# Patient Record
Sex: MALE | Race: WHITE | NOT HISPANIC OR LATINO | Employment: OTHER | URBAN - METROPOLITAN AREA
[De-identification: names, ages, dates, MRNs, and addresses within clinical notes are randomized per-mention and may not be internally consistent; named-entity substitution may affect disease eponyms.]

---

## 2017-01-03 ENCOUNTER — TELEPHONE (OUTPATIENT)
Dept: FAMILY MEDICINE CLINIC | Facility: CLINIC | Age: 72
End: 2017-01-03

## 2017-01-03 NOTE — TELEPHONE ENCOUNTER
Talk to the sister and they have a concern awaiting 6 months for another CT of the chest he just had one in December the radiologist stated there may be the slightest change I told him that I think 3-4 months would be something that we could wait.  They're hearing all of these stories that there've been changes in 2 weeks and patient.  It is very hard for us to get these done frequently on patient's we've be done her ever month.  I would talk to the radiologist and see how he feels about 3-4 months.  Told her it's hard for me to sit back in no that were following something and does not check it every month but insurance is diabetic and the patient that

## 2017-01-16 RX ORDER — ZOLPIDEM TARTRATE 5 MG/1
TABLET ORAL
Qty: 30 TABLET | Refills: 1 | Status: SHIPPED | OUTPATIENT
Start: 2017-01-16 | End: 2017-09-07 | Stop reason: SDUPTHER

## 2017-01-16 RX ORDER — RIVAROXABAN 20 MG/1
TABLET, FILM COATED ORAL
Qty: 30 TABLET | Refills: 5 | Status: SHIPPED | OUTPATIENT
Start: 2017-01-16 | End: 2017-07-14 | Stop reason: SDUPTHER

## 2017-01-17 RX ORDER — ZOLPIDEM TARTRATE 5 MG/1
TABLET ORAL
Qty: 30 TABLET | Refills: 1 | OUTPATIENT
Start: 2017-01-17

## 2017-01-30 RX ORDER — DOXAZOSIN MESYLATE 4 MG/1
TABLET ORAL
Qty: 90 TABLET | Refills: 0 | Status: SHIPPED | OUTPATIENT
Start: 2017-01-30 | End: 2017-04-19 | Stop reason: SDUPTHER

## 2017-01-30 RX ORDER — GABAPENTIN 300 MG/1
CAPSULE ORAL
Qty: 60 CAPSULE | Refills: 2 | Status: SHIPPED | OUTPATIENT
Start: 2017-01-30 | End: 2017-05-05 | Stop reason: SDUPTHER

## 2017-02-03 ENCOUNTER — OFFICE VISIT (OUTPATIENT)
Dept: NEUROLOGY | Facility: CLINIC | Age: 72
End: 2017-02-03

## 2017-02-03 VITALS
HEART RATE: 68 BPM | DIASTOLIC BLOOD PRESSURE: 66 MMHG | BODY MASS INDEX: 25.73 KG/M2 | WEIGHT: 190 LBS | OXYGEN SATURATION: 98 % | SYSTOLIC BLOOD PRESSURE: 130 MMHG | HEIGHT: 72 IN

## 2017-02-03 DIAGNOSIS — R47.1 DYSARTHRIA: ICD-10-CM

## 2017-02-03 DIAGNOSIS — R26.81 GAIT INSTABILITY: ICD-10-CM

## 2017-02-03 DIAGNOSIS — G20 IDIOPATHIC PARKINSON'S DISEASE (HCC): ICD-10-CM

## 2017-02-03 DIAGNOSIS — G60.9 IDIOPATHIC PERIPHERAL NEUROPATHY: ICD-10-CM

## 2017-02-03 PROCEDURE — 99214 OFFICE O/P EST MOD 30 MIN: CPT | Performed by: PSYCHIATRY & NEUROLOGY

## 2017-02-03 NOTE — PROGRESS NOTES
Subjective   Rommel Gonzalez is a 71 y.o. male with history of Parkinson's disease with peripheral neuropathy and dysarthria came for follow-up appointment.    History of Present Illness   He was accompanied by his daughter and according to them, his slurred speech got worse since her last visit but denies any difficulty swallowing or difficulty breathing issues.  Still having tremor and having more shuffling gait and balance issues when walking and had one fall and complaint with the medication carbidopa/levodopa and denies any side effects.  Denies any hallucinations or memory issues or lightheaded spells.  Denies any worsening of the tingling and numbness and complaint with the medication gabapentin and denies any side effects.  Denies any headaches, blurred vision, double vision, weakness, dizziness, passing out spells or recent hospitalizations since last visit.    The following portions of the patient's history were reviewed and updated as appropriate: allergies, current medications, past family history, past medical history, past social history, past surgical history and problem list.    Review of Systems   Constitutional: Negative for appetite change, chills, fatigue and fever.   HENT: Negative for hearing loss, tinnitus and trouble swallowing.    Eyes: Negative for pain, redness, itching and visual disturbance.   Respiratory: Negative for cough, shortness of breath and wheezing.    Cardiovascular: Negative for chest pain, palpitations and leg swelling.   Gastrointestinal: Negative for constipation, diarrhea, nausea, rectal pain and vomiting.   Endocrine: Negative for cold intolerance and heat intolerance.   Genitourinary: Negative for difficulty urinating and urgency.   Musculoskeletal: Positive for gait problem (unsteady). Negative for back pain, neck pain and neck stiffness.   Skin: Negative for rash and wound.   Allergic/Immunologic: Negative for environmental allergies and food allergies.   Neurological:  Positive for tremors. Negative for dizziness, weakness, numbness and headaches.   Hematological: Bruises/bleeds easily.   Psychiatric/Behavioral: Negative for confusion, hallucinations and sleep disturbance. The patient is not nervous/anxious.        Objective   Physical Exam  GENERAL EXAMINATION : Patient is well-developed, well-nourished, well-groomed, alert and approriate in no apparent distress.  HEENT: Normocephalic, normal funduscopic exam, no visible oral lesions.  NECK : Normal range of motion, no tenderness, no malalignment.  CARDIAC : Regular rate and rhythm, no murmurs.  CHEST : Clear to auscultation, bilateral.   No wheezing .  ABDOMEN : Soft, non tender and non distended. EXTREMITIES: No edema. SKIN : No rashes or lesions visible. MUSCULOSKELETAL : No muscle weakness or muscle pain. No joint pains. PSYCHIATRIC : Awake and follwoing verbal commands well.     NEUROLOGICAL EXAMINATION  :  Higher integrative functions : No aphasia but has dysarthria.  CRANIAL NERVES : Cranial nerve II: Normal visual acuity and visual fields.  On funduscopic exam, discs are flat with sharp margins cranial nerves III, IV, VI: Extraocular movements are full without nystagmus; pupils are equal, round and reactive to light.  Cranial nerve V: Normal facial sensation and strength of muscles of mastication.  Cranial nerve: VII: Facial movements are symmetric.  No weakness.  Cranial nerve VIII: Auditory acuity is normal.  Cranial nerve IX, X: Symmetric palate movement.  Cranial nerve XI sternocleidomastoid and trapezius are normal.  Cranial nerve XII: Tongue in the midline with no atrophy or fasciculations.      MOTOR : Normal muscle strength except for resting tremor with mild cogwheel rigidity and decreased arm swing.  SENSATION : Normal to light touch, pinprick, vibration sensations intact and Romberg is negative.  MUSCLE STRETCH REFLEXES : Normal and symmetric in the upper extremities and lower extremities and the plantar  responses are flexor bilaterally. COORDINATION : Finger to nose test showed no dysmetria.  Rapid alternating movements are normal.   GAIT : Walks on heels, toes, except for difficulty with tandem walk.    Assessment/Plan   Rommel was seen today for parkinson's disease.    Diagnoses and all orders for this visit:    Idiopathic Parkinson's disease  -     OT Consult: Eval & Treat; Future  -     Ambulatory Referral to Speech Therapy    Idiopathic peripheral neuropathy    Dysarthria  -     Ambulatory Referral to Speech Therapy    Gait instability  -     Ambulatory Referral to Physical Therapy Evaluate and treat     71-year-old right-handed white male with history of Parkinson's disease with peripheral neuropathy and dysarthria came for follow-up appointment and complaining of worsening of slurred speech issues and balance problems.  On exam, no new focal deficits were seen except for resting tremor with mild cogwheel rigidity and decreased arm swing with difficulty tandem walk and has dysarthria.  Discussed with the patient and his daughter regarding his signs and symptoms and told him to continue carbidopa/levodopa 25/100 mg 2 tablets by mouth 3 times a day and continue carbidopa/levodopa ER 50/200 mg one tablet by mouth at bedtime and discussed about side effects.  Discussed about fall precautions and ordered for physical therapy, occupational therapy and speech therapy to help with the balance and gait and slurred speech issues.  Continue gabapentin 300 mg 1 capsule by mouth twice a day and discussed about side effects.  Will follow-up in 4 months or earlier if problems arise.    Thank you for allowing me to participate in the care of the patient.  Please feel free to call for any questions at your convenience.    Yours sincerely,    Roxann Hankins M.D

## 2017-02-06 ENCOUNTER — OFFICE VISIT (OUTPATIENT)
Dept: FAMILY MEDICINE CLINIC | Facility: CLINIC | Age: 72
End: 2017-02-06

## 2017-02-06 VITALS
BODY MASS INDEX: 26.25 KG/M2 | OXYGEN SATURATION: 98 % | TEMPERATURE: 97.8 F | HEART RATE: 90 BPM | HEIGHT: 72 IN | SYSTOLIC BLOOD PRESSURE: 104 MMHG | DIASTOLIC BLOOD PRESSURE: 72 MMHG | WEIGHT: 193.8 LBS

## 2017-02-06 DIAGNOSIS — E03.9 HYPOTHYROIDISM (ACQUIRED): ICD-10-CM

## 2017-02-06 DIAGNOSIS — Z91.81 HISTORY OF FALL: Primary | ICD-10-CM

## 2017-02-06 DIAGNOSIS — R26.81 GAIT INSTABILITY: ICD-10-CM

## 2017-02-06 DIAGNOSIS — E03.4 HYPOTHYROIDISM DUE TO ACQUIRED ATROPHY OF THYROID: ICD-10-CM

## 2017-02-06 DIAGNOSIS — G20 IDIOPATHIC PARKINSON'S DISEASE (HCC): ICD-10-CM

## 2017-02-06 DIAGNOSIS — I10 ESSENTIAL HYPERTENSION: ICD-10-CM

## 2017-02-06 DIAGNOSIS — I48.20 CHRONIC ATRIAL FIBRILLATION (HCC): ICD-10-CM

## 2017-02-06 PROCEDURE — 99212 OFFICE O/P EST SF 10 MIN: CPT | Performed by: GENERAL PRACTICE

## 2017-02-06 RX ORDER — CARBIDOPA AND LEVODOPA 25; 100 MG/1; MG/1
1 TABLET, EXTENDED RELEASE ORAL 3 TIMES DAILY
COMMUNITY
End: 2017-03-13 | Stop reason: SDUPTHER

## 2017-02-06 RX ORDER — BUMETANIDE 2 MG/1
TABLET ORAL
Qty: 60 TABLET | Refills: 0 | Status: SHIPPED | OUTPATIENT
Start: 2017-02-06 | End: 2017-03-14 | Stop reason: SDUPTHER

## 2017-02-06 NOTE — PROGRESS NOTES
Subjective   Rommel Gonzalez is a 71 y.o. male.     Chief Complaint   Patient presents with   • Fall   • Knee Pain     Bitlateral knee pain        History of Present Illness patient is a 71 y.o. with known Parkinson disease who had a fall the other day landed on his knees right elbow he is on Plavix but I do not see any large bruising either knee or elbow is very fortunately didn't get any kind of a hematoma.  This patient labs today to be brought up-to-date today he is not fasting today he will return fasting he is with his sister today he does not need any medications filled today at least that's what I was told was in the room JONO was going in to recheck                        The following portions of the patient's history were reviewed and updated as appropriate: allergies, current medications, past family history, past medical history, past social history, past surgical history and problem list.      Review of Systems   Cardiovascular:        Pretension hyperlipidemia on medication chronic atrial fibrillation Xarelto   Musculoskeletal:        No contusions to his right or left knee or elbow from the fall   Skin:        History of fall on right and left knee and right elbow I see no evidence of contusion certainly no skin tears not any bruising no hematomas which is very marissa he is on Plavix   Neurological:        Patient has progressive Parkinson disease and if he can speech defect at this time and mobility defect on treatment from neurologist   Hematological:        Iron deficiency anemia         Objective   Physical Exam   HENT:   Obvious Parkinson issues with speech instability   Cardiovascular:   Slow atrial fibrillation on anticoagulation blood pressure [birth year] I asked the daughter to watch this as mailman a reason for the fall needs to take his pressures come times a day   Pulmonary/Chest: Effort normal and breath sounds normal.   Musculoskeletal:   No findings of the knees from the fall at all the  right elbow also there is negative findings is very fortunate since he is on Plavix   Neurological:   Parkinson disease gait instability speech defect some mild confusion not terrible slurred speech had a fall which he says I tripped   Skin:   Found no evidence of any counter bruising or contusions or the right or left knee on the right elbow from where he fell         Assessment/Plan   Rommel was seen today for fall and knee pain.    Diagnoses and all orders for this visit:    History of fall    Idiopathic Parkinson's disease    Chronic atrial fibrillation    Gait instability    Essential hypertension    Hypothyroidism due to acquired atrophy of thyroid    Hypothyroidism (acquired)            Ike Lee MD  2/6/2017     Very nice 71-year-old white gentleman here today with his sister mainly because he fell on his knees right and left and his right elbow however on examination I see no contusions no bruising no hematomas he is very fortunate since he is anticoagulation I certainly don't feel we need any counter x-rays.  He has progressive Parkinson disease gait instability speech difficulty sees a neurologist he is on medication patient has a history of hyperlipidemia hypothyroidism hypertension all on medications blood pressure as noted was slightly low

## 2017-02-09 LAB
T4 FREE SERPL-MCNC: 1.6 NG/DL (ref 0.93–1.7)
TSH SERPL DL<=0.005 MIU/L-ACNC: 1.21 MIU/ML (ref 0.27–4.2)

## 2017-02-13 RX ORDER — CARBIDOPA AND LEVODOPA 50; 200 MG/1; MG/1
TABLET, EXTENDED RELEASE ORAL
Qty: 90 TABLET | Refills: 2 | Status: SHIPPED | OUTPATIENT
Start: 2017-02-13 | End: 2017-03-13 | Stop reason: SDUPTHER

## 2017-03-13 ENCOUNTER — OFFICE VISIT (OUTPATIENT)
Dept: FAMILY MEDICINE CLINIC | Facility: CLINIC | Age: 72
End: 2017-03-13

## 2017-03-13 VITALS
HEART RATE: 100 BPM | HEIGHT: 72 IN | WEIGHT: 196 LBS | OXYGEN SATURATION: 99 % | BODY MASS INDEX: 26.55 KG/M2 | RESPIRATION RATE: 16 BRPM | SYSTOLIC BLOOD PRESSURE: 108 MMHG | TEMPERATURE: 98.6 F | DIASTOLIC BLOOD PRESSURE: 62 MMHG

## 2017-03-13 DIAGNOSIS — E03.9 HYPOTHYROIDISM (ACQUIRED): ICD-10-CM

## 2017-03-13 DIAGNOSIS — I10 ESSENTIAL HYPERTENSION: ICD-10-CM

## 2017-03-13 DIAGNOSIS — I48.20 CHRONIC ATRIAL FIBRILLATION (HCC): ICD-10-CM

## 2017-03-13 DIAGNOSIS — Z79.01 ANTICOAGULATED: ICD-10-CM

## 2017-03-13 DIAGNOSIS — E61.1 IRON DEFICIENCY: ICD-10-CM

## 2017-03-13 DIAGNOSIS — R93.89 ABNORMAL CHEST CT: ICD-10-CM

## 2017-03-13 DIAGNOSIS — Z79.899 HIGH RISK MEDICATION USE: ICD-10-CM

## 2017-03-13 DIAGNOSIS — G20 IDIOPATHIC PARKINSON'S DISEASE (HCC): Primary | ICD-10-CM

## 2017-03-13 PROCEDURE — 99214 OFFICE O/P EST MOD 30 MIN: CPT | Performed by: FAMILY MEDICINE

## 2017-03-13 RX ORDER — LEVOTHYROXINE SODIUM 112 UG/1
112 TABLET ORAL DAILY
Qty: 90 TABLET | Refills: 3 | Status: SHIPPED | OUTPATIENT
Start: 2017-03-13 | End: 2018-03-03 | Stop reason: SDUPTHER

## 2017-03-14 LAB
BASOPHILS # BLD AUTO: 0 X10E3/UL (ref 0–0.2)
BASOPHILS NFR BLD AUTO: 1 %
BUN SERPL-MCNC: 16 MG/DL (ref 8–27)
BUN/CREAT SERPL: 16 (ref 10–22)
CALCIUM SERPL-MCNC: 9.2 MG/DL (ref 8.6–10.2)
CHLORIDE SERPL-SCNC: 100 MMOL/L (ref 96–106)
CO2 SERPL-SCNC: 29 MMOL/L (ref 18–29)
CREAT SERPL-MCNC: 0.98 MG/DL (ref 0.76–1.27)
EOSINOPHIL # BLD AUTO: 0.2 X10E3/UL (ref 0–0.4)
EOSINOPHIL NFR BLD AUTO: 2 %
ERYTHROCYTE [DISTWIDTH] IN BLOOD BY AUTOMATED COUNT: 13.4 % (ref 12.3–15.4)
FERRITIN SERPL-MCNC: 316 NG/ML (ref 30–400)
GLUCOSE SERPL-MCNC: 95 MG/DL (ref 65–99)
HCT VFR BLD AUTO: 39.5 % (ref 37.5–51)
HGB BLD-MCNC: 12.8 G/DL (ref 12.6–17.7)
IMM GRANULOCYTES # BLD: 0 X10E3/UL (ref 0–0.1)
IMM GRANULOCYTES NFR BLD: 0 %
LYMPHOCYTES # BLD AUTO: 2.2 X10E3/UL (ref 0.7–3.1)
LYMPHOCYTES NFR BLD AUTO: 31 %
MCH RBC QN AUTO: 31.1 PG (ref 26.6–33)
MCHC RBC AUTO-ENTMCNC: 32.4 G/DL (ref 31.5–35.7)
MCV RBC AUTO: 96 FL (ref 79–97)
MONOCYTES # BLD AUTO: 0.5 X10E3/UL (ref 0.1–0.9)
MONOCYTES NFR BLD AUTO: 7 %
NEUTROPHILS # BLD AUTO: 4.1 X10E3/UL (ref 1.4–7)
NEUTROPHILS NFR BLD AUTO: 59 %
PLATELET # BLD AUTO: 239 X10E3/UL (ref 150–379)
POTASSIUM SERPL-SCNC: 4.4 MMOL/L (ref 3.5–5.2)
RBC # BLD AUTO: 4.12 X10E6/UL (ref 4.14–5.8)
SODIUM SERPL-SCNC: 142 MMOL/L (ref 134–144)
WBC # BLD AUTO: 7 X10E3/UL (ref 3.4–10.8)

## 2017-03-14 RX ORDER — BUMETANIDE 2 MG/1
TABLET ORAL
Qty: 60 TABLET | Refills: 0
Start: 2017-03-14 | End: 2017-04-19 | Stop reason: SDUPTHER

## 2017-03-21 ENCOUNTER — HOSPITAL ENCOUNTER (OUTPATIENT)
Dept: SPEECH THERAPY | Facility: HOSPITAL | Age: 72
Setting detail: THERAPIES SERIES
Discharge: HOME | End: 2017-03-21

## 2017-03-21 ENCOUNTER — HOSPITAL ENCOUNTER (OUTPATIENT)
Dept: OCCUPATIONAL THERAPY | Facility: HOSPITAL | Age: 72
Setting detail: THERAPIES SERIES
Discharge: HOME | End: 2017-03-21
Attending: PSYCHIATRY & NEUROLOGY

## 2017-03-21 ENCOUNTER — HOSPITAL ENCOUNTER (OUTPATIENT)
Dept: PHYSICAL THERAPY | Facility: HOSPITAL | Age: 72
Setting detail: THERAPIES SERIES
Discharge: HOME | End: 2017-03-21

## 2017-03-21 DIAGNOSIS — R27.9 LACK OF COORDINATION: ICD-10-CM

## 2017-03-21 DIAGNOSIS — R47.1 DYSARTHRIA: ICD-10-CM

## 2017-03-21 DIAGNOSIS — G20 PARKINSON'S DISEASE (HCC): ICD-10-CM

## 2017-03-21 DIAGNOSIS — G20 PARKINSON'S DISEASE (HCC): Primary | ICD-10-CM

## 2017-03-21 DIAGNOSIS — R49.0 DYSPHONIA: ICD-10-CM

## 2017-03-21 DIAGNOSIS — G20 IDIOPATHIC PARKINSON'S DISEASE (HCC): Primary | ICD-10-CM

## 2017-03-21 DIAGNOSIS — R29.898 WEAKNESS OF BOTH UPPER EXTREMITIES: ICD-10-CM

## 2017-03-21 DIAGNOSIS — R26.81 GAIT INSTABILITY: Primary | ICD-10-CM

## 2017-03-21 PROCEDURE — G8999 MOTOR SPEECH CURRENT STATUS: HCPCS | Performed by: SPEECH-LANGUAGE PATHOLOGIST

## 2017-03-21 PROCEDURE — 97165 OT EVAL LOW COMPLEX 30 MIN: CPT

## 2017-03-21 PROCEDURE — G9186 MOTOR SPEECH GOAL STATUS: HCPCS | Performed by: SPEECH-LANGUAGE PATHOLOGIST

## 2017-03-21 PROCEDURE — G8985 CARRY GOAL STATUS: HCPCS

## 2017-03-21 PROCEDURE — 92522 EVALUATE SPEECH PRODUCTION: CPT | Performed by: SPEECH-LANGUAGE PATHOLOGIST

## 2017-03-21 PROCEDURE — G8984 CARRY CURRENT STATUS: HCPCS

## 2017-03-21 PROCEDURE — G8979 MOBILITY GOAL STATUS: HCPCS

## 2017-03-21 PROCEDURE — 97161 PT EVAL LOW COMPLEX 20 MIN: CPT

## 2017-03-21 PROCEDURE — G8978 MOBILITY CURRENT STATUS: HCPCS

## 2017-03-21 NOTE — PROGRESS NOTES
Outpatient Physical Therapy Neuro Initial Evaluation  [facility]     Patient Name: Rommel Gonzalez  : 1945  MRN: [id number]  Today's Date: 3/21/2017      Visit Date: 2017    Patient Active Problem List   Diagnosis   • AF (atrial fibrillation)   • Depression   • Increased thyroid stimulating hormone level   • Gait instability   • Hypertension   • Insomnia   • Idiopathic Parkinson's disease   • Peripheral neuropathy   • Dysarthria   • Atrial flutter   • Anticoagulated   • Health care maintenance   • Hypothyroidism (acquired)   • Weakness of voice   • Pure hypercholesterolemia   • Abnormal chest CT        Past Medical History   Diagnosis Date   • AF (atrial fibrillation)    • Colon polyps    • Confusion    • Depression    • Disease of thyroid gland    • Gait instability    • Hay fever    • Hyperlipidemia    • Hypertension    • Insomnia    • Measles    • Mumps    • Parkinson disease    • Peripheral neuropathy    • Slurred speech    • Tremor         No past surgical history on file.      Visit Dx:     ICD-10-CM ICD-9-CM   1. Gait instability R26.81 781.2   2. Parkinson's disease G20 332.0             Patient History       17 1500 17 1200       History    Chief Complaint Balance Problems;Falls/history of falls  -MR Balance Problems;Falls/history of falls   difficulty getting up from couch  -EF     Date Current Problem(s) Began 17   date of MD referral  -MR 17   referral date  -EF     Brief Description of Current Complaint Pt reports problems with speech, balance, and decreased UE strength and coordination  -MR Pt reports difficulty getting up from couch, 2 falls in past year.  -EF     Previous treatment for THIS PROBLEM Other (comment)   Outpatient PT at [facility] last year  -MR Other (comment)   Outpatient PT at [facility] last year  -EF     Onset Date- PT  3-21-17  -EF     Onset Date- OT 3/21/17  -MR      Patient/Caregiver Goals Improve mobility;Improve strength  -MR  Improve mobility;Improve strength  -EF     Hand Dominance right-handed  -MR right-handed  -EF     Pain     Pain at Present 0  -MR 0  -EF     Fall Risk Assessment    Any falls in the past year: Yes  -MR Yes  -EF     Number of falls reported in the last 12 months 2  -MR 2  -EF     Factors that contributed to the fall: Lost balance   carrying too many things  -MR Lost balance   carrying too many things  -EF     Previous Functional Level  Ambulation   Independent without device  -EF     Services    Prior Rehab/Home Health Experiences  Yes  -EF     When was the prior experience with Rehab/Home Health  2016  -EF     Where was the prior experience with Rehab/Home Health  [facility]  -EF     Are you currently receiving Home Health services  No  -EF     Do you plan to receive Home Health services in the near future  No  -EF     Daily Activities    Primary Language English  -MR      Pt Participated in POC and Goals Yes  -MR      Safety    Are you being hurt, hit, or frightened by anyone at home or in your life? No  -MR      Are you being neglected by a caregiver No  -MR        User Key  (r) = Recorded By, (t) = Taken By, (c) = Cosigned By    Initials Name Provider Type    EF Kyara Reese, PT Physical Therapist    MR Pascale Hua Grace, OT Occupational Therapist             Hand Therapy (last 24 hours)      Hand Eval       03/21/17 1600          Hand  Strength     Strength Affected Side Bilateral  -MR       Strength Right    Right  Test 1 30  -MR      Right  Test 2 31  -MR      Right  Test 3 25  -MR       Strength Average Right 28.67  -MR       Strength Left    Left  Test 1 47  -MR      Left  Test 2 50  -MR      Left  Test 3 50  -MR       Strength Average Left 49  -MR      Pinch Strength    Affected Side Bilateral  -MR      Right Hand Strength - Pinch (lbs)    Lateral 12 lbs  -MR      Left Hand Strength - Pinch (lbs)    Lateral 11 lbs  -MR        User Key  (r) = Recorded  By, (t) = Taken By, (c) = Cosigned By    Initials Name Provider Type    MR Pascale Edwards, OT Occupational Therapist                PT Neuro       03/21/17 1200          Subjective Comments    Subjective Comments I think I'm doing pretty good.  -EF      Precautions and Contraindications    Precautions/Limitations fall precautions  -EF      Precautions falls  -EF      Subjective Pain    Able to rate subjective pain? yes  -EF      Pre-Treatment Pain Level 0  -EF      Post-Treatment Pain Level 0  -EF      Home Living    Living Arrangements condominium  -EF      Home Accessibility --   no steps  -EF      Posture/Observations    Posture/Observations Comments Pt drove to therapy & ambulated without assistive device. Communication: mumbles, voice very quiet.  -EF      ROM (Range of Motion)    General ROM no range of motion deficits identified  -EF      MMT (Manual Muscle Testing)    General MMT Assessment lower extremity strength deficits identified  -EF      Left Hip    Hip Flexion Gross Movement (4/5) good  -EF      Hip ABduction Gross Movement (4/5) good  -EF      Hip ADduction Gross Movement (4/5) good  -EF      Lower Extremity    Lower Ext Manual Muscle Testing right LE strength is WFL;left hip strength deficit  -EF      Bed Mobility, Assessment/Treatment    Bed Mobility, Comment per patient, Independent  -EF      Transfers    Transfers, Sit-Stand Saunders conditional independence   uses UEs  -EF      Transfers, Stand-Sit Saunders conditional independence   uses UEs  -EF      Transfer, Safety Issues weight-shifting ability decreased  -EF      Transfer, Impairments strength decreased;impaired balance  -EF      Gait Assessment/Treatment    Gait, Saunders Level conditional independence  -EF      Gait, Assistive Device --   no device  -EF      Gait, Distance (Feet) 200   x 1, 80 x 2  -EF      Gait, Gait Deviations bilateral:;adri decreased;decreased heel strike;step length decreased;toe-to-floor  clearance decreased;weight-shifting ability decreased  -EF      Gait, Safety Issues balance decreased during turns;step length decreased;weight-shifting ability decreased  -EF      Gait, Impairments strength decreased;impaired balance  -EF      Stairs Assessment/Treatment    Number of Stairs 4  -EF      Stairs, Handrail Location both sides  -EF      Stairs, [place] Level supervision required  -EF      Stairs, Technique Used step over step (ascending);step over step (descending)  -EF      Stairs, Safety Issues balance decreased during turns;weight-shifting ability decreased  -EF      Stairs, Impairments strength decreased;impaired balance  -EF        User Key  (r) = Recorded By, (t) = Taken By, (c) = Cosigned By    Initials Name Provider Type    KATHIE Reese PT Physical Therapist                        Therapy Education       03/21/17 1239          Therapy Education    Given Posture/body mechanics;Fall prevention and home safety;Mobility training  -EF      Program New  -EF      How Provided Verbal;Demonstration  -EF      Provided to Patient  -EF      Level of Understanding Teach back education performed  -EF        User Key  (r) = Recorded By, (t) = Taken By, (c) = Cosigned By    Initials Name Provider Type    KATHIE Reese PT Physical Therapist                PT OP Goals       03/21/17 1200       PT Short Term Goals    STG Date to Achieve 04/20/17  -EF     STG 1 Pt to be Indep with Initial Home Exercise Program.  -EF     STG 2 Pt to perform sit to stand from couch with improved ease mod Indep.  -EF     STG 3 Pt to improve reaching forward to ~4 inches without loss of balance.  -EF     Long Term Goals    LTG Date to Achieve 05/20/17  -EF     LTG 1 Pt to be Indep with HEP to maintain/improve upon gains made in therapy.  -EF     LTG 2 Pt to report improved ease with performing sit to stand from couch/chairs at home.  -EF     LTG 3 Pt to improve reaching forward to ~6 inches without loss of  balance.  -EF     LTG 4 Pt to score at least 50/56 on Krause Balance Test to demonstrate improved balance and decreased risk of falls.  -EF     LTG 5 Pt to ambulate with increased step length and improved toe to floor clearance.  -EF     Time Calculation    PT Goal Re-Cert Due Date 04/20/17  -EF       User Key  (r) = Recorded By, (t) = Taken By, (c) = Cosigned By    Initials Name Provider Type    EF Kyara Reese, PT Physical Therapist                PT Assessment/Plan       03/21/17 1759 03/21/17 1616    PT Plan    PT Frequency 1x/week  -EF     Predicted Duration of Therapy Intervention (days/wks) 6-8 weeks  -EF 4-5 weeks  -MR      03/21/17 1604 03/21/17 1427    PT Assessment    Functional Limitations  Impaired gait  -EF    Impairments  Balance;Gait;Muscle strength  -EF    Assessment Comments Pt presents with impaired gait and balance, decreased left hip strength, and difficulty with low transfers.  He will benefit from Outpatient Physical Therapy to address these deficits to promote improved function and safety at home and in the community.  -EF     Please refer to paper survey for additional self-reported information  Yes  -EF    Rehab Potential  Good  -EF    Patient/caregiver participated in establishment of treatment plan and goals  Yes  -EF    Patient would benefit from skilled therapy intervention  Yes  -EF    PT Plan    PT Frequency  1x/week  -EF    Predicted Duration of Therapy Intervention (days/wks)  6-8 weeks  -EF    Planned CPT's?  PT EVAL LOW COMPLEXITY: 11128;PT NEUROMUSC RE-EDUCATION EA 15 MIN: 94526  -EF    Physical Therapy Interventions (Optional Details)  balance training;gait training;neuromuscular re-education;home exercise program;patient/family education;strengthening;transfer training  -EF      03/21/17 1200       PT Assessment    Assessment Comments Pt presents with impaired gait and balance, decreased left hip strength, and difficulty with low transfers.  He will benefit from outpatient  Physical Therapy to address these deficits to promote improved function and safety at home and in the community.  -EF     Please refer to paper survey for additional self-reported information Yes  -EF     Rehab Potential Good  -EF     Patient/caregiver participated in establishment of treatment plan and goals Yes  -EF     Patient would benefit from skilled therapy intervention Yes  -EF       User Key  (r) = Recorded By, (t) = Taken By, (c) = Cosigned By    Initials Name Provider Type    EF Kyara Reese, PT Physical Therapist    MR Pascale Edwards, OT Occupational Therapist                   Exercises       03/21/17 1200          Subjective Comments    Subjective Comments I think I'm doing pretty good.  -EF      Subjective Pain    Able to rate subjective pain? yes  -EF      Pre-Treatment Pain Level 0  -EF      Post-Treatment Pain Level 0  -EF      Exercise 1    Exercise Name 1 Standing p/d flexion  -EF      Reps 1 10  -EF      Exercise 2    Exercise Name 2 Standing trunk rotation with UEs at 90 abd  -EF      Cueing 2 Verbal;Tactile  -EF      Reps 2 10  -EF        User Key  (r) = Recorded By, (t) = Taken By, (c) = Cosigned By    Initials Name Provider Type    KATHIE Reese, SARAH Physical Therapist                            Outcome Measures       03/21/17 1600 03/21/17 1500 03/21/17 1200    Krause Balance Scale    Sitting to Standing   3  -EF    Standing Unsupported   4  -EF    Sitting with Back Unsupported but Feet Supported on Floor or on Stool   4  -EF    Standing to Sitting   4  -EF    Transfers   3  -EF    Standing Unsupported with Eyes Closed   4  -EF    Standing Unsupported with Feet Together   4  -EF    Reaching Forward with Outstretched Arm While Standing   2  -EF     Object From the Floor From a Standing Position   4  -EF    Turning to Look Behind Over Left and Right Shoulders While Standing   2  -EF    Turn 360 Degrees   2  -EF    Place Alternate Foot on Step or Stool While Standing  Unsupported   4  -EF    Standing Unsupported with One Foot in Front   2  -EF    Standing on One Leg   2  -EF    Krause Total Score   44  -EF    DASH    Open a tight or new jar.  2  -MR     Write  4  -MR     Turn a key  2  -MR     Prepare a meal  2  -MR     Push open a heavy door  1  -MR     Place an object on a shelf above your head  2  -MR     Do heavy household chores (e.g., wash walls, wash floors)  3  -MR     Garden or do yard work  5  -MR     Make a bed  1  -MR     Carry a shopping bag or briefcase  1  -MR     Carry a heavy object (over 10 lbs)  4  -MR     Change a lightbulb overhead  5  -MR     Wash or blow dry your hair  1  -MR     Wash your back  1  -MR     Put on a pullover sweater  1  -MR     Use a knife to cut food  1  -MR     Recreational activities in which require little effort (e.g., cardplaying, knitting, etc.)  2  -MR     Recreational activities in which you take some force or impact through your arm, should or hand (e.g. golf, hammering, tennis, etc.)  3  -MR     Recreational Activities in which you move your arm freely (e.g., frisbee, badminton, etc.)  2  -MR     Manage transportation needs (getting from one place to another)  1  -MR     During the past week, to what extent has your arm, shoulder, or hand problem interfered with your normal social activites with family, friends, neighbors or groups?  1  -MR     During the past week, were you limited in your work or other regular daily activities as a result of your arm, shoulder or hand problem?  1  -MR     Arm, Shoulder, or hand pain  1  -MR     Arm, shoulder or hand pain when you performed any specific activity  1  -MR     Tingling (pins and needles) in your arm, shoulder, or hand  1  -MR     Weakness in your arm, shoulder or hand  2  -MR     Stiffness in your arm, shoulder or hand  1  -MR     During the past week, how much difficulty have you had sleeping because of the pain in your arm, shoulder or hand?  1  -MR     I feel less capable, less  confident or less useful because of my arm, shoulder or hand problem  4  -MR     DASH Sum   57  -MR     Number of Questions Answered  29  -MR     DASH Score  24.14  -MR     Timed Up and Go (TUG)    TUG Test 1   15 seconds  -EF    TUG Test 2   16 seconds  -EF    Functional Assessment    Outcome Measure Options --  -MR Disabilities of the Arm, Shoulder, and Hand (DASH)  -MR Krause Balance;Timed Up and Go (TUG)  -EF      User Key  (r) = Recorded By, (t) = Taken By, (c) = Cosigned By    Initials Name Provider Type    EF Kyara Reese, PT Physical Therapist    MR Pascale Edwards, OT Occupational Therapist          Time Calculation:   Start Time: 1105  Stop Time: 1150  Time Calculation (min): 45 min     Therapy Charges for Today     Code Description Service Date Service Provider Modifiers Qty    09688482304 HC PT MOBILITY CURRENT 3/21/2017 Kyara Reese, PT GP, CJ 1    76862080678 HC PT MOBILITY PROJECTED 3/21/2017 Kyara Reese, PT GP, CI 1    [id number] HC PT EVAL LOW COMPLEXITY 4 3/21/2017 Kyara Reese, PT GP 1          PT G-Codes  PT Professional Judgement Used?: Yes  Outcome Measure Options: Krause Balance, Timed Up and Go (TUG)  Functional Limitation: Mobility: Walking and moving around  Mobility: Walking and Moving Around Current Status (): At least 20 percent but less than 40 percent impaired, limited or restricted  Mobility: Walking and Moving Around Goal Status (): At least 1 percent but less than 20 percent impaired, limited or restricted         Kyara Reese, PT  3/21/2017

## 2017-03-21 NOTE — PROGRESS NOTES
"Outpatient Speech Language Pathology   Adult Speech Language Cognitive Initial Evaluation  [facility]     Patient Name: Rommel Gonzalez  : 1945  MRN: [id number]  Today's Date: 3/21/2017        Visit Date: 2017   Patient Active Problem List   Diagnosis   • AF (atrial fibrillation)   • Depression   • Increased thyroid stimulating hormone level   • Gait instability   • Hypertension   • Insomnia   • Idiopathic Parkinson's disease   • Peripheral neuropathy   • Dysarthria   • Atrial flutter   • Anticoagulated   • Health care maintenance   • Hypothyroidism (acquired)   • Weakness of voice   • Pure hypercholesterolemia   • Abnormal chest CT        Past Medical History   Diagnosis Date   • AF (atrial fibrillation)    • Colon polyps    • Confusion    • Depression    • Disease of thyroid gland    • Gait instability    • Hay fever    • Hyperlipidemia    • Hypertension    • Insomnia    • Measles    • Mumps    • Parkinson disease    • Peripheral neuropathy    • Slurred speech    • Tremor         History reviewed. No pertinent past surgical history.      Visit Dx:    ICD-10-CM ICD-9-CM   1. Parkinson's disease G20 332.0   2. Dysarthria R47.1 784.51   3. Dysphonia R49.0 784.42                 Adult Speech Language - 17 1100     Background and History    Reason for Referral Mr Gonzalez is referred to outpatient speech therapy by Dr Hankins for worsening dysarthria. Mr Gonzalez has history of Parkinson's disease, dysarthria and dysphonia. Mr Gonzalez has received outpatient speech therapy in the past, 7/16/15 to 8/13/15 and 16 to 16.  -KA    Stated Goals Mr Gonzalez stated his goals are to improve his speech intelligibility and loudness so others can understand him more easily. SLP asked Mr Gonzalez if he has continued his exercises and strategies and he stated \"not really.\" Mr Gonzalez verbalized his goal is to use strategies and exercises to improve his speech so others can understand him.  -KA    Previous " Functional Status Functional for Activities of Daily Living without assistance  -KA    Informant for the Evaluation Self  -KA    Oral Motor    Facial Appearance reduced tone  -KA    Dentition adequate  -KA    Secretions manages secretions (comment)  -KA    Lips reduced range of motion  -KA    Tongue reduced range of motion to right;reduced range of motion to left;reduced strength bilaterally;reduced range of motion tip on elevation;reduced range of motion back  -KA    Palate WFL  -KA    Cheeks reduced tone cheeks  -KA    Jaw reduced range of motion  -KA    AMR's and SMR's    Alternate Motion Rates accuracy imprecise  -KA    Sequential Motion Rates accuracy imprecise  -KA    Dysarthria- Informal Assessment    Respiratory Support Effect on Speech reduced overall loudness;monoloudness  -KA    Reduced Overall Loudness Severe  -KA    Monoloudness Moderate  -KA    Tasks Used to Assess Respiratory Support Effect vowel prolongation;connected speech;reading  -KA    Respiratory Comments MPT= 12 seconds Reduced coordination of breath support to sustain phonation at the sentence to multi-sentence level. Patient barely audible at times at the end of sentence when conversing at the conversation level.  -KA    Phonation Effects on Speech hoarse  -KA    Hoarse Moderate  -KA    Tasks Used to Assess Phonation vowel prolongation;connected speech  -KA    Articulation Effects on Speech imprecise consonants  -KA    Imprecise Consonants Moderate  -KA    Tasks Used to Assess Articulation vowel prolongation;connected speech;reading  -KA    Comprehensibility of Message    Message is Usually Comprehensible To: no one  -KA    Uses Strategies to Improve Comprehensibility not at all  -KA    When Strategies are Used, Message is Comprehensible To: examiner  -KA    Type of Dysarthria    Type of Dysarthria Hypokinetic Dysarthria  -KA    Dysarthria Comments Severe characterized by reduced breath support, reduced loudness, imprecise articulation with  speech intelligibility at the conversation level averaging 30 to 50% without use of strategies with barely audible phonation at the end of sentences.  -KA      User Key  (r) = Recorded By, (t) = Taken By, (c) = Cosigned By    Initials Name Provider Type    GONZÁLEZ Aguilera MA,CCC-SLP Speech and Language Pathologist                               OP SLP Education       03/21/17 1233    Education    Barriers to Learning No barriers identified  -KA    Education Provided Described results of evaluation;Patient expressed understanding of evaluation  -KA    Assessed Learning needs;Learning motivation  -KA    Learning Motivation Moderate  -KA    Learning Method Explanation  -    Teaching Response Verbalized understanding  -KA      User Key  (r) = Recorded By, (t) = Taken By, (c) = Cosigned By    Initials Name Effective Dates    GONZÁLEZ Aguilera MA,CCC-SLP 04/13/15 -                 SLP OP Goals       03/21/17 1200       Goal Type Needed    Goal Type Needed Dysarthria  -KA     Subjective Pain    Able to rate subjective pain? yes  -KA     Pre-Treatment Pain Level 0  -KA     Post-Treatment Pain Level 0  -KA     Dysarthria Goals     Dysarthria LTG's Patient will be able to engage in speech at the conversational level with maximum articulatory accuracy so that speech is understood by familiar /unfamiliar listeners;Patient will be able to communicate a message that is comprehensible to familiar/unfamiliar listeners utilizing verbal speech and augmentative strategies  -KA     Patient will be able to engage in speech at the conversational level with maximum articulatory accuracy so that speech is understood by familiar /unfamiliar listeners 90%:;without cues  -KA     Status: Patient will be able to engage in speech at the conversational level with maximum articulatory accuracy so that speech is understood by familiar /unfamiliar listeners New  -KA     Patient will be able to communicate a message that is comprehensible to  familiar/unfamiliar listeners utilizing verbal speech and augmentative strategies 90%:;without cues  -KA     Status: Patient will be able to communicate a message that is comprehensible to familiar/unfamiliar listeners utilizing verbal speech and augmentative strategies New  -KA      Dysarthria STG's Patient will improve respiratory support and the use of respiration for speech through use of diaphragmatic breathing and prolonging phonation of a vowel sound;Patient will improve comprehensibility of verbal messages by speaking at an appropriate vocal intensity;Patient will compensate for reduced respiratory support for speech by deeper inhalation before beginning an utterance;Patient will maximize use of phonation to improve communication skills by producing continuous tone from bottom to top/top to bottom of pitch range;Patient will apply compensatory strategies to improve intelligibility of speech during in spontaneous speech;Patient will increase strength and power  of articulators so they can move more quickly and accurately to improve speech intelligibility by  -KA     Patient will improve comprehensibility of verbal messages by speaking at an appropriate vocal intensity 90%:;without cues  -KA     Status: Patient will improve comprehensibility of verbal messages by speaking at an appropriate vocal intensity New  -KA     Patient will compensate for reduced respiratory support for speech by deeper inhalation before beginning an utterance 90%:;without cues  -KA     Status: Patient will compensate for reduced respiratory support for speech by deeper inhalation before beginning an utterance New  -KA     Patient will maximize use of phonation to improve communication skills by producing continuous tone from bottom to top/top to bottom of pitch range 90%:;without cues  -KA     Status: Patient will maximize use of phonation to improve communication skills by producing continuous tone from bottom to top/top to bottom of  "pitch range New  -KA     Patient will apply compensatory strategies to improve intelligibility of speech during in spontaneous speech 90%:;without cues  -KA     Status: Patient will apply compensatory strategies to improve intelligibility of speech during in spontaneous speech New  -KA     \"Patient will increase strength and power  of articulators so they can move more quickly and accurately to improve speech intelligibility by completing lip exercises\" 90%:;without cues  -KA     Status: Patient will increase strength and power  of articulators so they can move more quickly and accurately to improve speech intelligibility by completing lip exercises New  -KA       User Key  (r) = Recorded By, (t) = Taken By, (c) = Cosigned By    Initials Name Provider Type    GONZÁLEZ Aguilera MA,CCC-SLP Speech and Language Pathologist                OP SLP Assessment/Plan - 03/21/17 1232     SLP Assessment    Functional Problems Speech Language- Adult/Cognition  -KA    Impact on Function: Adult Speech Language/Cognition Difficulty communicating wants, needs, and ideas;Difficulty communicating in a medical emergency;Restrictions in personal and social life  -KA    Clinical Impression: Speech Language-Adult/Congnition Severe:;Dysarthria- Hypokinetic  -KA    Functional Problems Comment --   Patient presents with reduced speech intellibility in comparison to last year and worsening dysarthria  -KA    Please refer to paper survey for additional self-reported information Yes  -KA    Please refer to items scanned into chart for additional diagnostic informaiton and handouts as provided by clinician Yes  -KA    Prognosis Fair (comment)  -KA    Patient/caregiver participated in establishment of treatment plan and goals Yes  -KA    Patient would benefit from skilled therapy intervention Yes  -KA    SLP Plan    Frequency --   once a week   -KA    Duration --   four to six weeks to review strategies and exercises to improve functional speech " inteligibility  -GONZÁLEZ    Planned CPT's? SLP INDIVIDUAL SPEECH THERAPY: 12972  -KA    Expected Duration Therapy Session (min) 45-60 minutes  -GONZÁLEZ      User Key  (r) = Recorded By, (t) = Taken By, (c) = Cosigned By    Initials Name Provider Type    GONZÁLEZ Aguilera MA,CCC-SLP Speech and Language Pathologist             SLP Outcome Measures (last 72 hours)      SLP Outcome Measures       03/21/17 1200          SLP Outcome Measures    Outcome Measure Used? Adult NOMS  -GONZÁLEZ      FCM Scores    FCM Chosen Motor Speech  -GONZÁLEZ      Motor Speech FCM Score 3  -KA        User Key  (r) = Recorded By, (t) = Taken By, (c) = Cosigned By    Initials Name Effective Dates    GONZÁLEZ Aguilera MA,CCC-SLP 04/13/15 -              Time Calculation:   SLP Start Time: 1015  SLP Stop Time: 1100  SLP Time Calculation (min): 45 min    Therapy Charges for Today     Code Description Service Date Service Provider Modifiers Qty    79957770106 HC ST MOTOR SPEECH CURRENT 3/21/2017 Emigdio Aguilera MA,CCC-SLP GN, CL 1    58197929218 HC ST MOTOR SPEECH GOAL STATUS 3/21/2017 Emigdio Aguilera MA,CCC-SLP EMELINA, CJ 1    71698010872 HC ST EVAL SPEECH SOUND PRODUCTION 3 3/21/2017 Emigdio Aguilera MA,CCC-SLP GN 1          SLP G-Codes  SLP NOMS Used?: Yes  Functional Limitations: Motor speech  Motor Speech Current Status (): At least 60 percent but less than 80 percent impaired, limited or restricted  Motor Speech Goal Status (): At least 20 percent but less than 40 percent impaired, limited or restricted        Emigdio Aguilera MA,CCC-SLP  3/21/2017

## 2017-03-21 NOTE — PROGRESS NOTES
Outpatient Occupational Therapy Neuro Initial Evaluation  [facility]     Patient Name: Rommel Gonzalez  : 1945  MRN: [id number]  Today's Date: 3/21/2017      Visit Date: 2017    Patient Active Problem List   Diagnosis   • AF (atrial fibrillation)   • Depression   • Increased thyroid stimulating hormone level   • Gait instability   • Hypertension   • Insomnia   • Idiopathic Parkinson's disease   • Peripheral neuropathy   • Dysarthria   • Atrial flutter   • Anticoagulated   • Health care maintenance   • Hypothyroidism (acquired)   • Weakness of voice   • Pure hypercholesterolemia   • Abnormal chest CT        Past Medical History   Diagnosis Date   • AF (atrial fibrillation)    • Colon polyps    • Confusion    • Depression    • Disease of thyroid gland    • Gait instability    • Hay fever    • Hyperlipidemia    • Hypertension    • Insomnia    • Measles    • Mumps    • Parkinson disease    • Peripheral neuropathy    • Slurred speech    • Tremor         History reviewed. No pertinent past surgical history.      Visit Dx:      ICD-10-CM ICD-9-CM   1. Idiopathic Parkinson's disease G20 332.0   2. Weakness of both upper extremities M62.81 729.89   3. Lack of coordination R27.9 781.3             Patient History       17 1500 17 1200       History    Chief Complaint Balance Problems;Falls/history of falls  -MR Balance Problems;Falls/history of falls   difficulty getting up from couch  -EF     Date Current Problem(s) Began 17   date of MD referral  -MR 17   referral date  -EF     Brief Description of Current Complaint Pt reports problems with speech, balance, and decreased UE strength and coordination  -MR Pt reports difficulty getting up from couch, 2 falls in past year.  -EF     Previous treatment for THIS PROBLEM Other (comment)   Outpatient PT at [facility] last year  -MR Other (comment)   Outpatient PT at [facility] last year  -EF     Onset Date- PT  3-21-17  -EF     Onset  Date- OT 3/21/17  -MR      Patient/Caregiver Goals Improve mobility;Improve strength  -MR Improve mobility;Improve strength  -EF     Hand Dominance right-handed  -MR right-handed  -EF     Pain     Pain at Present 0  -MR 0  -EF     Fall Risk Assessment    Any falls in the past year: Yes  -MR Yes  -EF     Number of falls reported in the last 12 months 2  -MR 2  -EF     Factors that contributed to the fall: Lost balance   carrying too many things  -MR Lost balance   carrying too many things  -EF     Previous Functional Level  Ambulation   Independent without device  -EF     Services    Prior Rehab/Home Health Experiences  Yes  -EF     When was the prior experience with Rehab/Home Health  2016  -EF     Where was the prior experience with Rehab/Home Health  [facility]  -EF     Are you currently receiving Home Health services  No  -EF     Do you plan to receive Home Health services in the near future  No  -EF     Daily Activities    Primary Language English  -MR      Pt Participated in POC and Goals Yes  -MR      Safety    Are you being hurt, hit, or frightened by anyone at home or in your life? No  -MR      Are you being neglected by a caregiver No  -MR        User Key  (r) = Recorded By, (t) = Taken By, (c) = Cosigned By    Initials Name Provider Type    EF Kyara Reese, PT Physical Therapist    MR Pascale Edwards, OT Occupational Therapist                OT Neuro       03/21/17 1500          Home Living    Living Arrangements condominium  -MR      Coordination    Coordination Tests 9-Hole Peg;Box and Blocks  -MR      Box and Blocks Left 38 blocks  -MR      Box and Blocks Right 26 blocks  -MR      9-Hole Peg Left 38 sec  -MR      9-Hole Peg Right 57 sec  -MR      ROM (Range of Motion)    General ROM no range of motion deficits identified  -MR      MMT (Manual Muscle Testing)    General MMT Assessment upper extremity strength deficits identified  -MR      Left Shoulder    Flexion Gross Movement (4/5) good  -MR       ABduction Gross Movement (4/5) good  -MR      Right Shoulder    Flexion Gross Movement (4/5) good  -MR      ABduction Gross Movement (4/5) good  -MR      Upper Extremity    Upper Ext Manual Muscle Testing right shoulder strength deficit;left shoulder strength deficit;left elbow/forearm strength deficit;right elbow/forearm strength deficit  -MR      Upper Ext Manual Muscle Testing Detail --  -MR      Left Elbow/Forearm    Elbow Flexion Gross Movement (4/5) good  -MR      Elbow Extension Gross Movement (4/5) good  -MR      Right Elbow/Forearm    Elbow Flexion Gross Movement (4/5) good  -MR      Elbow Extension Gross Movement (4/5) good  -MR      ADL Assessment/Intervention    ADL's Assessed? --   Pt reports MOD I with self care tasks  -MR        User Key  (r) = Recorded By, (t) = Taken By, (c) = Cosigned By    Initials Name Provider Type    MR Pascale Hua HeatherstuartNATHAN Occupational Therapist             Hand Therapy (last 24 hours)      Hand Eval       03/21/17 1600          Hand  Strength     Strength Affected Side Bilateral  -MR       Strength Right    Right  Test 1 30  -MR      Right  Test 2 31  -MR      Right  Test 3 25  -MR       Strength Average Right 28.67  -MR       Strength Left    Left  Test 1 47  -MR      Left  Test 2 50  -MR      Left  Test 3 50  -MR       Strength Average Left 49  -MR      Pinch Strength    Affected Side Bilateral  -MR      Right Hand Strength - Pinch (lbs)    Lateral 12 lbs  -MR      Left Hand Strength - Pinch (lbs)    Lateral 11 lbs  -MR        User Key  (r) = Recorded By, (t) = Taken By, (c) = Cosigned By    Initials Name Provider Type    MR Pascale Hua NATHAN Edwards Occupational Therapist                    Therapy Education       03/21/17 8549          Therapy Education    Given Posture/body mechanics;Fall prevention and home safety;Mobility training  -EF      Program New  -EF      How Provided Verbal;Demonstration  -EF      Provided to  Patient  -EF      Level of Understanding Teach back education performed  -EF        User Key  (r) = Recorded By, (t) = Taken By, (c) = Cosigned By    Initials Name Provider Type    EF Kyara Reese, PT Physical Therapist                OT Goals       03/21/17 1600       Long Term Goals    LTG Date to Achieve 04/20/17  -MR     LTG 1 Pt to be independent with HEP.  -MR     LTG 2 Pt to increase RUE  strength to > or = 33 lbs for improved functional use of R hand.  -MR     LTG 3 Pt to improve BUE shoulder and elbow strength to > or = 4+/5 for improved functional use of BUEs.  -MR     LTG 4 Pt to complete 9 Hole peg test using RUE < or = 45 sec to demo improved FM skills for functional use of extremity.  -MR     LTG 5 Pt to report score on DASH < or = 52 to indicate improved UE function.  -MR     Time Calculation    OT Goal Re-Cert Due Date 04/20/17  -MR       User Key  (r) = Recorded By, (t) = Taken By, (c) = Cosigned By    Initials Name Provider Type    MR Pascale Edwards, OT Occupational Therapist                OT Assessment/Plan       03/21/17 1616 03/21/17 1427    OT Assessment    Functional Limitations Limitations in functional capacity and performance;Performance in leisure activities;Limitation in home management  -MR     Impairments Coordination;Dexterity;Balance;Muscle strength  -MR     Assessment Comments Pt is 71 year old male referred to outpatient OT with diagnosis of Parkinson's disease.  Pt reports difficulty with speech, balance, UE strength and coordination.  Pt also being evaluated by SLP and PT.  Upon evaluation pt noted with impaired UE strength and FM skills, with R  strength and FM skills exhibiting greater deficit.  Recommend 4-5 weeks of outpatient OT services to address UE deficits for greater safety and independence with ADL/IADLs.  -MR     Please refer to paper survey for additional self-reported information Yes  -MR     OT Diagnosis impaired functional use of BUEs  -MR     OT  Rehab Potential Good  -MR     Patient/caregiver participated in establishment of treatment plan and goals Yes  -MR     Patient would benefit from skilled therapy intervention Yes  -MR     OT Plan    OT Frequency 1x/week  -MR     Predicted Duration of Therapy Intervention (days/wks) 4-5 weeks  -MR 6-8 weeks  -EF    Planned CPT's? OT EVAL LOW COMPLEXITY: 16488;OT THER ACT EA 15 MIN: [id number];OT THER PROC EA 15 MIN: [id number];OT NEUROMUSC RE EDUCATION EA 15 MIN: 76288;OT SELF CARE/MGMT/TRAIN 15 MIN: 82342  -MR     Planned Therapy Interventions (Optional Details) home exercise program;motor coordination training;neuromuscular re-education;patient/family education;strengthening  -MR       User Key  (r) = Recorded By, (t) = Taken By, (c) = Cosigned By    Initials Name Provider Type    EF Kyara Reese, PT Physical Therapist    MR Pascale Edwards, OT Occupational Therapist                        Outcome Measures       03/21/17 1600 03/21/17 1500 03/21/17 1200    Krause Balance Scale    Sitting to Standing   3  -EF    Standing Unsupported   4  -EF    Sitting with Back Unsupported but Feet Supported on Floor or on Stool   4  -EF    Standing to Sitting   4  -EF    Transfers   3  -EF    Standing Unsupported with Eyes Closed   4  -EF    Standing Unsupported with Feet Together   4  -EF    Reaching Forward with Outstretched Arm While Standing   2  -EF     Object From the Floor From a Standing Position   4  -EF    Turning to Look Behind Over Left and Right Shoulders While Standing   2  -EF    Turn 360 Degrees   2  -EF    Place Alternate Foot on Step or Stool While Standing Unsupported   4  -EF    Standing Unsupported with One Foot in Front   2  -EF    Standing on One Leg   2  -EF    Krause Total Score   44  -EF    DASH    Open a tight or new jar.  2  -MR     Write  4  -MR     Turn a key  2  -MR     Prepare a meal  2  -MR     Push open a heavy door  1  -MR     Place an object on a shelf above your head  2  -MR     Do heavy  household chores (e.g., wash walls, wash floors)  3  -MR     Garden or do yard work  5  -MR     Make a bed  1  -MR     Carry a shopping bag or briefcase  1  -MR     Carry a heavy object (over 10 lbs)  4  -MR     Change a lightbulb overhead  5  -MR     Wash or blow dry your hair  1  -MR     Wash your back  1  -MR     Put on a pullover sweater  1  -MR     Use a knife to cut food  1  -MR     Recreational activities in which require little effort (e.g., cardplaying, knitting, etc.)  2  -MR     Recreational activities in which you take some force or impact through your arm, should or hand (e.g. golf, hammering, tennis, etc.)  3  -MR     Recreational Activities in which you move your arm freely (e.g., frisbee, badminton, etc.)  2  -MR     Manage transportation needs (getting from one place to another)  1  -MR     During the past week, to what extent has your arm, shoulder, or hand problem interfered with your normal social activites with family, friends, neighbors or groups?  1  -MR     During the past week, were you limited in your work or other regular daily activities as a result of your arm, shoulder or hand problem?  1  -MR     Arm, Shoulder, or hand pain  1  -MR     Arm, shoulder or hand pain when you performed any specific activity  1  -MR     Tingling (pins and needles) in your arm, shoulder, or hand  1  -MR     Weakness in your arm, shoulder or hand  2  -MR     Stiffness in your arm, shoulder or hand  1  -MR     During the past week, how much difficulty have you had sleeping because of the pain in your arm, shoulder or hand?  1  -MR     I feel less capable, less confident or less useful because of my arm, shoulder or hand problem  4  -MR     DASH Sum   57  -MR     Number of Questions Answered  29  -MR     DASH Score  24.14  -MR     Timed Up and Go (TUG)    TUG Test 1   15 seconds  -EF    TUG Test 2   16 seconds  -EF    Functional Assessment    Outcome Measure Options --  -MR Disabilities of the Arm, Shoulder, and  Hand (DASH)  -MR Krause Balance;Timed Up and Go (TUG)  -EF      User Key  (r) = Recorded By, (t) = Taken By, (c) = Cosigned By    Initials Name Provider Type    EF Kyara Reese, PT Physical Therapist    MR Pascale Hua Grace, OT Occupational Therapist            Time Calculation:   OT Start Time: 0930  OT Stop Time: 1014  OT Time Calculation (min): 44 min     Therapy Charges for Today     Code Description Service Date Service Provider Modifiers Qty    35804361933 HC OT CARRY MOV HAND OBJ CURRENT 3/21/2017 Lona Rohn, OT GO, CJ 1    49705738788 HC OT CARRY MOV HAND OBJ PROJECTED 3/21/2017 Lona Rohn, OT GO, CI 1    27461187613 HC OT EVAL LOW COMPLEXITY 3 3/21/2017 Pascale Edwards OT GO 1          OT G-codes  OT Professional Judgement Used?: Yes  OT Functional Scales Options: Disabilities of the Arm, Shoulder, and Hand (DASH)  Score: sum= 57; score=24.14  Functional Limitation: Carrying, moving and handling objects  Carrying, Moving and Handling Objects Current Status (): At least 20 percent but less than 40 percent impaired, limited or restricted  Carrying, Moving and Handling Objects Goal Status (): At least 1 percent but less than 20 percent impaired, limited or restricted          Lona Rohn, OT  3/21/2017

## 2017-03-21 NOTE — PROGRESS NOTES
Outpatient Physical Therapy Neuro Initial Evaluation  [facility]     Patient Name: Rommel Gonzalez  : 1945  MRN: [id number]  Today's Date: 3/21/2017      Visit Date: 2017    Patient Active Problem List   Diagnosis   • AF (atrial fibrillation)   • Depression   • Increased thyroid stimulating hormone level   • Gait instability   • Hypertension   • Insomnia   • Idiopathic Parkinson's disease   • Peripheral neuropathy   • Dysarthria   • Atrial flutter   • Anticoagulated   • Health care maintenance   • Hypothyroidism (acquired)   • Weakness of voice   • Pure hypercholesterolemia   • Abnormal chest CT        Past Medical History   Diagnosis Date   • AF (atrial fibrillation)    • Colon polyps    • Confusion    • Depression    • Disease of thyroid gland    • Gait instability    • Hay fever    • Hyperlipidemia    • Hypertension    • Insomnia    • Measles    • Mumps    • Parkinson disease    • Peripheral neuropathy    • Slurred speech    • Tremor         No past surgical history on file.      Visit Dx:     ICD-10-CM ICD-9-CM   1. Gait instability R26.81 781.2   2. Parkinson's disease G20 332.0             Patient History       17 1500 17 1200       History    Chief Complaint Balance Problems;Falls/history of falls  -MR Balance Problems;Falls/history of falls   difficulty getting up from couch  -EF     Date Current Problem(s) Began 17   date of MD referral  -MR 17   referral date  -EF     Brief Description of Current Complaint Pt reports problems with speech, balance, and decreased UE strength and coordination  -MR Pt reports difficulty getting up from couch, 2 falls in past year.  -EF     Previous treatment for THIS PROBLEM Other (comment)   Outpatient PT at [facility] last year  -MR Other (comment)   Outpatient PT at [facility] last year  -EF     Onset Date- PT  3-21-17  -EF     Onset Date- OT 3/21/17  -MR      Patient/Caregiver Goals Improve mobility;Improve strength  -MR  Improve mobility;Improve strength  -EF     Hand Dominance right-handed  -MR right-handed  -EF     Pain     Pain at Present 0  -MR 0  -EF     Fall Risk Assessment    Any falls in the past year: Yes  -MR Yes  -EF     Number of falls reported in the last 12 months 2  -MR 2  -EF     Factors that contributed to the fall: Lost balance   carrying too many things  -MR Lost balance   carrying too many things  -EF     Previous Functional Level  Ambulation   Independent without device  -EF     Services    Prior Rehab/Home Health Experiences  Yes  -EF     When was the prior experience with Rehab/Home Health  2016  -EF     Where was the prior experience with Rehab/Home Health  [facility]  -EF     Are you currently receiving Home Health services  No  -EF     Do you plan to receive Home Health services in the near future  No  -EF     Daily Activities    Primary Language English  -MR      Pt Participated in POC and Goals Yes  -MR      Safety    Are you being hurt, hit, or frightened by anyone at home or in your life? No  -MR      Are you being neglected by a caregiver No  -MR        User Key  (r) = Recorded By, (t) = Taken By, (c) = Cosigned By    Initials Name Provider Type    EF Kyara Reese, PT Physical Therapist    MR Pascale Hua Grace, OT Occupational Therapist             Hand Therapy (last 24 hours)      Hand Eval       03/21/17 1600          Hand  Strength     Strength Affected Side Bilateral  -MR       Strength Right    Right  Test 1 30  -MR      Right  Test 2 31  -MR      Right  Test 3 25  -MR       Strength Average Right 28.67  -MR       Strength Left    Left  Test 1 47  -MR      Left  Test 2 50  -MR      Left  Test 3 50  -MR       Strength Average Left 49  -MR      Pinch Strength    Affected Side Bilateral  -MR      Right Hand Strength - Pinch (lbs)    Lateral 12 lbs  -MR      Left Hand Strength - Pinch (lbs)    Lateral 11 lbs  -MR        User Key  (r) = Recorded  By, (t) = Taken By, (c) = Cosigned By    Initials Name Provider Type    MR Pascale Edwards, OT Occupational Therapist                PT Neuro       03/21/17 1200          Subjective Comments    Subjective Comments I think I'm doing pretty good.  -EF      Precautions and Contraindications    Precautions/Limitations fall precautions  -EF      Precautions falls  -EF      Subjective Pain    Able to rate subjective pain? yes  -EF      Pre-Treatment Pain Level 0  -EF      Post-Treatment Pain Level 0  -EF      Home Living    Living Arrangements condominium  -EF      Home Accessibility --   no steps  -EF      Posture/Observations    Posture/Observations Comments Pt drove to therapy & ambulated without assistive device. Communication: mumbles, voice very quiet.  -EF      ROM (Range of Motion)    General ROM no range of motion deficits identified  -EF      MMT (Manual Muscle Testing)    General MMT Assessment lower extremity strength deficits identified  -EF      Left Hip    Hip Flexion Gross Movement (4/5) good  -EF      Hip ABduction Gross Movement (4/5) good  -EF      Hip ADduction Gross Movement (4/5) good  -EF      Lower Extremity    Lower Ext Manual Muscle Testing right LE strength is WFL;left hip strength deficit  -EF      Bed Mobility, Assessment/Treatment    Bed Mobility, Comment per patient, Independent  -EF      Transfers    Transfers, Sit-Stand [place] conditional independence   uses UEs  -EF      Transfers, Stand-Sit [place] conditional independence   uses UEs  -EF      Transfer, Safety Issues weight-shifting ability decreased  -EF      Transfer, Impairments strength decreased;impaired balance  -EF      Gait Assessment/Treatment    Gait, [place] Level conditional independence  -EF      Gait, Assistive Device --   no device  -EF      Gait, Distance (Feet) 200   x 1, 80 x 2  -EF      Gait, Gait Deviations bilateral:;adri decreased;decreased heel strike;step length decreased;toe-to-floor  clearance decreased;weight-shifting ability decreased  -EF      Gait, Safety Issues balance decreased during turns;step length decreased;weight-shifting ability decreased  -EF      Gait, Impairments strength decreased;impaired balance  -EF      Stairs Assessment/Treatment    Number of Stairs 4  -EF      Stairs, Handrail Location both sides  -EF      Stairs, [place] Level supervision required  -EF      Stairs, Technique Used step over step (ascending);step over step (descending)  -EF      Stairs, Safety Issues balance decreased during turns;weight-shifting ability decreased  -EF      Stairs, Impairments strength decreased;impaired balance  -EF        User Key  (r) = Recorded By, (t) = Taken By, (c) = Cosigned By    Initials Name Provider Type    KATHIE Reese PT Physical Therapist                        Therapy Education       03/21/17 1239          Therapy Education    Given Posture/body mechanics;Fall prevention and home safety;Mobility training  -EF      Program New  -EF      How Provided Verbal;Demonstration  -EF      Provided to Patient  -EF      Level of Understanding Teach back education performed  -EF        User Key  (r) = Recorded By, (t) = Taken By, (c) = Cosigned By    Initials Name Provider Type    KATHIE Reese PT Physical Therapist                PT OP Goals       03/21/17 1200       PT Short Term Goals    STG Date to Achieve 04/20/17  -EF     STG 1 Pt to be Indep with Initial Home Exercise Program.  -EF     STG 2 Pt to perform sit to stand from couch with improved ease mod Indep.  -EF     STG 3 Pt to improve reaching forward to ~4 inches without loss of balance.  -EF     Long Term Goals    LTG Date to Achieve 05/20/17  -EF     LTG 1 Pt to be Indep with HEP to maintain/improve upon gains made in therapy.  -EF     LTG 2 Pt to report improved ease with performing sit to stand from couch/chairs at home.  -EF     LTG 3 Pt to improve reaching forward to ~6 inches without loss of  balance.  -EF     LTG 4 Pt to score at least 50/56 on Krause Balance Test to demonstrate improved balance and decreased risk of falls.  -EF     LTG 5 Pt to ambulate with increased step length and improved toe to floor clearance.  -EF     Time Calculation    PT Goal Re-Cert Due Date 04/20/17  -EF       User Key  (r) = Recorded By, (t) = Taken By, (c) = Cosigned By    Initials Name Provider Type    EF Kyara Reese, PT Physical Therapist                PT Assessment/Plan       03/21/17 1604 03/21/17 1427    PT Assessment    Functional Limitations  Impaired gait  -EF    Impairments  Balance;Gait;Muscle strength  -EF    Assessment Comments Pt presents with impaired gait and balance, decreased left hip strength, and difficulty with low transfers.  He will benefit from Outpatient Physical Therapy to address these deficits to promote improved function and safety at home and in the community.  -EF     Please refer to paper survey for additional self-reported information  Yes  -EF    Rehab Potential  Good  -EF    Patient/caregiver participated in establishment of treatment plan and goals  Yes  -EF    Patient would benefit from skilled therapy intervention  Yes  -EF    PT Plan    PT Frequency  1x/week  -EF    Predicted Duration of Therapy Intervention (days/wks)  6-8 weeks  -EF    Planned CPT's?  PT EVAL LOW COMPLEXITY: 00156;PT NEUROMUSC RE-EDUCATION EA 15 MIN: 34330  -EF    Physical Therapy Interventions (Optional Details)  balance training;gait training;neuromuscular re-education;home exercise program;patient/family education;strengthening;transfer training  -EF      03/21/17 1200       PT Assessment    Assessment Comments Pt presents with impaired gait and balance, decreased left hip strength, and difficulty with low transfers.  He will benefit from outpatient Physical Therapy to address these deficits to promote improved function and safety at home and in the community.  -EF     Please refer to paper survey for  additional self-reported information Yes  -EF     Rehab Potential Good  -EF     Patient/caregiver participated in establishment of treatment plan and goals Yes  -EF     Patient would benefit from skilled therapy intervention Yes  -EF       User Key  (r) = Recorded By, (t) = Taken By, (c) = Cosigned By    Initials Name Provider Type    KATHIE Reese PT Physical Therapist                   Exercises       03/21/17 1200          Subjective Comments    Subjective Comments I think I'm doing pretty good.  -EF      Subjective Pain    Able to rate subjective pain? yes  -EF      Pre-Treatment Pain Level 0  -EF      Post-Treatment Pain Level 0  -EF      Exercise 1    Exercise Name 1 Standing p/d flexion  -EF      Reps 1 10  -EF      Exercise 2    Exercise Name 2 Standing trunk rotation with UEs at 90 abd  -EF      Cueing 2 Verbal;Tactile  -EF      Reps 2 10  -EF        User Key  (r) = Recorded By, (t) = Taken By, (c) = Cosigned By    Initials Name Provider Type    KATHIE Reese PT Physical Therapist                            Outcome Measures       03/21/17 1600 03/21/17 1500 03/21/17 1200    Krause Balance Scale    Sitting to Standing   3  -EF    Standing Unsupported   4  -EF    Sitting with Back Unsupported but Feet Supported on Floor or on Stool   4  -EF    Standing to Sitting   4  -EF    Transfers   3  -EF    Standing Unsupported with Eyes Closed   4  -EF    Standing Unsupported with Feet Together   4  -EF    Reaching Forward with Outstretched Arm While Standing   2  -EF     Object From the Floor From a Standing Position   4  -EF    Turning to Look Behind Over Left and Right Shoulders While Standing   2  -EF    Turn 360 Degrees   2  -EF    Place Alternate Foot on Step or Stool While Standing Unsupported   4  -EF    Standing Unsupported with One Foot in Front   2  -EF    Standing on One Leg   2  -EF    Krause Total Score   44  -EF    DASH    Open a tight or new jar.  2  -MR     Write  4  -MR     Turn a  key  2  -MR     Prepare a meal  2  -MR     Push open a heavy door  1  -MR     Place an object on a shelf above your head  2  -MR     Do heavy household chores (e.g., wash walls, wash floors)  3  -MR     Garden or do yard work  5  -MR     Make a bed  1  -MR     Carry a shopping bag or briefcase  1  -MR     Carry a heavy object (over 10 lbs)  4  -MR     Change a lightbulb overhead  5  -MR     Wash or blow dry your hair  1  -MR     Wash your back  1  -MR     Put on a pullover sweater  1  -MR     Use a knife to cut food  1  -MR     Recreational activities in which require little effort (e.g., cardplaying, knitting, etc.)  2  -MR     Recreational activities in which you take some force or impact through your arm, should or hand (e.g. golf, hammering, tennis, etc.)  3  -MR     Recreational Activities in which you move your arm freely (e.g., frisbee, badminton, etc.)  2  -MR     Manage transportation needs (getting from one place to another)  1  -MR     During the past week, to what extent has your arm, shoulder, or hand problem interfered with your normal social activites with family, friends, neighbors or groups?  1  -MR     During the past week, were you limited in your work or other regular daily activities as a result of your arm, shoulder or hand problem?  1  -MR     Arm, Shoulder, or hand pain  1  -MR     Arm, shoulder or hand pain when you performed any specific activity  1  -MR     Tingling (pins and needles) in your arm, shoulder, or hand  1  -MR     Weakness in your arm, shoulder or hand  2  -MR     Stiffness in your arm, shoulder or hand  1  -MR     During the past week, how much difficulty have you had sleeping because of the pain in your arm, shoulder or hand?  1  -MR     I feel less capable, less confident or less useful because of my arm, shoulder or hand problem  4  -MR     DASH Sum   57  -MR     Number of Questions Answered  29  -MR     DASH Score  24.14  -MR     Timed Up and Go (TUG)    TUG Test 1   15  seconds  -EF    TUG Test 2   16 seconds  -EF    Functional Assessment    Outcome Measure Options --  -MR Disabilities of the Arm, Shoulder, and Hand (DASH)  -MR Krause Balance;Timed Up and Go (TUG)  -EF      User Key  (r) = Recorded By, (t) = Taken By, (c) = Cosigned By    Initials Name Provider Type    EF Kyara Reese, PT Physical Therapist    MR Pascale Edwards, OT Occupational Therapist          Time Calculation:   Start Time: 1105  Stop Time: 1150  Time Calculation (min): 45 min     Therapy Charges for Today     Code Description Service Date Service Provider Modifiers Qty    08205618852  PT MOBILITY CURRENT 3/21/2017 Kyara Reese, PT GP, CJ 1    89034374055 HC PT MOBILITY PROJECTED 3/21/2017 Kyara Reese, PT GP, CI 1    [id number] HC PT EVAL LOW COMPLEXITY 4 3/21/2017 Kyara Reese, PT GP 1          PT G-Codes  PT Professional Judgement Used?: Yes  Outcome Measure Options: Krause Balance, Timed Up and Go (TUG)  Functional Limitation: Mobility: Walking and moving around  Mobility: Walking and Moving Around Current Status (): At least 20 percent but less than 40 percent impaired, limited or restricted  Mobility: Walking and Moving Around Goal Status (): At least 1 percent but less than 20 percent impaired, limited or restricted         Kyara Reese, PT  3/21/2017

## 2017-03-28 ENCOUNTER — HOSPITAL ENCOUNTER (OUTPATIENT)
Dept: OCCUPATIONAL THERAPY | Facility: HOSPITAL | Age: 72
Setting detail: THERAPIES SERIES
Discharge: HOME | End: 2017-03-28
Attending: PSYCHIATRY & NEUROLOGY

## 2017-03-28 ENCOUNTER — HOSPITAL ENCOUNTER (OUTPATIENT)
Dept: PHYSICAL THERAPY | Facility: HOSPITAL | Age: 72
Setting detail: THERAPIES SERIES
Discharge: HOME | End: 2017-03-28

## 2017-03-28 ENCOUNTER — HOSPITAL ENCOUNTER (OUTPATIENT)
Dept: SPEECH THERAPY | Facility: HOSPITAL | Age: 72
Setting detail: THERAPIES SERIES
Discharge: HOME | End: 2017-03-28

## 2017-03-28 DIAGNOSIS — G20 PARKINSON'S DISEASE (HCC): Primary | ICD-10-CM

## 2017-03-28 DIAGNOSIS — R27.9 LACK OF COORDINATION: ICD-10-CM

## 2017-03-28 DIAGNOSIS — R26.81 GAIT INSTABILITY: Primary | ICD-10-CM

## 2017-03-28 DIAGNOSIS — R47.1 DYSARTHRIA: ICD-10-CM

## 2017-03-28 DIAGNOSIS — G20 IDIOPATHIC PARKINSON'S DISEASE (HCC): Primary | ICD-10-CM

## 2017-03-28 DIAGNOSIS — R29.898 WEAKNESS OF BOTH UPPER EXTREMITIES: ICD-10-CM

## 2017-03-28 PROCEDURE — 97112 NEUROMUSCULAR REEDUCATION: CPT

## 2017-03-28 PROCEDURE — 97110 THERAPEUTIC EXERCISES: CPT

## 2017-03-28 PROCEDURE — 92507 TX SP LANG VOICE COMM INDIV: CPT

## 2017-03-28 NOTE — PROGRESS NOTES
Outpatient Speech Language Pathology   Adult Speech Language Cognitive Treatment Note  [facility]     Patient Name: Rommel Gonzalez  : 1945  MRN: [id number]  Today's Date: 3/28/2017         Visit Date: 2017   Patient Active Problem List   Diagnosis   • AF (atrial fibrillation)   • Depression   • Increased thyroid stimulating hormone level   • Gait instability   • Hypertension   • Insomnia   • Idiopathic Parkinson's disease   • Peripheral neuropathy   • Dysarthria   • Atrial flutter   • Anticoagulated   • Health care maintenance   • Hypothyroidism (acquired)   • Weakness of voice   • Pure hypercholesterolemia   • Abnormal chest CT          Visit Dx:    ICD-10-CM ICD-9-CM   1. Parkinson's disease G20 332.0   2. Dysarthria R47.1 784.51                               SLP OP Goals       17 1100       Subjective Comments    Subjective Comments (P)  Pt was motivated and cooperative during 's session.  -JG     Subjective Pain    Able to rate subjective pain? (P)  yes  -JG     Pre-Treatment Pain Level (P)  0  -JG     Post-Treatment Pain Level (P)  0  -JG     Dysarthria Goals    Patient will be able to engage in speech at the conversational level with maximum articulatory accuracy so that speech is understood by familiar /unfamiliar listeners (P)  90%:;without cues  -JG     Status: Patient will be able to engage in speech at the conversational level with maximum articulatory accuracy so that speech is understood by familiar /unfamiliar listeners (P)  Progressing as expected  -JG     Patient will be able to communicate a message that is comprehensible to familiar/unfamiliar listeners utilizing verbal speech and augmentative strategies (P)  90%:;without cues  -JG     Status: Patient will be able to communicate a message that is comprehensible to familiar/unfamiliar listeners utilizing verbal speech and augmentative strategies (P)  Progressing as expected  -JG     Patient will improve comprehensibility  "of verbal messages by speaking at an appropriate vocal intensity (P)  90%:;without cues  -JG     Status: Patient will improve comprehensibility of verbal messages by speaking at an appropriate vocal intensity (P)  Progressing as expected  -JG     Comments: Patient will improve comprehensibility of verbal messages by speaking at an appropriate vocal intensity (P)  Pt demonstrated 60% accuracy with cues when verbalizing sentences with increased intensity. Pt identified 6 frequently used phrases at home and in community, and pt is to practice inhaling prior to phonation and saying phrases with loud intensity.  -JG     Patient will compensate for reduced respiratory support for speech by deeper inhalation before beginning an utterance (P)  90%:;without cues  -JG     Status: Patient will compensate for reduced respiratory support for speech by deeper inhalation before beginning an utterance (P)  Progressing as expected  -JG     Comments: Patient will compensate for reduced respiratory support for speech by deeper inhalation before beginning an utterance (P)  Pt demonstrated 50% accuracy with cues when taking inhalation prior to spoken sentences.   -JG     Patient will maximize use of phonation to improve communication skills by producing continuous tone from bottom to top/top to bottom of pitch range (P)  90%:;without cues  -JG     Status: Patient will maximize use of phonation to improve communication skills by producing continuous tone from bottom to top/top to bottom of pitch range (P)  Progressing as expected  -JG     Comments: Patient will maximize use of phonation to improve communication skills by producing continuous tone from bottom to top/top to bottom of pitch range (P)  Pt demonstrated 60% accuracy with cues during pitch glides. Cues required to increase pitch range and get loud.  -JG     \"Patient will increase strength and power  of articulators so they can move more quickly and accurately to improve speech " "intelligibility by completing lip exercises\" (P)  90%:;without cues  -JG     Status: Patient will increase strength and power  of articulators so they can move more quickly and accurately to improve speech intelligibility by completing lip exercises (P)  Progressing as expected  -JG     Comments: Patient will increase strength and power  of articulators so they can move more quickly and accurately to improve speech intelligibility by completing lip exercises (P)  Pt demonstrated 60% accuracy without cues when completing oral motor exercises, reduced lingual retraction and jaw range of motion.  -JG       User Key  (r) = Recorded By, (t) = Taken By, (c) = Cosigned By    Initials Name Provider Type    JG Haylee Parham Speech Therapy Student Speech Therapy Student                         Time Calculation:   SLP Start Time: (P) 1015  SLP Stop Time: (P) 1100  SLP Time Calculation (min): (P) 45 min    Therapy Charges for Today     Code Description Service Date Service Provider Modifiers Qty    [id number]  ST TREATMENT SPEECH 3 3/28/2017 Haylee Parham Speech Therapy Student GN 1                   Haylee Parham Speech Therapy Student  3/28/2017  "

## 2017-03-28 NOTE — PROGRESS NOTES
Outpatient Physical Therapy Neuro Treatment Note  [facility]     Patient Name: Rommel Gonzalez  : 1945  MRN: [id number]  Today's Date: 3/28/2017      Visit Date: 2017    Visit Dx:    ICD-10-CM ICD-9-CM   1. Gait instability R26.81 781.2       Patient Active Problem List   Diagnosis   • AF (atrial fibrillation)   • Depression   • Increased thyroid stimulating hormone level   • Gait instability   • Hypertension   • Insomnia   • Idiopathic Parkinson's disease   • Peripheral neuropathy   • Dysarthria   • Atrial flutter   • Anticoagulated   • Health care maintenance   • Hypothyroidism (acquired)   • Weakness of voice   • Pure hypercholesterolemia   • Abnormal chest CT                 PT Neuro       17 1221          Subjective Comments    Subjective Comments I'm doing fine.  -EF      Precautions and Contraindications    Precautions/Limitations fall precautions  -EF      Precautions falls  -EF      Subjective Pain    Able to rate subjective pain? yes  -EF      Pre-Treatment Pain Level 0  -EF      Post-Treatment Pain Level 0  -EF      Posture/Observations    Posture/Observations Comments head laterally flexed to L  -EF      Bed Mobility, Assessment/Treatment    Bed Mobility, Roll Left, [place] verbal cues required;conditional independence  -EF      Bed Mobility, Roll Right, [place] verbal cues required;conditional independence  -EF      Bed Mob, Supine to Sit, [place] verbal cues required;conditional independence   verbal cues for log roll & technique  -EF      Bed Mob, Sit to Supine, [place] conditional independence;verbal cues required   verbal cues for technique  -EF      Bed Mobility, Impairments decreased flexibility  -EF      Transfers    Transfers, Sit-Stand [place] conditional independence   uses UEs  -EF      Transfers, Stand-Sit [place] conditional independence   uses UEs  -EF      Transfer, Safety Issues weight-shifting ability decreased  -EF      Transfer,  Comment verbal cues for sequencing, practiced sit to stand from couch  -EF      Gait Assessment/Treatment    Gait, [place] Level conditional independence  -EF      Gait, Distance (Feet) 200   x 3, 80 x 2  -EF      Gait, Gait Deviations bilateral:;decreased heel strike;stride length decreased;toe-to-floor clearance decreased;weight-shifting ability decreased   decreased trunk rotation  -EF      Gait, Safety Issues balance decreased during turns;step length decreased;weight-shifting ability decreased  -EF      Gait, Impairments strength decreased;impaired balance  -EF        User Key  (r) = Recorded By, (t) = Taken By, (c) = Cosigned By    Initials Name Provider Type    KATHIE Reese, PT Physical Therapist                        PT Assessment/Plan       03/28/17 1242       PT Assessment    Assessment Comments Pt did well with practicing bed mobility using log roll and transfers from a couch with verbal cues.  Ambulated today with improved adri and slightly improved trunk rotation.    -EF     PT Plan    PT Frequency 1x/week  -EF     PT Plan Comments Continue with Plan of Care.  -EF       User Key  (r) = Recorded By, (t) = Taken By, (c) = Cosigned By    Initials Name Provider Type    KATHIE Reese, PT Physical Therapist                     Exercises       03/28/17 1221          Subjective Comments    Subjective Comments I'm doing fine.  -EF      Subjective Pain    Able to rate subjective pain? yes  -EF      Pre-Treatment Pain Level 0  -EF      Post-Treatment Pain Level 0  -EF      Exercise 1    Exercise Name 1 Standing p/d flexion at hemibars  -EF      Reps 1 10  -EF      Exercise 2    Exercise Name 2 Sitting trunk rotation holding 1# pole weight with UEs at 90 abd  -EF      Cueing 2 Verbal;Tactile  -EF      Reps 2 10  -EF      Exercise 3    Exercise Name 3 Neck AROM: rotation, lateral flexion, flex/ext (in sitting)  -EF      Reps 3 5   each  -EF      Exercise 4    Exercise Name 4 MIP at hemibars   -EF      Reps 4 20  -EF      Exercise 5    Exercise Name 5 Lunges at hemibars  -EF      Reps 5 5   with 10 sec hold  -EF      Exercise 6    Exercise Name 6 Sitting BIG exercise #1  -EF      Reps 6 5   with 10 sec hold after attaining each position  -EF      Exercise 7    Exercise Name 7 calf stretches on 1st step with rails  -EF      Reps 7 1   15 sec hold  -EF        User Key  (r) = Recorded By, (t) = Taken By, (c) = Cosigned By    Initials Name Provider Type    KATHIE Reese PT Physical Therapist                                  Therapy Education       03/28/17 1242 03/28/17 1012       Therapy Education    Given Posture/body mechanics;Fall prevention and home safety;Mobility training  -EF HEP  -MR     Program New  -EF New   pt given HEP for theraband with UE exercises  -MR     How Provided Verbal;Demonstration  -EF Verbal;Demonstration;Written  -MR     Provided to Patient  -EF Patient  -MR     Level of Understanding Teach back education performed;Verbalized;Demonstrated  -EF Teach back education performed  -MR       User Key  (r) = Recorded By, (t) = Taken By, (c) = Cosigned By    Initials Name Provider Type     Kyara Reese PT Physical Therapist    MR Pascale Edwards, OT Occupational Therapist                Time Calculation:   Start Time: 1105  Stop Time: 1150  Time Calculation (min): 45 min     Therapy Charges for Today     Code Description Service Date Service Provider Modifiers Qty    55271266850  PT NEUROMUSC RE EDUCATION EA 15 MIN 3/28/2017 Kyara Reese PT GP 3                    Kyara Reese PT  3/28/2017

## 2017-03-28 NOTE — PROGRESS NOTES
Outpatient Occupational Therapy Neuro Treatment Note  [facility]     Patient Name: Rommel Gonzalez  : 1945  MRN: [id number]  Today's Date: 3/28/2017       Visit Date: 2017    Patient Active Problem List   Diagnosis   • AF (atrial fibrillation)   • Depression   • Increased thyroid stimulating hormone level   • Gait instability   • Hypertension   • Insomnia   • Idiopathic Parkinson's disease   • Peripheral neuropathy   • Dysarthria   • Atrial flutter   • Anticoagulated   • Health care maintenance   • Hypothyroidism (acquired)   • Weakness of voice   • Pure hypercholesterolemia   • Abnormal chest CT        Past Medical History:   Diagnosis Date   • AF (atrial fibrillation)    • Colon polyps    • Confusion    • Depression    • Disease of thyroid gland    • Gait instability    • Hay fever    • Hyperlipidemia    • Hypertension    • Insomnia    • Measles    • Mumps    • Parkinson disease    • Peripheral neuropathy    • Slurred speech    • Tremor         No past surgical history on file.      Visit Dx:    ICD-10-CM ICD-9-CM   1. Idiopathic Parkinson's disease G20 332.0   2. Weakness of both upper extremities M62.81 729.89   3. Lack of coordination R27.9 781.3                         OT Assessment/Plan       17 1013       OT Assessment    Assessment Comments Pt seen for first treatment session following initial evaluation, cooperative for participation in all OT interventions.  -MR       User Key  (r) = Recorded By, (t) = Taken By, (c) = Cosigned By    Initials Name Provider Type     Pascale Hua Grace, OT Occupational Therapist                    Therapy Education       17 1012          Therapy Education    Given HEP  -MR      Program New   pt given HEP for theraband with UE exercises  -MR      How Provided Verbal;Demonstration;Written  -MR      Provided to Patient  -MR      Level of Understanding Teach back education performed  -MR        User Key  (r) = Recorded By, (t) = Taken By, (c) =  Cosigned By    Initials Name Provider Type    MR Pascale Hua NATHAN Edwards Occupational Therapist                    OT Exercises       03/28/17 0900          Subjective Comments    Subjective Comments Pt reports no new concerns  -MR      Subjective Pain    Able to rate subjective pain? yes  -MR      Exercise 1    Exercise Name 1 Pt performs various exercises to increase strength with UEs for improved functional use.  Using theraband pt performs chest pull, shoulder flexion, abduction, elbow flexion, and forward reach.  OT provides demo and cues for all exercises  -MR      Cueing 1 Verbal;Demo  -MR      Equipment 1 Theraband  -MR      Resistance 1 Red  -MR      Reps 1 15  -MR      Exercise 2    Exercise Name 2 Pt performs activity to increase coordination and strength for improved functional use of bilateral hands.  Pt performs in hand manipulation, gross grasp, tip pinch, oppositional pinches and lateral pinch.  OT provides demo and cues for technique throughout activity.  -MR      Cueing 2 Verbal;Demo  -MR      Equipment 2 Theraputty  -MR      Resistance 2 Red  -MR        User Key  (r) = Recorded By, (t) = Taken By, (c) = Cosigned By    Initials Name Provider Type    MR AshrafLona NATHAN Edwards Occupational Therapist                    Time Calculation:   OT Start Time: 0932  OT Stop Time: 1015  OT Time Calculation (min): 43 min     Therapy Charges for Today     Code Description Service Date Service Provider Modifiers Qty    40325052516  OT THER PROC EA 15 MIN 3/28/2017 Pascale Edwards OT GO 2    [id number]  OT NEUROMUSC RE EDUCATION EA 15 MIN 3/28/2017 Pascale Edwards OT GO 1                    Pascale Edwards OT  3/28/2017

## 2017-04-04 ENCOUNTER — HOSPITAL ENCOUNTER (OUTPATIENT)
Dept: OCCUPATIONAL THERAPY | Facility: HOSPITAL | Age: 72
Setting detail: THERAPIES SERIES
Discharge: HOME | End: 2017-04-04
Attending: PSYCHIATRY & NEUROLOGY

## 2017-04-04 ENCOUNTER — HOSPITAL ENCOUNTER (OUTPATIENT)
Dept: SPEECH THERAPY | Facility: HOSPITAL | Age: 72
Setting detail: THERAPIES SERIES
Discharge: HOME | End: 2017-04-04

## 2017-04-04 ENCOUNTER — HOSPITAL ENCOUNTER (OUTPATIENT)
Dept: PHYSICAL THERAPY | Facility: HOSPITAL | Age: 72
Setting detail: THERAPIES SERIES
Discharge: HOME | End: 2017-04-04

## 2017-04-04 DIAGNOSIS — R26.9 ABNORMAL GAIT: ICD-10-CM

## 2017-04-04 DIAGNOSIS — G20 PARKINSON'S DISEASE (HCC): Primary | ICD-10-CM

## 2017-04-04 DIAGNOSIS — G20 IDIOPATHIC PARKINSON'S DISEASE (HCC): Primary | ICD-10-CM

## 2017-04-04 DIAGNOSIS — R26.81 GAIT INSTABILITY: Primary | ICD-10-CM

## 2017-04-04 DIAGNOSIS — R27.9 LACK OF COORDINATION: ICD-10-CM

## 2017-04-04 DIAGNOSIS — R47.1 DYSARTHRIA: ICD-10-CM

## 2017-04-04 DIAGNOSIS — R49.0 DYSPHONIA: ICD-10-CM

## 2017-04-04 DIAGNOSIS — G20 PARKINSON'S DISEASE (HCC): ICD-10-CM

## 2017-04-04 DIAGNOSIS — R29.898 WEAKNESS OF BOTH UPPER EXTREMITIES: ICD-10-CM

## 2017-04-04 PROCEDURE — 92507 TX SP LANG VOICE COMM INDIV: CPT

## 2017-04-04 PROCEDURE — 97112 NEUROMUSCULAR REEDUCATION: CPT | Performed by: PHYSICAL THERAPIST

## 2017-04-04 PROCEDURE — 97112 NEUROMUSCULAR REEDUCATION: CPT

## 2017-04-04 PROCEDURE — 97110 THERAPEUTIC EXERCISES: CPT

## 2017-04-04 NOTE — PROGRESS NOTES
Outpatient Physical Therapy Neuro Treatment Note  [facility]     Patient Name: Rommel Gonzalez  : 1945  MRN: [id number]  Today's Date: 2017      Visit Date: 2017    Visit Dx:    ICD-10-CM ICD-9-CM   1. Gait instability R26.81 781.2   2. Parkinson's disease G20 332.0   3. Abnormal gait R26.9 781.2       Patient Active Problem List   Diagnosis   • AF (atrial fibrillation)   • Depression   • Increased thyroid stimulating hormone level   • Gait instability   • Hypertension   • Insomnia   • Idiopathic Parkinson's disease   • Peripheral neuropathy   • Dysarthria   • Atrial flutter   • Anticoagulated   • Health care maintenance   • Hypothyroidism (acquired)   • Weakness of voice   • Pure hypercholesterolemia   • Abnormal chest CT               PT Ortho       17 1100    Transfers    Transfers, Sit-Stand [place] independent  -    Transfers, Stand-Sit [place] independent  -    Transfer, Safety Issues weight-shifting ability decreased  -    Transfer, Comment vcs to reach forward and avoid use of UE (x10)  -    Gait Assessment/Treatment    Gait, [place] independent  -    Gait, Distance (Feet) 200  -KH    Gait, Gait Deviations bilateral:;adri decreased;decreased heel strike;double stance time increased;forward flexed posture;step length decreased;stride length decreased;toe-to-floor clearance decreased  -    Gait, Safety Issues balance decreased during turns  -    Gait, Impairments strength decreased;impaired balance  -    Gait, Comment vc's to increase B arm swing  -      User Key  (r) = Recorded By, (t) = Taken By, (c) = Cosigned By    Initials Name Provider Type    REAL Tyson PT Physical Therapist                PT Assessment/Plan       17 1200       PT Assessment    Assessment Comments Progressed by performing BIG exercises in standing position with single UE support on dom-bar. Patient requires vc's and demonstration to perform correctly as  "well as reminders to move \"BIG.\" Added 6\" forward/lateral step ups and had patient descend by tapping his heel on the floor to work on eccentric quad strength.   -     PT Plan    PT Plan Comments Continue to progress with balance and gait training  -       User Key  (r) = Recorded By, (t) = Taken By, (c) = Cosigned By    Initials Name Provider Type     Pricilla Tyson, PT Physical Therapist                     Exercises       04/04/17 1100          Subjective Comments    Subjective Comments I'm good  -      Subjective Pain    Able to rate subjective pain? yes  -      Pre-Treatment Pain Level 0  -      Post-Treatment Pain Level 0  -      Exercise 2    Exercise Name 2 Sitting trunk rotation holding 1# pole weight with UEs at 90 abd  -      Cueing 2 Verbal;Tactile  -      Reps 2 10  -KH      Exercise 4    Exercise Name 4 MIP at hemibars  -      Reps 4 20  -KH      Exercise 5    Exercise Name 5 Lunges at hemibars  -      Cueing 5 Verbal   to try and increase bend in knees  -      Reps 5 10   bilaterally  -      Exercise 6    Exercise Name 6 Forward step BIG exercise with unilateral UE support on dom-bar  -      Cueing 6 Demo  -      Reps 6 10   to each side  -KH      Exercise 7    Exercise Name 7 calf stretches on 1st step with rails  -      Reps 7 3  -KH      Time (Seconds) 7 20  -KH      Exercise 8    Exercise Name 8 Lateral step BIG exercise with unilateral UE support on dom-bar  -      Cueing 8 Demo  -      Reps 8 10   bilaterally  -      Exercise 9    Exercise Name 9 Posterior step BIG exercise with unilateral UE support on dom-bar  -      Cueing 9 Demo  -      Reps 9 10   bilaterally  -KH      Exercise 10    Exercise Name 10 Forward step ups (6\")  -      Reps 10 10   bilaterally  -KH      Exercise 11    Exercise Name 11 Lateral Step Ups (6\")  -      Reps 11 10   bilaterally  -      Exercise 12    Exercise Name 12 side steps with yellow theraband and UE support on " dom-bar  -      Sets 12 2  -KH      Reps 12 3   laps along dom-[facility]  -        User Key  (r) = Recorded By, (t) = Taken By, (c) = Cosigned By    Initials Name Provider Type    REAL Tyson PT Physical Therapist                  Time Calculation:   Start Time: 1102  Stop Time: 1143  Time Calculation (min): 41 min     Therapy Charges for Today     Code Description Service Date Service Provider Modifiers Qty    99015813803  PT NEUROMUSC RE EDUCATION EA 15 MIN 4/4/2017 Pricilla Tyson, PT GP 3                    Pricilla Tyson PT  4/4/2017

## 2017-04-04 NOTE — PROGRESS NOTES
Outpatient Speech Language Pathology   Adult Speech Language Cognitive Treatment Note  [facility]     Patient Name: Rommel Gonzalez  : 1945  MRN: [id number]  Today's Date: 2017         Visit Date: 2017   Patient Active Problem List   Diagnosis   • AF (atrial fibrillation)   • Depression   • Increased thyroid stimulating hormone level   • Gait instability   • Hypertension   • Insomnia   • Idiopathic Parkinson's disease   • Peripheral neuropathy   • Dysarthria   • Atrial flutter   • Anticoagulated   • Health care maintenance   • Hypothyroidism (acquired)   • Weakness of voice   • Pure hypercholesterolemia   • Abnormal chest CT          Visit Dx:    ICD-10-CM ICD-9-CM   1. Parkinson's disease G20 332.0   2. Dysarthria R47.1 784.51   3. Dysphonia R49.0 784.42                               SLP OP Goals       17 1100       Subjective Comments    Subjective Comments (P)  Pt was cooperative and motivated during session today.   -JG     Subjective Pain    Able to rate subjective pain? (P)  yes  -JG     Pre-Treatment Pain Level (P)  0  -JG     Post-Treatment Pain Level (P)  0  -JG     Dysarthria Goals    Patient will be able to engage in speech at the conversational level with maximum articulatory accuracy so that speech is understood by familiar /unfamiliar listeners (P)  90%:;without cues  -JG     Status: Patient will be able to engage in speech at the conversational level with maximum articulatory accuracy so that speech is understood by familiar /unfamiliar listeners (P)  Progressing as expected  -JG     Patient will be able to communicate a message that is comprehensible to familiar/unfamiliar listeners utilizing verbal speech and augmentative strategies (P)  90%:;without cues  -JG     Status: Patient will be able to communicate a message that is comprehensible to familiar/unfamiliar listeners utilizing verbal speech and augmentative strategies (P)  Progressing as expected  -JG     Patient will  improve comprehensibility of verbal messages by speaking at an appropriate vocal intensity (P)  90%:;without cues  -JG     Status: Patient will improve comprehensibility of verbal messages by speaking at an appropriate vocal intensity (P)  Progressing as expected  -JG     Comments: Patient will improve comprehensibility of verbal messages by speaking at an appropriate vocal intensity (P)  Pt demonstrated 60% accuracy with cues when verbalizing sentences with increased intensity. Pt requires mod to max cues to take deep inhalation prior to phonation and speak loudly when reading sentences. Pt's intensity is improved at the beginning of reading tasks.  -JG     Patient will compensate for reduced respiratory support for speech by deeper inhalation before beginning an utterance (P)  90%:;without cues  -JG     Status: Patient will compensate for reduced respiratory support for speech by deeper inhalation before beginning an utterance (P)  Progressing as expected  -JG     Comments: Patient will compensate for reduced respiratory support for speech by deeper inhalation before beginning an utterance (P)  Pt demonstrated 50% accuracy without cues and 70% accuracy with mod to max cues when taking inhalation prior to spoken sentences when reading common statements.  -JG     Patient will maximize use of phonation to improve communication skills by producing continuous tone from bottom to top/top to bottom of pitch range (P)  90%:;without cues  -JG     Status: Patient will maximize use of phonation to improve communication skills by producing continuous tone from bottom to top/top to bottom of pitch range (P)  Progressing as expected  -JG     Comments: Patient will maximize use of phonation to improve communication skills by producing continuous tone from bottom to top/top to bottom of pitch range (P)  Pt demonstrated 70% accuracy without cues during pitch glide exercises.  -JG     Patient will apply compensatory strategies to  "improve intelligibility of speech during in spontaneous speech (P)  90%:;without cues  -JG     Status: Patient will apply compensatory strategies to improve intelligibility of speech during in spontaneous speech (P)  Progressing as expected  -JG     Comments: Patient will apply compensatory strategies to improve intelligibility of speech during in spontaneous speech (P)  Pt demonstrated 50% accuracy without cues when reading sentences with overarticulation and focus on intonation.  -JG     \"Patient will increase strength and power  of articulators so they can move more quickly and accurately to improve speech intelligibility by completing lip exercises\" (P)  90%:;without cues  -JG     Status: Patient will increase strength and power  of articulators so they can move more quickly and accurately to improve speech intelligibility by completing lip exercises (P)  Progressing as expected  -JG     Comments: Patient will increase strength and power  of articulators so they can move more quickly and accurately to improve speech intelligibility by completing lip exercises (P)  Pt demonstrated 70% accuracy without cues when performing oral motor exercises.  -JG       User Key  (r) = Recorded By, (t) = Taken By, (c) = Cosigned By    Initials Name Provider Type    JOSHUA Parham Speech Therapy Student Speech Therapy Student                         Time Calculation:   SLP Start Time: (P) 1015  SLP Stop Time: (P) 1100  SLP Time Calculation (min): (P) 45 min    Therapy Charges for Today     Code Description Service Date Service Provider Modifiers Qty    73300215122  ST TREATMENT SPEECH 3 4/4/2017 Haylee Parham Speech Therapy Student GN 1                   Haylee Parham Speech Therapy Student  4/4/2017  "

## 2017-04-04 NOTE — PROGRESS NOTES
Outpatient Occupational Therapy Neuro Treatment Note  [facility]     Patient Name: Rommel Gonzalez  : 1945  MRN: [id number]  Today's Date: 2017       Visit Date: 2017    Patient Active Problem List   Diagnosis   • AF (atrial fibrillation)   • Depression   • Increased thyroid stimulating hormone level   • Gait instability   • Hypertension   • Insomnia   • Idiopathic Parkinson's disease   • Peripheral neuropathy   • Dysarthria   • Atrial flutter   • Anticoagulated   • Health care maintenance   • Hypothyroidism (acquired)   • Weakness of voice   • Pure hypercholesterolemia   • Abnormal chest CT        Past Medical History:   Diagnosis Date   • AF (atrial fibrillation)    • Colon polyps    • Confusion    • Depression    • Disease of thyroid gland    • Gait instability    • Hay fever    • Hyperlipidemia    • Hypertension    • Insomnia    • Measles    • Mumps    • Parkinson disease    • Peripheral neuropathy    • Slurred speech    • Tremor         No past surgical history on file.      Visit Dx:    ICD-10-CM ICD-9-CM   1. Idiopathic Parkinson's disease G20 332.0   2. Weakness of both upper extremities M62.81 729.89   3. Lack of coordination R27.9 781.3                         OT Assessment/Plan       17 1037       OT Assessment    Assessment Comments Pt remains cooperative with OT interventions, continue OT to address UE strength and coordination for independence with functional tasks.  -MR       User Key  (r) = Recorded By, (t) = Taken By, (c) = Cosigned By    Initials Name Provider Type     Pascale Hua Grace, OT Occupational Therapist                    Therapy Education       17 1036          Therapy Education    Given HEP  -MR      Program New   Pt given slo foam for use at home with bilateral hands  -MR      How Provided Verbal;Demonstration  -MR      Provided to Patient  -MR      Level of Understanding Teach back education performed  -MR        User Key  (r) = Recorded By, (t) =  "Taken By, (c) = Cosigned By    Initials Name Provider Type    MR Pascale Edwards OT Occupational Therapist                    OT Exercises       04/04/17 0900          Subjective Comments    Subjective Comments \"I walk every morning.\"  -MR      Subjective Pain    Able to rate subjective pain? yes  -MR      Pre-Treatment Pain Level 0  -MR      Exercise 1    Exercise Name 1 Pt peforms activity to increase coordination and strength with bilateral hands for improved functional use.  Using theraputty pt removes various small objects embedded by OT.  OT provides cues as needed.  -MR      Cueing 1 Verbal;Demo  -MR      Exercise 2    Exercise Name 2 Pt performs therapeutic exercises to increase BUE strength and endurance for improved performance of functional tasks.  Using dowel ye pt performs chest press, shoulder flexion, abduction, bicep curls, and forward circles. OT provides demo and cues for proper technique.  -MR      Cueing 2 Verbal;Demo  -MR      Equipment 2 Dowel  -MR      Weights/Plates 2 2  -MR      Sets 2 2  -MR      Reps 2 10  -MR      Exercise 3    Exercise Name 3 Pt performs exercises to increase  strength for improved functional use of hands.  Pt performs gripping exercises using green slo foam following OT demo/cues, with focus on R hand, as L hand with greater strength.  -MR      Cueing 3 Verbal;Demo  -MR      Equipment 3 Other   slo foam  -MR      Resistance 3 Green  -MR        User Key  (r) = Recorded By, (t) = Taken By, (c) = Cosigned By    Initials Name Provider Type    MR Pascale Edwards OT Occupational Therapist                    Time Calculation:   OT Start Time: 0932  OT Stop Time: 1015  OT Time Calculation (min): 43 min     Therapy Charges for Today     Code Description Service Date Service Provider Modifiers Qty    48308138796  OT THER PROC EA 15 MIN 4/4/2017 LonaMelita Edwards OT GO 2    [id number]  OT NEUROMUSC RE EDUCATION EA 15 MIN 4/4/2017 LonaMelita Edwards OT GO 1    "                 Pascale Edwards, OT  4/4/2017

## 2017-04-07 ENCOUNTER — TELEPHONE (OUTPATIENT)
Dept: NEUROLOGY | Facility: CLINIC | Age: 72
End: 2017-04-07

## 2017-04-07 NOTE — TELEPHONE ENCOUNTER
Pt fall yesterday pt's sister wanted to know if the meds should be changed or do you want to see pt.

## 2017-04-10 ENCOUNTER — TELEPHONE (OUTPATIENT)
Dept: NEUROLOGY | Facility: CLINIC | Age: 72
End: 2017-04-10

## 2017-04-10 NOTE — TELEPHONE ENCOUNTER
----- Message from Manuel Escobar MA sent at 4/10/2017 12:39 PM EDT -----  Pt's sister called Friday about pt falling he didn't hurt himself. She wanted to see if he should come in for a sooner appt or to see if his meds needed to be adjusted. Request callback    Raquel- [phone]           Called and made appt for pt on 4/19/17

## 2017-04-11 ENCOUNTER — HOSPITAL ENCOUNTER (OUTPATIENT)
Dept: SPEECH THERAPY | Facility: HOSPITAL | Age: 72
Setting detail: THERAPIES SERIES
Discharge: HOME | End: 2017-04-11

## 2017-04-11 ENCOUNTER — HOSPITAL ENCOUNTER (OUTPATIENT)
Dept: OCCUPATIONAL THERAPY | Facility: HOSPITAL | Age: 72
Setting detail: THERAPIES SERIES
Discharge: HOME | End: 2017-04-11
Attending: PSYCHIATRY & NEUROLOGY

## 2017-04-11 ENCOUNTER — HOSPITAL ENCOUNTER (OUTPATIENT)
Dept: PHYSICAL THERAPY | Facility: HOSPITAL | Age: 72
Setting detail: THERAPIES SERIES
Discharge: HOME | End: 2017-04-11

## 2017-04-11 DIAGNOSIS — R49.0 DYSPHONIA: ICD-10-CM

## 2017-04-11 DIAGNOSIS — R26.81 GAIT INSTABILITY: Primary | ICD-10-CM

## 2017-04-11 DIAGNOSIS — R29.898 WEAKNESS OF BOTH UPPER EXTREMITIES: ICD-10-CM

## 2017-04-11 DIAGNOSIS — G20 PARKINSON'S DISEASE (HCC): Primary | ICD-10-CM

## 2017-04-11 DIAGNOSIS — G20 IDIOPATHIC PARKINSON'S DISEASE (HCC): Primary | ICD-10-CM

## 2017-04-11 DIAGNOSIS — R47.1 DYSARTHRIA: ICD-10-CM

## 2017-04-11 DIAGNOSIS — R27.9 LACK OF COORDINATION: ICD-10-CM

## 2017-04-11 PROCEDURE — 97112 NEUROMUSCULAR REEDUCATION: CPT

## 2017-04-11 PROCEDURE — 92507 TX SP LANG VOICE COMM INDIV: CPT

## 2017-04-11 PROCEDURE — 97110 THERAPEUTIC EXERCISES: CPT

## 2017-04-11 NOTE — PROGRESS NOTES
Outpatient Occupational Therapy Neuro Treatment Note  [facility]     Patient Name: Rommel Gonzalez  : 1945  MRN: [id number]  Today's Date: 2017       Visit Date: 2017    Patient Active Problem List   Diagnosis   • AF (atrial fibrillation)   • Depression   • Increased thyroid stimulating hormone level   • Gait instability   • Hypertension   • Insomnia   • Idiopathic Parkinson's disease   • Peripheral neuropathy   • Dysarthria   • Atrial flutter   • Anticoagulated   • Health care maintenance   • Hypothyroidism (acquired)   • Weakness of voice   • Pure hypercholesterolemia   • Abnormal chest CT        Past Medical History:   Diagnosis Date   • AF (atrial fibrillation)    • Colon polyps    • Confusion    • Depression    • Disease of thyroid gland    • Gait instability    • Hay fever    • Hyperlipidemia    • Hypertension    • Insomnia    • Measles    • Mumps    • Parkinson disease    • Peripheral neuropathy    • Slurred speech    • Tremor         No past surgical history on file.      Visit Dx:    ICD-10-CM ICD-9-CM   1. Idiopathic Parkinson's disease G20 332.0   2. Weakness of both upper extremities M62.81 729.89   3. Lack of coordination R27.9 781.3                         OT Assessment/Plan       17 1002       OT Assessment    Assessment Comments Pt able to complete FM task using Grooved Pegboard this date with min difficulty; re-assessment to be completed at next OT session.  -MR       User Key  (r) = Recorded By, (t) = Taken By, (c) = Cosigned By    Initials Name Provider Type     Pascale Hua Grace, OT Occupational Therapist                    Therapy Education       17 1015          Therapy Education    Given HEP  -MR      Program Reinforced   theraband and slo foam exercises  -MR      How Provided Verbal  -MR      Provided to Patient  -MR      Level of Understanding Verbalized  -MR        User Key  (r) = Recorded By, (t) = Taken By, (c) = Cosigned By    Initials Name Provider  Type    MR Pascale Hua Grace OT Occupational Therapist                    OT Exercises       04/11/17 0900          Subjective Pain    Able to rate subjective pain? yes  -MR      Pre-Treatment Pain Level 0  -MR      Exercise 1    Exercise Name 1 FM activity to increase coordination with bilateral hands. Using grooved pegboard, pt manipulates pegs and places into corresponding openings.  Multiple attempts placing/removing pegs  -MR      Cueing 1 Verbal;Demo  -MR      Exercise 2    Exercise Name 2 Ergometer to increase UE strength and endurance. Pt propels ergometer using BUEs with no rest break.  -MR      Cueing 2 Verbal;Demo  -MR      Equipment 2 UE Ergometer  -MR      Exercise 3    Exercise Name 3 Activity to increase strength and coordination with bilateral hands. Using clothespins pt attaches and removes from horizontal bar following OT demo/cues. Multiple reps completed  -MR      Cueing 3 Verbal;Demo  -MR        User Key  (r) = Recorded By, (t) = Taken By, (c) = Cosigned By    Initials Name Provider Type    MR AshrafLona NATHAN Edwards Occupational Therapist                    Time Calculation:   OT Start Time: 0933  OT Stop Time: 1015  OT Time Calculation (min): 42 min     Therapy Charges for Today     Code Description Service Date Service Provider Modifiers Qty    60576456751  OT THER PROC EA 15 MIN 4/11/2017 Pascale Edwards OT GO 2    [id number]  OT NEUROMUSC RE EDUCATION EA 15 MIN 4/11/2017 Pascale Edwards OT GO 1                    Pascale Edwards OT  4/11/2017

## 2017-04-11 NOTE — PROGRESS NOTES
Outpatient Physical Therapy Neuro Treatment Note  [facility]     Patient Name: Rommel Gonzalez  : 1945  MRN: [id number]  Today's Date: 2017      Visit Date: 2017    Visit Dx:    ICD-10-CM ICD-9-CM   1. Gait instability R26.81 781.2       Patient Active Problem List   Diagnosis   • AF (atrial fibrillation)   • Depression   • Increased thyroid stimulating hormone level   • Gait instability   • Hypertension   • Insomnia   • Idiopathic Parkinson's disease   • Peripheral neuropathy   • Dysarthria   • Atrial flutter   • Anticoagulated   • Health care maintenance   • Hypothyroidism (acquired)   • Weakness of voice   • Pure hypercholesterolemia   • Abnormal chest CT                 PT Neuro       17 1216          Subjective Comments    Subjective Comments I'm doing fine.  -EF      Precautions and Contraindications    Precautions/Limitations fall precautions  -EF      Precautions falls  -EF      Subjective Pain    Able to rate subjective pain? yes  -EF      Pre-Treatment Pain Level 0  -EF      Post-Treatment Pain Level 0  -EF      Posture/Observations    Posture/Observations Comments head laterally flexed L  -EF      Bed Mobility, Assessment/Treatment    Bed Mob, Supine to Sit, [place] conditional independence  -EF      Bed Mob, Sit to Supine, [place] conditional independence  -EF      Transfers    Transfers, Sit-Stand [place] conditional independence  -EF      Transfers, Stand-Sit [place] conditional independence  -EF      Transfer, Safety Issues weight-shifting ability decreased  -EF      Gait Assessment/Treatment    Gait, [place] Level conditional independence  -EF      Gait, Distance (Feet) 200   x 2, 80 x 2  -EF      Gait, Gait Deviations forward flexed posture;bilateral:;decreased heel strike;step length decreased;toe-to-floor clearance decreased;weight-shifting ability decreased  -EF      Gait, Safety Issues balance decreased during turns;step length  decreased;weight-shifting ability decreased  -EF      Gait, Impairments strength decreased;impaired balance  -EF        User Key  (r) = Recorded By, (t) = Taken By, (c) = Cosigned By    Initials Name Provider Type    KATHIE Reese PT Physical Therapist                        PT Assessment/Plan       04/11/17 1241       PT Assessment    Assessment Comments Pt demonstrated understanding of previous home exercise program (added 10 second hold to BIG exercises for progression with balance and stretching) and also added new stretching & ROM exercises.  -EF     PT Plan    PT Frequency 1x/week  -EF     PT Plan Comments Continue with plan of care.  -EF       User Key  (r) = Recorded By, (t) = Taken By, (c) = Cosigned By    Initials Name Provider Type    KATHIE Reese PT Physical Therapist                     Exercises       04/11/17 1216          Subjective Comments    Subjective Comments I'm doing fine.  -EF      Subjective Pain    Able to rate subjective pain? yes  -EF      Pre-Treatment Pain Level 0  -EF      Post-Treatment Pain Level 0  -EF      Exercise 1    Exercise Name 1 Standing p/d flexion at hemibars  -EF      Reps 1 10  -EF      Exercise 7    Exercise Name 7 calf stretches on 1st step with rails  -EF      Reps 7 3  -EF      Time (Seconds) 7 20  -EF      Exercise 13    Exercise Name 13 Reviewed previous HEP (BIG sitting ex #1 adding 10 sec hold to each position, practiced cervical rotation & chin tucks with back to wall, single knee to chest sitting).    -EF      Reps 13 10  -EF      Exercise 14    Exercise Name 14 New HEP: sitting trunk rotation, sitting ham stretch)  -EF      Reps 14 5  -EF        User Key  (r) = Recorded By, (t) = Taken By, (c) = Cosigned By    Initials Name Provider Type    KATHIE Reese PT Physical Therapist                                  Therapy Education       04/11/17 1240 04/11/17 1015       Therapy Education    Given HEP;Posture/body mechanics;Fall prevention  and home safety;Mobility training   see separate pages of reviewed exercises & new exercises  -EF HEP  -MR     Program New   & reinforced  -EF Reinforced   theraband and slo foam exercises  -MR     How Provided Verbal;Demonstration;Written  -EF Verbal  -MR     Provided to Patient  -EF Patient  -MR     Level of Understanding Verbalized  -EF Verbalized  -MR       User Key  (r) = Recorded By, (t) = Taken By, (c) = Cosigned By    Initials Name Provider Type    EF Kyara Reese, PT Physical Therapist    MR Pascale Edwards, OT Occupational Therapist                Time Calculation:   Start Time: 1110  Stop Time: 1155  Time Calculation (min): 45 min     Therapy Charges for Today     Code Description Service Date Service Provider Modifiers Qty    49371304328 HC PT NEUROMUSC RE EDUCATION EA 15 MIN 4/11/2017 Kyara Reese, PT GP 3                    Kyara Reese, PT  4/11/2017

## 2017-04-17 RX ORDER — LISINOPRIL 20 MG/1
20 TABLET ORAL 2 TIMES DAILY
Qty: 180 TABLET | Refills: 1 | Status: SHIPPED | OUTPATIENT
Start: 2017-04-17 | End: 2017-06-02 | Stop reason: SDUPTHER

## 2017-04-18 ENCOUNTER — HOSPITAL ENCOUNTER (OUTPATIENT)
Dept: PHYSICAL THERAPY | Facility: HOSPITAL | Age: 72
Setting detail: THERAPIES SERIES
Discharge: HOME | End: 2017-04-18

## 2017-04-18 ENCOUNTER — HOSPITAL ENCOUNTER (OUTPATIENT)
Dept: SPEECH THERAPY | Facility: HOSPITAL | Age: 72
Setting detail: THERAPIES SERIES
Discharge: HOME | End: 2017-04-18

## 2017-04-18 ENCOUNTER — HOSPITAL ENCOUNTER (OUTPATIENT)
Dept: OCCUPATIONAL THERAPY | Facility: HOSPITAL | Age: 72
Setting detail: THERAPIES SERIES
Discharge: HOME | End: 2017-04-18
Attending: PSYCHIATRY & NEUROLOGY

## 2017-04-18 DIAGNOSIS — R27.9 LACK OF COORDINATION: ICD-10-CM

## 2017-04-18 DIAGNOSIS — R47.1 DYSARTHRIA: ICD-10-CM

## 2017-04-18 DIAGNOSIS — G20 PARKINSON'S DISEASE (HCC): Primary | ICD-10-CM

## 2017-04-18 DIAGNOSIS — G20 IDIOPATHIC PARKINSON'S DISEASE (HCC): Primary | ICD-10-CM

## 2017-04-18 DIAGNOSIS — R26.81 GAIT INSTABILITY: Primary | ICD-10-CM

## 2017-04-18 DIAGNOSIS — R49.0 DYSPHONIA: ICD-10-CM

## 2017-04-18 DIAGNOSIS — R29.898 WEAKNESS OF BOTH UPPER EXTREMITIES: ICD-10-CM

## 2017-04-18 PROCEDURE — 92507 TX SP LANG VOICE COMM INDIV: CPT

## 2017-04-18 PROCEDURE — 97110 THERAPEUTIC EXERCISES: CPT

## 2017-04-18 PROCEDURE — 97112 NEUROMUSCULAR REEDUCATION: CPT

## 2017-04-18 NOTE — PROGRESS NOTES
Outpatient Speech Language Pathology   Adult Speech Language Cognitive Treatment Note  [facility]     Patient Name: Rommel Gonzalez  : 1945  MRN: [id number]  Today's Date: 2017         Visit Date: 2017   Patient Active Problem List   Diagnosis   • AF (atrial fibrillation)   • Depression   • Increased thyroid stimulating hormone level   • Gait instability   • Hypertension   • Insomnia   • Idiopathic Parkinson's disease   • Peripheral neuropathy   • Dysarthria   • Atrial flutter   • Anticoagulated   • Health care maintenance   • Hypothyroidism (acquired)   • Weakness of voice   • Pure hypercholesterolemia   • Abnormal chest CT          Visit Dx:    ICD-10-CM ICD-9-CM   1. Parkinson's disease G20 332.0   2. Dysarthria R47.1 784.51   3. Dysphonia R49.0 784.42                               SLP OP Goals       17 1300       Subjective Comments    Subjective Comments (P)  Pt was cooperative and motivated in session.  -JG     Subjective Pain    Able to rate subjective pain? (P)  yes  -JG     Pre-Treatment Pain Level (P)  0  -JG     Post-Treatment Pain Level (P)  0  -JG     Dysarthria Goals    Patient will be able to engage in speech at the conversational level with maximum articulatory accuracy so that speech is understood by familiar /unfamiliar listeners (P)  90%:;without cues  -JG     Status: Patient will be able to engage in speech at the conversational level with maximum articulatory accuracy so that speech is understood by familiar /unfamiliar listeners (P)  Progressing as expected  -JG     Patient will be able to communicate a message that is comprehensible to familiar/unfamiliar listeners utilizing verbal speech and augmentative strategies (P)  90%:;without cues  -JG     Status: Patient will be able to communicate a message that is comprehensible to familiar/unfamiliar listeners utilizing verbal speech and augmentative strategies (P)  Progressing as expected  -JG     Patient will improve  comprehensibility of verbal messages by speaking at an appropriate vocal intensity (P)  90%:;without cues  -JG     Status: Patient will improve comprehensibility of verbal messages by speaking at an appropriate vocal intensity (P)  Progressing as expected  -JG     Comments: Patient will improve comprehensibility of verbal messages by speaking at an appropriate vocal intensity (P)  Pt demonstrated 80% accuracy with cues when verbalizing sentences with increased intensity. Pt's intensity continues to be improved at the beginning of reading tasks.  -JG     Patient will compensate for reduced respiratory support for speech by deeper inhalation before beginning an utterance (P)  90%:;without cues  -JG     Status: Patient will compensate for reduced respiratory support for speech by deeper inhalation before beginning an utterance (P)  Progressing as expected  -JG     Comments: Patient will compensate for reduced respiratory support for speech by deeper inhalation before beginning an utterance (P)  Pt demonstrated 75% accuracy without cues when taking inhalation prior to spoken sentences when reading common statements.  -JG     Patient will maximize use of phonation to improve communication skills by producing continuous tone from bottom to top/top to bottom of pitch range (P)  90%:;without cues  -JG     Status: Patient will maximize use of phonation to improve communication skills by producing continuous tone from bottom to top/top to bottom of pitch range (P)  Progressing as expected  -JG     Comments: Patient will maximize use of phonation to improve communication skills by producing continuous tone from bottom to top/top to bottom of pitch range (P)  Pt demonstrated 70% accuracy without cues during pitch glide exercises.  -JG     Patient will apply compensatory strategies to improve intelligibility of speech during in spontaneous speech (P)  90%:;without cues  -JG     Status: Patient will apply compensatory strategies  "to improve intelligibility of speech during in spontaneous speech (P)  Progressing as expected  -JG     Comments: Patient will apply compensatory strategies to improve intelligibility of speech during in spontaneous speech (P)  Pt demonstrated 70% accuracy without cues when reading sentences with overarticulation and focus on intonation. Pt demonstrates good self-awareness of progress during voice recordings.  -JG     \"Patient will increase strength and power  of articulators so they can move more quickly and accurately to improve speech intelligibility by completing lip exercises\" (P)  90%:;without cues  -JG     Status: Patient will increase strength and power  of articulators so they can move more quickly and accurately to improve speech intelligibility by completing lip exercises (P)  Progressing as expected  -JG     Comments: Patient will increase strength and power  of articulators so they can move more quickly and accurately to improve speech intelligibility by completing lip exercises (P)  Pt demonstrated 80% accuracy without cues when performing oral motor exercises. Pt demonstrated reduced lingual, labial, and jaw ROM.  -JG       User Key  (r) = Recorded By, (t) = Taken By, (c) = Cosigned By    Initials Name Provider Type    JOSHUA Parham Speech Therapy Student Speech Therapy Student                         Time Calculation:   SLP Start Time: (P) 1015  SLP Stop Time: (P) 1100  SLP Time Calculation (min): (P) 45 min    Therapy Charges for Today     Code Description Service Date Service Provider Modifiers Qty    [id number]  ST TREATMENT SPEECH 3 4/18/2017 Haylee Parham Speech Therapy Student GN 1                   Haylee Parham Speech Therapy Student  4/18/2017  "

## 2017-04-18 NOTE — PROGRESS NOTES
Outpatient Occupational Therapy Neuro Re-Evaluation  [facility]     Patient Name: Rommel Gonzalez  : 1945  MRN: [id number]  Today's Date: 2017      Visit Date: 2017    Patient Active Problem List   Diagnosis   • AF (atrial fibrillation)   • Depression   • Increased thyroid stimulating hormone level   • Gait instability   • Hypertension   • Insomnia   • Idiopathic Parkinson's disease   • Peripheral neuropathy   • Dysarthria   • Atrial flutter   • Anticoagulated   • Health care maintenance   • Hypothyroidism (acquired)   • Weakness of voice   • Pure hypercholesterolemia   • Abnormal chest CT        Past Medical History:   Diagnosis Date   • AF (atrial fibrillation)    • Colon polyps    • Confusion    • Depression    • Disease of thyroid gland    • Gait instability    • Hay fever    • Hyperlipidemia    • Hypertension    • Insomnia    • Measles    • Mumps    • Parkinson disease    • Peripheral neuropathy    • Slurred speech    • Tremor         No past surgical history on file.      Visit Dx:      ICD-10-CM ICD-9-CM   1. Idiopathic Parkinson's disease G20 332.0   2. Weakness of both upper extremities M62.81 729.89   3. Lack of coordination R27.9 781.3                 OT Neuro       17 0900          Coordination    9-Hole Peg Right 46 sec  -MR      ROM (Range of Motion)    General ROM no range of motion deficits identified  -MR      Left Shoulder    Flexion Gross Movement (4+/5) good plus  -MR      ABduction Gross Movement (4+/5) good plus  -MR      Right Shoulder    Flexion Gross Movement (4+/5) good plus  -MR      ABduction Gross Movement (4+/5) good plus  -MR      Left Elbow/Forearm    Elbow Flexion Gross Movement (4+/5) good plus  -MR      Elbow Extension Gross Movement (4+/5) good plus  -MR      Right Elbow/Forearm    Elbow Flexion Gross Movement (4+/5) good plus  -MR      Elbow Extension Gross Movement (4+/5) good plus  -MR      ADL Assessment/Intervention    ADL's Assessed? --   pt  reports MOD I with self care tasks  -MR        User Key  (r) = Recorded By, (t) = Taken By, (c) = Cosigned By    Initials Name Provider Type    MR Pascale Edwards, OT Occupational Therapist             Hand Therapy (last 24 hours)      Hand Eval       04/18/17 1000           Strength Right    Right  Test 1 54  -MR      Right  Test 2 55  -MR      Right  Test 3 41  -MR       Strength Average Right 50  -MR       Strength Left    Left  Test 1 52  -MR      Left  Test 2 50  -MR      Left  Test 3 52  -MR       Strength Average Left 51.33  -MR      Right Hand Strength - Pinch (lbs)    Lateral 15 lbs  -MR      Left Hand Strength - Pinch (lbs)    Lateral 15 lbs  -MR      Therapy Education    Given HEP  -MR      Program Reinforced  -MR      How Provided Verbal;Demonstration  -MR      Provided to Patient  -MR      Level of Understanding Verbalized  -MR        User Key  (r) = Recorded By, (t) = Taken By, (c) = Cosigned By    Initials Name Provider Type    MR Pascale Edwards, OT Occupational Therapist                        OT Goals       04/18/17 1000       Long Term Goals    LTG Date to Achieve 05/02/17  -MR     LTG 1 Pt to be independent with HEP.  -MR     LTG 1 Progress Progressing;Ongoing  -MR     LTG 1 Progress Comments Pt reports compliance with HEP.  -MR     LTG 2 Pt to increase RUE  strength to > or = 33 lbs for improved functional use of R hand.  -MR     LTG 2 Progress Met  -MR     LTG 3 Pt to improve BUE shoulder and elbow strength to > or = 4+/5 for improved functional use of BUEs.  -MR     LTG 3 Progress Met  -MR     LTG 4 Pt to complete 9 Hole peg test using RUE < or = 45 sec to demo improved FM skills for functional use of extremity.  -MR     LTG 4 Progress Progressing;Not Met  -MR     LTG 5 Pt to report score on DASH < or = 52 to indicate improved UE function.  -MR     LTG 5 Progress Progressing;Not Met  -MR     Time Calculation    OT Goal Re-Cert Due Date 05/18/17   "-MR       User Key  (r) = Recorded By, (t) = Taken By, (c) = Cosigned By    Initials Name Provider Type    MR Pascale Edwards, OT Occupational Therapist                OT Assessment/Plan       04/18/17 1012       OT Assessment    Functional Limitations Limitations in functional capacity and performance;Performance in leisure activities;Limitation in home management  -MR     Impairments Coordination;Dexterity;Balance;Muscle strength  -MR     Assessment Comments Re-assessment completed this date, pt demos progress since initial evaluation.  Upon re-evaluation today pt noted with improved BUE strength, as well as improved  and pinch strength.  Pt demos increased FM skills with RUE as evidenced by improved score on 9 Hole peg test. Pt reports overall improved strength, states he has performed home exericses per OT's recommendations. Recommend approximately 2 additional weeks OT treatment for UE strengthening and FM training for safety and independence with ADL/IADLs.  -MR     OT Diagnosis impaired functional use of BUEs  -MR     OT Rehab Potential Good  -MR     Patient/caregiver participated in establishment of treatment plan and goals Yes  -MR     Patient would benefit from skilled therapy intervention Yes  -MR     OT Plan    OT Frequency 1x/week  -MR     Predicted Duration of Therapy Intervention (days/wks) 2 weeks  -MR     Planned CPT's? OT THER ACT EA 15 MIN: 00435QT;OT THER PROC EA 15 MIN: 79980SP;OT NEUROMUSC RE EDUCATION EA 15 MIN: 95255;OT SELF CARE/MGMT/TRAIN 15 MIN: 62376  -MR     Planned Therapy Interventions (Optional Details) home exercise program;motor coordination training;neuromuscular re-education;patient/family education;strengthening  -MR       User Key  (r) = Recorded By, (t) = Taken By, (c) = Cosigned By    Initials Name Provider Type    MR Pascale Edwards, OT Occupational Therapist                OT Exercises       04/18/17 1000          Subjective Comments    Subjective Comments \"I've been " "doing my exercises.\"  -MR      Subjective Pain    Pre-Treatment Pain Level 0  -MR      Exercise 1    Exercise Name 1 FM activity to increase coordination for functional use of R hand. Using small pegboard, pt places and removes multiple pegs following OT demo/cues.  -MR      Cueing 1 Verbal;Demo  -MR      Exercise 2    Exercise Name 2  strengthening. Pt performs  strengthening exercises using digiflex, alternates R and L UEs  -MR      Cueing 2 Verbal;Demo  -MR      Equipment 2 Other   digiflex  -MR      Resistance 2 Green;Blue  -MR        User Key  (r) = Recorded By, (t) = Taken By, (c) = Cosigned By    Initials Name Provider Type    MR AshrafLona Rohn, OT Occupational Therapist                    Time Calculation:   OT Start Time: 0933  OT Stop Time: 1015  OT Time Calculation (min): 42 min     Therapy Charges for Today     Code Description Service Date Service Provider Modifiers Qty    [id number]  OT THER PROC EA 15 MIN 4/18/2017 Pascale Edwards OT GO 2    [id number]  OT NEUROMUSC RE EDUCATION EA 15 MIN 4/18/2017 Pascale Edwards OT GO 1                     Pascale Edwards OT  4/18/2017  "

## 2017-04-18 NOTE — PROGRESS NOTES
Outpatient Physical Therapy Neuro Re-Evaluation  [facility]     Patient Name: Rommel Gonzalez  : 1945  MRN: [id number]  Today's Date: 2017      Visit Date: 2017    Patient Active Problem List   Diagnosis   • AF (atrial fibrillation)   • Depression   • Increased thyroid stimulating hormone level   • Gait instability   • Hypertension   • Insomnia   • Idiopathic Parkinson's disease   • Peripheral neuropathy   • Dysarthria   • Atrial flutter   • Anticoagulated   • Health care maintenance   • Hypothyroidism (acquired)   • Weakness of voice   • Pure hypercholesterolemia   • Abnormal chest CT        Past Medical History:   Diagnosis Date   • AF (atrial fibrillation)    • Atrial flutter    • Atypical chest pain    • Colon polyps    • Confusion    • Depression    • Disease of thyroid gland    • Disorder of thyroid    • Dyspnea and respiratory abnormalities    • Elevated TSH    • Fatigue    • Gait instability    • Hay fever    • Hyperlipidemia    • Hypertension    • Insomnia    • Measles    • Mumps    • Palpitations    • Parkinson disease    • Peripheral neuropathy    • Pneumonia    • Poor sleep pattern    • Slurred speech    • Tremor         No past surgical history on file.      Visit Dx:     ICD-10-CM ICD-9-CM   1. Gait instability R26.81 781.2              Hand Therapy (last 24 hours)      Hand Eval       17 1000           Strength Right    Right  Test 1 54  -MR      Right  Test 2 55  -MR      Right  Test 3 41  -MR       Strength Average Right 50  -MR       Strength Left    Left  Test 1 52  -MR      Left  Test 2 50  -MR      Left  Test 3 52  -MR       Strength Average Left 51.33  -MR      Right Hand Strength - Pinch (lbs)    Lateral 15 lbs  -MR      Left Hand Strength - Pinch (lbs)    Lateral 15 lbs  -MR      Therapy Education    Given HEP  -MR      Program Reinforced  -MR      How Provided Verbal;Demonstration  -MR      Provided to Patient  -MR       Level of Understanding Verbalized  -MR        User Key  (r) = Recorded By, (t) = Taken By, (c) = Cosigned By    Initials Name Provider Type    MR Pascale Edwards, OT Occupational Therapist                PT Neuro       04/18/17 1614          Subjective Comments    Subjective Comments I'm doing okay.  -EF      Precautions and Contraindications    Precautions/Limitations fall precautions  -EF      Precautions falls  -EF      Subjective Pain    Able to rate subjective pain? yes  -EF      Pre-Treatment Pain Level 0  -EF      Post-Treatment Pain Level 0  -EF      Transfers    Transfers, Sit-Stand Harrison conditional independence  -EF      Transfers, Stand-Sit Harrison conditional independence  -EF      Transfer, Safety Issues weight-shifting ability decreased  -EF      Transfer, Comment Practiced sit to stand from couch, recliner, low chairs.  -EF      Gait Assessment/Treatment    Gait, Harrison Level conditional independence  -EF      Gait, Distance (Feet) 200   x 2  -EF      Gait, Gait Deviations forward flexed posture;bilateral:;step length decreased;decreased heel strike;toe-to-floor clearance decreased;weight-shifting ability decreased  -EF      Gait, Safety Issues step length decreased;weight-shifting ability decreased  -EF      Gait, Impairments strength decreased;impaired balance  -EF      Gait, Comment verbal cues to increase dorsiflexion during swing phase  -EF        User Key  (r) = Recorded By, (t) = Taken By, (c) = Cosigned By    Initials Name Provider Type    EF Kyara Reese, PT Physical Therapist                        Therapy Education       04/18/17 1631          Therapy Education    Given Symptoms/condition management;Posture/body mechanics;Fall prevention and home safety;Mobility training  -EF      Program Reinforced  -EF      How Provided Verbal;Demonstration  -EF      Provided to Patient  -EF      Level of Understanding Verbalized;Teach back education performed  -EF        User  Key  (r) = Recorded By, (t) = Taken By, (c) = Cosigned By    Initials Name Provider Type    KATHIE Reese PT Physical Therapist                PT OP Goals       04/18/17 1600       PT Short Term Goals    STG Date to Achieve 04/20/17  -EF     STG 1 Pt to be Indep with Initial Home Exercise Program.  -EF     STG 1 Progress Met  -EF     STG 2 Pt to perform sit to stand from couch with improved ease mod Indep.  -EF     STG 2 Progress Met  -EF     STG 3 Pt to improve reaching forward to ~4 inches without loss of balance.  -EF     STG 3 Progress Ongoing  -EF     Long Term Goals    LTG Date to Achieve 05/20/17  -EF     LTG 1 Pt to be Indep with HEP to maintain/improve upon gains made in therapy.  -EF     LTG 1 Progress Ongoing  -EF     LTG 2 Pt to report improved ease with performing sit to stand from couch/chairs at home.  -EF     LTG 2 Progress Partially Met  -EF     LTG 3 Pt to improve reaching forward to ~6 inches without loss of balance.  -EF     LTG 3 Progress Ongoing  -EF     LTG 4 Pt to score at least 50/56 on Krause Balance Test to demonstrate improved balance and decreased risk of falls.  -EF     LTG 4 Progress Progressing   48/56 on 4-18-17  -EF     LTG 5 Pt to ambulate with increased step length and improved toe to floor clearance.  -EF     LTG 5 Progress Partially Met   pt able to perform with verbal cues  -EF     Time Calculation    PT Goal Re-Cert Due Date 05/18/17  -EF       User Key  (r) = Recorded By, (t) = Taken By, (c) = Cosigned By    Initials Name Provider Type    KATHIE Reese PT Physical Therapist                PT Assessment/Plan       04/18/17 1643 04/18/17 1012    PT Assessment    Functional Limitations Impaired gait  -EF     Impairments Balance;Gait;Muscle strength  -EF     Assessment Comments Pt had made progress towards goals during the last four weeks.  His Krause Balance Test score has improved from 44/56 to 48/56 indicating improved gait and balance.  He is also able to  demonstrate improved gait pattern with verbal cues.  He will benefit from continued Outpatient Physical Therapy to work on gait, balance, and strengthening.  -EF     PT Plan    PT Frequency 1x/week  -EF     Predicted Duration of Therapy Intervention (days/wks) 2-4 weeks  -EF 2 weeks  -MR    Physical Therapy Interventions (Optional Details) balance training;gait training;neuromuscular re-education;home exercise program;patient/family education;strengthening;transfer training  -EF     PT Plan Comments Continue with Plan of Care.  -EF       User Key  (r) = Recorded By, (t) = Taken By, (c) = Cosigned By    Initials Name Provider Type    EF Kyara Reese, PT Physical Therapist    MR Pascale Edwards, OT Occupational Therapist                   Exercises       04/18/17 1614          Subjective Comments    Subjective Comments I'm doing okay.  -EF      Subjective Pain    Able to rate subjective pain? yes  -EF      Pre-Treatment Pain Level 0  -EF      Post-Treatment Pain Level 0  -EF      Exercise 1    Exercise Name 1 Standing p/d flexion at hemibars  -EF      Reps 1 15  -EF      Exercise 3    Exercise Name 3 Neck AROM: rotation, lateral flexion, flex/ext (in standing)  -EF      Reps 3 5  -EF      Exercise 4    Exercise Name 4 MIP at hemibars  -EF      Reps 4 20  -EF      Exercise 7    Exercise Name 7 calf stretches on 1st step with rails  -EF      Reps 7 1  -EF      Time (Seconds) 7 30  -EF      Exercise 10    Exercise Name 10 Forward step ups (on green step with 1 pink insert)  -EF      Reps 10 15   each  -EF      Exercise 15    Exercise Name 15 SLS at hemibars  -EF      Reps 15 5  -EF      Exercise 16    Exercise Name 16 Alt touches in 1st step (without rails)  -EF      Reps 16 20  -EF        User Key  (r) = Recorded By, (t) = Taken By, (c) = Cosigned By    Initials Name Provider Type    KATHIE Reese, PT Physical Therapist                            Outcome Measures       04/18/17 1600          Krause Balance  Scale    Sitting to Standing 4  -EF      Standing Unsupported 4  -EF      Sitting with Back Unsupported but Feet Supported on Floor or on Stool 4  -EF      Standing to Sitting 4  -EF      Transfers 4  -EF      Standing Unsupported with Eyes Closed 4  -EF      Standing Unsupported with Feet Together 4  -EF      Reaching Forward with Outstretched Arm While Standing 2  -EF       Object From the Floor From a Standing Position 4  -EF      Turning to Look Behind Over Left and Right Shoulders While Standing 2  -EF      Turn 360 Degrees 4  -EF      Place Alternate Foot on Step or Stool While Standing Unsupported 4  -EF      Standing Unsupported with One Foot in Front 2  -EF      Standing on One Leg 2  -EF      Krause Total Score 48  -EF      Timed Up and Go (TUG)    TUG Test 1 15 seconds  -EF      TUG Test 2 13 seconds  -EF        User Key  (r) = Recorded By, (t) = Taken By, (c) = Cosigned By    Initials Name Provider Type    EF Kyara Reese, PT Physical Therapist          Time Calculation:   Start Time: 1105  Stop Time: 1145  Time Calculation (min): 40 min     Therapy Charges for Today     Code Description Service Date Service Provider Modifiers Qty    [id number] HC PT NEUROMUSC RE EDUCATION EA 15 MIN 4/18/2017 Kyara Reese, PT GP 3                    Kyara Reese PT  4/18/2017

## 2017-04-19 ENCOUNTER — OFFICE VISIT (OUTPATIENT)
Dept: NEUROLOGY | Facility: CLINIC | Age: 72
End: 2017-04-19

## 2017-04-19 ENCOUNTER — OFFICE VISIT (OUTPATIENT)
Dept: CARDIOLOGY | Facility: CLINIC | Age: 72
End: 2017-04-19

## 2017-04-19 VITALS
HEART RATE: 90 BPM | WEIGHT: 196 LBS | HEIGHT: 72 IN | BODY MASS INDEX: 26.55 KG/M2 | OXYGEN SATURATION: 99 % | SYSTOLIC BLOOD PRESSURE: 114 MMHG | DIASTOLIC BLOOD PRESSURE: 72 MMHG

## 2017-04-19 VITALS
HEART RATE: 78 BPM | DIASTOLIC BLOOD PRESSURE: 76 MMHG | BODY MASS INDEX: 26.41 KG/M2 | SYSTOLIC BLOOD PRESSURE: 110 MMHG | HEIGHT: 72 IN | WEIGHT: 195 LBS

## 2017-04-19 DIAGNOSIS — R47.1 DYSARTHRIA: ICD-10-CM

## 2017-04-19 DIAGNOSIS — I10 ESSENTIAL HYPERTENSION: ICD-10-CM

## 2017-04-19 DIAGNOSIS — R26.81 GAIT INSTABILITY: ICD-10-CM

## 2017-04-19 DIAGNOSIS — I48.3 TYPICAL ATRIAL FLUTTER (HCC): ICD-10-CM

## 2017-04-19 DIAGNOSIS — R55 NEAR SYNCOPE: Primary | ICD-10-CM

## 2017-04-19 DIAGNOSIS — G47.10 HYPERSOMNIA: ICD-10-CM

## 2017-04-19 DIAGNOSIS — R29.810 FACIAL DROOP: ICD-10-CM

## 2017-04-19 DIAGNOSIS — Z79.01 ANTICOAGULATED: ICD-10-CM

## 2017-04-19 DIAGNOSIS — G60.9 IDIOPATHIC PERIPHERAL NEUROPATHY: ICD-10-CM

## 2017-04-19 DIAGNOSIS — G20 IDIOPATHIC PARKINSON'S DISEASE (HCC): ICD-10-CM

## 2017-04-19 DIAGNOSIS — R93.89 ABNORMAL CHEST CT: ICD-10-CM

## 2017-04-19 PROCEDURE — 99214 OFFICE O/P EST MOD 30 MIN: CPT | Performed by: PSYCHIATRY & NEUROLOGY

## 2017-04-19 PROCEDURE — 99214 OFFICE O/P EST MOD 30 MIN: CPT | Performed by: INTERNAL MEDICINE

## 2017-04-19 PROCEDURE — 93000 ELECTROCARDIOGRAM COMPLETE: CPT | Performed by: INTERNAL MEDICINE

## 2017-04-19 RX ORDER — BUMETANIDE 2 MG/1
TABLET ORAL
Qty: 60 TABLET | Refills: 0
Start: 2017-04-19 | End: 2017-04-19 | Stop reason: DRUGHIGH

## 2017-04-19 RX ORDER — BUMETANIDE 2 MG/1
TABLET ORAL
Qty: 30 TABLET | Refills: 3 | Status: SHIPPED | OUTPATIENT
Start: 2017-04-19 | End: 2018-03-07

## 2017-04-19 RX ORDER — DOXAZOSIN 2 MG/1
2 TABLET ORAL NIGHTLY
Qty: 30 TABLET | Refills: 5 | Status: SHIPPED | OUTPATIENT
Start: 2017-04-19 | End: 2017-09-11 | Stop reason: SDUPTHER

## 2017-04-19 NOTE — PROGRESS NOTES
Subjective   Rommel Gonzalez is a 71 y.o. male with history of Parkinson's disease with peripheral neuropathy and dysarthria with gait instability came for follow-up appointment.    History of Present Illness   He was accompanied by his sister and according to them, he was having more falls recently and feels weakness in the legs and more pronounced on the right compared to left but denies any worsening of the resting tremor or cogwheel rigidity but having occasional shuffling gait with balance issues when walking and getting physical therapy, occupational therapy and speech therapy once a week and complaint with the medication carbidopa/levodopa and denies any side effects.  Denies any hallucinations or memory issues.  Still complaining of slurred speech issues but denies any headaches, blurred vision or double vision, dizziness, passing out spells or recent hospitalizations since last visit.  Still complaining of tingling and numbness and gabapentin helping his symptoms and denies any side effects.  Complaining of depression but denies any suicidal or homicidal thoughts.    The following portions of the patient's history were reviewed and updated as appropriate: allergies, current medications, past family history, past medical history, past social history, past surgical history and problem list.    Review of Systems   Constitutional: Negative for chills, fatigue and fever.   HENT: Negative for hearing loss, tinnitus and trouble swallowing.    Eyes: Negative for pain, redness and itching.   Respiratory: Negative for cough and shortness of breath.    Cardiovascular: Negative for palpitations and leg swelling.   Gastrointestinal: Negative for constipation, diarrhea, nausea and vomiting.   Endocrine: Negative for cold intolerance, heat intolerance and polyuria.   Genitourinary: Negative for decreased urine volume, difficulty urinating and urgency.   Musculoskeletal: Positive for back pain and gait problem (falling).  Negative for neck pain and neck stiffness.   Skin: Negative for rash and wound.   Allergic/Immunologic: Negative for environmental allergies and food allergies.   Neurological: Positive for tremors. Negative for dizziness, weakness, light-headedness, numbness and headaches.   Hematological: Negative for adenopathy. Bruises/bleeds easily.   Psychiatric/Behavioral: Negative for confusion, hallucinations and sleep disturbance. The patient is nervous/anxious (depression).        Objective   Physical Exam   Vitals reviewed.    GENERAL EXAMINATION : Patient is well-developed, well-nourished, well-groomed, alert and approriate in no apparent distress.  HEENT: Normocephalic, normal funduscopic exam, no visible oral lesions.  NECK : Normal range of motion, no tenderness, no malalignment.  CARDIAC : Regular rate and rhythm, no murmurs.  CHEST : Clear to auscultation, bilateral.   No wheezing .  ABDOMEN : Soft, non tender and non distended. EXTREMITIES: No edema. SKIN : No rashes or lesions visible. MUSCULOSKELETAL : No muscle weakness or muscle pain. No joint pains. PSYCHIATRIC : Awake and follwoing verbal commands well.     NEUROLOGICAL EXAMINATION  :  Higher integrative functions : No aphasia but has dysarthria.  CRANIAL NERVES : Cranial nerve II: Normal visual acuity and visual fields.  On funduscopic exam, discs are flat with sharp margins cranial nerves III, IV, VI: Extraocular movements are full without nystagmus; pupils are equal, round and reactive to light.  Cranial nerve V: Normal facial sensation and strength of muscles of mastication.  Cranial nerve: VII: Facial movements are symmetric except for left facial droopiness.  Cranial nerve VIII: Auditory acuity is normal.  Cranial nerve IX, X: Symmetric palate movement.  Cranial nerve XI sternocleidomastoid and trapezius are normal.  Cranial nerve XII: Tongue in the midline with no atrophy or fasciculations.      MOTOR : Normal muscle strength except for subtle resting  tremor with mild cogwheel rigidity and decreased arm swing.  SENSATION : Normal to light touch, pinprick, vibration sensations intact and Romberg is negative.  MUSCLE STRETCH REFLEXES : Normal and symmetric in the upper extremities and lower extremities and the plantar responses are flexor bilaterally. COORDINATION : Finger to nose test showed no dysmetria.  Rapid alternating movements are normal.   GAIT : Walks on heels, toes, except for mild difficulty with tandem walk.    Assessment/Plan   Rommel was seen today for parkinson's disease.    Diagnoses and all orders for this visit:    Idiopathic Parkinson's disease  -     Ambulatory Referral to Neurology    Idiopathic peripheral neuropathy    Gait instability  -     Ambulatory Referral to Neurology    Dysarthria  -     MRI Brain Without Contrast; Future  -     Ambulatory Referral to Neurology    Facial droop  -     MRI Brain Without Contrast; Future    71-year-old right-handed white male with history of Parkinson's disease with peripheral neuropathy and dysarthria with gait instability came for follow-up appointment and complaining of more balance issues and falls and on exam has left facial droopiness with subtle resting tremor with mild cogwheel rigidity and decreased arm swing with mild difficulty tandem walk.  Discussed with the patient and his sister regarding his signs and symptoms and ordered for MRI brain without contrast to evaluate for slurred speech and left facial droopiness.  Told him to continue carbidopa/levodopa 25/100 mg 2 tablets by mouth 3 times a day and continue carbidopa/levodopa ER 50/200 mg one tablet by mouth at bedtime and discussed about side effects.  Discussed about fall precautions and told him to continue physical therapy, occupational therapy and speech therapy.  Will refer him to movement disorder specialist to evaluate for gait issues with dysarthria and Parkinson's disease.  Continue gabapentin 300 mg twice a day and discussed about  side effects.  Will follow-up in 4 months or earlier if problems arise.    Thank you for allowing me to participate in the care of the patient.  Please feel free to call for any questions at your convenience.    Yours sincerely,    Roxann Hankins M.D

## 2017-04-19 NOTE — PROGRESS NOTES
Date of Office Visit: 2017  Encounter Provider: Laura Bennett MD  Place of Service: [facility]  Patient Name: Rommel Gonzalez  :1945    Chief complaint  Follow-up of atrial flutter and fibrillation    History of Present Illness  The patient is a 71-year-old gentleman with history of hypertension, hyperlipidemia, and Parkinson’s disease.  In 2015, he was noted to have atrial fibrillation with rapid ventricular rates.  Coreg and Xarelto were added to his regimen and rate control was pursued.  An echocardiogram revealed normal left ventricular size and function with an ejection fraction of 51%, grade 2 diastolic dysfunction, moderate left atrial enlargement, mild mitral regurgitation, and normal right-sided pressures.  In 2015, he had a Lexiscan perfusion study that was negative for ischemia.  He had an inferior wall defect consistent with attenuation.     Since last visit he has had no chest pain palpitations syncope near syncope.  However he has been seen by Dr. Giana masters me) refer to Parkinson's specialist.  He has had 2 falls.  The first was in the kitchen approximate 6 weeks ago when he was carrying history across a room suddenly fell to the ground believes he lost consciousness.  He did have some prodromal symptoms of weakness.  He got up and was able to ablate back to his chair and room without difficulty.  He denies any chest pain palpitations syncope with this.  It did not recur again until 2 weeks ago when returning from the window walking back into his living room he suddenly had a syncopal episode and fell to the floor he does not recall having any trauma or warning he denies any chest pain shortness of breath palpitations.  Afraid to do any activities and has not been walking outside his home since then.  His blood pressure is often slightly lower than it is today.    Past Medical History:   Diagnosis Date   • AF (atrial fibrillation)    • Atrial  flutter    • Atypical chest pain    • Colon polyps    • Confusion    • Depression    • Disease of thyroid gland    • Disorder of thyroid    • Dyspnea and respiratory abnormalities    • Elevated TSH    • Fatigue    • Gait instability    • Hay fever    • Hyperlipidemia    • Hypertension    • Insomnia    • Measles    • Mumps    • Palpitations    • Parkinson disease    • Peripheral neuropathy    • Pneumonia    • Poor sleep pattern    • Slurred speech    • Tremor      No past surgical history on file.  Outpatient Medications Prior to Visit   Medication Sig Dispense Refill   • carbidopa-levodopa (SINEMET)  MG per tablet TAKE TWO TABLETS BY MOUTH THREE TIMES A  tablet 2   • carvedilol (COREG) 6.25 MG tablet TAKE ONE TABLET BY MOUTH TWICE A DAY WITH MEALS 180 tablet 2   • FLUZONE HIGH-DOSE 0.5 ML suspension prefilled syringe injection      • gabapentin (NEURONTIN) 300 MG capsule TAKE ONE CAPSULE BY MOUTH TWICE A DAY 60 capsule 2   • levothyroxine (SYNTHROID, LEVOTHROID) 112 MCG tablet Take 1 tablet by mouth Daily. 90 tablet 3   • lisinopril (PRINIVIL,ZESTRIL) 20 MG tablet Take 1 tablet by mouth 2 (Two) Times a Day. 180 tablet 1   • lovastatin (MEVACOR) 10 MG tablet Take 1 tablet by mouth Every Other Day. 90 tablet 1   • Multiple Vitamins-Minerals (MULTIVITAL) tablet Take 1 tablet by mouth daily.     • XARELTO 20 MG tablet TAKE ONE TABLET BY MOUTH EVERY EVENING WITH SUPPER 30 tablet 5   • zolpidem (AMBIEN) 5 MG tablet TAKE ONE TABLET BY MOUTH EVERY NIGHT AT BEDTIME AS NEEDED FOR SLEEP 30 tablet 1   • ZOSTAVAX 97456 UNT/0.65ML injection      • bumetanide (BUMEX) 2 MG tablet TAKE ONE HALF TABLET EVERY OTHER DAY. 60 tablet 0   • doxazosin (CARDURA) 4 MG tablet TAKE ONE TABLET BY MOUTH DAILY 90 tablet 0   • Ferrous Sulfate 27 MG tablet Take 1 tablet by mouth Every Morning.       No facility-administered medications prior to visit.      Allergies as of 04/19/2017   • (No Known Allergies)     Social History     Social  History   • Marital status:      Spouse name: N/A   • Number of children: N/A   • Years of education: N/A     Occupational History   • Not on file.     Social History Main Topics   • Smoking status: Former Smoker   • Smokeless tobacco: Not on file   • Alcohol use Yes      Comment: social use   • Drug use: Not on file   • Sexual activity: Not on file     Other Topics Concern   • Not on file     Social History Narrative     Family History   Problem Relation Age of Onset   • Hypertension Mother    • Glaucoma Mother    • Throat cancer Father    • Alcohol abuse Father    • Hypertension Sister    • Cancer Sister      malignant neoplasm of urinary bladder   • Lung cancer Brother    • Heart attack Other    • Lung disease Other      Review of Systems   Constitution: Positive for malaise/fatigue. Negative for fever, weight gain and weight loss.   HENT: Negative for ear pain, hearing loss, nosebleeds and sore throat.    Eyes: Negative for double vision, pain, vision loss in left eye and vision loss in right eye.   Cardiovascular:        See history of present illness.   Respiratory: Negative for cough, shortness of breath, sleep disturbances due to breathing, snoring and wheezing.    Endocrine: Negative for cold intolerance, heat intolerance and polyuria.   Skin: Negative for itching, poor wound healing and rash.   Musculoskeletal: Negative for joint pain, joint swelling and myalgias.   Gastrointestinal: Negative for abdominal pain, diarrhea, hematochezia, nausea and vomiting.   Genitourinary: Negative for hematuria and hesitancy.   Neurological: Negative for numbness, paresthesias and seizures.   Psychiatric/Behavioral: Negative for depression. The patient is not nervous/anxious.      Objective:     Vitals:    04/19/17 1306 04/19/17 1417 04/19/17 1418 04/19/17 1419   BP: 122/70 142/88 118/80 110/76   BP Location:  Left arm Left arm Left arm   Patient Position:  Lying Sitting Standing   Pulse: 78      Weight: 195 lb  "(88.5 kg)      Height: 72\" (182.9 cm)        Body mass index is 26.45 kg/(m^2).    Physical Exam   Constitutional: He is oriented to person, place, and time. He appears well-developed and well-nourished.   HENT:   Head: Normocephalic.   Nose: Nose normal.   Mouth/Throat: Oropharynx is clear and moist.   Eyes: Conjunctivae and EOM are normal. Pupils are equal, round, and reactive to light. Right eye exhibits no discharge. No scleral icterus.   Neck: Normal range of motion. Neck supple. No JVD present. No thyromegaly present.   Cardiovascular: Normal rate, regular rhythm, normal heart sounds and intact distal pulses.  Exam reveals no gallop and no friction rub.    No murmur heard.  Pulses:       Carotid pulses are 2+ on the right side, and 2+ on the left side.       Radial pulses are 2+ on the right side, and 2+ on the left side.        Femoral pulses are 2+ on the right side, and 2+ on the left side.       Popliteal pulses are 2+ on the right side, and 2+ on the left side.        Dorsalis pedis pulses are 2+ on the right side, and 2+ on the left side.        Posterior tibial pulses are 2+ on the right side, and 2+ on the left side.   Pulmonary/Chest: Effort normal and breath sounds normal. No respiratory distress. He has no wheezes. He has no rales.   Abdominal: Soft. Bowel sounds are normal. He exhibits no distension. There is no hepatosplenomegaly. There is no tenderness. There is no rebound.   Musculoskeletal: Normal range of motion. He exhibits no edema or tenderness.   Neurological: He is alert and oriented to person, place, and time.   Skin: Skin is warm and dry. No rash noted. No erythema.   Psychiatric: He has a normal mood and affect. His behavior is normal. Judgment and thought content normal.   Vitals reviewed.    Lab Review:     ECG 12 Lead  Date/Time: 4/19/2017 6:58 PM  Performed by: EL CORDERO  Authorized by: EL CORDERO   Comparison: compared with previous ECG   Similar to previous ECG  Rhythm: atrial " flutter  Conduction comments: QTc = 456msec  Clinical impression: abnormal ECG          Assessment:       Diagnosis Plan   1. Near syncope  Cardiac Event Monitor    Adult Transthoracic Echo Complete    Home Sleep Study    ECG 12 Lead   2. Hypersomnia   Home Sleep Study    ECG 12 Lead   3. Essential hypertension  ECG 12 Lead   4. Typical atrial flutter  ECG 12 Lead   5. Anticoagulated  ECG 12 Lead   6. Abnormal chest CT  ECG 12 Lead     Plan:       1.  Falls with syncope and near syncope.  Blood pressure supine is 142/88 standing was 110/76.  He was slightly lightheaded with this.  I suspect he is developing some autonomic dysfunction with Parkinson's disease.  Would concur with further neurologic testing and recommendations.  In addition I recommended he stay hydrated.  He will discuss possible decrease in Cardura with Dr. fong and Dr. Onofre.  In addition we'll decrease the frequency of Bumex to half a tablet only on Tuesday and Friday.  Will also place a 30 day event monitor and check an echocardiogram and he is also scheduled for an MRI of his head ordered by Dr. Rene Reid who felt he may have also had a facial droop  2.  Atrial flutter/fibrillation.  On Xarelto therapy.  If his falls continual have to discontinue Xarelto.  3.  Xarelto therapy, as above.  In addition blood work done March 13 revealed normal renal function  4.  History of rectal bleeding. No recurrance  5.  Hypertension, controlled, as above  6.  Pulmonary nodule, he had a CT scan in December that showed slight enlargement of the pulmonary nodule.  He will contact Dr. Onofre's office for follow-up in regards to this.  His sister is with him and we'll assist him with the recommendations above.       Rommel Gonzalez   Home Medication Instructions CHELSEA:    Printed on:04/19/17 7618   Medication Information                      bumetanide (BUMEX) 2 MG tablet  Tablet every Tuesday and Friday             carbidopa-levodopa (SINEMET)  MG per  tablet  TAKE TWO TABLETS BY MOUTH THREE TIMES A DAY             carvedilol (COREG) 6.25 MG tablet  TAKE ONE TABLET BY MOUTH TWICE A DAY WITH MEALS             doxazosin (CARDURA) 2 MG tablet  Take 1 tablet by mouth Every Night.             FLUZONE HIGH-DOSE 0.5 ML suspension prefilled syringe injection               gabapentin (NEURONTIN) 300 MG capsule  TAKE ONE CAPSULE BY MOUTH TWICE A DAY             levothyroxine (SYNTHROID, LEVOTHROID) 112 MCG tablet  Take 1 tablet by mouth Daily.             lisinopril (PRINIVIL,ZESTRIL) 20 MG tablet  Take 1 tablet by mouth 2 (Two) Times a Day.             lovastatin (MEVACOR) 10 MG tablet  Take 1 tablet by mouth Every Other Day.             Multiple Vitamins-Minerals (MULTIVITAL) tablet  Take 1 tablet by mouth daily.             XARELTO 20 MG tablet  TAKE ONE TABLET BY MOUTH EVERY EVENING WITH SUPPER             zolpidem (AMBIEN) 5 MG tablet  TAKE ONE TABLET BY MOUTH EVERY NIGHT AT BEDTIME AS NEEDED FOR SLEEP             ZOSTAVAX 92388 UNT/0.65ML injection                   New Medications Ordered This Visit   Medications   • bumetanide (BUMEX) 2 MG tablet     Sig: Tablet every Tuesday and Friday     Dispense:  30 tablet     Refill:  3       EMR Dragon/Transcription disclaimer:   Much of this encounter note is an electronic transcription/translation of spoken language to printed text. The electronic translation of spoken language may permit erroneous, or at times, nonsensical words or phrases to be inadvertently transcribed; Although I have reviewed the note for such errors, some may still exist.

## 2017-04-25 ENCOUNTER — HOSPITAL ENCOUNTER (OUTPATIENT)
Dept: OCCUPATIONAL THERAPY | Facility: HOSPITAL | Age: 72
Setting detail: THERAPIES SERIES
Discharge: HOME | End: 2017-04-25
Attending: PSYCHIATRY & NEUROLOGY

## 2017-04-25 ENCOUNTER — HOSPITAL ENCOUNTER (OUTPATIENT)
Dept: SPEECH THERAPY | Facility: HOSPITAL | Age: 72
Setting detail: THERAPIES SERIES
Discharge: HOME | End: 2017-04-25

## 2017-04-25 ENCOUNTER — HOSPITAL ENCOUNTER (OUTPATIENT)
Dept: PHYSICAL THERAPY | Facility: HOSPITAL | Age: 72
Setting detail: THERAPIES SERIES
Discharge: HOME | End: 2017-04-25

## 2017-04-25 DIAGNOSIS — R26.81 GAIT INSTABILITY: Primary | ICD-10-CM

## 2017-04-25 DIAGNOSIS — R29.898 WEAKNESS OF BOTH UPPER EXTREMITIES: ICD-10-CM

## 2017-04-25 DIAGNOSIS — R49.0 DYSPHONIA: ICD-10-CM

## 2017-04-25 DIAGNOSIS — G20 PARKINSON'S DISEASE (HCC): Primary | ICD-10-CM

## 2017-04-25 DIAGNOSIS — R47.1 DYSARTHRIA: ICD-10-CM

## 2017-04-25 DIAGNOSIS — R27.9 LACK OF COORDINATION: ICD-10-CM

## 2017-04-25 DIAGNOSIS — G20 IDIOPATHIC PARKINSON'S DISEASE (HCC): Primary | ICD-10-CM

## 2017-04-25 PROCEDURE — 92507 TX SP LANG VOICE COMM INDIV: CPT

## 2017-04-25 PROCEDURE — 97112 NEUROMUSCULAR REEDUCATION: CPT

## 2017-04-25 PROCEDURE — 97110 THERAPEUTIC EXERCISES: CPT

## 2017-04-25 NOTE — PROGRESS NOTES
Outpatient Occupational Therapy Neuro Treatment Note  [facility]     Patient Name: Rommel Gonzalez  : 1945  MRN: [id number]  Today's Date: 2017       Visit Date: 2017    Patient Active Problem List   Diagnosis   • AF (atrial fibrillation)   • Depression   • Increased thyroid stimulating hormone level   • Gait instability   • Hypertension   • Insomnia   • Idiopathic Parkinson's disease   • Peripheral neuropathy   • Dysarthria   • Atrial flutter   • Anticoagulated   • Health care maintenance   • Hypothyroidism (acquired)   • Weakness of voice   • Pure hypercholesterolemia   • Abnormal chest CT   • Facial droop   • Near syncope   • Hypersomnia         Past Medical History:   Diagnosis Date   • AF (atrial fibrillation)    • Atrial flutter    • Atypical chest pain    • Colon polyps    • Confusion    • Depression    • Disease of thyroid gland    • Disorder of thyroid    • Dyspnea and respiratory abnormalities    • Elevated TSH    • Fatigue    • Gait instability    • Hay fever    • Hyperlipidemia    • Hypertension    • Insomnia    • Measles    • Mumps    • Palpitations    • Parkinson disease    • Peripheral neuropathy    • Pneumonia    • Poor sleep pattern    • Slurred speech    • Tremor         No past surgical history on file.      Visit Dx:    ICD-10-CM ICD-9-CM   1. Idiopathic Parkinson's disease G20 332.0   2. Weakness of both upper extremities M62.81 729.89   3. Lack of coordination R27.9 781.3                         OT Assessment/Plan       17 1013       OT Assessment    Assessment Comments Pt has made good progress towards OT goals, anticipate discharge at next therapy session.  -MR       User Key  (r) = Recorded By, (t) = Taken By, (c) = Cosigned By    Initials Name Provider Type    MR Pascale Edwards, OT Occupational Therapist                    Therapy Education       17 1015          Therapy Education    Given HEP  -MR      Program Reinforced  -MR      How Provided Verbal   -MR      Provided to Patient  -MR      Level of Understanding Verbalized  -MR        User Key  (r) = Recorded By, (t) = Taken By, (c) = Cosigned By    Initials Name Provider Type    MR Pascale Hua NATHAN Edwards Occupational Therapist                    OT Exercises       04/25/17 1000          Subjective Pain    Pre-Treatment Pain Level 0  -MR      Exercise 1    Exercise Name 1 FM activity to increase coordination for use of R hand. Pt performs FM activity assembling small rods, washers, and cylinders using [employer] pegboard. Multiple reps completed following OT demo/cues, requires increased time to complete  -MR      Cueing 1 Verbal;Demo  -MR      Exercise 2    Exercise Name 2 therapeutic exercises to increase strength and endurance for functional use of UEs. Using dowel ye pt performs chest press, shoulder flexion, abduction, forward circles, and bicep curls. OT provides demo and cues for technique.  -MR      Cueing 2 Verbal;Demo  -MR      Equipment 2 Dowel  -MR      Weights/Plates 2 2  -MR      Sets 2 3  -MR      Reps 2 15  -MR        User Key  (r) = Recorded By, (t) = Taken By, (c) = Cosigned By    Initials Name Provider Type    MR Pascale Hua NATHAN Edwards Occupational Therapist                    Time Calculation:   OT Start Time: 0932  OT Stop Time: 1015  OT Time Calculation (min): 43 min     Therapy Charges for Today     Code Description Service Date Service Provider Modifiers Qty    19134472768  OT THER PROC EA 15 MIN 4/25/2017 Pascale Edwards OT GO 1    [id number]  OT NEUROMUSC RE EDUCATION EA 15 MIN 4/25/2017 Pascale Edwards OT GO 2                    Pascale Edwards OT  4/25/2017

## 2017-04-25 NOTE — PROGRESS NOTES
Outpatient Physical Therapy Neuro Treatment Note  [facility]     Patient Name: Rommel Gonzalez  : 1945  MRN: [id number]  Today's Date: 2017      Visit Date: 2017    Visit Dx:    ICD-10-CM ICD-9-CM   1. Gait instability R26.81 781.2       Patient Active Problem List   Diagnosis   • AF (atrial fibrillation)   • Depression   • Increased thyroid stimulating hormone level   • Gait instability   • Hypertension   • Insomnia   • Idiopathic Parkinson's disease   • Peripheral neuropathy   • Dysarthria   • Atrial flutter   • Anticoagulated   • Health care maintenance   • Hypothyroidism (acquired)   • Weakness of voice   • Pure hypercholesterolemia   • Abnormal chest CT   • Facial droop   • Near syncope   • Hypersomnia                  PT Neuro       17 1213          Subjective Comments    Subjective Comments No problems.  -EF      Precautions and Contraindications    Precautions/Limitations fall precautions  -EF      Precautions falls  -EF      Subjective Pain    Able to rate subjective pain? yes  -EF      Pre-Treatment Pain Level 0  -EF      Post-Treatment Pain Level 0  -EF      Transfers    Transfers, Sit-Stand [place] conditional independence  -EF      Transfers, Stand-Sit [place] conditional independence  -EF      Transfer, Safety Issues weight-shifting ability decreased  -EF      Transfer, Comment Practiced sit to stand x 10 from mat with v.c.s for shifting forward  -EF      Gait Assessment/Treatment    Gait, [place] Level conditional independence  -EF      Gait, Distance (Feet) 200   x 2  -EF      Gait, Gait Deviations forward flexed posture;bilateral:;step length decreased;decreased heel strike;toe-to-floor clearance decreased;weight-shifting ability decreased  -EF      Gait, Safety Issues step length decreased;weight-shifting ability decreased  -EF      Gait, Impairments strength decreased;impaired balance  -EF      Stairs Assessment/Treatment    Number of Stairs 12  -EF       Stairs, Handrail Location both sides  -EF      Stairs, [place] Level supervision required  -EF      Stairs, Technique Used step over step (ascending);step over step (descending)  -EF      Stairs, Safety Issues balance decreased during turns;weight-shifting ability decreased  -EF      Stairs, Impairments strength decreased;impaired balance  -EF      Stairs, Comment cues to step further onto step when ascending  -EF      Balance Skills Training    Standing-Balance Activities Reaching for objects   while stepping fwd with oposite LE  -EF        User Key  (r) = Recorded By, (t) = Taken By, (c) = Cosigned By    Initials Name Provider Type    KATHIE Reese, PT Physical Therapist                        PT Assessment/Plan       04/25/17 1225       PT Assessment    Assessment Comments Pt doing well with balance and exercises and improved sit to stand.  Plan for one additional session & then discharge home with home program.  -EF     PT Plan    PT Frequency 1x/week  -EF     Predicted Duration of Therapy Intervention (days/wks) one more session  -EF     PT Plan Comments Continue with Plan of Care.  -EF       User Key  (r) = Recorded By, (t) = Taken By, (c) = Cosigned By    Initials Name Provider Type    KATHIE Reese, PT Physical Therapist                     Exercises       04/25/17 1213          Subjective Comments    Subjective Comments No problems.  -EF      Subjective Pain    Able to rate subjective pain? yes  -EF      Pre-Treatment Pain Level 0  -EF      Post-Treatment Pain Level 0  -EF      Exercise 2    Exercise Name 2 Standing trunk rotation holding 1# pole weight with UEs at 90 degrees  -EF      Cueing 2 Verbal;Tactile  -EF      Reps 2 15  -EF      Exercise 3    Exercise Name 3 Neck AROM: rotation, lateral flexion, flex/ext (in standing)  -EF      Reps 3 5  -EF      Exercise 4    Exercise Name 4 MIP at hemibars  -EF      Reps 4 20  -EF      Exercise 5    Exercise Name 5 Lunges at hemibars  -EF       Cueing 5 Verbal  -EF      Reps 5 10   with 5 sec hold  -EF      Exercise 17    Exercise Name 17 Sitting hip flexion  -EF      Cueing 17 Verbal   for posture  -EF      Reps 17 10  -EF      Exercise 18    Exercise Name 18 Nustep (Lvl 7, seat 11, UEs 9)  -EF      Time (Minutes) 18 10  -EF        User Key  (r) = Recorded By, (t) = Taken By, (c) = Cosigned By    Initials Name Provider Type    EF Kyara Resee, SARAH Physical Therapist                                  Therapy Education       04/25/17 1225 04/25/17 1015       Therapy Education    Given Symptoms/condition management;Posture/body mechanics;Fall prevention and home safety;Mobility training  -EF HEP  -MR     Program Reinforced  -EF Reinforced  -MR     How Provided Verbal;Demonstration  -EF Verbal  -MR     Provided to Patient  -EF Patient  -MR     Level of Understanding Verbalized;Demonstrated  -EF Verbalized  -MR       User Key  (r) = Recorded By, (t) = Taken By, (c) = Cosigned By    Initials Name Provider Type    EF Kyara Reese, SARAH Physical Therapist    MR Pascale Edwards, OT Occupational Therapist                Time Calculation:   Start Time: 1100  Stop Time: 1145  Time Calculation (min): 45 min     Therapy Charges for Today     Code Description Service Date Service Provider Modifiers Qty    14486355080 HC PT NEUROMUSC RE EDUCATION EA 15 MIN 4/25/2017 Kyara Reese, PT GP 3                    Kyara Reese PT  4/25/2017      141

## 2017-04-25 NOTE — PROGRESS NOTES
Outpatient Speech Language Pathology   Adult Speech Language Cognitive Treatment Note  [facility]     Patient Name: Rommel Gonzalez  : 1945  MRN: [id number]  Today's Date: 2017         Visit Date: 2017   Patient Active Problem List   Diagnosis   • AF (atrial fibrillation)   • Depression   • Increased thyroid stimulating hormone level   • Gait instability   • Hypertension   • Insomnia   • Idiopathic Parkinson's disease   • Peripheral neuropathy   • Dysarthria   • Atrial flutter   • Anticoagulated   • Health care maintenance   • Hypothyroidism (acquired)   • Weakness of voice   • Pure hypercholesterolemia   • Abnormal chest CT   • Facial droop   • Near syncope   • Hypersomnia           Visit Dx:    ICD-10-CM ICD-9-CM   1. Parkinson's disease G20 332.0   2. Dysarthria R47.1 784.51   3. Dysphonia R49.0 784.42                               SLP OP Goals       17 1100       Subjective Comments    Subjective Comments (P)  Pt was cooperative and motivated for session today.  -JG     Subjective Pain    Able to rate subjective pain? (P)  yes  -JG     Pre-Treatment Pain Level (P)  0  -JG     Post-Treatment Pain Level (P)  0  -JG     Dysarthria Goals    Patient will be able to engage in speech at the conversational level with maximum articulatory accuracy so that speech is understood by familiar /unfamiliar listeners (P)  90%:;without cues  -JG     Status: Patient will be able to engage in speech at the conversational level with maximum articulatory accuracy so that speech is understood by familiar /unfamiliar listeners (P)  Progressing as expected  -JG     Patient will be able to communicate a message that is comprehensible to familiar/unfamiliar listeners utilizing verbal speech and augmentative strategies (P)  90%:;without cues  -JG     Status: Patient will be able to communicate a message that is comprehensible to familiar/unfamiliar listeners utilizing verbal speech and augmentative strategies (P)   Progressing as expected  -JG     Patient will improve comprehensibility of verbal messages by speaking at an appropriate vocal intensity (P)  90%:;without cues  -JG     Status: Patient will improve comprehensibility of verbal messages by speaking at an appropriate vocal intensity (P)  Progressing as expected  -JG     Comments: Patient will improve comprehensibility of verbal messages by speaking at an appropriate vocal intensity (P)  Pt demonstrated 80% accuracy with cues when verbalizing sentences with increased intensity. Pt's intensity continues to be improved at the beginning of reading tasks.  -JG     Patient will compensate for reduced respiratory support for speech by deeper inhalation before beginning an utterance (P)  90%:;without cues  -JG     Status: Patient will compensate for reduced respiratory support for speech by deeper inhalation before beginning an utterance (P)  Progressing as expected  -JG     Comments: Patient will compensate for reduced respiratory support for speech by deeper inhalation before beginning an utterance (P)  Pt demonstrated 75% accuracy without cues when taking inhalation prior to spoken sentences when reading common statements.  -JG     Patient will maximize use of phonation to improve communication skills by producing continuous tone from bottom to top/top to bottom of pitch range (P)  90%:;without cues  -JG     Status: Patient will maximize use of phonation to improve communication skills by producing continuous tone from bottom to top/top to bottom of pitch range (P)  Progressing as expected  -JG     Comments: Patient will maximize use of phonation to improve communication skills by producing continuous tone from bottom to top/top to bottom of pitch range (P)  Pt demonstrated 80% accuracy without cues during pitch glide exercises.  -JG     Patient will apply compensatory strategies to improve intelligibility of speech during in spontaneous speech (P)  90%:;without cues  -JG   "   Status: Patient will apply compensatory strategies to improve intelligibility of speech during in spontaneous speech (P)  Progressing as expected  -JG     Comments: Patient will apply compensatory strategies to improve intelligibility of speech during in spontaneous speech (P)  Pt demonstrated 65-70% accuracy without cues when reading sentences with overarticulation and focus on intonation. Pt continues to demonstrate good self-awareness of progress during voice recordings. Pt continues to demonstrate improved intelligibility at the beginning of reading tasks.  -JG     \"Patient will increase strength and power  of articulators so they can move more quickly and accurately to improve speech intelligibility by completing lip exercises\" (P)  90%:;without cues  -JG     Status: Patient will increase strength and power  of articulators so they can move more quickly and accurately to improve speech intelligibility by completing lip exercises (P)  Progressing as expected  -JG     Comments: Patient will increase strength and power  of articulators so they can move more quickly and accurately to improve speech intelligibility by completing lip exercises (P)  Pt demonstrated 80% accuracy without cues when performing oral motor exercises. Pt continues to demonstrate reduced lingual, labial, and jaw ROM.  -JG       User Key  (r) = Recorded By, (t) = Taken By, (c) = Cosigned By    Initials Name Provider Type    JOSHUA Parham Speech Therapy Student Speech Therapy Student                         Time Calculation:   SLP Start Time: (P) 1015  SLP Stop Time: (P) 1100  SLP Time Calculation (min): (P) 45 min    Therapy Charges for Today     Code Description Service Date Service Provider Modifiers Qty    [id number]  ST TREATMENT SPEECH 3 4/25/2017 Haylee Parham Speech Therapy Student GN 1                   Haylee Parham Speech Therapy Student  4/25/2017  "

## 2017-04-25 NOTE — PROGRESS NOTES
Outpatient Speech Language Pathology   Adult Speech Language Cognitive Re-Evaluation  [facility]     Patient Name: Rommel Gonzalez  : 1945  MRN: [id number]  Today's Date: 2017        Visit Date: 2017   Patient Active Problem List   Diagnosis   • AF (atrial fibrillation)   • Depression   • Increased thyroid stimulating hormone level   • Gait instability   • Hypertension   • Insomnia   • Idiopathic Parkinson's disease   • Peripheral neuropathy   • Dysarthria   • Atrial flutter   • Anticoagulated   • Health care maintenance   • Hypothyroidism (acquired)   • Weakness of voice   • Pure hypercholesterolemia   • Abnormal chest CT   • Facial droop   • Near syncope   • Hypersomnia         Past Medical History:   Diagnosis Date   • AF (atrial fibrillation)    • Atrial flutter    • Atypical chest pain    • Colon polyps    • Confusion    • Depression    • Disease of thyroid gland    • Disorder of thyroid    • Dyspnea and respiratory abnormalities    • Elevated TSH    • Fatigue    • Gait instability    • Hay fever    • Hyperlipidemia    • Hypertension    • Insomnia    • Measles    • Mumps    • Palpitations    • Parkinson disease    • Peripheral neuropathy    • Pneumonia    • Poor sleep pattern    • Slurred speech    • Tremor         No past surgical history on file.      Visit Dx:    ICD-10-CM ICD-9-CM   1. Parkinson's disease G20 332.0   2. Dysarthria R47.1 784.51   3. Dysphonia R49.0 784.42                                        SLP OP Goals       17 1100       Subjective Comments    Subjective Comments (P)  Pt was cooperative and motivated for session today.  -JG     Subjective Pain    Able to rate subjective pain? (P)  yes  -JG     Pre-Treatment Pain Level (P)  0  -JG     Post-Treatment Pain Level (P)  0  -JG     Dysarthria Goals    Patient will be able to engage in speech at the conversational level with maximum articulatory accuracy so that speech is understood by familiar /unfamiliar listeners  (P)  90%:;without cues  -JG     Status: Patient will be able to engage in speech at the conversational level with maximum articulatory accuracy so that speech is understood by familiar /unfamiliar listeners (P)  Progressing as expected  -JG     Patient will be able to communicate a message that is comprehensible to familiar/unfamiliar listeners utilizing verbal speech and augmentative strategies (P)  90%:;without cues  -JG     Status: Patient will be able to communicate a message that is comprehensible to familiar/unfamiliar listeners utilizing verbal speech and augmentative strategies (P)  Progressing as expected  -JG     Patient will improve comprehensibility of verbal messages by speaking at an appropriate vocal intensity (P)  90%:;without cues  -JG     Status: Patient will improve comprehensibility of verbal messages by speaking at an appropriate vocal intensity (P)  Progressing as expected  -JG     Comments: Patient will improve comprehensibility of verbal messages by speaking at an appropriate vocal intensity (P)  Pt demonstrated 80% accuracy with cues when verbalizing sentences with increased intensity. Pt's intensity continues to be improved at the beginning of reading tasks.  -JG     Patient will compensate for reduced respiratory support for speech by deeper inhalation before beginning an utterance (P)  90%:;without cues  -JG     Status: Patient will compensate for reduced respiratory support for speech by deeper inhalation before beginning an utterance (P)  Progressing as expected  -JG     Comments: Patient will compensate for reduced respiratory support for speech by deeper inhalation before beginning an utterance (P)  Pt demonstrated 75% accuracy without cues when taking inhalation prior to spoken sentences when reading common statements.  -JG     Patient will maximize use of phonation to improve communication skills by producing continuous tone from bottom to top/top to bottom of pitch range (P)   "90%:;without cues  -JG     Status: Patient will maximize use of phonation to improve communication skills by producing continuous tone from bottom to top/top to bottom of pitch range (P)  Progressing as expected  -JG     Comments: Patient will maximize use of phonation to improve communication skills by producing continuous tone from bottom to top/top to bottom of pitch range (P)  Pt demonstrated 80% accuracy without cues during pitch glide exercises.  -JG     Patient will apply compensatory strategies to improve intelligibility of speech during in spontaneous speech (P)  90%:;without cues  -JG     Status: Patient will apply compensatory strategies to improve intelligibility of speech during in spontaneous speech (P)  Progressing as expected  -JG     Comments: Patient will apply compensatory strategies to improve intelligibility of speech during in spontaneous speech (P)  Pt demonstrated 65-70% accuracy without cues when reading sentences with overarticulation and focus on intonation. Pt continues to demonstrate good self-awareness of progress during voice recordings. Pt continues to demonstrate improved intelligibility at the beginning of reading tasks.  -JG     \"Patient will increase strength and power  of articulators so they can move more quickly and accurately to improve speech intelligibility by completing lip exercises\" (P)  90%:;without cues  -JG     Status: Patient will increase strength and power  of articulators so they can move more quickly and accurately to improve speech intelligibility by completing lip exercises (P)  Progressing as expected  -JG     Comments: Patient will increase strength and power  of articulators so they can move more quickly and accurately to improve speech intelligibility by completing lip exercises (P)  Pt demonstrated 80% accuracy without cues when performing oral motor exercises. Pt continues to demonstrate reduced lingual, labial, and jaw ROM.  -JG       User Key  (r) = " Recorded By, (t) = Taken By, (c) = Cosigned By    Initials Name Provider Type    JOSHUA Parham, Speech Therapy Student Speech Therapy Student                OP SLP Assessment/Plan - 04/25/17 1137     SLP Assessment    Impact on Function: Adult Speech Language/Cognition Difficulty communicating wants, needs, and ideas;Difficulty communicating in a medical emergency;Restrictions in personal and social life  -    Clinical Impression: Speech Language-Adult/Congnition Severe:;Dysarthria- Hypokinetic  -    Functional Problems Comment --   Overall Mr Gonzalez has demonstrated some improvement with use of strategies (inhalation prior to vocalization, overarticulation, etc) during structured tasks when reading and repeating sentences average 80% with min to mod cues. Reduced carryover at the [facility]    Clinical Impression Comments --   continue: conversation level with requiring max cues. Mr Gonzalez requires min to mod cues for breath support exercises and pitch glides, and reports he is completing all exercises and practicing strategies at home with his frequently used statements   -    Prognosis Fair (comment)  -    Patient would benefit from skilled therapy intervention Yes  -KA    SLP Plan    Frequency --   once a week   -    Duration --   one more week planned d/c next week  -    Planned CPT's? SLP INDIVIDUAL SPEECH THERAPY: 03336  -      User Key  (r) = Recorded By, (t) = Taken By, (c) = Cosigned By    Initials Name Provider Type    GONZÁLEZ Aguilera MA,CCC-SLP Speech and Language Pathologist                 Time Calculation:   SLP Start Time: (P) 1015  SLP Stop Time: (P) 1100  SLP Time Calculation (min): (P) 45 min                 Emigdio Aguilera MA,CCC-SLP  4/25/2017

## 2017-04-27 ENCOUNTER — HOSPITAL ENCOUNTER (OUTPATIENT)
Dept: CARDIOLOGY | Facility: HOSPITAL | Age: 72
Discharge: HOME | End: 2017-04-27
Attending: INTERNAL MEDICINE | Admitting: INTERNAL MEDICINE

## 2017-04-27 VITALS
WEIGHT: 195 LBS | HEART RATE: 95 BPM | HEIGHT: 72 IN | BODY MASS INDEX: 26.41 KG/M2 | DIASTOLIC BLOOD PRESSURE: 84 MMHG | SYSTOLIC BLOOD PRESSURE: 152 MMHG

## 2017-04-27 DIAGNOSIS — R55 NEAR SYNCOPE: ICD-10-CM

## 2017-04-27 LAB
ASCENDING AORTA: 3 CM
BH CV ECHO MEAS - ACS: 2 CM
BH CV ECHO MEAS - AO MAX PG (FULL): 3 MMHG
BH CV ECHO MEAS - AO MAX PG: 6.7 MMHG
BH CV ECHO MEAS - AO MEAN PG (FULL): 2 MMHG
BH CV ECHO MEAS - AO MEAN PG: 3.9 MMHG
BH CV ECHO MEAS - AO ROOT AREA (BSA CORRECTED): 1.7
BH CV ECHO MEAS - AO ROOT AREA: 9.7 CM^2
BH CV ECHO MEAS - AO ROOT DIAM: 3.5 CM
BH CV ECHO MEAS - AO V2 MAX: 129.4 CM/SEC
BH CV ECHO MEAS - AO V2 MEAN: 93.2 CM/SEC
BH CV ECHO MEAS - AO V2 VTI: 26.3 CM
BH CV ECHO MEAS - AVA(I,A): 2.1 CM^2
BH CV ECHO MEAS - AVA(I,D): 2.1 CM^2
BH CV ECHO MEAS - AVA(V,A): 2 CM^2
BH CV ECHO MEAS - AVA(V,D): 2 CM^2
BH CV ECHO MEAS - BSA(HAYCOCK): 2.1 M^2
BH CV ECHO MEAS - BSA: 2.1 M^2
BH CV ECHO MEAS - BZI_BMI: 26.4 KILOGRAMS/M^2
BH CV ECHO MEAS - BZI_METRIC_HEIGHT: 182.9 CM
BH CV ECHO MEAS - BZI_METRIC_WEIGHT: 88.5 KG
BH CV ECHO MEAS - CONTRAST EF (2CH): 57.1 ML/M^2
BH CV ECHO MEAS - CONTRAST EF 4CH: 54.5 ML/M^2
BH CV ECHO MEAS - EDV(MOD-SP2): 91 ML
BH CV ECHO MEAS - EDV(MOD-SP4): 88 ML
BH CV ECHO MEAS - EDV(TEICH): 154.2 ML
BH CV ECHO MEAS - EF(CUBED): 70.7 %
BH CV ECHO MEAS - EF(MOD-SP2): 57.1 %
BH CV ECHO MEAS - EF(MOD-SP4): 54.5 %
BH CV ECHO MEAS - EF(TEICH): 61.7 %
BH CV ECHO MEAS - ESV(MOD-SP2): 39 ML
BH CV ECHO MEAS - ESV(MOD-SP4): 40 ML
BH CV ECHO MEAS - ESV(TEICH): 59.1 ML
BH CV ECHO MEAS - FS: 33.6 %
BH CV ECHO MEAS - IVS/LVPW: 0.96
BH CV ECHO MEAS - IVSD: 0.96 CM
BH CV ECHO MEAS - LAT PEAK E' VEL: 11 CM/SEC
BH CV ECHO MEAS - LV DIASTOLIC VOL/BSA (35-75): 41.8 ML/M^2
BH CV ECHO MEAS - LV MASS(C)D: 214.3 GRAMS
BH CV ECHO MEAS - LV MASS(C)DI: 101.7 GRAMS/M^2
BH CV ECHO MEAS - LV MAX PG: 3.7 MMHG
BH CV ECHO MEAS - LV MEAN PG: 2 MMHG
BH CV ECHO MEAS - LV SYSTOLIC VOL/BSA (12-30): 19 ML/M^2
BH CV ECHO MEAS - LV V1 MAX: 96 CM/SEC
BH CV ECHO MEAS - LV V1 MEAN: 65.4 CM/SEC
BH CV ECHO MEAS - LV V1 VTI: 20.5 CM
BH CV ECHO MEAS - LVIDD: 5.6 CM
BH CV ECHO MEAS - LVIDS: 3.7 CM
BH CV ECHO MEAS - LVLD AP2: 7.3 CM
BH CV ECHO MEAS - LVLD AP4: 7.1 CM
BH CV ECHO MEAS - LVLS AP2: 6.3 CM
BH CV ECHO MEAS - LVLS AP4: 6.4 CM
BH CV ECHO MEAS - LVOT AREA (M): 2.8 CM^2
BH CV ECHO MEAS - LVOT AREA: 2.7 CM^2
BH CV ECHO MEAS - LVOT DIAM: 1.9 CM
BH CV ECHO MEAS - LVPWD: 1 CM
BH CV ECHO MEAS - MED PEAK E' VEL: 12 CM/SEC
BH CV ECHO MEAS - MV E MAX VEL: 105.7 CM/SEC
BH CV ECHO MEAS - MV MAX PG: 4.9 MMHG
BH CV ECHO MEAS - MV MEAN PG: 2.2 MMHG
BH CV ECHO MEAS - MV V2 MAX: 111.2 CM/SEC
BH CV ECHO MEAS - MV V2 MEAN: 67.2 CM/SEC
BH CV ECHO MEAS - MV V2 VTI: 16.3 CM
BH CV ECHO MEAS - MVA(VTI): 3.4 CM^2
BH CV ECHO MEAS - PA ACC TIME: 0.12 SEC
BH CV ECHO MEAS - PA MAX PG (FULL): 1.2 MMHG
BH CV ECHO MEAS - PA MAX PG: 2.4 MMHG
BH CV ECHO MEAS - PA PR(ACCEL): 26.7 MMHG
BH CV ECHO MEAS - PA V2 MAX: 78.2 CM/SEC
BH CV ECHO MEAS - PVA(V,A): 3.7 CM^2
BH CV ECHO MEAS - PVA(V,D): 3.7 CM^2
BH CV ECHO MEAS - QP/QS: 0.98
BH CV ECHO MEAS - RAP SYSTOLE: 3 MMHG
BH CV ECHO MEAS - RV MAX PG: 1.3 MMHG
BH CV ECHO MEAS - RV MEAN PG: 0.78 MMHG
BH CV ECHO MEAS - RV V1 MAX: 56.8 CM/SEC
BH CV ECHO MEAS - RV V1 MEAN: 40.9 CM/SEC
BH CV ECHO MEAS - RV V1 VTI: 10.5 CM
BH CV ECHO MEAS - RVOT AREA: 5.2 CM^2
BH CV ECHO MEAS - RVOT DIAM: 2.6 CM
BH CV ECHO MEAS - RVSP: 29 MMHG
BH CV ECHO MEAS - SI(AO): 121.1 ML/M^2
BH CV ECHO MEAS - SI(CUBED): 59.1 ML/M^2
BH CV ECHO MEAS - SI(LVOT): 26.2 ML/M^2
BH CV ECHO MEAS - SI(MOD-SP2): 24.7 ML/M^2
BH CV ECHO MEAS - SI(MOD-SP4): 22.8 ML/M^2
BH CV ECHO MEAS - SI(TEICH): 45.1 ML/M^2
BH CV ECHO MEAS - SV(AO): 255.3 ML
BH CV ECHO MEAS - SV(CUBED): 124.6 ML
BH CV ECHO MEAS - SV(LVOT): 55.2 ML
BH CV ECHO MEAS - SV(MOD-SP2): 52 ML
BH CV ECHO MEAS - SV(MOD-SP4): 48 ML
BH CV ECHO MEAS - SV(RVOT): 54.3 ML
BH CV ECHO MEAS - SV(TEICH): 95.1 ML
BH CV ECHO MEAS - TAPSE (>1.6): 2.2 CM2
BH CV ECHO MEAS - TR MAX VEL: 253 CM/SEC
BH CV XLRA - RV BASE: 3.2 CM
BH CV XLRA - TDI S': 13 CM/SEC
E/E' RATIO: 11.5
LEFT ATRIUM VOLUME INDEX: 21 ML/M2
LV EF 2D ECHO EST: 55 %
SINUS: 3.7 CM
STJ: 2.9 CM

## 2017-04-27 PROCEDURE — 93306 TTE W/DOPPLER COMPLETE: CPT | Performed by: INTERNAL MEDICINE

## 2017-04-27 PROCEDURE — 93306 TTE W/DOPPLER COMPLETE: CPT

## 2017-05-02 ENCOUNTER — HOSPITAL ENCOUNTER (OUTPATIENT)
Dept: SPEECH THERAPY | Facility: HOSPITAL | Age: 72
Setting detail: THERAPIES SERIES
Discharge: HOME | End: 2017-05-02

## 2017-05-02 ENCOUNTER — HOSPITAL ENCOUNTER (OUTPATIENT)
Dept: OCCUPATIONAL THERAPY | Facility: HOSPITAL | Age: 72
Setting detail: THERAPIES SERIES
Discharge: HOME | End: 2017-05-02
Attending: PSYCHIATRY & NEUROLOGY

## 2017-05-02 ENCOUNTER — HOSPITAL ENCOUNTER (OUTPATIENT)
Dept: PHYSICAL THERAPY | Facility: HOSPITAL | Age: 72
Setting detail: THERAPIES SERIES
Discharge: HOME | End: 2017-05-02

## 2017-05-02 DIAGNOSIS — R26.81 GAIT INSTABILITY: Primary | ICD-10-CM

## 2017-05-02 DIAGNOSIS — R29.898 WEAKNESS OF BOTH UPPER EXTREMITIES: ICD-10-CM

## 2017-05-02 DIAGNOSIS — R49.0 DYSPHONIA: ICD-10-CM

## 2017-05-02 DIAGNOSIS — R47.1 DYSARTHRIA: ICD-10-CM

## 2017-05-02 DIAGNOSIS — G20 PARKINSON'S DISEASE (HCC): Primary | ICD-10-CM

## 2017-05-02 DIAGNOSIS — R27.9 LACK OF COORDINATION: ICD-10-CM

## 2017-05-02 DIAGNOSIS — G20 IDIOPATHIC PARKINSON'S DISEASE (HCC): Primary | ICD-10-CM

## 2017-05-02 PROCEDURE — 97112 NEUROMUSCULAR REEDUCATION: CPT

## 2017-05-02 PROCEDURE — G8979 MOBILITY GOAL STATUS: HCPCS

## 2017-05-02 PROCEDURE — G9186 MOTOR SPEECH GOAL STATUS: HCPCS | Performed by: SPEECH-LANGUAGE PATHOLOGIST

## 2017-05-02 PROCEDURE — G8980 MOBILITY D/C STATUS: HCPCS

## 2017-05-02 PROCEDURE — G8985 CARRY GOAL STATUS: HCPCS

## 2017-05-02 PROCEDURE — G8986 CARRY D/C STATUS: HCPCS

## 2017-05-02 PROCEDURE — 92507 TX SP LANG VOICE COMM INDIV: CPT | Performed by: SPEECH-LANGUAGE PATHOLOGIST

## 2017-05-02 PROCEDURE — 97110 THERAPEUTIC EXERCISES: CPT

## 2017-05-02 PROCEDURE — G9158 MOTOR SPEECH D/C STATUS: HCPCS | Performed by: SPEECH-LANGUAGE PATHOLOGIST

## 2017-05-05 ENCOUNTER — TELEPHONE (OUTPATIENT)
Dept: CARDIOLOGY | Facility: CLINIC | Age: 72
End: 2017-05-05

## 2017-05-07 DIAGNOSIS — G47.34 NOCTURNAL HYPOXIA: ICD-10-CM

## 2017-05-07 DIAGNOSIS — G47.33 OSA (OBSTRUCTIVE SLEEP APNEA): Primary | ICD-10-CM

## 2017-05-08 ENCOUNTER — HOSPITAL ENCOUNTER (OUTPATIENT)
Dept: CT IMAGING | Facility: HOSPITAL | Age: 72
Discharge: HOME | End: 2017-05-08
Attending: FAMILY MEDICINE | Admitting: FAMILY MEDICINE

## 2017-05-08 DIAGNOSIS — R93.89 ABNORMAL CHEST CT: ICD-10-CM

## 2017-05-08 LAB — CREAT BLDA-MCNC: 0.9 MG/DL (ref 0.6–1.3)

## 2017-05-08 PROCEDURE — 71260 CT THORAX DX C+: CPT

## 2017-05-08 PROCEDURE — 0 IOPAMIDOL 61 % SOLUTION: Performed by: FAMILY MEDICINE

## 2017-05-08 PROCEDURE — 82565 ASSAY OF CREATININE: CPT

## 2017-05-08 RX ORDER — GABAPENTIN 300 MG/1
CAPSULE ORAL
Qty: 60 CAPSULE | Refills: 6 | Status: SHIPPED | OUTPATIENT
Start: 2017-05-08 | End: 2017-12-04

## 2017-05-08 RX ADMIN — IOPAMIDOL 100 ML: 612 INJECTION, SOLUTION INTRAVENOUS at 12:55

## 2017-05-09 ENCOUNTER — APPOINTMENT (OUTPATIENT)
Dept: OCCUPATIONAL THERAPY | Facility: HOSPITAL | Age: 72
End: 2017-05-09
Attending: PSYCHIATRY & NEUROLOGY

## 2017-05-09 ENCOUNTER — APPOINTMENT (OUTPATIENT)
Dept: SPEECH THERAPY | Facility: HOSPITAL | Age: 72
End: 2017-05-09

## 2017-05-09 ENCOUNTER — APPOINTMENT (OUTPATIENT)
Dept: PHYSICAL THERAPY | Facility: HOSPITAL | Age: 72
End: 2017-05-09

## 2017-05-16 ENCOUNTER — APPOINTMENT (OUTPATIENT)
Dept: OCCUPATIONAL THERAPY | Facility: HOSPITAL | Age: 72
End: 2017-05-16
Attending: PSYCHIATRY & NEUROLOGY

## 2017-05-16 ENCOUNTER — APPOINTMENT (OUTPATIENT)
Dept: SPEECH THERAPY | Facility: HOSPITAL | Age: 72
End: 2017-05-16

## 2017-05-16 ENCOUNTER — APPOINTMENT (OUTPATIENT)
Dept: PHYSICAL THERAPY | Facility: HOSPITAL | Age: 72
End: 2017-05-16

## 2017-05-23 ENCOUNTER — APPOINTMENT (OUTPATIENT)
Dept: SPEECH THERAPY | Facility: HOSPITAL | Age: 72
End: 2017-05-23

## 2017-05-23 ENCOUNTER — APPOINTMENT (OUTPATIENT)
Dept: PHYSICAL THERAPY | Facility: HOSPITAL | Age: 72
End: 2017-05-23

## 2017-05-23 ENCOUNTER — APPOINTMENT (OUTPATIENT)
Dept: OCCUPATIONAL THERAPY | Facility: HOSPITAL | Age: 72
End: 2017-05-23
Attending: PSYCHIATRY & NEUROLOGY

## 2017-06-01 ENCOUNTER — TELEPHONE (OUTPATIENT)
Dept: CARDIOLOGY | Facility: CLINIC | Age: 72
End: 2017-06-01

## 2017-06-02 RX ORDER — LISINOPRIL 10 MG/1
10 TABLET ORAL 2 TIMES DAILY
Qty: 60 TABLET | Refills: 6 | Status: SHIPPED | OUTPATIENT
Start: 2017-06-02 | End: 2017-07-11 | Stop reason: SDUPTHER

## 2017-06-02 RX ORDER — CARVEDILOL 6.25 MG/1
9.38 TABLET ORAL 2 TIMES DAILY WITH MEALS
Qty: 90 TABLET | Refills: 6 | Status: SHIPPED | OUTPATIENT
Start: 2017-06-02 | End: 2017-09-05 | Stop reason: SDUPTHER

## 2017-06-23 ENCOUNTER — OFFICE VISIT (OUTPATIENT)
Dept: NEUROLOGY | Facility: CLINIC | Age: 72
End: 2017-06-23

## 2017-06-23 VITALS
BODY MASS INDEX: 26.55 KG/M2 | SYSTOLIC BLOOD PRESSURE: 108 MMHG | DIASTOLIC BLOOD PRESSURE: 70 MMHG | HEIGHT: 72 IN | WEIGHT: 196 LBS | OXYGEN SATURATION: 99 % | HEART RATE: 62 BPM

## 2017-06-23 DIAGNOSIS — R26.81 GAIT INSTABILITY: ICD-10-CM

## 2017-06-23 DIAGNOSIS — G20 IDIOPATHIC PARKINSON'S DISEASE (HCC): ICD-10-CM

## 2017-06-23 DIAGNOSIS — G60.9 IDIOPATHIC PERIPHERAL NEUROPATHY: ICD-10-CM

## 2017-06-23 DIAGNOSIS — R47.1 DYSARTHRIA: ICD-10-CM

## 2017-06-23 PROCEDURE — 99214 OFFICE O/P EST MOD 30 MIN: CPT | Performed by: PSYCHIATRY & NEUROLOGY

## 2017-06-23 NOTE — PROGRESS NOTES
Subjective   Rommel Gonzalez is a 71 y.o. male with history of Parkinson's disease with dysarthria and gait instability issues and peripheral neuropathy came for follow-up appointment.    History of Present Illness   He was accompanied by his sister and according to them, he denies any worsening of the tremors but having more stiffening with balance issues when walking and denies any falls and was evaluated by Parkinson's specialist but not in hospital and changed the carbidopa/levodopa dosage and denies any side effects.  Denies any hallucinations or memory issues but still complaining of lightheaded spells but denies any passing out spells.  Still complaining of tingling and numbness and complaint with the medication gabapentin and denies any side effects.  Denies any headaches, blurred vision, double vision, weakness, passing out spells or recent hospitalizations since last visit.  Denies any major depression or anxiety issues.      The following portions of the patient's history were reviewed and updated as appropriate: allergies, current medications, past family history, past medical history, past social history, past surgical history and problem list.    Review of Systems   Constitutional: Negative for chills, fatigue and fever.   HENT: Positive for hearing loss. Negative for tinnitus and trouble swallowing.    Eyes: Negative for pain, redness, itching and visual disturbance.   Respiratory: Negative for cough, chest tightness, shortness of breath and wheezing.    Cardiovascular: Negative for chest pain, palpitations and leg swelling.   Gastrointestinal: Negative for constipation, diarrhea, nausea and vomiting.   Endocrine: Negative for cold intolerance and heat intolerance.   Genitourinary: Negative for decreased urine volume, difficulty urinating, frequency and urgency.   Musculoskeletal: Positive for gait problem (unstead). Negative for back pain, neck pain and neck stiffness.   Skin: Negative for color  change, rash and wound.   Allergic/Immunologic: Negative for environmental allergies and food allergies.   Neurological: Positive for tremors. Negative for dizziness, weakness, light-headedness, numbness and headaches.   Hematological: Negative for adenopathy. Bruises/bleeds easily.   Psychiatric/Behavioral: Negative for confusion, hallucinations and sleep disturbance. The patient is not nervous/anxious.        Objective   Physical Exam   Vitals reviewed.    GENERAL EXAMINATION : Patient is well-developed, well-nourished, well-groomed, alert and approriate in no apparent distress.  HEENT: Normocephalic, normal funduscopic exam, no visible oral lesions.  NECK : Normal range of motion, no tenderness, no malalignment.  CARDIAC : Regular rate and rhythm, no murmurs.  CHEST : Clear to auscultation, bilateral.   No wheezing .  ABDOMEN : Soft, non tender and non distended. EXTREMITIES: No edema. SKIN : No rashes or lesions visible. MUSCULOSKELETAL : No muscle weakness or muscle pain. No joint pains. PSYCHIATRIC : Awake and follwoing verbal commands well.     NEUROLOGICAL EXAMINATION  :  Higher integrative functions : No aphasia or dysarthria.  CRANIAL NERVES : Cranial nerve II: Normal visual acuity and visual fields.  On funduscopic exam, discs are flat with sharp margins cranial nerves III, IV, VI: Extraocular movements are full without nystagmus; pupils are equal, round and reactive to light.  Cranial nerve V: Normal facial sensation and strength of muscles of mastication.  Cranial nerve: VII: Facial movements are symmetric.  No weakness.  Cranial nerve VIII: Auditory acuity is normal.  Cranial nerve IX, X: Symmetric palate movement.  Cranial nerve XI sternocleidomastoid and trapezius are normal.  Cranial nerve XII: Tongue in the midline with no atrophy or fasciculations.      MOTOR : Normal muscle strength except for subtle resting tremor with cogwheel rigidity and decreased arm swing with masked facies.    SENSATION :  Normal to light touch, pinprick, vibration sensations intact and Romberg is negative.  MUSCLE STRETCH REFLEXES : Normal and symmetric in the upper extremities and lower extremities and the plantar responses are flexor bilaterally. COORDINATION : Finger to nose test showed no dysmetria.  Rapid alternating movements are normal.   GAIT : Walks on heels, toesexcept for mild difficulty with tandem walk.      Assessment/Plan   Rommel was seen today for parkinson's disease.    Diagnoses and all orders for this visit:    Idiopathic Parkinson's disease    Dysarthria    Idiopathic peripheral neuropathy    Gait instability    71-year-old right-handed white male with history of Parkinson's disease with dysarthria and gait instability issues and peripheral neuropathy came for follow-up appointment.  On exam, no new focal deficits were seen except for subtle resting tremor with cogwheel rigidity and decreased arm swing with masked facies.  Reviewed Dr. Pickett notes and also reviewed MRI brain/MRI of the cervical spine dated May 2017 and did not any acute abnormalities and discussed with the patient and his sister regarding his signs and symptoms and told him to continue carbidopa/levodopa 25/250 mg one and half tablet by mouth twice a day and discussed about side effects.  Discussed about fall precautions and told him to continue physical activity and do stationary bike to help with the balance and gait and told him to follow-up with Dr. Pickett from now on.  Continue gabapentin 300 mg 1 capsule twice a day and discussed about side effects.  Will follow-up as needed or call for any questions.    Thank you for allowing me to participate in the care of the patient.  Please feel free to call for any questions at your convenience.    Yours sincerely,    Roxann Hankins M.D

## 2017-07-11 RX ORDER — LISINOPRIL 10 MG/1
10 TABLET ORAL 2 TIMES DAILY
Qty: 60 TABLET | Refills: 1 | Status: SHIPPED | OUTPATIENT
Start: 2017-07-11 | End: 2017-08-10 | Stop reason: SDUPTHER

## 2017-07-13 ENCOUNTER — HOSPITAL ENCOUNTER (OUTPATIENT)
Dept: SLEEP MEDICINE | Facility: HOSPITAL | Age: 72
Discharge: HOME | End: 2017-07-13
Attending: INTERNAL MEDICINE | Admitting: INTERNAL MEDICINE

## 2017-07-13 DIAGNOSIS — G47.33 OSA (OBSTRUCTIVE SLEEP APNEA): ICD-10-CM

## 2017-07-13 DIAGNOSIS — G47.34 NOCTURNAL HYPOXIA: ICD-10-CM

## 2017-07-13 PROCEDURE — G0463 HOSPITAL OUTPT CLINIC VISIT: HCPCS

## 2017-07-13 NOTE — CONSULTS
[facility]      Patient Care Team:  Luiz Onofre MD as PCP - General (Family Medicine)    Referring Provider: Luiz Onofre MD    Chief complaint:   Referred by Dr. Bennett for sleep apnea evaluation/treatment    History of present illness:    This is a 71-year-old male patient, known to have Parkinson disease which was diagnosed couple of years ago and has been progressively worsening.  Though, he had significant neurological impairment, he lives by himself and is able to perform his daily activities.  He does have atrial fibrillation and follows with Dr. Bennett.  A home sleep apnea test was performed and showed severe sleep apnea and therefore patient was referred to us for further care.    Patient lives alone.  He reported a prior history of snoring.  He does not know whether he snores at this point.  He reported dozing off very easily when he's a passenger in a car but otherwise he denies significant daytime sleepiness.  His [place] Sleepiness Scale (ESS) = 7.    He denies urge to move the legs, sleep paralysis, hallucination or cataplexy.    He reported difficulties falling asleep and staying asleep without medications.      Sleep schedule:  -Bedtime: 9 PM  -Sleep latency: 15 minutes  -Wake up time: 5 AM , does not feel refreshed  -Nocturnal awakening: 3 times because of nocturia.  No difficulties going back to sleep.  -Perceived sleep hours: 7 hours      Review of Systems  Constitutional: No fever or chills. No changes in appetite.   ENMT: No sinus congestion, postnasal drip, hoarsness  Cardiovascular: Irregular heartbeats and chest tightness but no legs swelling.    Respiratory: No dyspnea, cough or wheezing.  Gastrointestinal: No constipation, diarrhea, abdominal pain or acid reflux  Neurology: No headache, weakness, numbness or dizziness.   Musculoskeletal: No joints pain, stiffness or swelling.   Psychiatry: No depression, anxiety or irritability.    Hem/Lymphatic: No swollen glands or easy bruising.  Integumentary: No rash.  Endocrinology: No excessive thirst, cold or warm intolerance.   Urinary: No dysuria, bloody urine or frequent urination.     History  Past Medical History:   Diagnosis Date   • AF (atrial fibrillation)    • Atrial flutter    • Atypical chest pain    • Colon polyps    • Confusion    • Depression    • Disease of thyroid gland    • Disorder of thyroid    • Dyspnea and respiratory abnormalities    • Elevated TSH    • Fatigue    • Gait instability    • Hay fever    • Hyperlipidemia    • Hypertension    • Insomnia    • Measles    • Mumps    • Palpitations    • Parkinson disease    • Peripheral neuropathy    • Pneumonia    • Poor sleep pattern    • Slurred speech    • Tremor    , No past surgical history on file.,   Family History   Problem Relation Age of Onset   • Hypertension Mother    • Glaucoma Mother    • Throat cancer Father    • Alcohol abuse Father    • Hypertension Sister    • Cancer Sister      malignant neoplasm of urinary bladder   • Lung cancer Brother    • Heart attack Other    • Lung disease Other    ,   Social History   Substance Use Topics   • Smoking status: Former Smoker   • Smokeless tobacco: Not on file   • Alcohol use Yes      Comment: social use     Allergies:  Review of patient's allergies indicates no known allergies.    Medications:  Bumex  Sinemet  Carvedilol  Doxy Zosyn  Gabapentin  Levothyroxin  Lisinopril  Lovastatin  Xarelto  Ambien  Multivitamin    Vital Signs:  BP= 137/75; heart rate = 80; weight = 195 pounds; height = 69.5 inches; Neck size = 17 inches    Physical Exam:  Constitutional: Not in acute distress.  Eyes: Injected conjunctiva, EOMI.  ENMT: Krause score 4. No oral thrush. Tonsils grade 1. Narrow distance in between the posterior pharyngeal pillars (<25 %).   Neck: Large. No thyromegaly.    Heart: Regular rhythm and rate, no murmur  Lungs: Good and equal air entry bilaterally. No crackles or  wheezing.         Abdomen: Obese.   Extremities: No cyanosis, clubbing or pitting edema. Moves all extremities.  Neuro: Conscious, alert, oriented x3.  Significant bradykinesia. Mild tremor right hand. Cogwheel rigidity.  Psych: Appropriate mood and affect.    Integumentary: No rash  Lymphatic: No palpable cervical or supraclavicular lymph nodes.    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     Plan:  1) we discussed the pathophysiology of obstructive sleep apnea and treatment which mainly include CPAP therapy given the severity of his sleep apnea.  Patient is agreeable with CPAP therapy.  I am a little concerned about his ability to operate the CPAP.  It appears that he has an independent functional status which is reassuring. However,  his fine motor activity is reduced.  His sister lives 5 minutes away and can help him if needed.  Patient is very difficult to understand because of his aphasia in the setting of Parkinson disease and therefore his sister who usually accompanies him to his medical appointments.  He will definitely need help during the CPAP set up.  I will start him on auto CPAP therapy and have him follow-up within 1-2 month    2) we talked about the association between obstructive sleep apnea and cardiovascular disease including hypertension and atrial fibrillation at the beneficial effect of CPAP therapy.  I emphasized about the importance of CPAP therapy to reduce the risks of future cardiovascular events.        Ame Rdz MD  07/13/17  7:11 PM

## 2017-07-14 RX ORDER — RIVAROXABAN 20 MG/1
TABLET, FILM COATED ORAL
Qty: 30 TABLET | Refills: 5 | Status: SHIPPED | OUTPATIENT
Start: 2017-07-14 | End: 2017-09-15 | Stop reason: SDUPTHER

## 2017-07-17 ENCOUNTER — TELEPHONE (OUTPATIENT)
Dept: SLEEP MEDICINE | Facility: HOSPITAL | Age: 72
End: 2017-07-17

## 2017-08-10 ENCOUNTER — OFFICE VISIT (OUTPATIENT)
Dept: CARDIOLOGY | Facility: CLINIC | Age: 72
End: 2017-08-10

## 2017-08-10 VITALS
HEART RATE: 75 BPM | WEIGHT: 190 LBS | BODY MASS INDEX: 25.73 KG/M2 | HEIGHT: 72 IN | DIASTOLIC BLOOD PRESSURE: 84 MMHG | SYSTOLIC BLOOD PRESSURE: 122 MMHG

## 2017-08-10 DIAGNOSIS — I10 ESSENTIAL HYPERTENSION: Primary | ICD-10-CM

## 2017-08-10 DIAGNOSIS — I48.20 CHRONIC ATRIAL FIBRILLATION (HCC): ICD-10-CM

## 2017-08-10 DIAGNOSIS — Z79.01 ANTICOAGULATED: ICD-10-CM

## 2017-08-10 PROCEDURE — 93000 ELECTROCARDIOGRAM COMPLETE: CPT | Performed by: INTERNAL MEDICINE

## 2017-08-10 PROCEDURE — 99214 OFFICE O/P EST MOD 30 MIN: CPT | Performed by: INTERNAL MEDICINE

## 2017-08-10 RX ORDER — LISINOPRIL 10 MG/1
TABLET ORAL
Qty: 60 TABLET | Refills: 1 | Status: SHIPPED | OUTPATIENT
Start: 2017-08-10 | End: 2017-12-19 | Stop reason: SDUPTHER

## 2017-08-10 NOTE — PROGRESS NOTES
Date of Office Visit: 08/10/2017  Encounter Provider: Laura Bennett MD  Place of Service: [facility] CARDIOLOGY  Patient Name: Rommel Gonzalez  :1945    Chief complaint  Follow-up of atrial flutter and fibrillation    History of Present Illness  The patient is a 71-year-old gentleman with history of hypertension, hyperlipidemia, and Parkinson’s disease.  In 2015, he was noted to have atrial fibrillation with rapid ventricular rates.  Coreg and Xarelto were added to his regimen and rate control was pursued.  An echocardiogram revealed normal left ventricular size and function with an ejection fraction of 51%, grade 2 diastolic dysfunction, moderate left atrial enlargement, mild mitral regurgitation, and normal right-sided pressures.  In 2015, he had a Lexiscan perfusion study that was negative for ischemia.  He had an inferior wall defect consistent with attenuation.     Since last visit he has been using the CPAP for 2 weeks.  He brings with him numerous blood pressure readings many of which were higher MG tablet over the past several weeks is been lower with systolics in the 80s to 90s.  He denies any chest pain, shortness of breath, palpitations, syncope near syncope.  He is being followed by Dr. Pickett 4 hits Parkinson's and has significant gait ataxia though has not fallen as of yet.  Medications are being adjusted further    Past Medical History:   Diagnosis Date   • AF (atrial fibrillation)    • Atrial flutter    • Atypical chest pain    • Chest pain    • Colon polyps    • Confusion    • Depression    • Disease of thyroid gland    • Disorder of thyroid    • Dyspnea and respiratory abnormalities    • Elevated TSH    • Fatigue    • Gait instability    • Hay fever    • Hyperlipidemia    • Hypertension    • Hypothyroidism    • Insomnia    • Measles    • Mumps    • Palpitations    • Parkinson disease    • Peripheral neuropathy    • Pneumonia    • Poor sleep pattern    •  Slurred speech    • Tremor      History reviewed. No pertinent surgical history.  Outpatient Medications Prior to Visit   Medication Sig Dispense Refill   • bumetanide (BUMEX) 2 MG tablet Tablet every Tuesday and Friday (Patient taking differently: 0.5 mg. Tablet every Tuesday and Friday) 30 tablet 3   • carbidopa-levodopa (SINEMET)  MG per tablet TAKE TWO TABLETS BY MOUTH THREE TIMES A DAY (Patient taking differently: 1.5 tablets tid) 540 tablet 2   • carvedilol (COREG) 6.25 MG tablet Take 1.5 tablets by mouth 2 (Two) Times a Day With Meals. 90 tablet 6   • doxazosin (CARDURA) 2 MG tablet Take 1 tablet by mouth Every Night. 30 tablet 5   • gabapentin (NEURONTIN) 300 MG capsule TAKE ONE CAPSULE BY MOUTH TWO TIMES A DAY 60 capsule 6   • levothyroxine (SYNTHROID, LEVOTHROID) 112 MCG tablet Take 1 tablet by mouth Daily. 90 tablet 3   • lovastatin (MEVACOR) 10 MG tablet Take 1 tablet by mouth Every Other Day. 90 tablet 1   • Multiple Vitamins-Minerals (MULTIVITAL) tablet Take 1 tablet by mouth daily.     • XARELTO 20 MG tablet TAKE ONE TABLET BY MOUTH EVERY EVENING WITH SUPPER 30 tablet 5   • zolpidem (AMBIEN) 5 MG tablet TAKE ONE TABLET BY MOUTH EVERY NIGHT AT BEDTIME AS NEEDED FOR SLEEP 30 tablet 1   • ZOSTAVAX 88768 UNT/0.65ML injection      • lisinopril (PRINIVIL,ZESTRIL) 10 MG tablet Take 1 tablet by mouth 2 (Two) Times a Day. 60 tablet 1   • FLUZONE HIGH-DOSE 0.5 ML suspension prefilled syringe injection        No facility-administered medications prior to visit.      Allergies as of 08/10/2017   • (No Known Allergies)     Social History     Social History   • Marital status:      Spouse name: N/A   • Number of children: N/A   • Years of education: N/A     Occupational History   • Not on file.     Social History Main Topics   • Smoking status: Former Smoker   • Smokeless tobacco: Not on file   • Alcohol use Yes      Comment: social use   • Drug use: No   • Sexual activity: Not on file     Other Topics  "Concern   • Not on file     Social History Narrative     Family History   Problem Relation Age of Onset   • Hypertension Mother    • Glaucoma Mother    • Throat cancer Father    • Alcohol abuse Father    • Hypertension Sister    • Cancer Sister      malignant neoplasm of urinary bladder   • Lung cancer Brother    • Heart attack Other    • Lung disease Other      Review of Systems   Constitution: Positive for malaise/fatigue. Negative for fever, weight gain and weight loss.   HENT: Negative for ear pain, hearing loss, nosebleeds and sore throat.    Eyes: Negative for double vision, pain, vision loss in left eye and vision loss in right eye.   Cardiovascular:        See history of present illness.   Respiratory: Negative for cough, shortness of breath, sleep disturbances due to breathing, snoring and wheezing.    Endocrine: Negative for cold intolerance, heat intolerance and polyuria.   Skin: Negative for itching, poor wound healing and rash.   Musculoskeletal: Negative for joint pain, joint swelling and myalgias.   Gastrointestinal: Negative for abdominal pain, diarrhea, hematochezia, nausea and vomiting.   Genitourinary: Negative for hematuria and hesitancy.   Neurological: Negative for numbness, paresthesias and seizures.   Psychiatric/Behavioral: Negative for depression. The patient is not nervous/anxious.      Objective:     Vitals:    08/10/17 0946   BP: 122/84   Pulse: 75   Weight: 190 lb (86.2 kg)   Height: 72\" (182.9 cm)     Body mass index is 25.77 kg/(m^2).    Physical Exam   Constitutional: He is oriented to person, place, and time. He appears well-developed and well-nourished.   HENT:   Head: Normocephalic.   Nose: Nose normal.   Mouth/Throat: Oropharynx is clear and moist.   Eyes: Conjunctivae and EOM are normal. Pupils are equal, round, and reactive to light. Right eye exhibits no discharge. No scleral icterus.   Neck: Normal range of motion. Neck supple. No JVD present. No thyromegaly present. "   Cardiovascular: Normal rate, normal heart sounds and intact distal pulses.  An irregularly irregular rhythm present. Exam reveals no gallop and no friction rub.    No murmur heard.  Pulses:       Carotid pulses are 2+ on the right side, and 2+ on the left side.       Radial pulses are 2+ on the right side, and 2+ on the left side.        Femoral pulses are 2+ on the right side, and 2+ on the left side.       Popliteal pulses are 2+ on the right side, and 2+ on the left side.        Dorsalis pedis pulses are 2+ on the right side, and 2+ on the left side.        Posterior tibial pulses are 2+ on the right side, and 2+ on the left side.   Pulmonary/Chest: Effort normal and breath sounds normal. No respiratory distress. He has no wheezes. He has no rales.   Abdominal: Soft. Bowel sounds are normal. He exhibits no distension. There is no hepatosplenomegaly. There is no tenderness. There is no rebound.   Musculoskeletal: Normal range of motion. He exhibits no edema or tenderness.   Neurological: He is alert and oriented to person, place, and time.   Skin: Skin is warm and dry. No rash noted. No erythema.   Psychiatric: He has a normal mood and affect. His behavior is normal. Judgment and thought content normal.   Vitals reviewed.    Lab Review:     ECG 12 Lead  Date/Time: 8/10/2017 8:06 PM  Performed by: EL CORDERO  Authorized by: EL CORDERO   Rhythm: atrial flutter  Conduction comments: QTc = 461msec  Clinical impression: abnormal ECG          Assessment:       Diagnosis Plan   1. Essential hypertension     2. Chronic atrial fibrillation     3. Anticoagulated       Plan:       1.  Falls with syncope and near syncope.  This seems to have improved.  In addition we'll decrease lisinopril to 5 mg in a.m. and 10 mg every afternoon.  May need to continue to wean off depending on his blood pressure response as he continues with CPAP therapy  2.  Atrial flutter/fibrillation.  On Xarelto therapy.  If his falls continual have  to discontinue Xarelto.  3.  Xarelto therapy, as above.  In addition blood work done March 13 revealed normal renal function  4.  History of rectal bleeding. No recurrance  5.  Hypertension, controlled, as above  6.  Pulmonary nodule, by Dr. Onofre  7.  Sleep apnea, on CPAP x 2 weeks    Atrial Fibrillation and Atrial Flutter  Assessment  • The patient has persistent atrial fibrillation  • The patient's CHADS2-VASc score is 2  • A [id number] score of 2 or more is considered a high risk for a thromboembolic event  • Warfarin not prescribed  • Dabigatran not prescribed  • Rivaroxaban prescribed  • Apixaban not prescribed  • Aspirin not prescribed    Plan  • Continue in atrial fibrillation with rate control  • Add rivaroxaban for antithrombotic therapy  • Continue beta blocker for rate control       Rommel Gonzalez   Home Medication Instructions CHELSEA:    Printed on:08/13/17 2007   Medication Information                      bumetanide (BUMEX) 2 MG tablet  Tablet every Tuesday and Friday             carbidopa-levodopa (SINEMET)  MG per tablet  TAKE TWO TABLETS BY MOUTH THREE TIMES A DAY             carvedilol (COREG) 6.25 MG tablet  Take 1.5 tablets by mouth 2 (Two) Times a Day With Meals.             doxazosin (CARDURA) 2 MG tablet  Take 1 tablet by mouth Every Night.             gabapentin (NEURONTIN) 300 MG capsule  TAKE ONE CAPSULE BY MOUTH TWO TIMES A DAY             levothyroxine (SYNTHROID, LEVOTHROID) 112 MCG tablet  Take 1 tablet by mouth Daily.             lisinopril (PRINIVIL,ZESTRIL) 10 MG tablet  Take half tablet po q am and one tablet po q pm             lovastatin (MEVACOR) 10 MG tablet  Take 1 tablet by mouth Every Other Day.             Multiple Vitamins-Minerals (MULTIVITAL) tablet  Take 1 tablet by mouth daily.             XARELTO 20 MG tablet  TAKE ONE TABLET BY MOUTH EVERY EVENING WITH SUPPER             zolpidem (AMBIEN) 5 MG tablet  TAKE ONE TABLET BY MOUTH EVERY NIGHT AT BEDTIME AS NEEDED  FOR SLEEP             ZOSTAVAX 05442 UNT/0.65ML injection                   New Medications Ordered This Visit   Medications   • lisinopril (PRINIVIL,ZESTRIL) 10 MG tablet     Sig: Take half tablet po q am and one tablet po q pm     Dispense:  60 tablet     Refill:  1       Dictated utilizing Dragon dictation

## 2017-08-13 PROBLEM — R55 NEAR SYNCOPE: Status: RESOLVED | Noted: 2017-04-19 | Resolved: 2017-08-13

## 2017-08-16 ENCOUNTER — OFFICE VISIT (OUTPATIENT)
Dept: FAMILY MEDICINE CLINIC | Facility: CLINIC | Age: 72
End: 2017-08-16

## 2017-08-16 VITALS
RESPIRATION RATE: 16 BRPM | WEIGHT: 194.4 LBS | HEART RATE: 90 BPM | HEIGHT: 72 IN | DIASTOLIC BLOOD PRESSURE: 68 MMHG | OXYGEN SATURATION: 97 % | TEMPERATURE: 98.1 F | SYSTOLIC BLOOD PRESSURE: 110 MMHG | BODY MASS INDEX: 26.33 KG/M2

## 2017-08-16 DIAGNOSIS — H25.013 CATARACT CORTICAL, SENILE, BILATERAL: ICD-10-CM

## 2017-08-16 DIAGNOSIS — G20 IDIOPATHIC PARKINSON'S DISEASE (HCC): ICD-10-CM

## 2017-08-16 DIAGNOSIS — I48.20 CHRONIC ATRIAL FIBRILLATION (HCC): ICD-10-CM

## 2017-08-16 DIAGNOSIS — Z79.01 ANTICOAGULATED: ICD-10-CM

## 2017-08-16 DIAGNOSIS — E78.5 HYPERLIPIDEMIA, UNSPECIFIED HYPERLIPIDEMIA TYPE: ICD-10-CM

## 2017-08-16 DIAGNOSIS — I10 ESSENTIAL HYPERTENSION: ICD-10-CM

## 2017-08-16 DIAGNOSIS — E03.9 HYPOTHYROIDISM (ACQUIRED): ICD-10-CM

## 2017-08-16 DIAGNOSIS — G47.00 INSOMNIA, UNSPECIFIED TYPE: ICD-10-CM

## 2017-08-16 DIAGNOSIS — Z79.899 HIGH RISK MEDICATION USE: Primary | ICD-10-CM

## 2017-08-16 DIAGNOSIS — Z99.89 OSA ON CPAP: ICD-10-CM

## 2017-08-16 DIAGNOSIS — F32.0 MILD SINGLE CURRENT EPISODE OF MAJOR DEPRESSIVE DISORDER (HCC): ICD-10-CM

## 2017-08-16 DIAGNOSIS — G47.33 OSA ON CPAP: ICD-10-CM

## 2017-08-16 DIAGNOSIS — R26.81 GAIT INSTABILITY: ICD-10-CM

## 2017-08-16 LAB
ALBUMIN SERPL-MCNC: 4 G/DL (ref 3.5–5.2)
ALBUMIN/GLOB SERPL: 1.7 G/DL
ALP SERPL-CCNC: 40 U/L (ref 39–117)
ALT SERPL-CCNC: 12 U/L (ref 1–41)
AST SERPL-CCNC: 17 U/L (ref 1–40)
BASOPHILS # BLD AUTO: 0.02 10*3/MM3 (ref 0–0.2)
BASOPHILS NFR BLD AUTO: 0.3 % (ref 0–1.5)
BILIRUB SERPL-MCNC: 0.4 MG/DL (ref 0.1–1.2)
BUN SERPL-MCNC: 13 MG/DL (ref 8–23)
BUN/CREAT SERPL: 14.4 (ref 7–25)
CALCIUM SERPL-MCNC: 9.2 MG/DL (ref 8.6–10.5)
CHLORIDE SERPL-SCNC: 102 MMOL/L (ref 98–107)
CHOLEST SERPL-MCNC: 144 MG/DL (ref 0–200)
CO2 SERPL-SCNC: 28.4 MMOL/L (ref 22–29)
CREAT SERPL-MCNC: 0.9 MG/DL (ref 0.76–1.27)
EOSINOPHIL # BLD AUTO: 0.08 10*3/MM3 (ref 0–0.7)
EOSINOPHIL NFR BLD AUTO: 1.3 % (ref 0.3–6.2)
ERYTHROCYTE [DISTWIDTH] IN BLOOD BY AUTOMATED COUNT: 13.4 % (ref 11.5–14.5)
GLOBULIN SER CALC-MCNC: 2.4 GM/DL
GLUCOSE SERPL-MCNC: 95 MG/DL (ref 65–99)
HCT VFR BLD AUTO: 38.3 % (ref 40.4–52.2)
HDLC SERPL-MCNC: 40 MG/DL (ref 40–60)
HGB BLD-MCNC: 11.8 G/DL (ref 13.7–17.6)
IMM GRANULOCYTES # BLD: 0.02 10*3/MM3 (ref 0–0.03)
IMM GRANULOCYTES NFR BLD: 0.3 % (ref 0–0.5)
INTERPRETATION: NORMAL
LDLC SERPL CALC-MCNC: 79 MG/DL (ref 0–100)
LYMPHOCYTES # BLD AUTO: 1.49 10*3/MM3 (ref 0.9–4.8)
LYMPHOCYTES NFR BLD AUTO: 23.8 % (ref 19.6–45.3)
MCH RBC QN AUTO: 31.2 PG (ref 27–32.7)
MCHC RBC AUTO-ENTMCNC: 30.8 G/DL (ref 32.6–36.4)
MCV RBC AUTO: 101.3 FL (ref 79.8–96.2)
MONOCYTES # BLD AUTO: 0.28 10*3/MM3 (ref 0.2–1.2)
MONOCYTES NFR BLD AUTO: 4.5 % (ref 5–12)
NEUTROPHILS # BLD AUTO: 4.37 10*3/MM3 (ref 1.9–8.1)
NEUTROPHILS NFR BLD AUTO: 69.8 % (ref 42.7–76)
PLATELET # BLD AUTO: 202 10*3/MM3 (ref 140–500)
POTASSIUM SERPL-SCNC: 4.4 MMOL/L (ref 3.5–5.2)
PROT SERPL-MCNC: 6.4 G/DL (ref 6–8.5)
RBC # BLD AUTO: 3.78 10*6/MM3 (ref 4.6–6)
SODIUM SERPL-SCNC: 141 MMOL/L (ref 136–145)
T4 FREE SERPL-MCNC: 1.83 NG/DL (ref 0.93–1.7)
TRIGL SERPL-MCNC: 125 MG/DL (ref 0–150)
TSH SERPL DL<=0.005 MIU/L-ACNC: 0.81 MIU/ML (ref 0.27–4.2)
VLDLC SERPL CALC-MCNC: 25 MG/DL (ref 5–40)
WBC # BLD AUTO: 6.26 10*3/MM3 (ref 4.5–10.7)

## 2017-08-16 PROCEDURE — 99214 OFFICE O/P EST MOD 30 MIN: CPT | Performed by: FAMILY MEDICINE

## 2017-08-16 RX ORDER — CARBIDOPA/LEVODOPA 25MG-250MG
TABLET ORAL
COMMUNITY
Start: 2017-08-14 | End: 2018-02-23 | Stop reason: DRUGHIGH

## 2017-08-16 NOTE — PROGRESS NOTES
HPI  Rommel Gonzalez is a 72 y.o. male who is here for follow up of hypothyroidism and hyperlipidemia and other ongoing medical issues.  Also followed by cardiologist and neurologist.  Overall denies any new medical complaints.  Does have some balance issues most likely related to parkinsonism.      Review of Systems   Constitutional: Positive for activity change.   HENT: Positive for drooling and voice change.    Eyes: Positive for discharge.   Respiratory: Positive for apnea.    Cardiovascular:        Known atrial fibrillation on anticoagulation therapy   Gastrointestinal: Positive for constipation.   Genitourinary: Positive for frequency.   Neurological: Positive for tremors and speech difficulty.   Psychiatric/Behavioral: Positive for sleep disturbance.        PHQ9 noted with a total score of 5.  Main complaint is sleep difficulty.   All other systems reviewed and are negative.        Past Medical History:   Diagnosis Date   • AF (atrial fibrillation)    • Atrial flutter    • Atypical chest pain    • Chest pain    • Colon polyps    • Confusion    • Depression    • Disease of thyroid gland    • Disorder of thyroid    • Dyspnea and respiratory abnormalities    • Elevated TSH    • Fatigue    • Gait instability    • Hay fever    • Hyperlipidemia    • Hypertension    • Hypothyroidism    • Insomnia    • Measles    • Mumps    • Palpitations    • Parkinson disease    • Peripheral neuropathy    • Pneumonia    • Poor sleep pattern    • Slurred speech    • Tremor        No past surgical history on file.    Family History   Problem Relation Age of Onset   • Hypertension Mother    • Glaucoma Mother    • Throat cancer Father    • Alcohol abuse Father    • Hypertension Sister    • Cancer Sister      malignant neoplasm of urinary bladder   • Lung cancer Brother    • Heart attack Other    • Lung disease Other        Social History     Social History   • Marital status:      Spouse name: N/A   • Number of children: N/A   •  Years of education: N/A     Occupational History   • Not on file.     Social History Main Topics   • Smoking status: Former Smoker   • Smokeless tobacco: Not on file   • Alcohol use Yes      Comment: social use   • Drug use: No   • Sexual activity: No     Other Topics Concern   • Not on file     Social History Narrative         Physical Exam   Constitutional: He is oriented to person, place, and time. He appears well-developed and well-nourished.   HENT:   Head: Normocephalic.   Mouth/Throat: Oropharynx is clear and moist.   Eyes: Conjunctivae and EOM are normal. Pupils are equal, round, and reactive to light.   Bilateral cataracts noted and do recommend routine eye exam   Neck: Normal range of motion. Neck supple. No thyromegaly present.   Cardiovascular: Normal rate and regular rhythm.    Pulmonary/Chest: Effort normal and breath sounds normal.   Abdominal: Soft. He exhibits no distension and no mass. There is no tenderness.   Musculoskeletal: He exhibits no edema or deformity.   Neurological: He is alert and oriented to person, place, and time. He exhibits abnormal muscle tone. Coordination abnormal.   Skin: Skin is warm and dry.   Psychiatric: He has a normal mood and affect. Judgment and thought content normal. His speech is delayed. He is slowed. Cognition and memory are normal.   Nursing note and vitals reviewed.        Assessment/Plan    Rommel was seen today for hypertension.    Diagnoses and all orders for this visit:    High risk medication use  -     CBC & Differential  -     Comprehensive Metabolic Panel    Hypothyroidism (acquired)  -     TSH+Free T4    Idiopathic Parkinson's disease    Hyperlipidemia, unspecified hyperlipidemia type  -     Lipid Panel    Chronic atrial fibrillation    Anticoagulated    Gait instability  -     CBC & Differential    Mild single current episode of major depressive disorder    Essential hypertension  -     Comprehensive Metabolic Panel    CORINNE on CPAP    Insomnia,  unspecified type    Cataract cortical, senile, bilateral      Patient is here for routine follow-up of multiple medical problems some of which are noted above.  Overall seems to be stable on current regimen.  Also followed by cardiologist and neurologist and has obvious changes consistent with his parkinsonism.  Will get routine lab as noted above and adjust medications as indicated.  Otherwise follow-up in 6 months or as needed.  Do recommend routine eye exam.    This note includes information entered using a voice recognition dictation system.  Though reviewed, some nonsensible errors may remain.

## 2017-08-17 ENCOUNTER — TELEPHONE (OUTPATIENT)
Dept: CARDIOLOGY | Facility: CLINIC | Age: 72
End: 2017-08-17

## 2017-08-17 DIAGNOSIS — D64.9 ANEMIA, UNSPECIFIED TYPE: ICD-10-CM

## 2017-08-17 DIAGNOSIS — E03.9 HYPOTHYROIDISM (ACQUIRED): Primary | ICD-10-CM

## 2017-09-05 RX ORDER — CARVEDILOL 6.25 MG/1
9.38 TABLET ORAL 2 TIMES DAILY WITH MEALS
Qty: 180 TABLET | Refills: 1 | Status: SHIPPED | OUTPATIENT
Start: 2017-09-05 | End: 2017-09-07 | Stop reason: SDUPTHER

## 2017-09-06 ENCOUNTER — TELEPHONE (OUTPATIENT)
Dept: FAMILY MEDICINE CLINIC | Facility: CLINIC | Age: 72
End: 2017-09-06

## 2017-09-06 NOTE — TELEPHONE ENCOUNTER
PT IS REQUESTING A #90 DAY SUPPLY.    AMBIEN 5 MG.    Future appointment 02/14/2018.    Last O.V. 08/16/2017.  Lazaro 03/08/2017 (UPDATED).  Filled 08/09/2017.    Thank you.    ----- Message from Kip Kaur sent at 9/6/2017 11:23 AM EDT -----  Patient would like a 90 day supply verses 30 day supply of sleeping medication.       Thanks,  Katya

## 2017-09-07 ENCOUNTER — HOSPITAL ENCOUNTER (OUTPATIENT)
Dept: SLEEP MEDICINE | Facility: HOSPITAL | Age: 72
Discharge: HOME | End: 2017-09-07
Admitting: INTERNAL MEDICINE

## 2017-09-07 PROCEDURE — G0463 HOSPITAL OUTPT CLINIC VISIT: HCPCS

## 2017-09-07 RX ORDER — ZOLPIDEM TARTRATE 5 MG/1
5 TABLET ORAL NIGHTLY PRN
Qty: 90 TABLET | Refills: 1 | Status: SHIPPED | OUTPATIENT
Start: 2017-09-07 | End: 2017-11-01 | Stop reason: SDUPTHER

## 2017-09-07 RX ORDER — CARVEDILOL 6.25 MG/1
9.38 TABLET ORAL 2 TIMES DAILY WITH MEALS
Qty: 270 TABLET | Refills: 1 | Status: SHIPPED | OUTPATIENT
Start: 2017-09-07 | End: 2018-05-18 | Stop reason: SDUPTHER

## 2017-09-07 NOTE — PROGRESS NOTES
[facility]                          Chief Complaint:   Follow up for obstructive sleep apnea    History of present illness:   This is a 72-year-old male patient who was last seen on 17 when he was referred by Dr. Bennett for evaluation of CORINNE.  He had a home sleep test through her office showing moderate to severe CORINNE with SAUL 28.2 events per hour.    He is accompanied by his sister today.  On the last visit, I placed him on auto CPAP therapy.  He returns today for follow-up.  Since his last visit he was diagnosed with atypical Parkinson disease.    He has been using his CPAP every night.  He said that he takes it off sometimes in the middle of the night because the mask is to titrate his face.  He also reported being bothered by the change in the pressure.  He has noted no difference since his been started with CPAP therapy.  As a matter of fact, he reported waking up more often.  However, his sister said that his daytime sleepiness has improved    Sleep schedule:  -Bedtime: 9:30 PM  -Wake up time: 5 AM , does feel refreshed  -Nocturnal awakenin-3 times because of nocturia.  No difficulties going back to sleep.  -Perceived sleep hours: 7 hours    Mask: Air fit F 20 large fullface which does fit well.  He reported some air leak and some dry mouth  DME: [facility]    ESS: 11    Vital Signs:  BP = 130/75; HR = 88; weight = 190; height = 6 foot; SPO2 = 98%; neck size = 15.5'    Physical Exam:   General Appearance:    Alert, cooperative, in no acute distress   HEENT:  Krause score 4. No oral thrush. Tonsils grade 1. Narrow distance in between the posterior pharyngeal pillars (<25 %).    Lungs:     Clear to auscultation,respirations regular, even and                  unlabored    Heart:    Regular rhythm and normal rate, normal S1 and S2, no            murmur, no gallop, no rub, no click       Neuro:   Conscious, alert. Appropriate mood and  affect. Parkinson face.  She'll hold gait    Extremities:   Moves all extremities well, no edema, no cyanosis              Diagnostic data:  Home sleep apnea test was performed on: 4/27/17  AHI= 28.2/h; SHANNAN: 30/h  Jak SpO2: 69 %; Hypoxic burden: 75 minutes (15 %)    CPAP download showed:  Date: 8/8/17-9/6/17  Usage (days): 100 %  Days used>4h: 83 %  AHI: 25.7/h  Leak: 2 hours and 48 seconds  Usage: 5 h and 40 min  CPAP: Auto 5-18  P 90% = 10  Mean CPAP = 8.3      ASSESSMENT:  1) CORINNE, moderate to severe: Patient is very compliant with therapy and has minor symptomatic benefit.  However he has significant residual apnea with, predominantly hypopnia which can be explained by the large air leak..       PLAN:  Proceed with mask refitting.  Return to clinic in 1 month to evaluate effectiveness of therapy.  I advised him to continue using his CPAP in the meanwhile                EMR Dragon/Transcription disclaimer:   Much of this encounter note is an electronic transcription/translation of spoken language to printed text. The electronic translation of spoken language may permit erroneous, or at times, nonsensical words or phrases to be inadvertently transcribed; Although I have reviewed the note for such errors, some may still exist.

## 2017-09-11 ENCOUNTER — TELEPHONE (OUTPATIENT)
Dept: FAMILY MEDICINE CLINIC | Facility: CLINIC | Age: 72
End: 2017-09-11

## 2017-09-11 RX ORDER — DOXAZOSIN 2 MG/1
2 TABLET ORAL NIGHTLY
Qty: 90 TABLET | Refills: 3 | Status: SHIPPED | OUTPATIENT
Start: 2017-09-11 | End: 2018-05-07 | Stop reason: SDUPTHER

## 2017-09-11 NOTE — TELEPHONE ENCOUNTER
----- Message from Kyara Celis sent at 9/11/2017 11:30 AM EDT -----  PT REQUESTING 90 DAY SUPPLY    doxazosin (CARDURA) 2 MG tablet   Sig: Take 1 tablet by mouth Every Night.      Pharmacy:  [place] [address] [address] [place] AT [facility] [address] [facility] [phone] [facility] [phone] FX

## 2017-09-18 ENCOUNTER — RESULTS ENCOUNTER (OUTPATIENT)
Dept: FAMILY MEDICINE CLINIC | Facility: CLINIC | Age: 72
End: 2017-09-18

## 2017-09-18 DIAGNOSIS — E03.9 HYPOTHYROIDISM (ACQUIRED): ICD-10-CM

## 2017-09-18 DIAGNOSIS — D64.9 ANEMIA, UNSPECIFIED TYPE: ICD-10-CM

## 2017-10-26 ENCOUNTER — OFFICE VISIT (OUTPATIENT)
Dept: SLEEP MEDICINE | Facility: HOSPITAL | Age: 72
End: 2017-10-26
Attending: INTERNAL MEDICINE

## 2017-10-26 VITALS
HEART RATE: 93 BPM | WEIGHT: 189 LBS | SYSTOLIC BLOOD PRESSURE: 117 MMHG | OXYGEN SATURATION: 96 % | DIASTOLIC BLOOD PRESSURE: 74 MMHG | HEIGHT: 72 IN | BODY MASS INDEX: 25.6 KG/M2

## 2017-10-26 DIAGNOSIS — G47.33 OBSTRUCTIVE SLEEP APNEA, ADULT: Primary | ICD-10-CM

## 2017-10-26 DIAGNOSIS — G47.01 INSOMNIA DUE TO MEDICAL CONDITION: ICD-10-CM

## 2017-10-26 PROCEDURE — G0463 HOSPITAL OUTPT CLINIC VISIT: HCPCS

## 2017-10-26 NOTE — PROGRESS NOTES
[facility]                          Chief Complaint:   Follow up for obstructive sleep apnea    History of present illness:   Rommel is a 72-year-old male patient who was diagnosed with sleep apnea in April 2017 on home sleep test.  His AHI was 28.2.  He was started on auto CPAP therapy.  I last saw him on 9/7/17 when he was noted to be very complaint with therapy but had large air leak and residual apnea.   I recommended mask refitting.  He did end up getting a new mask.      He reported that he uses the CPAP every night. He takes the mask off his face on some nights because it bothers him and leaves the machine blowing.  He does hear it leaking sometimes, usually at the sites.  He said that they goes away when he adjust the mask and tighten the straps.  It does appear that he had some troubles adjusting the mask occasionally.  He said that one of the strap was off for 1 night and he noticed that the second day and ended up fixing it.  He said that he tosses and turns a lot in bed.    As previously reported, he was recently diagnosed with Parkinson disease.  Surprisingly, he can still take care of himself and does his usual activity at home.  He lives by himself.  He lives by himself.    Sleep schedule:  -Bedtime: 9 PM  -Sleep latency: Variable  -Wake up time: 6 AM , does feel refreshed  -Nocturnal awakening: 3 times because of nocturia.  No difficulties going back to sleep usually.    Mask: Resmed AirFit FFM which does fit well sometimes.  He reported significant air leak sometimes but denies dry mouth  DME: [facility]    ESS: Total score: 1     He reported difficulties falling asleep most of the night.  He uses melatonin over-the-counter and Ambien 5 mg.  He wakes up early and cannot go back to sleep.  His sleep is interrupted.  He denies significant mind racing.    REVIEW OF SYSTEMS:   HEENT: No nasal congestion or postnasal drip    CARDIOVASCULAR: No chest pain, chest pressure or chest discomfort. No palpitations or edema.   RESPIRATORY: No shortness of breath, cough or sputum.   GASTROINTESTINAL: No abdominal bloating or reflux   NEUROLOGICAL/PSYCHOATRY: No depression nor anxiety    Past Medical History:  Past Medical History:   Diagnosis Date   • AF (atrial fibrillation)    • Atrial flutter    • Atypical chest pain    • Chest pain    • Colon polyps    • Confusion    • Depression    • Disease of thyroid gland    • Disorder of thyroid    • Dyspnea and respiratory abnormalities    • Elevated TSH    • Fatigue    • Gait instability    • Hay fever    • Hyperlipidemia    • Hypertension    • Hypothyroidism    • Insomnia    • Measles    • Mumps    • Palpitations    • Parkinson disease    • Peripheral neuropathy    • Pneumonia    • Poor sleep pattern    • Slurred speech    • Tremor        Medication Review:     Current Outpatient Prescriptions:   •  bumetanide (BUMEX) 2 MG tablet, Tablet every Tuesday and Friday (Patient taking differently: 0.5 mg. Tablet every Tuesday and Friday), Disp: 30 tablet, Rfl: 3  •  carbidopa-levodopa (SINEMET)  MG per tablet, , Disp: , Rfl:   •  carvedilol (COREG) 6.25 MG tablet, Take 1.5 tablets by mouth 2 (Two) Times a Day With Meals., Disp: 270 tablet, Rfl: 1  •  doxazosin (CARDURA) 2 MG tablet, Take 1 tablet by mouth Every Night., Disp: 90 tablet, Rfl: 3  •  gabapentin (NEURONTIN) 300 MG capsule, TAKE ONE CAPSULE BY MOUTH TWO TIMES A DAY, Disp: 60 capsule, Rfl: 6  •  levothyroxine (SYNTHROID, LEVOTHROID) 112 MCG tablet, Take 1 tablet by mouth Daily., Disp: 90 tablet, Rfl: 3  •  lisinopril (PRINIVIL,ZESTRIL) 10 MG tablet, Take half tablet po q am and one tablet po q pm, Disp: 60 tablet, Rfl: 1  •  lovastatin (MEVACOR) 10 MG tablet, Take 1 tablet by mouth Every Other Day., Disp: 90 tablet, Rfl: 1  •  Multiple Vitamins-Minerals (MULTIVITAL) tablet, Take 1 tablet by mouth daily., Disp: , Rfl:   •  rivaroxaban  "(XARELTO) 20 MG tablet, Take 1 tablet by mouth Daily With Dinner., Disp: 90 tablet, Rfl: 1  •  zolpidem (AMBIEN) 5 MG tablet, Take 1 tablet by mouth At Night As Needed for Sleep., Disp: 90 tablet, Rfl: 1      Social History:    Vital Signs:  Vitals:    10/26/17 1251   BP: 117/74   Pulse: 93   SpO2: 96%   Weight: 189 lb (85.7 kg)   Height: 72\" (182.9 cm)     Body mass index is 25.63 kg/(m^2).    Physical Exam:   General Appearance:    Alert, cooperative, in no acute distress   HEENT: Krause score 4. No oral thrush. Tonsils grade 1. Narrow distance in between the posterior pharyngeal pillars (<25 %).    Lungs:     Clear to auscultation,respirations regular, even and                  unlabored    Heart:    Regular rhythm and normal rate, normal S1 and S2, no            murmur   Abdomen:     Soft abdomen.     Neuro:   Conscious, alert, oriented x3. Rigid face.  Shuffling gait.    Extremities:   Moves all extremities well, no edema, no cyanosis, no             Redness              Diagnostic data:  Home sleep apnea test was performed on: 4/27/17  AHI= 28.2/h; SHANNAN: 30/h  Jak SpO2: 69 %; Hypoxic burden: 75 minutes (15 %)    CPAP download showed:  Date: 9/26/17-10/25/17  Usage (days): 96 %  Days used>4h: 76 %  AHI: 26.9/h (hypoxia index = 15.5; OAI = 9.5)  Leak: 111 min and 56 seconds  Usage: 5 h and 27 min  P 90% = 9.3  Auto CPAP 5-18 centimeters H2O      ASSESSMENT:  Diagnoses and all orders for this visit:    Obstructive sleep apnea, adult    Insomnia due to medical condition    Parkinson's disease    PLAN:  -I asked the patient to ensure that the mask is tight when he goes to bed.  He continues to have large air leak and significant residual apnea which is predominantly related to the air leak and was persistent despite mask refitting.  If his problem persists, I will consider refitting again with a different masks such as nasal mask.    - I asked the patient to continue taking melatonin and increase the Ambien to " 10 mg at night.  He does not have significant daytime sleepiness and therefore I'm not concerned about that.  I would rather have him use extra dose of Ambien so he can stays asleep with the mask on to treat his sleep apnea which is likely severe.    -RTC in 3 month              EMR Dragon/Transcription disclaimer:   Much of this encounter note is an electronic transcription/translation of spoken language to printed text. The electronic translation of spoken language may permit erroneous, or at times, nonsensical words or phrases to be inadvertently transcribed; Although I have reviewed the note for such errors, some may still exist.

## 2017-11-01 ENCOUNTER — OFFICE VISIT (OUTPATIENT)
Dept: FAMILY MEDICINE CLINIC | Facility: CLINIC | Age: 72
End: 2017-11-01

## 2017-11-01 VITALS
BODY MASS INDEX: 25.33 KG/M2 | SYSTOLIC BLOOD PRESSURE: 110 MMHG | OXYGEN SATURATION: 98 % | WEIGHT: 187 LBS | HEART RATE: 96 BPM | HEIGHT: 72 IN | TEMPERATURE: 97.6 F | DIASTOLIC BLOOD PRESSURE: 64 MMHG

## 2017-11-01 DIAGNOSIS — D64.9 ANEMIA, UNSPECIFIED TYPE: ICD-10-CM

## 2017-11-01 DIAGNOSIS — G20 IDIOPATHIC PARKINSON'S DISEASE (HCC): ICD-10-CM

## 2017-11-01 DIAGNOSIS — Z99.89 OSA ON CPAP: Primary | ICD-10-CM

## 2017-11-01 DIAGNOSIS — E03.9 HYPOTHYROIDISM (ACQUIRED): ICD-10-CM

## 2017-11-01 DIAGNOSIS — R29.810 FACIAL DROOP: ICD-10-CM

## 2017-11-01 DIAGNOSIS — Z79.01 ANTICOAGULATED: ICD-10-CM

## 2017-11-01 DIAGNOSIS — I10 ESSENTIAL HYPERTENSION: ICD-10-CM

## 2017-11-01 DIAGNOSIS — R26.81 GAIT INSTABILITY: ICD-10-CM

## 2017-11-01 DIAGNOSIS — G47.33 OSA ON CPAP: Primary | ICD-10-CM

## 2017-11-01 DIAGNOSIS — Z23 IMMUNIZATION DUE: ICD-10-CM

## 2017-11-01 DIAGNOSIS — I48.20 CHRONIC ATRIAL FIBRILLATION (HCC): ICD-10-CM

## 2017-11-01 LAB
BASOPHILS # BLD AUTO: 0.01 10*3/MM3 (ref 0–0.2)
BASOPHILS NFR BLD AUTO: 0.2 % (ref 0–1.5)
EOSINOPHIL # BLD AUTO: 0.14 10*3/MM3 (ref 0–0.7)
EOSINOPHIL NFR BLD AUTO: 2.4 % (ref 0.3–6.2)
ERYTHROCYTE [DISTWIDTH] IN BLOOD BY AUTOMATED COUNT: 13 % (ref 11.5–14.5)
HCT VFR BLD AUTO: 40.1 % (ref 40.4–52.2)
HGB BLD-MCNC: 12.8 G/DL (ref 13.7–17.6)
IMM GRANULOCYTES # BLD: 0 10*3/MM3 (ref 0–0.03)
IMM GRANULOCYTES NFR BLD: 0 % (ref 0–0.5)
LYMPHOCYTES # BLD AUTO: 1.92 10*3/MM3 (ref 0.9–4.8)
LYMPHOCYTES NFR BLD AUTO: 33 % (ref 19.6–45.3)
MCH RBC QN AUTO: 32.1 PG (ref 27–32.7)
MCHC RBC AUTO-ENTMCNC: 31.9 G/DL (ref 32.6–36.4)
MCV RBC AUTO: 100.5 FL (ref 79.8–96.2)
MONOCYTES # BLD AUTO: 0.47 10*3/MM3 (ref 0.2–1.2)
MONOCYTES NFR BLD AUTO: 8.1 % (ref 5–12)
NEUTROPHILS # BLD AUTO: 3.27 10*3/MM3 (ref 1.9–8.1)
NEUTROPHILS NFR BLD AUTO: 56.3 % (ref 42.7–76)
PLATELET # BLD AUTO: 228 10*3/MM3 (ref 140–500)
RBC # BLD AUTO: 3.99 10*6/MM3 (ref 4.6–6)
T4 FREE SERPL-MCNC: 1.7 NG/DL (ref 0.93–1.7)
TSH SERPL DL<=0.005 MIU/L-ACNC: 1.39 MIU/ML (ref 0.27–4.2)
WBC # BLD AUTO: 5.81 10*3/MM3 (ref 4.5–10.7)

## 2017-11-01 PROCEDURE — 90662 IIV NO PRSV INCREASED AG IM: CPT | Performed by: FAMILY MEDICINE

## 2017-11-01 PROCEDURE — 90471 IMMUNIZATION ADMIN: CPT | Performed by: FAMILY MEDICINE

## 2017-11-01 PROCEDURE — 99214 OFFICE O/P EST MOD 30 MIN: CPT | Performed by: FAMILY MEDICINE

## 2017-11-01 RX ORDER — ZOLPIDEM TARTRATE 10 MG/1
10 TABLET ORAL NIGHTLY PRN
Qty: 30 TABLET | Refills: 5 | Status: SHIPPED | OUTPATIENT
Start: 2017-11-01 | End: 2017-11-01 | Stop reason: SDUPTHER

## 2017-11-01 RX ORDER — ZOLPIDEM TARTRATE 10 MG/1
10 TABLET ORAL NIGHTLY PRN
Qty: 90 TABLET | Refills: 1 | Status: SHIPPED | OUTPATIENT
Start: 2017-11-01 | End: 2018-03-19 | Stop reason: HOSPADM

## 2017-11-01 NOTE — PROGRESS NOTES
HPI  Rommel Gonzalez is a 72 y.o. male who is here for follow up of multiple medical problems some of which are noted below.  Previously diagnosed with parkinsonism which has led to instability and frequent falls.  Has been evaluated by sleep specialist and started on CPAP machine.  Continues to complain of sleep difficulties which she says he has had his whole life.  Previously was on Ambien 10 mg but was decreased to 5 mg because of age and risks of falls.  This was discussed with patient as well as into is with him.  Basically feel his falling difficulties is related to his neurological disorder and has nothing to do with his sleep medicine.  However potential risks again discussed especially since he is on anticoagulation therapy for chronic atrial fibrillation.  Reportedly has a new neurologist and is being weaned off levodopa because neurologist feels he has more of a supranuclear palsy instead of parkinsonism?  Is also was discussed.  Patient has long-term speech difficulties.  Discussed immunization updates.  Did get Prevnar last year and thinks he probably got Pneumovax several years ago but as usual despite investigating to previous computer systems as usual  this cannot be verified?      Review of Systems   HENT: Positive for drooling.    Respiratory: Positive for apnea.    Gastrointestinal: Positive for constipation.   Musculoskeletal: Positive for gait problem.   Neurological: Positive for speech difficulty.   Psychiatric/Behavioral: Positive for agitation and sleep disturbance.   All other systems reviewed and are negative.        Past Medical History:   Diagnosis Date   • AF (atrial fibrillation)    • Atrial flutter    • Atypical chest pain    • Chest pain    • Colon polyps    • Confusion    • Depression    • Disease of thyroid gland    • Disorder of thyroid    • Dyspnea and respiratory abnormalities    • Elevated TSH    • Fatigue    • Gait instability    • Hay fever    • Hyperlipidemia    •  Hypertension    • Hypothyroidism    • Insomnia    • Measles    • Mumps    • Palpitations    • Parkinson disease    • Peripheral neuropathy    • Pneumonia    • Poor sleep pattern    • Slurred speech    • Tremor        No past surgical history on file.    Family History   Problem Relation Age of Onset   • Hypertension Mother    • Glaucoma Mother    • Throat cancer Father    • Alcohol abuse Father    • Hypertension Sister    • Cancer Sister      malignant neoplasm of urinary bladder   • Lung cancer Brother    • Heart attack Other    • Lung disease Other        Social History     Social History   • Marital status:      Spouse name: N/A   • Number of children: N/A   • Years of education: N/A     Occupational History   • Not on file.     Social History Main Topics   • Smoking status: Former Smoker   • Smokeless tobacco: Not on file   • Alcohol use Yes      Comment: social use   • Drug use: No   • Sexual activity: No     Other Topics Concern   • Not on file     Social History Narrative         Physical Exam   Constitutional: He appears well-developed and well-nourished. No distress.   HENT:   Head: Normocephalic.   Mouth/Throat: Oropharynx is clear and moist.   Eyes: EOM are normal. Pupils are equal, round, and reactive to light.   Cardiovascular: Normal rate.  An irregularly irregular rhythm present.   Pulmonary/Chest: Effort normal. No respiratory distress.   Musculoskeletal: He exhibits no edema or deformity.   Neurological: He is alert. Coordination abnormal.   Very flat facial features.  Very soft voice   Skin: Skin is warm and dry.   Psychiatric: Judgment and thought content normal. His affect is blunt. He is slowed. He is not agitated, not aggressive, not hyperactive, not withdrawn and not combative. Cognition and memory are normal.   Vitals reviewed.        Assessment/Plan    Rommel was seen today for insomnia and injections.    Diagnoses and all orders for this visit:    CORINNE on CPAP    Idiopathic Parkinson's  disease    Gait instability    Facial droop    Hypothyroidism (acquired)  -     TSH+Free T4    Anticoagulated    Anemia, unspecified type  -     CBC & Differential    Chronic atrial fibrillation    Essential hypertension    Other orders  -     Discontinue: zolpidem (AMBIEN) 10 MG tablet; Take 1 tablet by mouth At Night As Needed for Sleep.  -     zolpidem (AMBIEN) 10 MG tablet; Take 1 tablet by mouth At Night As Needed for Sleep.        Patient with multiple ongoing medical issues some of which are noted above.  Discussed risks versus benefits of trial of increased dose of Ambien to help with sleep.  Do not really feel this will significantly increase his risk of falling as discussed above.  Will recheck blood count which was slightly decreased last visit.  Also free T4 was slightly elevated and will recheck and may need to decrease thyroid supplement?  The medication adjustments per new neurologist.  Will follow-up in 3 months or as needed.    .fq

## 2017-11-03 RX ORDER — LOVASTATIN 10 MG/1
10 TABLET ORAL EVERY OTHER DAY
Qty: 90 TABLET | Refills: 1 | Status: SHIPPED | OUTPATIENT
Start: 2017-11-03 | End: 2018-03-19 | Stop reason: HOSPADM

## 2017-11-09 ENCOUNTER — TELEPHONE (OUTPATIENT)
Dept: CARDIOLOGY | Facility: CLINIC | Age: 72
End: 2017-11-09

## 2017-11-09 NOTE — TELEPHONE ENCOUNTER
Dr Bennett is out until Monday.    Pt takes Bumex 2mg on Tuesday and Friday.  Pt has small amt of edema on the tops of his feet.  Pt's sister (Raquel) @ [phone] want to know if they can go ahead and take extra Bumex today?  Please advise.  Thank you!

## 2017-12-19 RX ORDER — LISINOPRIL 10 MG/1
TABLET ORAL
Qty: 60 TABLET | Refills: 3 | Status: SHIPPED | OUTPATIENT
Start: 2017-12-19 | End: 2018-03-07

## 2018-01-11 ENCOUNTER — OFFICE VISIT (OUTPATIENT)
Dept: SLEEP MEDICINE | Facility: HOSPITAL | Age: 73
End: 2018-01-11
Attending: INTERNAL MEDICINE

## 2018-01-11 VITALS
BODY MASS INDEX: 26.34 KG/M2 | DIASTOLIC BLOOD PRESSURE: 67 MMHG | SYSTOLIC BLOOD PRESSURE: 106 MMHG | HEIGHT: 70 IN | HEART RATE: 86 BPM | WEIGHT: 184 LBS

## 2018-01-11 DIAGNOSIS — G47.33 OBSTRUCTIVE SLEEP APNEA, ADULT: Primary | ICD-10-CM

## 2018-01-11 PROCEDURE — G0463 HOSPITAL OUTPT CLINIC VISIT: HCPCS

## 2018-01-11 NOTE — PROGRESS NOTES
[facility]                          Chief Complaint:   Follow up for obstructive sleep apnea    History of present illness:   Rommel is a 72-year-old male patient who was diagnosed with sleep apnea in 2017 on home sleep test.  His AHI was 28.2.  He was started on auto CPAP therapy.  I last saw him on 10/26/17 when he was noted to have significant air leak and poor compliance.  At that time, we discussed that the mask should be tightened enough and I asked his sister to help him with that.  He was also noted to have some insomnia and we increased his Ambien to 10 mg in addition to the melatonin.    This the clinic today for follow-up.  He continues to report poor sleep with frequent awakening.  It seems like he was taken off melatonin by another specialist due to falls.  Concerning his sleep apnea, he reported that he unintentionally removes the mask in the middle of the night.  He denies pressure intolerance or significant air leak.  However he also reported that the mask bother him sometimes and therefore he removes it and goes back to sleep.    Sleep schedule:  -Bedtime: 9 PM  -Wake up time: 5 AM   -Nocturnal awakenin times because of unclear reason.     Mask: Resmed AirFit FFM which does fit well sometimes.    DME: [facility]    ESS: Total score: 0         Medication Review:     Current Outpatient Prescriptions:   •  bumetanide (BUMEX) 2 MG tablet, Tablet every Tuesday and Friday (Patient taking differently: 0.5 mg. Tablet every Tuesday and Friday), Disp: 30 tablet, Rfl: 3  •  carbidopa-levodopa (SINEMET)  MG per tablet, , Disp: , Rfl:   •  carvedilol (COREG) 6.25 MG tablet, Take 1.5 tablets by mouth 2 (Two) Times a Day With Meals., Disp: 270 tablet, Rfl: 1  •  doxazosin (CARDURA) 2 MG tablet, Take 1 tablet by mouth Every Night., Disp: 90 tablet, Rfl: 3  •  levothyroxine (SYNTHROID, LEVOTHROID) 112 MCG tablet, Take 1 tablet by  "mouth Daily., Disp: 90 tablet, Rfl: 3  •  lisinopril (PRINIVIL,ZESTRIL) 10 MG tablet, TAKE ONE-HALF TABLET BY MOUTH EVERY MORNING AND TAKE ONE TABLET BY MOUTH EVERY EVENING, Disp: 60 tablet, Rfl: 3  •  lovastatin (MEVACOR) 10 MG tablet, Take 1 tablet by mouth Every Other Day., Disp: 90 tablet, Rfl: 1  •  Multiple Vitamins-Minerals (MULTIVITAL) tablet, Take 1 tablet by mouth daily., Disp: , Rfl:   •  rivaroxaban (XARELTO) 20 MG tablet, Take 1 tablet by mouth Daily With Dinner., Disp: 90 tablet, Rfl: 1  •  zolpidem (AMBIEN) 10 MG tablet, Take 1 tablet by mouth At Night As Needed for Sleep., Disp: 90 tablet, Rfl: 1      Social History:    Vital Signs:  Vitals:    01/11/18 1300   BP: 106/67   Pulse: 86   Weight: 83.5 kg (184 lb)   Height: 177.8 cm (70\")     Body mass index is 26.4 kg/(m^2).          Physical Exam:   General Appearance:    Alert, cooperative, in no acute distress   HEENT: Krause score 4. No oral thrush. Tonsils grade 1. Narrow distance in between the posterior pharyngeal pillars (<25 %).    Lungs:     Clear to auscultation,respirations regular, even and                  unlabored    Heart:    Regular rhythm and normal rate, normal S1 and S2, no            murmur   Abdomen:     Soft abdomen.     Neuro:   Conscious, alert, oriented x3. Rigid face.  Shuffling gait.    Extremities:   Moves all extremities well, no edema, no cyanosis, no             Redness           Diagnostic data:  Home sleep apnea test was performed on: 4/27/17  AHI= 28.2/h; SHANNAN: 30/h  Jak SpO2: 69 %; Hypoxic burden: 75 minutes (15 %)    CPAP download showed:  Date: 12/12/17-1/10/18  Usage (days): 93 %  Days used>4h: 30 %  AHI: 26.2/h (OAI=5.5; CA=1.1)  Leak: 1 hour and 45 minutes   Usage: 3 h and 30 min  P 90% : 8.5  Auto CPAP: 5-18      ASSESSMENT:    Diagnoses and all orders for this visit:    Obstructive sleep apnea, adult      PLAN:    We discussed the result of the download.  Unfortunately continues to have significant air leak " and residual apnea.  It's very difficult to assess the reason off poor compliance but it's probably multifactorial, related to sleep disturbance and insomnia from Parkinson disease in addition to difficulty setting up his equipment and probably underlying central apnea or may be inadequate pressure.  Therefore, I will proceed with in lab CPAP titration.              EMR Dragon/Transcription disclaimer:   Much of this encounter note is an electronic transcription/translation of spoken language to printed text. The electronic translation of spoken language may permit erroneous, or at times, nonsensical words or phrases to be inadvertently transcribed; Although I have reviewed the note for such errors, some may still exist.

## 2018-01-19 ENCOUNTER — TRANSCRIBE ORDERS (OUTPATIENT)
Dept: PHYSICAL THERAPY | Facility: HOSPITAL | Age: 73
End: 2018-01-19

## 2018-01-19 DIAGNOSIS — G24.1 GENERALIZED TORSION DYSTONIA: Primary | ICD-10-CM
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
## 2018-01-19 DIAGNOSIS — R47.1 DYSARTHRIA: ICD-10-CM

## 2018-01-24 ENCOUNTER — HOSPITAL ENCOUNTER (OUTPATIENT)
Dept: OCCUPATIONAL THERAPY | Facility: HOSPITAL | Age: 73
Setting detail: THERAPIES SERIES
Discharge: HOME | End: 2018-01-24

## 2018-01-24 ENCOUNTER — HOSPITAL ENCOUNTER (OUTPATIENT)
Dept: SPEECH THERAPY | Facility: HOSPITAL | Age: 73
Setting detail: THERAPIES SERIES
Discharge: HOME | End: 2018-01-24

## 2018-01-24 ENCOUNTER — HOSPITAL ENCOUNTER (OUTPATIENT)
Dept: PHYSICAL THERAPY | Facility: HOSPITAL | Age: 73
Setting detail: THERAPIES SERIES
Discharge: HOME | End: 2018-01-24

## 2018-01-24 DIAGNOSIS — G20 ATYPICAL PARKINSONISM (HCC): Primary | ICD-10-CM

## 2018-01-24 DIAGNOSIS — R27.8 LOSS OF COORDINATION: ICD-10-CM

## 2018-01-24 DIAGNOSIS — R26.89 FUNCTIONAL GAIT ABNORMALITY: ICD-10-CM

## 2018-01-24 DIAGNOSIS — R29.898 WEAKNESS OF UPPER EXTREMITY: ICD-10-CM

## 2018-01-24 DIAGNOSIS — R47.1 DYSARTHRIA: ICD-10-CM

## 2018-01-24 DIAGNOSIS — R49.0 DYSPHONIA: Primary | ICD-10-CM

## 2018-01-24 PROCEDURE — G9171 VOICE CURRENT STATUS: HCPCS

## 2018-01-24 PROCEDURE — 97166 OT EVAL MOD COMPLEX 45 MIN: CPT | Performed by: OCCUPATIONAL THERAPIST

## 2018-01-24 PROCEDURE — 92522 EVALUATE SPEECH PRODUCTION: CPT

## 2018-01-24 PROCEDURE — G8979 MOBILITY GOAL STATUS: HCPCS

## 2018-01-24 PROCEDURE — G8988 SELF CARE GOAL STATUS: HCPCS | Performed by: OCCUPATIONAL THERAPIST

## 2018-01-24 PROCEDURE — G8987 SELF CARE CURRENT STATUS: HCPCS | Performed by: OCCUPATIONAL THERAPIST

## 2018-01-24 PROCEDURE — G9172 VOICE GOAL STATUS: HCPCS

## 2018-01-24 PROCEDURE — G8978 MOBILITY CURRENT STATUS: HCPCS

## 2018-01-24 PROCEDURE — G8999 MOTOR SPEECH CURRENT STATUS: HCPCS

## 2018-01-24 PROCEDURE — 97162 PT EVAL MOD COMPLEX 30 MIN: CPT

## 2018-01-24 PROCEDURE — G9186 MOTOR SPEECH GOAL STATUS: HCPCS

## 2018-01-24 NOTE — THERAPY EVALUATION
Outpatient Speech Language Pathology   Adult Speech  Initial Evaluation  [facility]     Patient Name: Rommel Gonzalez  : 1945  MRN: [id number]  Today's Date: 2018        Visit Date: 2018   Patient Active Problem List   Diagnosis   • AF (atrial fibrillation)   • Depression   • Gait instability   • Hypertension   • Insomnia   • Idiopathic Parkinson's disease   • Peripheral neuropathy   • Dysarthria   • Atrial flutter   • Anticoagulated   • Health care maintenance   • Hypothyroidism (acquired)   • Weakness of voice   • Abnormal chest CT   • Facial droop   • Hypersomnia    • Hyperlipidemia   • High risk medication use   • CORINNE on CPAP   • Obstructive sleep apnea, adult        Past Medical History:   Diagnosis Date   • AF (atrial fibrillation)    • Atrial flutter    • Atypical chest pain    • Chest pain    • Colon polyps    • Confusion    • Depression    • Disease of thyroid gland    • Disorder of thyroid    • Dyspnea and respiratory abnormalities    • Elevated TSH    • Fatigue    • Gait instability    • Hay fever    • Hyperlipidemia    • Hypertension    • Hypothyroidism    • Insomnia    • Measles    • Mumps    • Palpitations    • Parkinson disease    • Peripheral neuropathy    • Pneumonia    • Poor sleep pattern    • Slurred speech    • Tremor         No past surgical history on file.      Visit Dx:    ICD-10-CM ICD-9-CM   1. Dysphonia R49.0 784.42   2. Dysarthria R47.1 784.51             Patient History       18 1500 18 1348 18 1245    History    Chief Complaint Difficulty with daily activities;Fatigue/poor endurance;Muscle weakness  -SO Balance Problems;Difficulty Walking;Difficulty with daily activities;Falls/history of falls  -TORREY Other 1 (comment)   Speech; Difficulty being understood  -SA    Date Current Problem(s) Began 18   OT order  -SO 18  -TORREY 18  -SA    Brief Description of Current Complaint Pt reports decrease in UE strength and coordination and  requiring increased time for ADLs.  -SO Pt reports he is having difficulty with transfers, ambulation and balance but transfers are most concern of pt. He reports he falls back into chair frequently. Pt referred to PT to work on improving his mobility.   -TORREY Pt reports people don't understand him; he avoids people d/t his voice; runs out of air when he talks and he feels incompetant  -SA    Previous treatment for THIS PROBLEM   Other (comment)   Speech therapy last year  -SA    Onset Date- PT  1/24/2018  -TORREY     Onset Date- OT 1/24/18  -SO      Onset Date- SLP   7/26/16  -SA    Patient/Caregiver Goals Improve strength  -SO Improve strength;Improve mobility  -TORREY Improve strength;Other (comment)   Improve communication  -SA    Patient's Rating of General Health Good  -SO      Hand Dominance right-handed  -SO right-handed  -TORREY right-handed  -SA    Pain     Pain Location  Foot   left great toe   -TORREY     Pain at Present  2  -TORREY 0  -SA    [place] FACES Pain Rating  0  -SO      Fall Risk Assessment    Any falls in the past year: Yes  -SO Yes   pt reports frequently bumps into walls at home, without fall  -TORREY Yes  -SA    Number of falls reported in the last 12 months --   3-4  -SO      Factors that contributed to the fall: Lost balance  -SO Lost balance  -TORREY     Does patient have a fear of falling Yes (comment)  -SO      Services    Prior Rehab/Home Health Experiences   Yes  -SA    When was the prior experience with Rehab/Home Health   7/26/16   through 5/2/2017  -SA    Where was the prior experience with Rehab/Home Health   [facility]  -SA    Daily Activities    Primary Language English  -SO  English  -SA    Teaching needs identified Home Exercise Program;Management of Condition;Falls Prevention;Home Safety  -SO  Management of Condition;Home Exercise Program  -SA    Patient is concerned about/has problems with Coordination;Difficulty with self care (i.e. bathing, dressing, toileting:;Repetitive  movements of the hand, arm, shoulder;Transfers (getting out of a chair, bed);Writing/grasping items with hand(s)  -SO  Other (comment)   Communicating w/ others  -SA    Barriers to learning None  -SO  None  -SA    Recommended Referrals Physical Therapy;Speech Therapy  -SO      Pt Participated in POC and Goals Yes  -SO  Yes  -SA    Safety    Are you being hurt, hit, or frightened by anyone at home or in your life? No  -SO  No  -SA    Are you being neglected by a caregiver No  -SO  No  -SA      User Key  (r) = Recorded By, (t) = Taken By, (c) = Cosigned By    Initials Name Provider Type    SO Kyara Lo, OTR Occupational Therapist    TORREY Babin, PT Physical Therapist    SA Lisa Paul MS CCC-SLP Speech and Language Pathologist                Adult Speech Language - 01/24/18 1600     Background and History    Reason for Referral dysarthria  -SA    Description of Complaint People don't understand what pt says  -SA    Previous Functional Status Connected speech was dysarthric or apraxic but intelligible;Connected speech was difficult to understand  -SA    Informant for the Evaluation Self;Other (comment)   Sister  -SA    AMR's and SMR's    Alternate Motion Rates rate slow;accuracy imprecise;rhythm irregular  -SA    Sequential Motion Rates rhythm regular;accuracy imprecise;rate normal  -SA    Dysarthria- Informal Assessment    Respiratory Support Effect on Speech reduced overall loudness  -SA    Reduced Overall Loudness Severe  -SA    Tasks Used to Assess Respiratory Support Effect connected speech;reading;counting;vowel prolongation  -SA    Respiratory Comments Thoracic breathing; Observation w/ breathing rate per minute was 12 which is on the low average.   -SA    Phonation Effects on Speech breathy;hoarse;loudness decay;low pitch;monopitch  -SA    Breathy Mild  -SA    Hoarse Severe  -SA    Monopitch Severe  -SA    Low Pitch Moderate  -SA    Loudness Decay Severe  -SA    Tasks Used to Assess  Phonation reading;connected speech  -SA    Phonation Comments Maximum phonation time is reduced; s/z ratio: 3.23 which indicates a possible vocal fold pathology; he is able to complete pitch glide of vowels adequately re: ability and pitch range; however, pitch control had a change in voice quality and pt used glottal hickman  -SA    Articulation Effects on Speech imprecise consonants;weak pressure consonants  -SA    Imprecise Consonants Moderate  -SA    Weak Pressure Consonants Moderate  -SA    Tasks Used to Assess Articulation reading;connected speech  -SA    Articulation Comments Pt w/ long phrase length and increased MLU w/o appropriate breath support which decreases intelligibility  -SA    Resonance Effects on Speech WFL  -SA    Tasks Used to Assess Resonance nasal laden stimuli;non-nasal stimuli;connected speech  -SA    Prosody Effects on Speech stress;intonation word level  -SA    Stress excess and equal  -SA    Intonation Word Level abnormal pattern;other (comment)   unable  -SA    Intonation in Speech abnormal pattern  -SA    Tasks Used to Assess Prosody reading;connected speech  -SA    Prosody Comments Pt attempted to increase loudness instead of prosody  -SA    Intelligibility of Acoustic Signal    Easily Understood By: no one  -SA    Comprehensibility of Message    Message is Usually Comprehensible To: family/caregiver  -SA    Uses Strategies to Improve Comprehensibility not at all  -SA    When Strategies are Used, Message is Comprehensible To: examiner  -SA    Efficiency of Verbal Communication    Verbal Messages are: take longer than usual;do not sound natural  -SA    Environmental Factors that Improve Comprehensibility background noise;proximity of speaker to listener;other (comment)   use of a voice amplification system (pt brought his [employer])  -SA    Type of Dysarthria    Type of Dysarthria Mixed Dysarthria  -SA    Standardized Tests    Dysarthria Tests Other   [place] Dysarthria Assessment Tool  -SA     Other Dysarthria Standardized Tests    Other Dysarthria Test Used [place] Dysarthria Assessment Tool  -    Other Dysarthria Test Comments Also used the Voice Handicap Index. Pt achieved an overall score of 104 which has a severity rating of Severe.  -SA      User Key  (r) = Recorded By, (t) = Taken By, (c) = Cosigned By    Initials Name Provider Type     Lisa Paul, MS CCC-SLP Speech and Language Pathologist                               OP SLP Education       01/24/18 1731    Education    Barriers to Learning No barriers identified  -    Education Provided Described results of evaluation;Family/caregivers participated in establishing goals and treatment plan;Patient requires further education on strategies, risks  -    Assessed Learning needs;Learning motivation;Learning preferences;Learning readiness  -    Learning Motivation Other (comment)   Unsure at this time; pt did state he was hesitant to do the homework d/t people might hear him   -    Learning Method Explanation  -    Teaching Response Reinforcement needed  -      User Key  (r) = Recorded By, (t) = Taken By, (c) = Cosigned By    Initials Name Effective Dates    SA Gonzalez Paul, MS CCC-SLP 07/25/17 -                 SLP OP Goals       01/24/18 1700 01/24/18 1600    Goal Type Needed    Goal Type Needed Dysarthria;Voice  -SA     Subjective Pain    Able to rate subjective pain? yes  -SA     Pre-Treatment Pain Level 0  -SA     Post-Treatment Pain Level 0  -SA     Dysarthria Goals     Dysarthria LTG's Patient will be able to engage in speech at the conversational level with maximum articulatory accuracy so that speech is understood by familiar /unfamiliar listeners  -SA     Patient will be able to engage in speech at the conversational level with maximum articulatory accuracy so that speech is understood by familiar /unfamiliar listeners 90%:;without cues  -SA     Status: Patient will be able to engage in speech at the conversational level  with maximum articulatory accuracy so that speech is understood by familiar /unfamiliar listeners New  -SA     Patient will be able to communicate a message that is comprehensible to familiar/unfamiliar listeners utilizing verbal speech and augmentative strategies 90%:;without cues  -SA     Status: Patient will be able to communicate a message that is comprehensible to familiar/unfamiliar listeners utilizing verbal speech and augmentative strategies New  -SA      Dysarthria STG's Patient will compensate for reduced respiratory support for speech by deeper inhalation before beginning an utterance;Patient will improve respiratory support and the use of respiration for speech through use of increased maximum inhalation as measured by incentive spirometry;Patient will improve comprehensibility of verbal messages by speaking at an appropriate vocal intensity;Patient will apply compensatory strategies to improve intelligibility of speech during in spontaneous speech;Patient will maximize use of phonation to improve communication skills by using high phonatory effort in sentences;Patient will increase strength and power  of articulators so they can move more quickly and accurately to improve speech intelligibility by  -SA     Patient will improve respiratory support and the use of respiration for speech through use of increased maximum inhalation as measured by incentive spirometry without cues;90%:  -SA     Status: Patient will improve respiratory support and the use of respiration for speech through use of increased maximum inhalation as measured by incentive spirometry New  -SA     Patient will improve comprehensibility of verbal messages by speaking at an appropriate vocal intensity 90%:;without cues  -SA     Status: Patient will improve comprehensibility of verbal messages by speaking at an appropriate vocal intensity New  -SA     Patient will compensate for reduced respiratory support for speech by deeper inhalation  "before beginning an utterance 90%:;without cues  -SA     Status: Patient will compensate for reduced respiratory support for speech by deeper inhalation before beginning an utterance New  -SA     Patient will maximize use of phonation to improve communication skills by using high phonatory effort in sentences 90%:;without cues  -SA     Status: Patient will maximize use of phonation to improve communication skills by using high phonatory effort in sentences New  -SA     Patient will apply compensatory strategies to improve intelligibility of speech during in spontaneous speech 90%:;without cues  -SA     Status: Patient will apply compensatory strategies to improve intelligibility of speech during in spontaneous speech New  -SA     \"Patient will increase strength and power  of articulators so they can move more quickly and accurately to improve speech intelligibility by completing lip exercises\" 90%:;without cues  -SA     Status: Patient will increase strength and power  of articulators so they can move more quickly and accurately to improve speech intelligibility by completing lip exercises New  -SA     Voice Goals    Voice LTG's Patient will utilize proprioceptive and auditory self-monitoring skills for adopting functional vocal behaviors in all vocational and avocational activities  -SA     Patient will utilize proprioceptive and auditory self-monitoring skills for adopting functional vocal behaviors in all vocational and avocational activities 90%:;without cues  -SA     Status: Patient will utilize proprioceptive and auditory self-monitoring skills for adopting functional vocal behaviors in all vocational and avocational activities New  -SA     Patient will be able to produce his/her optimal pitch 90%:;without cues  -SA     Status: Patient will be able to produce his/her optimal pitch New  -SA     Patient will be able to use functional phonation 90%:;without cues  -SA     Status: Patient will be able to use " functional phonation New  -SA     SLP Time Calculation    SLP Goal Re-Cert Due Date 04/24/18  -     Time Calculation    PT Goal Re-Cert Due Date  02/21/18  -      User Key  (r) = Recorded By, (t) = Taken By, (c) = Cosigned By    Initials Name Provider Type    TORREY Babin, PT Physical Therapist     Lisa Paul MS CCC-SLP Speech and Language Pathologist                OP SLP Assessment/Plan - 01/24/18 1729     SLP Assessment    Functional Problems Speech Language- Adult/Cognition  -SA    Impact on Function: Adult Speech Language/Cognition Difficulty communicating wants, needs, and ideas;Difficulty communicating in a medical emergency;Restrictions in personal and social life  -SA    Clinical Impression: Speech Language-Adult/Congnition Other (comment)   Dysarthria-Mixed  -SA    Please refer to paper survey for additional self-reported information Yes  -SA    Please refer to items scanned into chart for additional diagnostic informaiton and handouts as provided by clinician Yes  -SA    SLP Diagnosis Severe Dysarthria  -SA    Prognosis Fair (comment)   depending on pt motivation  -SA    Patient/caregiver participated in establishment of treatment plan and goals Yes  -SA    Patient would benefit from skilled therapy intervention Yes  -SA    SLP Plan    Frequency 2 times a week  -    Duration 6-9 months  -SA    Planned CPT's? SLP SPEECH SOUND PROD EVAL: 61999;SLP INDIVIDUAL SPEECH THERAPY: 37839  -    Expected Duration Therapy Session (min) 45-60 minutes  -      User Key  (r) = Recorded By, (t) = Taken By, (c) = Cosigned By    Initials Name Provider Type    SA Lisa Paul MS CCC-SLP Speech and Language Pathologist             SLP Outcome Measures (last 72 hours)      SLP Outcome Measures       01/24/18 1700          SLP Outcome Measures    Outcome Measure Used? Adult NOMS  -SA      FCM Scores    FCM Chosen Voice;Motor Speech  -SA      Motor Speech FCM Score 3  -SA      Voice FCM Score 2  -SA         User Key  (r) = Recorded By, (t) = Taken By, (c) = Cosigned By    Initials Name Effective Dates    SA Lisa Paul MS CCC-SLP 07/25/17 -              Time Calculation:   SLP Start Time: 1245  SLP Stop Time: 1345  SLP Time Calculation (min): 60 min  SLP Non-Billable Time (min): 60 min    Therapy Charges for Today     Code Description Service Date Service Provider Modifiers Qty    77860974849 HC ST EVAL SPEECH SOUND PRODUCTION 4 1/24/2018 Lisa Paul MS CCC-SLP GN 1    14781869716 HC ST MOTOR SPEECH CURRENT 1/24/2018 Lisa Paul MS CCC-SLP GN, CL 1    86907275064 HC ST MOTOR SPEECH GOAL STATUS 1/24/2018 Lisa Paul MS CCC-SLP GN, CK 1    42740561758 HC ST VOICE CURRENT 1/24/2018 Lisa Paul MS CCC-SLP GN, CM 1    95027117731 HC ST VOICE PROJECTED 1/24/2018 Lisa Paul MS CCC-SLP GN, CL 1          SLP G-Codes  SLP NOMS Used?: Yes  Functional Limitations: Motor speech, Voice  Motor Speech Current Status (): At least 60 percent but less than 80 percent impaired, limited or restricted  Motor Speech Goal Status (): At least 40 percent but less than 60 percent impaired, limited or restricted  Voice Current Status (): At least 80 percent but less than 100 percent impaired, limited or restricted  Voice Goal Status (): At least 60 percent but less than 80 percent impaired, limited or restricted        Lisa Paul MS CCC-SLP  1/24/2018

## 2018-01-24 NOTE — THERAPY EVALUATION
.Outpatient Physical Therapy Neuro Initial Evaluation  Psychiatric     Patient Name: Rommel Gonzalez  : 1945  MRN: [id number]  Today's Date: 2018      Visit Date: 2018    Patient Active Problem List   Diagnosis   • AF (atrial fibrillation)   • Depression   • Gait instability   • Hypertension   • Insomnia   • Idiopathic Parkinson's disease   • Peripheral neuropathy   • Dysarthria   • Atrial flutter   • Anticoagulated   • Health care maintenance   • Hypothyroidism (acquired)   • Weakness of voice   • Abnormal chest CT   • Facial droop   • Hypersomnia    • Hyperlipidemia   • High risk medication use   • CORINNE on CPAP   • Obstructive sleep apnea, adult        Past Medical History:   Diagnosis Date   • AF (atrial fibrillation)    • Atrial flutter    • Atypical chest pain    • Chest pain    • Colon polyps    • Confusion    • Depression    • Disease of thyroid gland    • Disorder of thyroid    • Dyspnea and respiratory abnormalities    • Elevated TSH    • Fatigue    • Gait instability    • Hay fever    • Hyperlipidemia    • Hypertension    • Hypothyroidism    • Insomnia    • Measles    • Mumps    • Palpitations    • Parkinson disease    • Peripheral neuropathy    • Pneumonia    • Poor sleep pattern    • Slurred speech    • Tremor         History reviewed. No pertinent surgical history.      Visit Dx:     ICD-10-CM ICD-9-CM   1. 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 Functional gait abnormality R26.89 781.2             Patient History       18 1500 18 1348       History    Chief Complaint Difficulty with daily activities;Fatigue/poor endurance;Muscle weakness  -SO Balance Problems;Difficulty Walking;Difficulty with daily activities;Falls/history of falls  -TORREY     Date Current Problem(s) Began 18   OT order  -SO 18  -TORREY     Brief Description of Current Complaint Pt reports decrease in UE strength and coordination and requiring increased time for ADLs.  -SO Pt reports he is having  difficulty with transfers, ambulation and balance but transfers are most concern of pt. He reports he falls back into chair frequently. Pt referred to PT to work on improving his mobility.   -TORREY     Onset Date- PT  1/24/2018  -TORREY     Onset Date- OT 1/24/18  -SO      Patient/Caregiver Goals Improve strength  -SO Improve strength;Improve mobility  -TORREY     Patient's Rating of General Health Good  -SO      Hand Dominance right-handed  -SO right-handed  -TORREY     Pain     Pain Location  Foot   left great toe   -TORREY     Pain at Present  2  -TORREY     Berg-Lee FACES Pain Rating  0  -SO      Fall Risk Assessment    Any falls in the past year: Yes  -SO Yes   pt reports frequently bumps into walls at home, without fall  -TORREY     Number of falls reported in the last 12 months --   3-4  -SO      Factors that contributed to the fall: Lost balance  -SO Lost balance  -TORREY     Does patient have a fear of falling Yes (comment)  -SO      Daily Activities    Primary Language English  -SO      Teaching needs identified Home Exercise Program;Management of Condition;Falls Prevention;Home Safety  -SO      Patient is concerned about/has problems with Coordination;Difficulty with self care (i.e. bathing, dressing, toileting:;Repetitive movements of the hand, arm, shoulder;Transfers (getting out of a chair, bed);Writing/grasping items with hand(s)  -SO      Barriers to learning None  -SO      Recommended Referrals Physical Therapy;Speech Therapy  -SO      Pt Participated in POC and Goals Yes  -SO      Safety    Are you being hurt, hit, or frightened by anyone at home or in your life? No  -SO      Are you being neglected by a caregiver No  -SO        User Key  (r) = Recorded By, (t) = Taken By, (c) = Cosigned By    Initials Name Provider Type    SO Kyara Lo, OTR Occupational Therapist    TORREY Babin, PT Physical Therapist             Hand Therapy (last 24 hours)      Hand Eval       01/24/18 1500          Hand  Strength      Strength Affected Side Bilateral  -SO       Strength Right    # Reps 3  -SO      Right Rung 2  -SO      Right  Test 1 25  -SO      Right  Test 2 30  -SO      Right  Test 3 30  -SO       Strength Average Right 28.33  -SO       Strength Left    # Reps 3  -SO      Left Rung 2  -SO      Left  Test 1 30  -SO      Left  Test 2 21  -SO      Left  Test 3 30  -SO       Strength Average Left 27  -SO      Pinch Strength    Affected Side Bilateral  -SO      Right Hand Strength - Pinch (lbs)    Lateral 12 lbs  -SO      Tip (2 point) 8.3 lbs  -SO      Left Hand Strength - Pinch (lbs)    Lateral 18 lbs  -SO      Tip (2 point) 10.3 lbs  -SO        User Key  (r) = Recorded By, (t) = Taken By, (c) = Cosigned By    Initials Name Provider Type    SO Kyara Lo OTR Occupational Therapist                PT Neuro       01/24/18 3353          Subjective Comments    Subjective Comments Pt and his sister report pt having difficulty with transfers. Pt said he uses his cane sometimes in the community but no device at home.   -TORREY      Precautions and Contraindications    Precautions/Limitations fall precautions  -TORREY      Pain Assessment    Pain Assessment 0-10  -TORREY      Pain Score 0  -TORREY      Post Pain Score 2  -TORREY      Pain Location Foot   L great toe   -TORREY      Response to Interventions Pt has an appointment to see podiatrist tomorrow to look at his L great toe.   -TORREY      Home Living    Living Arrangements condominium  -TORREY      Home Accessibility bed and bath on same level  -TORREY      Stair Railings at Home none  -TORREY      Home Equipment Rolling walker;Cane;Quad cane  -TORREY      Living Environment Comment Pt lives alone. Pt drives and goes to the grocery or buys fast food too. His 2 sisters help him to get to appointments. Pt is a .   -TORREY      Vision-Basic Assessment    Current Vision --   some slow eye movement especially looking downward   -TORREY      Sensation    Light Touch No  apparent deficits  -TORREY      Posture/Observations    Posture/Observations Comments Pt is well nourished male who arrived to therapy with straight cane and his sister was present. Sits with head laterally flexed to L.  Very little articulation with low voice.   -TORREY      ROM (Range of Motion)    General ROM no range of motion deficits identified  -TORREY      MMT (Manual Muscle Testing)    General MMT Assessment Detail Overall LE strength 4/5.   -TORREY      Bed Mobility, Assessment/Treatment    Bed Mobility, Roll Left, [place] verbal cues required;conditional independence   labors; instructed pt to flex knees first   -TORREY      Bed Mobility, Roll Right, [place] verbal cues required;conditional independence   cues to flex knees prior to roll   -TORREY      Bed Mob, Sidelying to Sit, [place] supervision required  -TORREY      Bed Mob, Sit to Sidelying, [place] supervision required  -TORREY      Bed Mobility, Impairments strength decreased;impaired balance;postural control impaired  -TORREY      Transfers    Transfers, Sit-Stand [place] verbal cues required;supervision required;contact guard assist  -TORREY      Transfers, Stand-Sit [place] verbal cues required;contact guard assist;supervision required   pt only 1/2 turns to sit   -TORREY      Transfers, Sit-Stand-Sit, Assist Device other (see comments)   no device   -TORREY      Transfer, Safety Issues balance decreased during turns;step length decreased;weight-shifting ability decreased;steps too close front assistive device;impulsivity  -TORREY      Transfer, Impairments strength decreased;impaired balance;postural control impaired  -TORREY      Transfer, Comment Pt's transfers to stand and to sit decreased technique back of knees pushing back on chair seat, decreased forward weight shift and especially turning around to sit with TUG pt nearly dove for chair, falling into chair.   -      Gait Assessment/Treatment    Gait, [place] Level supervision required  -TORREY      Gait,  "Assistive Device straight cane  -      Gait, Distance (Feet) 80   x 2  -      Gait, Gait Deviations bilateral:;adri decreased;toe-to-floor clearance decreased;weight-shifting ability decreased;step length decreased;decreased heel strike;forward flexed posture   no armswing; no trunk rotation   -      Gait, Safety Issues balance decreased during turns;step length decreased;weight-shifting ability decreased   places cane too close to R foot, sometimes carries cane   -      Gait, Impairments decreased flexibility;strength decreased;impaired balance;postural control impaired  -      Gait, Comment Pt ambulated without device and at first good step length but as he fatigued more flexed posture, more toe drag. Pt's sister said sometimes \"he gets to moving to fast and I think he is going to fall.\"   -      Stairs Assessment/Treatment    Number of Stairs 4  -      Stairs, Handrail Location both sides  -      Stairs, [place] Level supervision required  -      Stairs, Technique Used step over step (ascending);step over step (descending)  -      Stairs, Safety Issues balance decreased during turns;weight-shifting ability decreased  -      Stairs, Impairments strength decreased;impaired balance;postural control impaired  -      Stairs, Comment Curb: no device, SBA -- however, this was tested at beginning of session.   -      Balance Skills Training    Sitting-Level of Assistance Independent  -      Sitting-Balance Support No upper extremity supported;Feet supported  -      Sitting-Balance Activities --   little pelvis movement with anterior/posterior pelvic tilt.   -        User Key  (r) = Recorded By, (t) = Taken By, (c) = Cosigned By    Initials Name Provider Type    TORREY Babin PT Physical Therapist                  Therapy Education  Education Details: Reviewed with pt to fully turn prior to sit.   Given: Fall prevention and home safety, Mobility training  How Provided: " Verbal, Demonstration  Provided to: Patient  Level of Understanding: Teach back education performed, Verbalized, Demonstrated          PT OP Goals       01/24/18 1600       PT Short Term Goals    STG Date to Achieve 02/21/18  -     STG 1 Pt to be Indep with Home Exercise Program to improve mobility.   -     STG 2 Pt to perform sit to stand from armchair and armless chair with increased forward weight shift over LE's.   -     STG 3 Pt to perform stand to sit transfers by fully turning around prior to sit and reaching back for chair.   -     STG 4 Pt will perform bed mobility with less labored and independently.   -     Long Term Goals    LTG Date to Achieve 03/21/18  -     LTG 1 Pt to ambulate with increased step length and improved toe clearance bilaterally.   -     LTG 2 Pt will transfer to stand from armchair and armless chair with one attempt.   -     LTG 3 Pt to score at least 44/56 on the MCKEON balance test to demonstrate improved balance and decreased fall risk.   -     LTG 4 Pt to score at least 18/28 on the Tinetti to demonstrate improved balance and decreased fall risk.   -     LTG 5 Pt will complete TUG in less than 18 seconds for improved functional mobility and decreased fall risk.   -     Time Calculation    PT Goal Re-Cert Due Date 02/21/18  -       User Key  (r) = Recorded By, (t) = Taken By, (c) = Cosigned By    Initials Name Provider Type    TORREY Babin, PT Physical Therapist                PT Assessment/Plan       01/24/18 1630 01/24/18 1552    PT Assessment    Functional Limitations Decreased safety during functional activities;Impaired gait;Limitation in home management;Performance in self-care ADL;Limitations in community activities  -     Impairments Balance;Gait;Muscle strength  -     Assessment Comments Pt presents with deficits with bed mobility, transfers, ambulation and balance. Pt is at risk for falls as indicated by his score on the Tinetti, MCKEON, and  TUG. Pt's clinical presentation is evolving due to his activity limitations due to Parkinsonism, his dependence on his meds for mobility. Pt will benefit from skilled PT to work on strengthening, bed mobility, transfers, ambulation and balance.   -TORREY     Please refer to paper survey for additional self-reported information Yes  -TORREY     Rehab Potential Good  -TORREY     Patient/caregiver participated in establishment of treatment plan and goals Yes  -TORREY     Patient would benefit from skilled therapy intervention Yes  -TORREY     PT Plan    PT Frequency 2x/week  -TORREY     Predicted Duration of Therapy Intervention (days/wks) 8 weeks   -TORREY 1x/week for 4-6 weeks  -SO    Planned CPT's? PT EVAL MOD COMPLELITY: 40524;PT NEUROMUSC RE-EDUCATION EA 15 MIN: 67433  -TORREY     Physical Therapy Interventions (Optional Details) balance training;bed mobility training;gait training;home exercise program;postural re-education;neuromuscular re-education;stair training;transfer training  -TORREY       User Key  (r) = Recorded By, (t) = Taken By, (c) = Cosigned By    Initials Name Provider Type    DARLENE Lo, OTR Occupational Therapist    TORREY Babin, PT Physical Therapist                   Exercises       01/24/18 9864          Subjective Comments    Subjective Comments Pt and his sister report pt having difficulty with transfers. Pt said he uses his cane sometimes in the community but no device at home.   -TORREY        User Key  (r) = Recorded By, (t) = Taken By, (c) = Cosigned By    Initials Name Provider Type    TORREY Babin, PT Physical Therapist                      Outcome Measure Options: Krause Balance, Timed Up and Go (TUG), Tinetti  Krause Balance Scale  Sitting to Standing: able to stand independently using hands  Standing Unsupported: able to stand safely for 2 minutes  Sitting with Back Unsupported but Feet Supported on Floor or on Stool: able to sit safely and securely for 2 minutes  Standing to Sitting: uses back  "of legs against chair to control descent  Transfers: able to transfer safely definite need of hands  Standing Unsupported with Eyes Closed: able to stand 10 seconds safely  Standing Unsupported with Feet Together: able to place feet together independently and stand 1 minute safely  Reaching Forward with Outstretched Arm While Standing: can reach forward 12 cm (5 inches)   Object From the Floor From a Standing Position: able to  object but needs supervision  Turning to Look Behind Over Left and Right Shoulders While Standing: turns sideways only but maintains balance  Turn 360 Degrees: able to turn 360 degrees safely but slowly  Place Alternate Foot on Step or Stool While Standing Unsupported: able to complete 4 steps without aid with supervision  Standing Unsupported with One Foot in Front: able to take small step independently and hold 30 seconds  Standing on One Leg: tries to lift leg unable to hold 3 seconds but remains standing independently  Krause Total Score: 39  Tinetti Assessment  Tinetti Assessment: yes  Sitting Balance: Steady,safe  Arises: Able, uses arms  Attempts to Rise: Able, requires > 1 attempt  Immediate Standing Balance (first 5 sec): Steady without support  Standing Balance: Narrow stance, without support  Sternal Nudge (feet close together): Steady  Eyes Closed (feet close together): Steady  Turning 360 Degrees- Steps: Discontinuous steps  Turning 360 Degrees- Steadiness: Unsteady (staggers, grabs)  Sitting Down: Uses arms or not a smooth motion  Tinetti Balance Score: 11  Gait Initiation (immediate after told \"go\"): No hesitancy  Step Length- Right Swing: Right swing foot passes Left stance leg  Step Length- Left Swing: Left swing foot passes Right  Foot Clearance- Right Foot: Right foot does not completely clear floor  Foot Clearance- Left Foot: Left foot does not completely clear floor  Step Symmetry: Right and Left step length unequal  Step Continuity: Steps appear " continuous  Path (excursion): Mild/moderate deviation or use of aid  Trunk: Marked sway or uses device  Base of Support: Heels close while walking  Gait Score: 6  Tinetti Total Score: 17  Tinetti Assistive Device: other (see comments) (no device)  Timed Up and Go (TUG)  TUG Test 1: 21 seconds (using cane )  TUG Test 2: 20 seconds (no device )  Timed Up and Go Comments: Pt only 1/2 turned to sit down with each test, falling into chair.     Time Calculation:   Start Time: 1348  Stop Time: 1430  Time Calculation (min): 42 min     Therapy Charges for Today     Code Description Service Date Service Provider Modifiers Qty    88490299947 HC PT MOBILITY CURRENT 1/24/2018 Socorro Babin, PT GP,  1    [id number] HC PT MOBILITY PROJECTED 1/24/2018 Socorro Babin, PT GP,  1    [id number]  PT EVAL MOD COMPLEXITY 4 1/24/2018 Socorro Babin, PT GP 1          PT G-Codes  PT Professional Judgement Used?: Yes  Outcome Measure Options: Krause Balance, Timed Up and Go (TUG), Tinetti  Functional Limitation: Mobility: Walking and moving around  Mobility: Walking and Moving Around Current Status (): At least 20 percent but less than 40 percent impaired, limited or restricted  Mobility: Walking and Moving Around Goal Status (): At least 20 percent but less than 40 percent impaired, limited or restricted         [place] Babin, PT  1/24/2018

## 2018-01-24 NOTE — THERAPY EVALUATION
Outpatient Speech Language Pathology   Adult Speech  Initial Evaluation  [facility]     Patient Name: Rommel Gonzalez  : 1945  MRN: [id number]  Today's Date: 2018        Visit Date: 2018   Patient Active Problem List   Diagnosis   • AF (atrial fibrillation)   • Depression   • Gait instability   • Hypertension   • Insomnia   • Idiopathic Parkinson's disease   • Peripheral neuropathy   • Dysarthria   • Atrial flutter   • Anticoagulated   • Health care maintenance   • Hypothyroidism (acquired)   • Weakness of voice   • Abnormal chest CT   • Facial droop   • Hypersomnia    • Hyperlipidemia   • High risk medication use   • CORINNE on CPAP   • Obstructive sleep apnea, adult        Past Medical History:   Diagnosis Date   • AF (atrial fibrillation)    • Atrial flutter    • Atypical chest pain    • Chest pain    • Colon polyps    • Confusion    • Depression    • Disease of thyroid gland    • Disorder of thyroid    • Dyspnea and respiratory abnormalities    • Elevated TSH    • Fatigue    • Gait instability    • Hay fever    • Hyperlipidemia    • Hypertension    • Hypothyroidism    • Insomnia    • Measles    • Mumps    • Palpitations    • Parkinson disease    • Peripheral neuropathy    • Pneumonia    • Poor sleep pattern    • Slurred speech    • Tremor         No past surgical history on file.      Visit Dx:    ICD-10-CM ICD-9-CM   1. Dysphonia R49.0 784.42   2. Dysarthria R47.1 784.51             Patient History       18 1500 18 1348 18 1245    History    Chief Complaint Difficulty with daily activities;Fatigue/poor endurance;Muscle weakness  -SO Balance Problems;Difficulty Walking;Difficulty with daily activities;Falls/history of falls  -TORREY Other 1 (comment)   Speech; Difficulty being understood  -SA    Date Current Problem(s) Began 18   OT order  -SO 18  -TORREY 18  -SA    Brief Description of Current Complaint Pt reports decrease in UE strength and coordination and  requiring increased time for ADLs.  -SO Pt reports he is having difficulty with transfers, ambulation and balance but transfers are most concern of pt. He reports he falls back into chair frequently. Pt referred to PT to work on improving his mobility.   -TORREY Pt reports people don't understand him; he avoids people d/t his voice; runs out of air when he talks and he feels incompetant  -SA    Previous treatment for THIS PROBLEM   Other (comment)   Speech therapy last year  -SA    Onset Date- PT  1/24/2018  -TORREY     Onset Date- OT 1/24/18  -SO      Onset Date- SLP   7/26/16  -SA    Patient/Caregiver Goals Improve strength  -SO Improve strength;Improve mobility  -TORREY Improve strength;Other (comment)   Improve communication  -SA    Patient's Rating of General Health Good  -SO      Hand Dominance right-handed  -SO right-handed  -TORREY right-handed  -SA    Pain     Pain Location  Foot   left great toe   -TORREY     Pain at Present  2  -TORREY 0  -SA    [place] FACES Pain Rating  0  -SO      Fall Risk Assessment    Any falls in the past year: Yes  -SO Yes   pt reports frequently bumps into walls at home, without fall  -TORREY Yes  -SA    Number of falls reported in the last 12 months --   3-4  -SO      Factors that contributed to the fall: Lost balance  -SO Lost balance  -TORREY     Does patient have a fear of falling Yes (comment)  -SO      Services    Prior Rehab/Home Health Experiences   Yes  -SA    When was the prior experience with Rehab/Home Health   7/26/16   through 5/2/2017  -SA    Where was the prior experience with Rehab/Home Health   [facility]  -SA    Daily Activities    Primary Language English  -SO  English  -SA    Teaching needs identified Home Exercise Program;Management of Condition;Falls Prevention;Home Safety  -SO  Management of Condition;Home Exercise Program  -SA    Patient is concerned about/has problems with Coordination;Difficulty with self care (i.e. bathing, dressing, toileting:;Repetitive  movements of the hand, arm, shoulder;Transfers (getting out of a chair, bed);Writing/grasping items with hand(s)  -SO  Other (comment)   Communicating w/ others  -SA    Barriers to learning None  -SO  None  -SA    Recommended Referrals Physical Therapy;Speech Therapy  -SO      Pt Participated in POC and Goals Yes  -SO  Yes  -SA    Safety    Are you being hurt, hit, or frightened by anyone at home or in your life? No  -SO  No  -SA    Are you being neglected by a caregiver No  -SO  No  -SA      User Key  (r) = Recorded By, (t) = Taken By, (c) = Cosigned By    Initials Name Provider Type    SO Kyara Lo, OTR Occupational Therapist    TORREY Babin, PT Physical Therapist    SA Lisa Paul MS CCC-SLP Speech and Language Pathologist                Adult Speech Language - 01/24/18 1600     Background and History    Reason for Referral dysarthria  -SA    Description of Complaint People don't understand what pt says  -SA    Previous Functional Status Connected speech was dysarthric or apraxic but intelligible;Connected speech was difficult to understand  -SA    Informant for the Evaluation Self;Other (comment)   Sister  -SA    AMR's and SMR's    Alternate Motion Rates rate slow;accuracy imprecise;rhythm irregular  -SA    Sequential Motion Rates rhythm regular;accuracy imprecise;rate normal  -SA    Dysarthria- Informal Assessment    Respiratory Support Effect on Speech reduced overall loudness  -SA    Reduced Overall Loudness Severe  -SA    Tasks Used to Assess Respiratory Support Effect connected speech;reading;counting;vowel prolongation  -SA    Respiratory Comments Thoracic breathing; Observation w/ breathing rate per minute was 12 which is on the low average.   -SA    Phonation Effects on Speech breathy;hoarse;loudness decay;low pitch;monopitch  -SA    Breathy Mild  -SA    Hoarse Severe  -SA    Monopitch Severe  -SA    Low Pitch Moderate  -SA    Loudness Decay Severe  -SA    Tasks Used to Assess  Phonation reading;connected speech  -SA    Phonation Comments Maximum phonation time is reduced; s/z ratio: 3.23 which indicates a possible vocal fold pathology; he is able to complete pitch glide of vowels adequately re: ability and pitch range; however, pitch control had a change in voice quality and pt used glottal hickman  -SA    Articulation Effects on Speech imprecise consonants;weak pressure consonants  -SA    Imprecise Consonants Moderate  -SA    Weak Pressure Consonants Moderate  -SA    Tasks Used to Assess Articulation reading;connected speech  -SA    Articulation Comments Pt w/ long phrase length and increased MLU w/o appropriate breath support which decreases intelligibility  -SA    Resonance Effects on Speech WFL  -SA    Tasks Used to Assess Resonance nasal laden stimuli;non-nasal stimuli;connected speech  -SA    Prosody Effects on Speech stress;intonation word level  -SA    Stress excess and equal  -SA    Intonation Word Level abnormal pattern;other (comment)   unable  -SA    Intonation in Speech abnormal pattern  -SA    Tasks Used to Assess Prosody reading;connected speech  -SA    Prosody Comments Pt attempted to increase loudness instead of prosody  -SA    Intelligibility of Acoustic Signal    Easily Understood By: no one  -SA    Comprehensibility of Message    Message is Usually Comprehensible To: family/caregiver  -SA    Uses Strategies to Improve Comprehensibility not at all  -SA    When Strategies are Used, Message is Comprehensible To: examiner  -SA    Efficiency of Verbal Communication    Verbal Messages are: take longer than usual;do not sound natural  -SA    Environmental Factors that Improve Comprehensibility background noise;proximity of speaker to listener;other (comment)   use of a voice amplification system (pt brought his [employer])  -SA    Type of Dysarthria    Type of Dysarthria Mixed Dysarthria  -SA    Standardized Tests    Dysarthria Tests Other   [place] Dysarthria Assessment Tool  -SA     Other Dysarthria Standardized Tests    Other Dysarthria Test Used [place] Dysarthria Assessment Tool  -SA    Other Dysarthria Test Comments Also used the Voice Handicap Index. Pt achieved an overall score of 104 which has a severity rating of Severe.  -SA      User Key  (r) = Recorded By, (t) = Taken By, (c) = Cosigned By    Initials Name Provider Type    SA Lisa Paul MS CCC-SLP Speech and Language Pathologist                               OP SLP Education       01/24/18 1731    Education    Barriers to Learning No barriers identified  -SA    Education Provided Described results of evaluation;Family/caregivers participated in establishing goals and treatment plan;Patient requires further education on strategies, risks  -SA    Assessed Learning needs;Learning motivation;Learning preferences;Learning readiness  -    Learning Motivation Other (comment)   Unsure at this time; pt did state he was hesitant to do the homework d/t people might hear him   -    Learning Method Explanation  -    Teaching Response Reinforcement needed  -SA      User Key  (r) = Recorded By, (t) = Taken By, (c) = Cosigned By    Initials Name Effective Dates     Lisa Paul MS CCC-SLP 07/25/17 -                 SLP OP Goals       01/24/18 1600       Time Calculation    PT Goal Re-Cert Due Date 02/21/18  -TORREY       User Key  (r) = Recorded By, (t) = Taken By, (c) = Cosigned By    Initials Name Provider Type    TORREY Babin, PT Physical Therapist                OP SLP Assessment/Plan - 01/24/18 1721     SLP Assessment    Functional Problems Speech Language- Adult/Cognition  -    Impact on Function: Adult Speech Language/Cognition Difficulty communicating wants, needs, and ideas;Difficulty communicating in a medical emergency;Restrictions in personal and social life  -SA    Clinical Impression: Speech Language-Adult/Congnition Other (comment)   Dysarthria-Mixed  -SA    Please refer to paper survey for additional self-reported  information Yes  -SA    Please refer to items scanned into chart for additional diagnostic informaiton and handouts as provided by clinician Yes  -SA    SLP Diagnosis Severe Dysarthria  -    Prognosis Fair (comment)   depending on pt motivation  -SA    Patient/caregiver participated in establishment of treatment plan and goals Yes  -SA    Patient would benefit from skilled therapy intervention Yes  -SA    SLP Plan    Frequency 2 times a week  -    Duration 6-9 months  -SA    Planned CPT's? SLP SPEECH SOUND PROD EVAL: 42771;SLP INDIVIDUAL SPEECH THERAPY: 53442  -    Expected Duration Therapy Session (min) 45-60 minutes  -      User Key  (r) = Recorded By, (t) = Taken By, (c) = Cosigned By    Initials Name Provider Type     Lisa Paul MS CCC-SLP Speech and Language Pathologist             SLP Outcome Measures (last 72 hours)      SLP Outcome Measures       01/24/18 1700          SLP Outcome Measures    Outcome Measure Used? Adult NOMS  -      FCM Scores    FCM Chosen Voice;Motor Speech  -      Motor Speech FCM Score 3  -SA      Voice FCM Score 2  -        User Key  (r) = Recorded By, (t) = Taken By, (c) = Cosigned By    Initials Name Effective Dates     Lisa Paul MS CCC-SLP 07/25/17 -              Time Calculation:   SLP Start Time: 1245  SLP Stop Time: 1345  SLP Time Calculation (min): 60 min  SLP Non-Billable Time (min): 60 min    Therapy Charges for Today     Code Description Service Date Service Provider Modifiers Qty    98856739286 HC ST EVAL SPEECH SOUND PRODUCTION 4 1/24/2018 Lisa Paul MS CCC-SLP GN 1    25351244955 HC ST MOTOR SPEECH CURRENT 1/24/2018 Lisa Paul, MS CCC-SLP GN, CL 1    52332132707 HC ST MOTOR SPEECH GOAL STATUS 1/24/2018 Lisa Humberto MS CCC-SLP GN, CK 1    33418295358 HC ST VOICE CURRENT 1/24/2018 Lisadavid Paul MS CCC-SLP GN, CM 1    88749979489 HC ST VOICE PROJECTED 1/24/2018 Lisa Paul, MS CCC-SLP GN, CL 1          SLP G-Codes  SLP NOMS Used?: Yes  Functional  Limitations: Motor speech, Voice  Motor Speech Current Status (): At least 60 percent but less than 80 percent impaired, limited or restricted  Motor Speech Goal Status (): At least 40 percent but less than 60 percent impaired, limited or restricted  Voice Current Status (): At least 80 percent but less than 100 percent impaired, limited or restricted  Voice Goal Status (): At least 60 percent but less than 80 percent impaired, limited or restricted        Lisa Paul MS CCC-SLP  1/24/2018

## 2018-01-24 NOTE — THERAPY EVALUATION
Outpatient Occupational Therapy Neuro Initial Evaluation  [facility]     Patient Name: Rommel Gonzalez  : 1945  MRN: [id number]  Today's Date: 2018      Visit Date: 2018    Patient Active Problem List   Diagnosis   • AF (atrial fibrillation)   • Depression   • Gait instability   • Hypertension   • Insomnia   • Idiopathic Parkinson's disease   • Peripheral neuropathy   • Dysarthria   • Atrial flutter   • Anticoagulated   • Health care maintenance   • Hypothyroidism (acquired)   • Weakness of voice   • Abnormal chest CT   • Facial droop   • Hypersomnia    • Hyperlipidemia   • High risk medication use   • CORINNE on CPAP   • Obstructive sleep apnea, adult        Past Medical History:   Diagnosis Date   • AF (atrial fibrillation)    • Atrial flutter    • Atypical chest pain    • Chest pain    • Colon polyps    • Confusion    • Depression    • Disease of thyroid gland    • Disorder of thyroid    • Dyspnea and respiratory abnormalities    • Elevated TSH    • Fatigue    • Gait instability    • Hay fever    • Hyperlipidemia    • Hypertension    • Hypothyroidism    • Insomnia    • Measles    • Mumps    • Palpitations    • Parkinson disease    • Peripheral neuropathy    • Pneumonia    • Poor sleep pattern    • Slurred speech    • Tremor         History reviewed. No pertinent surgical history.      Visit Dx:      ICD-10-CM ICD-9-CM   1. 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 Weakness of upper extremity R29.898 729.89   3. Loss of coordination R27.0 781.3             Patient History       18 1500 18 1348       History    Chief Complaint Difficulty with daily activities;Fatigue/poor endurance;Muscle weakness  -SO Balance Problems;Difficulty Walking;Difficulty with daily activities;Falls/history of falls  -TORREY     Date Current Problem(s) Began 18   OT order  -SO 18  -TORREY     Brief Description of Current Complaint Pt reports decrease in UE strength and coordination and requiring increased time  for ADLs.  -SO Pt reports he is having difficulty with transfers, ambulation and balance but transfers are most concern of pt. He reports he falls back into chair frequently. Pt referred to PT to work on improving his mobility.   -TORREY     Onset Date- PT  1/24/2018  -TORREY     Onset Date- OT 1/24/18  -SO      Patient/Caregiver Goals Improve strength  -SO Improve strength;Improve mobility  -TORREY     Patient's Rating of General Health Good  -SO      Hand Dominance right-handed  -SO right-handed  -TORREY     Pain     Pain Location  Foot   left great toe   -TORREY     Pain at Present  2  -TORREY     Berg-Lee FACES Pain Rating  0  -SO      Fall Risk Assessment    Any falls in the past year: Yes  -SO Yes   pt reports frequently bumps into walls at home, without fall  -TORREY     Number of falls reported in the last 12 months --   3-4  -SO      Factors that contributed to the fall: Lost balance  -SO Lost balance  -TORREY     Does patient have a fear of falling Yes (comment)  -SO      Daily Activities    Primary Language English  -SO      Teaching needs identified Home Exercise Program;Management of Condition;Falls Prevention;Home Safety  -SO      Patient is concerned about/has problems with Coordination;Difficulty with self care (i.e. bathing, dressing, toileting:;Repetitive movements of the hand, arm, shoulder;Transfers (getting out of a chair, bed);Writing/grasping items with hand(s)  -SO      Barriers to learning None  -SO      Recommended Referrals Physical Therapy;Speech Therapy  -SO      Pt Participated in POC and Goals Yes  -SO      Safety    Are you being hurt, hit, or frightened by anyone at home or in your life? No  -SO      Are you being neglected by a caregiver No  -SO        User Key  (r) = Recorded By, (t) = Taken By, (c) = Cosigned By    Initials Name Provider Type    SO Kyara Lo, OTR Occupational Therapist    TORREY Socorro Babin, PT Physical Therapist                OT Neuro       01/24/18 1500          Subjective  Comments    Subjective Comments Pt states that he has noticed a decrease in his UE strength and things like buttons and self care take increased time to perform.  -SO      Precautions and Contraindications    Precautions/Limitations fall precautions  -SO      Subjective Pain    Able to rate subjective pain? yes  -SO      Pre-Treatment Pain Level 0  -SO      Post-Treatment Pain Level 0  -SO      Home Living    Living Arrangements condominium  -SO      Living Environment Comment States he does not have grab bars or use a shower  -SO      Vision- Basic    Current Vision Wears glasses only for reading  -SO      Cognitive Assessment/Intervention    Current Cognitive/Communication Assessment functional   Very disarthric  -SO      Orientation Status oriented x 4  -SO      Follows Commands/Answers Questions 100% of the time  -SO      Personal Safety WNL/WFL;mild impairment  -SO      Sensation    Sensation WNL? WNL  -SO      Posture/Observations    Posture/Observations Comments Cervical dystonia, pt head placement tilted to the R, when asked to turn head to the L unable to turn much past midline. Concern due to pt states he is still driving and really isnt able to turn his head functionally to the left  -SO      Coordination    Coordination Observations Delayed Response  -SO      Other Coordination Observations R hand delayed movements, seems to get frozen during certain tasks  -SO      Coordination Tests Box and Blocks;9-Hole Peg  -SO      Box and Blocks Left 35  -SO      Box and Blocks Right 26  -SO      9-Hole Peg Left 58  -SO      9-Hole Peg Right 1:20  -SO      ROM (Range of Motion)    General ROM no range of motion deficits identified  -SO      General ROM Detail BUE appear WFL except supination of both hands initally 1/2, able to supinate to 7/8 with cues; also seems to have a swan neck deformity on the L hand 4th digit limiting flexion   -SO      MMT (Manual Muscle Testing)    General MMT Assessment upper extremity  strength deficits identified  -SO      General MMT Assessment Detail Generalized UE weakness 4-/5  -SO      Functional Mobility    Functional Mobility- Comment Uses cane in community  -SO        User Key  (r) = Recorded By, (t) = Taken By, (c) = Cosigned By    Initials Name Provider Type    JAVIER Mendoza Occupational Therapist             Hand Therapy (last 24 hours)      Hand Eval       01/24/18 1500          Hand  Strength     Strength Affected Side Bilateral  -SO       Strength Right    # Reps 3  -SO      Right Rung 2  -SO      Right  Test 1 25  -SO      Right  Test 2 30  -SO      Right  Test 3 30  -SO       Strength Average Right 28.33  -SO       Strength Left    # Reps 3  -SO      Left Rung 2  -SO      Left  Test 1 30  -SO      Left  Test 2 21  -SO      Left  Test 3 30  -SO       Strength Average Left 27  -SO      Pinch Strength    Affected Side Bilateral  -SO      Right Hand Strength - Pinch (lbs)    Lateral 12 lbs  -SO      Tip (2 point) 8.3 lbs  -SO      Left Hand Strength - Pinch (lbs)    Lateral 18 lbs  -SO      Tip (2 point) 10.3 lbs  -SO        User Key  (r) = Recorded By, (t) = Taken By, (c) = Cosigned By    Initials Name Provider Type    DARLENE Lo OTR Occupational Therapist                         OT Goals       01/24/18 1500       OT Short Term Goals    STG Date to Achieve 02/07/18  -SO     STG 1 Patient to be independent with fine motor coordination home program.  -SO     STG 2 Patient and family to be independent with gross motor coordination home exercise program.  -SO     STG 3 Pt. to be (I) with strengthening HEP.  -SO     Long Term Goals    LTG Date to Achieve 03/07/18  -SO     LTG 1 Patient will reach to right and left sides in standing with moderate to maximum challenge without loss of balance.  -SO     LTG 2 Patient to improve fine motor skills evidenced by increased independence with manipulation of self care  items. (i.e. buttons, tying, and opening containers)  -SO     LTG 3 Patient to use R hand to hold utensil for cutting/chopping food.  -SO     LTG 4 Pt. to improve Box and Blocks score to blocks by 5 blocks on R side to demonstrate increased independence with daily tasks.  -SO     LTG 5 Pt to increase BUE strength to 4+/5 to assist with ADLs.  -SO     Time Calculation    OT Goal Re-Cert Due Date 02/21/18  -SO       User Key  (r) = Recorded By, (t) = Taken By, (c) = Cosigned By    Initials Name Provider Type    SO Kyara Lo, OTR Occupational Therapist                OT Assessment/Plan       01/24/18 8868       OT Assessment    Functional Limitations Performance in self-care ADL;Limitation in home management  -SO     Impairments Balance;Joint mobility;Muscle strength;Poor body mechanics  -SO     Assessment Comments Pt presents to OP OT with diagnosis of supranuclear palsy with impaired balance, UE strength and coordination. pt with decreased RUE  and pinch strengths and impaired GMC/FMC skills.  -SO     Please refer to paper survey for additional self-reported information Yes  -SO     OT Diagnosis Impaired UE  -SO     OT Rehab Potential Good  -SO     Patient/caregiver participated in establishment of treatment plan and goals Yes  -SO     Patient would benefit from skilled therapy intervention Yes  -SO     OT Plan    OT Frequency 1x/week  -SO     Predicted Duration of Therapy Intervention (days/wks) 1x/week for 4-6 weeks  -SO     Planned CPT's? OT EVAL MOD COMPLEXITY: 16785;OT SELF CARE/MGMT/TRAIN 15 MIN: 00886;OT NEUROMUSC RE EDUCATION EA 15 MIN: 44722;OT THER PROC EA 15 MIN: 20496UH  -SO     Planned Therapy Interventions (Optional Details) balance training;patient/family education;postural re-education;strengthening;neuromuscular re-education  -SO     OT Plan Comments OT plans to work on UE deficits to improve independence.  -SO       User Key  (r) = Recorded By, (t) = Taken By, (c) = Cosigned By     Initials Name Provider Type    SO Kayra JAVIER Burgess Occupational Therapist                  Outcome Measure Options: Disabilities of the Arm, Shoulder, and Hand (DASH)  9 Hole Peg  9-Hole Peg Left: 58  9-Hole Peg Right: 1:20  Box and Blocks  Box and Blocks Left: 35  Box and Blocks Right: 26  DASH  Open a tight or new jar.: Mild Difficulty  Write: Mild Difficulty  Turn a key: Mild Difficulty  Prepare a meal: Moderate Difficulty  Push open a heavy door: Moderate Difficulty  Place an object on a shelf above your head: Moderate Difficulty  Do heavy household chores (e.g., wash walls, wash floors): Moderate Difficulty  Garden or do yard work: Unable  Make a bed: Mild Difficulty  Carry a shopping bag or briefcase: Moderate Difficulty  Carry a heavy object (over 10 lbs): Moderate Difficulty  Change a lightbulb overhead: No Difficulty  Wash or blow dry your hair: No Difficulty  Wash your back: No Difficulty  Put on a pullover sweater: No Difficulty  Use a knife to cut food: Moderate Difficulty  Recreational activities in which require little effort (e.g., cardplaying, knitting, etc.): Unable  Recreational activities in which you take some force or impact through your arm, should or hand (e.g. golf, hammering, tennis, etc.): Unable  Recreational Activities in which you move your arm freely (e.g., frisbee, badminton, etc.): Unable  Manage transportation needs (getting from one place to another): Mild Difficulty  During the past week, to what extent has your arm, shoulder, or hand problem interfered with your normal social activites with family, friends, neighbors or groups?: Slightly  During the past week, were you limited in your work or other regular daily activities as a result of your arm, shoulder or hand problem?: Slightly Limited  Arm, Shoulder, or hand pain: None  Arm, shoulder or hand pain when you performed any specific activity: None  Tingling (pins and needles) in your arm, shoulder, or hand:  None  Weakness in your arm, shoulder or hand: None  Stiffness in your arm, shoulder or hand: Mild  During the past week, how much difficulty have you had sleeping because of the pain in your arm, shoulder or hand?: No difficulty  I feel less capable, less confident or less useful because of my arm, shoulder or hand problem: Disagree  DASH Sum : 68  Number of Questions Answered: 29  DASH Score: 33.62         Time Calculation:   OT Start Time: 1430  OT Stop Time: 1515  OT Time Calculation (min): 45 min     Therapy Charges for Today     Code Description Service Date Service Provider Modifiers Qty    [id number]  OT EVAL MOD COMPLEXITY 3 1/24/2018 Kyara Lo OTR GO 1    [id number]  OT SELFCARE CURRENT 1/24/2018 Kyara Lo OTR GO, CI 1    [id number]  OT SELFCARE PROJECTED 1/24/2018 Kyara Lo OTR GO, CI 1          OT G-codes  OT Functional Scales Options: Disabilities of the Arm, Shoulder, and Hand (DASH)  Functional Limitation: Self care  Self Care Current Status (): At least 1 percent but less than 20 percent impaired, limited or restricted  Self Care Goal Status (): At least 1 percent but less than 20 percent impaired, limited or restricted          Kyara Lo OTR  1/24/2018

## 2018-01-26 ENCOUNTER — HOSPITAL ENCOUNTER (OUTPATIENT)
Dept: OCCUPATIONAL THERAPY | Facility: HOSPITAL | Age: 73
Setting detail: THERAPIES SERIES
Discharge: HOME | End: 2018-01-26

## 2018-01-26 ENCOUNTER — HOSPITAL ENCOUNTER (OUTPATIENT)
Dept: SPEECH THERAPY | Facility: HOSPITAL | Age: 73
Setting detail: THERAPIES SERIES
Discharge: HOME | End: 2018-01-26

## 2018-01-26 ENCOUNTER — HOSPITAL ENCOUNTER (OUTPATIENT)
Dept: PHYSICAL THERAPY | Facility: HOSPITAL | Age: 73
Setting detail: THERAPIES SERIES
Discharge: HOME | End: 2018-01-26

## 2018-01-26 ENCOUNTER — TELEPHONE (OUTPATIENT)
Dept: SPEECH THERAPY | Facility: HOSPITAL | Age: 73
End: 2018-01-26

## 2018-01-26 DIAGNOSIS — R29.898 WEAKNESS OF UPPER EXTREMITY: ICD-10-CM

## 2018-01-26 DIAGNOSIS — G20 ATYPICAL PARKINSONISM (HCC): Primary | ICD-10-CM

## 2018-01-26 DIAGNOSIS — R47.1 DYSARTHRIA: Primary | ICD-10-CM

## 2018-01-26 DIAGNOSIS — R47.9 SPEECH DEFECT: ICD-10-CM

## 2018-01-26 DIAGNOSIS — R49.0 DYSPHONIA: ICD-10-CM

## 2018-01-26 DIAGNOSIS — R27.8 LOSS OF COORDINATION: ICD-10-CM

## 2018-01-26 DIAGNOSIS — R26.89 FUNCTIONAL GAIT ABNORMALITY: ICD-10-CM

## 2018-01-26 DIAGNOSIS — R26.81 GAIT INSTABILITY: ICD-10-CM

## 2018-01-26 PROCEDURE — 97112 NEUROMUSCULAR REEDUCATION: CPT | Performed by: OCCUPATIONAL THERAPIST

## 2018-01-26 PROCEDURE — 97112 NEUROMUSCULAR REEDUCATION: CPT

## 2018-01-26 PROCEDURE — 92507 TX SP LANG VOICE COMM INDIV: CPT

## 2018-01-26 PROCEDURE — 97110 THERAPEUTIC EXERCISES: CPT | Performed by: OCCUPATIONAL THERAPIST

## 2018-01-26 NOTE — TELEPHONE ENCOUNTER
1/26/18: 2:30 pm Spoke to Kayleen, Mr. Gonzalez's sister re: concerns that ST and PT had re: almost falling X 2 in tx today. With PT, an aide caught Mr. Gonzalez so that he did not fall to the ground. Pt stated he drove to tx and therapists have concerns re: driving as well as walking to tx. Kayleen stated Mr. Gonzalez has a handicapped parking pass but he is too proud to use it. SLP stated we would like him to be driven to and from tx and assist him up to the tx room if possible. His sister stated that would be fine and she will bring him to his appt's; however, asked if she had to stay for the full 3 hours, as she cares for her elderly mother with dementia. SLP stated that the  did not have to stay for tx.

## 2018-01-26 NOTE — THERAPY TREATMENT NOTE
Outpatient Speech Language Pathology   Adult Speech Treatment Note  [facility]     Patient Name: Rommel Gonzalez  : 1945  MRN: [id number]  Today's Date: 2018         Visit Date: 2018   Patient Active Problem List   Diagnosis   • AF (atrial fibrillation)   • Depression   • Gait instability   • Hypertension   • Insomnia   • Idiopathic Parkinson's disease   • Peripheral neuropathy   • Dysarthria   • Atrial flutter   • Anticoagulated   • Health care maintenance   • Hypothyroidism (acquired)   • Weakness of voice   • Abnormal chest CT   • Facial droop   • Hypersomnia    • Hyperlipidemia   • High risk medication use   • CORINNE on CPAP   • Obstructive sleep apnea, adult          Visit Dx:    ICD-10-CM ICD-9-CM   1. Dysarthria R47.1 784.51   2. Dysphonia R49.0 784.42   3. Speech defect R47.02 784.59             Adult Speech Language - 18 1600     Background and History    Reason for Referral dysarthria  -SA    Description of Complaint People don't understand what pt says  -SA    Previous Functional Status Connected speech was dysarthric or apraxic but intelligible;Connected speech was difficult to understand  -SA    Informant for the Evaluation Self;Other (comment)   Sister  -SA    AMR's and SMR's    Alternate Motion Rates rate slow;accuracy imprecise;rhythm irregular  -SA    Sequential Motion Rates rhythm regular;accuracy imprecise;rate normal  -SA    Dysarthria- Informal Assessment    Respiratory Support Effect on Speech reduced overall loudness  -SA    Reduced Overall Loudness Severe  -SA    Tasks Used to Assess Respiratory Support Effect connected speech;reading;counting;vowel prolongation  -SA    Respiratory Comments Thoracic breathing; Observation w/ breathing rate per minute was 12 which is on the low average.   -SA    Phonation Effects on Speech breathy;hoarse;loudness decay;low pitch;monopitch  -SA    Breathy Mild  -SA    Hoarse Severe  -SA    Monopitch Severe  -SA    Low Pitch Moderate  -SA     Loudness Decay Severe  -SA    Tasks Used to Assess Phonation reading;connected speech  -SA    Phonation Comments Maximum phonation time is reduced; s/z ratio: 3.23 which indicates a possible vocal fold pathology; he is able to complete pitch glide of vowels adequately re: ability and pitch range; however, pitch control had a change in voice quality and pt used glottal hickman  -SA    Articulation Effects on Speech imprecise consonants;weak pressure consonants  -SA    Imprecise Consonants Moderate  -SA    Weak Pressure Consonants Moderate  -SA    Tasks Used to Assess Articulation reading;connected speech  -SA    Articulation Comments Pt w/ long phrase length and increased MLU w/o appropriate breath support which decreases intelligibility  -SA    Resonance Effects on Speech WFL  -SA    Tasks Used to Assess Resonance nasal laden stimuli;non-nasal stimuli;connected speech  -SA    Prosody Effects on Speech stress;intonation word level  -SA    Stress excess and equal  -SA    Intonation Word Level abnormal pattern;other (comment)   unable  -SA    Intonation in Speech abnormal pattern  -SA    Tasks Used to Assess Prosody reading;connected speech  -SA    Prosody Comments Pt attempted to increase loudness instead of prosody  -SA    Intelligibility of Acoustic Signal    Easily Understood By: no one  -SA    Comprehensibility of Message    Message is Usually Comprehensible To: family/caregiver  -SA    Uses Strategies to Improve Comprehensibility not at all  -SA    When Strategies are Used, Message is Comprehensible To: examiner  -SA    Efficiency of Verbal Communication    Verbal Messages are: take longer than usual;do not sound natural  -SA    Environmental Factors that Improve Comprehensibility background noise;proximity of speaker to listener;other (comment)   use of a voice amplification system (pt brought his [employer])  -SA    Type of Dysarthria    Type of Dysarthria Mixed Dysarthria  -SA    Standardized Tests    Dysarthria  "Tests Other   [place] Dysarthria Assessment Tool  -    Other Dysarthria Standardized Tests    Other Dysarthria Test Used [place] Dysarthria Assessment Tool  -    Other Dysarthria Test Comments Also used the Voice Handicap Index. Pt achieved an overall score of 104 which has a severity rating of Severe.  -SA      User Key  (r) = Recorded By, (t) = Taken By, (c) = Cosigned By    Initials Name Provider Type     Lisa Paul MS CCC-SLP Speech and Language Pathologist                              SLP OP Goals       01/26/18 1100       Subjective Comments    Subjective Comments No c/o; pt almost fell when getting up from chair in tx; Provided pt w/ handouts on supranuclear palsy; dysphagia handouts as pt c/o swallowing issues and EMST(expiratory muscle strength ) Spoke to sister re: driving concerns and she stated pt passed a vision test given by the MD who stated he was ok to drive. Sister will be bringing pt to tx d/t concerns for falls in the parking lot -SA     Subjective Pain    Able to rate subjective pain? yes  -SA     Pre-Treatment Pain Level 0  -SA     Post-Treatment Pain Level 0  -SA     Dysarthria Goals     Dysarthria LTG's Patient will be able to engage in speech at the conversational level with maximum articulatory accuracy so that speech is understood by familiar /unfamiliar listeners  -SA     Status: Patient will be able to engage in speech at the conversational level with maximum articulatory accuracy so that speech is understood by familiar /unfamiliar listeners Progressing as expected  -SA     Comments: Patient will be able to engage in speech at the conversational level with maximum articulatory accuracy so that speech is understood by familiar /unfamiliar listeners Mr. Gonzalez was cued to use \"Loud Speech\", \"Short Phrases\" and \"Exaggerated Speech\". After lengthy explanation of all of the compensatory strategies; Mr. Gonzalez was asked a question w/ the expectation to use the strategies " "discussed. Pt w/ odd behavior as he did not answer the question, and when slp further probed the pt stated he had lied because he thought \"exaggerated\" meant to lie. SLP once again reviewed the compensatory strategies w/ pt.   -SA     Status: Patient will be able to communicate a message that is comprehensible to familiar/unfamiliar listeners utilizing verbal speech and augmentative strategies Progressing as expected  -SA     Comments: Patient will be able to communicate a message that is comprehensible to familiar/unfamiliar listeners utilizing verbal speech and augmentative strategies With use of strategies to overarticulate and increased loudness. Mr. Gonzalez needed MAX visual; verbal; and auditory cues to overarticulation and use increased loudness  -SA     Comments: Patient will improve respiratory support and the use of respiration for speech through use of increased maximum inhalation as measured by incentive spirometry Reviewed information re: expiratory muscle strength  and how to purchase to strengthen expiratory muscles. Unsure if pt will purchase. However, did speak to pt's sister re: information   -SA     Patient will improve comprehensibility of verbal messages by speaking at an appropriate vocal intensity 40%:;with cues   Max cues  -SA     Status: Patient will improve comprehensibility of verbal messages by speaking at an appropriate vocal intensity Progressing as expected  -SA     Comments: Patient will improve comprehensibility of verbal messages by speaking at an appropriate vocal intensity When imitating and repeating words pt able to increase loudness w/ MAX vis/janina/aud cues  -SA     Patient will apply compensatory strategies to improve intelligibility of speech during in spontaneous speech 40%:  -SA     Status: Patient will apply compensatory strategies to improve intelligibility of speech during in spontaneous speech Progressing as expected  -SA     Comments: Patient will apply " "compensatory strategies to improve intelligibility of speech during in spontaneous speech Pt demonstrated 40% accuracy with max cues to produce exaggerated speech (overarticulation). Minimal self awareness was evidenced during voice recordings of labial sounds in words .   -SA       User Key  (r) = Recorded By, (t) = Taken By, (c) = Cosigned By    Initials Name Provider Type    SA Lisa Paul MS CCC-SLP Speech and Language Pathologist                OP SLP Education       01/26/18 1709    Education    Barriers to Learning Resistent to information;Other (comment0   Unsure if he is willing to attempt tx d/t being \"proud\" per sister  -    Action Taken to Address Barriers Will discuss w/ pt next session re: working at tx  -    Education Provided Described results of evaluation;Patient expressed understanding of evaluation;Patient requires further education on strategies, risks  -    Assessed Learning needs;Learning motivation;Learning readiness  -    Learning Motivation Weak  -    Learning Method Explanation;Demonstration;Written materials  -    Teaching Response Reinforcement needed  -      User Key  (r) = Recorded By, (t) = Taken By, (c) = Cosigned By    Initials Name Effective Dates    SA Gonzalez Paul, MS CCC-SLP 07/25/17 -                  SLP Outcome Measures (last 72 hours)      SLP Outcome Measures       01/24/18 1700          SLP Outcome Measures    Outcome Measure Used? Adult NOMS  -SA      FCM Scores    FCM Chosen Voice;Motor Speech  -      Motor Speech FCM Score 3  -SA      Voice FCM Score 2  -SA        User Key  (r) = Recorded By, (t) = Taken By, (c) = Cosigned By    Initials Name Effective Dates     Lisa PaulMS [place]-SLP 07/25/17 -              Time Calculation:   SLP Start Time: 1000  SLP Stop Time: 1100  SLP Time Calculation (min): 60 min  SLP Non-Billable Time (min): 30 min (Spoke to sister at length via phone)    Therapy Charges for Today     Code Description Service Date Service " Provider Modifiers Qty    [id number] [facility] TREATMENT SPEECH 4 1/26/2018 Lisa Paul, MS CCC-SLP GN 1                   Lisa Paul MS CCC-SLP  1/26/2018

## 2018-01-26 NOTE — THERAPY TREATMENT NOTE
Outpatient Occupational Therapy Neuro Treatment Note  [facility]     Patient Name: Rommel Gonzalez  : 1945  MRN: [id number]  Today's Date: 2018       Visit Date: 2018    Patient Active Problem List   Diagnosis   • AF (atrial fibrillation)   • Depression   • Gait instability   • Hypertension   • Insomnia   • Idiopathic Parkinson's disease   • Peripheral neuropathy   • Dysarthria   • Atrial flutter   • Anticoagulated   • Health care maintenance   • Hypothyroidism (acquired)   • Weakness of voice   • Abnormal chest CT   • Facial droop   • Hypersomnia    • Hyperlipidemia   • High risk medication use   • CORINNE on CPAP   • Obstructive sleep apnea, adult        Past Medical History:   Diagnosis Date   • AF (atrial fibrillation)    • Atrial flutter    • Atypical chest pain    • Chest pain    • Colon polyps    • Confusion    • Depression    • Disease of thyroid gland    • Disorder of thyroid    • Dyspnea and respiratory abnormalities    • Elevated TSH    • Fatigue    • Gait instability    • Hay fever    • Hyperlipidemia    • Hypertension    • Hypothyroidism    • Insomnia    • Measles    • Mumps    • Palpitations    • Parkinson disease    • Peripheral neuropathy    • Pneumonia    • Poor sleep pattern    • Slurred speech    • Tremor         No past surgical history on file.      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 Weakness of upper extremity R29.898 729.89   3. Loss of coordination R27.0 781.3                         OT Assessment/Plan       18 1007       OT Assessment    Assessment Comments Pt able to participate in UE strengtheing, requires vcing for full ROM and speed of exercises.  -SO       User Key  (r) = Recorded By, (t) = Taken By, (c) = Cosigned By    Initials Name Provider Type    DARLENE Lo, OTR Occupational Therapist              Therapy Education  Education Details: Given BUE strengthening program using dumbbells.  Given: HEP  Program:  New  How Provided: Verbal, Demonstration, Written  Provided to: Patient  Level of Understanding: Teach back education performed, Verbalized              OT Exercises       01/26/18 0900          Subjective Comments    Subjective Comments Pt reports no new changes. Neck posture seems to be more midline this date.  -SO      Subjective Pain    Able to rate subjective pain? yes  -SO      Pre-Treatment Pain Level 0  -SO      Post-Treatment Pain Level 0  -SO      Exercise 1    Exercise Name 1 Pt performs UE ergometer to warm up and assist with improving endurance in the UE.  -SO      Cueing 1 Verbal  -SO      Equipment 1 UE Ergometer  -SO      Sets 1 2  -SO      Time (Minutes) 1 3  -SO      Exercise 2    Exercise Name 2 Pt performs UE strengthening exercises, requires vcing for full ROM with BUE and for speed of movement.  -SO      Cueing 2 Verbal;Demo  -SO      Equipment 2 Dumbell  -SO      Weights/Plates 2 2  -SO      Sets 2 3  -SO      Reps 2 10  -SO      Exercise 3    Exercise Name 3 Pt performes RUE FMC exercise with small clothespins. Pt with min difficulty with in hand manipulation and 2 pt pinch. Pt with mod difficulty when performed with L hand, manipulation to finger tips with increased difficulty.  -SO        User Key  (r) = Recorded By, (t) = Taken By, (c) = Cosigned By    Initials Name Provider Type    SO JAVIER Suarez Occupational Therapist                        Time Calculation:   OT Start Time: 0902  OT Stop Time: 0946  OT Time Calculation (min): 44 min     Therapy Charges for Today     Code Description Service Date Service Provider Modifiers Qty    33561070154  OT THER PROC EA 15 MIN 1/26/2018 JAVIER Suarez GO 2    [id number]  OT NEUROMUSC RE EDUCATION EA 15 MIN 1/26/2018 JAVIER Suaerz GO 1                    JAVIER Suarez  1/26/2018

## 2018-01-26 NOTE — THERAPY TREATMENT NOTE
"    Outpatient Physical Therapy Neuro Treatment Note  [facility]     Patient Name: Rommel Gonzalez  : 1945  MRN: [id number]  Today's Date: 2018      Visit Date: 2018    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 Functional gait abnormality R26.89 781.2   3. Gait instability R26.81 781.2       Patient Active Problem List   Diagnosis   • AF (atrial fibrillation)   • Depression   • Gait instability   • Hypertension   • Insomnia   • Idiopathic Parkinson's disease   • Peripheral neuropathy   • Dysarthria   • Atrial flutter   • Anticoagulated   • Health care maintenance   • Hypothyroidism (acquired)   • Weakness of voice   • Abnormal chest CT   • Facial droop   • Hypersomnia    • Hyperlipidemia   • High risk medication use   • CORINNE on CPAP   • Obstructive sleep apnea, adult                 PT Neuro       18 1101          Subjective Comments    Subjective Comments \"I haven't had a fall in months.\" Pt reports he has a rolling walker at home but he probably won't use it. Beba hilario how he got here pt reported he drove here. Pt reported he parked but doesn't use his handicap permit. After PT walked pt to his car to evaluate gait outside PT recommended pt have his sister drivehim to PT due to his strong loss of balance to the R when coming to stand and for his safety. Pt stated, \"Yes katarzyna.\"   -LB      Precautions and Contraindications    Precautions/Limitations fall precautions  -LB      Pain Assessment    Pain Assessment 0-10  -LB      Pain Score 0  -LB      Post Pain Score 0  -LB      Bed Mobility, Assessment/Treatment    Bed Mobility, Comment deferred  -LB      Transfers    Transfers, Sit-Stand [place] moderate assist (50% patient effort);stand by assist;contact guard assist  -LB      Transfers, Stand-Sit [place] contact guard assist;verbal cues required   lacks control and plops into chairs at times   -LB      Transfers, Sit-Stand-Sit, Assist Device straight " cane;other (see comments)   or no device   -LB      Transfer, Safety Issues balance decreased during turns;step length decreased;weight-shifting ability decreased;steps too close front assistive device;impulsivity  -LB      Transfer, Impairments strength decreased;impaired balance;postural control impaired  -LB      Transfer, Comment When pt was ask to stand to begin pt stood and strongly lost his balance to the R and required moderate assistance ot prevent falling. With to stand rest of PT session pt reqquired CGA to SBA with to stand. Pt lacked control with to sit 2 of 5 times and plopped down into the chair with chair moving. Into car pt lacks control .  -LB      Gait Assessment/Treatment    Gait, [place] Level contact guard assist;stand by assist  -LB      Gait, Assistive Device straight cane  -LB      Gait, Distance (Feet) 90    x 4, 300' outdoors on concrete in parking lot  -LB      Gait, Gait Deviations adri decreased;bilateral:;toe-to-floor clearance decreased;weight-shifting ability decreased;step length decreased;forward flexed posture;other (see comments)   no armswing; no trunk rotation   -LB      Gait, Safety Issues balance decreased during turns;sequencing ability decreased;step length decreased;weight-shifting ability decreased   places cane too close to R foot,  carries cane ~75%  -LB      Gait, Impairments decreased flexibility;strength decreased;impaired balance;postural control impaired  -LB      Gait, Comment Pt was advised to place some weight thru the cane with ambulation but basically carries cane. Pt was evaluated with the cane in his left hand but pt switched to R hand after a few steps (poor cane placement) Pt was evaluated with a rolling walker ~ 50' x 1 with pt leaning to the right.    Pt did not loss his balance with ambulation.  -LB      Balance Skills Training    Standing-Level of Assistance Contact guard  -LB      Static Standing Balance Support No upper extremity supported   "-LB      Standing-Balance Activities Weight Shift R-L  -LB      Gait Balance-Level of Assistance Minimum assistance   loss of balance when sidestepping to the right   -LB      Gait Balance Support No upper extremity supported  -LB      Gait Balance Activities side-stepping  -LB        User Key  (r) = Recorded By, (t) = Taken By, (c) = Cosigned By    Initials Name Provider Type    KIM Agudelo PT Physical Therapist                        PT Assessment/Plan       01/26/18 6223       PT Assessment    Assessment Comments Pt presented to PT following OT and ST. When pt stood first time he strongly lost his balcnce to the right and required moderate of 1 to regain balance. Pt did not present with this strong loss of blance throughout the rest of the session. However, he did lack control with to sit plopped into a chair 2. Pt reported he drove to PT today and parked far away. Pt was ask if he had a  parking pass or a handicap sticker and pt stated he did but didn;t use.  PT ambulated pt to his car to evaluate his gait outdoors. Pt did not loss his balance. PT recommended to pt that his sister drive him to therapies due to imbalance noted with transfers and risk for falls. Pt agreed. Speech pathologist Lisa Paul called pt's sister regarding our concerns.    -LB       User Key  (r) = Recorded By, (t) = Taken By, (c) = Cosigned By    Initials Name Provider Type    KIM Agudelo PT Physical Therapist                     Exercises       01/26/18 1109          Subjective Comments    Subjective Comments \"I haven't had a fall in months.\" Pt reports he has a rolling walker at home but he probably won't use it. Whe ladyk how he got here pt reported he drove here. Pt reported he parked but doesn't use his handicap permit. After PT walked pt to his car to evaluate gait outside PT recommended pt have his sister drivehim to PT due to his strong loss of balance to the R when coming to stand and for his safety. Pt stated, " "\"Yes katarzyna.\"   -LB        User Key  (r) = Recorded By, (t) = Taken By, (c) = Cosigned By    Initials Name Provider Type    KIM Agudelo PT Physical Therapist                              PT OP Goals       01/26/18 1700       PT Short Term Goals    STG 5 Pt to come to stand with feet even ~ 90%.   -LB       User Key  (r) = Recorded By, (t) = Taken By, (c) = Cosigned By    Initials Name Provider Type    KIM Agudelo PT Physical Therapist          Therapy Education  Education Details: Recommended to pt that his sister drive him to therapies due to his strong loss of balance with coming to stand and risk for falls. Pt agreed. Pt was advised to come to stand with feet even and to stand for 30 seconds before stepping. Pt was encouraged to place weight thru his cane.   Given: Fall prevention and home safety  How Provided: Verbal  Provided to: Patient  Level of Understanding: Verbalized, Demonstrated              Time Calculation:   Start Time: 1102  Stop Time: 1149  Time Calculation (min): 47 min     Therapy Charges for Today     Code Description Service Date Service Provider Modifiers Qty    13176464012 HC PT NEUROMUSC RE EDUCATION EA 15 MIN 1/26/2018 Evelyne Agudelo, PT GP 3                    Evelyne Agudelo PT  1/26/2018     "

## 2018-01-31 ENCOUNTER — HOSPITAL ENCOUNTER (OUTPATIENT)
Dept: PHYSICAL THERAPY | Facility: HOSPITAL | Age: 73
Setting detail: THERAPIES SERIES
Discharge: HOME | End: 2018-01-31

## 2018-01-31 ENCOUNTER — HOSPITAL ENCOUNTER (OUTPATIENT)
Dept: OCCUPATIONAL THERAPY | Facility: HOSPITAL | Age: 73
Setting detail: THERAPIES SERIES
Discharge: HOME | End: 2018-01-31

## 2018-01-31 ENCOUNTER — HOSPITAL ENCOUNTER (OUTPATIENT)
Dept: SPEECH THERAPY | Facility: HOSPITAL | Age: 73
Setting detail: THERAPIES SERIES
Discharge: HOME | End: 2018-01-31

## 2018-01-31 DIAGNOSIS — R27.8 LOSS OF COORDINATION: ICD-10-CM

## 2018-01-31 DIAGNOSIS — R47.9 SPEECH DEFECT: ICD-10-CM

## 2018-01-31 DIAGNOSIS — R26.89 FUNCTIONAL GAIT ABNORMALITY: ICD-10-CM

## 2018-01-31 DIAGNOSIS — G20 ATYPICAL PARKINSONISM (HCC): Primary | ICD-10-CM

## 2018-01-31 DIAGNOSIS — R47.1 DYSARTHRIA: Primary | ICD-10-CM

## 2018-01-31 DIAGNOSIS — R29.898 WEAKNESS OF UPPER EXTREMITY: ICD-10-CM

## 2018-01-31 DIAGNOSIS — R26.81 GAIT INSTABILITY: ICD-10-CM

## 2018-01-31 DIAGNOSIS — G20 PARKINSON'S DISEASE (HCC): ICD-10-CM

## 2018-01-31 DIAGNOSIS — R49.0 DYSPHONIA: ICD-10-CM

## 2018-01-31 PROCEDURE — 97110 THERAPEUTIC EXERCISES: CPT | Performed by: OCCUPATIONAL THERAPIST

## 2018-01-31 PROCEDURE — 97112 NEUROMUSCULAR REEDUCATION: CPT | Performed by: OCCUPATIONAL THERAPIST

## 2018-01-31 PROCEDURE — 97112 NEUROMUSCULAR REEDUCATION: CPT

## 2018-02-02 ENCOUNTER — APPOINTMENT (OUTPATIENT)
Dept: SPEECH THERAPY | Facility: HOSPITAL | Age: 73
End: 2018-02-02

## 2018-02-02 ENCOUNTER — APPOINTMENT (OUTPATIENT)
Dept: OCCUPATIONAL THERAPY | Facility: HOSPITAL | Age: 73
End: 2018-02-02

## 2018-02-02 ENCOUNTER — APPOINTMENT (OUTPATIENT)
Dept: PHYSICAL THERAPY | Facility: HOSPITAL | Age: 73
End: 2018-02-02

## 2018-02-02 ENCOUNTER — OFFICE VISIT (OUTPATIENT)
Dept: FAMILY MEDICINE CLINIC | Facility: CLINIC | Age: 73
End: 2018-02-02

## 2018-02-02 VITALS
HEIGHT: 70 IN | OXYGEN SATURATION: 98 % | SYSTOLIC BLOOD PRESSURE: 106 MMHG | HEART RATE: 96 BPM | TEMPERATURE: 97.9 F | DIASTOLIC BLOOD PRESSURE: 58 MMHG | BODY MASS INDEX: 26.63 KG/M2 | WEIGHT: 186 LBS

## 2018-02-02 DIAGNOSIS — Z79.01 ANTICOAGULATED: ICD-10-CM

## 2018-02-02 DIAGNOSIS — Z79.899 HIGH RISK MEDICATION USE: ICD-10-CM

## 2018-02-02 DIAGNOSIS — G20 IDIOPATHIC PARKINSON'S DISEASE (HCC): ICD-10-CM

## 2018-02-02 DIAGNOSIS — R47.1 DYSARTHRIA: ICD-10-CM

## 2018-02-02 DIAGNOSIS — I48.20 CHRONIC ATRIAL FIBRILLATION (HCC): Primary | ICD-10-CM

## 2018-02-02 LAB
ALBUMIN SERPL-MCNC: 4.1 G/DL (ref 3.5–5.2)
ALBUMIN/GLOB SERPL: 1.7 G/DL
ALP SERPL-CCNC: 54 U/L (ref 39–117)
ALT SERPL-CCNC: 9 U/L (ref 1–41)
AST SERPL-CCNC: 18 U/L (ref 1–40)
BASOPHILS # BLD AUTO: 0.02 10*3/MM3 (ref 0–0.2)
BASOPHILS NFR BLD AUTO: 0.1 % (ref 0–1.5)
BILIRUB SERPL-MCNC: 0.2 MG/DL (ref 0.1–1.2)
BUN SERPL-MCNC: 17 MG/DL (ref 8–23)
BUN/CREAT SERPL: 18.9 (ref 7–25)
CALCIUM SERPL-MCNC: 8.7 MG/DL (ref 8.6–10.5)
CHLORIDE SERPL-SCNC: 103 MMOL/L (ref 98–107)
CO2 SERPL-SCNC: 32.2 MMOL/L (ref 22–29)
CREAT SERPL-MCNC: 0.9 MG/DL (ref 0.76–1.27)
EOSINOPHIL # BLD AUTO: 0.08 10*3/MM3 (ref 0–0.7)
EOSINOPHIL NFR BLD AUTO: 0.6 % (ref 0.3–6.2)
ERYTHROCYTE [DISTWIDTH] IN BLOOD BY AUTOMATED COUNT: 13.4 % (ref 11.5–14.5)
GFR SERPLBLD CREATININE-BSD FMLA CKD-EPI: 100 ML/MIN/1.73
GFR SERPLBLD CREATININE-BSD FMLA CKD-EPI: 83 ML/MIN/1.73
GLOBULIN SER CALC-MCNC: 2.4 GM/DL
GLUCOSE SERPL-MCNC: 113 MG/DL (ref 65–99)
HCT VFR BLD AUTO: 37.8 % (ref 40.4–52.2)
HGB BLD-MCNC: 11.6 G/DL (ref 13.7–17.6)
IMM GRANULOCYTES # BLD: 0.03 10*3/MM3 (ref 0–0.03)
IMM GRANULOCYTES NFR BLD: 0.2 % (ref 0–0.5)
LYMPHOCYTES # BLD AUTO: 1.33 10*3/MM3 (ref 0.9–4.8)
LYMPHOCYTES NFR BLD AUTO: 9.8 % (ref 19.6–45.3)
MCH RBC QN AUTO: 31.4 PG (ref 27–32.7)
MCHC RBC AUTO-ENTMCNC: 30.7 G/DL (ref 32.6–36.4)
MCV RBC AUTO: 102.4 FL (ref 79.8–96.2)
MONOCYTES # BLD AUTO: 0.82 10*3/MM3 (ref 0.2–1.2)
MONOCYTES NFR BLD AUTO: 6 % (ref 5–12)
NEUTROPHILS # BLD AUTO: 11.34 10*3/MM3 (ref 1.9–8.1)
NEUTROPHILS NFR BLD AUTO: 83.3 % (ref 42.7–76)
PLATELET # BLD AUTO: 242 10*3/MM3 (ref 140–500)
POTASSIUM SERPL-SCNC: 4.5 MMOL/L (ref 3.5–5.2)
PROT SERPL-MCNC: 6.5 G/DL (ref 6–8.5)
RBC # BLD AUTO: 3.69 10*6/MM3 (ref 4.6–6)
SODIUM SERPL-SCNC: 144 MMOL/L (ref 136–145)
WBC # BLD AUTO: 13.62 10*3/MM3 (ref 4.5–10.7)

## 2018-02-02 PROCEDURE — 99213 OFFICE O/P EST LOW 20 MIN: CPT | Performed by: FAMILY MEDICINE

## 2018-02-02 NOTE — PROGRESS NOTES
HPI  Rommel Gonzalez is a 72 y.o. male who is here for follow up complaining of weakness especially after having speech physical and occupational therapy all done yesterday.  Afterwards he was extremely weak was even having trouble talking etc.  Has follow-up appointment with neurologist next week.  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  Actually seems to be doing much better today.      Review of Systems   Constitutional: Positive for chills and fatigue.   HENT: Positive for drooling and postnasal drip.    Gastrointestinal: Positive for blood in stool and constipation.   Endocrine: Positive for cold intolerance and polyphagia.   Musculoskeletal: Positive for neck stiffness.   Allergic/Immunologic: Positive for environmental allergies.   Neurological: Positive for speech difficulty and weakness.   Psychiatric/Behavioral: Positive for sleep disturbance.   All other systems reviewed and are negative.        Past Medical History:   Diagnosis Date   • AF (atrial fibrillation)    • Atrial flutter    • Atypical chest pain    • Chest pain    • Colon polyps    • Confusion    • Depression    • Disease of thyroid gland    • Disorder of thyroid    • Dyspnea and respiratory abnormalities    • Elevated TSH    • Fatigue    • Gait instability    • Hay fever    • Hyperlipidemia    • Hypertension    • Hypothyroidism    • Insomnia    • Measles    • Mumps    • Palpitations    • Parkinson disease    • Peripheral neuropathy    • Pneumonia    • Poor sleep pattern    • Slurred speech    • Tremor        No past surgical history on file.    Family History   Problem Relation Age of Onset   • Hypertension Mother    • Glaucoma Mother    • Throat cancer Father    • Alcohol abuse Father    • Hypertension Sister    • Cancer Sister      malignant neoplasm of urinary bladder   • Lung cancer Brother    • Heart attack Other    • Lung disease Other        Social History     Social History   •  Marital status:      Spouse name: N/A   • Number of children: N/A   • Years of education: N/A     Occupational History   • Not on file.     Social History Main Topics   • Smoking status: Former Smoker   • Smokeless tobacco: Not on file   • Alcohol use Yes      Comment: social use   • Drug use: No   • Sexual activity: No     Other Topics Concern   • Not on file     Social History Narrative         Physical Exam   Constitutional: He is oriented to person, place, and time. He appears well-developed and well-nourished.   HENT:   Head: Normocephalic and atraumatic.   Nose: Nose normal.   Mouth/Throat: Oropharynx is clear and moist.   Eyes: Conjunctivae and EOM are normal. Pupils are equal, round, and reactive to light.   Neck: Normal range of motion. Neck supple. No JVD present. No thyromegaly present.   Cardiovascular: Normal rate.  An irregular rhythm present.   Pulmonary/Chest: Effort normal and breath sounds normal.   Abdominal: Soft. He exhibits no distension. There is no tenderness.   Musculoskeletal: He exhibits no edema or deformity.   Neurological: He is alert and oriented to person, place, and time. He exhibits abnormal muscle tone. Coordination abnormal.   Skin: Skin is warm and dry.   Psychiatric: Judgment and thought content normal. His affect is blunt. His speech is slurred. He is slowed. Cognition and memory are normal.   Nursing note and vitals reviewed.        Assessment/Plan    Rommel was seen today for extremity weakness and fatigue.    Diagnoses and all orders for this visit:    Chronic atrial fibrillation    Anticoagulated    High risk medication use  -     CBC & Differential  -     Comprehensive Metabolic Panel    Idiopathic Parkinson's disease    Dysarthria      Patient is here mostly for generalized weakness after undergoing recent physical occupational and speech therapy.  Better today and suspect just related to too much activity at wants and this was discussed with his sister.  Has  appointment with neurologist next week regarding parkinsonism versus super nuclear palsy?  Also appointment with cardiologist later this month.  Blood pressure tends to run somewhat low at times suspect related to his neurological condition and blood pressure medications may need to be decreased slightly.  Will get routine lab work which was last done 6 months ago.  Recent thyroid function was unremarkable.  Follow-up in 3 months or as needed.    This note includes information entered using a voice recognition dictation system.  Though reviewed, some nonsensible errors may remain.

## 2018-02-05 ENCOUNTER — TELEPHONE (OUTPATIENT)
Dept: PHYSICAL THERAPY | Facility: HOSPITAL | Age: 73
End: 2018-02-05

## 2018-02-05 NOTE — TELEPHONE ENCOUNTER
02/05/2018  Spoke with pt's sister Raquel regarding pt's fatigue after previous therapy sessions and the recommendations of the therapists to reduce pt to two therapies 2 x week verses the scheduled three therapies 2 x week. Raquel reported her brother was very fatigued and couldn't talk for awhile after previous session. She reported he has also had a recent fall. Raquel reported pt's neurologist Dr. Pickett has ordered a EEG, echo on his heart this week and a MRI and CT scan on the 17th and he will not be returning until they get the results. Discussed with Raquel reduction of therapies or possible home health following discussion with the neurologist.

## 2018-02-07 ENCOUNTER — APPOINTMENT (OUTPATIENT)
Dept: OCCUPATIONAL THERAPY | Facility: HOSPITAL | Age: 73
End: 2018-02-07

## 2018-02-07 ENCOUNTER — APPOINTMENT (OUTPATIENT)
Dept: PHYSICAL THERAPY | Facility: HOSPITAL | Age: 73
End: 2018-02-07

## 2018-02-07 ENCOUNTER — APPOINTMENT (OUTPATIENT)
Dept: SPEECH THERAPY | Facility: HOSPITAL | Age: 73
End: 2018-02-07

## 2018-02-09 ENCOUNTER — APPOINTMENT (OUTPATIENT)
Dept: PHYSICAL THERAPY | Facility: HOSPITAL | Age: 73
End: 2018-02-09

## 2018-02-09 ENCOUNTER — APPOINTMENT (OUTPATIENT)
Dept: SPEECH THERAPY | Facility: HOSPITAL | Age: 73
End: 2018-02-09

## 2018-02-09 ENCOUNTER — APPOINTMENT (OUTPATIENT)
Dept: OCCUPATIONAL THERAPY | Facility: HOSPITAL | Age: 73
End: 2018-02-09

## 2018-02-14 ENCOUNTER — APPOINTMENT (OUTPATIENT)
Dept: OCCUPATIONAL THERAPY | Facility: HOSPITAL | Age: 73
End: 2018-02-14

## 2018-02-14 ENCOUNTER — APPOINTMENT (OUTPATIENT)
Dept: SPEECH THERAPY | Facility: HOSPITAL | Age: 73
End: 2018-02-14

## 2018-02-14 ENCOUNTER — APPOINTMENT (OUTPATIENT)
Dept: PHYSICAL THERAPY | Facility: HOSPITAL | Age: 73
End: 2018-02-14

## 2018-02-16 ENCOUNTER — APPOINTMENT (OUTPATIENT)
Dept: OCCUPATIONAL THERAPY | Facility: HOSPITAL | Age: 73
End: 2018-02-16

## 2018-02-16 ENCOUNTER — APPOINTMENT (OUTPATIENT)
Dept: SPEECH THERAPY | Facility: HOSPITAL | Age: 73
End: 2018-02-16

## 2018-02-16 ENCOUNTER — APPOINTMENT (OUTPATIENT)
Dept: PHYSICAL THERAPY | Facility: HOSPITAL | Age: 73
End: 2018-02-16

## 2018-02-19 ENCOUNTER — TELEPHONE (OUTPATIENT)
Dept: PHYSICAL THERAPY | Facility: HOSPITAL | Age: 73
End: 2018-02-19

## 2018-02-19 NOTE — TELEPHONE ENCOUNTER
Spoke to sister, Raquel today and she said they got results that he has carotid stenosis and will see the cardiologist Friday, 2/23/18. She said pt has fallen at home since we last spoke to her and pt is now not driving. PT discussed possibility of home health and she will discuss this with cardiologist at pt's appointment. Pt's sister to call PT back to let us know what plans for therapy are.  Sister also mentioned they are thinking of getting some hired help in pt's home, at least a few hours at a time.

## 2018-02-21 ENCOUNTER — APPOINTMENT (OUTPATIENT)
Dept: PHYSICAL THERAPY | Facility: HOSPITAL | Age: 73
End: 2018-02-21

## 2018-02-21 ENCOUNTER — APPOINTMENT (OUTPATIENT)
Dept: OCCUPATIONAL THERAPY | Facility: HOSPITAL | Age: 73
End: 2018-02-21

## 2018-02-21 ENCOUNTER — APPOINTMENT (OUTPATIENT)
Dept: SPEECH THERAPY | Facility: HOSPITAL | Age: 73
End: 2018-02-21

## 2018-02-23 ENCOUNTER — APPOINTMENT (OUTPATIENT)
Dept: OCCUPATIONAL THERAPY | Facility: HOSPITAL | Age: 73
End: 2018-02-23

## 2018-02-23 ENCOUNTER — APPOINTMENT (OUTPATIENT)
Dept: PHYSICAL THERAPY | Facility: HOSPITAL | Age: 73
End: 2018-02-23

## 2018-02-23 ENCOUNTER — OFFICE VISIT (OUTPATIENT)
Dept: CARDIOLOGY | Facility: CLINIC | Age: 73
End: 2018-02-23

## 2018-02-23 ENCOUNTER — TELEPHONE (OUTPATIENT)
Dept: CARDIOLOGY | Facility: CLINIC | Age: 73
End: 2018-02-23

## 2018-02-23 VITALS
HEIGHT: 72 IN | DIASTOLIC BLOOD PRESSURE: 76 MMHG | BODY MASS INDEX: 24.11 KG/M2 | WEIGHT: 178 LBS | HEART RATE: 92 BPM | SYSTOLIC BLOOD PRESSURE: 136 MMHG

## 2018-02-23 DIAGNOSIS — G47.33 OBSTRUCTIVE SLEEP APNEA, ADULT: ICD-10-CM

## 2018-02-23 DIAGNOSIS — Z79.899 HIGH RISK MEDICATION USE: ICD-10-CM

## 2018-02-23 DIAGNOSIS — Z79.01 ANTICOAGULATED: ICD-10-CM

## 2018-02-23 DIAGNOSIS — I48.3 TYPICAL ATRIAL FLUTTER (HCC): Primary | ICD-10-CM

## 2018-02-23 DIAGNOSIS — I10 ESSENTIAL HYPERTENSION: ICD-10-CM

## 2018-02-23 DIAGNOSIS — I48.20 CHRONIC ATRIAL FIBRILLATION (HCC): ICD-10-CM

## 2018-02-23 PROCEDURE — 93000 ELECTROCARDIOGRAM COMPLETE: CPT | Performed by: INTERNAL MEDICINE

## 2018-02-23 PROCEDURE — 99213 OFFICE O/P EST LOW 20 MIN: CPT | Performed by: INTERNAL MEDICINE

## 2018-02-23 RX ORDER — CARBIDOPA AND LEVODOPA 50; 200 MG/1; MG/1
TABLET, EXTENDED RELEASE ORAL
COMMUNITY
Start: 2018-02-17 | End: 2018-03-19 | Stop reason: HOSPADM

## 2018-02-23 RX ORDER — GABAPENTIN 300 MG/1
CAPSULE ORAL
COMMUNITY
Start: 2017-11-27 | End: 2018-03-07

## 2018-02-23 NOTE — TELEPHONE ENCOUNTER
Dr Bennett spoke with Dr Pickett re: ASA and Xarelto use.      Per Dr Bennett and Dr Pickett- continue ASA and Xarelto.  Pt is at high risk for a fall but also has a lot of plaque which also puts him at risk.  PT MUST USE HIS WALKER.      LMM with pt at home to call me.

## 2018-02-27 NOTE — TELEPHONE ENCOUNTER
Yes, but they told me he already had one and hew as using this at home. Is this not correct? kriss

## 2018-02-27 NOTE — TELEPHONE ENCOUNTER
Order placed in our box.  Patient and family will need to discuss choice with primary care or the medical supply company. kriss

## 2018-02-28 ENCOUNTER — APPOINTMENT (OUTPATIENT)
Dept: SPEECH THERAPY | Facility: HOSPITAL | Age: 73
End: 2018-02-28

## 2018-02-28 ENCOUNTER — APPOINTMENT (OUTPATIENT)
Dept: PHYSICAL THERAPY | Facility: HOSPITAL | Age: 73
End: 2018-02-28

## 2018-02-28 ENCOUNTER — DOCUMENTATION (OUTPATIENT)
Dept: PHYSICAL THERAPY | Facility: HOSPITAL | Age: 73
End: 2018-02-28

## 2018-02-28 ENCOUNTER — APPOINTMENT (OUTPATIENT)
Dept: OCCUPATIONAL THERAPY | Facility: HOSPITAL | Age: 73
End: 2018-02-28

## 2018-03-01 ENCOUNTER — DOCUMENTATION (OUTPATIENT)
Dept: OCCUPATIONAL THERAPY | Facility: HOSPITAL | Age: 73
End: 2018-03-01

## 2018-03-01 NOTE — THERAPY DISCHARGE NOTE
Outpatient Occupational Therapy Discharge Summary         Patient Name: Rommel Gonzalez  : 1945  MRN: [id number]    Today's Date: 3/1/2018      Visit Date: 2018            OT Goals       18 0900       OT Short Term Goals    STG 1 Patient to be independent with fine motor coordination home program.  -SO     STG 1 Progress Not Met  -SO     STG 2 Patient and family to be independent with gross motor coordination home exercise program.  -SO     STG 2 Progress Not Met  -SO     STG 3 Pt. to be (I) with strengthening HEP.  -SO     STG 3 Progress Not Met  -SO       User Key  (r) = Recorded By, (t) = Taken By, (c) = Cosigned By    Initials Name Provider Type    SO Kyara Lo OTR Occupational Therapist           OP OT Discharge Summary  Date of Discharge: 18  Reason for Discharge: Unable to participate  Outcomes Achieved: Unable to make functional progress toward goals at this time  Discharge Instructions: Pt not coming back to OP OT at this time per family.      Time Calculation:                         JAVIER Suarez   3/1/2018

## 2018-03-02 ENCOUNTER — APPOINTMENT (OUTPATIENT)
Dept: PHYSICAL THERAPY | Facility: HOSPITAL | Age: 73
End: 2018-03-02

## 2018-03-02 ENCOUNTER — APPOINTMENT (OUTPATIENT)
Dept: SPEECH THERAPY | Facility: HOSPITAL | Age: 73
End: 2018-03-02

## 2018-03-02 ENCOUNTER — APPOINTMENT (OUTPATIENT)
Dept: OCCUPATIONAL THERAPY | Facility: HOSPITAL | Age: 73
End: 2018-03-02

## 2018-03-03 DIAGNOSIS — E03.9 HYPOTHYROIDISM (ACQUIRED): ICD-10-CM

## 2018-03-05 RX ORDER — LEVOTHYROXINE SODIUM 112 UG/1
TABLET ORAL
Qty: 90 TABLET | Refills: 2 | Status: SHIPPED | OUTPATIENT
Start: 2018-03-05 | End: 2018-03-07

## 2018-03-07 ENCOUNTER — DOCUMENTATION (OUTPATIENT)
Dept: SPEECH THERAPY | Facility: HOSPITAL | Age: 73
End: 2018-03-07

## 2018-03-07 ENCOUNTER — APPOINTMENT (OUTPATIENT)
Dept: PHYSICAL THERAPY | Facility: HOSPITAL | Age: 73
End: 2018-03-07

## 2018-03-07 ENCOUNTER — APPOINTMENT (OUTPATIENT)
Dept: CT IMAGING | Facility: HOSPITAL | Age: 73
End: 2018-03-07

## 2018-03-07 ENCOUNTER — APPOINTMENT (OUTPATIENT)
Dept: OCCUPATIONAL THERAPY | Facility: HOSPITAL | Age: 73
End: 2018-03-07

## 2018-03-07 ENCOUNTER — APPOINTMENT (OUTPATIENT)
Dept: GENERAL RADIOLOGY | Facility: HOSPITAL | Age: 73
End: 2018-03-07

## 2018-03-07 ENCOUNTER — HOSPITAL ENCOUNTER (INPATIENT)
Facility: HOSPITAL | Age: 73
LOS: 12 days | Discharge: NURSING, REHAB OR LONG-TERM CARE | End: 2018-03-19
Attending: EMERGENCY MEDICINE | Admitting: INTERNAL MEDICINE

## 2018-03-07 ENCOUNTER — APPOINTMENT (OUTPATIENT)
Dept: SPEECH THERAPY | Facility: HOSPITAL | Age: 73
End: 2018-03-07

## 2018-03-07 DIAGNOSIS — G23.1 PROGRESSIVE SUPRANUCLEAR PALSY (HCC): ICD-10-CM

## 2018-03-07 DIAGNOSIS — T79.6XXA TRAUMATIC RHABDOMYOLYSIS, INITIAL ENCOUNTER (HCC): Primary | ICD-10-CM
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
## 2018-03-07 DIAGNOSIS — R13.10 DYSPHAGIA, UNSPECIFIED TYPE: ICD-10-CM
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
## 2018-03-07 DIAGNOSIS — R47.1 DYSARTHRIA: ICD-10-CM

## 2018-03-07 DIAGNOSIS — Z74.09 IMPAIRED FUNCTIONAL MOBILITY, BALANCE, GAIT, AND ENDURANCE: ICD-10-CM

## 2018-03-07 PROBLEM — T14.8XXA BRUISING: Status: ACTIVE | Noted: 2018-03-07

## 2018-03-07 LAB
ALBUMIN SERPL-MCNC: 3.8 G/DL (ref 3.5–5.2)
ALBUMIN/GLOB SERPL: 1.2 G/DL
ALP SERPL-CCNC: 57 U/L (ref 39–117)
ALT SERPL W P-5'-P-CCNC: 13 U/L (ref 1–41)
ANION GAP SERPL CALCULATED.3IONS-SCNC: 9 MMOL/L
AST SERPL-CCNC: 63 U/L (ref 1–40)
BASOPHILS # BLD AUTO: 0.01 10*3/MM3 (ref 0–0.2)
BASOPHILS NFR BLD AUTO: 0.1 % (ref 0–1.5)
BILIRUB SERPL-MCNC: 0.7 MG/DL (ref 0.1–1.2)
BILIRUB UR QL STRIP: NEGATIVE
BUN BLD-MCNC: 17 MG/DL (ref 8–23)
BUN/CREAT SERPL: 18.7 (ref 7–25)
CALCIUM SPEC-SCNC: 8.8 MG/DL (ref 8.6–10.5)
CHLORIDE SERPL-SCNC: 104 MMOL/L (ref 98–107)
CK SERPL-CCNC: 2413 U/L (ref 20–200)
CLARITY UR: CLEAR
CO2 SERPL-SCNC: 27 MMOL/L (ref 22–29)
COLOR UR: ABNORMAL
CREAT BLD-MCNC: 0.91 MG/DL (ref 0.76–1.27)
DEPRECATED RDW RBC AUTO: 48.1 FL (ref 37–54)
EOSINOPHIL # BLD AUTO: 0 10*3/MM3 (ref 0–0.7)
EOSINOPHIL NFR BLD AUTO: 0 % (ref 0.3–6.2)
ERYTHROCYTE [DISTWIDTH] IN BLOOD BY AUTOMATED COUNT: 13.4 % (ref 11.5–14.5)
GFR SERPL CREATININE-BSD FRML MDRD: 82 ML/MIN/1.73
GLOBULIN UR ELPH-MCNC: 3.1 GM/DL
GLUCOSE BLD-MCNC: 111 MG/DL (ref 65–99)
GLUCOSE UR STRIP-MCNC: NEGATIVE MG/DL
HCT VFR BLD AUTO: 40.1 % (ref 40.4–52.2)
HGB BLD-MCNC: 13.1 G/DL (ref 13.7–17.6)
HGB UR QL STRIP.AUTO: NEGATIVE
HOLD SPECIMEN: NORMAL
IMM GRANULOCYTES # BLD: 0.02 10*3/MM3 (ref 0–0.03)
IMM GRANULOCYTES NFR BLD: 0.2 % (ref 0–0.5)
INR PPP: 2.57 (ref 0.9–1.1)
KETONES UR QL STRIP: ABNORMAL
LEUKOCYTE ESTERASE UR QL STRIP.AUTO: NEGATIVE
LYMPHOCYTES # BLD AUTO: 0.73 10*3/MM3 (ref 0.9–4.8)
LYMPHOCYTES NFR BLD AUTO: 8.1 % (ref 19.6–45.3)
MCH RBC QN AUTO: 31.9 PG (ref 27–32.7)
MCHC RBC AUTO-ENTMCNC: 32.7 G/DL (ref 32.6–36.4)
MCV RBC AUTO: 97.6 FL (ref 79.8–96.2)
MONOCYTES # BLD AUTO: 0.88 10*3/MM3 (ref 0.2–1.2)
MONOCYTES NFR BLD AUTO: 9.8 % (ref 5–12)
NEUTROPHILS # BLD AUTO: 7.35 10*3/MM3 (ref 1.9–8.1)
NEUTROPHILS NFR BLD AUTO: 81.8 % (ref 42.7–76)
NITRITE UR QL STRIP: NEGATIVE
PH UR STRIP.AUTO: 7 [PH] (ref 5–8)
PLATELET # BLD AUTO: 202 10*3/MM3 (ref 140–500)
PMV BLD AUTO: 11.3 FL (ref 6–12)
POTASSIUM BLD-SCNC: 4.2 MMOL/L (ref 3.5–5.2)
PROT SERPL-MCNC: 6.9 G/DL (ref 6–8.5)
PROT UR QL STRIP: ABNORMAL
PROTHROMBIN TIME: 27.1 SECONDS (ref 11.7–14.2)
RBC # BLD AUTO: 4.11 10*6/MM3 (ref 4.6–6)
SODIUM BLD-SCNC: 140 MMOL/L (ref 136–145)
SP GR UR STRIP: 1.03 (ref 1–1.03)
UROBILINOGEN UR QL STRIP: ABNORMAL
WBC NRBC COR # BLD: 8.99 10*3/MM3 (ref 4.5–10.7)
WHOLE BLOOD HOLD SPECIMEN: NORMAL
WHOLE BLOOD HOLD SPECIMEN: NORMAL

## 2018-03-07 PROCEDURE — 70450 CT HEAD/BRAIN W/O DYE: CPT

## 2018-03-07 PROCEDURE — 85025 COMPLETE CBC W/AUTO DIFF WBC: CPT | Performed by: NURSE PRACTITIONER

## 2018-03-07 PROCEDURE — 81003 URINALYSIS AUTO W/O SCOPE: CPT | Performed by: NURSE PRACTITIONER

## 2018-03-07 PROCEDURE — 85610 PROTHROMBIN TIME: CPT | Performed by: NURSE PRACTITIONER

## 2018-03-07 PROCEDURE — 80053 COMPREHEN METABOLIC PANEL: CPT | Performed by: NURSE PRACTITIONER

## 2018-03-07 PROCEDURE — 82550 ASSAY OF CK (CPK): CPT | Performed by: NURSE PRACTITIONER

## 2018-03-07 PROCEDURE — 99285 EMERGENCY DEPT VISIT HI MDM: CPT

## 2018-03-07 PROCEDURE — 71046 X-RAY EXAM CHEST 2 VIEWS: CPT

## 2018-03-07 RX ORDER — ASPIRIN 81 MG/1
81 TABLET, CHEWABLE ORAL DAILY
COMMUNITY
End: 2018-03-19 | Stop reason: HOSPADM

## 2018-03-07 RX ORDER — GABAPENTIN 300 MG/1
300 CAPSULE ORAL 2 TIMES DAILY
Status: DISCONTINUED | OUTPATIENT
Start: 2018-03-07 | End: 2018-03-08

## 2018-03-07 RX ORDER — ATORVASTATIN CALCIUM 10 MG/1
10 TABLET, FILM COATED ORAL DAILY
Status: DISCONTINUED | OUTPATIENT
Start: 2018-03-08 | End: 2018-03-12

## 2018-03-07 RX ORDER — LISINOPRIL 10 MG/1
5 TABLET ORAL EVERY MORNING
COMMUNITY
End: 2018-03-19 | Stop reason: HOSPADM

## 2018-03-07 RX ORDER — CARBIDOPA/LEVODOPA 25MG-250MG
0.5 TABLET ORAL 3 TIMES DAILY
Status: DISCONTINUED | OUTPATIENT
Start: 2018-03-07 | End: 2018-03-17

## 2018-03-07 RX ORDER — LISINOPRIL 10 MG/1
10 TABLET ORAL NIGHTLY
Status: DISCONTINUED | OUTPATIENT
Start: 2018-03-07 | End: 2018-03-08

## 2018-03-07 RX ORDER — ZOLPIDEM TARTRATE 5 MG/1
5 TABLET ORAL NIGHTLY PRN
Status: DISCONTINUED | OUTPATIENT
Start: 2018-03-07 | End: 2018-03-19 | Stop reason: HOSPADM

## 2018-03-07 RX ORDER — ACETAMINOPHEN 325 MG/1
650 TABLET ORAL EVERY 6 HOURS PRN
Status: DISCONTINUED | OUTPATIENT
Start: 2018-03-07 | End: 2018-03-19 | Stop reason: HOSPADM

## 2018-03-07 RX ORDER — ASPIRIN 81 MG/1
81 TABLET, CHEWABLE ORAL DAILY
Status: DISCONTINUED | OUTPATIENT
Start: 2018-03-08 | End: 2018-03-09

## 2018-03-07 RX ORDER — LEVOTHYROXINE SODIUM 112 UG/1
112 TABLET ORAL
Status: DISCONTINUED | OUTPATIENT
Start: 2018-03-08 | End: 2018-03-17

## 2018-03-07 RX ORDER — SODIUM CHLORIDE 450 MG/100ML
100 INJECTION, SOLUTION INTRAVENOUS CONTINUOUS
Status: DISCONTINUED | OUTPATIENT
Start: 2018-03-07 | End: 2018-03-08

## 2018-03-07 RX ORDER — GABAPENTIN 300 MG/1
300 CAPSULE ORAL 2 TIMES DAILY
COMMUNITY
End: 2018-03-19 | Stop reason: HOSPADM

## 2018-03-07 RX ORDER — TERAZOSIN 2 MG/1
2 CAPSULE ORAL NIGHTLY
Status: DISCONTINUED | OUTPATIENT
Start: 2018-03-07 | End: 2018-03-08

## 2018-03-07 RX ORDER — LISINOPRIL 5 MG/1
5 TABLET ORAL DAILY
Status: DISCONTINUED | OUTPATIENT
Start: 2018-03-08 | End: 2018-03-08

## 2018-03-07 RX ORDER — BUMETANIDE 2 MG/1
2 TABLET ORAL
Status: DISCONTINUED | OUTPATIENT
Start: 2018-03-09 | End: 2018-03-08

## 2018-03-07 RX ORDER — LEVOTHYROXINE SODIUM 112 UG/1
112 TABLET ORAL DAILY
COMMUNITY
End: 2018-04-11 | Stop reason: DRUGHIGH

## 2018-03-07 RX ORDER — SODIUM CHLORIDE 0.9 % (FLUSH) 0.9 %
10 SYRINGE (ML) INJECTION AS NEEDED
Status: DISCONTINUED | OUTPATIENT
Start: 2018-03-07 | End: 2018-03-19 | Stop reason: HOSPADM

## 2018-03-07 RX ORDER — SODIUM CHLORIDE 0.9 % (FLUSH) 0.9 %
1-10 SYRINGE (ML) INJECTION AS NEEDED
Status: DISCONTINUED | OUTPATIENT
Start: 2018-03-07 | End: 2018-03-08

## 2018-03-07 RX ORDER — MULTIPLE VITAMINS W/ MINERALS TAB 9MG-400MCG
1 TAB ORAL DAILY
Status: DISCONTINUED | OUTPATIENT
Start: 2018-03-08 | End: 2018-03-17

## 2018-03-07 RX ORDER — BUMETANIDE 2 MG/1
1 TABLET ORAL 2 TIMES WEEKLY
COMMUNITY
End: 2018-03-19 | Stop reason: HOSPADM

## 2018-03-07 RX ORDER — DEXTROSE MONOHYDRATE 50 MG/ML
75 INJECTION, SOLUTION INTRAVENOUS CONTINUOUS
Status: DISCONTINUED | OUTPATIENT
Start: 2018-03-07 | End: 2018-03-07

## 2018-03-07 RX ORDER — BUMETANIDE 1 MG/1
1 TABLET ORAL
Status: DISCONTINUED | OUTPATIENT
Start: 2018-03-10 | End: 2018-03-08

## 2018-03-07 RX ORDER — CARBIDOPA/LEVODOPA 25MG-250MG
2.5 TABLET ORAL 3 TIMES DAILY
COMMUNITY
End: 2018-05-18 | Stop reason: DRUGHIGH

## 2018-03-07 RX ORDER — BUMETANIDE 2 MG/1
2 TABLET ORAL 2 TIMES WEEKLY
COMMUNITY
End: 2018-03-19 | Stop reason: HOSPADM

## 2018-03-07 RX ORDER — ONDANSETRON 2 MG/ML
4 INJECTION INTRAMUSCULAR; INTRAVENOUS EVERY 6 HOURS PRN
Status: DISCONTINUED | OUTPATIENT
Start: 2018-03-07 | End: 2018-03-19 | Stop reason: HOSPADM

## 2018-03-07 RX ORDER — LISINOPRIL 10 MG/1
10 TABLET ORAL NIGHTLY
COMMUNITY
End: 2018-03-19 | Stop reason: HOSPADM

## 2018-03-07 RX ADMIN — LISINOPRIL 10 MG: 10 TABLET ORAL at 23:18

## 2018-03-07 RX ADMIN — CARVEDILOL 9.38 MG: 6.25 TABLET, FILM COATED ORAL at 23:18

## 2018-03-07 RX ADMIN — RIVAROXABAN 20 MG: 20 TABLET, FILM COATED ORAL at 23:18

## 2018-03-07 RX ADMIN — SODIUM CHLORIDE 100 ML/HR: 4.5 INJECTION, SOLUTION INTRAVENOUS at 21:26

## 2018-03-07 RX ADMIN — CARBIDOPA AND LEVODOPA 0.5 TABLET: 25; 250 TABLET ORAL at 23:19

## 2018-03-07 RX ADMIN — SODIUM CHLORIDE 1000 ML: 9 INJECTION, SOLUTION INTRAVENOUS at 17:55

## 2018-03-07 RX ADMIN — SODIUM CHLORIDE 1500 ML: 9 INJECTION, SOLUTION INTRAVENOUS at 16:36

## 2018-03-07 RX ADMIN — TERAZOSIN HYDROCHLORIDE ANHYDROUS 2 MG: 2 CAPSULE ORAL at 23:18

## 2018-03-07 RX ADMIN — CARBIDOPA AND LEVODOPA 2 TABLET: 25; 100 TABLET ORAL at 23:17

## 2018-03-07 RX ADMIN — GABAPENTIN 300 MG: 300 CAPSULE ORAL at 23:18

## 2018-03-07 NOTE — ED PROVIDER NOTES
Pt presents to the ED with family beside. He was found down by family after being down for an unknown length of time. He denies a fall but let himself down onto the ground. He denies any injuries. On exam, pt awake and alert, no new focal neuro deficit, no facial droop, erythema and abrasions over left knee and thigh. He is currently being hydrated for rhabdomyolysis. Discussed with family the plan to admit for continued observation and IVF.    Attestation:  I supervised care provided by the midlevel provider.    We have discussed this patient's history, physical exam, and treatment plan.   I have reviewed the note and personally saw and examined the patient and agree with the plan of care.    Documentation assistance provided by dm Rivera for Dr. Lovelace. Information recorded by the codyibedinson was done at my direction and has been verified and validated by me.       Brittany Rivera  03/07/18 7983       Sigifredo Lovelace MD  03/07/18 1955

## 2018-03-07 NOTE — ED NOTES
"Pt's sister reports that she found pt today at 14:00 after pt was down in floor prone since approximately 10:00 am. Pt's sister reports new left-sided facial droop, worsened slurred speech, and increased left-sided weakness. Pt reports that this is probably all normal and associated with his previously diagnosed \"Palsy,\" and claims that he laid himself on his face in the floor for 4 hours. MD aware of pt arrival/pt's current status. V/s currently stable and pt in NAD. Pt updated, family at bedside. Call light provided and lighting decreased per pt's request     Germania Smith RN  03/07/18 1530    "

## 2018-03-07 NOTE — THERAPY DISCHARGE NOTE
Outpatient Speech Language Pathology   Adult Speech Language Cognitive Eval/Discharge       Patient Name: Rommel Gonzalez  : 1945  MRN: [id number]  Today's Date: 3/7/2018         Visit Date: 2018    Patient Active Problem List   Diagnosis   • AF (atrial fibrillation)   • Depression   • Gait instability   • Hypertension   • Insomnia   • Idiopathic Parkinson's disease   • Peripheral neuropathy   • Dysarthria   • Atrial flutter   • Anticoagulated   • Health care maintenance   • Hypothyroidism (acquired)   • Weakness of voice   • Abnormal chest CT   • Facial droop   • Hypersomnia    • Hyperlipidemia   • High risk medication use   • CORINNE on CPAP   • Obstructive sleep apnea, adult        Past Medical History:   Diagnosis Date   • AF (atrial fibrillation)    • Atrial flutter    • Atypical chest pain    • Chest pain    • Colon polyps    • Confusion    • Depression    • Disease of thyroid gland    • Disorder of thyroid    • Dyspnea and respiratory abnormalities    • Elevated TSH    • Fatigue    • Gait instability    • Hay fever    • Hyperlipidemia    • Hypertension    • Hypothyroidism    • Insomnia    • Measles    • Mumps    • Palpitations    • Parkinson disease    • Peripheral neuropathy    • Pneumonia    • Poor sleep pattern    • Slurred speech    • Tremor         No past surgical history on file.      Visit Dx:  No diagnosis found.                                        OP SLP Assessment/Plan - 18 0944     SLP Assessment    Functional Problems Voice  -SA    Clinical Impression- Voice Severe voice disorder  -SA    Clinical Impression- PVFM Does not appear to present with PVFM  -SA    Prognosis Fair (comment)  -SA    SLP Plan    Frequency Pt being d/c from ST and should continue ST in the home  -SA      User Key  (r) = Recorded By, (t) = Taken By, (c) = Cosigned By    Initials Name Provider Type    SA Lisa Paul MS CCC-SLP Speech and Language Pathologist             SLP Outcome Measures (last 72 hours)       SLP Outcome Measures       03/07/18 0900          SLP Outcome Measures    Outcome Measure Used? Adult NOMS  -      FCM Scores    Motor Speech FCM Score 3  -      Voice FCM Score 2  -SA        User Key  (r) = Recorded By, (t) = Taken By, (c) = Cosigned By    Initials Name Effective Dates    SA Lisa Paul MS CCC-SLP 07/25/17 -              Time Calculation:            SLP G-Codes  Functional Limitations: Motor speech, Voice  Motor Speech Discharge Status (): At least 60 percent but less than 80 percent impaired, limited or restricted  Voice Discharge Status (): At least 80 percent but less than 100 percent impaired, limited or restricted    OP SLP Discharge Summary  Date of Discharge: 03/07/18  Reason for Discharge: Change in medical status, other (comment) (Pt w/ decline and weakness coming to tx; Pt to recieve HH speech tx)  Outcomes Achieved:  (Worked on high phonatory effort w/ 40% accuracy; imporoving comprehensibilitiy of appropriate vocal intensity 50% w/ MOD to MAX verbal cues; pt able to achieve pitch glide w/ 100%; pitch scale-40%. Pt using compensatory strategies 40% w/ max cues. )  Discharge Destination: Home health services  Discharge Instructions:  (Pt Worked on diaphragmatic breathing  for exhalatory strength; SLP provided information to purchase EMST device to improbe respiratory support; SLP encouraged pt to use voice amplification system. Pt reluctant to use. )      Lisa Paul MS CCC-SLP  3/7/2018

## 2018-03-07 NOTE — PROGRESS NOTES
Clinical Pharmacy Services: Medication History    Rommel Gonzalez is a 72 y.o. male presenting to [facility] for   Chief Complaint   Patient presents with   • Neuro Deficit(s)       He  has a past medical history of AF (atrial fibrillation); Atrial flutter; Atypical chest pain; Chest pain; Colon polyps; Confusion; Depression; Disease of thyroid gland; Disorder of thyroid; Dyspnea and respiratory abnormalities; Elevated TSH; Fatigue; Gait instability; Hay fever; Hyperlipidemia; Hypertension; Hypothyroidism; Insomnia; Measles; Mumps; Palpitations; Parkinson disease; Peripheral neuropathy; Pneumonia; Poor sleep pattern; Slurred speech; and Tremor.    Allergies as of 03/07/2018   • (No Known Allergies)       Medication information was obtained from: Family member  Pharmacy and Phone Number: [facility] [phone]    Prior to Admission Medications     Prescriptions Last Dose Informant Patient Reported? Taking?    aspirin 81 MG chewable tablet  Family Member Yes Yes    Chew 81 mg Daily.    bumetanide (BUMEX) 2 MG tablet  Family Member Yes Yes    Take 2 mg by mouth 2 (Two) Times a Week. Tuesday, Friday    bumetanide (BUMEX) 2 MG tablet  Family Member Yes Yes    Take 1 mg by mouth 2 (Two) Times a Week. Wednesday, Saturday    carbidopa-levodopa (SINEMET)  MG per tablet  Family Member Yes Yes    Take 2 tablets by mouth 3 (Three) Times a Day.    carbidopa-levodopa (SINEMET)  MG per tablet  Family Member Yes Yes    Take 0.5 tablets by mouth 3 (Three) Times a Day.    carbidopa-levodopa CR (SINEMET CR)  MG per CR tablet  Family Member Yes Yes    Take  by mouth.    carvedilol (COREG) 6.25 MG tablet  Family Member No Yes    Take 1.5 tablets by mouth 2 (Two) Times a Day With Meals.    doxazosin (CARDURA) 2 MG tablet  Family Member No Yes    Take 1 tablet by mouth Every Night.    gabapentin (NEURONTIN) 300 MG capsule  Family Member Yes Yes    Take 300 mg by mouth 2 (Two) Times a Day.    levothyroxine  (SYNTHROID, LEVOTHROID) 112 MCG tablet  Family Member Yes Yes    Take 112 mcg by mouth Daily.    lisinopril (PRINIVIL,ZESTRIL) 10 MG tablet  Family Member Yes Yes    Take 5 mg by mouth Every Morning.    lisinopril (PRINIVIL,ZESTRIL) 10 MG tablet  Family Member Yes Yes    Take 10 mg by mouth Every Night.    lovastatin (MEVACOR) 10 MG tablet  Family Member No Yes    Take 1 tablet by mouth Every Other Day.    Multiple Vitamins-Minerals (MULTIVITAL) tablet  Family Member Yes Yes    Take 1 tablet by mouth daily.    rivaroxaban (XARELTO) 20 MG tablet  Family Member No Yes    Take 1 tablet by mouth Daily With Dinner.    zolpidem (AMBIEN) 10 MG tablet  Family Member No Yes    Take 1 tablet by mouth At Night As Needed for Sleep.            Medication notes: Added Sinemet  per family in addition to     This medication list is complete to the best of my knowledge as of 3/7/2018    Please call if questions.    Gisell Goel, Medication History Technician  3/7/2018 6:36 PM

## 2018-03-07 NOTE — ED PROVIDER NOTES
EMERGENCY DEPARTMENT ENCOUNTER    Room Number:  14  Date seen:  3/7/2018  Time seen: 4:03 PM  PCP: Luiz Onofre MD   Neurologist: Dr. Pickett   Cardiologist: Dr. Bennett    HPI:  Chief complaint: Found down by family.   Context:Rommel Gonzalez is a 72 y.o. male who presents to the ED with family after being found on floor. Family states that pt was recently dx with progressive supranuclear palsy. Pt has a hx of Parkinson's.  He was not started on any medication at that time. Family states that pt was found down today at 1400. Pt was last in contact with family at 1000. He reports lying on the floor for approximately 3 hours. Pt is unable to state why he was unable to get up from floor. However, he denies any fall or injury. Pt denies any recent illness, fever, cough, abd pain, CP, N/V/D. Pt is on Xarelto    Timing: constant  Duration: 3 hours  Location: pt denies any injury  Radiation: none  Quality: found down  Intensity/Severity: pt denies any pain  Associated Symptoms: pt denies any sxs  Aggravating Factors: none  Alleviating Factors:none  Previous Episodes:none  Treatment before arrival:none    MEDICAL RECORD REVIEW  Seen by Dr. Pickett on 18 in office.  RADIOLOGY REPORT        FACILITY:  [facility]    UNIT/AGE/GENDER: M.MRI  OP      AGE:72 Y          SEX:M    PATIENT NAME/:  ROMMEL GONZALEZ L    1945    UNIT NUMBER:  [id number]    ACCOUNT NUMBER:  [id number]    ACCESSION NUMBER:  [id number]        EXAMINATION: MRI of the brain without contrast.        DATE: 2018    HISTORY: TIA versus focal seizure Dx: Transient cerebral ischemia, unspecified type [G45.9 (ICD-10-CM)]; Partial symptomatic epilepsy with complex partial seizures, not intractable, without status epilepticus (HCC) [G40.209 (ICD-10-CM)]      COMPARISON: None.    TECHNIQUE: Standard multisequence multiplanar magnetic resonance imaging study of the brain was performed without intravenous contrast.          FINDINGS:        There is no acute infarct. Chronic lacunar infarcts are present in the subcortical white matter of the frontal lobes bilaterally. There are scattered T2 hyperintense white matter changes consistent with the sequela of chronic microangiopathy. A chronic     lacunar infarct is present in the splenium of the corpus callosum on the right. Brain volume is normal.        Paranasal sinuses are clear, except for some frothy secretions in the left frontal sinus. Surgical changes are present in the paranasal sinuses.        IMPRESSION:        1. Multifocal chronic lacunar infarcts and white matter changes consistent with chronic microangiopathy.         Dictated by: Jud Nunez M.D.        Images and Report reviewed and interpreted by: Jud Nunez M.D.        <PS><Electronically signed by: Jud Nunez M.D.>    2018 1228        D: 2018 1222    T: 2018 1222       MRI Brain Wo Contrast (2018 8:11 AM)   Procedure Note   Interface, Rad Out - 2018 12:29 PM EST    RADIOLOGY REPORT    FACILITY: [facility]  UNIT/AGE/GENDER: M.MRI OP AGE:72 Y SEX:M  PATIENT NAME/: DESIRAE CASTANEDA L 1945  UNIT NUMBER: [id number]  ACCOUNT NUMBER: [id number]  ACCESSION NUMBER: [id number]    EXAMINATION: MRI of the brain without contrast.    DATE: 2018  HISTORY: TIA versus focal seizure Dx: Transient cerebral ischemia, unspecified type [G45.9 (ICD-10-CM)]; Partial symptomatic epilepsy with complex partial seizures, not intractable, without status epilepticus (HCC) [G40.209 (ICD-10-CM)]   COMPARISON: None.  TECHNIQUE: Standard multisequence multiplanar magnetic resonance imaging study of the brain was performed without intravenous contrast.     FINDINGS:    There is no acute infarct. Chronic lacunar infarcts are present in the subcortical white matter of the frontal lobes bilaterally. There are scattered T2 hyperintense white matter changes  consistent with the sequela of chronic microangiopathy. A chronic   lacunar infarct is present in the splenium of the corpus callosum on the right. Brain volume is normal.    Paranasal sinuses are clear, except for some frothy secretions in the left frontal sinus. Surgical changes are present in the paranasal sinuses.    IMPRESSION:    1. Multifocal chronic lacunar infarcts and white matter changes consistent with chronic microangiopathy.     Dictated by: Jud Nunez M.D.    Images and Report reviewed and interpreted by: Jud Nunez M.D.    <PS><Electronically signed by: Jud Nunez M.D.>  2018 1228    D: 2018 1222  T: 2018 1222       Progress Notes  Unsigned   Encounter Date: 2018  Laura Bennett MD   Cardiology   Procedure Orders:   1. ECG 12 Lead [[id number]] ordered by Laura Bennett MD at 18 1824   Post-procedure Diagnoses:   1. Typical atrial flutter [I48.3]   2. Chronic atrial fibrillation [I48.2]   3. Essential hypertension [I10]   4. Obstructive sleep apnea, adult [G47.33]   5. Anticoagulated [Z79.01]   6. High risk medication use [Z79.899]   Expand All Collapse All   Hide copied text  Date of Office Visit: 2018  Encounter Provider: Laura Bennett MD  Place of Service: [facility] CARDIOLOGY  Patient Name: Rommel Gonzalez  :1945     Chief complaint  Follow-up of atrial flutter and fibrillation     History of Present Illness  The patient is a 71-year-old gentleman with history of hypertension, hyperlipidemia, and Parkinson’s disease.  In 2015, he was noted to have atrial fibrillation with rapid ventricular rates.  Coreg and Xarelto were added to his regimen and rate control was pursued.  An echocardiogram revealed normal left ventricular size and function with an ejection fraction of 51%, grade 2 diastolic dysfunction, moderate left atrial enlargement, mild mitral regurgitation, and normal right-sided pressures.  In 2015, he had a  Lexiscan perfusion study that was negative for ischemia.  He had an inferior wall defect consistent with attenuation.      Since last visit, he has had no chest pain, shortness of breath, palpitations, syncope, near-syncope. There was a concern that he may have had a TIA earlier last week when he was weak. He states he was instructed by his neurologist at [facility] to take a baby aspirin a day in addition to Xarelto. However at home he uses a walker and feels quite steady though did have a fall a month ago in the bathroom where he fell into the tub. At that time he was not using his walker.            ALLERGIES  Review of patient's allergies indicates no known allergies.    PAST MEDICAL HISTORY  Active Ambulatory Problems     Diagnosis Date Noted   • AF (atrial fibrillation) 03/28/2016   • Depression 03/28/2016   • Gait instability 03/28/2016   • Hypertension 03/28/2016   • Insomnia 03/28/2016   • Idiopathic Parkinson's disease 03/28/2016   • Peripheral neuropathy 03/28/2016   • Dysarthria 03/28/2016   • Atrial flutter 04/03/2016   • Anticoagulated 04/05/2016   • Health care maintenance 04/19/2016   • Hypothyroidism (acquired) 06/23/2016   • Weakness of voice 06/23/2016   • Abnormal chest CT 03/13/2017   • Facial droop 04/19/2017   • Hypersomnia  04/19/2017   • Hyperlipidemia 08/16/2017   • High risk medication use 08/16/2017   • CORINNE on CPAP 08/16/2017   • Obstructive sleep apnea, adult 10/26/2017     Resolved Ambulatory Problems     Diagnosis Date Noted   • Atypical chest pain 03/28/2016   • Chest pain 03/28/2016   • Colon polyp 03/28/2016   • Confusional state 03/28/2016   • Shortness of breath 03/28/2016   • Fatigue 03/28/2016   • Hyperlipidemia 03/28/2016   • Palpitations 03/28/2016   • Parkinson's disease 03/28/2016   • Tremor 03/28/2016   • Hematuria, gross 04/12/2016   • Persistent cough for 3 weeks or longer 05/02/2016   • Near syncope 04/19/2017     Past Medical History:   Diagnosis Date   • AF  (atrial fibrillation)    • Atrial flutter    • Atypical chest pain    • Chest pain    • Colon polyps    • Confusion    • Depression    • Disease of thyroid gland    • Disorder of thyroid    • Dyspnea and respiratory abnormalities    • Elevated TSH    • Fatigue    • Gait instability    • Hay fever    • Hyperlipidemia    • Hypertension    • Hypothyroidism    • Insomnia    • Measles    • Mumps    • Palpitations    • Parkinson disease    • Peripheral neuropathy    • Pneumonia    • Poor sleep pattern    • Slurred speech    • Tremor        PAST SURGICAL HISTORY  History reviewed. No pertinent surgical history.    FAMILY HISTORY  Family History   Problem Relation Age of Onset   • Hypertension Mother    • Glaucoma Mother    • Throat cancer Father    • Alcohol abuse Father    • Hypertension Sister    • Cancer Sister      malignant neoplasm of urinary bladder   • Lung cancer Brother    • Heart attack Other    • Lung disease Other        SOCIAL HISTORY  Social History     Social History   • Marital status:      Spouse name: N/A   • Number of children: N/A   • Years of education: N/A     Occupational History   • Not on file.     Social History Main Topics   • Smoking status: Former Smoker   • Smokeless tobacco: Not on file      Comment: seldom caffeine   • Alcohol use Yes      Comment: social use   • Drug use: No   • Sexual activity: No     Other Topics Concern   • Not on file     Social History Narrative       REVIEW OF SYSTEMS  Review of Systems   Constitutional: Negative for activity change, appetite change, diaphoresis and fever.   HENT: Negative for trouble swallowing.    Eyes: Negative for visual disturbance.   Respiratory: Negative for cough, chest tightness, shortness of breath and wheezing.    Cardiovascular: Negative for chest pain, palpitations and leg swelling.   Gastrointestinal: Negative for abdominal pain, diarrhea, nausea and vomiting.   Genitourinary: Negative for dysuria.   Musculoskeletal: Negative for  back pain.   Skin: Negative for rash.   Neurological: Negative for dizziness, speech difficulty and light-headedness.       PHYSICAL EXAM  ED Triage Vitals   Temp Heart Rate Resp BP SpO2   03/07/18 1459 03/07/18 1500 03/07/18 1501 03/07/18 1459 03/07/18 1500   97.5 °F (36.4 °C) 99 18 126/71 96 %      Temp src Heart Rate Source Patient Position BP Location FiO2 (%)   03/07/18 1459 -- -- -- --   Oral         Physical Exam   Constitutional: He is oriented to person, place, and time and well-developed, well-nourished, and in no distress. No distress.   HENT:   Head: Normocephalic and atraumatic.   Eyes: Pupils are equal, round, and reactive to light.   Neck: Normal range of motion. Neck supple.   Cardiovascular: Normal rate, S1 normal, S2 normal and normal heart sounds.  Exam reveals no gallop and no friction rub.    No murmur heard.  Pulmonary/Chest: Effort normal. No respiratory distress. He has no wheezes. He has no rales. He exhibits no tenderness.   Abdominal: Soft. There is no rebound and no guarding.   Musculoskeletal: Normal range of motion. He exhibits no deformity.   Lymphadenopathy:     He has no cervical adenopathy.   Neurological: He is alert and oriented to person, place, and time. He displays abnormal speech (garbled likely due to Parkinsons).   Cranial nerves 2-12 intact as tested.  Sensation intact.  5/5 strength in all extremities.  Normal cerebellar testing.  No drift in any extremity.  No dysarthria.  No aphasia.  No neglect/extinction.     Flattened face, likely due to Parkinson's   Skin: Skin is warm and dry. Abrasion (medial bilateral knees due to prolonged skin to skin contact) and bruising (Large bruise R wrist. Bruising and erythema to bilateral knees) noted.   Psychiatric: Affect normal.   Nursing note and vitals reviewed.      LAB RESULTS  Recent Results (from the past 24 hour(s))   Light Blue Top    Collection Time: 03/07/18  3:00 PM   Result Value Ref Range    Extra Tube hold for add-on     Green Top (Gel)    Collection Time: 03/07/18  3:00 PM   Result Value Ref Range    Extra Tube Hold for add-ons.    Lavender Top    Collection Time: 03/07/18  3:00 PM   Result Value Ref Range    Extra Tube hold for add-on    Comprehensive Metabolic Panel    Collection Time: 03/07/18  3:00 PM   Result Value Ref Range    Glucose 111 (H) 65 - 99 mg/dL    BUN 17 8 - 23 mg/dL    Creatinine 0.91 0.76 - 1.27 mg/dL    Sodium 140 136 - 145 mmol/L    Potassium 4.2 3.5 - 5.2 mmol/L    Chloride 104 98 - 107 mmol/L    CO2 27.0 22.0 - 29.0 mmol/L    Calcium 8.8 8.6 - 10.5 mg/dL    Total Protein 6.9 6.0 - 8.5 g/dL    Albumin 3.80 3.50 - 5.20 g/dL    ALT (SGPT) 13 1 - 41 U/L    AST (SGOT) 63 (H) 1 - 40 U/L    Alkaline Phosphatase 57 39 - 117 U/L    Total Bilirubin 0.7 0.1 - 1.2 mg/dL    eGFR Non African Amer 82 >60 mL/min/1.73    Globulin 3.1 gm/dL    A/G Ratio 1.2 g/dL    BUN/Creatinine Ratio 18.7 7.0 - 25.0    Anion Gap 9.0 mmol/L   Protime-INR    Collection Time: 03/07/18  3:00 PM   Result Value Ref Range    Protime 27.1 (H) 11.7 - 14.2 Seconds    INR 2.57 (H) 0.90 - 1.10   CK    Collection Time: 03/07/18  3:00 PM   Result Value Ref Range    Creatine Kinase 2413 (H) 20 - 200 U/L   CBC Auto Differential    Collection Time: 03/07/18  3:00 PM   Result Value Ref Range    WBC 8.99 4.50 - 10.70 10*3/mm3    RBC 4.11 (L) 4.60 - 6.00 10*6/mm3    Hemoglobin 13.1 (L) 13.7 - 17.6 g/dL    Hematocrit 40.1 (L) 40.4 - 52.2 %    MCV 97.6 (H) 79.8 - 96.2 fL    MCH 31.9 27.0 - 32.7 pg    MCHC 32.7 32.6 - 36.4 g/dL    RDW 13.4 11.5 - 14.5 %    RDW-SD 48.1 37.0 - 54.0 fl    MPV 11.3 6.0 - 12.0 fL    Platelets 202 140 - 500 10*3/mm3    Neutrophil % 81.8 (H) 42.7 - 76.0 %    Lymphocyte % 8.1 (L) 19.6 - 45.3 %    Monocyte % 9.8 5.0 - 12.0 %    Eosinophil % 0.0 (L) 0.3 - 6.2 %    Basophil % 0.1 0.0 - 1.5 %    Immature Grans % 0.2 0.0 - 0.5 %    Neutrophils, Absolute 7.35 1.90 - 8.10 10*3/mm3    Lymphocytes, Absolute 0.73 (L) 0.90 - 4.80 10*3/mm3     Monocytes, Absolute 0.88 0.20 - 1.20 10*3/mm3    Eosinophils, Absolute 0.00 0.00 - 0.70 10*3/mm3    Basophils, Absolute 0.01 0.00 - 0.20 10*3/mm3    Immature Grans, Absolute 0.02 0.00 - 0.03 10*3/mm3   Urinalysis With / Microscopic If Indicated - Urine, Clean Catch    Collection Time: 03/07/18  5:48 PM   Result Value Ref Range    Color, UA Dark Yellow (A) Yellow, Straw    Appearance, UA Clear Clear    pH, UA 7.0 5.0 - 8.0    Specific Gravity, UA 1.027 1.005 - 1.030    Glucose, UA Negative Negative    Ketones, UA Trace (A) Negative    Bilirubin, UA Negative Negative    Blood, UA Negative Negative    Protein, UA Trace (A) Negative    Leuk Esterase, UA Negative Negative    Nitrite, UA Negative Negative    Urobilinogen, UA 1.0 E.U./dL 0.2 - 1.0 E.U./dL       I ordered the above labs and reviewed the results    RADIOLOGY  XR Chest 2 View   Preliminary Result   1. No acute process.          CT Head Without Contrast   Preliminary Result   No acute process identified. Further evaluation could be   performed with a MRI examination of the brain as indicated.               Radiation dose reduction techniques were utilized, including automated   exposure control and exposure modulation based on body size.                  I ordered the above noted radiological studies and reviewed the images on the PACS system.  MEDICATIONS GIVEN IN ER  Medications   sodium chloride 0.9 % flush 10 mL (not administered)   sodium chloride 0.9 % bolus 1,000 mL (1,000 mL Intravenous New Bag 3/7/18 1755)   sodium chloride 0.9 % bolus 1,500 mL (1,500 mL Intravenous New Bag 3/7/18 1636)     PROCEDURES  Procedures    COURSE & MEDICAL DECISION MAKING  Pertinent Labs and Imaging studies that were ordered and reviewed are noted above.  Results were reviewed/discussed with the patient and they were also made aware of online assess.  Pt also made aware that some labs, such as cultures, will not be resulted during ER visit and follow up with PMD is  "necessary.     PROGRESS AND CONSULTS    Progress Notes:    ED Course     1719 Reviewed pt's history and workup with Dr. Lovelace.  After a bedside evaluation; Dr Lovelace agrees with the plan of care    1817 Based on the patient's lab findings and presenting symptoms, the doctor and I feel it is appropriate to admit the patient for further management, evaluation, and treatment.  I have discussed this with the admitting team (Dr. Schaffer).  I have also discussed this with the patient/family.  They are in agreement with admission.        Disposition vitals:  /71  Pulse 92  Temp 97.5 °F (36.4 °C) (Oral)   Resp 12  Ht 182.9 cm (72\")  Wt 81.6 kg (180 lb)  SpO2 100%  BMI 24.41 kg/m2      DIAGNOSIS  Final diagnoses:   Traumatic rhabdomyolysis, initial encounter   Fall at home, initial encounter     Documentation assistance provided by dm Hastings for JOVANY Hudson.  Information recorded by the codyibedinson was done at my direction and has been verified and validated by me.  Electronically signed by Johan Hastings on 3/7/2018 at time 6:15 PM       Rafael Hastings  03/07/18 1819       JOVANY Love  03/07/18 1929    "

## 2018-03-08 ENCOUNTER — APPOINTMENT (OUTPATIENT)
Dept: CARDIOLOGY | Facility: HOSPITAL | Age: 73
End: 2018-03-08
Attending: INTERNAL MEDICINE

## 2018-03-08 PROBLEM — M62.82 NON-TRAUMATIC RHABDOMYOLYSIS: Status: ACTIVE | Noted: 2018-03-07

## 2018-03-08 LAB
ALBUMIN SERPL-MCNC: 3 G/DL (ref 3.5–5.2)
ALBUMIN/GLOB SERPL: 1.4 G/DL
ALP SERPL-CCNC: 41 U/L (ref 39–117)
ALT SERPL W P-5'-P-CCNC: 5 U/L (ref 1–41)
ANION GAP SERPL CALCULATED.3IONS-SCNC: 3.2 MMOL/L
AST SERPL-CCNC: 65 U/L (ref 1–40)
BH CV ECHO MEAS - ACS: 1.8 CM
BH CV ECHO MEAS - AO MAX PG (FULL): 3 MMHG
BH CV ECHO MEAS - AO MAX PG: 6.2 MMHG
BH CV ECHO MEAS - AO MEAN PG (FULL): 1 MMHG
BH CV ECHO MEAS - AO MEAN PG: 3 MMHG
BH CV ECHO MEAS - AO ROOT AREA (BSA CORRECTED): 1.6
BH CV ECHO MEAS - AO ROOT AREA: 8.6 CM^2
BH CV ECHO MEAS - AO ROOT DIAM: 3.3 CM
BH CV ECHO MEAS - AO V2 MAX: 124 CM/SEC
BH CV ECHO MEAS - AO V2 MEAN: 74 CM/SEC
BH CV ECHO MEAS - AO V2 VTI: 23.3 CM
BH CV ECHO MEAS - AVA(I,A): 2.8 CM^2
BH CV ECHO MEAS - AVA(I,D): 2.8 CM^2
BH CV ECHO MEAS - AVA(V,A): 2.2 CM^2
BH CV ECHO MEAS - AVA(V,D): 2.2 CM^2
BH CV ECHO MEAS - BSA(HAYCOCK): 2 M^2
BH CV ECHO MEAS - BSA: 2 M^2
BH CV ECHO MEAS - BZI_BMI: 24 KILOGRAMS/M^2
BH CV ECHO MEAS - BZI_METRIC_HEIGHT: 182.9 CM
BH CV ECHO MEAS - BZI_METRIC_WEIGHT: 80.3 KG
BH CV ECHO MEAS - CONTRAST EF (2CH): 60.5 ML/M^2
BH CV ECHO MEAS - CONTRAST EF 4CH: 57.8 ML/M^2
BH CV ECHO MEAS - EDV(CUBED): 103.8 ML
BH CV ECHO MEAS - EDV(MOD-SP2): 81 ML
BH CV ECHO MEAS - EDV(MOD-SP4): 90 ML
BH CV ECHO MEAS - EDV(TEICH): 102.4 ML
BH CV ECHO MEAS - EF(CUBED): 68.4 %
BH CV ECHO MEAS - EF(MOD-SP2): 60.5 %
BH CV ECHO MEAS - EF(MOD-SP4): 57.8 %
BH CV ECHO MEAS - EF(TEICH): 60 %
BH CV ECHO MEAS - ESV(CUBED): 32.8 ML
BH CV ECHO MEAS - ESV(MOD-SP2): 32 ML
BH CV ECHO MEAS - ESV(MOD-SP4): 38 ML
BH CV ECHO MEAS - ESV(TEICH): 41 ML
BH CV ECHO MEAS - FS: 31.9 %
BH CV ECHO MEAS - IVS/LVPW: 1
BH CV ECHO MEAS - IVSD: 1 CM
BH CV ECHO MEAS - LAT PEAK E' VEL: 12 CM/SEC
BH CV ECHO MEAS - LV DIASTOLIC VOL/BSA (35-75): 44.5 ML/M^2
BH CV ECHO MEAS - LV MASS(C)D: 164.5 GRAMS
BH CV ECHO MEAS - LV MASS(C)DI: 81.3 GRAMS/M^2
BH CV ECHO MEAS - LV MAX PG: 3.1 MMHG
BH CV ECHO MEAS - LV MEAN PG: 2 MMHG
BH CV ECHO MEAS - LV SYSTOLIC VOL/BSA (12-30): 18.8 ML/M^2
BH CV ECHO MEAS - LV V1 MAX: 88.7 CM/SEC
BH CV ECHO MEAS - LV V1 MEAN: 56.8 CM/SEC
BH CV ECHO MEAS - LV V1 VTI: 20.7 CM
BH CV ECHO MEAS - LVIDD: 4.7 CM
BH CV ECHO MEAS - LVIDS: 3.2 CM
BH CV ECHO MEAS - LVLD AP2: 8 CM
BH CV ECHO MEAS - LVLD AP4: 7.6 CM
BH CV ECHO MEAS - LVLS AP2: 7 CM
BH CV ECHO MEAS - LVLS AP4: 6.1 CM
BH CV ECHO MEAS - LVOT AREA (M): 3.1 CM^2
BH CV ECHO MEAS - LVOT AREA: 3.1 CM^2
BH CV ECHO MEAS - LVOT DIAM: 2 CM
BH CV ECHO MEAS - LVPWD: 1 CM
BH CV ECHO MEAS - MED PEAK E' VEL: 10 CM/SEC
BH CV ECHO MEAS - MV A DUR: 0.13 SEC
BH CV ECHO MEAS - MV A MAX VEL: 47.1 CM/SEC
BH CV ECHO MEAS - MV DEC SLOPE: 863 CM/SEC^2
BH CV ECHO MEAS - MV DEC TIME: 0.13 SEC
BH CV ECHO MEAS - MV E MAX VEL: 105 CM/SEC
BH CV ECHO MEAS - MV E/A: 2.2
BH CV ECHO MEAS - MV MAX PG: 5.8 MMHG
BH CV ECHO MEAS - MV MEAN PG: 2 MMHG
BH CV ECHO MEAS - MV P1/2T MAX VEL: 122 CM/SEC
BH CV ECHO MEAS - MV P1/2T: 41.4 MSEC
BH CV ECHO MEAS - MV V2 MAX: 120 CM/SEC
BH CV ECHO MEAS - MV V2 MEAN: 61.4 CM/SEC
BH CV ECHO MEAS - MV V2 VTI: 29.1 CM
BH CV ECHO MEAS - MVA P1/2T LCG: 1.8 CM^2
BH CV ECHO MEAS - MVA(P1/2T): 5.3 CM^2
BH CV ECHO MEAS - MVA(VTI): 2.2 CM^2
BH CV ECHO MEAS - PA ACC TIME: 0.13 SEC
BH CV ECHO MEAS - PA MAX PG (FULL): 0.86 MMHG
BH CV ECHO MEAS - PA MAX PG: 2.6 MMHG
BH CV ECHO MEAS - PA PR(ACCEL): 18.7 MMHG
BH CV ECHO MEAS - PA V2 MAX: 79.9 CM/SEC
BH CV ECHO MEAS - PVA(V,A): 5 CM^2
BH CV ECHO MEAS - PVA(V,D): 5 CM^2
BH CV ECHO MEAS - QP/QS: 1.5
BH CV ECHO MEAS - RAP SYSTOLE: 8 MMHG
BH CV ECHO MEAS - RV MAX PG: 1.7 MMHG
BH CV ECHO MEAS - RV MEAN PG: 1 MMHG
BH CV ECHO MEAS - RV V1 MAX: 65 CM/SEC
BH CV ECHO MEAS - RV V1 MEAN: 42.1 CM/SEC
BH CV ECHO MEAS - RV V1 VTI: 15.9 CM
BH CV ECHO MEAS - RVOT AREA: 6.2 CM^2
BH CV ECHO MEAS - RVOT DIAM: 2.8 CM
BH CV ECHO MEAS - RVSP: 25 MMHG
BH CV ECHO MEAS - SI(AO): 98.5 ML/M^2
BH CV ECHO MEAS - SI(CUBED): 35.1 ML/M^2
BH CV ECHO MEAS - SI(LVOT): 32.2 ML/M^2
BH CV ECHO MEAS - SI(MOD-SP2): 24.2 ML/M^2
BH CV ECHO MEAS - SI(MOD-SP4): 25.7 ML/M^2
BH CV ECHO MEAS - SI(TEICH): 30.4 ML/M^2
BH CV ECHO MEAS - SV(AO): 199.3 ML
BH CV ECHO MEAS - SV(CUBED): 71.1 ML
BH CV ECHO MEAS - SV(LVOT): 65 ML
BH CV ECHO MEAS - SV(MOD-SP2): 49 ML
BH CV ECHO MEAS - SV(MOD-SP4): 52 ML
BH CV ECHO MEAS - SV(RVOT): 97.9 ML
BH CV ECHO MEAS - SV(TEICH): 61.4 ML
BH CV ECHO MEAS - TAPSE (>1.6): 3.3 CM2
BH CV ECHO MEAS - TR MAX VEL: 206 CM/SEC
BH CV VAS BP RIGHT ARM: NORMAL MMHG
BH CV XLRA - RV BASE: 3.6 CM
BH CV XLRA - TDI S': 11 CM/SEC
BILIRUB SERPL-MCNC: 0.4 MG/DL (ref 0.1–1.2)
BUN BLD-MCNC: 12 MG/DL (ref 8–23)
BUN/CREAT SERPL: 14 (ref 7–25)
CALCIUM SPEC-SCNC: 7.5 MG/DL (ref 8.6–10.5)
CHLORIDE SERPL-SCNC: 108 MMOL/L (ref 98–107)
CK SERPL-CCNC: 2335 U/L (ref 20–200)
CO2 SERPL-SCNC: 29.8 MMOL/L (ref 22–29)
CREAT BLD-MCNC: 0.86 MG/DL (ref 0.76–1.27)
DEPRECATED RDW RBC AUTO: 48.8 FL (ref 37–54)
E/E' RATIO: 10
ERYTHROCYTE [DISTWIDTH] IN BLOOD BY AUTOMATED COUNT: 13.7 % (ref 11.5–14.5)
GFR SERPL CREATININE-BSD FRML MDRD: 87 ML/MIN/1.73
GLOBULIN UR ELPH-MCNC: 2.1 GM/DL
GLUCOSE BLD-MCNC: 96 MG/DL (ref 65–99)
HCT VFR BLD AUTO: 32.6 % (ref 40.4–52.2)
HGB BLD-MCNC: 10.4 G/DL (ref 13.7–17.6)
LEFT ATRIUM VOLUME INDEX: 24 ML/M2
LV EF 2D ECHO EST: 58 %
MAXIMAL PREDICTED HEART RATE: 147 BPM
MCH RBC QN AUTO: 31.1 PG (ref 27–32.7)
MCHC RBC AUTO-ENTMCNC: 31.9 G/DL (ref 32.6–36.4)
MCV RBC AUTO: 97.6 FL (ref 79.8–96.2)
NT-PROBNP SERPL-MCNC: 1028 PG/ML (ref 5–900)
PLATELET # BLD AUTO: 176 10*3/MM3 (ref 140–500)
PMV BLD AUTO: 10.9 FL (ref 6–12)
POTASSIUM BLD-SCNC: 3.4 MMOL/L (ref 3.5–5.2)
PROT SERPL-MCNC: 5.1 G/DL (ref 6–8.5)
RBC # BLD AUTO: 3.34 10*6/MM3 (ref 4.6–6)
SODIUM BLD-SCNC: 141 MMOL/L (ref 136–145)
STRESS TARGET HR: 125 BPM
WBC NRBC COR # BLD: 7.15 10*3/MM3 (ref 4.5–10.7)

## 2018-03-08 PROCEDURE — 25810000003 SODIUM CHLORIDE 0.9 % WITH KCL 20 MEQ 20-0.9 MEQ/L-% SOLUTION: Performed by: HOSPITALIST

## 2018-03-08 PROCEDURE — 93010 ELECTROCARDIOGRAM REPORT: CPT | Performed by: INTERNAL MEDICINE

## 2018-03-08 PROCEDURE — 93306 TTE W/DOPPLER COMPLETE: CPT | Performed by: INTERNAL MEDICINE

## 2018-03-08 PROCEDURE — 93005 ELECTROCARDIOGRAM TRACING: CPT | Performed by: INTERNAL MEDICINE

## 2018-03-08 PROCEDURE — 93306 TTE W/DOPPLER COMPLETE: CPT

## 2018-03-08 PROCEDURE — 85027 COMPLETE CBC AUTOMATED: CPT | Performed by: INTERNAL MEDICINE

## 2018-03-08 PROCEDURE — 82550 ASSAY OF CK (CPK): CPT | Performed by: INTERNAL MEDICINE

## 2018-03-08 PROCEDURE — 83880 ASSAY OF NATRIURETIC PEPTIDE: CPT | Performed by: INTERNAL MEDICINE

## 2018-03-08 PROCEDURE — 99221 1ST HOSP IP/OBS SF/LOW 40: CPT | Performed by: INTERNAL MEDICINE

## 2018-03-08 PROCEDURE — 99232 SBSQ HOSP IP/OBS MODERATE 35: CPT | Performed by: PSYCHIATRY & NEUROLOGY

## 2018-03-08 PROCEDURE — 80053 COMPREHEN METABOLIC PANEL: CPT | Performed by: INTERNAL MEDICINE

## 2018-03-08 PROCEDURE — 92610 EVALUATE SWALLOWING FUNCTION: CPT

## 2018-03-08 RX ORDER — MIDODRINE HYDROCHLORIDE 2.5 MG/1
2.5 TABLET ORAL
Status: DISCONTINUED | OUTPATIENT
Start: 2018-03-08 | End: 2018-03-09

## 2018-03-08 RX ORDER — SODIUM CHLORIDE AND POTASSIUM CHLORIDE 150; 900 MG/100ML; MG/100ML
125 INJECTION, SOLUTION INTRAVENOUS CONTINUOUS
Status: DISCONTINUED | OUTPATIENT
Start: 2018-03-08 | End: 2018-03-12

## 2018-03-08 RX ORDER — TERAZOSIN 1 MG/1
1 CAPSULE ORAL NIGHTLY
Status: DISCONTINUED | OUTPATIENT
Start: 2018-03-08 | End: 2018-03-17

## 2018-03-08 RX ORDER — GABAPENTIN 100 MG/1
100 CAPSULE ORAL 2 TIMES DAILY
Status: DISCONTINUED | OUTPATIENT
Start: 2018-03-08 | End: 2018-03-11

## 2018-03-08 RX ADMIN — CARBIDOPA AND LEVODOPA 2 TABLET: 25; 100 TABLET ORAL at 20:51

## 2018-03-08 RX ADMIN — CARVEDILOL 9.38 MG: 6.25 TABLET, FILM COATED ORAL at 17:15

## 2018-03-08 RX ADMIN — MIDODRINE HYDROCHLORIDE 2.5 MG: 2.5 TABLET ORAL at 18:56

## 2018-03-08 RX ADMIN — ATORVASTATIN CALCIUM 10 MG: 10 TABLET, FILM COATED ORAL at 08:04

## 2018-03-08 RX ADMIN — LISINOPRIL 5 MG: 5 TABLET ORAL at 08:04

## 2018-03-08 RX ADMIN — ASPIRIN 81 MG: 81 TABLET, CHEWABLE ORAL at 08:04

## 2018-03-08 RX ADMIN — CARBIDOPA AND LEVODOPA 2 TABLET: 25; 100 TABLET ORAL at 17:16

## 2018-03-08 RX ADMIN — MULTIPLE VITAMINS W/ MINERALS TAB 1 TABLET: TAB at 08:04

## 2018-03-08 RX ADMIN — TERAZOSIN HYDROCHLORIDE 1 MG: 1 CAPSULE ORAL at 20:52

## 2018-03-08 RX ADMIN — CARBIDOPA AND LEVODOPA 2 TABLET: 25; 100 TABLET ORAL at 08:04

## 2018-03-08 RX ADMIN — CARVEDILOL 9.38 MG: 6.25 TABLET, FILM COATED ORAL at 08:04

## 2018-03-08 RX ADMIN — CARBIDOPA AND LEVODOPA 0.5 TABLET: 25; 250 TABLET ORAL at 17:15

## 2018-03-08 RX ADMIN — CARBIDOPA AND LEVODOPA 0.5 TABLET: 25; 250 TABLET ORAL at 08:04

## 2018-03-08 RX ADMIN — SODIUM CHLORIDE 100 ML/HR: 4.5 INJECTION, SOLUTION INTRAVENOUS at 08:03

## 2018-03-08 RX ADMIN — LEVOTHYROXINE SODIUM 112 MCG: 112 TABLET ORAL at 06:28

## 2018-03-08 RX ADMIN — GABAPENTIN 100 MG: 100 CAPSULE ORAL at 20:52

## 2018-03-08 RX ADMIN — SODIUM CHLORIDE AND POTASSIUM CHLORIDE 100 ML/HR: 9; 1.49 INJECTION, SOLUTION INTRAVENOUS at 10:30

## 2018-03-08 RX ADMIN — GABAPENTIN 300 MG: 300 CAPSULE ORAL at 08:04

## 2018-03-08 RX ADMIN — CARBIDOPA AND LEVODOPA 0.5 TABLET: 25; 250 TABLET ORAL at 20:51

## 2018-03-08 RX ADMIN — SODIUM CHLORIDE AND POTASSIUM CHLORIDE 100 ML/HR: 9; 1.49 INJECTION, SOLUTION INTRAVENOUS at 20:49

## 2018-03-08 RX ADMIN — SODIUM BICARBONATE 75 ML/HR: 84 INJECTION, SOLUTION INTRAVENOUS at 00:46

## 2018-03-08 NOTE — PROGRESS NOTES
Name: Rommel Gonzalez ADMIT: 3/7/2018   : 1945  PCP: Luiz Onofre MD    MRN: [id number] LOS: 1 days   AGE/SEX: 72 y.o. male  ROOM: [facility]/   Subjective   Subjective  States he feels good. Denies complaints. No chest pain or difficulty breathing.    Objective   Vital Signs  Temp:  [97.5 °F (36.4 °C)-99.2 °F (37.3 °C)] 99.2 °F (37.3 °C)  Heart Rate:  [] 90  Resp:  [12-18] 16  BP: (111-139)/(61-77) 111/61  SpO2:  [96 %-100 %] 97 %  on   ;   O2 Device: room air  Body mass index is 24.06 kg/(m^2).    Physical Exam   Constitutional: He is oriented to person, place, and time. He appears well-developed. He is cooperative. No distress.   Neck: No JVD present. No tracheal deviation present. No thyromegaly present.   Cardiovascular: Normal rate and regular rhythm.    No murmur heard.  Pulmonary/Chest: Effort normal and breath sounds normal. No respiratory distress.   Abdominal: Soft. Normal appearance and bowel sounds are normal. He exhibits no distension. There is no tenderness.   Neurological: He is alert and oriented to person, place, and time.   Masked facies. Sluggish speech.   Skin: Skin is warm and dry. No rash noted.   Psychiatric: He has a normal mood and affect. His behavior is normal.   Nursing note and vitals reviewed.      Results Review:       I reviewed the patient's new clinical results.    Results from last 7 days  Lab Units 18  0554 18  1500   WBC 10*3/mm3 7.15 8.99   HEMOGLOBIN g/dL 10.4* 13.1*   PLATELETS 10*3/mm3 176 202     Results from last 7 days  Lab Units 18  0554 18  1500   SODIUM mmol/L 141 140   POTASSIUM mmol/L 3.4* 4.2   CHLORIDE mmol/L 108* 104   CO2 mmol/L 29.8* 27.0   BUN mg/dL 12 17   CREATININE mg/dL 0.86 0.91   GLUCOSE mg/dL 96 111*   Estimated Creatinine Clearance: 87.1 mL/min (by C-G formula based on Cr of 0.86).  Results from last 7 days  Lab Units 18  0554 18  1500   CALCIUM mg/dL 7.5* 8.8   ALBUMIN g/dL 3.00* 3.80       Results  from last 7 days  Lab Units 03/08/18  0554 03/07/18  1500   CK TOTAL U/L 2335* 2413*   PROBNP pg/mL 1028.0*  --        Results from last 7 days  Lab Units 03/07/18  1500   PROTIME Seconds 27.1*   INR  2.57*         aspirin 81 mg Oral Daily   atorvastatin 10 mg Oral Daily   [START ON 3/10/2018] bumetanide 1 mg Oral Once per day on Wed Sat   [START ON 3/9/2018] bumetanide 2 mg Oral Once per day on Tue Fri   carbidopa-levodopa 2 tablet Oral TID   carbidopa-levodopa 0.5 tablet Oral TID   carvedilol 9.375 mg Oral BID With Meals   gabapentin 300 mg Oral BID   levothyroxine 112 mcg Oral Q AM   lisinopril 10 mg Oral Nightly   lisinopril 5 mg Oral Daily   multivitamin with minerals 1 tablet Oral Daily   rivaroxaban 20 mg Oral Daily With Dinner   terazosin 2 mg Oral Nightly       sodium bicarbonate drip less than 75 mEq/bag 75 mL/hr Last Rate: 75 mL/hr (03/08/18 0046)   sodium chloride 100 mL/hr Last Rate: 100 mL/hr (03/08/18 0803)   NPO Diet NPO Except: Sips With Meds, Ice Chips      Assessment/Plan   Active Hospital Problems (** Indicates Principal Problem)    Diagnosis Date Noted   • **Non-traumatic rhabdomyolysis [M62.82] 03/07/2018   • Fall [W19.XXXA] 03/07/2018   • Progressive supranuclear palsy [G23.1] 03/07/2018   • Bruising [T14.8XXA] 03/07/2018   • Obstructive sleep apnea, adult [G47.33] 10/26/2017   • Weakness of voice [R49.8] 06/23/2016   • Anticoagulated [Z79.01] 04/05/2016   • Idiopathic Parkinson's disease [G20] 03/28/2016   • Hypertension [I10] 03/28/2016   • AF (atrial fibrillation) [I48.91] 03/28/2016   • Gait instability [R26.81] 03/28/2016   • Dysarthria [R47.1] 03/28/2016      Resolved Hospital Problems    Diagnosis Date Noted Date Resolved   No resolved problems to display.       Mr. Gonzalez is a 72 y.o. male who has been admitted with mild rhabdomyolysis.    · Discontinue bicarbonate from IV fluids. Will add potassium. Will monitor CPK and renal function daily. No evidence of AMANDA currently.  · We will  hold his Bumex and lisinopril for now.  · Neurology to evaluate.  · We will have physical therapy and case management see as well.      Fahad Schafer MD  [facility]  03/08/18  8:29 AM

## 2018-03-08 NOTE — PLAN OF CARE
Problem: Patient Care Overview (Adult)  Goal: Plan of Care Review  Outcome: Ongoing (interventions implemented as appropriate)   03/08/18 0915   Patient Care Overview   Progress progress towards functional goals is fair   Outcome Evaluation   Outcome Summary/Follow up Plan Pt seen for bedside swallow w/suspected oropharyngeal dysphagia. Rec pudding thick liquids, fork mashed/ground meat diet, meds with puree, and water/ice between meals with oral care. VFSS 3/9. ST to follow.   Coping/Psychosocial Response Interventions   Plan Of Care Reviewed With patient;family       Problem: Inpatient SLP  Goal: Dysphagia- Patient will safely consume diet as per recommendation with no signs/symptoms of aspiration  Outcome: Ongoing (interventions implemented as appropriate)   03/08/18 0915   Safely Consume Diet   Safely Consume Diet- SLP, Time to Achieve by discharge   Safely Consume Diet- SLP, Activity Level Patient will improve oral skills for more efficient eating;Patient will improve timing of pharyngeal response for safer, more efficient swallow   Safely Consume Diet- SLP, Outcome goal ongoing

## 2018-03-08 NOTE — PLAN OF CARE
Problem: Patient Care Overview (Adult)  Goal: Plan of Care Review  Outcome: Ongoing (interventions implemented as appropriate)   03/07/18 2019 03/08/18 0458   Patient Care Overview   Progress --  no change   Outcome Evaluation   Outcome Summary/Follow up Plan --  Patient was admitted form ER. Patient is on IV fluids and D5W with sodium bicarb. Family went home for the night. Bed alarm on. Patient refused PRN pain pills for carpet burn pain on his knees. Cardiology and neurology was consulted. Will continue to monitor vital signs,labs and comfort.   Coping/Psychosocial Response Interventions   Plan Of Care Reviewed With patient;daughter --      Goal: Adult Individualization and Mutuality  Outcome: Ongoing (interventions implemented as appropriate)    Goal: Discharge Needs Assessment  Outcome: Ongoing (interventions implemented as appropriate)      Problem: Fall Risk (Adult)  Goal: Identify Related Risk Factors and Signs and Symptoms  Outcome: Ongoing (interventions implemented as appropriate)    Goal: Absence of Falls  Outcome: Ongoing (interventions implemented as appropriate)      Problem: Pain, Acute (Adult)  Goal: Identify Related Risk Factors and Signs and Symptoms  Outcome: Ongoing (interventions implemented as appropriate)    Goal: Acceptable Pain Control/Comfort Level  Outcome: Ongoing (interventions implemented as appropriate)

## 2018-03-08 NOTE — CONSULTS
Encounter Date: 3/8/2018    CHIEF COMPLAINT: Altered mental status, found down    Patient Active Problem List   Diagnosis   • AF (atrial fibrillation)   • Depression   • Gait instability   • Hypertension   • Insomnia   • Idiopathic Parkinson's disease   • Peripheral neuropathy   • Dysarthria   • Atrial flutter   • Anticoagulated   • Health care maintenance   • Hypothyroidism (acquired)   • Weakness of voice   • Abnormal chest CT   • Facial droop   • Hypersomnia    • Hyperlipidemia   • High risk medication use   • CORINNE on CPAP   • Obstructive sleep apnea, adult   • Non-traumatic rhabdomyolysis   • Fall   • Progressive supranuclear palsy   • Bruising       PRESENT ILLNESS:    Portions of this notes is copied from previous physician encounters, reviewed and edited appropriately.    Background:     Rommel Gonzalez is a 72 y.o. male with history of hypertension and dyslipidemia and questionable Parkinson's disease now admitted with altered mental status.  Patient was found down on the floor with some pills around him.  He usually uses walker for worsening disease from Parkinson's and new diagnosis of progressive supranuclear palsy.  He was found to have mild rhabdomyolysis, acute on chronic kidney disease, atrial fibrillation.  He also has sleep apnea on CPAP.    Patient examined by the bedside: No new complaints    Review of systems   No neck stiffness  No photophobia  All systems reviewed and are negative.         Past Medical History:   Diagnosis Date   • AF (atrial fibrillation)    • Atrial flutter    • Atypical chest pain    • Chest pain    • Colon polyps    • Confusion    • Depression    • Disease of thyroid gland    • Disorder of thyroid    • Dyspnea and respiratory abnormalities    • Elevated TSH    • Fatigue    • Gait instability    • Hay fever    • Hyperlipidemia    • Hypertension    • Hypothyroidism    • Insomnia    • Measles    • Mumps    • Palpitations    • Parkinson disease    • Peripheral neuropathy    •  Pneumonia    • Poor sleep pattern    • Slurred speech    • Tremor        History reviewed. No pertinent surgical history.    Current Facility-Administered Medications   Medication Dose Route Frequency Provider Last Rate Last Dose   • acetaminophen (TYLENOL) tablet 650 mg  650 mg Oral Q6H PRN Jero Schaffer MD       • aspirin chewable tablet 81 mg  81 mg Oral Daily Jawed MD Sarbjit   81 mg at 03/08/18 0804   • atorvastatin (LIPITOR) tablet 10 mg  10 mg Oral Daily Jawed MD Sarbjit   10 mg at 03/08/18 0804   • carbidopa-levodopa (SINEMET)  MG per tablet 2 tablet  2 tablet Oral TID Jawshannen Schaffer MD   2 tablet at 03/08/18 0804   • carbidopa-levodopa (SINEMET)  MG per tablet 0.5 tablet  0.5 tablet Oral TID Kaylied MD Sarbjit   0.5 tablet at 03/08/18 0804   • carvedilol (COREG) tablet 9.375 mg  9.375 mg Oral BID With Meals Jawed MD Sarbjit   9.375 mg at 03/08/18 0804   • gabapentin (NEURONTIN) capsule 300 mg  300 mg Oral BID Jawed MD Sarbjit   300 mg at 03/08/18 0804   • levothyroxine (SYNTHROID, LEVOTHROID) tablet 112 mcg  112 mcg Oral Q AM Jero Schaffer MD   112 mcg at 03/08/18 0628   • multivitamin with minerals 1 tablet  1 tablet Oral Daily Kaylied MD Sarbjit   1 tablet at 03/08/18 0804   • ondansetron (ZOFRAN) injection 4 mg  4 mg Intravenous Q6H PRN Jero Schaffer MD       • rivaroxaban (XARELTO) tablet 20 mg  20 mg Oral Daily With Dinner Jawed MD Sarbjit   20 mg at 03/07/18 2318   • sodium chloride 0.9 % flush 1-10 mL  1-10 mL Intravenous PRN Jero Schaffer MD       • sodium chloride 0.9 % flush 10 mL  10 mL Intravenous PRN Sigifredo Lovelace MD       • sodium chloride 0.9 % with KCl 20 mEq/L infusion  100 mL/hr Intravenous Continuous Fahad Schafer  mL/hr at 03/08/18 1030 100 mL/hr at 03/08/18 1030   • terazosin (HYTRIN) capsule 2 mg  2 mg Oral Nightly Jawed MD Sarbjit   2 mg at 03/07/18 9321   • zolpidem (AMBIEN) tablet 5 mg  5 mg Oral Nightly PRN Jawed MD Sarbjit           No Known Allergies    Social History     Social  "History   • Marital status:      Spouse name: N/A   • Number of children: N/A   • Years of education: N/A     Occupational History   • Not on file.     Social History Main Topics   • Smoking status: Former Smoker   • Smokeless tobacco: Not on file      Comment: seldom caffeine   • Alcohol use Yes      Comment: social use   • Drug use: No   • Sexual activity: No     Other Topics Concern   • Not on file     Social History Narrative       Family History   Problem Relation Age of Onset   • Hypertension Mother    • Glaucoma Mother    • Throat cancer Father    • Alcohol abuse Father    • Hypertension Sister    • Cancer Sister      malignant neoplasm of urinary bladder   • Lung cancer Brother    • Heart attack Other    • Lung disease Other        Family Status   Relation Status   • Mother    • Father    • Sister    • Brother    • Other        No current facility-administered medications on file prior to encounter.      Current Outpatient Prescriptions on File Prior to Encounter   Medication Sig   • carvedilol (COREG) 6.25 MG tablet Take 1.5 tablets by mouth 2 (Two) Times a Day With Meals.   • doxazosin (CARDURA) 2 MG tablet Take 1 tablet by mouth Every Night.   • lovastatin (MEVACOR) 10 MG tablet Take 1 tablet by mouth Every Other Day.   • Multiple Vitamins-Minerals (MULTIVITAL) tablet Take 1 tablet by mouth daily.   • rivaroxaban (XARELTO) 20 MG tablet Take 1 tablet by mouth Daily With Dinner.   • zolpidem (AMBIEN) 10 MG tablet Take 1 tablet by mouth At Night As Needed for Sleep.   • carbidopa-levodopa CR (SINEMET CR)  MG per CR tablet Take  by mouth.           PHYSICAL EXAMINATION:    Vitals: Patient Vitals for the past 4 hrs:   Height   03/08/18 0825 182.9 cm (72\")     Temp:  [97.5 °F (36.4 °C)-99.2 °F (37.3 °C)] 99.2 °F (37.3 °C)  Heart Rate:  [] 90  Resp:  [12-18] 16  BP: (111-139)/(61-77) 111/61    General:  pleasant in no acute distress.  HEENT: No pallor or icterus. Neck supple. No Carotid " bruits.  Heart: S1 and S2 normal with regular rhythm.  No murmur.       GENERAL NEUROLOGIC EXAM     Mental Status: Awake alert and oriented to person place and time. Language is normal for comprehension, repetition, naming and fluency. Recent and remote memory were normal.  Attention span and concentration were normal. Fund of knowledge was normal.      Cranial nerves:  Fundi were normal bilaterally.  Visual fields were full to confrontation.  Pupils are equal regular and reactive to light. Extraocular movements are intact. Facial sensation was normal and symmetrical bilaterally. Muscles of facial expression were strong and symmetric. Hearing to finger rub normal bilaterally. Palate elevates symmetrically. Sternocleidomastoid and trapezius normal. The tongue protrudes midline without atrophy or fasciculations.      Motor: Normal tone and bulk with no involuntary movements or pronator drift.    Strength normal 5/5 in bilateral upper and lower extremities.     Sensation: Light touch, pin prick, vibration and joint position sense were normal in the upper and lower extremities.     Reflexes: 2+ bilaterally symmetrical with down going toes.    Coordination: finger nose and heel shin test normal bilaterally.     Gait: Normal station and gait.  Heel, toe and tandem walk normal.  Romberg negative.     Labs:     Lab Results   Component Value Date    HGB 10.4 (L) 03/08/2018    HCT 32.6 (L) 03/08/2018    WBC 7.15 03/08/2018     03/08/2018     Lab Results   Component Value Date    BUN 12 03/08/2018    CALCIUM 7.5 (L) 03/08/2018     03/08/2018    K 3.4 (L) 03/08/2018     (H) 03/08/2018    CO2 29.8 (H) 03/08/2018     (H) 02/02/2018     Lab Results   Component Value Date    ALT 5 03/08/2018    AST 65 (H) 03/08/2018    BILITOT 0.4 03/08/2018     Lab Results   Component Value Date    PROTIME 27.1 (H) 03/07/2018    INR 2.57 (H) 03/07/2018     No components found for: POCGLUC  No components found for:  A1C  Lab Results   Component Value Date    HDL 40 08/16/2017    LDL 79 08/16/2017     No components found for: B12  Lab Results   Component Value Date    TSH 1.390 11/01/2017       Lab Results (last 24 hours)     Procedure Component Value Units Date/Time    Light Blue Top [[id number]] Collected:  03/07/18 1500    Specimen:  Blood Updated:  03/07/18 1601     Extra Tube hold for add-on      Auto resulted       Green Top (Gel) [[id number]] Collected:  03/07/18 1500    Specimen:  Blood Updated:  03/07/18 1601     Extra Tube Hold for add-ons.      Auto resulted.       Lavender Top [[id number]] Collected:  03/07/18 1500    Specimen:  Blood Updated:  03/07/18 1601     Extra Tube hold for add-on      Auto resulted       CBC & Differential [[id number]] Collected:  03/07/18 1500    Specimen:  Blood Updated:  03/07/18 1655    Narrative:       The following orders were created for panel order CBC & Differential.  Procedure                               Abnormality         Status                     ---------                               -----------         ------                     CBC Auto Differential[[id number]]        Abnormal            Final result                 Please view results for these tests on the individual orders.    CBC Auto Differential [[id number]]  (Abnormal) Collected:  03/07/18 1500    Specimen:  Blood Updated:  03/07/18 1655     WBC 8.99 10*3/mm3      RBC 4.11 (L) 10*6/mm3      Hemoglobin 13.1 (L) g/dL      Hematocrit 40.1 (L) %      MCV 97.6 (H) fL      MCH 31.9 pg      MCHC 32.7 g/dL      RDW 13.4 %      RDW-SD 48.1 fl      MPV 11.3 fL      Platelets 202 10*3/mm3      Neutrophil % 81.8 (H) %      Lymphocyte % 8.1 (L) %      Monocyte % 9.8 %      Eosinophil % 0.0 (L) %      Basophil % 0.1 %      Immature Grans % 0.2 %      Neutrophils, Absolute 7.35 10*3/mm3      Lymphocytes, Absolute 0.73 (L) 10*3/mm3      Monocytes, Absolute 0.88 10*3/mm3      Eosinophils, Absolute 0.00 10*3/mm3      Basophils, Absolute  0.01 10*3/mm3      Immature Grans, Absolute 0.02 10*3/mm3     Protime-INR [[id number]]  (Abnormal) Collected:  03/07/18 1500    Specimen:  Blood Updated:  03/07/18 1700     Protime 27.1 (H) Seconds      INR 2.57 (H)    Comprehensive Metabolic Panel [[id number]]  (Abnormal) Collected:  03/07/18 1500    Specimen:  Blood Updated:  03/07/18 1717     Glucose 111 (H) mg/dL      BUN 17 mg/dL      Creatinine 0.91 mg/dL      Sodium 140 mmol/L      Potassium 4.2 mmol/L      Chloride 104 mmol/L      CO2 27.0 mmol/L      Calcium 8.8 mg/dL      Total Protein 6.9 g/dL      Albumin 3.80 g/dL      ALT (SGPT) 13 U/L      AST (SGOT) 63 (H) U/L       Specimen hemolyzed.  Results may be affected.        Alkaline Phosphatase 57 U/L      Total Bilirubin 0.7 mg/dL      eGFR Non African Amer 82 mL/min/1.73      Globulin 3.1 gm/dL      A/G Ratio 1.2 g/dL      BUN/Creatinine Ratio 18.7     Anion Gap 9.0 mmol/L     Narrative:       The MDRD GFR formula is only valid for adults with stable renal function between ages 18 and 70.    CK [[id number]]  (Abnormal) Collected:  03/07/18 1500    Specimen:  Blood Updated:  03/07/18 1725     Creatine Kinase 2413 (H) U/L     Urinalysis With / Microscopic If Indicated - Urine, Clean Catch [[id number]]  (Abnormal) Collected:  03/07/18 1748    Specimen:  Urine from Urine, Clean Catch Updated:  03/07/18 1800     Color, UA Dark Yellow (A)     Appearance, UA Clear     pH, UA 7.0     Specific Gravity, UA 1.027     Glucose, UA Negative     Ketones, UA Trace (A)     Bilirubin, UA Negative     Blood, UA Negative     Protein, UA Trace (A)     Leuk Esterase, UA Negative     Nitrite, UA Negative     Urobilinogen, UA 1.0 E.U./dL    Narrative:       Urine microscopic not indicated.    [place] Draw [[id number]] Collected:  03/07/18 1500    Specimen:  Blood Updated:  03/07/18 6106    Narrative:       The following orders were created for panel order [place] Draw.  Procedure                               Abnormality          Status                     ---------                               -----------         ------                     Light Blue Top[[id number]]                                   Final result               Green Top (Gel)[[id number]]                                  Final result               Lavender Top[[id number]]                                     Final result               Gold Top - SST[[id number]]                                                                Please view results for these tests on the individual orders.    BNP [[id number]]  (Abnormal) Collected:  03/08/18 0554    Specimen:  Blood Updated:  03/08/18 0714     proBNP 1028.0 (H) pg/mL     Narrative:       Among patients with dyspnea, NT-proBNP is highly sensitive for the detection of acute congestive heart failure. In addition NT-proBNP of <300 pg/ml effectively rules out acute congestive heart failure with 99% negative predictive value.    CBC (No Diff) [[id number]]  (Abnormal) Collected:  03/08/18 0554    Specimen:  Blood Updated:  03/08/18 0717     WBC 7.15 10*3/mm3      RBC 3.34 (L) 10*6/mm3      Hemoglobin 10.4 (L) g/dL      Hematocrit 32.6 (L) %      MCV 97.6 (H) fL      MCH 31.1 pg      MCHC 31.9 (L) g/dL      RDW 13.7 %      RDW-SD 48.8 fl      MPV 10.9 fL      Platelets 176 10*3/mm3     Comprehensive Metabolic Panel [[id number]]  (Abnormal) Collected:  03/08/18 0554    Specimen:  Blood Updated:  03/08/18 0720     Glucose 96 mg/dL      BUN 12 mg/dL      Creatinine 0.86 mg/dL      Sodium 141 mmol/L      Potassium 3.4 (L) mmol/L      Chloride 108 (H) mmol/L      CO2 29.8 (H) mmol/L      Calcium 7.5 (L) mg/dL      Total Protein 5.1 (L) g/dL      Albumin 3.00 (L) g/dL      ALT (SGPT) 5 U/L      AST (SGOT) 65 (H) U/L      Alkaline Phosphatase 41 U/L      Total Bilirubin 0.4 mg/dL      eGFR Non African Amer 87 mL/min/1.73      Globulin 2.1 gm/dL      A/G Ratio 1.4 g/dL      BUN/Creatinine Ratio 14.0     Anion Gap 3.2 mmol/L     Narrative:       The MDRD  GFR formula is only valid for adults with stable renal function between ages 18 and 70.    CK [[id number]]  (Abnormal) Collected:  03/08/18 0554    Specimen:  Blood Updated:  03/08/18 0729     Creatine Kinase 2335 (H) U/L           .  Imaging Results (last 24 hours)     Procedure Component Value Units Date/Time    CT Head Without Contrast [[id number]] Collected:  03/07/18 1725     Updated:  03/07/18 2022    Narrative:       CT HEAD WITHOUT CONTRAST     HISTORY: Confusion, altered mental status, left facial droop.     COMPARISON: CT head 09/5/2014.     FINDINGS: The brain and ventricles are symmetrical. There is no evidence  of intracranial hemorrhage, hydrocephalus or of abnormal extra-axial  fluid. No focal area of decreased attenuation to suggest acute  infarction is identified. Decreased attenuation is appreciated involving  the deep white matter of the left frontal lobe, present previously and  unchanged likely representing small vessel ischemic disease. Vascular  calcification is noted.       Impression:       No acute process identified. Further evaluation could be  performed with a MRI examination of the brain as indicated.           Radiation dose reduction techniques were utilized, including automated  exposure control and exposure modulation based on body size.     This report was finalized on 3/7/2018 8:19 PM by Dr. Leon Garcia MD.       XR Chest 2 View [[id number]] Collected:  03/07/18 1753     Updated:  03/08/18 0829    Narrative:       PA AND LATERAL CHEST 3/7/2018      HISTORY: Cough. Shortness of breath.     FINDINGS: Heart size is within normal limits. The lungs appear free of  acute infiltrates. Bones and soft tissues are unremarkable.       Impression:       1. No acute process.     This report was finalized on 3/8/2018 8:26 AM by Dr. Wood Vargas MD.             For this problem, the following was ordered:  Orders Placed This Encounter   Procedures   • XR Chest 2 View   • CT Head Without  Contrast   • [place] Draw   • Comprehensive Metabolic Panel   • Protime-INR   • Urinalysis With / Microscopic If Indicated - Urine, Clean Catch   • CK   • CBC Auto Differential   • CK   • CBC (No Diff)   • Comprehensive Metabolic Panel   • BNP   • Basic Metabolic Panel   • CK   • Diet Dysphagia; III - Pureed With Some Mashed; Pudding Thick   • Vital Signs   • Strict Intake and Output   • Weigh Patient   • Saline Lock & Maintain IV Access   • Place Sequential Compression Device   • Maintain Sequential Compression Device   • Straight Cath   • Full Code   • LHA (on-call MD unless specified)   • Inpatient Consult to Neurology   • Inpatient Consult to Cardiology   • Oxygen Therapy-   • SLP Consult: Eval & Treat   • SLP Evaluation - Failed Bedside Dysphagia Screening   • SLP Consult: Eval & Treat   • ECG 12 Lead   • Adult Transthoracic Echo Complete W/ Cont if Necessary Per Protocol   • Insert peripheral IV   • Insert Peripheral IV   • Inpatient Admission   • Light Blue Top   • Green Top (Gel)   • Lavender Top   • Gold Top - SST   • CBC & Differential       Problem List Items Addressed This Visit     * (Principal)Non-traumatic rhabdomyolysis - Primary      Other Visit Diagnoses     Fall at home, initial encounter              ASSESSMENT/PLAN: This is a 72 y.o. male who has   Past Medical History:   Diagnosis Date   • AF (atrial fibrillation)    • Atrial flutter    • Atypical chest pain    • Chest pain    • Colon polyps    • Confusion    • Depression    • Disease of thyroid gland    • Disorder of thyroid    • Dyspnea and respiratory abnormalities    • Elevated TSH    • Fatigue    • Gait instability    • Hay fever    • Hyperlipidemia    • Hypertension    • Hypothyroidism    • Insomnia    • Measles    • Mumps    • Palpitations    • Parkinson disease    • Peripheral neuropathy    • Pneumonia    • Poor sleep pattern    • Slurred speech    • Tremor     presents with Parkinson's disease, found down with mild  rhabdomyolysis.    1.  Acute encephalopathy from combination of rhabdomyolysis,hypocalcemia,Neurontin; the other differential is possible seizure.  - Since is the first episode we are not inclined to treat it But if patient develops any seizure witnessed in the future AED will be started.  - Correction of electric imbalance aggressively  - Reduced dose of Neurontin and terazosin by half    2.  Parkinson's disease and supranuclear palsy:  - Continue current dose of Sinemet and follow-up with neurology as outpatient.  - PTOT    3.  Falls for the from Parkinson disease; supranuclear palsy and autonomic dysfunction:  -   Midodrine 2.5 mg  Three times a day. (to counteract the effects of hypotension from  Parkinson/PSP related autonomic dysfunction and medications.     follow-up neurology as outpatient.     will sign off/    Thank you for allowing us to participate in the care of this patient.    Vidya Manley MD  03/08/18  12:21 PM

## 2018-03-08 NOTE — PROGRESS NOTES
Discharge Planning Assessment  [facility]     Patient Name: Rommel Gonzalez  MRN: [id number]  Today's Date: 3/8/2018    Admit Date: 3/7/2018          Discharge Needs Assessment       03/08/18 1234    Living Environment    Living Environment Comment --   grab bars in the shower    Living Environment    Provides Primary Care For no one, unable/limited ability to care for self    Primary Care Provided By other (see comments)   family assist with meals and have offered for him to come live with them    Quality Of Family Relationships supportive;helpful;involved    Able to Return to Prior Living Arrangements yes   with some assistance and home health    Living Arrangement Comments lives by himself    Discharge Needs Assessment    Concerns To Be Addressed adjustment to diagnosis/illness concerns    Readmission Within The Last 30 Days no previous admission in last 30 days    Anticipated Changes Related to Illness inability to care for self    Equipment Needed After Discharge --   not sure at this time. Will continue to follow for any MD DME orders    Current Discharge Risk lives alone    Discharge Disposition still a patient      03/08/18 1228    Discharge Needs Assessment    Equipment Currently Used at Home walker, standard;cane, straight;bipap/ cpap;raised toilet;grab bar      03/08/18 1227    Living Environment    Lives With alone    Living Arrangements condominium    Home Accessibility bed and bath are not on the first floor    Stair Railings at Home none    Type of Financial/Environmental Concern none    Transportation Available car;family or friend will provide            Discharge Plan       03/08/18 1247    Case Management/Social Work Plan    Plan home with home health vs. SNU short term rehab      03/08/18 1108    Case Management/Social Work Plan    Additional Comments IMM completed. MARY LOU Rangel RN, CCP        Discharge Placement     No information found                Demographic Summary       03/08/18 1232     Referral Information    Admission Type inpatient    Arrived From admitted as an inpatient    Referral Source admission list    Reason For Consult discharge planning    Record Reviewed clinical discipline documentation;history and physical;medical record;patient profile;plan of care    Contact Information    Permission Granted to Share Information With       03/08/18 1226    Referral Information    Referral Source admission list    Primary Care Physician Information    Name Dr. Anthony Saleh            Functional Status       03/08/18 1236    Functional Status Current    Ambulation 3-->assistive equipment and person    Transferring 3-->assistive equipment and person    Toileting 3-->assistive equipment and person    Bathing 2-->assistive person    Dressing 0-->independent    Eating 0-->independent    Communication 2-->difficulty speaking (not related to language barrier)    Swallowing (if score 2 or more for any item, consult Rehab Services) 2-->difficulty swallowing liquids/foods    Change in Functional Status Since Onset of Current Illness/Injury yes    Functional Status Prior    Ambulation 1-->assistive equipment    Transferring 1-->assistive equipment    Toileting 1-->assistive equipment    Bathing 0-->independent    Dressing 0-->independent    Eating 0-->independent    Communication 2-->difficulty speaking (not related to language barrier)    Swallowing 2-->difficulty swallowing liquids/foods    Cognitive/Perceptual/Developmental    Current Mental Status/Cognitive Functioning no deficits noted    Recent Changes in Mental Status/Cognitive Functioning no changes    Employment/Financial    Source Of Income social security    Financial Concerns none      03/08/18 1227    IADL    Medications independent    Meal Preparation --   family brings meals to him    Housekeeping independent    Laundry independent    Shopping independent    Oral Care independent    Activity Tolerance    Usual Activity Tolerance  moderate    Current Activity Tolerance fair    Cognitive/Perceptual/Developmental    Developmental Stage (Eriksson's Stages of Development) Stage 8 (65 years-death/Late Adulthood) Integrity vs. Despair            Psychosocial     None            Abuse/Neglect     None            Legal     None            Substance Abuse     None            Patient Forms     None          Nan Rangel, RN

## 2018-03-08 NOTE — THERAPY EVALUATION
Acute Care - Speech Language Pathology   Swallow Initial Evaluation [facility]     Patient Name: Rommel Gonzalez  : 1945  MRN: [id number]  Today's Date: 3/8/2018               Admit Date: 3/7/2018    SPEECH-LANGUAGE PATHOLOGY EVALUATION - SWALLOW  Subjective: The patient was seen on this date for a Clinical Swallow evaluation.  Patient was alert and cooperative but clearly fatigued.   Significant history: Pt adm after found down on floor for unknown time w/left facial droop and increased slurred speech. Pt w/PMH Parkinsons/Progressive Supranuclear Palsy, Dysarthria, and Aphonia. Pt was just DC from OP ST yesterday after being treated for voice/speech.   Objective: Oral motor exam revealed generalized weakness with decreased ROM of mandible, left sided buccal weakness, Left sided labial droop, decreased lingual ROM, and noted harsh vocal quality. Pt w/missing teeth and partials not available. Slightly weak volitional cough and slight delay with volitional swallow noted.  Textures given included ice, thin liquid, nectar thick liquid, honey thick liquid, puree consistency and mechanical soft consistency. Regular solid not given due to missing dentition and inadequate mastication of mechanical soft.  Assessment: Difficulties were noted with ice, thin liquid, nectar thick liquid, honey thick liquid and mechanical soft consistency.  Pt w/reduced mandibular ROM causing slight difficulty taking foods/liquids from spoon. Delayed/inadequate mastication noted w/mechanical soft peach, taking pt several minutes to chew/swallow. Wet vocal quality noted immediately w/ice, thin, nectar, and honey all by tsp, and mechanical soft with delay. Pt able to clear voice with throat clear and second swallow when cued by SLP. No vocal quality changes noted with puree, however, pt w/multiple swallows. Audible swallows noted throughout trials. Laryngeal elevation adequate but slightly delayed with volitional swallow.   oral residue,  inefficient mastication and wet vocal quality  SLP Findings:  Patient presents with moderate oropharyngeal dysphagia, without esophageal component.   Recommendations: Diet Textures: pudding thick liquid, puree with some mashed (fork mashed) consistency food.  Medications should be taken whole or crushed with puree. May have water and ice between meals after oral care, under staff or family supervision and with the recommended strategies for safe swallowing.   Recommended Strategies: Upright for PO, small bites and sips, double swallow with all bites/sips and no straw. Oral care before breakfast, after all meals and PRN.  Other Recommended Evaluations: VFSS    Dysphagia therapy is recommended. Rationale: monitor diet tolerance, ed re diet modifications as needed.        Visit Dx:     ICD-10-CM ICD-9-CM   1. Traumatic rhabdomyolysis, initial encounter T79.6XXA 958.6   2. Fall at home, initial encounter W19.XXXA E888.9    Y92.099 E849.0     Patient Active Problem List   Diagnosis   • AF (atrial fibrillation)   • Depression   • Gait instability   • Hypertension   • Insomnia   • Idiopathic Parkinson's disease   • Peripheral neuropathy   • Dysarthria   • Atrial flutter   • Anticoagulated   • Health care maintenance   • Hypothyroidism (acquired)   • Weakness of voice   • Abnormal chest CT   • Facial droop   • Hypersomnia    • Hyperlipidemia   • High risk medication use   • CORINNE on CPAP   • Obstructive sleep apnea, adult   • Non-traumatic rhabdomyolysis   • Fall   • Progressive supranuclear palsy   • Bruising     Past Medical History:   Diagnosis Date   • AF (atrial fibrillation)    • Atrial flutter    • Atypical chest pain    • Chest pain    • Colon polyps    • Confusion    • Depression    • Disease of thyroid gland    • Disorder of thyroid    • Dyspnea and respiratory abnormalities    • Elevated TSH    • Fatigue    • Gait instability    • Hay fever    • Hyperlipidemia    • Hypertension    • Hypothyroidism    • Insomnia     • Measles    • Mumps    • Palpitations    • Parkinson disease    • Peripheral neuropathy    • Pneumonia    • Poor sleep pattern    • Slurred speech    • Tremor      History reviewed. No pertinent surgical history.       SWALLOW EVALUATION (last 72 hours)      Swallow Evaluation       03/08/18 0900                Rehab Evaluation    Document Type evaluation  -JT        Subjective Information agree to therapy;no complaints  -JT        Patient Effort, Rehab Treatment good  -JT        Symptoms Noted During/After Treatment fatigue  -JT        General Information    Patient Profile Review yes  -JT        Subjective Patient Observations alert, cooperative  -JT        Pertinent History Of Current Problem Pt adm after found down on floor, increased slurred speech, left facial droop. PMH Parkinson's/Progressive Supranuclear Palsy   Pt DC from OP ST yesterday. OP ST for Speech/voice  -JT        Current Diet Limitations NPO  -JT        Precautions/Limitations, Hearing WFL  -JT        Prior Level of Function- Communication connected speech is difficult to understand   pt w/dysarthria and aphonia due to parkinsons  -JT        Prior Level of Function- Swallowing no diet consistency restrictions  -JT        Plans/Goals Discussed With patient;other;agreed upon  -JT        Barriers to Rehab medically complex  -JT        Clinical Impression    Patient's Goals For Discharge return to PO diet  -JT        Family Goals For Discharge family did not state  -JT        SLP Swallowing Diagnosis oral dysfunction;pharyngeal dysfunction  -JT        Rehab Potential/Prognosis, Swallowing fair, will monitor progress closely  -JT        Criteria for Skilled Therapeutic Interventions Met skilled criteria for dysphagia intervention met  -JT        Therapy Frequency PRN  -JT        Predicted Duration Therapy Interv (days) until discharge  -JT        Expected Duration Therapy Session (min) 15-30 minutes  -JT        SLP Diet Recommendation III - pureed  with some mashed;pudding-thick liquids  -JT        Recommended Diagnostics VFSS (MBS)  -JT        Recommended Feeding/Eating Techniques alternate between small bites and sips of food/liquid;multiple swallow technique;no straws;small sips/bites  -JT        SLP Rec. for Method of Medication Administration meds whole in pudding/applesauce  -JT        Monitor For Signs Of Aspiration cough;elevated WBC count;gurgly voice;throat clearing;fever;upper respiratory infection;pneumonia;right lower lobe infiltrates  -JT        Anticipated Discharge Disposition other (see comments)   unknown  -JT        Pain Assessment    Pain Assessment No/denies pain  -JT        Cognitive Assessment/Intervention    Current Cognitive/Communication Assessment functional  -JT        Orientation Status oriented x 4  -JT        Follows Commands/Answers Questions 100% of the time;able to follow single-step instructions  -JT        Oral Motor Structure and Function    Oral Motor Anatomy and Physiology patient demonstrates anatomy that is WNL  -JT        Dentition Assessment missing teeth   upper  partials not present; family to bring  -JT        Secretion Management WNL/WFL  -JT        Mucosal Quality dry  -JT        Volitional Swallow mild to moderate difficulties initiating volitional swallow  -JT        Volitional Cough weak volitional cough  -JT        Oral Musculature General Assessment mandibular impairment;oral labial impairment;lingual impairment;buccal impairment  -JT        Mandibular Strength and Mobility reduced strength left  -JT        Oral Labial Strength and Mobility left labial droop  -JT        Lingual Strength and Mobility reduced ROM;reduced strength bilaterally  -JT        Buccal Strength and Mobility reduced strength left  -JT        General Feeding/Swallowing Observations    Current Feeding Method NPO  -JT        Clinical Swallow Exam    Mode of Presentation fed by clinician;spoon  -JT        Oral Preparation Concerns  mixed:;excess chewing noted;increased prep time;inability to open lips/jaw adequately  -JT        Oral Residue mixed:;sulci residue  -JT        Oral Phase Results impaired oral phase, signs of dysfunction present  -JT        Pharyngeal Phase Results sign/symptoms of pharyngeal impairment;multiple swallows   wet vocal quality w/thin, nectar,honey, mixed; aud swall all  -JT        Summary of Clinical Exam Pt w/suspected oropharyngeal dysphagia characterized by generalized weakness, reduced oral prep, wet vocal quality with thin, nectar, honey, and mixed, multiple/audible swallows w/all consistencies, and inadequate mastication for solid foods.   -JT          User Key  (r) = Recorded By, (t) = Taken By, (c) = Cosigned By    Initials Name Effective Dates    JT Danna Ortiz Taina 03/07/18 -         EDUCATION  The patient has been educated in the following areas:   Dysphagia (Swallowing Impairment).    SLP Recommendation and Plan  SLP Swallowing Diagnosis: oral dysfunction, pharyngeal dysfunction  SLP Diet Recommendation: III - pureed with some mashed, pudding-thick liquids  Recommended Feeding/Eating Techniques: alternate between small bites and sips of food/liquid, multiple swallow technique, no straws, small sips/bites  SLP Rec. for Method of Medication Administration: meds whole in pudding/applesauce  Monitor For Signs Of Aspiration: cough, elevated WBC count, gurgly voice, throat clearing, fever, upper respiratory infection, pneumonia, right lower lobe infiltrates  Recommended Diagnostics: VFSS (MBS)  Criteria for Skilled Therapeutic Interventions Met: skilled criteria for dysphagia intervention met  Anticipated Discharge Disposition: other (see comments) (unknown)  Rehab Potential/Prognosis, Swallowing: fair, will monitor progress closely  Therapy Frequency: PRN             Plan of Care Review  Plan Of Care Reviewed With: patient, family  Progress: progress towards functional goals is fair  Outcome Summary/Follow  up Plan: Pt seen for bedside swallow w/suspected oropharyngeal dysphagia. Rec pudding thick liquids, fork mashed/ground meat diet, meds with puree, and water/ice between meals with oral care. VFSS 3/9. ST to follow.          IP SLP Goals       03/08/18 0915          Safely Consume Diet    Safely Consume Diet- SLP, Time to Achieve by discharge  -JT      Safely Consume Diet- SLP, Activity Level Patient will improve oral skills for more efficient eating;Patient will improve timing of pharyngeal response for safer, more efficient swallow  -JT      Safely Consume Diet- SLP, Outcome goal ongoing  -JT        User Key  (r) = Recorded By, (t) = Taken By, (c) = Cosigned By    Initials Name Provider Type    JKRISTOPHER Her Speech and Language Pathologist             SLP Outcome Measures (last 72 hours)      SLP Outcome Measures       03/08/18 0900 03/07/18 0900       SLP Outcome Measures    Outcome Measure Used? Adult NOMS  -JT Adult NOMS  -SA     FCM Scores    FCM Chosen Swallowing  -JT      Swallowing FCM Score 3  -JT      Motor Speech FCM Score  3  -SA     Voice FCM Score  2  -SA       User Key  (r) = Recorded By, (t) = Taken By, (c) = Cosigned By    Initials Name Effective Dates    JT Danna Carterlen 03/07/18 -     SA Lisa Paul MS [facility]-SLP 03/07/18 -            Time Calculation:         Time Calculation- SLP       03/08/18 0919          Time Calculation- SLP    SLP Start Time 0815  -JT      SLP Stop Time 0915  -JT      SLP Time Calculation (min) 60 min  -JT      SLP Received On 03/08/18  -JT        User Key  (r) = Recorded By, (t) = Taken By, (c) = Cosigned By    Initials Name Provider Type    DIGNA Her Speech and Language Pathologist          Therapy Charges for Today     Code Description Service Date Service Provider Modifiers Qty    [id number] HC ST EVAL ORAL PHARYNG SWALLOW 4 3/8/2018 Danna Her GN 1               Danna Her  3/8/2018

## 2018-03-08 NOTE — H&P
Internal medicine history and physical    INTERNAL MEDICINE   [facility]       Patient Identification:  Name: Rommel Gonzalez  Age: 72 y.o.  Sex: male  :  1945  MRN: [id number]                   Primary Care Physician: Luiz Onofre MD                                   Chief Complaint:  Found down by family members at his home on the floor.    History of Present Illness:   Patient is a 72-year-old male with past medical history noted below most important of which is progressive Parkinson's disease and new diagnosis of progressive supranuclear palsy..  In that background patient has issues with mobility and uses walker.  According to patient he went down to  some pills on the floor and then in that process laid down and could not get up.  Patient's family members for calling him as per their routine but was not getting any answer since the last time this spoke was 10 AM this morning.  This resulted in somebody went to check on him and found him on the floor resulting in this hospitalization.  Patient himself denies any specific complaints denies any pain and says that his appetite is okay.  Again is very difficult to understand Mr. Gonzalez at times because of the peculiar garbled nature of his speech that is progressively getting worse due to underlying Parkinson's disease.  Patient denies any specific weakness of arm or legs.      Past Medical History:  Past Medical History:   Diagnosis Date   • AF (atrial fibrillation)    • Atrial flutter    • Atypical chest pain    • Chest pain    • Colon polyps    • Confusion    • Depression    • Disease of thyroid gland    • Disorder of thyroid    • Dyspnea and respiratory abnormalities    • Elevated TSH    • Fatigue    • Gait instability    • Hay fever    • Hyperlipidemia    • Hypertension    • Hypothyroidism    • Insomnia    • Measles    • Mumps    • Palpitations    • Parkinson disease    • Peripheral neuropathy    • Pneumonia    • Poor sleep  pattern    • Slurred speech    • Tremor      Past Surgical History:  History reviewed. No pertinent surgical history.   Home Meds:  Prescriptions Prior to Admission   Medication Sig Dispense Refill Last Dose   • aspirin 81 MG chewable tablet Chew 81 mg Daily.      • bumetanide (BUMEX) 2 MG tablet Take 2 mg by mouth 2 (Two) Times a Week. Tuesday, Friday      • bumetanide (BUMEX) 2 MG tablet Take 1 mg by mouth 2 (Two) Times a Week. Wednesday, Saturday      • carbidopa-levodopa (SINEMET)  MG per tablet Take 2 tablets by mouth 3 (Three) Times a Day.      • carbidopa-levodopa (SINEMET)  MG per tablet Take 0.5 tablets by mouth 3 (Three) Times a Day.      • carvedilol (COREG) 6.25 MG tablet Take 1.5 tablets by mouth 2 (Two) Times a Day With Meals. 270 tablet 1 Unknown   • doxazosin (CARDURA) 2 MG tablet Take 1 tablet by mouth Every Night. 90 tablet 3 Unknown   • gabapentin (NEURONTIN) 300 MG capsule Take 300 mg by mouth 2 (Two) Times a Day.      • levothyroxine (SYNTHROID, LEVOTHROID) 112 MCG tablet Take 112 mcg by mouth Daily.      • lisinopril (PRINIVIL,ZESTRIL) 10 MG tablet Take 5 mg by mouth Every Morning.      • lisinopril (PRINIVIL,ZESTRIL) 10 MG tablet Take 10 mg by mouth Every Night.      • lovastatin (MEVACOR) 10 MG tablet Take 1 tablet by mouth Every Other Day. 90 tablet 1 Unknown   • Multiple Vitamins-Minerals (MULTIVITAL) tablet Take 1 tablet by mouth daily.   Unknown   • rivaroxaban (XARELTO) 20 MG tablet Take 1 tablet by mouth Daily With Dinner. 90 tablet 1 Unknown   • zolpidem (AMBIEN) 10 MG tablet Take 1 tablet by mouth At Night As Needed for Sleep. 90 tablet 1 Unknown   • carbidopa-levodopa CR (SINEMET CR)  MG per CR tablet Take  by mouth.   Unknown     Current Meds:     Current Facility-Administered Medications:   •  sodium chloride 0.9 % flush 10 mL, 10 mL, Intravenous, PRN, Sigifredo Lovelace MD  Allergies:  No Known Allergies  Social History:   Social History   Substance Use Topics   •  "Smoking status: Former Smoker   • Smokeless tobacco: Not on file      Comment: seldom caffeine   • Alcohol use Yes      Comment: social use      Family History:  Family History   Problem Relation Age of Onset   • Hypertension Mother    • Glaucoma Mother    • Throat cancer Father    • Alcohol abuse Father    • Hypertension Sister    • Cancer Sister      malignant neoplasm of urinary bladder   • Lung cancer Brother    • Heart attack Other    • Lung disease Other           Review of Systems  See history of present illness and past medical history.  Constitutional: Denies weakness no fever or chills.   HENT: Having issues and trouble swallowing.    Eyes: Negative for visual disturbance.   Respiratory: Negative for cough, chest tightness, shortness of breath and wheezing.    Cardiovascular: Negative for chest pain, palpitations and leg swelling.   Gastrointestinal: Negative for abdominal pain, diarrhea, nausea and vomiting.   Genitourinary: Negative for dysuria.   Musculoskeletal: Negative for back pain.   Skin: Abrasions superficial on the knee and of the bony prominences noted.   Neurological: Generalized weakness due to progressive supranuclear palsy and rigidity and propensity to fall due to underlying Parkinson's disease..     Vitals:   /73 (BP Location: Left arm, Patient Position: Lying)  Pulse 97  Temp 98.8 °F (37.1 °C) (Oral)   Resp 16  Ht 60 cm (23.62\")  Wt 80.5 kg (177 lb 7 oz)  SpO2 97%  .57 kg/m2  I/O:   Intake/Output Summary (Last 24 hours) at 03/07/18 2058  Last data filed at 03/07/18 1755   Gross per 24 hour   Intake             2500 ml   Output                0 ml   Net             2500 ml     Exam:  General Appearance:    Alert, cooperative, no distress, appears Older than stated age   Head:    Normocephalic, without obvious abnormality, Classical masked facies    Eyes:    PERRL, conjunctiva/corneas clear, EOM's intact, both eyes   Ears:    Normal external ear canals, both ears "   Nose:   Nares normal, septum midline, mucosa normal, no drainage    or sinus tenderness   Throat:   Lips, tongue, gums normal; oral mucosa pink and moist   Neck:   Supple, symmetrical, trachea midline, no adenopathy;     thyroid:  no enlargement/tenderness/nodules; no carotid    bruit or JVD   Back:     Symmetric, no curvature, ROM normal, no CVA tenderness   Lungs:     Clear to auscultation bilaterally, respirations unlabored   Chest Wall:    No tenderness or deformity    Heart:    Regular rate and rhythm, S1 and S2 normal, no murmur, rub   or gallop   Abdomen:     Soft, non-tender, bowel sounds active all four quadrants,     no masses, no hepatomegaly, no splenomegaly   Extremities:   No obvious deformity noted has abrasions on his knees and ankle    Pulses:   Pulses palpable in all extremities; symmetric all extremities   Skin:   Patient and ecchymosis noted    Neurologic:   CNII-XII intact, motor strength grossly intact, sensation grossly intact to light touch, no focal deficits noted       Data Review:      I reviewed the patient's new clinical results.    Results from last 7 days  Lab Units 03/07/18  1500   WBC 10*3/mm3 8.99   HEMOGLOBIN g/dL 13.1*   PLATELETS 10*3/mm3 202       Results from last 7 days  Lab Units 03/07/18  1500   SODIUM mmol/L 140   POTASSIUM mmol/L 4.2   CHLORIDE mmol/L 104   CO2 mmol/L 27.0   BUN mg/dL 17   CREATININE mg/dL 0.91   CALCIUM mg/dL 8.8   GLUCOSE mg/dL 111*       Assessment:  Active Hospital Problems (** Indicates Principal Problem)    Diagnosis Date Noted   • **Traumatic rhabdomyolysis [T79.6XXA] 03/07/2018   • Fall [W19.XXXA] 03/07/2018   • Progressive supranuclear palsy [G23.1] 03/07/2018   • Bruising [T14.8XXA] 03/07/2018   • Obstructive sleep apnea, adult [G47.33] 10/26/2017   • Hypothyroidism (acquired) [E03.9] 06/23/2016   • Weakness of voice [R49.8] 06/23/2016   • Anticoagulated [Z79.01] 04/05/2016   • Idiopathic Parkinson's disease [G20] 03/28/2016   • Hypertension  [I10] 03/28/2016   • AF (atrial fibrillation) [I48.91] 03/28/2016   • Gait instability [R26.81] 03/28/2016   • Dysarthria [R47.1] 03/28/2016      Resolved Hospital Problems    Diagnosis Date Noted Date Resolved   No resolved problems to display.       Plan:  Found down and was unable to get up in the context of progressive Parkinson's disease and new diagnosis of progressive supranuclear palsy-this represent progressive deterioration of underlying Parkinson's and supranuclear palsy.  Plan follow precautions will do PT evaluation.  Elevated CPK due to rhabdomyolysis in the context of prolonged immobility on the ground after reported fall plan is to localize urine monitor CPK and renal function.  Acute on chronic renal failure likely due to prerenal process called by volume depletion due to vomiting and inability to drink or eat much.  Atrial fibrillation continue current regimen  Obstructive sleep apnea continue CPAP.  Dysarthria-patient's speech is very monotonous and garbled and this is unchanged compared to previous episodes as documented in the notes.  Still would need aspiration precaution.  Hypertension continue his home regimen  Hypothyroidism continue thyroid replacement therapy.  Jero Schaffer MD   3/7/2018  8:58 PM  Much of this encounter note is an electronic transcription/translation of spoken language to printed text. The electronic translation of spoken language may permit erroneous, or at times, nonsensical words or phrases to be inadvertently transcribed; Although I have reviewed the note for such errors, some may still exist

## 2018-03-08 NOTE — CONSULTS
Patient Name: Rommel Gonzalez  :1945  72 y.o.    Date of Admission: 3/7/2018  Date of Consultation:  18  Encounter Provider: Delia Mcgrath RN  Place of Service: [facility] CARDIOLOGY  Referring Provider: Jero Schaffer MD  Patient Care Team:  Luiz Onofre MD as PCP - General (Family Medicine)      Chief complaint: Weakness    History of Present Illness:      72-year-old gentleman who is usually followed by Dr. Bennett.  Patient has a history of hypertension hyperlipidemia and by chart Parkinson's disease.  It sounds like he really has progressive supranuclear palsy.  He had an episode of atrial fibrillation back in  with a rapid rate.  He is placed on Coreg and Xarelto at that time and had remained stable.  He had a stress test also back in  with an echo that was not remarkable.  Patient had an echo about a year ago that was unremarkable.  There was a concern he was having TIAs during an office visit with a neurologist on 18 and aspirin was added to his Xarelto regimen.  Patient's sister attempted to get a hold of him yesterday and he did not answer the phone.  They ultimately went over to his house and found him on the floor.  He was unable to get up and a 5 called EMS.  It is unclear how long he was on the floor however his CPK is elevated consistent with rhabdomyolysis.  We will asked to see patient for management of diuretics and I believe also to ultimately address anticoagulation.    CT of head on 3/7/18-  FINDINGS: The brain and ventricles are symmetrical. There is no evidence  of intracranial hemorrhage, hydrocephalus or of abnormal extra-axial  fluid. No focal area of decreased attenuation to suggest acute  infarction is identified. Decreased attenuation is appreciated involving  the deep white matter of the left frontal lobe, present previously and  unchanged likely representing small vessel ischemic disease. Vascular  calcification is  noted.      IMPRESSION:  No acute process identified. Further evaluation could be  performed with a MRI examination of the brain as indicated.    XR chest 2 vw on 3/7/18-  FINDINGS: Heart size is within normal limits. The lungs appear free of  acute infiltrates. Bones and soft tissues are unremarkable.      IMPRESSION:  1. No acute process.    Previous Testing-  Echocardiogram on 4/27/17-  Interpretation Summary      · Left ventricular function is normal. Estimated EF = 55%.     Echocardiogram on 2/27/15-        Past Medical History:   Diagnosis Date   • AF (atrial fibrillation)    • Atrial flutter    • Atypical chest pain    • Chest pain    • Colon polyps    • Confusion    • Depression    • Disease of thyroid gland    • Disorder of thyroid    • Dyspnea and respiratory abnormalities    • Elevated TSH    • Fatigue    • Gait instability    • Hay fever    • Hyperlipidemia    • Hypertension    • Hypothyroidism    • Insomnia    • Measles    • Mumps    • Palpitations    • Parkinson disease    • Peripheral neuropathy    • Pneumonia    • Poor sleep pattern    • Slurred speech    • Tremor        History reviewed. No pertinent surgical history.      Prior to Admission medications    Medication Sig Start Date End Date Taking? Authorizing Provider   aspirin 81 MG chewable tablet Chew 81 mg Daily.   Yes Historical Provider, MD   bumetanide (BUMEX) 2 MG tablet Take 2 mg by mouth 2 (Two) Times a Week. Tuesday, Friday   Yes Historical Provider, MD   bumetanide (BUMEX) 2 MG tablet Take 1 mg by mouth 2 (Two) Times a Week. Wednesday, Saturday   Yes Historical Provider, MD   carbidopa-levodopa (SINEMET)  MG per tablet Take 2 tablets by mouth 3 (Three) Times a Day.   Yes Historical Provider, MD   carbidopa-levodopa (SINEMET)  MG per tablet Take 0.5 tablets by mouth 3 (Three) Times a Day.   Yes Historical Provider, MD   carvedilol (COREG) 6.25 MG tablet Take 1.5 tablets by mouth 2 (Two) Times a Day With Meals. 9/7/17  Yes  Laura Bennett MD   doxazosin (CARDURA) 2 MG tablet Take 1 tablet by mouth Every Night. 9/11/17  Yes Luiz Onofre MD   gabapentin (NEURONTIN) 300 MG capsule Take 300 mg by mouth 2 (Two) Times a Day.   Yes Historical Provider, MD   levothyroxine (SYNTHROID, LEVOTHROID) 112 MCG tablet Take 112 mcg by mouth Daily.   Yes Historical Provider, MD   lisinopril (PRINIVIL,ZESTRIL) 10 MG tablet Take 5 mg by mouth Every Morning.   Yes Historical Provider, MD   lisinopril (PRINIVIL,ZESTRIL) 10 MG tablet Take 10 mg by mouth Every Night.   Yes Historical Provider, MD   lovastatin (MEVACOR) 10 MG tablet Take 1 tablet by mouth Every Other Day. 11/3/17  Yes Luiz Onofre MD   Multiple Vitamins-Minerals (MULTIVITAL) tablet Take 1 tablet by mouth daily. 9/5/14  Yes Historical Provider, MD   rivaroxaban (XARELTO) 20 MG tablet Take 1 tablet by mouth Daily With Dinner. 9/15/17  Yes Laura Bennett MD   zolpidem (AMBIEN) 10 MG tablet Take 1 tablet by mouth At Night As Needed for Sleep. 11/1/17  Yes Luiz Onofre MD   carbidopa-levodopa CR (SINEMET CR)  MG per CR tablet Take  by mouth. 2/17/18   Historical Provider, MD       No Known Allergies    Social History     Social History   • Marital status:      Social History Main Topics   • Smoking status: Former Smoker      Comment: seldom caffeine   • Alcohol use Yes      Comment: social use   • Drug use: No   • Sexual activity: No       Family History   Problem Relation Age of Onset   • Hypertension Mother    • Glaucoma Mother    • Throat cancer Father    • Alcohol abuse Father    • Hypertension Sister    • Cancer Sister      malignant neoplasm of urinary bladder   • Lung cancer Brother    • Heart attack Other    • Lung disease Other        REVIEW OF SYSTEMS:   All systems reviewed.  Pertinent positives identified in HPI.  All other systems are negative.      Objective:     Vitals:    03/07/18 1932 03/07/18 1935 03/08/18 0411 03/08/18 0825   BP: 127/73  111/61    BP Location:  "Left arm  Right arm    Patient Position: Lying      Pulse: 97  90    Resp: 16  16    Temp: 98.8 °F (37.1 °C)  99.2 °F (37.3 °C)    TempSrc: Oral  Oral    SpO2: 97%  97%    Weight:  80.5 kg (177 lb 7 oz)     Height:  60 cm (23.62\")  182.9 cm (72\")     Body mass index is 24.06 kg/(m^2).    General Appearane:    Alert, cooperative, in no acute distress  Flat affect    Head:    Normocephalic, without obvious abnormality, atraumatic   Eyes:            Lids and lashes normal, conjunctivae and sclerae normal, no   icterus, no pallor, corneas clear, PERRLA   Ears:    Ears appear intact with no abnormalities noted   Throat:   No oral lesions, no thrush, oral mucosa moist   Neck:   No adenopathy, supple, trachea midline, no thyromegaly, no   carotid bruit, no JVD   Back:     No kyphosis present, no scoliosis present, no skin lesions, erythema or scars, no tenderness to percussion or palpation, range of motion normal   Lungs:     Clear to auscultation,respirations regular, even and unlabored    Heart:    Regular rhythm and normal rate, normal S1 and S2, no murmur, no gallop, no rub, no click   Chest Wall:    No abnormalities observed   Abdomen:     Normal bowel sounds, no masses, no organomegaly, soft        non-tender, non-distended, no guarding, no rebound  tenderness   Extremities:   Moves all extremities well, no edema, no cyanosis, no redness           Lab Review:       Results from last 7 days  Lab Units 03/08/18  0554   SODIUM mmol/L 141   POTASSIUM mmol/L 3.4*   CHLORIDE mmol/L 108*   CO2 mmol/L 29.8*   BUN mg/dL 12   CREATININE mg/dL 0.86   CALCIUM mg/dL 7.5*   BILIRUBIN mg/dL 0.4   ALK PHOS U/L 41   ALT (SGPT) U/L 5   AST (SGOT) U/L 65*   GLUCOSE mg/dL 96       Results from last 7 days  Lab Units 03/08/18  0554 03/07/18  1500   CK TOTAL U/L 2335* 2413*       Results from last 7 days  Lab Units 03/08/18  0554   WBC 10*3/mm3 7.15   HEMOGLOBIN g/dL 10.4*   HEMATOCRIT % 32.6*   PLATELETS 10*3/mm3 176       Results from " last 7 days  Lab Units 03/07/18  1500   INR  2.57*                     Baseline EKG on 2/23/18-    I personally viewed and interpreted the patient's EKG/Telemetry data.        Assessment and Plan:       1.  Progressive supranuclear palsy.  Per neurology  2.  Possible recent TIAs versus super nuclear palsy progression.  3.  Rhabdomyolysis.  Hold diuretics for now follow kidney function and CK levels.  4.  Last echo was in 2015 had grade 2 diastolic dysfunction and normal ejection fraction.  Would repeat  5.  Paroxysmal atrial fibrillation in the past.  ECG pending at time this dictation.  6.  Hypertension blood pressure currently good.    7. Will follow        Delia Mcgrath RN  03/08/18  8:32 AM    Hans Muhammad MD  [facility]  3/8/2018 11:14 AM

## 2018-03-08 NOTE — PLAN OF CARE
"Problem: Patient Care Overview (Adult)  Goal: Plan of Care Review  Outcome: Ongoing (interventions implemented as appropriate)   03/08/18 1803   Patient Care Overview   Progress improving   Outcome Evaluation   Outcome Summary/Follow up Plan Pt has denied pain. Speech evaluated, ordered dysphagia III diet with mashed and pudding thick. Pt tolerated lunch and drinks in between. Speech to do video swallow tomorrow. Family updated regarding this. Pt has been consulted. Orthostatic BP per neurology request. Labs continued. Echo and ekg preformed today. Echo appears negative. EKG showed atrial flutter. Potassium low, MD ordered potassium in fluids, no protocol currently. Pt was straight cathed this Am due to retention. Pt had large void later this AM. Pt has been asked frequently if he feels the need to void, replying \"no\" most times. Pt was bladder scanned this afternoon with only 137 ml. Pt sat in chair for lunch. SCDs have been on while in bed. Takes pills whole with water. Will continue to monitor.   Coping/Psychosocial Response Interventions   Plan Of Care Reviewed With patient;family;daughter     Goal: Adult Individualization and Mutuality  Outcome: Ongoing (interventions implemented as appropriate)    Goal: Discharge Needs Assessment  Outcome: Ongoing (interventions implemented as appropriate)      Problem: Fall Risk (Adult)  Goal: Identify Related Risk Factors and Signs and Symptoms  Outcome: Ongoing (interventions implemented as appropriate)    Goal: Absence of Falls  Outcome: Ongoing (interventions implemented as appropriate)      Problem: Pain, Acute (Adult)  Goal: Identify Related Risk Factors and Signs and Symptoms  Outcome: Ongoing (interventions implemented as appropriate)    Goal: Acceptable Pain Control/Comfort Level  Outcome: Ongoing (interventions implemented as appropriate)        "

## 2018-03-08 NOTE — THERAPY EVALUATION
Acute Care - Speech Language Pathology   Swallow Initial Evaluation [facility]     Patient Name: Rommel Gonzalez  : 1945  MRN: [id number]  Today's Date: 3/8/2018               Admit Date: 3/7/2018    SPEECH-LANGUAGE PATHOLOGY EVALUATION - SWALLOW  Subjective: The patient was seen on this date for a Clinical Swallow evaluation.  Patient was alert and cooperative but clearly fatigued.   Significant history: Pt adm after found down on floor for unknown time w/left facial droop and increased slurred speech. Pt w/PMH Parkinsons/Progressive Supranuclear Palsy, Dysarthria, and Aphonia. Pt was just DC from OP ST yesterday after being treated for voice/speech.   Objective: Oral motor exam revealed generalized weakness with decreased ROM of mandible, left sided buccal weakness, Left sided labial droop, decreased lingual ROM, and noted harsh vocal quality. Pt w/missing teeth and partials not available. Slightly weak volitional cough and slight delay with volitional swallow noted.  Textures given included ice, thin liquid, nectar thick liquid, honey thick liquid, puree consistency and mechanical soft consistency. Regular solid not given due to missing dentition and inadequate mastication of mechanical soft.  Assessment: Difficulties were noted with ice, thin liquid, nectar thick liquid, honey thick liquid and mechanical soft consistency.  Pt w/reduced mandibular ROM causing slight difficulty taking foods/liquids from spoon. Delayed/inadequate mastication noted w/mechanical soft peach, taking pt several minutes to chew/swallow. Wet vocal quality noted immediately w/ice, thin, nectar, and honey all by tsp, and mechanical soft with delay. Pt able to clear voice with throat clear and second swallow when cued by SLP. No vocal quality changes noted with puree, however, pt w/multiple swallows. Audible swallows noted throughout trials. Laryngeal elevation adequate but slightly delayed with volitional swallow.   oral residue,  inefficient mastication and wet vocal quality  SLP Findings:  Patient presents with moderate oropharyngeal dysphagia, without esophageal component.   Recommendations: Diet Textures: pudding thick liquid, puree with some mashed (fork mashed) consistency food.  Medications should be taken whole or crushed with puree. May have water and ice between meals after oral care, under staff or family supervision and with the recommended strategies for safe swallowing.   Recommended Strategies: Upright for PO, small bites and sips, double swallow with all bites/sips and no straw. Oral care before breakfast, after all meals and PRN.  Other Recommended Evaluations: VFSS    Dysphagia therapy is recommended. Rationale: monitor diet tolerance, ed re diet modifications as needed.  Visit Dx:     ICD-10-CM ICD-9-CM   1. Traumatic rhabdomyolysis, initial encounter T79.6XXA 958.6   2. Fall at home, initial encounter W19.XXXA E888.9    Y92.099 E849.0     Patient Active Problem List   Diagnosis   • AF (atrial fibrillation)   • Depression   • Gait instability   • Hypertension   • Insomnia   • Idiopathic Parkinson's disease   • Peripheral neuropathy   • Dysarthria   • Atrial flutter   • Anticoagulated   • Health care maintenance   • Hypothyroidism (acquired)   • Weakness of voice   • Abnormal chest CT   • Facial droop   • Hypersomnia    • Hyperlipidemia   • High risk medication use   • CORINNE on CPAP   • Obstructive sleep apnea, adult   • Non-traumatic rhabdomyolysis   • Fall   • Progressive supranuclear palsy   • Bruising     Past Medical History:   Diagnosis Date   • AF (atrial fibrillation)    • Atrial flutter    • Atypical chest pain    • Chest pain    • Colon polyps    • Confusion    • Depression    • Disease of thyroid gland    • Disorder of thyroid    • Dyspnea and respiratory abnormalities    • Elevated TSH    • Fatigue    • Gait instability    • Hay fever    • Hyperlipidemia    • Hypertension    • Hypothyroidism    • Insomnia    •  Measles    • Mumps    • Palpitations    • Parkinson disease    • Peripheral neuropathy    • Pneumonia    • Poor sleep pattern    • Slurred speech    • Tremor      History reviewed. No pertinent surgical history.       SWALLOW EVALUATION (last 72 hours)      Swallow Evaluation       03/08/18 0900                Rehab Evaluation    Document Type evaluation  -JT        Subjective Information agree to therapy;no complaints  -JT        Patient Effort, Rehab Treatment good  -JT        Symptoms Noted During/After Treatment fatigue  -JT        General Information    Patient Profile Review yes  -JT        Subjective Patient Observations alert, cooperative  -JT        Pertinent History Of Current Problem Pt adm after found down on floor, increased slurred speech, left facial droop. PMH Parkinson's/Progressive Supranuclear Palsy   Pt DC from OP ST yesterday. OP ST for Speech/voice  -JT        Current Diet Limitations NPO  -JT        Precautions/Limitations, Hearing WFL  -JT        Prior Level of Function- Communication connected speech is difficult to understand   pt w/dysarthria and aphonia due to parkinsons  -JT        Prior Level of Function- Swallowing no diet consistency restrictions  -JT        Plans/Goals Discussed With patient;other;agreed upon  -JT        Barriers to Rehab medically complex  -JT        Clinical Impression    Patient's Goals For Discharge return to PO diet  -JT        Family Goals For Discharge family did not state  -JT        SLP Swallowing Diagnosis oral dysfunction;pharyngeal dysfunction  -JT        Rehab Potential/Prognosis, Swallowing fair, will monitor progress closely  -JT        Criteria for Skilled Therapeutic Interventions Met skilled criteria for dysphagia intervention met  -JT        Therapy Frequency PRN  -JT        Predicted Duration Therapy Interv (days) until discharge  -JT        Expected Duration Therapy Session (min) 15-30 minutes  -JT        SLP Diet Recommendation III - pureed with  some mashed;pudding-thick liquids  -JT        Recommended Diagnostics VFSS (MBS)  -JT        Recommended Feeding/Eating Techniques alternate between small bites and sips of food/liquid;multiple swallow technique;no straws;small sips/bites  -JT        SLP Rec. for Method of Medication Administration meds whole in pudding/applesauce  -JT        Monitor For Signs Of Aspiration cough;elevated WBC count;gurgly voice;throat clearing;fever;upper respiratory infection;pneumonia;right lower lobe infiltrates  -JT        Anticipated Discharge Disposition other (see comments)   unknown  -JT        Pain Assessment    Pain Assessment No/denies pain  -JT        Cognitive Assessment/Intervention    Current Cognitive/Communication Assessment functional  -JT        Orientation Status oriented x 4  -JT        Follows Commands/Answers Questions 100% of the time;able to follow single-step instructions  -JT        Oral Motor Structure and Function    Oral Motor Anatomy and Physiology patient demonstrates anatomy that is WNL  -JT        Dentition Assessment missing teeth   upper  partials not present; family to bring  -JT        Secretion Management WNL/WFL  -JT        Mucosal Quality dry  -JT        Volitional Swallow mild to moderate difficulties initiating volitional swallow  -JT        Volitional Cough weak volitional cough  -JT        Oral Musculature General Assessment mandibular impairment;oral labial impairment;lingual impairment;buccal impairment  -JT        Mandibular Strength and Mobility reduced strength left  -JT        Oral Labial Strength and Mobility left labial droop  -JT        Lingual Strength and Mobility reduced ROM;reduced strength bilaterally  -JT        Buccal Strength and Mobility reduced strength left  -JT        General Feeding/Swallowing Observations    Current Feeding Method NPO  -JT        Clinical Swallow Exam    Mode of Presentation fed by clinician;spoon  -JT        Oral Preparation Concerns mixed:;excess  chewing noted;increased prep time;inability to open lips/jaw adequately  -JT        Oral Residue mixed:;sulci residue  -JT        Oral Phase Results impaired oral phase, signs of dysfunction present  -JT        Pharyngeal Phase Results sign/symptoms of pharyngeal impairment;multiple swallows   wet vocal quality w/thin, nectar,honey, mixed; aud swall all  -JT        Summary of Clinical Exam Pt w/suspected oropharyngeal dysphagia characterized by generalized weakness, reduced oral prep, wet vocal quality with thin, nectar, honey, and mixed, multiple/audible swallows w/all consistencies, and inadequate mastication for solid foods.   -JT          User Key  (r) = Recorded By, (t) = Taken By, (c) = Cosigned By    Initials Name Effective Dates    JT Danna Ortiz Taina 03/07/18 -         EDUCATION  The patient has been educated in the following areas:   Dysphagia (Swallowing Impairment).    SLP Recommendation and Plan  SLP Swallowing Diagnosis: oral dysfunction, pharyngeal dysfunction  SLP Diet Recommendation: III - pureed with some mashed, pudding-thick liquids  Recommended Feeding/Eating Techniques: alternate between small bites and sips of food/liquid, multiple swallow technique, no straws, small sips/bites  SLP Rec. for Method of Medication Administration: meds whole in pudding/applesauce  Monitor For Signs Of Aspiration: cough, elevated WBC count, gurgly voice, throat clearing, fever, upper respiratory infection, pneumonia, right lower lobe infiltrates  Recommended Diagnostics: VFSS (MBS)  Criteria for Skilled Therapeutic Interventions Met: skilled criteria for dysphagia intervention met  Anticipated Discharge Disposition: other (see comments) (unknown)  Rehab Potential/Prognosis, Swallowing: fair, will monitor progress closely  Therapy Frequency: PRN             Plan of Care Review  Plan Of Care Reviewed With: patient, family  Progress: progress towards functional goals is fair  Outcome Summary/Follow up Plan: Pt  seen for bedside swallow w/suspected oropharyngeal dysphagia. Rec pudding thick liquids, fork mashed/ground meat diet, meds with puree, and water/ice between meals with oral care. VFSS 3/9. ST to follow.          IP SLP Goals       03/08/18 0915          Safely Consume Diet    Safely Consume Diet- SLP, Time to Achieve by discharge  -JT      Safely Consume Diet- SLP, Activity Level Patient will improve oral skills for more efficient eating;Patient will improve timing of pharyngeal response for safer, more efficient swallow  -JT      Safely Consume Diet- SLP, Outcome goal ongoing  -JT        User Key  (r) = Recorded By, (t) = Taken By, (c) = Cosigned By    Initials Name Provider Type    DIGNA Her Speech and Language Pathologist             SLP Outcome Measures (last 72 hours)      SLP Outcome Measures       03/08/18 0900 03/07/18 0900       SLP Outcome Measures    Outcome Measure Used? Adult NOMS  -JT Adult NOMS  -SA     FCM Scores    FCM Chosen Swallowing  -JT      Swallowing FCM Score 3  -JT      Motor Speech FCM Score  3  -SA     Voice FCM Score  2  -SA       User Key  (r) = Recorded By, (t) = Taken By, (c) = Cosigned By    Initials Name Effective Dates    JT Danna Ortiz Taina 03/07/18 -     SA Lisa Paul MS [facility]-SLP 03/07/18 -            Time Calculation:         Time Calculation- SLP       03/08/18 0919          Time Calculation- SLP    SLP Start Time 0815  -JT      SLP Stop Time 0930  -JT      SLP Time Calculation (min) 75 min  -JT      SLP Received On 03/08/18  -JT        User Key  (r) = Recorded By, (t) = Taken By, (c) = Cosigned By    Initials Name Provider Type    DIGNA Her Speech and Language Pathologist          Therapy Charges for Today     Code Description Service Date Service Provider Modifiers Qty    [id number] HC ST EVAL ORAL PHARYNG SWALLOW 5 3/8/2018 Danna Her GN 1               Danna Her  3/8/2018

## 2018-03-09 ENCOUNTER — APPOINTMENT (OUTPATIENT)
Dept: GENERAL RADIOLOGY | Facility: HOSPITAL | Age: 73
End: 2018-03-09

## 2018-03-09 ENCOUNTER — APPOINTMENT (OUTPATIENT)
Dept: OCCUPATIONAL THERAPY | Facility: HOSPITAL | Age: 73
End: 2018-03-09

## 2018-03-09 ENCOUNTER — APPOINTMENT (OUTPATIENT)
Dept: PHYSICAL THERAPY | Facility: HOSPITAL | Age: 73
End: 2018-03-09

## 2018-03-09 ENCOUNTER — APPOINTMENT (OUTPATIENT)
Dept: SPEECH THERAPY | Facility: HOSPITAL | Age: 73
End: 2018-03-09

## 2018-03-09 LAB
ANION GAP SERPL CALCULATED.3IONS-SCNC: 3.8 MMOL/L
BUN BLD-MCNC: 10 MG/DL (ref 8–23)
BUN/CREAT SERPL: 13.7 (ref 7–25)
CALCIUM SPEC-SCNC: 7.7 MG/DL (ref 8.6–10.5)
CHLORIDE SERPL-SCNC: 108 MMOL/L (ref 98–107)
CK SERPL-CCNC: 1867 U/L (ref 20–200)
CO2 SERPL-SCNC: 29.2 MMOL/L (ref 22–29)
CREAT BLD-MCNC: 0.73 MG/DL (ref 0.76–1.27)
GFR SERPL CREATININE-BSD FRML MDRD: 105 ML/MIN/1.73
GLUCOSE BLD-MCNC: 86 MG/DL (ref 65–99)
POTASSIUM BLD-SCNC: 3.9 MMOL/L (ref 3.5–5.2)
SODIUM BLD-SCNC: 141 MMOL/L (ref 136–145)

## 2018-03-09 PROCEDURE — 97165 OT EVAL LOW COMPLEX 30 MIN: CPT

## 2018-03-09 PROCEDURE — 97162 PT EVAL MOD COMPLEX 30 MIN: CPT

## 2018-03-09 PROCEDURE — 99232 SBSQ HOSP IP/OBS MODERATE 35: CPT | Performed by: INTERNAL MEDICINE

## 2018-03-09 PROCEDURE — 97110 THERAPEUTIC EXERCISES: CPT

## 2018-03-09 PROCEDURE — 25810000003 SODIUM CHLORIDE 0.9 % WITH KCL 20 MEQ 20-0.9 MEQ/L-% SOLUTION: Performed by: HOSPITALIST

## 2018-03-09 PROCEDURE — 97535 SELF CARE MNGMENT TRAINING: CPT

## 2018-03-09 PROCEDURE — 92611 MOTION FLUOROSCOPY/SWALLOW: CPT

## 2018-03-09 PROCEDURE — 82550 ASSAY OF CK (CPK): CPT | Performed by: HOSPITALIST

## 2018-03-09 PROCEDURE — 74230 X-RAY XM SWLNG FUNCJ C+: CPT

## 2018-03-09 PROCEDURE — 80048 BASIC METABOLIC PNL TOTAL CA: CPT | Performed by: HOSPITALIST

## 2018-03-09 RX ORDER — POTASSIUM CHLORIDE 750 MG/1
20 CAPSULE, EXTENDED RELEASE ORAL ONCE
Status: COMPLETED | OUTPATIENT
Start: 2018-03-09 | End: 2018-03-09

## 2018-03-09 RX ORDER — MIDODRINE HYDROCHLORIDE 2.5 MG/1
2.5 TABLET ORAL
Status: DISCONTINUED | OUTPATIENT
Start: 2018-03-09 | End: 2018-03-12

## 2018-03-09 RX ADMIN — MIDODRINE HYDROCHLORIDE 2.5 MG: 2.5 TABLET ORAL at 17:31

## 2018-03-09 RX ADMIN — CARVEDILOL 9.38 MG: 6.25 TABLET, FILM COATED ORAL at 17:31

## 2018-03-09 RX ADMIN — MULTIPLE VITAMINS W/ MINERALS TAB 1 TABLET: TAB at 09:05

## 2018-03-09 RX ADMIN — GABAPENTIN 100 MG: 100 CAPSULE ORAL at 09:05

## 2018-03-09 RX ADMIN — BARIUM SULFATE 50 ML: 400 SUSPENSION ORAL at 11:15

## 2018-03-09 RX ADMIN — CARBIDOPA AND LEVODOPA 2 TABLET: 25; 100 TABLET ORAL at 21:41

## 2018-03-09 RX ADMIN — CARBIDOPA AND LEVODOPA 0.5 TABLET: 25; 250 TABLET ORAL at 16:05

## 2018-03-09 RX ADMIN — BARIUM SULFATE 65 ML: 960 POWDER, FOR SUSPENSION ORAL at 11:15

## 2018-03-09 RX ADMIN — GABAPENTIN 100 MG: 100 CAPSULE ORAL at 21:41

## 2018-03-09 RX ADMIN — CARBIDOPA AND LEVODOPA 2 TABLET: 25; 100 TABLET ORAL at 16:04

## 2018-03-09 RX ADMIN — POTASSIUM CHLORIDE 20 MEQ: 750 CAPSULE, EXTENDED RELEASE ORAL at 09:06

## 2018-03-09 RX ADMIN — CARVEDILOL 9.38 MG: 6.25 TABLET, FILM COATED ORAL at 09:05

## 2018-03-09 RX ADMIN — SODIUM CHLORIDE AND POTASSIUM CHLORIDE 100 ML/HR: 9; 1.49 INJECTION, SOLUTION INTRAVENOUS at 06:49

## 2018-03-09 RX ADMIN — SODIUM CHLORIDE AND POTASSIUM CHLORIDE 75 ML/HR: 9; 1.49 INJECTION, SOLUTION INTRAVENOUS at 17:15

## 2018-03-09 RX ADMIN — LEVOTHYROXINE SODIUM 112 MCG: 112 TABLET ORAL at 06:47

## 2018-03-09 RX ADMIN — ATORVASTATIN CALCIUM 10 MG: 10 TABLET, FILM COATED ORAL at 09:05

## 2018-03-09 RX ADMIN — CARBIDOPA AND LEVODOPA 0.5 TABLET: 25; 250 TABLET ORAL at 21:41

## 2018-03-09 RX ADMIN — BARIUM SULFATE 4 ML: 980 POWDER, FOR SUSPENSION ORAL at 11:15

## 2018-03-09 RX ADMIN — RIVAROXABAN 20 MG: 20 TABLET, FILM COATED ORAL at 17:31

## 2018-03-09 RX ADMIN — CARBIDOPA AND LEVODOPA 2 TABLET: 25; 100 TABLET ORAL at 09:05

## 2018-03-09 RX ADMIN — MIDODRINE HYDROCHLORIDE 2.5 MG: 2.5 TABLET ORAL at 06:47

## 2018-03-09 RX ADMIN — CARBIDOPA AND LEVODOPA 0.5 TABLET: 25; 250 TABLET ORAL at 09:05

## 2018-03-09 RX ADMIN — TERAZOSIN HYDROCHLORIDE 1 MG: 1 CAPSULE ORAL at 21:42

## 2018-03-09 NOTE — THERAPY EVALUATION
Acute Care - Occupational Therapy Initial Evaluation  [facility]     Patient Name: Rommel Gonzalez  : 1945  MRN: [id number]  Today's Date: 3/9/2018     Date of Referral to OT: 18  Referring Physician: Gilmar    Admit Date: 3/7/2018       ICD-10-CM ICD-9-CM   1. Traumatic rhabdomyolysis, initial encounter T79.6XXA 958.6   2. Fall at home, initial encounter W19.XXXA E888.9    Y92.099 E849.0   3. Dysarthria R47.1 784.51   4. Progressive supranuclear palsy G23.1 333.0     Patient Active Problem List   Diagnosis   • AF (atrial fibrillation)   • Depression   • Gait instability   • Hypertension   • Insomnia   • Idiopathic Parkinson's disease   • Peripheral neuropathy   • Dysarthria   • Atrial flutter   • Anticoagulated   • Health care maintenance   • Hypothyroidism (acquired)   • Weakness of voice   • Abnormal chest CT   • Facial droop   • Hypersomnia    • Hyperlipidemia   • High risk medication use   • CORINNE on CPAP   • Obstructive sleep apnea, adult   • Non-traumatic rhabdomyolysis   • Fall   • Progressive supranuclear palsy   • Bruising     Past Medical History:   Diagnosis Date   • AF (atrial fibrillation)    • Atrial flutter    • Atypical chest pain    • Chest pain    • Colon polyps    • Confusion    • Depression    • Disease of thyroid gland    • Disorder of thyroid    • Dyspnea and respiratory abnormalities    • Elevated TSH    • Fatigue    • Gait instability    • Hay fever    • Hyperlipidemia    • Hypertension    • Hypothyroidism    • Insomnia    • Measles    • Mumps    • Palpitations    • Parkinson disease    • Peripheral neuropathy    • Pneumonia    • Poor sleep pattern    • Slurred speech    • Tremor      History reviewed. No pertinent surgical history.       OT ASSESSMENT FLOWSHEET (last 72 hours)      OT Evaluation       18 1357 18 1100 18 1056 18 0937 18 1236    Rehab Evaluation    Document Type evaluation;therapy note (daily note)  -LE evaluation  -SA       Subjective  Information agree to therapy  -LE no complaints;agree to therapy  -SA       Evaluation Not Performed   other (see comments)   Undergoing swallow study. Will follow up this afternoon.  -MA      Patient Effort, Rehab Treatment good  -LE good  -SA       Symptoms Noted During/After Treatment  none  -SA       General Information    Patient Profile Review yes  -LE        Referring Physician Ray  -LE        General Observations supine in bed  -LE        Pertinent History Of Current Problem from home with a fall  -LE        Precautions/Limitations fall precautions  -LE        Prior Level of Function independent:;driving;ADL's;transfer   takes shower, drives to [place].  -LE        Equipment Currently Used at Home walker, rolling;cane, straight;raised toilet  -LE   cane, quad;walker, rolling  -BC     Plans/Goals Discussed With patient;agreed upon  -LE        Living Environment    Lives With alone  -LE        Living Environment Comment --   plans to d/c to [facility]  -LE        Clinical Impression    Date of Referral to OT 03/09/18  -LE        OT Diagnosis need for assist with pesonal care  -LE        Impairments Found (describe specific impairments) ergonomics and body mechanics;gait, locomotion, and balance  -LE        Patient/Family Goals Statement prevent falls  -LE        Criteria for Skilled Therapeutic Interventions Met yes;treatment indicated  -LE        Rehab Potential fair, will monitor progress closely  -LE        Therapy Frequency 3-5 times/wk  -LE        Anticipated Equipment Needs At Discharge shower chair  -LE        Anticipated Discharge Disposition home with assist   agree with d/c to [facility]  -LE        Functional Level Prior    Ambulation     1-->assistive equipment  -BC    Transferring     1-->assistive equipment  -BC    Toileting     1-->assistive equipment  -BC    Bathing     0-->independent  -BC    Dressing     0-->independent  -BC    Eating     0-->independent  -BC    Communication      2-->difficulty speaking (not related to language barrier)  -BC    Swallowing     2-->difficulty swallowing liquids/foods  -BC    Vital Signs    O2 Delivery Pre Treatment room air  -LE        O2 Delivery Intra Treatment room air  -LE        O2 Delivery Post Treatment room air  -LE        Pre Patient Position Supine  -LE        Intra Patient Position Standing  -LE        Post Patient Position Supine  -LE        Activity Duration --   moves slowly. h/o parkinsons  -LE        Pain Assessment    Pain Assessment No/denies pain  -LE        Cognitive Assessment/Intervention    Current Cognitive/Communication Assessment --   slow moving  -LE        Orientation Status oriented x 4  -LE        Personal Safety decreased insight to deficits  -LE        Personal Safety Interventions fall prevention program maintained;gait belt;nonskid shoes/slippers when out of bed  -LE        ROM (Range of Motion)    General ROM no range of motion deficits identified   B UE functional for ADL  -LE        MMT (Manual Muscle Testing)    General MMT Assessment --   B UE 4/5  -LE        Bed Mobility, Assessment/Treatment    Bed Mobility, Assistive Device bed rails;head of bed elevated  -LE        Bed Mob, Supine to Sit, [place] supervision required  -LE        Bed Mob, Sit to Supine, [place] supervision required  -LE        Transfer Assessment/Treatment    Transfers, Sit-Stand [place] contact guard assist  -LE        Transfers, Stand-Sit [place] contact guard assist  -LE        Transfers, Sit-Stand-Sit, Assist Device rolling walker  -LE        Toilet Transfer, [place] contact guard assist  -LE        Toilet Transfer, Assistive Device rolling walker  -LE        Transfer, Safety Issues balance decreased during turns;sequencing ability decreased;step length decreased  -LE        Transfer, Impairments impaired balance   leans to R  -LE        Transfer, Comment --   cues and assist to reach back before sitting  -LE         Functional Mobility    Functional Mobility- Ind. Level contact guard assist  -LE        Functional Mobility- Device rolling walker  -LE        Functional Mobility-Distance (Feet) 20  -LE        Functional Mobility- Comment --   leans to right.  cues and A for body mechanics  -LE        Lower Body Dressing Assessment/Training    LB Dressing Assess/Train, Clothing Type doffing:;donning:;slipper socks  -LE        LB Dressing Assess/Train, Position edge of bed;sitting  -LE        LB Dressing Assess/Train, [place] set up required;supervision required;verbal cues required  -LE        Toileting Assessment/Training    Toileting Assess/Train, Assistive Device grab bars  -LE        Toileting Assess/Train, Position sitting;supported standing  -LE        Toileting Assess/Train, Indepen Level --   sat on toilet.  no output  -LE        Balance Skills Training    Sitting-Level of Assistance Distant supervision  -LE        Sitting-Balance Support Feet supported  -LE        Sitting-Balance Activities Trunk control activities  -LE        Sitting # of Minutes 5  -LE        Standing-Level of Assistance Close supervision;Contact guard   CGA dynamic at walker. Leans to R  -LE        Static Standing Balance Support assistive device;Left upper extremity supported;Right upper extremity supported  -LE        Standing-Balance Activities --   VC and A for posture and to correct to midline  -LE        Standing Balance # of Minutes 5  -LE        General Therapy Interventions    Planned Therapy Interventions activity intolerance;adaptive equipment training;ADL retraining;balance training;transfer training  -LE        Positioning and Restraints    Pre-Treatment Position in bed  -LE        Post Treatment Position bed  -LE        In Bed supine;call light within reach;encouraged to call for assist;exit alarm on;SCD pump applied;heels elevated  -LE          03/08/18 1234 03/08/18 1228 03/08/18 1227 03/08/18 0900 03/08/18 0458    Rehab Evaluation     Document Type    evaluation  -JT     Subjective Information    agree to therapy;no complaints  -JT     Patient Effort, Rehab Treatment    good  -JT     Symptoms Noted During/After Treatment    fatigue  -JT     General Information    Equipment Currently Used at Home  walker, standard;cane, straight;bipap/ cpap;raised toilet;grab bar  -BC   walker, standard  -    Living Environment    Lives With   alone  -BC      Living Arrangements   condomini  -BC      Home Accessibility   bed and bath are not on the first floor  -BC      Stair Railings at Home   none  -BC      Type of Financial/Environmental Concern   none  -BC      Transportation Available   car;family or friend will provide  -BC  car;family or friend will provide  -    Living Environment Comment --   grab bars in the shower  -BC        Pain Assessment    Pain Assessment    No/denies pain  -JT     Cognitive Assessment/Intervention    Current Cognitive/Communication Assessment    functional  -JT     Orientation Status    oriented x 4  -JT     Follows Commands/Answers Questions    100% of the time;able to follow single-step instructions  -JT       03/07/18 1939                General Information    Equipment Currently Used at Home cane, straight;bipap/ cpap  -MB        Living Environment    Lives With alone  -MB        Living Arrangements [facility]        Home Accessibility bed and bath are not on the first floor  -MB        Stair Railings at Home none  -MB        Type of Financial/Environmental Concern none  -MB        Transportation Available car  -MB        Functional Level Prior    Ambulation 1-->assistive equipment  -MB        Transferring 1-->assistive equipment  -MB        Toileting 1-->assistive equipment  -MB        Bathing 0-->independent  -MB        Dressing 0-->independent  -MB        Eating 0-->independent  -MB        Communication 2-->difficulty speaking (not related to language barrier)  -MB        Swallowing 2-->difficulty swallowing  liquids/foods  -MB        Prior Functional Level Comment None  -MB          User Key  (r) = Recorded By, (t) = Taken By, (c) = Cosigned By    Initials Name Effective Dates    TARSHA Zepeda, OTR 03/07/18 -     GINA Duval, DANDY 06/16/16 -     JKRISTOPHER Her 03/07/18 -     DEJON Rangel RN 02/18/16 -     MYRTLE Hernandez RN 11/10/16 -     SHERIF Allen, PT 03/07/18 -     SA Lisa Paul, MS [facility]-SLP 03/07/18 -            Occupational Therapy Education     Title: PT OT SLP Therapies (Done)     Topic: Occupational Therapy (Done)     Point: ADL training (Done)    Description: Instruct learner(s) on proper safety adaptation and remediation techniques during self care or transfers.   Instruct in proper use of assistive devices.    Learning Progress Summary    Learner Readiness Method Response Comment Documented by Status   Patient Acceptance D,TB,E VU,DU VC and A for posture and to correct R lean in standing.  VC for hand placement and body position during xfers.  Ed on risk of falls and agree with d/c to [facility]'s home for increase assist.  03/09/18 1404 Done               Point: Body mechanics (Done)    Description: Instruct learner(s) on proper positioning and spine alignment during self-care, functional mobility activities and/or exercises.    Learning Progress Summary    Learner Readiness Method Response Comment Documented by Status   Patient Acceptance D,TB,E VU,DU VC and A for posture and to correct R lean in standing.  VC for hand placement and body position during xfers.  Ed on risk of falls and agree with d/c to [facility]'s home for increase assist.  03/09/18 1404 Done                      User Key     Initials Effective Dates Name Provider Type Discipline     03/07/18 -  Evelyne Zepeda, OTR Occupational Therapist OT                  OT Recommendation and Plan  Anticipated Equipment Needs At Discharge: shower chair  Anticipated Discharge Disposition: home with assist (agree with d/c to [facility]  home)  Planned Therapy Interventions: activity intolerance, adaptive equipment training, ADL retraining, balance training, transfer training  Therapy Frequency: 3-5 times/wk  Plan of Care Review  Plan Of Care Reviewed With: (P) patient  Outcome Summary/Follow up Plan: (P) Pt presents after fall at home. Pt has been living alone and still drives. Pt demo impaired balance, increase completion time and a fall risk. Pt is assist of 1 to walk to BR.  Pt  may benefit from skilled OT to increase safety and independence.           OT Goals       03/09/18 1406          Transfer Training OT LTG    Transfer Training OT LTG, Date Established (P)  03/09/18  -LE      Transfer Training OT LTG, Time to Achieve (P)  1 wk  -LE      Transfer Training OT LTG, Activity Type (P)  bed to chair /chair to bed;sit to stand/stand to sit;toilet  -LE      Transfer Training OT LTG, [place] Level (P)  supervision required  -LE      Transfer Training OT LTG, Assist Device (P)  walker, rolling  -LE      Bathing OT LTG    Bathing Goal OT LTG, Date Established (P)  03/09/18  -LE      Bathing Goal OT LTG, Time to Achieve (P)  1 wk  -LE      Bathing Goal OT LTG, Activity Type (P)  upper body bathing;lower body bathing  -LE      Bathing Goal OT LTG, [place] Level (P)  supervision required  -LE      Functional Mobility OT LTG    Functional Mobility Goal OT LTG, Date Established (P)  03/09/18  -LE      Functional Mobility Goal OT LTG, Time to Achieve (P)  1 wk  -LE      Functional Mobility Goal OT LTG, [place] Level (P)  supervision  -LE      Functional Mobility Goal OT LTG, Assist Device (P)  appropriate AD  -LE      Functional Mobility Goal OT LTG, Distance to Achieve (P)  to the bathroom;to the sink  -LE        User Key  (r) = Recorded By, (t) = Taken By, (c) = Cosigned By    Initials Name Provider Type    JAVIER Delgado Occupational Therapist                Outcome Measures       03/09/18 1408 03/09/18 1400       How much help from  another is currently needed...    Putting on and taking off regular lower body clothing? 3  -LE      Bathing (including washing, rinsing, and drying) 3  -LE      Toileting (which includes using toilet bed pan or urinal) 3  -LE      Putting on and taking off regular upper body clothing 3  -LE      Taking care of personal grooming (such as brushing teeth) 3  -LE      Eating meals 3  -LE      Score 18  -LE      Functional Assessment    Outcome Measure Options  AM-PAC 6 Clicks Daily Activity (OT)  -LE       User Key  (r) = Recorded By, (t) = Taken By, (c) = Cosigned By    Initials Name Provider Type    JAVIER Delgado Occupational Therapist          Time Calculation:   OT Start Time: 1343  OT Stop Time: 1402  OT Time Calculation (min): 19 min    Therapy Charges for Today     Code Description Service Date Service Provider Modifiers Qty    12725692986  OT EVAL LOW COMPLEXITY 2 3/9/2018 JAVIER Galeas GO 1    [id number]  OT SELF CARE/MGMT/TRAIN EA 15 MIN 3/9/2018 JAVIER Galeas GO 1               JAVIER Galeas  3/9/2018

## 2018-03-09 NOTE — PLAN OF CARE
Problem: Patient Care Overview (Adult)  Goal: Plan of Care Review  Outcome: Ongoing (interventions implemented as appropriate)   03/09/18 1705   Patient Care Overview   Progress improving   Outcome Evaluation   Outcome Summary/Follow up Plan Pt has denied pain this shift. IVF decreased this afternoon. Pt up several times to BR with x1 assist and walker to urinate. BM today. Ambulated in velez with PT. Aspirin d/c'd, xarelto continued per cardiology. Will continue to monitor.   Coping/Psychosocial Response Interventions   Plan Of Care Reviewed With patient;family     Goal: Adult Individualization and Mutuality  Outcome: Ongoing (interventions implemented as appropriate)    Goal: Discharge Needs Assessment  Outcome: Ongoing (interventions implemented as appropriate)      Problem: Fall Risk (Adult)  Goal: Identify Related Risk Factors and Signs and Symptoms  Outcome: Ongoing (interventions implemented as appropriate)    Goal: Absence of Falls  Outcome: Ongoing (interventions implemented as appropriate)      Problem: Pain, Acute (Adult)  Goal: Identify Related Risk Factors and Signs and Symptoms  Outcome: Ongoing (interventions implemented as appropriate)    Goal: Acceptable Pain Control/Comfort Level  Outcome: Ongoing (interventions implemented as appropriate)      Problem: Nutrition, Imbalanced: Inadequate Oral Intake (Adult)  Goal: Identify Related Risk Factors and Signs and Symptoms  Outcome: Ongoing (interventions implemented as appropriate)    Goal: Prevent Further Weight Loss  Outcome: Ongoing (interventions implemented as appropriate)

## 2018-03-09 NOTE — PROGRESS NOTES
Discharge Planning Assessment  [facility]     Patient Name: Rommel Gonzalez  MRN: [id number]  Today's Date: 3/9/2018    Admit Date: 3/7/2018          Discharge Needs Assessment       03/09/18 0937    Discharge Needs Assessment    Equipment Currently Used at Home cane, quad;walker, rolling            Discharge Plan     None        Discharge Placement     No information found                Demographic Summary       03/09/18 0937    Primary Care Physician Information    Name correction it is Dr. Luiz Onofre            Functional Status     None            Psychosocial     None            Abuse/Neglect     None            Legal     None            Substance Abuse     None            Patient Forms     None          Nan Rangel, DANDY

## 2018-03-09 NOTE — PROGRESS NOTES
Name: Rommel Gonzalez ADMIT: 3/7/2018   : 1945  PCP: Luiz Onofre MD    MRN: [id number] LOS: 2 days   AGE/SEX: 72 y.o. male  ROOM: [facility]/   Subjective   Subjective  States he feels good. Denies complaints. No chest pain or difficulty breathing.    Objective   Vital Signs  Temp:  [96.8 °F (36 °C)-98.5 °F (36.9 °C)] 97.1 °F (36.2 °C)  Heart Rate:  [63-82] 75  Resp:  [16] 16  BP: (109-153)/(59-75) 153/75  SpO2:  [95 %-99 %] 98 %  on   ;   O2 Device: room air  Body mass index is 24.06 kg/(m^2).    Physical Exam   Constitutional: He is oriented to person, place, and time. He appears well-developed. He is cooperative. No distress.   Neck: No JVD present. No tracheal deviation present. No thyromegaly present.   Cardiovascular: Normal rate and regular rhythm.    No murmur heard.  Pulmonary/Chest: Effort normal and breath sounds normal. No respiratory distress.   Abdominal: Soft. Normal appearance and bowel sounds are normal. He exhibits no distension. There is no tenderness.   Neurological: He is alert and oriented to person, place, and time.   Masked facies. Sluggish speech.   Skin: Skin is warm and dry. No rash noted.   Psychiatric: He has a normal mood and affect. His behavior is normal.   Nursing note and vitals reviewed.      Results Review:       I reviewed the patient's new clinical results.    Results from last 7 days  Lab Units 18  0554 18  1500   WBC 10*3/mm3 7.15 8.99   HEMOGLOBIN g/dL 10.4* 13.1*   PLATELETS 10*3/mm3 176 202       Results from last 7 days  Lab Units 18  0720 18  0554 18  1500   SODIUM mmol/L 141 141 140   POTASSIUM mmol/L 3.9 3.4* 4.2   CHLORIDE mmol/L 108* 108* 104   CO2 mmol/L 29.2* 29.8* 27.0   BUN mg/dL 10 12 17   CREATININE mg/dL 0.73* 0.86 0.91   GLUCOSE mg/dL 86 96 111*   Estimated Creatinine Clearance: 93.6 mL/min (by C-G formula based on Cr of 0.73).    Results from last 7 days  Lab Units 18  0720 18  0554 18  1500    CALCIUM mg/dL 7.7* 7.5* 8.8   ALBUMIN g/dL  --  3.00* 3.80       Results from last 7 days  Lab Units 03/09/18  0720 03/08/18  0554 03/07/18  1500   CK TOTAL U/L 1867* 2335* 2413*   PROBNP pg/mL  --  1028.0*  --        Results from last 7 days  Lab Units 03/07/18  1500   PROTIME Seconds 27.1*   INR  2.57*         atorvastatin 10 mg Oral Daily   carbidopa-levodopa 2 tablet Oral TID   carbidopa-levodopa 0.5 tablet Oral TID   carvedilol 9.375 mg Oral BID With Meals   gabapentin 100 mg Oral BID   levothyroxine 112 mcg Oral Q AM   midodrine 2.5 mg Oral BID AC   multivitamin with minerals 1 tablet Oral Daily   rivaroxaban 20 mg Oral Daily With Dinner   terazosin 1 mg Oral Nightly       sodium chloride 0.9 % with KCl 20 mEq 100 mL/hr Last Rate: 100 mL/hr (03/09/18 0649)   Diet Soft Texture; Chopped; Thin      Assessment/Plan   Active Hospital Problems (** Indicates Principal Problem)    Diagnosis Date Noted   • **Non-traumatic rhabdomyolysis [M62.82] 03/07/2018   • Fall [W19.XXXA] 03/07/2018   • Progressive supranuclear palsy [G23.1] 03/07/2018   • Bruising [T14.8XXA] 03/07/2018   • Obstructive sleep apnea, adult [G47.33] 10/26/2017   • Weakness of voice [R49.8] 06/23/2016   • Anticoagulated [Z79.01] 04/05/2016   • Idiopathic Parkinson's disease [G20] 03/28/2016   • Hypertension [I10] 03/28/2016   • AF (atrial fibrillation) [I48.91] 03/28/2016   • Gait instability [R26.81] 03/28/2016   • Dysarthria [R47.1] 03/28/2016      Resolved Hospital Problems    Diagnosis Date Noted Date Resolved   No resolved problems to display.       Mr. Gonzalez is a 72 y.o. male with a history of Parkinson's disease and progressive supranuclear palsy who was admitted with mild rhabdomyolysis after sustaining a fall.    · IVFs adjusted yesterday. Cr stable and CPK improving. Will slow rate.  · Discussed with Dr. Manley. Feels likely orthostatic hypotension due to autonomic dysfxn and midodrine has been started. Watch BP. On Coreg for Afib. No AC  per cards due to fall risk.   · Continue to hold Bumex and lisinopril for now.  · We will have physical therapy and case management see as well. SNU vs home with HH. Probably Sunday or Monday.      Fahad Schafer MD  [facility]  03/09/18  1:56 PM

## 2018-03-09 NOTE — PLAN OF CARE
Problem: Patient Care Overview (Adult)  Goal: Plan of Care Review  Outcome: Ongoing (interventions implemented as appropriate)  Pt up x 2 to void, large bowel movement this am. He has denied any discomfort. Swallows pills whole without difficulty, very pleasant and cooperative. Uses walker to ambulate to BR. Some swelling and bruising noted on r lower rib from his recent fall. IVF infusing as ordered. Will continue to monitor and report any changes as needed.  Goal: Adult Individualization and Mutuality  Outcome: Ongoing (interventions implemented as appropriate)    Goal: Discharge Needs Assessment  Outcome: Ongoing (interventions implemented as appropriate)      Problem: Fall Risk (Adult)  Goal: Identify Related Risk Factors and Signs and Symptoms  Outcome: Ongoing (interventions implemented as appropriate)    Goal: Absence of Falls  Outcome: Ongoing (interventions implemented as appropriate)      Problem: Pain, Acute (Adult)  Goal: Identify Related Risk Factors and Signs and Symptoms  Outcome: Ongoing (interventions implemented as appropriate)    Goal: Acceptable Pain Control/Comfort Level  Outcome: Ongoing (interventions implemented as appropriate)

## 2018-03-09 NOTE — THERAPY EVALUATION
Acute Care - Physical Therapy Initial Evaluation  [facility]     Patient Name: Rommel Gonzalez  : 1945  MRN: [id number]  Today's Date: 3/9/2018      Date of Referral to PT: 18  Referring Physician: Gilmar      Admit Date: 3/7/2018     Visit Dx:    ICD-10-CM ICD-9-CM   1. Traumatic rhabdomyolysis, initial encounter T79.6XXA 958.6   2. Fall at home, initial encounter W19.XXXA E888.9    Y92.099 E849.0   3. Dysarthria R47.1 784.51   4. Progressive supranuclear palsy G23.1 333.0   5. Impaired functional mobility, balance, gait, and endurance Z74.09 V49.89     Patient Active Problem List   Diagnosis   • AF (atrial fibrillation)   • Depression   • Gait instability   • Hypertension   • Insomnia   • Idiopathic Parkinson's disease   • Peripheral neuropathy   • Dysarthria   • Atrial flutter   • Anticoagulated   • Health care maintenance   • Hypothyroidism (acquired)   • Weakness of voice   • Abnormal chest CT   • Facial droop   • Hypersomnia    • Hyperlipidemia   • High risk medication use   • CORINNE on CPAP   • Obstructive sleep apnea, adult   • Non-traumatic rhabdomyolysis   • Fall   • Progressive supranuclear palsy   • Bruising     Past Medical History:   Diagnosis Date   • AF (atrial fibrillation)    • Atrial flutter    • Atypical chest pain    • Chest pain    • Colon polyps    • Confusion    • Depression    • Disease of thyroid gland    • Disorder of thyroid    • Dyspnea and respiratory abnormalities    • Elevated TSH    • Fatigue    • Gait instability    • Hay fever    • Hyperlipidemia    • Hypertension    • Hypothyroidism    • Insomnia    • Measles    • Mumps    • Palpitations    • Parkinson disease    • Peripheral neuropathy    • Pneumonia    • Poor sleep pattern    • Slurred speech    • Tremor      History reviewed. No pertinent surgical history.       PT ASSESSMENT (last 72 hours)      PT Evaluation       18 1400 18 1357    Rehab Evaluation    Document Type evaluation  -MA evaluation;therapy  note (daily note)  -LE    Subjective Information agree to therapy;no complaints  -MA agree to therapy  -LE    Patient Effort, Rehab Treatment good  -MA good  -LE    Symptoms Noted During/After Treatment fatigue  -MA     General Information    Patient Profile Review yes  -MA yes  -LE    Referring Physician Ray  -MA Ray  -TARSHA    General Observations supine in bed with HOB elevated, no acute distress noted at rest  -MA supine in bed  -LE    Pertinent History Of Current Problem Experienced a fall  -MA from home with a fall  -LE    Precautions/Limitations fall precautions   Progressive supranuclear palsy, PD  -MA fall precautions  -LE    Prior Level of Function independent:;all household mobility  -MA independent:;driving;ADL's;transfer   takes shower, drives to [place].  -LE    Equipment Currently Used at Home walker, rolling;cane, straight  -MA walker, rolling;cane, straight;raised toilet  -LE    Plans/Goals Discussed With patient  -MA patient;agreed upon  -LE    Risks Reviewed patient:  -MA     Benefits Reviewed patient:  -MA     Barriers to Rehab medically complex  -MA     Living Environment    Lives With  alone  -LE    Living Environment Comment  --   plans to d/c to [facility] home  -LE    Clinical Impression    Date of Referral to PT 03/09/18  -MA     PT Diagnosis impaired funct mobility  -MA     Criteria for Skilled Therapeutic Interventions Met yes;treatment indicated  -MA     Pathology/Pathophysiology Noted (Describe Specifically for Each System) neuromuscular;musculoskeletal  -MA     Impairments Found (describe specific impairments) aerobic capacity/endurance;ergonomics and body mechanics;gait, locomotion, and balance  -MA     Rehab Potential good, to achieve stated therapy goals  -MA     Vital Signs    O2 Delivery Pre Treatment room air  -MA room air  -LE    O2 Delivery Intra Treatment room air  -MA room air  -LE    O2 Delivery Post Treatment room air  -MA room air  -LE    Pre Patient Position Supine  -MA Supine   -LE    Intra Patient Position Standing  -MA Standing  -LE    Post Patient Position Supine  -MA Supine  -LE    Activity Duration  --   moves slowly. h/o parkinsons  -LE    Pain Assessment    Pain Assessment No/denies pain  -MA No/denies pain  -LE    Vision Assessment/Intervention    Visual Impairment WFL  -MA     Cognitive Assessment/Intervention    Current Cognitive/Communication Assessment functional  -MA --   slow moving  -LE    Orientation Status oriented x 4  -MA oriented x 4  -LE    Follows Commands/Answers Questions 75% of the time;able to follow multi-step instructions  -MA     Personal Safety mild impairment  -MA decreased insight to deficits  -LE    Personal Safety Interventions fall prevention program maintained;gait belt;nonskid shoes/slippers when out of bed  -MA fall prevention program maintained;gait belt;nonskid shoes/slippers when out of bed  -LE    ROM (Range of Motion)    General ROM no range of motion deficits identified  -MA no range of motion deficits identified   B UE functional for ADL  -LE    MMT (Manual Muscle Testing)    General MMT Assessment  --   B UE 4/5  -LE    General MMT Assessment Detail B LE MMT 4/5 grossly  -MA     Bed Mobility, Assessment/Treatment    Bed Mobility, Assistive Device bed rails;head of bed elevated  -MA bed rails;head of bed elevated  -LE    Bed Mob, Supine to Sit, [place] supervision required  -MA supervision required  -LE    Bed Mob, Sit to Supine, [place] supervision required  -MA supervision required  -LE    Transfer Assessment/Treatment    Transfers, Sit-Stand [place] contact guard assist  -MA contact guard assist  -LE    Transfers, Stand-Sit [place] contact guard assist  -MA contact guard assist  -LE    Transfers, Sit-Stand-Sit, Assist Device rolling walker  -MA rolling walker  -LE    Toilet Transfer, [place]  contact guard assist  -LE    Toilet Transfer, Assistive Device  rolling walker  -LE    Transfer, Safety Issues balance  decreased during turns;sequencing ability decreased  -MA balance decreased during turns;sequencing ability decreased;step length decreased  -LE    Transfer, Impairments impaired balance  -MA impaired balance   leans to R  -LE    Transfer, Comment Cues for hand placement  -MA --   cues and assist to reach back before sitting  -LE    Gait Assessment/Treatment    Gait, [place] stand by assist;contact guard assist  -MA     Gait, Assistive Device rolling walker  -MA     Gait, Distance (Feet) 95  -MA     Gait, Gait Pattern Analysis swing-through gait  -MA     Gait, Gait Deviations bilateral:;adri decreased;forward flexed posture  -MA     Gait, Safety Issues balance decreased during turns;sequencing ability decreased  -MA     Gait, Impairments impaired balance  -MA     Gait, Comment Toe-walker noted- corrected with cues, cues for BIG steps and wide EUN  -MA     Balance Skills Training    Sitting-Level of Assistance Distant supervision  -MA Distant supervision  -LE    Sitting-Balance Support Feet supported  -MA Feet supported  -LE    Sitting-Balance Activities  Trunk control activities  -LE    Sitting # of Minutes  5  -LE    Standing-Level of Assistance Contact guard  -MA Close supervision;Contact guard   CGA dynamic at walker. Leans to R  -LE    Static Standing Balance Support assistive device  -MA assistive device;Left upper extremity supported;Right upper extremity supported  -LE    Standing-Balance Activities  --   VC and A for posture and to correct to midline  -LE    Standing Balance # of Minutes  5  -LE    Positioning and Restraints    Pre-Treatment Position in bed  -MA in bed  -LE    Post Treatment Position bed  -MA bed  -LE    In Bed notified nsg;fowlers;call light within reach;encouraged to call for assist;SCD pump applied  -MA supine;call light within reach;encouraged to call for assist;exit alarm on;SCD pump applied;heels elevated  -LE      03/09/18 1100 03/09/18 1056    Rehab Evaluation     Document Type evaluation  -     Subjective Information no complaints;agree to therapy  -SA     Evaluation Not Performed  other (see comments)   Undergoing swallow study. Will follow up this afternoon.  -MA    Patient Effort, Rehab Treatment good  -SA     Symptoms Noted During/After Treatment none  -SA       03/09/18 0937 03/08/18 1234    General Information    Equipment Currently Used at Home cane, quad;walker, rolling  -BC     Living Environment    Living Environment Comment  --   grab bars in the shower  -BC      03/08/18 1228 03/08/18 1227    General Information    Equipment Currently Used at Home walker, standard;cane, straight;bipap/ cpap;raised toilet;grab bar  -BC     Living Environment    Lives With  alone  -BC    Living Arrangements  [facility]    Home Accessibility  bed and bath are not on the first floor  -BC    Stair Railings at Home  none  -BC    Type of Financial/Environmental Concern  none  -BC    Transportation Available  car;family or friend will provide  -BC      03/08/18 0900 03/08/18 0458    Rehab Evaluation    Document Type evaluation  -JT     Subjective Information agree to therapy;no complaints  -JT     Patient Effort, Rehab Treatment good  -JT     Symptoms Noted During/After Treatment fatigue  -JT     General Information    Equipment Currently Used at Home  walker, standard  -    Living Environment    Transportation Available  car;family or friend will provide  -    Pain Assessment    Pain Assessment No/denies pain  -JT     Cognitive Assessment/Intervention    Current Cognitive/Communication Assessment functional  -JT     Orientation Status oriented x 4  -JT     Follows Commands/Answers Questions 100% of the time;able to follow single-step instructions  -JT       03/07/18 1939       General Information    Equipment Currently Used at Home cane, straight;bipap/ cpap  -MB     Living Environment    Lives With alone  -MB     Living Arrangements condominium  -MB     Home Accessibility  bed and bath are not on the first floor  -MB     Stair Railings at Home none  -MB     Type of Financial/Environmental Concern none  -MB     Transportation Available car  -MB       User Key  (r) = Recorded By, (t) = Taken By, (c) = Cosigned By    Initials Name Provider Type    TARSHA Zepeda, OTR Occupational Therapist    GINA Duval, RN Registered Nurse    DIGNA Hre Speech and Language Pathologist    DEJON Rangel, DANDY Case Manager    MYRTLE Hernandez, RN Registered Nurse    SHERIF Allen, PT Physical Therapist    SA Lisa Paul MS CCC-SLP Speech and Language Pathologist          Physical Therapy Education     Title: PT OT SLP Therapies (Done)     Topic: Physical Therapy (Done)     Point: Mobility training (Done)    Learning Progress Summary    Learner Readiness Method Response Comment Documented by Status   Patient Acceptance [facility] 03/09/18 1445 Done               Point: Home exercise program (Done)    Learning Progress Summary    Learner Readiness Method Response Comment Documented by Status   Patient Acceptance [facility] 03/09/18 1445 Done               Point: Body mechanics (Done)    Learning Progress Summary    Learner Readiness Method Response Comment Documented by Status   Patient Acceptance [facility] 03/09/18 1445 Done               Point: Precautions (Done)    Learning Progress Summary    Learner Readiness Method Response Comment Documented by Status   Patient Acceptance [facility] 03/09/18 1445 Done                      User Key     Initials Effective Dates Name Provider Type Discipline    MA 03/07/18 -  Eve Allen PT Physical Therapist PT                PT Recommendation and Plan  Anticipated Discharge Disposition: home with assist, home with home health  Planned Therapy Interventions: balance training, home exercise program, bed mobility training, gait training, patient/family education, postural re-education, strengthening, transfer training, stair training  PT  Frequency: daily  Plan of Care Review  Plan Of Care Reviewed With: patient  Outcome Summary/Follow up Plan: Patient is a 72 y.o. male who presents with impaired functional mobility and endurance secondary to admission for a fall. PMHx includes supranuclear palsy and PD. All mobility required assist x 1. Ambulated 95' with CGA. Patient currently lives alone but planning to move in with his daugther for assistance. Patient will benefit from skilled PT services acutely to address deficits as able and improve level of independence.           IP PT Goals       03/09/18 1441          Bed Mobility PT LTG    Bed Mobility PT LTG, Date Established 03/09/18  -MA      Bed Mobility PT LTG, Time to Achieve 1 wk  -MA      Bed Mobility PT LTG, Activity Type all bed mobility  -MA      Bed Mobility PT LTG, [place] Level independent  -MA      Transfer Training PT LTG    Transfer Training PT LTG, Date Established 03/09/18  -MA      Transfer Training PT LTG, Time to Achieve 1 wk  -MA      Transfer Training PT LTG, Activity Type all transfers  -MA      Transfer Training PT LTG, [place] Level conditional independence  -MA      Transfer Training PT LTG, Assist Device walker, rolling  -MA      Gait Training PT LTG    Gait Training Goal PT LTG, Date Established 03/09/18  -MA      Gait Training Goal PT LTG, Time to Achieve 1 wk  -MA      Gait Training Goal PT LTG, [place] Level conditional independence  -MA      Gait Training Goal PT LTG, Assist Device walker, rolling  -MA      Gait Training Goal PT LTG, Distance to Achieve 150  -MA        User Key  (r) = Recorded By, (t) = Taken By, (c) = Cosigned By    Initials Name Provider Type    SHERIF Allen, PT Physical Therapist                Outcome Measures       03/09/18 1408 03/09/18 1400       How much help from another person do you currently need...    Turning from your back to your side while in flat bed without using bedrails? 4  -MA      Moving from lying on back to  sitting on the side of a flat bed without bedrails? 4  -MA      Moving to and from a bed to a chair (including a wheelchair)? 3  -MA      Standing up from a chair using your arms (e.g., wheelchair, bedside chair)? 3  -MA      Climbing 3-5 steps with a railing? 2  -MA      To walk in hospital room? 3  -MA      AM-PAC 6 Clicks Score 19  -MA      How much help from another is currently needed...    Putting on and taking off regular lower body clothing? 3  -LE      Bathing (including washing, rinsing, and drying) 3  -LE      Toileting (which includes using toilet bed pan or urinal) 3  -LE      Putting on and taking off regular upper body clothing 3  -LE      Taking care of personal grooming (such as brushing teeth) 3  -LE      Eating meals 3  -LE      Score 18  -LE      Functional Assessment    Outcome Measure Options AM-PAC 6 Clicks Basic Mobility (PT)  -MA AM-PAC 6 Clicks Daily Activity (OT)  -LE       User Key  (r) = Recorded By, (t) = Taken By, (c) = Cosigned By    Initials Name Provider Type    TARSHA Zepeda, OTR Occupational Therapist    SHERIF Allen, PT Physical Therapist           Time Calculation:         PT Charges       03/09/18 1434 03/09/18 1056       Time Calculation    Start Time 1410  -MA      Stop Time 1428  -MA      Time Calculation (min) 18 min  -MA      PT Received On 03/09/18  -MA      PT - Next Appointment 03/10/18  -MA 03/09/18  -MA     PT Goal Re-Cert Due Date 03/16/18  -MA        User Key  (r) = Recorded By, (t) = Taken By, (c) = Cosigned By    Initials Name Provider Type    SHERIF Allen PT Physical Therapist          Therapy Charges for Today     Code Description Service Date Service Provider Modifiers Qty    78770888300  PT EVAL MOD COMPLEXITY 2 3/9/2018 Eve Allen, PT GP 1    01382268149 HC PT THER PROC EA 15 MIN 3/9/2018 Eve Allen PT GP 1          PT G-Codes  Outcome Measure Options: AM-PAC 6 Clicks Basic Mobility (PT)      Eve Allen  PT  3/9/2018

## 2018-03-09 NOTE — PROGRESS NOTES
[facility]  Progress note: 3/9/2018    Patient Identification:  Name:Rommel Gonzalez  Age:72 y.o.  Sex: male  :  1945  MRN: [id number]           CC:  Followup of atrial fib, falls     Interval history:  He does not recall falling.  Denies any chest pain or shortness of breath or palpitations    Vital Signs:   Temp:  [96.8 °F (36 °C)-98.5 °F (36.9 °C)] 97.2 °F (36.2 °C)  Heart Rate:  [63-82] 80  Resp:  [12-16] 16  BP: (109-133)/(59-75) 133/74    Intake/Output Summary (Last 24 hours) at 18 0810  Last data filed at 18 0528   Gross per 24 hour   Intake           1774.2 ml   Output                0 ml   Net           1774.2 ml       Physical Examination:    General Appearance No acute distress   Neck No adenopathy, supple, trachea midline, no thyromegaly, no carotid bruit, no JVD   Lungs Clear to auscultation,respirations regular, even and unlabored   Heart Irregular rhythm and normal rate, normal S1 and S2, no murmur, no gallop, no rub, no click   Chest wall No abnormalities observed   Abdomen Normal bowel sounds, no masses, no hepatomegaly, soft   Extremities Moves all extremities well, no edema, no cyanosis, no redness   Neurological Alert and oriented x 3     Lab Review:  Personally reviewed the labs, radiology imaging and other cardiac procedures.   Results from last 7 days  Lab Units 18  0554   SODIUM mmol/L 141   POTASSIUM mmol/L 3.4*   CHLORIDE mmol/L 108*   CO2 mmol/L 29.8*   BUN mg/dL 12   CREATININE mg/dL 0.86   CALCIUM mg/dL 7.5*   BILIRUBIN mg/dL 0.4   ALK PHOS U/L 41   ALT (SGPT) U/L 5   AST (SGOT) U/L 65*   GLUCOSE mg/dL 96       Results from last 7 days  Lab Units 18  0554 18  1500   CK TOTAL U/L 2335* 2413*     )  Results from last 7 days  Lab Units 18  0554 18  1500   WBC 10*3/mm3 7.15 8.99   HEMOGLOBIN g/dL 10.4* 13.1*   HEMATOCRIT % 32.6* 40.1*   PLATELETS 10*3/mm3 176 202       Results from last 7 days  Lab Units 18  1500   INR  2.57*        Medication Review:   Meds reviewed  Scheduled Meds:  aspirin 81 mg Oral Daily   atorvastatin 10 mg Oral Daily   carbidopa-levodopa 2 tablet Oral TID   carbidopa-levodopa 0.5 tablet Oral TID   carvedilol 9.375 mg Oral BID With Meals   gabapentin 100 mg Oral BID   levothyroxine 112 mcg Oral Q AM   midodrine 2.5 mg Oral TID AC   multivitamin with minerals 1 tablet Oral Daily   rivaroxaban 20 mg Oral Daily With Dinner   terazosin 1 mg Oral Nightly     Continuous Infusions:  sodium chloride 0.9 % with KCl 20 mEq 100 mL/hr Last Rate: 100 mL/hr (03/09/18 0649)     I personally viewed and interpreted the patient's EKG/Telemetry data    Assessment and Plan  1.  Progressive supranuclear palsy.  Favor long term facility or an alternative living situation with weakness and worsening gait severo if anticoagulation is to be continued  2.  Weakness with recurrent falls I talked with Dr. Fernandez who does not feel the patient had a TIA or stroke and does not feel aspirin as needed at this time.  We'll discontinue aspirin and continue with Xarelto  3.  Rhabdomyolysis.    4.  Diastolic dysfunction and normal ejection fraction.  Overall unchanged  5.  Atrial flutter/fibrillation.  This is still present.  Anticoagulation concerns as above  6.  Hypertension blood pressure currently good.    7.  Prolonged QTc interval.  The measurement is confounded by flutter waves.  Recheck tomorrow after treatment of hypokalemia       Mini Bennett  3/9/95502:10 AM  25min spent in reviewing records, discussion and examination of the patient and discussion with other members of the patient's medical team.     Dictated utilizing Dragon dictation

## 2018-03-09 NOTE — SIGNIFICANT NOTE
03/09/18 1056   Rehab Treatment   Discipline physical therapist   Rehab Evaluation   Evaluation Not Performed other (see comments)  (Undergoing swallow study. Will follow up this afternoon.)   Recommendation   PT - Next Appointment 03/09/18

## 2018-03-09 NOTE — PLAN OF CARE
Problem: Inpatient SLP  Goal: Dysphagia- Patient will safely consume diet as per recommendation with no signs/symptoms of aspiration   03/09/18 1348   Safely Consume Diet   Safely Consume Diet- SLP, Date Established 03/09/18   Safely Consume Diet- SLP, Time to Achieve by discharge   Safely Consume Diet- SLP, Activity Level Patient will improve oral skills for more efficient eating;Patient will improve hyolaryngeal excursion for safer, more efficient swallow;Patient will improve tongue base/pharyngeal wall squeeze for safer, more efficient swallow

## 2018-03-09 NOTE — PLAN OF CARE
Problem: Patient Care Overview (Adult)  Goal: Plan of Care Review  Outcome: Ongoing (interventions implemented as appropriate)   03/09/18 1406   Outcome Evaluation   Outcome Summary/Follow up Plan Pt presents after fall at home. Pt has been living alone and still drives. Pt demo impaired balance, increase completion time and a fall risk. Pt is assist of 1 to walk to BR. Pt may benefit from skilled OT to increase safety and independence.    Coping/Psychosocial Response Interventions   Plan Of Care Reviewed With patient       Problem: Inpatient Occupational Therapy  Goal: Transfer Training Goal 1 LTG- OT  Outcome: Ongoing (interventions implemented as appropriate)   03/09/18 1406   Transfer Training OT LTG   Transfer Training OT LTG, Date Established 03/09/18   Transfer Training OT LTG, Time to Achieve 1 wk   Transfer Training OT LTG, Activity Type bed to chair /chair to bed;sit to stand/stand to sit;toilet   Transfer Training OT LTG, [place] Level supervision required   Transfer Training OT LTG, Assist Device walker, rolling     Goal: Bathing Goal LTG- OT  Outcome: Ongoing (interventions implemented as appropriate)   03/09/18 1406   Bathing OT LTG   Bathing Goal OT LTG, Date Established 03/09/18   Bathing Goal OT LTG, Time to Achieve 1 wk   Bathing Goal OT LTG, Activity Type upper body bathing;lower body bathing   Bathing Goal OT LTG, [place] Level supervision required     Goal: Functional Mobility Goal LTG- OT  Outcome: Ongoing (interventions implemented as appropriate)   03/09/18 1406   Functional Mobility OT LTG   Functional Mobility Goal OT LTG, Date Established 03/09/18   Functional Mobility Goal OT LTG, Time to Achieve 1 wk   Functional Mobility Goal OT LTG, [place] Level supervision   Functional Mobility Goal OT LTG, Assist Device appropriate AD   Functional Mobility Goal OT LTG, Distance to Achieve to the bathroom;to the sink

## 2018-03-09 NOTE — PROGRESS NOTES
Discharge Planning Assessment  [facility]     Patient Name: Rommel Gonzalez  MRN: [id number]  Today's Date: 3/9/2018    Admit Date: 3/7/2018          Discharge Needs Assessment     None            Discharge Plan       03/09/18 1550    Case Management/Social Work Plan    Additional Comments Spoke with the patient and his discharge plan is home with daughter/Christianne in [place], [place]. Patient prefer [facility] and a call was placed to [facility] which is following for a possible weekend discharge. Daughter will provide transportation to home. The address to the Christianne's home is [address] [place] [address]. MARY LOU Rangel RN, CCP.         Discharge Placement     Facility/Agency Request Status Selected? Address Phone Number Fax Number    [facility] Pending - Request Sent     [address] [place] [address] [phone]                Demographic Summary     None            Functional Status     None            Psychosocial     None            Abuse/Neglect     None            Legal     None            Substance Abuse     None            Patient Forms     None          Nan Rangel RN

## 2018-03-09 NOTE — PLAN OF CARE
Problem: Patient Care Overview (Adult)  Goal: Plan of Care Review   03/09/18 1441   Outcome Evaluation   Outcome Summary/Follow up Plan Patient is a pleasant 72 y.o. male who presents with impaired functional mobility and endurance secondary to admission for a fall. PMHx includes supranuclear palsy and PD. All mobility required assist x 1. Ambulated 95' with CGA. Patient currently lives alone but planning to move in with his daugther for assistance. Patient will benefit from skilled PT services acutely to address deficits as able and improve level of independence.    Coping/Psychosocial Response Interventions   Plan Of Care Reviewed With patient       Problem: Inpatient Physical Therapy  Goal: Bed Mobility Goal LTG- PT   03/09/18 1441   Bed Mobility PT LTG   Bed Mobility PT LTG, Date Established 03/09/18   Bed Mobility PT LTG, Time to Achieve 1 wk   Bed Mobility PT LTG, Activity Type all bed mobility   Bed Mobility PT LTG, [place] Level independent     Goal: Transfer Training Goal 1 LTG- PT   03/09/18 1441   Transfer Training PT LTG   Transfer Training PT LTG, Date Established 03/09/18   Transfer Training PT LTG, Time to Achieve 1 wk   Transfer Training PT LTG, Activity Type all transfers   Transfer Training PT LTG, [place] Level conditional independence   Transfer Training PT LTG, Assist Device walker, rolling     Goal: Gait Training Goal LTG- PT   03/09/18 1441   Gait Training PT LTG   Gait Training Goal PT LTG, Date Established 03/09/18   Gait Training Goal PT LTG, Time to Achieve 1 wk   Gait Training Goal PT LTG, [place] Level conditional independence   Gait Training Goal PT LTG, Assist Device walker, rolling   Gait Training Goal PT LTG, Distance to Achieve 150

## 2018-03-09 NOTE — PROGRESS NOTES
Discharge Planning Assessment  [facility]     Patient Name: Rommel Gonzalez  MRN: [id number]  Today's Date: 3/9/2018    Admit Date: 3/7/2018          Discharge Needs Assessment       03/09/18 1557    Discharge Needs Assessment    Discharge Planning Comments Discharge plan is for the patient to move in with his daughter/Christianne, [address] [place] [address] ([phone]). [facility] to follow            Discharge Plan       03/09/18 8680    Case Management/Social Work Plan    Additional Comments Spoke with the patient and his discharge plan is home with daughter/Christianne in [place], [place]. Patient prefer [facility] and a call was placed to [facility] which is following for a possible weekend discharge. Daughter will provide transportation to home. The address to the Christianne's home is [address] [place] [address]. MARY LOU Rangel RN, CCP.         Discharge Placement     Facility/Agency Request Status Selected? Address Phone Number Fax Number    [facility] Pending - Request Sent     [address] [place] [address] [phone]                Demographic Summary     None            Functional Status     None            Psychosocial     None            Abuse/Neglect     None            Legal     None            Substance Abuse     None            Patient Forms     None          Nan Rangel RN

## 2018-03-09 NOTE — PLAN OF CARE
Problem: Nutrition, Imbalanced: Inadequate Oral Intake (Adult)  Intervention: Promote/Optimize Nutrition   03/09/18 1232   Nutrition Interventions   Oral Nutrition Promotion calorie dense liquids provided       Goal: Identify Related Risk Factors and Signs and Symptoms  Outcome: Ongoing (interventions implemented as appropriate)   03/09/18 1232   Nutrition, Imbalanced: Inadequate Oral Intake   Nutrition Imbalanced: Less than Body Requirements: Related Risk Factors chronic illness/infection   Signs and Symptoms (Nutrition Imbalance, Inadequate Oral Intake: Signs and Symptoms) weakness/lethargy;weight decreased (percent weight loss, percent usual body weight, body mass index less than 18.5) (Adults)     Goal: Prevent Further Weight Loss  Outcome: Ongoing (interventions implemented as appropriate)   03/09/18 1232   Nutrition, Imbalanced: Inadequate Oral Intake (Adult)   Prevent Further Weight Loss making progress toward outcome

## 2018-03-09 NOTE — THERAPY EVALUATION
Acute Care - Speech Language Pathology   Swallow Initial Evaluation [facility]     Patient Name: Rommel Gonzalez  : 1945  MRN: [id number]  Today's Date: 3/9/2018               Admit Date: 3/7/2018  SPEECH-LANGUAGE PATHOLOGY EVALUTION - VFSS  Subjective: The patient was seen on this date for a VFSS(Videofluoroscopic Swallowing Study).  Patient was alert and cooperative.    Significant history: Significant history: Pt familiar to speech; recently d/c from OP Speech Therapy w/ this SLP. Pt adm after found down on floor, increased slurred speech, left facial droop. PMH Parkinson's/Progressive Supranuclear Palsy. Currently on pudding thick liquids/pureed per BSE.   Objective: Risks/benefits were reviewed with the patient and family, and consent was obtained. The study was completed with SLP present and Radiologist review. The patient was seen in lateral view(s). Textures given included thin liquid, nectar thick liquid, honey thick liquid, puree consistency, mechanical soft consistency and regular consistency.  Assessment:  Pt w/ mild oropharyngeal dysphagia. No aspiration or supraglottic penetration was seen during this study. Pt w/ reduced oral control and spillage to the valleculae or pyriforms before the swallow was triggered. Oral residue spilled into the valleculae and pyriforms before additional swallows cleared. Pt typically swallowed w/o any cuing.  Mild difficulty w/ oral transit as lingual pumping was observed to propel the bolus. Anterior munching versus rotary chew was observed w/ solids. Pt states he has difficulty w/ some solids (ie meats). However, mastication was functional for soft foods. There was mild residue in the valleculae and pyriforms post swallow indicating reduced tongue base retraction; pharyngeal constriction, and hyoid excursion. Feel pt will be safe on [facility] soft (chopped meats) and thin liquids w/ additional swallows.   SLP Findings: Patient presents with mild oropharyngeal  dysphagia.   Comments:   Recommendations: Diet Textures: thin liquid, mechanical soft, chopped meats consistency food. Medications should be taken whole with thin liquids or puree.   Recommended Strategies: Upright for PO, small bites and sips, double swallow with bites/sips and may use straw. Oral care before breakfast, after all meals and PRN.    Dysphagia therapy is recommended at next level of care. Rationale: increase strength; diet tolerance; education. Pt to receive  speech therapy.      Visit Dx:     ICD-10-CM ICD-9-CM   1. Traumatic rhabdomyolysis, initial encounter T79.6XXA 958.6   2. Fall at home, initial encounter W19.XXXA E888.9    Y92.099 E849.0   3. Dysarthria R47.1 784.51   4. Progressive supranuclear palsy G23.1 333.0     Patient Active Problem List   Diagnosis   • AF (atrial fibrillation)   • Depression   • Gait instability   • Hypertension   • Insomnia   • Idiopathic Parkinson's disease   • Peripheral neuropathy   • Dysarthria   • Atrial flutter   • Anticoagulated   • Health care maintenance   • Hypothyroidism (acquired)   • Weakness of voice   • Abnormal chest CT   • Facial droop   • Hypersomnia    • Hyperlipidemia   • High risk medication use   • CORINNE on CPAP   • Obstructive sleep apnea, adult   • Non-traumatic rhabdomyolysis   • Fall   • Progressive supranuclear palsy   • Bruising     Past Medical History:   Diagnosis Date   • AF (atrial fibrillation)    • Atrial flutter    • Atypical chest pain    • Chest pain    • Colon polyps    • Confusion    • Depression    • Disease of thyroid gland    • Disorder of thyroid    • Dyspnea and respiratory abnormalities    • Elevated TSH    • Fatigue    • Gait instability    • Hay fever    • Hyperlipidemia    • Hypertension    • Hypothyroidism    • Insomnia    • Measles    • Mumps    • Palpitations    • Parkinson disease    • Peripheral neuropathy    • Pneumonia    • Poor sleep pattern    • Slurred speech    • Tremor      History reviewed. No pertinent  surgical history.       SWALLOW EVALUATION (last 72 hours)      Swallow Evaluation       03/09/18 1100 03/08/18 0900             Rehab Evaluation    Document Type evaluation  -SA evaluation  -JT       Subjective Information no complaints;agree to therapy  -SA agree to therapy;no complaints  -JT       Patient Effort, Rehab Treatment good  -SA good  -JT       Symptoms Noted During/After Treatment none  -SA fatigue  -JT       General Information    Patient Profile Review yes  -SA yes  -JT       Subjective Patient Observations Alert; cooperative  -SA alert, cooperative  -JT       Pertinent History Of Current Problem Pt adm after found down on floor, increased slurred speech, left facial droop. PMH Parkinson's/Progressive Supranuclear Palsy (Pt DC from OP ST yesterday. OP ST for Speech/voice)  -SA Pt adm after found down on floor, increased slurred speech, left facial droop. [facility] Parkinson's/Progressive Supranuclear Palsy   Pt DC from OP ST yesterday. OP ST for Speech/voice  -JT       Current Diet Limitations puree;other (see comments)   pudding thick liquids  -SA NPO  -JT       Precautions/Limitations, Vision WFL  -SA        Precautions/Limitations, Hearing WFL  -SA WFL  -JT       Prior Level of Function- Communication connected speech is dysarthric or apraxic but intelligible;connected speech is difficult to understand  -SA connected speech is difficult to understand   pt w/dysarthria and aphonia due to parkinsons  -JT       Prior Level of Function- Swallowing no diet consistency restrictions  -SA no diet consistency restrictions  -JT       Plans/Goals Discussed With patient and family  -SA patient;other;agreed upon  -JT       Barriers to Rehab none identified  -SA medically complex  -JT       Clinical Impression    Patient's Goals For Discharge return to regular diet  -SA return to PO diet  -JT       Family Goals For Discharge  family did not state  -JT       SLP Swallowing Diagnosis mild dysphagia  -SA oral  dysfunction;pharyngeal dysfunction  -JT       Rehab Potential/Prognosis, Swallowing good, to achieve stated therapy goals  -SA fair, will monitor progress closely  -JT       Criteria for Skilled Therapeutic Interventions Met skilled criteria for dysphagia intervention met  -SA skilled criteria for dysphagia intervention met  -JT       FCM, Swallowing 6-->Level 6  -SA        Therapy Frequency PRN  -SA PRN  -JT       Predicted Duration Therapy Interv (days) until discharge  -SA until discharge  -JT       Expected Duration Therapy Session (min) 15-30 minutes  -SA 15-30 minutes  -JT       SLP Diet Recommendation soft textures;chopped;thin liquids  -SA III - pureed with some mashed;pudding-thick liquids  -JT       Recommended Diagnostics  VFSS (MBS)  -JT       Recommended Feeding/Eating Techniques maintain upright posture during/after eating for 30 mins;small sips/bites;multiple swallow technique  -SA alternate between small bites and sips of food/liquid;multiple swallow technique;no straws;small sips/bites  -JT       SLP Rec. for Method of Medication Administration meds whole with thin liquid;meds whole in pudding/applesauce  -SA meds whole in pudding/applesauce  -JT       Monitor For Signs Of Aspiration cough;elevated WBC count;gurgly voice;throat clearing;fever;upper respiratory infection;pneumonia;right lower lobe infiltrates  -SA cough;elevated WBC count;gurgly voice;throat clearing;fever;upper respiratory infection;pneumonia;right lower lobe infiltrates  -JT       Anticipated Discharge Disposition home with home health  -SA other (see comments)   unknown  -JT       Pain Assessment    Pain Assessment  No/denies pain  -JT       Cognitive Assessment/Intervention    Current Cognitive/Communication Assessment  functional  -JT       Orientation Status  oriented x 4  -JT       Follows Commands/Answers Questions  100% of the time;able to follow single-step instructions  -JT       Oral Motor Structure and Function    Oral  Motor Anatomy and Physiology patient demonstrates anatomy and physiology that is WNL  -SA patient demonstrates anatomy that is WNL  -JT       Dentition Assessment present and adequate  -SA missing teeth   upper  partials not present; family to bring  -JT       Secretion Management WNL/WFL  -SA WNL/WFL  -JT       Mucosal Quality dry  -SA dry  -JT       Volitional Swallow  mild to moderate difficulties initiating volitional swallow  -JT       Volitional Cough  weak volitional cough  -JT       Oral Musculature General Assessment  mandibular impairment;oral labial impairment;lingual impairment;buccal impairment  -JT       Mandibular Strength and Mobility  reduced strength left  -JT       Oral Labial Strength and Mobility  left labial droop  -JT       Lingual Strength and Mobility  reduced ROM;reduced strength bilaterally  -JT       Buccal Strength and Mobility  reduced strength left  -JT       General Feeding/Swallowing Observations    Current Feeding Method  NPO  -JT       Clinical Swallow Exam    Mode of Presentation  fed by clinician;spoon  -JT       Oral Preparation Concerns  mixed:;excess chewing noted;increased prep time;inability to open lips/jaw adequately  -JT       Oral Residue  mixed:;sulci residue  -JT       Oral Phase Results  impaired oral phase, signs of dysfunction present  -JT       Pharyngeal Phase Results  sign/symptoms of pharyngeal impairment;multiple swallows   wet vocal quality w/thin, nectar,honey, mixed; aud swall all  -JT       Summary of Clinical Exam  Pt w/suspected oropharyngeal dysphagia characterized by generalized weakness, reduced oral prep, wet vocal quality with thin, nectar, honey, and mixed, multiple/audible swallows w/all consistencies, and inadequate mastication for solid foods.   -JT       SLP Communication to Radiology    Summary Statement Pt w/ mild oropharyngeal dysphagia. No aspiration or supraglottic penetration was seen during this study. Pt w/ reduced oral control and  spillage to the valleculae or pyriforms before the swallow was triggered. Oral residue spilled into the valleculae and pyriforms before additional swallows cleared. Pt typically swallowed w/o any cuing.  Mild difficulty w/ oral transit as lingual pumping was observed to propel the bolus. Anterior munching versus rotary chew was observed w/ solids. Pt states he has difficulty w/ some solids (ie meats). However, mastication was functional for soft foods. There was mild residue in the valleculae and pyriforms post swallow indicating reduced tongue base retraction; pharyngeal constriction, and hyoid excursion. Feel pt will be safe on [facility] soft (chopped meats) and thin liquids w/ additional swallows.   -SA          User Key  (r) = Recorded By, (t) = Taken By, (c) = Cosigned By    Initials Name Effective Dates    JT Danna Ortiz Taina 03/07/18 -     SA Lisa Paul MS KADI-SLP 03/07/18 -         EDUCATION  The patient has been educated in the following areas:   Home Exercise Program (HEP) Dysphagia (Swallowing Impairment) Oral Care/Hydration Modified Diet Instruction.    SLP Recommendation and Plan  SLP Swallowing Diagnosis: mild dysphagia  SLP Diet Recommendation: soft textures, chopped, thin liquids  Recommended Feeding/Eating Techniques: maintain upright posture during/after eating for 30 mins, small sips/bites, multiple swallow technique  SLP Rec. for Method of Medication Administration: meds whole with thin liquid, meds whole in pudding/applesauce  Monitor For Signs Of Aspiration: cough, elevated WBC count, gurgly voice, throat clearing, fever, upper respiratory infection, pneumonia, right lower lobe infiltrates     Criteria for Skilled Therapeutic Interventions Met: skilled criteria for dysphagia intervention met  Anticipated Discharge Disposition: home with home health  Rehab Potential/Prognosis, Swallowing: good, to achieve stated therapy goals  Therapy Frequency: PRN                        IP SLP Goals        03/09/18 1348 03/08/18 0915       Safely Consume Diet    Safely Consume Diet- SLP, Date Established 03/09/18  -      Safely Consume Diet- SLP, Time to Achieve by discharge  -SA by discharge  -JT     Safely Consume Diet- SLP, Activity Level Patient will improve oral skills for more efficient eating;Patient will improve hyolaryngeal excursion for safer, more efficient swallow;Patient will improve tongue base/pharyngeal wall squeeze for safer, more efficient swallow  -SA Patient will improve oral skills for more efficient eating;Patient will improve timing of pharyngeal response for safer, more efficient swallow  -JT     Safely Consume Diet- SLP, Outcome  goal ongoing  -JT       User Key  (r) = Recorded By, (t) = Taken By, (c) = Cosigned By    Initials Name Provider Type    DIGNA Her Speech and Language Pathologist    SA Lisa Paul MS CCC-SLP Speech and Language Pathologist             SLP Outcome Measures (last 72 hours)      SLP Outcome Measures       03/09/18 1300 03/08/18 0900 03/07/18 0900    SLP Outcome Measures    Outcome Measure Used? Adult NOMS  -SA Adult NOMS  -JT Adult NOMS  -SA    FCM Scores    FCM Chosen Swallowing  -SA Swallowing  -JT     Swallowing FCM Score 6  -SA 3  -JT     Motor Speech FCM Score   3  -SA    Voice FCM Score   2  -SA      User Key  (r) = Recorded By, (t) = Taken By, (c) = Cosigned By    Initials Name Effective Dates     Danna Her 03/07/18 -      Lisa Paul MS CCC-SLP 03/07/18 -            Time Calculation:         Time Calculation- SLP       03/09/18 1349          Time Calculation- SLP    SLP Start Time 1100  -      SLP Stop Time 1200  -      SLP Time Calculation (min) 60 min  -      SLP Received On 03/09/18  -        User Key  (r) = Recorded By, (t) = Taken By, (c) = Cosigned By    Initials Name Provider Type    SA Lisa Paul MS CCC-SLP Speech and Language Pathologist          Therapy Charges for Today     Code Description Service Date  Service Provider Modifiers Qty    [id number] HC ST MOTION FLUORO EVAL SWALLOW 4 3/9/2018 Lisa Paul, MS CCC-SLP GN 1               Lisa Paul MS CCC-SLP  3/9/2018

## 2018-03-09 NOTE — DISCHARGE PLACEMENT REQUEST
"Rommel Gonzalez (72 y.o. Male)     Date of Birth Social Security Number Address Home Phone MRN    1945  [address] [place] [address] [phone] [id number]    Mandaeism Marital Status          Jehovah's witness        Admission Date Admission Type Admitting Provider Attending Provider Department, Room/Bed    3/7/18 Emergency Jero Schaffer MD Ray, Jonathan, MD [address], P485/1    Discharge Date Discharge Disposition Discharge Destination                      Attending Provider: Fahad Schafer MD     Allergies:  No Known Allergies    Isolation:  None   Infection:  None   Code Status:  FULL    Ht:  182.9 cm (72\")   Wt:  80.5 kg (177 lb 7 oz)    Admission Cmt:  None   Principal Problem:  Non-traumatic rhabdomyolysis [M62.82]                 Active Insurance as of 3/7/2018     Primary Coverage     Payor Plan Insurance Group Employer/Plan Group    HUMANA MEDICARE REPLACEMENT HUMANA MEDICARE REPL [id number]     Payor Plan Address Payor Plan Phone Number Effective From Effective To    [address] [address] [phone] 1/1/2018     [place] [address]       Subscriber Name Subscriber Birth Date Member ID       ROMMEL GONZALEZ 1945 [id number]                 Emergency Contacts      (Rel.) Home Phone Work Phone Mobile Phone    Leroy CarlosRaquel hess (Sister) [phone] -- --    SandhyaDanicaChristianne (Daughter) -- -- [phone]    Miya Mcgrath (Sister) [phone] -- --              "

## 2018-03-09 NOTE — CONSULTS
Adult Nutrition  Assessment/PES    Patient Name:  Rommel Gonzalez  YOB: 1945  MRN: [id number]  Admit Date:  3/7/2018    Assessment Date:  3/9/2018    Comments:  Consult per nursing admission screen for weight loss and decreased appetite.     Pt is eating 100% here. He had a swallow eval by SLP, diet upgraded to Soft/Chopped with thin liquids.  Wt is down approximately 18 lb over past year, 11 lb over past 4-5 months. Wt remains within IBW range & normal BMI.   Added Mighty Shakes BID. Will continue to monitor.          Reason for Assessment       03/09/18 1203    Reason for Assessment    Reason For Assessment/Visit nurse/nurse practitioner consult    Identified At Risk By Screening Criteria MST SCORE 2+;no indicators present    Neurological Dysphagia;Parkinson's    Other diagnosis Non-traumatic rhabdomyolysis              Nutrition/Diet History       03/09/18 1205    Nutrition/Diet History    Oral Swallowing Difficulty    Mental State/Condition Weakness;Other (comment)   found down by family - unknown period of time; has progressive Parkinson's            Anthropometrics       03/09/18 1210    Anthropometrics    RD Documented Weight on Admission 80.3 kg (177 lb)    Anthropometrics (Special Considerations)    Height Used for Calculations 1.829 m (6')    RD Calculated     RD Calculated % IBW 99    RD Calculated BMI (kg/m2) 24    Usual Body Weight (UBW)    Usual Body Weight 88.9 kg (196 lb)   3/2017    Weight Loss 8.165 kg (18 lb)    % Weight Loss  9 %    Weight Loss Time Frame 1 yr (down 11 lb over 4 mos, down 6 lb over 2 mos) -- progressive wt loss but not within parameters of MSA    Body Mass Index (BMI)    BMI Grade 19.1 - 24.9 - normal            Labs/Tests/Procedures/Meds       03/09/18 1212    Labs/Tests/Procedures/Meds    Diagnostic Test/Procedure Review reviewed    Labs/Tests Review Reviewed    Procedure Review SLP    Swallow eval status Pending    Type of SLP Evaluation VFSS   BSE  completed 3/8    Medication Review Reviewed, pertinent;Multivitamin;Ldopa    Significant Vitals reviewed            Physical Findings       03/09/18 1212    Physical Findings/Assessment    Additional Documentation Physical Appearance (Group)    Physical Appearance    Skin other (see comments)   no impairment            Estimated/Assessed Needs       03/09/18 1213    Calculation Measurements    Weight Used For Calculations 80.3 kg (177 lb)    Estimated/Assessed Energy Needs    Energy Need Method Kcal/kg    kcal/kg 25    25 Kcal/Kg (kcal) 2007.18    Estimated/Assessed Protein Needs    Weight Used for Protein Calculation 80.3 kg (177 lb)    Protein (gm/kg) 1.0    1.0 Gm Protein (gm) 80.29    Estimated/Assessed Fluid Needs    Fluid Need Method RDA method    RDA Method (mL)  2000            Nutrition Prescription Ordered       03/09/18 1214    Nutrition Prescription PO    Current PO Diet Soft Texture    Texture Chopped    Dysphagia Level --    Fluid Consistency Thin            Evaluation of Received Nutrient/Fluid Intake       03/09/18 1214    PO Evaluation    Number of Days PO Intake Evaluated 1 day    Number of Meals 3    % PO Intake 100%            Problem/Interventions:        Problem 1       03/09/18 1214    Nutrition Diagnoses Problem 1    Problem 1 Swallowing Difficulty    Etiology (related to) Medical Diagnosis    Neurological Parkinson's    Signs/Symptoms (evidenced by) Report/Observation    Other Comment Known to need dys diet.             Problem 2       03/09/18 1216    Nutrition Diagnoses Problem 2    Problem 2 Unintended Weight Loss    Signs/Symptoms (evidenced by) Unintended Weight Change    Unintended Weight Change Loss    Number of Pounds Lost 18#    Weight loss time period 1 yr                  Intervention Goal       03/09/18 1216    Intervention Goal    General Maintain nutrition    PO Tolerate PO;Maintain intake;PO intake (%)    PO Intake % 100 %    Weight Maintain weight            Nutrition  Intervention       03/09/18 1217    Nutrition Intervention    RD/Tech Action Recommend/ordered;Follow Tx progress;Care plan reviewd;Encourage intake    Recommended/Ordered Supplement            Nutrition Prescription       03/09/18 1217    Nutrition Prescription PO    Supplement Mighty Shake    Supplement Frequency 3 times a day;2 times a day    New PO Prescription Ordered? Yes            Education/Evaluation       03/09/18 1217    Monitor/Evaluation    Monitor Per protocol        Electronically signed by:  Jazzy Trammell RD  03/09/18 12:22 PM

## 2018-03-10 LAB
ANION GAP SERPL CALCULATED.3IONS-SCNC: 6.5 MMOL/L
BUN BLD-MCNC: 7 MG/DL (ref 8–23)
BUN/CREAT SERPL: 9.1 (ref 7–25)
CALCIUM SPEC-SCNC: 7.7 MG/DL (ref 8.6–10.5)
CHLORIDE SERPL-SCNC: 106 MMOL/L (ref 98–107)
CK SERPL-CCNC: 1407 U/L (ref 20–200)
CO2 SERPL-SCNC: 27.5 MMOL/L (ref 22–29)
CREAT BLD-MCNC: 0.77 MG/DL (ref 0.76–1.27)
GFR SERPL CREATININE-BSD FRML MDRD: 99 ML/MIN/1.73
GLUCOSE BLD-MCNC: 101 MG/DL (ref 65–99)
POTASSIUM BLD-SCNC: 4 MMOL/L (ref 3.5–5.2)
SODIUM BLD-SCNC: 140 MMOL/L (ref 136–145)

## 2018-03-10 PROCEDURE — 93005 ELECTROCARDIOGRAM TRACING: CPT | Performed by: INTERNAL MEDICINE

## 2018-03-10 PROCEDURE — 99231 SBSQ HOSP IP/OBS SF/LOW 25: CPT | Performed by: NURSE PRACTITIONER

## 2018-03-10 PROCEDURE — 93010 ELECTROCARDIOGRAM REPORT: CPT | Performed by: INTERNAL MEDICINE

## 2018-03-10 PROCEDURE — 82550 ASSAY OF CK (CPK): CPT | Performed by: HOSPITALIST

## 2018-03-10 PROCEDURE — 25810000003 SODIUM CHLORIDE 0.9 % WITH KCL 20 MEQ 20-0.9 MEQ/L-% SOLUTION: Performed by: HOSPITALIST

## 2018-03-10 PROCEDURE — 80048 BASIC METABOLIC PNL TOTAL CA: CPT | Performed by: HOSPITALIST

## 2018-03-10 PROCEDURE — 97110 THERAPEUTIC EXERCISES: CPT

## 2018-03-10 RX ORDER — BUMETANIDE 1 MG/1
1 TABLET ORAL DAILY
Status: DISCONTINUED | OUTPATIENT
Start: 2018-03-10 | End: 2018-03-11

## 2018-03-10 RX ADMIN — SODIUM CHLORIDE AND POTASSIUM CHLORIDE 75 ML/HR: 9; 1.49 INJECTION, SOLUTION INTRAVENOUS at 21:46

## 2018-03-10 RX ADMIN — ZOLPIDEM TARTRATE 5 MG: 5 TABLET ORAL at 21:46

## 2018-03-10 RX ADMIN — BUMETANIDE 1 MG: 1 TABLET ORAL at 12:00

## 2018-03-10 RX ADMIN — CARBIDOPA AND LEVODOPA 0.5 TABLET: 25; 250 TABLET ORAL at 08:51

## 2018-03-10 RX ADMIN — CARVEDILOL 9.38 MG: 6.25 TABLET, FILM COATED ORAL at 08:50

## 2018-03-10 RX ADMIN — CARBIDOPA AND LEVODOPA 2 TABLET: 25; 100 TABLET ORAL at 18:21

## 2018-03-10 RX ADMIN — MULTIPLE VITAMINS W/ MINERALS TAB 1 TABLET: TAB at 08:49

## 2018-03-10 RX ADMIN — CARBIDOPA AND LEVODOPA 0.5 TABLET: 25; 250 TABLET ORAL at 21:47

## 2018-03-10 RX ADMIN — MIDODRINE HYDROCHLORIDE 2.5 MG: 2.5 TABLET ORAL at 06:51

## 2018-03-10 RX ADMIN — LEVOTHYROXINE SODIUM 112 MCG: 112 TABLET ORAL at 06:51

## 2018-03-10 RX ADMIN — CARBIDOPA AND LEVODOPA 2 TABLET: 25; 100 TABLET ORAL at 08:50

## 2018-03-10 RX ADMIN — GABAPENTIN 100 MG: 100 CAPSULE ORAL at 08:49

## 2018-03-10 RX ADMIN — TERAZOSIN HYDROCHLORIDE 1 MG: 1 CAPSULE ORAL at 21:46

## 2018-03-10 RX ADMIN — GABAPENTIN 100 MG: 100 CAPSULE ORAL at 21:46

## 2018-03-10 RX ADMIN — CARBIDOPA AND LEVODOPA 2 TABLET: 25; 100 TABLET ORAL at 21:46

## 2018-03-10 RX ADMIN — CARVEDILOL 9.38 MG: 6.25 TABLET, FILM COATED ORAL at 18:21

## 2018-03-10 RX ADMIN — SODIUM CHLORIDE AND POTASSIUM CHLORIDE 75 ML/HR: 9; 1.49 INJECTION, SOLUTION INTRAVENOUS at 06:37

## 2018-03-10 RX ADMIN — ATORVASTATIN CALCIUM 10 MG: 10 TABLET, FILM COATED ORAL at 08:50

## 2018-03-10 RX ADMIN — CARBIDOPA AND LEVODOPA 0.5 TABLET: 25; 250 TABLET ORAL at 18:25

## 2018-03-10 RX ADMIN — RIVAROXABAN 20 MG: 20 TABLET, FILM COATED ORAL at 18:21

## 2018-03-10 NOTE — PLAN OF CARE
Problem: Patient Care Overview  Goal: Plan of Care Review  Outcome: Ongoing (interventions implemented as appropriate)   03/10/18 9989   Coping/Psychosocial   Plan of Care Reviewed With patient;sibling   OTHER   Outcome Summary pt is progressing mobility. Lived alone prior to fall. Feels stronger. Encourage up to chair.    Plan of Care Review   Progress improving

## 2018-03-10 NOTE — PROGRESS NOTES
Name: Rommel Gonzalez ADMIT: 3/7/2018   : 1945  PCP: Luiz Onofre MD    MRN: [id number] LOS: 3 days   AGE/SEX: 72 y.o. male  ROOM: [facility]   Subjective   Subjective  States he feels good. Denies complaints.     Objective   Vital Signs  Temp:  [97.1 °F (36.2 °C)-99.2 °F (37.3 °C)] 99 °F (37.2 °C)  Heart Rate:  [75-85] 81  Resp:  [14-16] 16  BP: (143-155)/(75-87) 143/84  SpO2:  [95 %-98 %] 96 %  on   ;      Body mass index is 24.06 kg/m².    Physical Exam   Constitutional: He is oriented to person, place, and time. He appears well-developed. He is cooperative. No distress.   Neck: No JVD present. No tracheal deviation present. No thyromegaly present.   Cardiovascular: Normal rate and regular rhythm.    No murmur heard.  Pulmonary/Chest: Effort normal and breath sounds normal. No respiratory distress.   Abdominal: Soft. Normal appearance and bowel sounds are normal. He exhibits no distension. There is no tenderness.   Neurological: He is alert and oriented to person, place, and time.   Masked facies. Sluggish speech.   Skin: Skin is warm and dry. No rash noted.   Psychiatric: He has a normal mood and affect. His behavior is normal.   Nursing note and vitals reviewed.      Results Review:       I reviewed the patient's new clinical results.    Results from last 7 days  Lab Units 18  0554 18  1500   WBC 10*3/mm3 7.15 8.99   HEMOGLOBIN g/dL 10.4* 13.1*   PLATELETS 10*3/mm3 176 202       Results from last 7 days  Lab Units 03/10/18  0604 18  0720 18  0554 18  1500   SODIUM mmol/L 140 141 141 140   POTASSIUM mmol/L 4.0 3.9 3.4* 4.2   CHLORIDE mmol/L 106 108* 108* 104   CO2 mmol/L 27.5 29.2* 29.8* 27.0   BUN mg/dL 7* 10 12 17   CREATININE mg/dL 0.77 0.73* 0.86 0.91   GLUCOSE mg/dL 101* 86 96 111*   Estimated Creatinine Clearance: 93.6 mL/min (by C-G formula based on SCr of 0.77 mg/dL).    Results from last 7 days  Lab Units 03/10/18  0604 18  0720 18  0512  03/07/18  1500   CALCIUM mg/dL 7.7* 7.7* 7.5* 8.8   ALBUMIN g/dL  --   --  3.00* 3.80       Results from last 7 days  Lab Units 03/10/18  0604 03/09/18  0720 03/08/18  0554 03/07/18  1500   CK TOTAL U/L 1,407* 1,867* 2,335* 2,413*   PROBNP pg/mL  --   --  1,028.0*  --        Results from last 7 days  Lab Units 03/07/18  1500   PROTIME Seconds 27.1*   INR  2.57*   (XARELTO)      atorvastatin 10 mg Oral Daily   carbidopa-levodopa 2 tablet Oral TID   carbidopa-levodopa 0.5 tablet Oral TID   carvedilol 9.375 mg Oral BID With Meals   gabapentin 100 mg Oral BID   levothyroxine 112 mcg Oral Q AM   midodrine 2.5 mg Oral BID AC   multivitamin with minerals 1 tablet Oral Daily   rivaroxaban 20 mg Oral Daily With Dinner   terazosin 1 mg Oral Nightly       sodium chloride 0.9 % with KCl 20 mEq 75 mL/hr Last Rate: 75 mL/hr (03/10/18 0637)   Diet Soft Texture; Chopped; Thin      Assessment/Plan   Active Hospital Problems (** Indicates Principal Problem)    Diagnosis Date Noted   • **Non-traumatic rhabdomyolysis [M62.82] 03/07/2018   • Fall [W19.XXXA] 03/07/2018   • Progressive supranuclear palsy [G23.1] 03/07/2018   • Bruising [T14.8XXA] 03/07/2018   • Obstructive sleep apnea, adult [G47.33] 10/26/2017   • Weakness of voice [R49.8] 06/23/2016   • Anticoagulated [Z79.01] 04/05/2016   • Idiopathic Parkinson's disease [G20] 03/28/2016   • Hypertension [I10] 03/28/2016   • AF (atrial fibrillation) [I48.91] 03/28/2016   • Gait instability [R26.81] 03/28/2016   • Dysarthria [R47.1] 03/28/2016      Resolved Hospital Problems    Diagnosis Date Noted Date Resolved   No resolved problems to display.       Mr. Gonzalez is a 72 y.o. male with a history of Parkinson's disease and progressive supranuclear palsy who was admitted with mild rhabdomyolysis after sustaining a fall.    · Cr stable and CPK improving. Continue IVFs. Will restart his Bumex and continue to hold lisinopril. Can likely resume at discharge.  · Rather peculiar Bumex  instructions prior to admission. Will simplify and give 1 mg per day. Was previously getting 6 mg per week. He has a history of diastolic dysfunction but denies prior history of CHF.  · Discussed with Dr. Manley. Feels likely orthostatic hypotension due to autonomic dysfxn and midodrine has been started. BP up slightly since midodrine started. On Coreg for Afib. On Xarelto per Dr. Bennett. Restarting Bumex.  · Plan for him is to move in with his daughter for now. Will need home health at discharge. Can likely discharge Sunday or Monday.      Fahad Schafer MD  [facility]  03/10/18  7:57 AM

## 2018-03-10 NOTE — THERAPY TREATMENT NOTE
Acute Care - Physical Therapy Treatment Note  [facility]     Patient Name: Rommel Gonzalez  : 1945  MRN: [id number]  Today's Date: 3/10/2018     Date of Referral to PT: 18       Admit Date: 3/7/2018    Visit Dx:    ICD-10-CM ICD-9-CM   1. Traumatic rhabdomyolysis, initial encounter T79.6XXA 958.6   2. Fall at home, initial encounter W19.XXXA E888.9    Y92.099 E849.0   3. Dysarthria R47.1 784.51   4. Progressive supranuclear palsy G23.1 333.0   5. Impaired functional mobility, balance, gait, and endurance Z74.09 V49.89     Patient Active Problem List   Diagnosis   • AF (atrial fibrillation)   • Depression   • Gait instability   • Hypertension   • Insomnia   • Idiopathic Parkinson's disease   • Peripheral neuropathy   • Dysarthria   • Atrial flutter   • Anticoagulated   • Health care maintenance   • Hypothyroidism (acquired)   • Weakness of voice   • Abnormal chest CT   • Facial droop   • Hypersomnia    • Hyperlipidemia   • High risk medication use   • CORINNE on CPAP   • Obstructive sleep apnea, adult   • Non-traumatic rhabdomyolysis   • Fall   • Progressive supranuclear palsy   • Bruising       Therapy Treatment    Therapy Treatment / Health Promotion    Treatment Time/Intention  Discipline: physical therapist  Document Type: therapy note (daily note)  Subjective Information: no complaints, pain  Mode of Treatment: physical therapy  Patient Effort: good  Plan of Care Review  Plan of Care Reviewed With: patient, sibling    Vitals/Pain/Safety  Pain Assessment  Additional Documentation: Pain Scale: Numbers Pre/Post-Treatment (Group)  Pain Scale: Numbers Pre/Post-Treatment  Pain Scale: Numbers, Pretreatment: 0/10 - no pain  Safety Issues, Functional Mobility  Safety Issues Affecting Function (Mobility): sequencing abilities, awareness of need for assistance  Positioning and Restraints  Pre-Treatment Position: other (comment) (sitting eob with nsg )  Post Treatment Position: bed  In Bed: encouraged to call  for assist, call light within reach, exit alarm on, side rails up x2, SCD pump applied, supine    Mobility,ADL,Motor, Modality  Bed Mobility Assessment/Treatment  Bed Mobility Assessment/Treatment: sit-supine, scooting/bridging  Scooting/Bridging Chidester (Bed Mobility): verbal cues, minimum assist (75% patient effort)  Supine-Sit [place] (Bed Mobility): not tested (up with nursing on eob )  Sit-Supine [place] (Bed Mobility): minimum assist (75% patient effort)  Bed Mobility, Safety Issues: impaired trunk control for bed mobility, decreased use of arms for pushing/pulling, decreased use of legs for bridging/pushing  Assistive Device (Bed Mobility): bed rails, head of bed elevated  Comment (Bed Mobility): pt req hob up, leans back but with rigidity started to roll some and tried to pull up legs but could not move, so assisted.   Transfer Assessment/Treatment  Transfer Assessment/Treatment: sit-stand transfer, stand-sit transfer  Sit-Stand Transfer  Sit-Stand [place] (Transfers): minimum assist (75% patient effort)  Assistive Device (Sit-Stand Transfers): walker, front-wheeled  Stand-Sit Transfer  Stand-Sit [place] (Transfers): contact guard, minimum assist (75% patient effort)  Assistive Device (Stand-Sit Transfers): walker, front-wheeled  Gait/Stairs Assessment/Training  Gait/Stairs Assessment/Training: gait/ambulation assistive device, distance ambulated, gait pattern, gait deviations  Chidester Level (Gait): minimum assist (75% patient effort), verbal cues, nonverbal cues (demo/gesture)  Assistive Device (Gait): walker, front-wheeled  Distance in Feet (Gait): 100  Pattern (Gait): step-to, swing-to (vc throughout every 4 steps to recorret balance)  Deviations/Abnormal Patterns (Gait): festinating/shuffling, gait speed decreased, stride length decreased (startes to walk on toes intitially, less during walk, )     Motor Skills Assessment/Interventions  Additional Documentation:  (cues,  corrections for gait deviations )           ROM/MMT             Sensory, Edema, Orthotics          Cognition, Communication, Swallow  Cognitive Assessment Intervention- SLP  Cognitive Function (Cognition): WFL  Orientation Status (Cognition): awareness of basic personal information, person, place, time    Outcome Summary             Physical Therapy Education     Title: PT OT SLP Therapies (Done)     Topic: Physical Therapy (Done)     Point: Mobility training (Done)    Learning Progress Summary     Learner Status Readiness Method Response Comment Documented by    Patient Done Acceptance E VU,NR ed on gait impairment corrections, and ed on safety, up only with assist. SP 03/10/18 1702     Done Acceptance E VU  MA 03/09/18 1445          Point: Home exercise program (Done)    Learning Progress Summary     Learner Status Readiness Method Response Comment Documented by    Patient Done Acceptance E VU  MA 03/09/18 1445          Point: Body mechanics (Done)    Learning Progress Summary     Learner Status Readiness Method Response Comment Documented by    Patient Done Acceptance E VU  MA 03/09/18 1445          Point: Precautions (Done)    Learning Progress Summary     Learner Status Readiness Method Response Comment Documented by    Patient Done Acceptance E VU,NR ed on gait impairment corrections, and ed on safety, up only with assist. SP 03/10/18 1702     Done Acceptance E VU  MA 03/09/18 1445                      User Key     Initials Effective Dates Name Provider Type Discipline    MA 03/07/18 -  Eve Allen, PT Physical Therapist PT     01/09/17 -  Monica Booker, PT Physical Therapist PT                    PT Recommendation and Plan     Plan of Care Reviewed With: patient, sibling  Progress: improving  Outcome Summary: pt is progressing mobility. Lived alone prior to fall. Feels stronger.  Encourage up to chair.           Outcome Measures     Row Name 03/10/18 1700 03/09/18 1408 03/09/18 1400       How much  help from another person do you currently need...    Turning from your back to your side while in flat bed without using bedrails? 3  -SP 4  -MA  --    Moving from lying on back to sitting on the side of a flat bed without bedrails? 3  -SP 4  -MA  --    Moving to and from a bed to a chair (including a wheelchair)? 3  -SP 3  -MA  --    Standing up from a chair using your arms (e.g., wheelchair, bedside chair)? 3  -SP 3  -MA  --    Climbing 3-5 steps with a railing? 2  -SP 2  -MA  --    To walk in hospital room? 3  -SP 3  -MA  --    AM-PAC 6 Clicks Score 17  -SP 19  -MA  --       How much help from another is currently needed...    Putting on and taking off regular lower body clothing?  -- 3  -LE  --    Bathing (including washing, rinsing, and drying)  -- 3  -LE  --    Toileting (which includes using toilet bed pan or urinal)  -- 3  -LE  --    Putting on and taking off regular upper body clothing  -- 3  -LE  --    Taking care of personal grooming (such as brushing teeth)  -- 3  -LE  --    Eating meals  -- 3  -LE  --    Score  -- 18  -LE  --       Functional Assessment    Outcome Measure Options AM-PAC 6 Clicks Basic Mobility (PT)  -SP AM-PAC 6 Clicks Basic Mobility (PT)  -MA AM-PAC 6 Clicks Daily Activity (OT)  -LE      User Key  (r) = Recorded By, (t) = Taken By, (c) = Cosigned By    Initials Name Provider Type    TARSHA Zepeda OTR Occupational Therapist    SHERIF Allen, PT Physical Therapist    PATRICK Booker, PT Physical Therapist           Time Calculation:         PT Charges     Row Name 03/10/18 1705             Time Calculation    Start Time 1625  -SP      Stop Time 1645  -SP      Time Calculation (min) 20 min  -SP      PT Received On 03/10/18  -SP      PT - Next Appointment 03/11/18  -SP        User Key  (r) = Recorded By, (t) = Taken By, (c) = Cosigned By    Initials Name Provider Type    PATRICK Booker, PT Physical Therapist          Therapy Charges for Today     Code Description Service  Date Service Provider Modifiers Qty    69435251569 HC PT THER PROC EA 15 MIN 3/10/2018 Monica Booker, PT GP 1          PT G-Codes  Outcome Measure Options: AM-PAC 6 Clicks Basic Mobility (PT)    Monica Booker, PT  3/10/2018

## 2018-03-10 NOTE — PROGRESS NOTES
[facility]  Progress note: 3/10/2018    Patient Identification:  Name:Rommel Gonzalez  Age:72 y.o.  Sex: male  :  1945  MRN: [id number]           CC:  Followup of atrial fib, falls     Interval history:    Denies any chest pain,  Syncope, near syncope,  or shortness of breath or palpitations.      Vital Signs:   Temp:  [97.1 °F (36.2 °C)-99.2 °F (37.3 °C)] 97.1 °F (36.2 °C)  Heart Rate:  [78-93] 93  Resp:  [14-16] 16  BP: (138-174)/(72-92) 174/92    Intake/Output Summary (Last 24 hours) at 03/10/18 1759  Last data filed at 03/10/18 1739   Gross per 24 hour   Intake             1929 ml   Output              800 ml   Net             1129 ml       Physical Examination:    General Appearance No acute distress, frail elderly, chronically ill appearing.        Lungs Clear to auscultation,respirations regular, even and unlabored   Heart Irregular rhythm and normal rate, normal S1 and S2, no murmur, no gallop, no rub, no click   Chest wall No abnormalities observed   Abdomen Normal bowel sounds, no masses, no hepatomegaly, soft   Extremities Moves all extremities well, no edema, no cyanosis, no redness   Neurological Alert and oriented x 3, flat affect.  Slow speech. Follows commands.     Lab Review:  Personally reviewed the labs, radiology imaging and other cardiac procedures.   Results from last 7 days  Lab Units 03/10/18  0604  18  0554   SODIUM mmol/L 140  < > 141   POTASSIUM mmol/L 4.0  < > 3.4*   CHLORIDE mmol/L 106  < > 108*   CO2 mmol/L 27.5  < > 29.8*   BUN mg/dL 7*  < > 12   CREATININE mg/dL 0.77  < > 0.86   CALCIUM mg/dL 7.7*  < > 7.5*   BILIRUBIN mg/dL  --   --  0.4   ALK PHOS U/L  --   --  41   ALT (SGPT) U/L  --   --  5   AST (SGOT) U/L  --   --  65*   GLUCOSE mg/dL 101*  < > 96   < > = values in this interval not displayed.    Results from last 7 days  Lab Units 03/10/18  0604 18  0720 18  0554   CK TOTAL U/L 1,407* 1,867* 2,335*     )    Results from last 7 days  Lab Units  03/08/18  0554 03/07/18  1500   WBC 10*3/mm3 7.15 8.99   HEMOGLOBIN g/dL 10.4* 13.1*   HEMATOCRIT % 32.6* 40.1*   PLATELETS 10*3/mm3 176 202       Results from last 7 days  Lab Units 03/07/18  1500   INR  2.57*     Previous Testing-  Echocardiogram on 4/27/17-      Interpretation Summary      · Left ventricular function is normal. Estimated EF = 55%.      Echocardiogram on 2/27/15-       3/10/18      3/8/18      8/10/17 previous EKG      Medication Review:   Meds reviewed  Scheduled Meds:    atorvastatin 10 mg Oral Daily   bumetanide 1 mg Oral Daily   carbidopa-levodopa 2 tablet Oral TID   carbidopa-levodopa 0.5 tablet Oral TID   carvedilol 9.375 mg Oral BID With Meals   gabapentin 100 mg Oral BID   levothyroxine 112 mcg Oral Q AM   midodrine 2.5 mg Oral BID AC   multivitamin with minerals 1 tablet Oral Daily   rivaroxaban 20 mg Oral Daily With Dinner   terazosin 1 mg Oral Nightly     Continuous Infusions:    sodium chloride 0.9 % with KCl 20 mEq 75 mL/hr Last Rate: 75 mL/hr (03/10/18 0637)     I personally viewed and interpreted the patient's EKG/Telemetry data    The patient's CHADS2-VASc score is 2.  A [id number] score of 2 or more is considered a high risk for a thromboembolic event    Assessment and Plan  1.  Progressive supranuclear palsy. Plans for him to move in with daughter after discharge.   2.  Weakness with recurrent falls.  Dr. Bennett discussed with Dr. Fernandez who does not feel the patient had a TIA or stroke and does not feel aspirin is needed at this time.  ASA was discontinued 3/9.  continue with Xarelto for CVA prophylaxis in setting of afib/flutter.  He must use his walker.  He is at risk for further falls.  For now risk of CVA is greater than risk of fall.   3.  Rhabdomyolysis: is on IVF and CPK is trending down.    4.  Diastolic dysfunction and normal ejection fraction. clinically compensated.   Bumex has been resumed at 1 mg daily.  Will need to monitor creatinine and orthostatic BP.    5.   Atrial flutter/fibrillation.  Persistant. Is on xarelto.   Anticoagulation concerns as above  6.  Hypertension blood pressure currently good.  randing 130-170/70-90's which is tolerable for his age and co-morbidities.   7.  Prolonged QTc interval: improved now that K+ is normalized.   8.  Orthostatic hypotension: per neuro likely from autonomic dysfunction/ parkinsons.  Is on low dose midodrine 2.5 mg bid.  Terazosin dose was cut in half by neuro.  Have asked staff to check orthostatic BP daily to reassess med adjustments.     JOVANY Thakkar  Weekend cardiology coverage  3/10/86777:10 AM

## 2018-03-10 NOTE — PLAN OF CARE
Problem: Fall Risk (Adult)  Goal: Identify Related Risk Factors and Signs and Symptoms  Outcome: Ongoing (interventions implemented as appropriate)    Goal: Absence of Fall  Outcome: Outcome(s) achieved Date Met: 03/10/18    Goal: Identify Related Risk Factors and Signs and Symptoms  Outcome: Ongoing (interventions implemented as appropriate)      Problem: Nutrition, Imbalanced: Inadequate Oral Intake (Adult)  Goal: Identify Related Risk Factors and Signs and Symptoms  Outcome: Ongoing (interventions implemented as appropriate)    Goal: Improved Oral Intake  Outcome: Ongoing (interventions implemented as appropriate)    Goal: Prevent Further Weight Loss  Outcome: Ongoing (interventions implemented as appropriate)      Problem: Patient Care Overview  Goal: Plan of Care Review  Outcome: Ongoing (interventions implemented as appropriate)   03/10/18 0553   Coping/Psychosocial   Plan of Care Reviewed With patient   OTHER   Outcome Summary pt slept a litte during night, frequently urinating, unsteady on feet while using a walker, no c/o pain or nausea, will cont to monitor

## 2018-03-11 ENCOUNTER — APPOINTMENT (OUTPATIENT)
Dept: GENERAL RADIOLOGY | Facility: HOSPITAL | Age: 73
End: 2018-03-11

## 2018-03-11 ENCOUNTER — APPOINTMENT (OUTPATIENT)
Dept: CT IMAGING | Facility: HOSPITAL | Age: 73
End: 2018-03-11

## 2018-03-11 LAB
ANION GAP SERPL CALCULATED.3IONS-SCNC: 14.5 MMOL/L
ARTERIAL PATENCY WRIST A: POSITIVE
ATMOSPHERIC PRESS: 749.5 MMHG
BASE EXCESS BLDA CALC-SCNC: 0 MMOL/L (ref 0–2)
BASOPHILS # BLD AUTO: 0.03 10*3/MM3 (ref 0–0.2)
BASOPHILS NFR BLD AUTO: 0.1 % (ref 0–1.5)
BDY SITE: ABNORMAL
BUN BLD-MCNC: 8 MG/DL (ref 8–23)
BUN/CREAT SERPL: 9.4 (ref 7–25)
CALCIUM SPEC-SCNC: 8.2 MG/DL (ref 8.6–10.5)
CHLORIDE SERPL-SCNC: 99 MMOL/L (ref 98–107)
CK SERPL-CCNC: 941 U/L (ref 20–200)
CO2 SERPL-SCNC: 23.5 MMOL/L (ref 22–29)
CREAT BLD-MCNC: 0.85 MG/DL (ref 0.76–1.27)
D-LACTATE SERPL-SCNC: 1.9 MMOL/L (ref 0.5–2)
DEPRECATED RDW RBC AUTO: 46.4 FL (ref 37–54)
EOSINOPHIL # BLD AUTO: 0 10*3/MM3 (ref 0–0.7)
EOSINOPHIL NFR BLD AUTO: 0 % (ref 0.3–6.2)
ERYTHROCYTE [DISTWIDTH] IN BLOOD BY AUTOMATED COUNT: 13.3 % (ref 11.5–14.5)
GAS FLOW AIRWAY: 1.5 LPM
GFR SERPL CREATININE-BSD FRML MDRD: 88 ML/MIN/1.73
GLUCOSE BLD-MCNC: 87 MG/DL (ref 65–99)
GLUCOSE BLDC GLUCOMTR-MCNC: 205 MG/DL (ref 70–130)
GLUCOSE BLDC GLUCOMTR-MCNC: 75 MG/DL (ref 70–130)
GLUCOSE BLDC GLUCOMTR-MCNC: 97 MG/DL (ref 70–130)
HCO3 BLDA-SCNC: 22.3 MMOL/L (ref 22–28)
HCT VFR BLD AUTO: 33.2 % (ref 40.4–52.2)
HGB BLD-MCNC: 10.8 G/DL (ref 13.7–17.6)
IMM GRANULOCYTES # BLD: 0.32 10*3/MM3 (ref 0–0.03)
IMM GRANULOCYTES NFR BLD: 1.2 % (ref 0–0.5)
LYMPHOCYTES # BLD AUTO: 0.82 10*3/MM3 (ref 0.9–4.8)
LYMPHOCYTES NFR BLD AUTO: 3 % (ref 19.6–45.3)
MCH RBC QN AUTO: 31.1 PG (ref 27–32.7)
MCHC RBC AUTO-ENTMCNC: 32.5 G/DL (ref 32.6–36.4)
MCV RBC AUTO: 95.7 FL (ref 79.8–96.2)
MODALITY: ABNORMAL
MONOCYTES # BLD AUTO: 1.23 10*3/MM3 (ref 0.2–1.2)
MONOCYTES NFR BLD AUTO: 4.5 % (ref 5–12)
NEUTROPHILS # BLD AUTO: 25.14 10*3/MM3 (ref 1.9–8.1)
NEUTROPHILS NFR BLD AUTO: 91.2 % (ref 42.7–76)
PCO2 BLDA: 28.3 MM HG (ref 35–45)
PH BLDA: 7.51 PH UNITS (ref 7.35–7.45)
PLATELET # BLD AUTO: 135 10*3/MM3 (ref 140–500)
PMV BLD AUTO: 10.5 FL (ref 6–12)
PO2 BLDA: 64.6 MM HG (ref 80–100)
POTASSIUM BLD-SCNC: 4.3 MMOL/L (ref 3.5–5.2)
PROCALCITONIN SERPL-MCNC: 55.39 NG/ML (ref 0.1–0.25)
RBC # BLD AUTO: 3.47 10*6/MM3 (ref 4.6–6)
SAO2 % BLDCOA: 94.5 % (ref 92–99)
SODIUM BLD-SCNC: 137 MMOL/L (ref 136–145)
TOTAL RATE: 28 BREATHS/MINUTE
WBC NRBC COR # BLD: 27.54 10*3/MM3 (ref 4.5–10.7)

## 2018-03-11 PROCEDURE — 82962 GLUCOSE BLOOD TEST: CPT

## 2018-03-11 PROCEDURE — 82550 ASSAY OF CK (CPK): CPT | Performed by: HOSPITALIST

## 2018-03-11 PROCEDURE — 82803 BLOOD GASES ANY COMBINATION: CPT

## 2018-03-11 PROCEDURE — 99232 SBSQ HOSP IP/OBS MODERATE 35: CPT | Performed by: NURSE PRACTITIONER

## 2018-03-11 PROCEDURE — 85025 COMPLETE CBC W/AUTO DIFF WBC: CPT | Performed by: NURSE PRACTITIONER

## 2018-03-11 PROCEDURE — 84145 PROCALCITONIN (PCT): CPT | Performed by: NURSE PRACTITIONER

## 2018-03-11 PROCEDURE — 36600 WITHDRAWAL OF ARTERIAL BLOOD: CPT

## 2018-03-11 PROCEDURE — 71045 X-RAY EXAM CHEST 1 VIEW: CPT

## 2018-03-11 PROCEDURE — 83605 ASSAY OF LACTIC ACID: CPT | Performed by: INTERNAL MEDICINE

## 2018-03-11 PROCEDURE — 70450 CT HEAD/BRAIN W/O DYE: CPT

## 2018-03-11 PROCEDURE — 25010000002 PIPERACILLIN SOD-TAZOBACTAM PER 1 G: Performed by: INTERNAL MEDICINE

## 2018-03-11 PROCEDURE — 25010000002 VANCOMYCIN 10 G RECONSTITUTED SOLUTION: Performed by: INTERNAL MEDICINE

## 2018-03-11 PROCEDURE — 87040 BLOOD CULTURE FOR BACTERIA: CPT | Performed by: INTERNAL MEDICINE

## 2018-03-11 PROCEDURE — 80048 BASIC METABOLIC PNL TOTAL CA: CPT | Performed by: HOSPITALIST

## 2018-03-11 RX ORDER — DEXTROSE MONOHYDRATE 25 G/50ML
INJECTION, SOLUTION INTRAVENOUS
Status: COMPLETED
Start: 2018-03-11 | End: 2018-03-11

## 2018-03-11 RX ADMIN — TAZOBACTAM SODIUM AND PIPERACILLIN SODIUM 4.5 G: 500; 4 INJECTION, SOLUTION INTRAVENOUS at 20:40

## 2018-03-11 RX ADMIN — DEXTROSE MONOHYDRATE: 25 INJECTION, SOLUTION INTRAVENOUS at 10:15

## 2018-03-11 RX ADMIN — VANCOMYCIN HYDROCHLORIDE 1500 MG: 10 INJECTION, POWDER, LYOPHILIZED, FOR SOLUTION INTRAVENOUS at 22:01

## 2018-03-11 NOTE — PLAN OF CARE
Problem: Fall Risk (Adult)  Goal: Identify Related Risk Factors and Signs and Symptoms  Outcome: Outcome(s) achieved Date Met: 03/11/18    Goal: Absence of Fall  Outcome: Ongoing (interventions implemented as appropriate)      Problem: Patient Care Overview  Goal: Plan of Care Review  Outcome: Ongoing (interventions implemented as appropriate)   03/11/18 8691   Coping/Psychosocial   Plan of Care Reviewed With patient;family   OTHER   Outcome Summary pt confused, lethargic, rapid called, md notified head ct and abg's ordered, transferred to tele will monitor   Plan of Care Review   Progress declining

## 2018-03-11 NOTE — PROGRESS NOTES
[facility]  Progress note: 3/11/2018    Patient Identification:  Name:Rommel Gonzalez  Age:72 y.o.  Sex: male  :  1945  MRN: [id number]           CC:  Followup of atrial fib, falls     Interval history:  Events noted. Rapid called this am for decreased responsiveness and daughter noted facial drooping on both sides of mouth.  Was transferred to Tele floor.  Daughter is at bedside.      Subjective:  Denies any chest pain,  Pressure or SHOB.      Vital Signs:   Temp:  [97.1 °F (36.2 °C)-99.4 °F (37.4 °C)] 97.6 °F (36.4 °C)  Heart Rate:  [] 115  Resp:  [14-18] 14  BP: ()/(54-86) 113/68     BP's yesterday were running 150-170's systolic but since around 10 am today BP dramatically lower 90-11/50-60's and HR's have trended up from 70-80's to 90- 110's.      Intake/Output Summary (Last 24 hours) at 18 1856  Last data filed at 18 0605   Gross per 24 hour   Intake                0 ml   Output              550 ml   Net             -550 ml     Tele: Aflutter rates 's.      ROS:  GI: denies nv or abd pain  Pulm: denies cough or shob  CV: denies chest pain or palps  Const: + poor appetite, denies fever, chills, rigors.     Physical Examination:    General Appearance No acute distress, frail elderly, chronically ill appearing.        Lungs Clear to auscultation,respirations regular, even and unlabored   Heart Irregular rhythm and normal rate, normal S1 and S2, no murmur, no gallop, no rub, no click   Chest wall No abnormalities observed   Abdomen Normal bowel sounds, no masses, no hepatomegaly, soft   Extremities Moves all extremities with generalized weakness noted, no edema, no cyanosis, no redness   Neurological Alert and oriented x 3, flat affect.  Slow speech. Follows commands.     Lab Review:  Personally reviewed the labs, radiology imaging and other cardiac procedures.   Results from last 7 days  Lab Units 18  0658  18  0554   SODIUM mmol/L 137  < > 141    POTASSIUM mmol/L 4.3  < > 3.4*   CHLORIDE mmol/L 99  < > 108*   CO2 mmol/L 23.5  < > 29.8*   BUN mg/dL 8  < > 12   CREATININE mg/dL 0.85  < > 0.86   CALCIUM mg/dL 8.2*  < > 7.5*   BILIRUBIN mg/dL  --   --  0.4   ALK PHOS U/L  --   --  41   ALT (SGPT) U/L  --   --  5   AST (SGOT) U/L  --   --  65*   GLUCOSE mg/dL 87  < > 96   < > = values in this interval not displayed.    Results from last 7 days  Lab Units 03/11/18  0658 03/10/18  0604 03/09/18  0720   CK TOTAL U/L 941* 1,407* 1,867*     )    Results from last 7 days  Lab Units 03/08/18  0554 03/07/18  1500   WBC 10*3/mm3 7.15 8.99   HEMOGLOBIN g/dL 10.4* 13.1*   HEMATOCRIT % 32.6* 40.1*   PLATELETS 10*3/mm3 176 202       Results from last 7 days  Lab Units 03/07/18  1500   INR  2.57*     Previous Testing-  Echocardiogram on 4/27/17-      Interpretation Summary      · Left ventricular function is normal. Estimated EF = 55%.      Echocardiogram on 2/27/15-       3/10/18      3/8/18      8/10/17 previous EKG      Medication Review:   Meds reviewed  Scheduled Meds:    atorvastatin 10 mg Oral Daily   carbidopa-levodopa 2 tablet Oral TID   carbidopa-levodopa 0.5 tablet Oral TID   carvedilol 9.375 mg Oral BID With Meals   levothyroxine 112 mcg Oral Q AM   midodrine 2.5 mg Oral BID AC   multivitamin with minerals 1 tablet Oral Daily   rivaroxaban 20 mg Oral Daily With Dinner   terazosin 1 mg Oral Nightly     Continuous Infusions:    sodium chloride 0.9 % with KCl 20 mEq 75 mL/hr Last Rate: 75 mL/hr (03/10/18 6041)     I personally viewed and interpreted the patient's EKG/Telemetry data    The patient's CHADS2-VASc score is 2.  A [id number] score of 2 or more is considered a high risk for a thromboembolic event    Assessment and Plan  1.  Progressive supranuclear palsy. Plans for him to move in with daughter after discharge.   2.  Weakness with recurrent falls.  Dr. Bennett discussed with Dr. Fernandez who does not feel the patient had a TIA or stroke and does not feel aspirin  is needed at this time.  ASA was discontinued 3/9.  continue with Xarelto for CVA prophylaxis in setting of afib/flutter.  He must use his walker.  He is at risk for further falls.  For now risk of CVA is greater than risk of fall.   3.  Rhabdomyolysis: is on IVF and CPK is trending down.    4.  Diastolic dysfunction and normal ejection fraction. clinically compensated.   Bumex has been resumed at 1 mg daily by attending.  Will need to monitor creatinine and orthostatic BP.    5.  Atrial flutter/fibrillation.  Persistant. Is on xarelto.   Anticoagulation concerns as above  6.  Hypertension: now hypotensive.  BP's yesterday were running 150-170's systolic but since around 10 am today BP dramatically lower 90-11/50-60's and HR's have trended up from 70-80's to 90- 110's.  Will order CBC and procal to assess for sepsis, I have strong suspicion given his current clinical presentation of tachycardia, hypotension, and altered mental status.  7.  Prolonged QTc interval: improved now that K+ is normalized. Check 12 lead in am.    8.  Orthostatic hypotension: per neuro likely from autonomic dysfunction/ parkinsons.  Is on low dose midodrine 2.5 mg bid which has been held d/t difficulty swallowing earlier today. .  Terazosin dose was cut in half by neuro.      I spoke with daughter regarding my concerns for sepsis and that labs were pending.        Prisca Begmu, APRN  Weekend cardiology coverage  3/11/2018 630 pm    35 min total: 20 minutes at bedside and 15 min in chart review and in coordination of care with other team members.      Addendum: 742 pm  740 pm procal 55.39 with WBC 27.54 (7.15 on 3/8/18).  Hgb 10.8 (10.4), Plts 135,000 (176,000).  I spoke with his bedside RN and requested she notify Hospitalist attending of CBC and procal results for further management.  Recheck CBC in am and BMP in am.

## 2018-03-11 NOTE — CODE DOCUMENTATION
Rashida Mckeon spoke with Dr Whiteside, no orders.   Reviewed medications.  BP in 90s, RN will hold bp meds and talk with cardiology.  Pt in NAD

## 2018-03-11 NOTE — PLAN OF CARE
Problem: Fall Risk (Adult)  Goal: Identify Related Risk Factors and Signs and Symptoms  Outcome: Ongoing (interventions implemented as appropriate)    Goal: Identify Related Risk Factors and Signs and Symptoms  Outcome: Ongoing (interventions implemented as appropriate)    Goal: Absence of Fall  Outcome: Ongoing (interventions implemented as appropriate)      Problem: Patient Care Overview  Goal: Plan of Care Review  Outcome: Ongoing (interventions implemented as appropriate)   03/10/18 2007   Coping/Psychosocial   Plan of Care Reviewed With patient   OTHER   Outcome Summary pt frequently urinating. bed alarm on. pt confused today. plan is fo rpt to be DC soon with    Plan of Care Review   Progress improving     Goal: Individualization and Mutuality  Outcome: Ongoing (interventions implemented as appropriate)    Goal: Discharge Needs Assessment  Outcome: Ongoing (interventions implemented as appropriate)    Goal: Interprofessional Rounds/Family Conf  Outcome: Ongoing (interventions implemented as appropriate)

## 2018-03-11 NOTE — PROGRESS NOTES
Called with lab results. Markedly elevated PCT. WBC also >27K. I ordered blood cultures & lactic acid. Started vanc & zosyn since CXR shows mild right basilar infiltrate that is new. Doesn't seem like this would be enough to cause that much of a white count & PCT, but there is something going on.

## 2018-03-11 NOTE — PROGRESS NOTES
[facility]     LOS: 4 days     Name: Rommel Gonzalez  Age: 72 y.o.  Sex: male  :  1945  MRN: [id number]         Primary Care Physician: Luiz Onofre MD    Diet Soft Texture; Chopped; Thin    Subjective   Rapid response his morning for dysarthria. Discussed with sister and another family member they state he was similar to this before but daughter arrived later and says this is not normal. Time: 35 minutes, greater than 50% spent in counseling and coordination of care.    Objective   Temp:  [97.1 °F (36.2 °C)-99.4 °F (37.4 °C)] 97.1 °F (36.2 °C)  Heart Rate:  [] 106  Resp:  [14-18] 14  BP: ()/(54-98) 91/54  SpO2:  [90 %-97 %] 92 %  on   ;   Device (Oxygen Therapy): room air  Body mass index is 24.06 kg/m².    Physical Exam   Constitutional: No distress.   Cardiovascular: Normal rate and regular rhythm.    Pulmonary/Chest: Effort normal and breath sounds normal. No respiratory distress.   Abdominal: Soft. There is no tenderness.   Neurological: He is alert.   Slurred speech, left facial weakness   Skin: Skin is warm and dry.   Psychiatric: He is slowed.     Reviewed medications and new clinical results    atorvastatin 10 mg Oral Daily   bumetanide 1 mg Oral Daily   carbidopa-levodopa 2 tablet Oral TID   carbidopa-levodopa 0.5 tablet Oral TID   carvedilol 9.375 mg Oral BID With Meals   gabapentin 100 mg Oral BID   levothyroxine 112 mcg Oral Q AM   midodrine 2.5 mg Oral BID AC   multivitamin with minerals 1 tablet Oral Daily   rivaroxaban 20 mg Oral Daily With Dinner   terazosin 1 mg Oral Nightly     sodium chloride 0.9 % with KCl 20 mEq 75 mL/hr Last Rate: 75 mL/hr (03/10/18 3379)     Results from last 7 days  Lab Units 18  0554 18  1500   WBC 10*3/mm3 7.15 8.99   HEMOGLOBIN g/dL 10.4* 13.1*   PLATELETS 10*3/mm3 176 202     Results from last 7 days  Lab Units 18  0658 03/10/18  0604 18  0720 18  0554  03/07/18  1500   SODIUM mmol/L 137 140 141 141 140   POTASSIUM mmol/L 4.3 4.0 3.9 3.4* 4.2   CHLORIDE mmol/L 99 106 108* 108* 104   CO2 mmol/L 23.5 27.5 29.2* 29.8* 27.0   BUN mg/dL 8 7* 10 12 17   CREATININE mg/dL 0.85 0.77 0.73* 0.86 0.91   CALCIUM mg/dL 8.2* 7.7* 7.7* 7.5* 8.8   GLUCOSE mg/dL 87 101* 86 96 111*     Lab Results   Component Value Date    ANIONGAP 14.5 03/11/2018     Glucose   Date/Time Value Ref Range Status   03/11/2018 1025 205 (H) 70 - 130 mg/dL Final   03/11/2018 1007 75 70 - 130 mg/dL Final     Estimated Creatinine Clearance: 88.1 mL/min (by C-G formula based on SCr of 0.85 mg/dL).    Results from last 7 days  Lab Units 03/11/18  0658 03/10/18  0604 03/09/18  0720   CK TOTAL U/L 941* 1,407* 1,867*     Assessment/Plan   Active Hospital Problems (** Indicates Principal Problem)    Diagnosis Date Noted   • **Non-traumatic rhabdomyolysis [M62.82] 03/07/2018   • Fall [W19.XXXA] 03/07/2018   • Progressive supranuclear palsy [G23.1] 03/07/2018   • Bruising [T14.8XXA] 03/07/2018   • Obstructive sleep apnea, adult [G47.33] 10/26/2017   • Weakness of voice [R49.8] 06/23/2016   • Anticoagulated [Z79.01] 04/05/2016   • Idiopathic Parkinson's disease [G20] 03/28/2016   • Hypertension [I10] 03/28/2016   • AF (atrial fibrillation) [I48.91] 03/28/2016   • Gait instability [R26.81] 03/28/2016   • Dysarthria [R47.1] 03/28/2016      Resolved Hospital Problems    Diagnosis Date Noted Date Resolved   No resolved problems to display.     · Rhabdomyolysis: CPK improving. Continue IV fluids.  · Low BP this morning: Hold Bumex  · Family states his Neurontin was discontinued as an outpatient. Dose has been reduced here, will stop  · Encephalopathy: Her daughter were status today. Check CT head, urinalysis/culture, chest x-ray. Ask neurology to see again  · Parkinson's disease, supranuclear palsy: Discussed with sister goals of care, progression.  · Atrial fibrillation: Continues on Xarelto  · Disposition: with daughter  on discharge  · I discussed the patient's findings and my recommendations with patient, family and nursing staff.    Aleksey Whiteside MD   03/11/18  12:47 PM

## 2018-03-11 NOTE — PROGRESS NOTES
LOS: 4 days   Patient Care Team:  Luiz Onofre MD as PCP - General (Family Medicine)    Chief Complaint:    Chief Complaint   Patient presents with   • Neuro Deficit(s)       Subjective     Interval History: per RN and nurses aide patient was up all night wanting to go to the . However, since there shift he has been minimally responsive. Daughter notes he was talking the past few days and easy to understand, typically not, and oriented.     Patient Complaints: no response, daughter is concerned that both sides of his mouth are drooping   Patient Denies: no response   History taken from: patient chart family RN    Objective     Vital Signs  Temp:  [97.1 °F (36.2 °C)-99.4 °F (37.4 °C)] 98.2 °F (36.8 °C)  Heart Rate:  [] 111  Resp:  [14-18] 14  BP: ()/(54-98) 100/60      Physical Examination:  HEENT: Normocephalic, atraumatic, diaphoretic, ill appearing, poor dentition   Resp: shallow, congested     Neurological:   MS: lethargic, difficult to arouse. Héctor to whisper his name, told me he was home. Followed simple commands, thumbs up and wiggled his toes  CN: EOM intact, dysarthric ? Dry mouth, soft spoken, no obvious facial weakness, no obvious vision loss  Motor: héctor to hold both arms up off of bed, R>L. Moves BLE spontaneously not to command  Sensory: W/D to pain LE>UE  Coordination: No response     Results Review:     I reviewed the patient's new clinical results.          Results from last 7 days  Lab Units 03/08/18  0554 03/07/18  1500   WBC 10*3/mm3 7.15 8.99   HEMOGLOBIN g/dL 10.4* 13.1*   HEMATOCRIT % 32.6* 40.1*   PLATELETS 10*3/mm3 176 202       No results found for: CHOL  Lab Results   Component Value Date    HDL 40 08/16/2017    HDL 41 10/10/2016    HDL 40 04/04/2016     Lab Results   Component Value Date    LDL 79 08/16/2017    LDL 92 10/10/2016    LDL 99 04/04/2016     Lab Results   Component Value Date    TRIG 125 08/16/2017    TRIG 71 10/10/2016    TRIG 221 (H) 04/04/2016          Results from last 7 days  Lab Units 03/11/18  0658 03/10/18  0604 03/09/18  0720 03/08/18  0554 03/07/18  1500   SODIUM mmol/L 137 140 141 141 140   POTASSIUM mmol/L 4.3 4.0 3.9 3.4* 4.2   CHLORIDE mmol/L 99 106 108* 108* 104   CO2 mmol/L 23.5 27.5 29.2* 29.8* 27.0   BUN mg/dL 8 7* 10 12 17   CREATININE mg/dL 0.85 0.77 0.73* 0.86 0.91   CALCIUM mg/dL 8.2* 7.7* 7.7* 7.5* 8.8   BILIRUBIN mg/dL  --   --   --  0.4 0.7   ALK PHOS U/L  --   --   --  41 57   ALT (SGPT) U/L  --   --   --  5 13   AST (SGOT) U/L  --   --   --  65* 63*   GLUCOSE mg/dL 87 101* 86 96 111*       Medication Review: reviewed, changes made    Assessment/Plan  Mr. Gonzalez is a 73yo with atrial fib, HTN, HLD, CORINNE and PD with progressive supranuclear palsy who presented on 3/7 after being found down at home with AMS and Dx with mild rhabdomyolysis, AMANDA and afib. He was seen by Dr. Manley on 3/8 for Parkinson's and Dx with acute encephalopathy. We were asked to reevaluate patient today for AMS, he had a low grade temp today, BS of 75 and elevated BP which have all been addressed. On exam he is lethargic, will awaken with continuous stimuli, diaphoretic, attempts to speak but difficult to understand as he is soft spoken-obtunded. Again, this is likely an acute toxic metabolic encephalopathy, normalize electrolytes, rule out infection, resume his home CPAP. He is non focal, no obvious stroke symptoms. A CT head was ordered. We recommend ABGs, stat, O2 and to put him on his home CPAP. If his symptoms persist after the above and or there is obvious seizure activity consider EEG and AED. Please call with any questions or concerns.     Plan:   - continue Xarelto, if CT head negative for hemorrhage   - UA, CXR per admitting   - ABGs stat, O2 stat, resume home CPAP stat, call admitting for orders for RT and settings     - continue Sinemet   - continue Midodrine   - normalize BS    - Non-pharmacological DVT prophylaxis   - EKG Tele   - Blood pressure  control to 130/80   - Goal LDL <100-recommend high dose statins-    - Serum glucose < 140   - CCP for D/C planning  Principal Problem:    Non-traumatic rhabdomyolysis  Active Problems:    AF (atrial fibrillation)    Gait instability    Hypertension    Idiopathic Parkinson's disease    Dysarthria    Anticoagulated    Weakness of voice    Obstructive sleep apnea, adult    Fall    Progressive supranuclear palsy    Bruising      I have discussed the above with the patient, Dr. Manley, nurse and family.    Nadya Limon, JOVANY  03/11/18  2:40 PM

## 2018-03-11 NOTE — PLAN OF CARE
Problem: Nutrition, Imbalanced: Inadequate Oral Intake (Adult)  Goal: Prevent Further Weight Loss  Outcome: Ongoing (interventions implemented as appropriate)      Problem: Patient Care Overview  Goal: Plan of Care Review  Outcome: Ongoing (interventions implemented as appropriate)  Continue symptom management and comfort care.   03/11/18 0448   Coping/Psychosocial   Plan of Care Reviewed With patient   OTHER   Outcome Summary Patient remains confused. Frequent urination and trying to exit the bed. Plan is for pt to be sent home soon with home health.   Plan of Care Review   Progress no change     Goal: Individualization and Mutuality  Outcome: Ongoing (interventions implemented as appropriate)    Goal: Discharge Needs Assessment  Outcome: Ongoing (interventions implemented as appropriate)    Goal: Interprofessional Rounds/Family Conf  Outcome: Ongoing (interventions implemented as appropriate)      Problem: Skin Injury Risk (Adult)  Goal: Identify Related Risk Factors and Signs and Symptoms  Outcome: Ongoing (interventions implemented as appropriate)    Goal: Skin Health and Integrity  Outcome: Ongoing (interventions implemented as appropriate)

## 2018-03-12 PROBLEM — A41.9 SEPSIS (HCC): Status: ACTIVE | Noted: 2018-03-12

## 2018-03-12 PROBLEM — J69.0 ASPIRATION PNEUMONIA (HCC): Status: ACTIVE | Noted: 2018-03-12

## 2018-03-12 LAB
ANION GAP SERPL CALCULATED.3IONS-SCNC: 11.9 MMOL/L
BACTERIA UR QL AUTO: ABNORMAL /HPF
BASOPHILS # BLD AUTO: 0.03 10*3/MM3 (ref 0–0.2)
BASOPHILS NFR BLD AUTO: 0.1 % (ref 0–1.5)
BILIRUB UR QL STRIP: NEGATIVE
BUN BLD-MCNC: 20 MG/DL (ref 8–23)
BUN/CREAT SERPL: 16.8 (ref 7–25)
CALCIUM SPEC-SCNC: 7.5 MG/DL (ref 8.6–10.5)
CHLORIDE SERPL-SCNC: 104 MMOL/L (ref 98–107)
CK SERPL-CCNC: 2814 U/L (ref 20–200)
CLARITY UR: ABNORMAL
CO2 SERPL-SCNC: 25.1 MMOL/L (ref 22–29)
COLOR UR: ABNORMAL
CREAT BLD-MCNC: 1.19 MG/DL (ref 0.76–1.27)
DEPRECATED RDW RBC AUTO: 47.4 FL (ref 37–54)
DEPRECATED RDW RBC AUTO: 48 FL (ref 37–54)
EOSINOPHIL # BLD AUTO: 0 10*3/MM3 (ref 0–0.7)
EOSINOPHIL NFR BLD AUTO: 0 % (ref 0.3–6.2)
ERYTHROCYTE [DISTWIDTH] IN BLOOD BY AUTOMATED COUNT: 13.6 % (ref 11.5–14.5)
ERYTHROCYTE [DISTWIDTH] IN BLOOD BY AUTOMATED COUNT: 13.8 % (ref 11.5–14.5)
GFR SERPL CREATININE-BSD FRML MDRD: 60 ML/MIN/1.73
GLUCOSE BLD-MCNC: 95 MG/DL (ref 65–99)
GLUCOSE UR STRIP-MCNC: NEGATIVE MG/DL
HCT VFR BLD AUTO: 31.7 % (ref 40.4–52.2)
HCT VFR BLD AUTO: 32.1 % (ref 40.4–52.2)
HGB BLD-MCNC: 10.4 G/DL (ref 13.7–17.6)
HGB BLD-MCNC: 10.4 G/DL (ref 13.7–17.6)
HGB UR QL STRIP.AUTO: ABNORMAL
HYALINE CASTS UR QL AUTO: ABNORMAL /LPF
IMM GRANULOCYTES # BLD: 0.46 10*3/MM3 (ref 0–0.03)
IMM GRANULOCYTES NFR BLD: 1.7 % (ref 0–0.5)
KETONES UR QL STRIP: ABNORMAL
LEUKOCYTE ESTERASE UR QL STRIP.AUTO: ABNORMAL
LYMPHOCYTES # BLD AUTO: 0.77 10*3/MM3 (ref 0.9–4.8)
LYMPHOCYTES NFR BLD AUTO: 2.8 % (ref 19.6–45.3)
MCH RBC QN AUTO: 30.9 PG (ref 27–32.7)
MCH RBC QN AUTO: 31.2 PG (ref 27–32.7)
MCHC RBC AUTO-ENTMCNC: 32.4 G/DL (ref 32.6–36.4)
MCHC RBC AUTO-ENTMCNC: 32.8 G/DL (ref 32.6–36.4)
MCV RBC AUTO: 95.2 FL (ref 79.8–96.2)
MCV RBC AUTO: 95.3 FL (ref 79.8–96.2)
MONOCYTES # BLD AUTO: 0.86 10*3/MM3 (ref 0.2–1.2)
MONOCYTES NFR BLD AUTO: 3.2 % (ref 5–12)
NEUTROPHILS # BLD AUTO: 25.03 10*3/MM3 (ref 1.9–8.1)
NEUTROPHILS NFR BLD AUTO: 92.2 % (ref 42.7–76)
NITRITE UR QL STRIP: NEGATIVE
NRBC BLD MANUAL-RTO: 0 /100 WBC (ref 0–0)
PH UR STRIP.AUTO: 6 [PH] (ref 5–8)
PLATELET # BLD AUTO: 138 10*3/MM3 (ref 140–500)
PLATELET # BLD AUTO: 144 10*3/MM3 (ref 140–500)
PMV BLD AUTO: 11 FL (ref 6–12)
PMV BLD AUTO: 11.5 FL (ref 6–12)
POTASSIUM BLD-SCNC: 4.3 MMOL/L (ref 3.5–5.2)
PROT UR QL STRIP: ABNORMAL
RBC # BLD AUTO: 3.33 10*6/MM3 (ref 4.6–6)
RBC # BLD AUTO: 3.37 10*6/MM3 (ref 4.6–6)
RBC # UR: ABNORMAL /HPF
REF LAB TEST METHOD: ABNORMAL
SODIUM BLD-SCNC: 141 MMOL/L (ref 136–145)
SP GR UR STRIP: 1.03 (ref 1–1.03)
SQUAMOUS #/AREA URNS HPF: ABNORMAL /HPF
UROBILINOGEN UR QL STRIP: ABNORMAL
VANCOMYCIN SERPL-MCNC: 10.5 MCG/ML (ref 5–40)
WBC NRBC COR # BLD: 27.15 10*3/MM3 (ref 4.5–10.7)
WBC NRBC COR # BLD: 29.32 10*3/MM3 (ref 4.5–10.7)
WBC UR QL AUTO: ABNORMAL /HPF

## 2018-03-12 PROCEDURE — 85027 COMPLETE CBC AUTOMATED: CPT | Performed by: HOSPITALIST

## 2018-03-12 PROCEDURE — 80048 BASIC METABOLIC PNL TOTAL CA: CPT | Performed by: HOSPITALIST

## 2018-03-12 PROCEDURE — 85025 COMPLETE CBC W/AUTO DIFF WBC: CPT | Performed by: INTERNAL MEDICINE

## 2018-03-12 PROCEDURE — 87086 URINE CULTURE/COLONY COUNT: CPT | Performed by: HOSPITALIST

## 2018-03-12 PROCEDURE — 97110 THERAPEUTIC EXERCISES: CPT | Performed by: OCCUPATIONAL THERAPIST

## 2018-03-12 PROCEDURE — 94799 UNLISTED PULMONARY SVC/PX: CPT

## 2018-03-12 PROCEDURE — 81001 URINALYSIS AUTO W/SCOPE: CPT | Performed by: HOSPITALIST

## 2018-03-12 PROCEDURE — 93005 ELECTROCARDIOGRAM TRACING: CPT | Performed by: NURSE PRACTITIONER

## 2018-03-12 PROCEDURE — 25010000002 VANCOMYCIN PER 500 MG: Performed by: HOSPITALIST

## 2018-03-12 PROCEDURE — 99232 SBSQ HOSP IP/OBS MODERATE 35: CPT | Performed by: INTERNAL MEDICINE

## 2018-03-12 PROCEDURE — 97112 NEUROMUSCULAR REEDUCATION: CPT | Performed by: OCCUPATIONAL THERAPIST

## 2018-03-12 PROCEDURE — 82550 ASSAY OF CK (CPK): CPT | Performed by: HOSPITALIST

## 2018-03-12 PROCEDURE — 80202 ASSAY OF VANCOMYCIN: CPT | Performed by: INTERNAL MEDICINE

## 2018-03-12 PROCEDURE — 25010000002 PIPERACILLIN SOD-TAZOBACTAM PER 1 G: Performed by: INTERNAL MEDICINE

## 2018-03-12 PROCEDURE — 93010 ELECTROCARDIOGRAM REPORT: CPT | Performed by: INTERNAL MEDICINE

## 2018-03-12 PROCEDURE — 94660 CPAP INITIATION&MGMT: CPT

## 2018-03-12 PROCEDURE — 97110 THERAPEUTIC EXERCISES: CPT

## 2018-03-12 RX ORDER — MIDODRINE HYDROCHLORIDE 2.5 MG/1
2.5 TABLET ORAL
Status: DISCONTINUED | OUTPATIENT
Start: 2018-03-12 | End: 2018-03-16

## 2018-03-12 RX ORDER — VANCOMYCIN HYDROCHLORIDE 1 G/200ML
1000 INJECTION, SOLUTION INTRAVENOUS ONCE
Status: COMPLETED | OUTPATIENT
Start: 2018-03-12 | End: 2018-03-12

## 2018-03-12 RX ORDER — VANCOMYCIN HYDROCHLORIDE 1 G/200ML
1000 INJECTION, SOLUTION INTRAVENOUS ONCE
Status: DISCONTINUED | OUTPATIENT
Start: 2018-03-12 | End: 2018-03-12

## 2018-03-12 RX ORDER — SODIUM CHLORIDE, SODIUM LACTATE, POTASSIUM CHLORIDE, CALCIUM CHLORIDE 600; 310; 30; 20 MG/100ML; MG/100ML; MG/100ML; MG/100ML
100 INJECTION, SOLUTION INTRAVENOUS CONTINUOUS
Status: DISCONTINUED | OUTPATIENT
Start: 2018-03-12 | End: 2018-03-15

## 2018-03-12 RX ADMIN — RIVAROXABAN 20 MG: 20 TABLET, FILM COATED ORAL at 18:20

## 2018-03-12 RX ADMIN — CARBIDOPA AND LEVODOPA 2 TABLET: 25; 100 TABLET ORAL at 20:38

## 2018-03-12 RX ADMIN — TAZOBACTAM SODIUM AND PIPERACILLIN SODIUM 3.38 G: 375; 3 INJECTION, SOLUTION INTRAVENOUS at 09:30

## 2018-03-12 RX ADMIN — TAZOBACTAM SODIUM AND PIPERACILLIN SODIUM 3.38 G: 375; 3 INJECTION, SOLUTION INTRAVENOUS at 20:39

## 2018-03-12 RX ADMIN — SODIUM CHLORIDE, POTASSIUM CHLORIDE, SODIUM LACTATE AND CALCIUM CHLORIDE 100 ML/HR: 600; 310; 30; 20 INJECTION, SOLUTION INTRAVENOUS at 18:19

## 2018-03-12 RX ADMIN — TAZOBACTAM SODIUM AND PIPERACILLIN SODIUM 3.38 G: 375; 3 INJECTION, SOLUTION INTRAVENOUS at 02:19

## 2018-03-12 RX ADMIN — VANCOMYCIN HYDROCHLORIDE 1000 MG: 1 INJECTION, SOLUTION INTRAVENOUS at 18:27

## 2018-03-12 RX ADMIN — MULTIPLE VITAMINS W/ MINERALS TAB 1 TABLET: TAB at 18:21

## 2018-03-12 RX ADMIN — CARBIDOPA AND LEVODOPA 2 TABLET: 25; 100 TABLET ORAL at 18:20

## 2018-03-12 RX ADMIN — CARBIDOPA AND LEVODOPA 0.5 TABLET: 25; 250 TABLET ORAL at 18:20

## 2018-03-12 RX ADMIN — CARBIDOPA AND LEVODOPA 0.5 TABLET: 25; 250 TABLET ORAL at 20:38

## 2018-03-12 NOTE — PLAN OF CARE
Problem: Patient Care Overview  Goal: Plan of Care Review   03/12/18 9025   Coping/Psychosocial   Plan of Care Reviewed With patient;family   OTHER   Outcome Summary pt completed AROM 10x2 B elbow, forearm and shoulder. He sat EOB and completed balance activity, stood and took side steps to HOB x3. min A and max VCs. 2nd person for safety in A pt to HOB. pt tired at end of session.   Plan of Care Review   Progress improving

## 2018-03-12 NOTE — THERAPY TREATMENT NOTE
Acute Care - Physical Therapy Treatment Note  [facility]     Patient Name: Rommel Gonzalez  : 1945  MRN: [id number]  Today's Date: 3/12/2018     Date of Referral to PT: 18       Admit Date: 3/7/2018    Visit Dx:    ICD-10-CM ICD-9-CM   1. Traumatic rhabdomyolysis, initial encounter T79.6XXA 958.6   2. Fall at home, initial encounter W19.XXXA E888.9    Y92.099 E849.0   3. Dysarthria R47.1 784.51   4. Progressive supranuclear palsy G23.1 333.0   5. Impaired functional mobility, balance, gait, and endurance Z74.09 V49.89     Patient Active Problem List   Diagnosis   • AF (atrial fibrillation)   • Depression   • Gait instability   • Hypertension   • Insomnia   • Idiopathic Parkinson's disease   • Peripheral neuropathy   • Dysarthria   • Atrial flutter   • Anticoagulated   • Health care maintenance   • Hypothyroidism (acquired)   • Weakness of voice   • Abnormal chest CT   • Facial droop   • Hypersomnia    • Hyperlipidemia   • High risk medication use   • CORINNE on CPAP   • Obstructive sleep apnea, adult   • Non-traumatic rhabdomyolysis   • Fall   • Progressive supranuclear palsy   • Bruising   • Aspiration pneumonia   • Sepsis       Therapy Treatment    Therapy Treatment / Health Promotion    Treatment Time/Intention  Discipline: physical therapy assistant  Document Type: therapy note (daily note)  Subjective Information: no complaints  Patient Effort: good  Plan of Care Review  Plan of Care Reviewed With: patient    Vitals/Pain/Safety  Pain Scale: Numbers Pre/Post-Treatment  Pain Scale: Numbers, Pretreatment: 0/10 - no pain  Safety Issues, Functional Mobility  Safety Issues Affecting Function (Mobility): safety precaution awareness, safety precautions follow-through/compliance, sequencing abilities  Impairments Affecting Function (Mobility): balance, coordination, endurance/activity tolerance, motor control, strength  Positioning and Restraints  Pre-Treatment Position: in bed  Post Treatment Position:  bed  In Bed: fowlers, call light within reach, encouraged to call for assist, exit alarm on, with family/caregiver, SCD pump applied    Mobility,ADL,Motor, Modality  Bed Mobility Assessment/Treatment  Supine-Sit [place] (Bed Mobility): moderate assist (50% patient effort), verbal cues, nonverbal cues (demo/gesture)  Sit-Supine [place] (Bed Mobility): minimum assist (75% patient effort), verbal cues, nonverbal cues (demo/gesture) (assist w/ BLE)  Bed Mobility, Safety Issues: decreased use of arms for pushing/pulling, decreased use of legs for bridging/pushing  Assistive Device (Bed Mobility): bed rails, head of bed elevated  Transfer Assessment/Treatment  Transfer Assessment/Treatment: sit-stand transfer, stand-sit transfer  Comment (Transfers): Pt initially attempted standing w/ 1 assist requiring modA. With second person pt required minAx2  Sit-Stand Transfer  Sit-Stand [place] (Transfers): moderate assist (50% patient effort), 1 person assist, minimum assist (75% patient effort), 2 person assist, verbal cues, nonverbal cues (demo/gesture)  Assistive Device (Sit-Stand Transfers): walker, front-wheeled  Stand-Sit Transfer  Stand-Sit [place] (Transfers): moderate assist (50% patient effort), 2 person assist, verbal cues, nonverbal cues (demo/gesture)  Assistive Device (Stand-Sit Transfers): walker, front-wheeled  Gait/Stairs Assessment/Training  [place] Level (Gait): minimum assist (75% patient effort), moderate assist (50% patient effort), 2 person assist, verbal cues, nonverbal cues (demo/gesture)  Assistive Device (Gait): walker, front-wheeled  Distance in Feet (Gait): 40  Pattern (Gait): step-to  Deviations/Abnormal Patterns (Gait): base of support, narrow, adri decreased, festinating/shuffling, gait speed decreased, stride length decreased  Bilateral Gait Deviations: heel strike decreased  Comment (Gait/Stairs): Max cueing and assist for RW guidance, especially during turns         Balance  Balance: static standing balance  Static Standing Balance  Level of [place] (Supported Standing, Static Balance): minimal assist, 75% patient effort  Time Able to Maintain Position (Supported Standing, Static Balance): 1 to 2 minutes  Assistive Device Utilized (Supported Standing, Static Balance): rolling walker  Comment (Supported Standing, Static Balance): stood to change bed and brief at EOB        ROM/MMT             Sensory, Edema, Orthotics          Cognition, Communication, Swallow  Cognitive Assessment/Intervention- PT/OT  Affect/Mental Status (Cognitive): flat/blunted affect  Orientation Status (Cognition): oriented to, person  Follows Commands (Cognition): 50-74% accuracy, delayed response/completion, increased processing time needed, initiation impaired, verbal cues/prompting required, repetition of directions required  Personal Safety Interventions: fall prevention program maintained, gait belt, nonskid shoes/slippers when out of bed    Outcome Summary             Physical Therapy Education     Title: PT OT SLP Therapies (Done)     Topic: Physical Therapy (Done)     Point: Mobility training (Done)    Learning Progress Summary     Learner Status Readiness Method Response Comment Documented by    Patient Done PATEL Theodore NR   03/12/18 1535     Done Acceptance ABILIO HOWELL ed on gait impairment corrections, and ed on safety, up only with assist. SP 03/10/18 1702     Done Acceptance ZHENG CALDERA  MA 03/09/18 1445    Family Done PATEL Theodore NR   03/12/18 1535          Point: Home exercise program (Done)    Learning Progress Summary     Learner Status Readiness Method Response Comment Documented by    Patient Done Acceptance E WERNER  MA 03/09/18 1445          Point: Body mechanics (Done)    Learning Progress Summary     Learner Status Readiness Method Response Comment Documented by    Patient Done PATEL Theodore NR   03/12/18 1535     Done Acceptance E WERNER  MA 03/09/18 1445    Family Done  Acceptance PATEL CALZADA ABILIO CALDERA   03/12/18 1535          Point: Precautions (Done)    Learning Progress Summary     Learner Status Readiness Method Response Comment Documented by    Patient Done Acceptance ZHENGABILIO SEYMOUR   03/12/18 1535     Done Acceptance ABILIO HOWELL ed on gait impairment corrections, and ed on safety, up only with assist. SP 03/10/18 1702     Done Lisa CALDERA  MA 03/09/18 1445    Family Done Acceptance ZHENGPATEL CALDERAABILIO   03/12/18 1535                      User Key     Initials Effective Dates Name Provider Type Discipline     03/07/18 -  Luz Montgomery, PTA Physical Therapy Assistant PT    MA 03/07/18 -  Eve Allen, PT Physical Therapist PT     01/09/17 -  Monica Booker, PT Physical Therapist PT                    PT Recommendation and Plan     Plan of Care Reviewed With: patient  Outcome Summary: Pt more rigid today. Able to ambulate w/ assist x2 into hallway. Max cueing and assist for steering RW and to correct posterior lean.           Outcome Measures     Row Name 03/12/18 1520 03/12/18 1355 03/10/18 1700       How much help from another person do you currently need...    Turning from your back to your side while in flat bed without using bedrails? 3  -RW  -- 3  -SP    Moving from lying on back to sitting on the side of a flat bed without bedrails? 2  -RW  -- 3  -SP    Moving to and from a bed to a chair (including a wheelchair)? 2  -RW  -- 3  -SP    Standing up from a chair using your arms (e.g., wheelchair, bedside chair)? 2  -RW  -- 3  -SP    Climbing 3-5 steps with a railing? 1  -RW  -- 2  -SP    To walk in hospital room? 2  -RW  -- 3  -SP    AM-PAC 6 Clicks Score 12  -RW  -- 17  -SP       How much help from another is currently needed...    Putting on and taking off regular lower body clothing?  -- 1  -KP  --    Bathing (including washing, rinsing, and drying)  -- 2  -KP  --    Toileting (which includes using toilet bed pan or urinal)  -- 2  -KP  --    Putting on and taking off regular  upper body clothing  -- 3  -KP  --    Taking care of personal grooming (such as brushing teeth)  -- 3  -KP  --    Eating meals  -- 3  -KP  --    Score  -- 14  -KP  --       Functional Assessment    Outcome Measure Options AM-PAC 6 Clicks Basic Mobility (PT)  -RW AM-PAC 6 Clicks Daily Activity (OT)  - AM-PAC 6 Clicks Basic Mobility (PT)  -SP      User Key  (r) = Recorded By, (t) = Taken By, (c) = Cosigned By    Initials Name Provider Type     Eleanor Lepe, OTR Occupational Therapist    RW Luz Montgomery PTA Physical Therapy Assistant    SP Monica Booker, PT Physical Therapist           Time Calculation:         PT Charges     Row Name 03/12/18 1534             Time Calculation    Start Time 1513  -RW      Stop Time 1530  -RW      Time Calculation (min) 17 min  -RW      PT Received On 03/12/18  -RW      PT - Next Appointment 03/13/18  -        User Key  (r) = Recorded By, (t) = Taken By, (c) = Cosigned By    Initials Name Provider Type     Luz Montgomery PTA Physical Therapy Assistant          Therapy Charges for Today     Code Description Service Date Service Provider Modifiers Qty    50664117755 HC PT THER PROC EA 15 MIN 3/12/2018 Luz Montgomery PTA GP 1    99704354463 HC PT THER SUPP EA 15 MIN 3/12/2018 Luz Montgomery PTA GP 1          PT G-Codes  Outcome Measure Options: AM-PAC 6 Clicks Basic Mobility (PT)    Luz Montgomery PTA  3/12/2018

## 2018-03-12 NOTE — PROGRESS NOTES
"[facility]               ASSOCIATES     LOS: 5 days     Name: Rommel Gonzalez  Age: 72 y.o.  Sex: male  :  1945  MRN: [id number]         Primary Care Physician: Luiz Onofre MD    Diet Soft Texture; Chopped; Thin    Subjective    More alert today. Sister I spoke with yesterday here today confirms much improved and more alert today (was \"out of it\" yesterday)    Objective   Temp:  [97.4 °F (36.3 °C)-98.2 °F (36.8 °C)] 97.8 °F (36.6 °C)  Heart Rate:  [102-115] 102  Resp:  [16-18] 18  BP: ()/(59-68) 97/63  FiO2 (%):  [28 %] 28 %  SpO2:  [91 %-96 %] 96 %  on  Flow (L/min):  [1-2] 2;   Device (Oxygen Therapy): nasal cannula  Body mass index is 26.12 kg/m².    Physical Exam   Constitutional: No distress.   Cardiovascular: Normal rate and regular rhythm.    Pulmonary/Chest: Effort normal and breath sounds normal. No respiratory distress.   Abdominal: Soft. There is no tenderness.   Musculoskeletal: He exhibits no edema.   Neurological: He is alert.   Oriented to [employer], 2018 but slow. Moving all extremities   Skin: Skin is warm and dry.   Psychiatric: He is slowed.     Reviewed medications and new clinical results    carbidopa-levodopa 2 tablet Oral TID   carbidopa-levodopa 0.5 tablet Oral TID   carvedilol 9.375 mg Oral BID With Meals   levothyroxine 112 mcg Oral Q AM   midodrine 2.5 mg Oral TID AC   multivitamin with minerals 1 tablet Oral Daily   piperacillin-tazobactam 3.375 g Intravenous Q8H   rivaroxaban 20 mg Oral Daily With Dinner   terazosin 1 mg Oral Nightly   vancomycin 1,000 mg Intravenous Once   Vancomycin Pharmacy Intermittent Dosing  Does not apply Daily       lactated ringers 100 mL/hr   Pharmacy to dose vancomycin        Results from last 7 days  Lab Units 18  0548 18  1840 18  0554 18  1500   WBC 10*3/mm3 27.15* 27.54* 7.15 8.99   HEMOGLOBIN g/dL 10.4* 10.8* 10.4* 13.1*   PLATELETS 10*3/mm3 144 135* 176 202       Results from last  " days  Lab Units 03/12/18  0548 03/11/18  0658 03/10/18  0604 03/09/18  0720 03/08/18  0554 03/07/18  1500   SODIUM mmol/L 141 137 140 141 141 140   POTASSIUM mmol/L 4.3 4.3 4.0 3.9 3.4* 4.2   CHLORIDE mmol/L 104 99 106 108* 108* 104   CO2 mmol/L 25.1 23.5 27.5 29.2* 29.8* 27.0   BUN mg/dL 20 8 7* 10 12 17   CREATININE mg/dL 1.19 0.85 0.77 0.73* 0.86 0.91   CALCIUM mg/dL 7.5* 8.2* 7.7* 7.7* 7.5* 8.8   GLUCOSE mg/dL 95 87 101* 86 96 111*     Lab Results   Component Value Date    ANIONGAP 11.9 03/12/2018     Glucose   Date/Time Value Ref Range Status   03/11/2018 1328 97 70 - 130 mg/dL Final   03/11/2018 1025 205 (H) 70 - 130 mg/dL Final   03/11/2018 1007 75 70 - 130 mg/dL Final     Estimated Creatinine Clearance: 68.3 mL/min (by C-G formula based on SCr of 1.19 mg/dL).    Results from last 7 days  Lab Units 03/12/18  0548 03/11/18  0658 03/10/18  0604   CK TOTAL U/L 2,814* 941* 1,407*     Assessment/Plan   Active Hospital Problems (** Indicates Principal Problem)    Diagnosis Date Noted   • **Non-traumatic rhabdomyolysis [M62.82] 03/07/2018   • Aspiration pneumonia [J69.0] 03/12/2018   • Sepsis [A41.9] 03/12/2018   • Fall [W19.XXXA] 03/07/2018   • Progressive supranuclear palsy [G23.1] 03/07/2018   • Bruising [T14.8XXA] 03/07/2018   • Obstructive sleep apnea, adult [G47.33] 10/26/2017   • Weakness of voice [R49.8] 06/23/2016   • Anticoagulated [Z79.01] 04/05/2016   • Idiopathic Parkinson's disease [G20] 03/28/2016   • Hypertension [I10] 03/28/2016   • AF (atrial fibrillation) [I48.91] 03/28/2016   • Gait instability [R26.81] 03/28/2016   • Dysarthria [R47.1] 03/28/2016      Resolved Hospital Problems    Diagnosis Date Noted Date Resolved   No resolved problems to display.     · PNA: antibiotics started. PCT 55. Suspect aspiration, zosyn would treat. SLP following.  · Rhabdomyolysis: CPK higher today. Stop statin. Continue IV fluids.  · Encephalopathy: seems improved today and family confirms. CT head no evidence for  acute intracranial pathology. CXR with right basilar atx/PNA probably contributing.  · Parkinson's disease, supranuclear palsy, autonomic dysfunction. Monitoring BPs, may need to decrease coreg.  · Atrial fibrillation: Continues on Xarelto, coreg  · Disposition: plans were with daughter on discharge  · I discussed the patient's findings and my recommendations with patient, family and nursing staff.    Aleksey Whiteside MD   03/12/18  11:49 AM

## 2018-03-12 NOTE — PROGRESS NOTES
"Pharmacy to Dose Vancomycin    Patient: Rommel Gonzalez (/,  LOS: 4 days )  Relevant clinical data and objective history reviewed:  72 y.o. male 182.9 cm (72\") 80.5 kg (177 lb 7 oz)  Body mass index is 24.06 kg/m².  he has a past medical history of AF (atrial fibrillation); Atrial flutter; Atypical chest pain; Chest pain; Colon polyps; Confusion; Depression; Disease of thyroid gland; Disorder of thyroid; Dyspnea and respiratory abnormalities; Elevated TSH; Fatigue; Gait instability; Hay fever; Hyperlipidemia; Hypertension; Hypothyroidism; Insomnia; Measles; Mumps; Palpitations; Parkinson disease; Peripheral neuropathy; Pneumonia; Poor sleep pattern; Slurred speech; and Tremor.    Day #1  Duration: 3 days  Consult for Dr Mora  Indication: pna    Cultures:    3/11 blood x2  Sent  3/11 urine cx  pending    Radiology:  Date Location/type Results    3/11  CXR  Mild right basilar atelectasis and/or pneumonia.             Other Abx: zosyn    Results from last 7 days  Lab Units 18  1840 18  0554 18  1500   WBC 10*3/mm3 27.54* 7.15 8.99   HEMOGLOBIN g/dL 10.8* 10.4* 13.1*   HEMATOCRIT % 33.2* 32.6* 40.1*   PLATELETS 10*3/mm3 135* 176 202     3/11 pct 55.39    Temp (24hrs), Av.2 °F (36.8 °C), Min:97.1 °F (36.2 °C), Max:99.4 °F (37.4 °C)    Serum creatinine: 0.85 mg/dL 18 0658  Estimated creatinine clearance: 88.1 mL/min    IV contrast given? no    Assessment/Plan:     1) Vancomycin 1500 mg IV x1 then 1250 mg IV q12h. BMP/CBC daily    Sonu Camejo, PharmD  18 8:23 PM    "

## 2018-03-12 NOTE — PROGRESS NOTES
Following this patient and chart.  He remains more alert and is now being seen by physical therapy and occupational therapy.  I will continue to follow area and again the CT of the head shows no acute abnormality.  The echocardiogram shows no left ventricular abnormality.  EKG does show he is in atrial flutter or which we will continue the Xarelto.  Vaibhav Archuleta M.D.

## 2018-03-12 NOTE — PROGRESS NOTES
"Pharmacokinetic Consult - Vancomycin Dosing (Follow-up Note)  Rommel Gonzalez is on day 1 of 3 pharmacy to dose vancomycin for pneumonia.  Pharmacy dosing vancomycin per Dr. Mora's request.   Goal trough: 15-20 mg/L   Other antimicrobials: Zosyn  Admit date: 3/7/2018  2:47 PM    Relevant clinical data and objective history reviewed:  72 y.o. male 182.9 cm (72\") 87.4 kg (192 lb 9.6 oz)    Past Medical History:   Diagnosis Date   • AF (atrial fibrillation)    • Atrial flutter    • Atypical chest pain    • Chest pain    • Colon polyps    • Confusion    • Depression    • Disease of thyroid gland    • Disorder of thyroid    • Dyspnea and respiratory abnormalities    • Elevated TSH    • Fatigue    • Gait instability    • Hay fever    • Hyperlipidemia    • Hypertension    • Hypothyroidism    • Insomnia    • Measles    • Mumps    • Palpitations    • Parkinson disease    • Peripheral neuropathy    • Pneumonia    • Poor sleep pattern    • Slurred speech    • Tremor      Creatinine   Date Value Ref Range Status   03/12/2018 1.19 0.76 - 1.27 mg/dL Final   03/11/2018 0.85 0.76 - 1.27 mg/dL Final   03/10/2018 0.77 0.76 - 1.27 mg/dL Final     BUN   Date Value Ref Range Status   03/12/2018 20 8 - 23 mg/dL Final     Estimated Creatinine Clearance: 68.3 mL/min (by C-G formula based on SCr of 1.19 mg/dL).    Lab Results   Component Value Date    WBC 27.15 (H) 03/12/2018    WBC 27.54 (H) 03/11/2018    WBC 7.15 03/08/2018     Temp Readings from Last 3 Encounters:   03/12/18 97.8 °F (36.6 °C) (Oral)   02/02/18 97.9 °F (36.6 °C)   11/01/17 97.6 °F (36.4 °C)     Baseline cultures/source/suscetibilit/labs/radiology:  3/11 BCx NGTD  3/11 PCT 55.39  3/11 Lactate 1.9  3/12 WBC 27.15    Anti-Infectives     Ordered     Dose/Rate Route Frequency Start Stop    03/12/18 1038  vancomycin (VANCOCIN) in iso-osmotic dextrose IVPB 1 g (premix) 200 mL     Ordering Provider:  Kyara Mora MD    1,000 mg  over 100 Minutes Intravenous Once 03/12/18 " 1130      03/12/18 0850  Vancomycin Pharmacy Intermittent Dosing     Ordering Provider:  Kyara Mora MD     Does not apply Daily 03/12/18 0930 03/15/18 0859    03/11/18 1956  piperacillin-tazobactam (ZOSYN) 3.375 g in iso-osmotic dextrose 50 ml (premix)     Ordering Provider:  Kyara Mora MD    3.375 g  over 4 Hours Intravenous Every 8 Hours 03/12/18 0230 03/16/18 0229    03/11/18 2003  vancomycin 1500 mg/500 mL 0.9% NS IVPB ([facility])     Ordering Provider:  Kyara Mora MD    20 mg/kg × 80.5 kg  over 150 Minutes Intravenous Once 03/11/18 2045 03/12/18 0031    03/11/18 1956  piperacillin-tazobactam (ZOSYN) 4.5 g in iso-osmotic dextrose 100 mL IVPB (premix)     Ordering Provider:  Kyara Mora MD    4.5 g  over 30 Minutes Intravenous Once 03/11/18 2030 03/11/18 2110 03/11/18 1955  Pharmacy to dose vancomycin     Ordering Provider:  Kyara Mora MD     Does not apply Continuous PRN 03/11/18 1953 03/14/18 1952           No results found for: TREVIN  Lab Results   Component Value Date    VANCORANDOM 10.50 03/12/2018       Vancomycin dosing history:   3/11 2201 vancomycin 1500 mg iv once, followed by 1250 mg iv q12h.   3/12 0911 random level = 10.5 mcg/mL    Assessment/Plan  1. Given unstable renal function, checked vancomycin random level which resulted at 10.5 mcg/mL. Level drawn ~11 hours after the loading dose. Will give vancomycin 1 g iv once and check ~12 hour random level tonight and re-dose if needed.  2. Creatinine elevated at 1.19. Will continue to monitor renal function.  3. Encourage hydration to prevent toxic accumulation; monitor for s/sx of toxicity including increase in SCr and decrease in UOP.    Pharmacy will continue to follow daily while on vancomycin and adjust as needed.     Aleksandra Newton, Pharm.D.  03/12/18 11:17 AM

## 2018-03-12 NOTE — PLAN OF CARE
Problem: Patient Care Overview  Goal: Plan of Care Review  Outcome: Ongoing (interventions implemented as appropriate)   03/12/18 8403   Coping/Psychosocial   Plan of Care Reviewed With patient   OTHER   Outcome Summary Pt more rigid today. Able to ambulate w/ assist x2 into hallway. Max cueing and assist for steering RW and to correct posterior lean.

## 2018-03-12 NOTE — PLAN OF CARE
Problem: Fall Risk (Adult)  Goal: Identify Related Risk Factors and Signs and Symptoms  Outcome: Outcome(s) achieved Date Met: 03/12/18 03/11/18 1931 03/12/18 0748   Fall Risk (Adult)   Related Risk Factors (Fall Risk) age-related changes;fatigue/slow reaction;gait/mobility problems;sensory deficits;bladder function altered;confusion/agitation;history of falls --    Signs and Symptoms (Fall Risk) --  presence of risk factors     Goal: Absence of Fall  Outcome: Ongoing (interventions implemented as appropriate)   03/12/18 0748   Fall Risk (Adult)   Absence of Fall achieves outcome       Problem: Nutrition, Imbalanced: Inadequate Oral Intake (Adult)  Goal: Identify Related Risk Factors and Signs and Symptoms  Outcome: Outcome(s) achieved Date Met: 03/12/18   03/10/18 0553   Nutrition, Imbalanced: Inadequate Oral Intake (Adult)   Related Risk Factors (Nutrition Imbalance, Inadequate Oral Intake) appetite decreased;knowledge deficit   Signs and Symptoms (Nutrition Imbalance, Inadequate Oral Intake: Signs and Symptoms) muscle mass/strength decreased     Goal: Improved Oral Intake  Outcome: Ongoing (interventions implemented as appropriate)   03/12/18 0748   Nutrition, Imbalanced: Inadequate Oral Intake (Adult)   Improved Oral Intake making progress toward outcome     Goal: Prevent Further Weight Loss  Outcome: Ongoing (interventions implemented as appropriate)   03/12/18 0748   Nutrition, Imbalanced: Inadequate Oral Intake (Adult)   Prevent Further Weight Loss making progress toward outcome       Problem: Patient Care Overview  Goal: Individualization and Mutuality  Outcome: Ongoing (interventions implemented as appropriate)   03/12/18 0748   Individualization   Patient Specific Preferences none stated     Goal: Discharge Needs Assessment  Outcome: Ongoing (interventions implemented as appropriate)   03/12/18 0748   Discharge Needs Assessment   Readmission Within the Last 30 Days no previous admission in last 30 days    Concerns to be Addressed basic needs   Patient/Family Anticipates Transition to home with family   Transportation Concerns car, none   Transportation Anticipated family or friend will provide   Anticipated Changes Related to Illness inability to care for self   Discharge Facility/Level of Care Needs home with home health   Current Discharge Risk lives alone     Goal: Interprofessional Rounds/Family Conf  Outcome: Ongoing (interventions implemented as appropriate)

## 2018-03-12 NOTE — THERAPY TREATMENT NOTE
Acute Care - Occupational Therapy Treatment Note  [facility]     Patient Name: Rommel Gonzalez  : 1945  MRN: [id number]  Today's Date: 3/12/2018     Date of Referral to OT: 18       Admit Date: 3/7/2018       ICD-10-CM ICD-9-CM   1. Traumatic rhabdomyolysis, initial encounter T79.6XXA 958.6   2. Fall at home, initial encounter W19.XXXA E888.9    Y92.099 E849.0   3. Dysarthria R47.1 784.51   4. Progressive supranuclear palsy G23.1 333.0   5. Impaired functional mobility, balance, gait, and endurance Z74.09 V49.89     Patient Active Problem List   Diagnosis   • AF (atrial fibrillation)   • Depression   • Gait instability   • Hypertension   • Insomnia   • Idiopathic Parkinson's disease   • Peripheral neuropathy   • Dysarthria   • Atrial flutter   • Anticoagulated   • Health care maintenance   • Hypothyroidism (acquired)   • Weakness of voice   • Abnormal chest CT   • Facial droop   • Hypersomnia    • Hyperlipidemia   • High risk medication use   • CORINNE on CPAP   • Obstructive sleep apnea, adult   • Non-traumatic rhabdomyolysis   • Fall   • Progressive supranuclear palsy   • Bruising   • Aspiration pneumonia   • Sepsis     Past Medical History:   Diagnosis Date   • AF (atrial fibrillation)    • Atrial flutter    • Atypical chest pain    • Chest pain    • Colon polyps    • Confusion    • Depression    • Disease of thyroid gland    • Disorder of thyroid    • Dyspnea and respiratory abnormalities    • Elevated TSH    • Fatigue    • Gait instability    • Hay fever    • Hyperlipidemia    • Hypertension    • Hypothyroidism    • Insomnia    • Measles    • Mumps    • Palpitations    • Parkinson disease    • Peripheral neuropathy    • Pneumonia    • Poor sleep pattern    • Slurred speech    • Tremor      History reviewed. No pertinent surgical history.    Therapy Treatment    Therapy Treatment / Health Promotion    Treatment Time/Intention  Discipline: occupational therapist  Document Type: therapy note (daily  note)  Subjective Information: no complaints  Mode of Treatment: individual therapy  Care Plan Review: care plan/treatment goals reviewed  Care Plan Review, Other Participant(s): sibling  Patient Effort: good  Plan of Care Review  Plan of Care Reviewed With: patient, family    Vitals/Pain/Safety  Pain Assessment  Additional Documentation: Pain Scale: Numbers Pre/Post-Treatment (Group)  Pain Scale: Numbers Pre/Post-Treatment  Pain Scale: Numbers, Pretreatment: 0/10 - no pain  Pain Scale: Numbers, Post-Treatment: 0/10 - no pain  Safety Issues, Functional Mobility  Impairments Affecting Function (Mobility): endurance/activity tolerance, balance  Comment, Safety Issues/Impairments (Mobility): pt took 3 side steps to HOB min x2 and max VCs to complete task  Positioning and Restraints  Pre-Treatment Position: in bed  Post Treatment Position: bed  In Bed: supine, call light within reach, encouraged to call for assist, exit alarm on, with family/caregiver    Mobility,ADL,Motor, Modality  Bed Mobility Assessment/Treatment  Bed Mobility Assessment/Treatment: supine-sit, sit-supine  Scooting/Bridging Wolfe (Bed Mobility): set up, 2 person assist, moderate assist (50% patient effort)  Supine-Sit Wolfe (Bed Mobility): minimum assist (75% patient effort), moderate assist (50% patient effort)  Sit-Supine Wolfe (Bed Mobility): moderate assist (50% patient effort)  Transfer Assessment/Treatment  Transfer Assessment/Treatment: sit-stand transfer, stand-sit transfer  Sit-Stand Transfer  Sit-Stand Wolfe (Transfers): set up, minimum assist (75% patient effort)  Assistive Device (Sit-Stand Transfers): other (see comments) (VCs)  Stand-Sit Transfer  Stand-Sit Wolfe (Transfers): minimum assist (75% patient effort), set up (VCs)  Lower Body Dressing Assessment/Training  Lower Body Dressing Position:  (pt already completed bathing w. A)  Motor Skills Assessment/Interventions  Additional Documentation:  Therapeutic Exercise (Group), Therapeutic Exercise Interventions (Group), Balance Interventions (Group), Balance (Group)  Therapeutic Exercise  Additional Documentation: Therapeutic Exercise (Row)  Therapeutic Exercise  Upper Extremity (Therapeutic Exercise): bicep curl, right, bicep curl, left  Upper Extremity Range of Motion (Therapeutic Exercise): shoulder flexion/extension, bilateral, forearm supination/pronation, left, forearm supination/pronation, right  Hand (Therapeutic Exercise): hand , left, hand , right  Exercise Type (Therapeutic Exercise): AROM (active range of motion), AAROM (active assistive range of motion)  Position (Therapeutic Exercise): seated, supine  Sets/Reps (Therapeutic Exercise): 10x2  Balance  Balance: static sitting balance, dynamic sitting balance  Static Sitting Balance  Level of [place] (Unsupported Sitting, Static Balance): supervision  Sitting Position (Unsupported Sitting, Static Balance): sitting on edge of bed  Time Able to Maintain Position (Unsupported Sitting, Static Balance): more than 5 minutes  Comment (Unsupported Sitting, Static Balance): sat EOB and reached out to touch OT hand in various locations w. no LOB        ROM/MMT             Sensory, Edema, Orthotics          Cognition, Communication, Swallow  Cognitive Assessment/Intervention  Additional Documentation: Cognitive Assessment/Intervention (Group)  Cognitive Assessment/Intervention- PT/OT  Affect/Mental Status (Cognitive): other (see comments) (difficult to understand at times)  Orientation Status (Cognition): oriented to, person, place  Follows Commands (Cognition): follows one step commands, 25-49% accuracy, delayed response/completion, increased processing time needed, initiation impaired, physical/tactile prompts required, repetition of directions required, verbal cues/prompting required  Cognitive Function (Cognitive): executive function deficit  Executive Function Deficit (Cognition): initiation,  organization/sequencing, problem solving/reasoning    Outcome Summary       Occupational Therapy Education     Title: PT OT SLP Therapies (Done)     Topic: Occupational Therapy (Done)     Point: ADL training (Done)     Description: Instruct learner(s) on proper safety adaptation and remediation techniques during self care or transfers.   Instruct in proper use of assistive devices.   Learning Progress Summary     Learner Status Readiness Method Response Comment Documented by    Patient Done Acceptance D,TB,E VU,DU VC and A for posture and to correct R lean in standing.  VC for hand placement and body position during xfers.  Ed on risk of falls and agree with d/c to [facility]'s home for increase assist.  03/09/18 1404          Point: Body mechanics (Done)     Description: Instruct learner(s) on proper positioning and spine alignment during self-care, functional mobility activities and/or exercises.   Learning Progress Summary     Learner Status Readiness Method Response Comment Documented by    Patient Done Acceptance D,TB,E VU,DU VC and A for posture and to correct R lean in standing.  VC for hand placement and body position during xfers.  Ed on risk of falls and agree with d/c to [facility]'s home for increase assist.  03/09/18 1404                      User Key     Initials Effective Dates Name Provider Type Discipline    LE 03/07/18 -  Evelyne Zepeda, OTR Occupational Therapist OT                  OT Recommendation and Plan     Plan of Care Review  Plan of Care Reviewed With: patient, family  Plan of Care Reviewed With: patient, family  Progress: improving  Outcome Summary: pt completed AROM 10x2 B elbow, forearm and shoulder. He sat EOB and completed balance activity, stood and took side steps to HOB x3. min A and max VCs. 2nd person for safety in A pt to HOB. pt tired at end of session.        Outcome Measures     Row Name 03/12/18 1355 03/10/18 1700 03/09/18 1408       How much help from another person do you currently  need...    Turning from your back to your side while in flat bed without using bedrails?  -- 3  -SP 4  -MA    Moving from lying on back to sitting on the side of a flat bed without bedrails?  -- 3  -SP 4  -MA    Moving to and from a bed to a chair (including a wheelchair)?  -- 3  -SP 3  -MA    Standing up from a chair using your arms (e.g., wheelchair, bedside chair)?  -- 3  -SP 3  -MA    Climbing 3-5 steps with a railing?  -- 2  -SP 2  -MA    To walk in hospital room?  -- 3  -SP 3  -MA    AM-PAC 6 Clicks Score  -- 17  -SP 19  -MA       How much help from another is currently needed...    Putting on and taking off regular lower body clothing? 1  -KP  -- 3  -LE    Bathing (including washing, rinsing, and drying) 2  -KP  -- 3  -LE    Toileting (which includes using toilet bed pan or urinal) 2  -KP  -- 3  -LE    Putting on and taking off regular upper body clothing 3  -KP  -- 3  -LE    Taking care of personal grooming (such as brushing teeth) 3  -KP  -- 3  -LE    Eating meals 3  -KP  -- 3  -LE    Score 14  -KP  -- 18  -LE       Functional Assessment    Outcome Measure Options AM-PAC 6 Clicks Daily Activity (OT)  - AM-PAC 6 Clicks Basic Mobility (PT)  -SP AM-PAC 6 Clicks Basic Mobility (PT)  -MA    Row Name 03/09/18 1400             Functional Assessment    Outcome Measure Options AM-PAC 6 Clicks Daily Activity (OT)  -LE        User Key  (r) = Recorded By, (t) = Taken By, (c) = Cosigned By    Initials Name Provider Type    TARSHA Zepeda, OTR Occupational Therapist    DARSHAN Lepe, OTR Occupational Therapist    SHERIF Allen, PT Physical Therapist    SP Monica Booker, PT Physical Therapist           Time Calculation:         Time Calculation- OT     Row Name 03/12/18 1355             Time Calculation- OT    OT Start Time 1248  -      OT Stop Time 1311  -      OT Time Calculation (min) 23 min  -      OT Received On 03/12/18  -      OT - Next Appointment 03/13/18  -        User Key  (r) =  Recorded By, (t) = Taken By, (c) = Cosigned By    Initials Name Provider Type     JAVIER Burkett Occupational Therapist           Therapy Charges for Today     Code Description Service Date Service Provider Modifiers Qty    54931190564 HC OT NEUROMUSC RE EDUCATION EA 15 MIN 3/12/2018 JAVIER Burkett GO 1    [id number]  OT THER PROC EA 15 MIN 3/12/2018 JAVIER Burkett GO 1               JAVIER Burkett  3/12/2018

## 2018-03-12 NOTE — PROGRESS NOTES
[facility]  Progress note: 3/12/2018    Patient Identification:  Name:Rommel Gonzalez  Age:72 y.o.  Sex: male  :  1945  MRN: [id number]           CC:  Follouwp of atrial fib/flutter    Interval history:  Events noted.  He is arousable but not talkative.  He is on    Vital Signs:   Temp:  [97.1 °F (36.2 °C)-99.4 °F (37.4 °C)] 97.4 °F (36.3 °C)  Heart Rate:  [105-124] 105  Resp:  [14-18] 16  BP: ()/(54-77) 96/59  FiO2 (%):  [28 %] 28 %    Intake/Output Summary (Last 24 hours) at 18 0734  Last data filed at 18 0720   Gross per 24 hour   Intake          3944.58 ml   Output                0 ml   Net          3944.58 ml       Physical Examination:    General Appearance No acute distress   Neck No adenopathy, supple, trachea midline, no thyromegaly, no carotid bruit, no JVD   Lungs Clear to auscultation,respirations regular, even and unlabored   Heart Irregular rhythm and normal rate, normal S1 and S2, no murmur, no gallop, no rub, no click   Chest wall No abnormalities observed   Abdomen Normal bowel sounds, no masses, no hepatomegaly, soft   Extremities Moves all extremities well, no edema, no cyanosis, no redness   Neurological Alert and oriented x 3     Lab Review:  Personally reviewed the labs, radiology imaging and other cardiac procedures.   Results from last 7 days  Lab Units 18  0548  18  0554   SODIUM mmol/L 141  < > 141   POTASSIUM mmol/L 4.3  < > 3.4*   CHLORIDE mmol/L 104  < > 108*   CO2 mmol/L 25.1  < > 29.8*   BUN mg/dL 20  < > 12   CREATININE mg/dL 1.19  < > 0.86   CALCIUM mg/dL 7.5*  < > 7.5*   BILIRUBIN mg/dL  --   --  0.4   ALK PHOS U/L  --   --  41   ALT (SGPT) U/L  --   --  5   AST (SGOT) U/L  --   --  65*   GLUCOSE mg/dL 95  < > 96   < > = values in this interval not displayed.    Results from last 7 days  Lab Units 18  0548 18  0658 03/10/18  0604   CK TOTAL U/L 2,814* 941* 1,407*     )  Results from last 7 days  Lab Units 18  1840  03/08/18  0554 03/07/18  1500   WBC 10*3/mm3 27.54* 7.15 8.99   HEMOGLOBIN g/dL 10.8* 10.4* 13.1*   HEMATOCRIT % 33.2* 32.6* 40.1*   PLATELETS 10*3/mm3 135* 176 202       Results from last 7 days  Lab Units 03/07/18  1500   INR  2.57*       Medication Review:   Meds reviewed  Scheduled Meds:  atorvastatin 10 mg Oral Daily   carbidopa-levodopa 2 tablet Oral TID   carbidopa-levodopa 0.5 tablet Oral TID   carvedilol 9.375 mg Oral BID With Meals   levothyroxine 112 mcg Oral Q AM   midodrine 2.5 mg Oral BID AC   multivitamin with minerals 1 tablet Oral Daily   piperacillin-tazobactam 3.375 g Intravenous Q8H   rivaroxaban 20 mg Oral Daily With Dinner   terazosin 1 mg Oral Nightly   vancomycin 1,250 mg Intravenous Q12H     Continuous Infusions:  Pharmacy to dose vancomycin     sodium chloride 0.9 % with KCl 20 mEq 125 mL/hr Last Rate: 125 mL/hr (03/12/18 0720)     I personally viewed and interpreted the patient's EKG/Telemetry data    Assessment and Plan    1.  Progressive supranuclear palsy. Plans for him to move in with daughter after discharge.   2.  Weakness with recurrent falls. BP remains low. Increase midodrine  3.  Rhabdomyolysis: CPK back up?   4.  Diastolic dysfunction and normal ejection fraction. clinically compensated. Diuretics on hold    5.  Atrial flutter/fibrillation. Persistant and on xarelto.     6.  Hypertension blood pressure low, Increase Middfrine as below  7.  Prolonged QTc interval: improved now that K+ is normalized.   8.  Orthostatic hypotension: Increase midorine to 2.5 mg TID  9.  Encephalopathy, toxic  10.  Pneumonia, ? Aspiration. He is on antibiotics      Mini Bennett  3/12/80370:34 AM  25min spent in reviewing records, discussion and examination of the patient and discussion with other members of the patient's medical team.     Dictated utilizing Dragon dictation

## 2018-03-12 NOTE — PLAN OF CARE
Problem: Fall Risk (Adult)  Goal: Absence of Fall  Outcome: Ongoing (interventions implemented as appropriate)   03/12/18 1112   Fall Risk (Adult)   Absence of Fall making progress toward outcome       Problem: Nutrition, Imbalanced: Inadequate Oral Intake (Adult)  Goal: Improved Oral Intake  Outcome: Ongoing (interventions implemented as appropriate)   03/12/18 1112   Nutrition, Imbalanced: Inadequate Oral Intake (Adult)   Improved Oral Intake making progress toward outcome     Goal: Prevent Further Weight Loss  Outcome: Ongoing (interventions implemented as appropriate)   03/12/18 1112   Nutrition, Imbalanced: Inadequate Oral Intake (Adult)   Prevent Further Weight Loss making progress toward outcome       Problem: Patient Care Overview  Goal: Individualization and Mutuality   03/12/18 1112   Individualization   Patient Specific Goals (Include Timeframe) no falls, no c/o pain, vss       Problem: Skin Injury Risk (Adult)  Goal: Identify Related Risk Factors and Signs and Symptoms  Outcome: Ongoing (interventions implemented as appropriate)   03/11/18 0448   Skin Injury Risk (Adult)   Related Risk Factors (Skin Injury Risk) advanced age;mobility impaired     Goal: Skin Health and Integrity  Outcome: Ongoing (interventions implemented as appropriate)   03/12/18 1112   Skin Injury Risk (Adult)   Skin Health and Integrity making progress toward outcome

## 2018-03-13 LAB
ANION GAP SERPL CALCULATED.3IONS-SCNC: 8.1 MMOL/L
BUN BLD-MCNC: 25 MG/DL (ref 8–23)
BUN/CREAT SERPL: 26.9 (ref 7–25)
CALCIUM SPEC-SCNC: 7.8 MG/DL (ref 8.6–10.5)
CHLORIDE SERPL-SCNC: 106 MMOL/L (ref 98–107)
CK SERPL-CCNC: 1161 U/L (ref 20–200)
CO2 SERPL-SCNC: 27.9 MMOL/L (ref 22–29)
CREAT BLD-MCNC: 0.93 MG/DL (ref 0.76–1.27)
DEPRECATED RDW RBC AUTO: 47.8 FL (ref 37–54)
ERYTHROCYTE [DISTWIDTH] IN BLOOD BY AUTOMATED COUNT: 13.6 % (ref 11.5–14.5)
GFR SERPL CREATININE-BSD FRML MDRD: 80 ML/MIN/1.73
GLUCOSE BLD-MCNC: 88 MG/DL (ref 65–99)
HCT VFR BLD AUTO: 31.4 % (ref 40.4–52.2)
HGB BLD-MCNC: 10 G/DL (ref 13.7–17.6)
MCH RBC QN AUTO: 30.7 PG (ref 27–32.7)
MCHC RBC AUTO-ENTMCNC: 31.8 G/DL (ref 32.6–36.4)
MCV RBC AUTO: 96.3 FL (ref 79.8–96.2)
PLATELET # BLD AUTO: 138 10*3/MM3 (ref 140–500)
PMV BLD AUTO: 10.9 FL (ref 6–12)
POTASSIUM BLD-SCNC: 3.9 MMOL/L (ref 3.5–5.2)
PROCALCITONIN SERPL-MCNC: 28.05 NG/ML (ref 0.1–0.25)
RBC # BLD AUTO: 3.26 10*6/MM3 (ref 4.6–6)
SODIUM BLD-SCNC: 142 MMOL/L (ref 136–145)
VANCOMYCIN SERPL-MCNC: 10.5 MCG/ML (ref 5–40)
WBC NRBC COR # BLD: 20.69 10*3/MM3 (ref 4.5–10.7)

## 2018-03-13 PROCEDURE — 25010000002 PIPERACILLIN SOD-TAZOBACTAM PER 1 G: Performed by: INTERNAL MEDICINE

## 2018-03-13 PROCEDURE — 97110 THERAPEUTIC EXERCISES: CPT

## 2018-03-13 PROCEDURE — 85027 COMPLETE CBC AUTOMATED: CPT | Performed by: HOSPITALIST

## 2018-03-13 PROCEDURE — 80048 BASIC METABOLIC PNL TOTAL CA: CPT | Performed by: HOSPITALIST

## 2018-03-13 PROCEDURE — 92612 ENDOSCOPY SWALLOW (FEES) VID: CPT

## 2018-03-13 PROCEDURE — 82550 ASSAY OF CK (CPK): CPT | Performed by: HOSPITALIST

## 2018-03-13 PROCEDURE — 80202 ASSAY OF VANCOMYCIN: CPT | Performed by: INTERNAL MEDICINE

## 2018-03-13 PROCEDURE — 97535 SELF CARE MNGMENT TRAINING: CPT

## 2018-03-13 PROCEDURE — 84145 PROCALCITONIN (PCT): CPT | Performed by: HOSPITALIST

## 2018-03-13 PROCEDURE — 25010000002 VANCOMYCIN 10 G RECONSTITUTED SOLUTION: Performed by: HOSPITALIST

## 2018-03-13 PROCEDURE — 99231 SBSQ HOSP IP/OBS SF/LOW 25: CPT | Performed by: PSYCHIATRY & NEUROLOGY

## 2018-03-13 PROCEDURE — 99232 SBSQ HOSP IP/OBS MODERATE 35: CPT | Performed by: NURSE PRACTITIONER

## 2018-03-13 RX ADMIN — CARBIDOPA AND LEVODOPA 0.5 TABLET: 25; 250 TABLET ORAL at 17:47

## 2018-03-13 RX ADMIN — SODIUM CHLORIDE, POTASSIUM CHLORIDE, SODIUM LACTATE AND CALCIUM CHLORIDE 100 ML/HR: 600; 310; 30; 20 INJECTION, SOLUTION INTRAVENOUS at 19:53

## 2018-03-13 RX ADMIN — RIVAROXABAN 20 MG: 20 TABLET, FILM COATED ORAL at 17:48

## 2018-03-13 RX ADMIN — SODIUM CHLORIDE, POTASSIUM CHLORIDE, SODIUM LACTATE AND CALCIUM CHLORIDE 100 ML/HR: 600; 310; 30; 20 INJECTION, SOLUTION INTRAVENOUS at 04:15

## 2018-03-13 RX ADMIN — VANCOMYCIN HYDROCHLORIDE 1250 MG: 10 INJECTION, POWDER, LYOPHILIZED, FOR SOLUTION INTRAVENOUS at 11:08

## 2018-03-13 RX ADMIN — CARBIDOPA AND LEVODOPA 2 TABLET: 25; 100 TABLET ORAL at 19:51

## 2018-03-13 RX ADMIN — TAZOBACTAM SODIUM AND PIPERACILLIN SODIUM 3.38 G: 375; 3 INJECTION, SOLUTION INTRAVENOUS at 11:04

## 2018-03-13 RX ADMIN — TAZOBACTAM SODIUM AND PIPERACILLIN SODIUM 3.38 G: 375; 3 INJECTION, SOLUTION INTRAVENOUS at 01:38

## 2018-03-13 RX ADMIN — MIDODRINE HYDROCHLORIDE 2.5 MG: 2.5 TABLET ORAL at 06:20

## 2018-03-13 RX ADMIN — CARVEDILOL 9.38 MG: 6.25 TABLET, FILM COATED ORAL at 11:04

## 2018-03-13 RX ADMIN — TAZOBACTAM SODIUM AND PIPERACILLIN SODIUM 3.38 G: 375; 3 INJECTION, SOLUTION INTRAVENOUS at 17:48

## 2018-03-13 RX ADMIN — MULTIPLE VITAMINS W/ MINERALS TAB 1 TABLET: TAB at 11:04

## 2018-03-13 RX ADMIN — LEVOTHYROXINE SODIUM 112 MCG: 112 TABLET ORAL at 06:20

## 2018-03-13 RX ADMIN — CARBIDOPA AND LEVODOPA 0.5 TABLET: 25; 250 TABLET ORAL at 11:04

## 2018-03-13 RX ADMIN — CARBIDOPA AND LEVODOPA 2 TABLET: 25; 100 TABLET ORAL at 17:47

## 2018-03-13 RX ADMIN — CARBIDOPA AND LEVODOPA 0.5 TABLET: 25; 250 TABLET ORAL at 19:51

## 2018-03-13 RX ADMIN — CARVEDILOL 9.38 MG: 6.25 TABLET, FILM COATED ORAL at 17:48

## 2018-03-13 RX ADMIN — VANCOMYCIN HYDROCHLORIDE 1250 MG: 10 INJECTION, POWDER, LYOPHILIZED, FOR SOLUTION INTRAVENOUS at 19:51

## 2018-03-13 RX ADMIN — MIDODRINE HYDROCHLORIDE 2.5 MG: 2.5 TABLET ORAL at 17:48

## 2018-03-13 RX ADMIN — TERAZOSIN HYDROCHLORIDE 1 MG: 1 CAPSULE ORAL at 19:59

## 2018-03-13 RX ADMIN — CARBIDOPA AND LEVODOPA 2 TABLET: 25; 100 TABLET ORAL at 11:04

## 2018-03-13 NOTE — PROGRESS NOTES
[facility]     LOS: 6 days     Name: Rommel Gonzalez  Age: 72 y.o.  Sex: male  :  1945  MRN: [id number]         Primary Care Physician: Luiz Onofre MD    NPO Diet NPO Except: Other; Other: Except meds crushed in applesauce    Subjective    Alert today. No complaints. Walked with PTx2. Discussed with daughter aspiration PNA and dysphagia and goals of care and progression of disease and prognosis. Time: 35 minutes, greater than 50% spent in counseling and coordination of care.    Objective   Temp:  [98.1 °F (36.7 °C)-98.7 °F (37.1 °C)] 98.1 °F (36.7 °C)  Heart Rate:  [] 80  Resp:  [16-18] 16  BP: ()/(59-83) 131/74  FiO2 (%):  [28 %] 28 %  SpO2:  [92 %-98 %] 92 %  on  Flow (L/min):  [2] 2;   Device (Oxygen Therapy): room air  Body mass index is 24.41 kg/m².    Physical Exam   Constitutional: No distress.   Cardiovascular: Normal rate and regular rhythm.    Pulmonary/Chest: Effort normal and breath sounds normal. No respiratory distress.   Abdominal: Soft. There is no tenderness.   Musculoskeletal: He exhibits no edema.   Neurological: He is alert.   Oriented to [employer], 2018 but slow. Appropriate.   Skin: Skin is warm and dry.   Psychiatric: He is slowed.     Reviewed medications and new clinical results    carbidopa-levodopa 2 tablet Oral TID   carbidopa-levodopa 0.5 tablet Oral TID   carvedilol 9.375 mg Oral BID With Meals   levothyroxine 112 mcg Oral Q AM   midodrine 2.5 mg Oral TID AC   multivitamin with minerals 1 tablet Oral Daily   piperacillin-tazobactam 3.375 g Intravenous Q8H   rivaroxaban 20 mg Oral Daily With Dinner   terazosin 1 mg Oral Nightly   vancomycin 1,250 mg Intravenous Q12H       lactated ringers 100 mL/hr Last Rate: 100 mL/hr (18 0415)   Pharmacy to dose vancomycin         Results from last 7 days  Lab Units 18  0641 18  1209 18  0548 18  1840 18  0554 18  1500   WBC  10*3/mm3 20.69* 29.32* 27.15* 27.54* 7.15 8.99   HEMOGLOBIN g/dL 10.0* 10.4* 10.4* 10.8* 10.4* 13.1*   PLATELETS 10*3/mm3 138* 138* 144 135* 176 202       Results from last 7 days  Lab Units 03/13/18  0641 03/12/18  0548 03/11/18  0658 03/10/18  0604 03/09/18  0720 03/08/18  0554 03/07/18  1500   SODIUM mmol/L 142 141 137 140 141 141 140   POTASSIUM mmol/L 3.9 4.3 4.3 4.0 3.9 3.4* 4.2   CHLORIDE mmol/L 106 104 99 106 108* 108* 104   CO2 mmol/L 27.9 25.1 23.5 27.5 29.2* 29.8* 27.0   BUN mg/dL 25* 20 8 7* 10 12 17   CREATININE mg/dL 0.93 1.19 0.85 0.77 0.73* 0.86 0.91   CALCIUM mg/dL 7.8* 7.5* 8.2* 7.7* 7.7* 7.5* 8.8   GLUCOSE mg/dL 88 95 87 101* 86 96 111*     Lab Results   Component Value Date    ANIONGAP 8.1 03/13/2018     Glucose   Date/Time Value Ref Range Status   03/11/2018 1328 97 70 - 130 mg/dL Final   03/11/2018 1025 205 (H) 70 - 130 mg/dL Final   03/11/2018 1007 75 70 - 130 mg/dL Final     Estimated Creatinine Clearance: 81.6 mL/min (by C-G formula based on SCr of 0.93 mg/dL).    Results from last 7 days  Lab Units 03/13/18  0641 03/12/18  0548 03/11/18  0658   CK TOTAL U/L 1,161* 2,814* 941*     Assessment/Plan   Active Hospital Problems (** Indicates Principal Problem)    Diagnosis Date Noted   • **Non-traumatic rhabdomyolysis [M62.82] 03/07/2018   • Aspiration pneumonia [J69.0] 03/12/2018   • Sepsis [A41.9] 03/12/2018   • Fall [W19.XXXA] 03/07/2018   • Progressive supranuclear palsy [G23.1] 03/07/2018   • Bruising [T14.8XXA] 03/07/2018   • Obstructive sleep apnea, adult [G47.33] 10/26/2017   • Weakness of voice [R49.8] 06/23/2016   • Anticoagulated [Z79.01] 04/05/2016   • Idiopathic Parkinson's disease [G20] 03/28/2016   • Hypertension [I10] 03/28/2016   • AF (atrial fibrillation) [I48.91] 03/28/2016   • Gait instability [R26.81] 03/28/2016   • Dysarthria [R47.1] 03/28/2016      Resolved Hospital Problems    Diagnosis Date Noted Date Resolved   No resolved problems to display.     · Aspiration PNA:  Continue Zosyn. PCT 55. WBC improved. Recheck pro-calcitonin  · Rhabdomyolysis: CPK down again. Stop statin. Continue IV fluids.  · Elevated BUN:Cr. Hgb stable. Will check occult blood. Could be muscle  · Encephalopathy: improved. CT head no evidence for acute intracranial pathology. CXR with right basilar atx/PNA probably contributing.  · Dysphagia: discussed with patient as well as daughter risk of aspiration/PNA/death if continues PO. They will discuss if they wish to pursue PEG.  · Parkinson's disease, supranuclear palsy, autonomic dysfunction. Stable BPs at present. Able to ambulate with physical therapy and walker.  · Atrial fibrillation: Continues on Xarelto, coreg. If they decide on PEG will need to stop xarelto.  · Disposition: plans were with daughter on discharge  · I discussed the patient's findings and my recommendations with patient, family and nursing staff.    Aleksey Whiteside MD   03/13/18  3:19 PM

## 2018-03-13 NOTE — PLAN OF CARE
Problem: Patient Care Overview  Goal: Plan of Care Review  Outcome: Ongoing (interventions implemented as appropriate)   03/13/18 7728   Coping/Psychosocial   Plan of Care Reviewed With patient   OTHER   Outcome Summary Pt will continue to benefit from OT for increasing functional strength and endurance to assist with safety and independence for adls   Plan of Care Review   Progress no change

## 2018-03-13 NOTE — DISCHARGE PLACEMENT REQUEST
"Rommel Gonzalez (72 y.o. Male)     Date of Birth Social Security Number Address Home Phone MRN    1945  [address] [place] [address] [phone] [id number]    Congregation Marital Status          Hinduism        Admission Date Admission Type Admitting Provider Attending Provider Department, Room/Bed    3/7/18 Emergency Jero Schaffer MD Nguyen, Aleksey Patterson MD [address], 540/1    Discharge Date Discharge Disposition Discharge Destination                       Attending Provider:  Aleksey Whiteside MD    Allergies:  No Known Allergies    Isolation:  None   Infection:  None   Code Status:  FULL    Ht:  182.9 cm (72\")   Wt:  81.6 kg (180 lb)    Admission Cmt:  None   Principal Problem:  Non-traumatic rhabdomyolysis [M62.82]                 Active Insurance as of 3/7/2018     Primary Coverage     Payor Plan Insurance Group Employer/Plan Group    HUMANA MEDICARE REPLACEMENT HUMANA MEDICARE REPL [id number]     Payor Plan Address Payor Plan Phone Number Effective From Effective To    [address] [address] [phone] 1/1/2018     [place] [address]       Subscriber Name Subscriber Birth Date Member ID       ROMMEL GONZALEZ 1945 [id number]                 Emergency Contacts      (Rel.) Home Phone Work Phone Mobile Phone    Leroy Raquel Gonzalez (Sister) [phone] -- --    ChetnaDanica petersonna (Daughter) -- -- [phone]    Miya Mcgrath (Sister) [phone] -- --              "

## 2018-03-13 NOTE — CONSULTS
Adult Nutrition  Assessment/PES    Patient Name:  Rommel Gonzalez  YOB: 1945  MRN: [id number]  Admit Date:  3/7/2018    Assessment Date:  3/13/2018    Comments:  Currently NPO, speech to evaluate today.  Will remain available as needed.          Adult Nutrition Assessment     Row Name 03/13/18 0852       Physical Findings    Skin other (see comments)   BRUISE/ABRASION       Nutrition Prescription PO    Current PO Diet NPO   UNTIL SPEECH SEE'S TODAY    Row Name 03/13/18 0851       Nutrition/Diet History    Typical Food/Fluid Intake speech to evaluate today    Row Name 03/13/18 0850       Labs/Procedures/Meds    Lab Results Reviewed reviewed    Lab Results Comments BUN 25, PLATELETS 138, PROCAL 55.39    Row Name 03/13/18 0849       Reason for Assessment    Reason For Assessment follow-up protocol    Diagnosis --   rapid called 3/11 for increase confusion, lethargic and facial drooping    Row Name 03/13/18 0500       Anthropometrics    Weight 81.6 kg (180 lb)          Problem/Interventions:                  Intervention Goal     Row Name 03/13/18 0853       Intervention Goal    General Maintain nutrition    PO Initiate feeding;Tolerate PO    Weight Maintain weight            Nutrition Intervention     Row Name 03/13/18 0853       Nutrition Intervention    RD/Tech Action Await begin PO;Follow Tx progress;Care plan reviewd              Education/Evaluation     Row Name 03/13/18 0853       Monitor/Evaluation    Monitor Per protocol;PO intake;Pertinent labs;Weight;Skin status        Electronically signed by:  Germania Bliss RD  03/13/18 8:54 AM

## 2018-03-13 NOTE — MBS/VFSS/FEES
Acute Care - Speech Language Pathology   Swallow Re-Evaluation [facility]     Patient Name: Rommel Gonzalez  : 1945  MRN: [id number]  Today's Date: 3/13/2018               Admit Date: 3/7/2018  SPEECH-LANGUAGE PATHOLOGY EVALUATION - FEES  Subjective: The patient was seen on this date for a FEES (Fiberoptic Endoscopic Evaluation of Swallow).  Patient was alert and cooperative.    Objective: Risks/benefits were reviewed with the patient, and consent was obtained. The nasendoscope was introduced into the right nare without the use of topical anaesthetic. Textures given included ice, thin liquid, nectar thick liquid, honey thick liquid, puree consistency and mechanical soft consistency.  Assessment:  Velopharyngeal function was WFL and Characterized by symmetrical movement.  Epiglottis was Adequate in appearance, resting posteriorly. Tongue base movement was symmetrical with reduced range of motion with fatigue. Laryngeal function was characterized by movement of the vocal folds that was symmetrical and adequate in range during quiet breathing and phonation. Secretion rating scale score was 2 - Secretions initially outside the vestibule that later entered.   Pt with prespill of ice chip, thin, nectar thick, and honey thick liquids to the pyriforms with fatigue. As pt fatigued, swallow became delayed. Pt with posterior aspiration thin secondary to mild residue mixing with secretions. Pt with eventual aspiration nectar thick liquids secondary to residue spilling via the L lateral channel and interarytenoid space. No aspiration observed with honey thick, however mild-moderate residue in pharynx. Pt with severe fatigue and at times unable to initiate second swallow. Pt with prespill to the vallecula with puree. Moderate pharyngeal residue with puree, double swallow aids in clearance. Again, pt with fatigue and unable to consistently perform double swallow. Pre spill to the pyriforms with mech soft, single diced  peach. Moderate oral and pharyngeal residue this date with [facility] soft trial. Able to clear [facility] soft residue with puree bolus.   SLP Findings:  Patient presents with severe oropharyngeal dysphagia. Reviewed results with pt and pt's sister. Daughter to call  later this date.   Recommendations:  Diet Textures: NPO. Medications should be taken crushed with puree. May have Ice after oral care, under staff or family supervision and with the recommended strategies for safe swallowing. May have 4 ounce pleasure feeds of puree and honey thick liquids twice a day.  Recommended Strategies: With 4 ounce pleasure feeds: Upright for PO, small bites and sips, double swallow with all PO, no straw, alternate liquids and solids and supervision with all PO  Other Recommended Evaluations:     Dysphagia therapy is recommended. Rationale: Education PRN.    Visit Dx:     ICD-10-CM ICD-9-CM   1. Traumatic rhabdomyolysis, initial encounter T79.6XXA 958.6   2. Fall at home, initial encounter W19.XXXA E888.9    Y92.099 E849.0   3. Dysarthria R47.1 784.51   4. Progressive supranuclear palsy G23.1 333.0   5. Impaired functional mobility, balance, gait, and endurance Z74.09 V49.89     Patient Active Problem List   Diagnosis   • AF (atrial fibrillation)   • Depression   • Gait instability   • Hypertension   • Insomnia   • Idiopathic Parkinson's disease   • Peripheral neuropathy   • Dysarthria   • Atrial flutter   • Anticoagulated   • Health care maintenance   • Hypothyroidism (acquired)   • Weakness of voice   • Abnormal chest CT   • Facial droop   • Hypersomnia    • Hyperlipidemia   • High risk medication use   • CORINNE on CPAP   • Obstructive sleep apnea, adult   • Non-traumatic rhabdomyolysis   • Fall   • Progressive supranuclear palsy   • Bruising   • Aspiration pneumonia   • Sepsis     Past Medical History:   Diagnosis Date   • AF (atrial fibrillation)    • Atrial flutter    • Atypical chest pain    • Chest pain    • Colon polyps    •  Confusion    • Depression    • Disease of thyroid gland    • Disorder of thyroid    • Dyspnea and respiratory abnormalities    • Elevated TSH    • Fatigue    • Gait instability    • Hay fever    • Hyperlipidemia    • Hypertension    • Hypothyroidism    • Insomnia    • Measles    • Mumps    • Palpitations    • Parkinson disease    • Peripheral neuropathy    • Pneumonia    • Poor sleep pattern    • Slurred speech    • Tremor      History reviewed. No pertinent surgical history.       SWALLOW EVALUATION (last 72 hours)      SLP Adult Swallow Evaluation     Row Name 03/13/18 1100                   Rehab Evaluation    Document Type re-evaluation  -OC        Subjective Information no complaints  -OC        Patient Observations alert;cooperative;agree to therapy  -OC        Patient/Family Observations Sister present this date.  -OC        Patient Effort good  -OC        Symptoms Noted During/After Treatment none  -OC           General Information    Patient Profile Reviewed yes  -OC        Current Method of Nutrition NPO  -OC        Prior Level of Function-Swallowing thin liquids;mechanical soft textures;other (see comments)   puree/pudding thick  -OC        Plans/Goals Discussed with patient and family  -OC        Barriers to Rehab medically complex  -OC        Patient's Goals for Discharge patient did not state  -OC        Family Goals for Discharge family did not state  -OC           Pain Assessment    Additional Documentation Pain Scale: Numbers Pre/Post-Treatment (Group)  -OC           Pain Scale: Numbers Pre/Post-Treatment    Pain Scale: Numbers, Pretreatment 0/10 - no pain  -OC        Pain Scale: Numbers, Post-Treatment 0/10 - no pain  -OC           Oral Motor and Function    Dentition Assessment natural, present and adequate  -OC        Secretion Management WNL/WFL  -OC        Mucosal Quality dry  -OC        Volitional Swallow delayed  -OC        Volitional Cough non-productive  -OC           Oral Musculature and  Cranial Nerve Assessment    Oral Motor General Assessment generalized oral motor weakness  -OC           Fiberoptic Endoscopic Evaluation of Swallowing (FEES)    Risks/Benefits Reviewed risks/benefits explained;patient;family;agreed to eval  -OC        Nasal Entry right:  -OC           Anatomy and Physiology    Velopharyngeal WFL  -OC        Base of Tongue symmetrical;range adequate  -OC        Epiglottis WFL;rests posteriorly  -OC        Laryngeal Function Breathing symmetrical  -OC        Laryngeal Function Phonation symmetrical  -OC        Laryngeal Function to Breath Hold incomplete closure;can't sustain closure  -OC        Secretion Rating Scale (Kyle et al. 1996) 2- secretions initially outside the vestibule but later entered the vestibule  -OC        Secretion Description white;thin  -OC        Ice Chips elicited swallow;partially cleared secretions;not aspirated  -OC        Spontaneous Swallow frequency reduced;did not clear secretions  -OC        Sensory reduced sensation  -OC        Utensils Used Spoon;Cup;Straw  -OC        Consistencies Trialed thin liquids;nectar-thick liquids;honey-thick liquids;pudding/puree;mechanical soft  -OC           Clinical Impression    SLP Swallowing Diagnosis severe;oral dysfunction;pharyngeal dysfunction  -OC        Functional Impact risk of aspiration/pneumonia  -OC        Rehab Potential/Prognosis, Swallowing re-evaluate goals as necessary  -OC        Criteria for Skilled Therapeutic Interventions Met not appropriate;other (see comments)   Education PRN  -OC           Recommendations    Therapy Frequency (SLP) PRN  -OC        Predicted Duration Therapy Intervention (Days) until discharge  -OC        SLP Diet Recommendation NPO;temporary alternate methods of nutrition/hydration;long term alternate methods of nutrition/hydration  -OC        Recommended Precautions and Strategies other (see comments)   4 ounce pleasure feedings of puree/honey thick ok  -OC        SLP Rec.  for Method of Medication Administration meds crushed;with pudding or applesauce  -OC        Monitor for Signs of Aspiration cough;elevated WBC count;pneumonia;right lower lobe infiltrates;upper respiratory;none - silent aspiration present  -OC        Anticipated Dischage Disposition skilled nursing facility  -OC          User Key  (r) = Recorded By, (t) = Taken By, (c) = Cosigned By    Initials Name Effective Dates    OC Bárbara Tobias MA,[facility]SLP 04/05/17 -         EDUCATION  The patient has been educated in the following areas:   Dysphagia (Swallowing Impairment).    SLP Recommendation and Plan  SLP Swallowing Diagnosis: severe, oral dysfunction, pharyngeal dysfunction  SLP Diet Recommendation: NPO, temporary alternate methods of nutrition/hydration, long term alternate methods of nutrition/hydration  Recommended Precautions and Strategies: other (see comments) (4 ounce pleasure feedings of puree/honey thick ok)     Monitor for Signs of Aspiration: cough, elevated WBC count, pneumonia, right lower lobe infiltrates, upper respiratory, none - silent aspiration present     Criteria for Skilled Therapeutic Interventions Met: not appropriate, other (see comments) (Education PRN)  Anticipated Dischage Disposition: skilled nursing facility  Rehab Potential/Prognosis, Swallowing: re-evaluate goals as necessary  Therapy Frequency (SLP): PRN  Predicted Duration Therapy Intervention (Days): until discharge       Plan of Care Reviewed With: patient, sibling  Plan of Care Review  Plan of Care Reviewed With: patient, sibling  Outcome Summary: Re-eval of swallow as FEES completed. Recommend NPO secondary to fatigue and risk of aspiration with residue.  Pt may have meds crushed with puree. Pt may have  4 ounce pleasure feeds of puree and honey thick liquids twice a day.           SLP Outcome Measures (last 72 hours)      SLP Outcome Measures     Row Name 03/13/18 4114             SLP Outcome Measures    Outcome Measure Used? Adult  NOMS  -OC         FCM Scores    FCM Chosen Swallowing  -OC      Swallowing FCM Score 1  -OC        User Key  (r) = Recorded By, (t) = Taken By, (c) = Cosigned By    Initials Name Effective Dates    OC Bárbara Tobias MA,[facility]-SLP 04/05/17 -            Time Calculation:         Time Calculation- SLP     Row Name 03/13/18 1434             Time Calculation- SLP    SLP Start Time 0915  -OC      SLP Stop Time 1030  -OC      SLP Time Calculation (min) 75 min  -OC      SLP Received On 03/13/18  -OC        User Key  (r) = Recorded By, (t) = Taken By, (c) = Cosigned By    Initials Name Provider Type    OC Bárbara Tobias MA,CCC-SLP Speech and Language Pathologist          Therapy Charges for Today     Code Description Service Date Service Provider Modifiers Qty    99483644421 HC ST FIBEROPTIC ENDO EVAL SWALL 5 3/13/2018 Bárbara Tobias MA,CCC-SLP GN 1               Bárbara Tobias MA,KADI-SLP  3/13/2018

## 2018-03-13 NOTE — PROGRESS NOTES
"Pharmacokinetic Consult - Vancomycin Dosing (Follow-up Note)  Rommel Gonzalez is on day 2 pharmacy to dose vancomycin for pneumonia.  Pharmacy dosing vancomycin per Morgan's request.   Goal trough: 15-20 mg/L   Other antimicrobials: Day 2 Zosyn   Admit date: 3/7/2018  2:47 PM    Relevant clinical data and objective history reviewed:  72 y.o. male 182.9 cm (72\") 81.6 kg (180 lb)    Past Medical History:   Diagnosis Date   • AF (atrial fibrillation)    • Atrial flutter    • Atypical chest pain    • Chest pain    • Colon polyps    • Confusion    • Depression    • Disease of thyroid gland    • Disorder of thyroid    • Dyspnea and respiratory abnormalities    • Elevated TSH    • Fatigue    • Gait instability    • Hay fever    • Hyperlipidemia    • Hypertension    • Hypothyroidism    • Insomnia    • Measles    • Mumps    • Palpitations    • Parkinson disease    • Peripheral neuropathy    • Pneumonia    • Poor sleep pattern    • Slurred speech    • Tremor      Creatinine   Date Value Ref Range Status   03/13/2018 0.93 0.76 - 1.27 mg/dL Final   03/12/2018 1.19 0.76 - 1.27 mg/dL Final   03/11/2018 0.85 0.76 - 1.27 mg/dL Final     BUN   Date Value Ref Range Status   03/13/2018 25 (H) 8 - 23 mg/dL Final     Estimated Creatinine Clearance: 81.6 mL/min (by C-G formula based on SCr of 0.93 mg/dL).    Lab Results   Component Value Date    WBC 20.69 (H) 03/13/2018    WBC 29.32 (H) 03/12/2018    WBC 27.15 (H) 03/12/2018     Temp Readings from Last 3 Encounters:   03/13/18 98.1 °F (36.7 °C) (Oral)   02/02/18 97.9 °F (36.6 °C)   11/01/17 97.6 °F (36.4 °C)     Baseline cultures/source/suscetibilit/labs/radiology:  3/11 BCx NGTD  3/11 PCT 55.39  3/11 Lactate 1.9  3/12 WBC 27.15, 3/13 WBC 20.69  3/12 UCx NGTD    Anti-Infectives     Ordered     Dose/Rate Route Frequency Start Stop    03/13/18 0805  vancomycin 1250 mg/250 mL 0.9% NS IVPB ([facility])     Ordering Provider:  Alekesy Whiteside MD    1,250 mg  over 125 Minutes Intravenous Every 12 Hours " 03/13/18 0900 03/18/18 2059 03/12/18 1717  vancomycin (VANCOCIN) in iso-osmotic dextrose IVPB 1 g (premix) 200 mL     Ordering Provider:  Aleksey Whiteside MD    1,000 mg  over 100 Minutes Intravenous Once 03/12/18 1730 03/12/18 2007 03/11/18 1956  piperacillin-tazobactam (ZOSYN) 3.375 g in iso-osmotic dextrose 50 ml (premix)     Ordering Provider:  Kyara Mora MD    3.375 g  over 4 Hours Intravenous Every 8 Hours 03/12/18 0230 03/16/18 0229    03/11/18 2003  vancomycin 1500 mg/500 mL 0.9% NS IVPB ([facility])     Ordering Provider:  Kyara Mora MD    20 mg/kg × 80.5 kg  over 150 Minutes Intravenous Once 03/11/18 2045 03/12/18 0031 03/11/18 1956  piperacillin-tazobactam (ZOSYN) 4.5 g in iso-osmotic dextrose 100 mL IVPB (premix)     Ordering Provider:  Kyara Mora MD    4.5 g  over 30 Minutes Intravenous Once 03/11/18 2030 03/11/18 2110 03/11/18 1955  Pharmacy to dose vancomycin     Aleksandra Newton [facility] reviewed the order on 03/12/18 1128.   Ordering Provider:  Aleksey Whiteside MD     Does not apply Continuous PRN 03/11/18 1953 03/18/18 1952           No results found for: TREVIN  Lab Results   Component Value Date    VANCORANDOM 10.50 03/13/2018    VANCORANDOM 10.50 03/12/2018       Vancomycin dosing history:   3/11 2201 vancomycin 1500 mg iv once, followed by 1250 mg iv q12h.              3/12 0911 random level = 10.5 mcg/mL, 10 hr  3/12 1827 vancomycin 1g iv once -given late due to trouble with line   3/13 0641 random level = 10.5 mcg/mL, 12 hr    Assessment/Plan  1. Given improvement in renal function and random level, will initiate vancomycin 1250 mg iv q12h.  2. Creatinine at 0.93. Will continue to monitor renal function and draw vancomycin trough level on 3/14 prior to the 3rd dose to ensure appropriate serum concentration.   3. Encourage hydration to prevent toxic accumulation; monitor for s/sx of toxicity including increase in SCr and decrease in UOP.    Pharmacy will continue to  follow daily while on vancomycin and adjust as needed.     Aleksandra Newton, Pharm.D.  03/13/18 8:44 AM

## 2018-03-13 NOTE — THERAPY TREATMENT NOTE
Acute Care - Physical Therapy Treatment Note  [facility]     Patient Name: Rommel Gonzalez  : 1945  MRN: [id number]  Today's Date: 3/13/2018     Date of Referral to PT: 18       Admit Date: 3/7/2018    Visit Dx:    ICD-10-CM ICD-9-CM   1. Traumatic rhabdomyolysis, initial encounter T79.6XXA 958.6   2. Fall at home, initial encounter W19.XXXA E888.9    Y92.099 E849.0   3. Dysarthria R47.1 784.51   4. Progressive supranuclear palsy G23.1 333.0   5. Impaired functional mobility, balance, gait, and endurance Z74.09 V49.89     Patient Active Problem List   Diagnosis   • AF (atrial fibrillation)   • Depression   • Gait instability   • Hypertension   • Insomnia   • Idiopathic Parkinson's disease   • Peripheral neuropathy   • Dysarthria   • Atrial flutter   • Anticoagulated   • Health care maintenance   • Hypothyroidism (acquired)   • Weakness of voice   • Abnormal chest CT   • Facial droop   • Hypersomnia    • Hyperlipidemia   • High risk medication use   • CORINNE on CPAP   • Obstructive sleep apnea, adult   • Non-traumatic rhabdomyolysis   • Fall   • Progressive supranuclear palsy   • Bruising   • Aspiration pneumonia   • Sepsis       Therapy Treatment    Therapy Treatment / Health Promotion    Treatment Time/Intention  Discipline: physical therapy assistant  Document Type: therapy note (daily note)  Subjective Information: no complaints  Patient Effort: good  Plan of Care Review  Plan of Care Reviewed With: patient    Vitals/Pain/Safety  Vital Signs  Pre SpO2 (%): 95  O2 Delivery Pre Treatment: room air  O2 Delivery Intra Treatment: room air  Post SpO2 (%): 92  O2 Delivery Post Treatment: room air  Pain Scale: Numbers Pre/Post-Treatment  Pain Scale: Numbers, Pretreatment: 0/10 - no pain  Safety Issues, Functional Mobility  Safety Issues Affecting Function (Mobility): at risk behavior observed, safety precaution awareness, safety precautions follow-through/compliance, insight into deficits/self  awareness  Impairments Affecting Function (Mobility): balance, coordination, motor control, strength, endurance/activity tolerance  Positioning and Restraints  Pre-Treatment Position: in bed  Post Treatment Position: bed  In Bed: fowlers, call light within reach, encouraged to call for assist, exit alarm on, SCD pump applied    Mobility,ADL,Motor, Modality  Bed Mobility Assessment/Treatment  Supine-Sit [place] (Bed Mobility): minimum assist (75% patient effort), verbal cues, nonverbal cues (demo/gesture)  Sit-Supine [place] (Bed Mobility): minimum assist (75% patient effort), verbal cues, nonverbal cues (demo/gesture) (assist w/ BLE)  Assistive Device (Bed Mobility): bed rails, head of bed elevated  Transfer Assessment/Treatment  Transfer Assessment/Treatment: sit-stand transfer, stand-sit transfer  Comment (Transfers): Cues and assist for hand placement  Sit-Stand Transfer  Sit-Stand [place] (Transfers): contact guard, minimum assist (75% patient effort), 1 person assist, 1 person to manage equipment, verbal cues  Assistive Device (Sit-Stand Transfers): walker, front-wheeled  Stand-Sit Transfer  Stand-Sit [place] (Transfers): contact guard, verbal cues  Assistive Device (Stand-Sit Transfers): walker, front-wheeled  Gait/Stairs Assessment/Training  [place] Level (Gait): minimum assist (75% patient effort), 1 person assist, 1 person to manage equipment, verbal cues  Assistive Device (Gait): walker, front-wheeled  Distance in Feet (Gait): 75  Pattern (Gait): swing-through  Deviations/Abnormal Patterns (Gait): base of support, narrow, adri decreased, gait speed decreased, stride length decreased  Bilateral Gait Deviations: heel strike decreased  Comment (Gait/Stairs): Cueing for decreased heel strike L > R                 ROM/MMT             Sensory, Edema, Orthotics          Cognition, Communication, Swallow  Cognitive Assessment/Intervention- PT/OT  Affect/Mental Status (Cognitive):  flat/blunted affect  Orientation Status (Cognition): oriented to, person, place  Follows Commands (Cognition): 75-90% accuracy, physical/tactile prompts required, repetition of directions required, verbal cues/prompting required  Executive Function Deficit (Cognition): mild deficit, insight/awareness of deficits, judgment  Personal Safety Interventions: fall prevention program maintained, nonskid shoes/slippers when out of bed, gait belt  Speaking Valve  Pre SpO2 (%): 95  Post SpO2 (%): 92  General Eating/Swallowing Observations  Pre SpO2 (%): 95  Post SpO2 (%): 92    Outcome Summary             Physical Therapy Education     Title: PT OT SLP Therapies (Done)     Topic: Physical Therapy (Done)     Point: Mobility training (Done)    Learning Progress Summary     Learner Status Readiness Method Response Comment Documented by    Patient Done Acceptance E,TB,D VU,NR   03/13/18 1538     Done Acceptance E,D VU,NR   03/12/18 1535     Done Acceptance E VU,NR ed on gait impairment corrections, and ed on safety, up only with assist. SP 03/10/18 1702     Done Acceptance E VU  MA 03/09/18 1445    Family Done Acceptance E,D VU,NR   03/12/18 1535          Point: Home exercise program (Done)    Learning Progress Summary     Learner Status Readiness Method Response Comment Documented by    Patient Done Acceptance E VU  MA 03/09/18 1445          Point: Body mechanics (Done)    Learning Progress Summary     Learner Status Readiness Method Response Comment Documented by    Patient Done Acceptance E,TB,D VU,NR   03/13/18 1538     Done Acceptance E,D VU,NR   03/12/18 1535     Done Acceptance E VU  MA 03/09/18 1445    Family Done Acceptance E,D VU,NR   03/12/18 1535          Point: Precautions (Done)    Learning Progress Summary     Learner Status Readiness Method Response Comment Documented by    Patient Done Acceptance E,TB,D VU,NR   03/13/18 1538     Done Acceptance E,D VU,NR   03/12/18 1535     Done Acceptance E VU,NR  ed on gait impairment corrections, and ed on safety, up only with assist. PATRICK 03/10/18 1702     Done Acceptance E WERNER  MA 03/09/18 1445    Family Done Acceptance E,D WERNER,NR   03/12/18 1535                      User Key     Initials Effective Dates Name Provider Type Discipline     03/07/18 -  Luz Montgomery, PTA Physical Therapy Assistant PT    MA 03/07/18 -  Eve Allen, PT Physical Therapist PT    SP 01/09/17 -  Monica Booker, PT Physical Therapist PT                    PT Recommendation and Plan     Plan of Care Reviewed With: patient  Outcome Summary: Pt required less assist for mobility this pm. Less rigid today, but still ambulates on toes despite cueing.           Outcome Measures     Row Name 03/13/18 1537 03/13/18 1514 03/12/18 1520       How much help from another person do you currently need...    Turning from your back to your side while in flat bed without using bedrails? 3  -RW  -- 3  -RW    Moving from lying on back to sitting on the side of a flat bed without bedrails? 3  -RW  -- 2  -RW    Moving to and from a bed to a chair (including a wheelchair)? 3  -RW  -- 2  -RW    Standing up from a chair using your arms (e.g., wheelchair, bedside chair)? 3  -RW  -- 2  -RW    Climbing 3-5 steps with a railing? 1  -RW  -- 1  -RW    To walk in hospital room? 3  -RW  -- 2  -RW    AM-PAC 6 Clicks Score 16  -RW  -- 12  -RW       How much help from another is currently needed...    Putting on and taking off regular lower body clothing?  -- 2  -SG  --    Bathing (including washing, rinsing, and drying)  -- 2  -SG  --    Toileting (which includes using toilet bed pan or urinal)  -- 2  -SG  --    Putting on and taking off regular upper body clothing  -- 3  -SG  --    Taking care of personal grooming (such as brushing teeth)  -- 3  -SG  --    Eating meals  -- 1  -SG  --    Score  -- 13  -SG  --       Functional Assessment    Outcome Measure Options AM-PAC 6 Clicks Basic Mobility (PT)  -RW  -- AM-PAC 6 Clicks  Basic Mobility (PT)  -RW    Row Name 03/12/18 1355 03/10/18 1700          How much help from another person do you currently need...    Turning from your back to your side while in flat bed without using bedrails?  -- 3  -SP     Moving from lying on back to sitting on the side of a flat bed without bedrails?  -- 3  -SP     Moving to and from a bed to a chair (including a wheelchair)?  -- 3  -SP     Standing up from a chair using your arms (e.g., wheelchair, bedside chair)?  -- 3  -SP     Climbing 3-5 steps with a railing?  -- 2  -SP     To walk in hospital room?  -- 3  -SP     AM-PAC 6 Clicks Score  -- 17  -SP        How much help from another is currently needed...    Putting on and taking off regular lower body clothing? 1  -KP  --     Bathing (including washing, rinsing, and drying) 2  -KP  --     Toileting (which includes using toilet bed pan or urinal) 2  -KP  --     Putting on and taking off regular upper body clothing 3  -KP  --     Taking care of personal grooming (such as brushing teeth) 3  -KP  --     Eating meals 3  -KP  --     Score 14  -KP  --        Functional Assessment    Outcome Measure Options AM-PAC 6 Clicks Daily Activity (OT)  -KP AM-PAC 6 Clicks Basic Mobility (PT)  -SP       User Key  (r) = Recorded By, (t) = Taken By, (c) = Cosigned By    Initials Name Provider Type    DARWIN Rice, OTR Occupational Therapist    DARSHAN Lepe, OTR Occupational Therapist    FAUSTINA Montgomery, CLARENCE Physical Therapy Assistant    PATRICK Booker, PT Physical Therapist           Time Calculation:         PT Charges     Row Name 03/13/18 1524             Time Calculation    Start Time 1520  -RW      Stop Time 1535  -RW      Time Calculation (min) 15 min  -RW      PT Received On 03/13/18  -RW      PT - Next Appointment 03/14/18  -RW        User Key  (r) = Recorded By, (t) = Taken By, (c) = Cosigned By    Initials Name Provider Type    FAUSTINA Montgomery, CLARENCE Physical Therapy Assistant           Therapy Charges for Today     Code Description Service Date Service Provider Modifiers Qty    93259720921 HC PT THER PROC EA 15 MIN 3/12/2018 Luz Montgomery, PTA GP 1    49788667008 HC PT THER SUPP EA 15 MIN 3/12/2018 Luz Montgomery, PTA GP 1    76394009527 HC PT THER PROC EA 15 MIN 3/13/2018 Luz Montgomery, PTA GP 1    44022715654 HC PT THER SUPP EA 15 MIN 3/13/2018 Luz Montgomery, PTA GP 1          PT G-Codes  Outcome Measure Options: AM-PAC 6 Clicks Basic Mobility (PT)    Luz Montgomery PTA  3/13/2018

## 2018-03-13 NOTE — SIGNIFICANT NOTE
03/13/18 1427   Rehab Time/Intention   Evaluation Not Performed other (see comments)  (Spoke with patient's daughter, Christianne Rodriguez, via telephone per family request re: FEES results. She would like to discuss alternative nutrition/plan of care with MD. LANE to follow for education/plan of care. )   Rehab Treatment   Discipline speech language pathologist

## 2018-03-13 NOTE — PROGRESS NOTES
Patient Identification:  NAME:  Rommel Gonzalez  Age:  72 y.o.   Sex:  male   :  1945   MRN:  [id number]       Chief complaint: Metabolic encephalopathy Parkinson's disease    History of present illness:  He is more alert today he ambulated with physical therapy his pneumonia is being treated appropriately and he is been seen by speech therapy as well  Clearly the CT scan of his head shows no acute abnormality    Past medical history:  Past Medical History:   Diagnosis Date   • AF (atrial fibrillation)    • Atrial flutter    • Atypical chest pain    • Chest pain    • Colon polyps    • Confusion    • Depression    • Disease of thyroid gland    • Disorder of thyroid    • Dyspnea and respiratory abnormalities    • Elevated TSH    • Fatigue    • Gait instability    • Hay fever    • Hyperlipidemia    • Hypertension    • Hypothyroidism    • Insomnia    • Measles    • Mumps    • Palpitations    • Parkinson disease    • Peripheral neuropathy    • Pneumonia    • Poor sleep pattern    • Slurred speech    • Tremor        Allergies:  Review of patient's allergies indicates no known allergies.    Home medications:  Prescriptions Prior to Admission   Medication Sig Dispense Refill Last Dose   • aspirin 81 MG chewable tablet Chew 81 mg Daily.      • bumetanide (BUMEX) 2 MG tablet Take 2 mg by mouth 2 (Two) Times a Week. Tuesday, Friday      • bumetanide (BUMEX) 2 MG tablet Take 1 mg by mouth 2 (Two) Times a Week. Wednesday, Saturday      • carbidopa-levodopa (SINEMET)  MG per tablet Take 2 tablets by mouth 3 (Three) Times a Day.      • carbidopa-levodopa (SINEMET)  MG per tablet Take 0.5 tablets by mouth 3 (Three) Times a Day.      • carvedilol (COREG) 6.25 MG tablet Take 1.5 tablets by mouth 2 (Two) Times a Day With Meals. 270 tablet 1 Unknown   • doxazosin (CARDURA) 2 MG tablet Take 1 tablet by mouth Every Night. 90 tablet 3 Unknown   • gabapentin (NEURONTIN) 300 MG capsule Take 300 mg by mouth 2 (Two) Times  a Day.      • levothyroxine (SYNTHROID, LEVOTHROID) 112 MCG tablet Take 112 mcg by mouth Daily.      • lisinopril (PRINIVIL,ZESTRIL) 10 MG tablet Take 5 mg by mouth Every Morning.      • lisinopril (PRINIVIL,ZESTRIL) 10 MG tablet Take 10 mg by mouth Every Night.      • lovastatin (MEVACOR) 10 MG tablet Take 1 tablet by mouth Every Other Day. 90 tablet 1 Unknown   • Multiple Vitamins-Minerals (MULTIVITAL) tablet Take 1 tablet by mouth daily.   Unknown   • rivaroxaban (XARELTO) 20 MG tablet Take 1 tablet by mouth Daily With Dinner. 90 tablet 1 Unknown   • zolpidem (AMBIEN) 10 MG tablet Take 1 tablet by mouth At Night As Needed for Sleep. 90 tablet 1 Unknown   • carbidopa-levodopa CR (SINEMET CR)  MG per CR tablet Take  by mouth.   Unknown        Hospital medications:    carbidopa-levodopa 2 tablet Oral TID   carbidopa-levodopa 0.5 tablet Oral TID   carvedilol 9.375 mg Oral BID With Meals   levothyroxine 112 mcg Oral Q AM   midodrine 2.5 mg Oral TID AC   multivitamin with minerals 1 tablet Oral Daily   piperacillin-tazobactam 3.375 g Intravenous Q8H   rivaroxaban 20 mg Oral Daily With Dinner   terazosin 1 mg Oral Nightly   vancomycin 1,250 mg Intravenous Q12H       lactated ringers 100 mL/hr Last Rate: 100 mL/hr (03/13/18 0415)   Pharmacy to dose vancomycin       •  acetaminophen  •  ondansetron  •  Pharmacy to dose vancomycin  •  sodium chloride  •  zolpidem      Objective:  Vitals Ranges:   Temp:  [98.1 °F (36.7 °C)-98.7 °F (37.1 °C)] 98.1 °F (36.7 °C)  Heart Rate:  [] 80  Resp:  [16-18] 16  BP: ()/(59-83) 131/74  FiO2 (%):  [28 %] 28 %      Physical Exam:  More alert today somewhat mask face tongue midline there is rigidity bradykinesia he was able to ambulate with physical therapy reflexes absent throughout toes downgoing bilaterally    Results review:   I reviewed the patient's new clinical results.    Data review:  Lab Results (last 24 hours)     Procedure Component Value Units Date/Time     Procalcitonin [[id number]] Collected:  03/13/18 1615    Specimen:  Blood Updated:  03/13/18 1636    CK [[id number]]  (Abnormal) Collected:  03/13/18 0641    Specimen:  Blood Updated:  03/13/18 0731     Creatine Kinase 1,161 (H) U/L     Basic Metabolic Panel [[id number]]  (Abnormal) Collected:  03/13/18 0641    Specimen:  Blood Updated:  03/13/18 0728     Glucose 88 mg/dL      BUN 25 (H) mg/dL      Creatinine 0.93 mg/dL      Sodium 142 mmol/L      Potassium 3.9 mmol/L      Chloride 106 mmol/L      CO2 27.9 mmol/L      Calcium 7.8 (L) mg/dL      eGFR Non African Amer 80 mL/min/1.73      BUN/Creatinine Ratio 26.9 (H)     Anion Gap 8.1 mmol/L     Narrative:       The MDRD GFR formula is only valid for adults with stable renal function between ages 18 and 70.    Vancomycin, Random [[id number]]  (Normal) Collected:  03/13/18 0641    Specimen:  Blood Updated:  03/13/18 0728     Vancomycin Random 10.50 mcg/mL     CBC (No Diff) [[id number]]  (Abnormal) Collected:  03/13/18 0641    Specimen:  Blood Updated:  03/13/18 0709     WBC 20.69 (H) 10*3/mm3      RBC 3.26 (L) 10*6/mm3      Hemoglobin 10.0 (L) g/dL      Hematocrit 31.4 (L) %      MCV 96.3 (H) fL      MCH 30.7 pg      MCHC 31.8 (L) g/dL      RDW 13.6 %      RDW-SD 47.8 fl      MPV 10.9 fL      Platelets 138 (L) 10*3/mm3     Urine Culture - Urine, Urine, Clean Catch [[id number]]  (Normal) Collected:  03/12/18 1943    Specimen:  Urine from Urine, Clean Catch Updated:  03/13/18 0636     Urine Culture No growth    Blood Culture - Blood, [[id number]]  (Normal) Collected:  03/11/18 2145    Specimen:  Blood from Hand, Left Updated:  03/12/18 2208     Blood Culture No growth at 24 hours    Blood Culture - Blood, [[id number]]  (Normal) Collected:  03/11/18 2033    Specimen:  Blood from Arm, Left Updated:  03/12/18 2046     Blood Culture No growth at 24 hours    Urinalysis With / Culture If Indicated - Urine, Clean Catch [[id number]]  (Abnormal) Collected:  03/12/18 1943    Specimen:   Urine from Urine, Clean Catch Updated:  03/12/18 2016     Color, UA Dark Yellow (A)     Appearance, UA Cloudy (A)     pH, UA 6.0     Specific Gravity, UA 1.026     Glucose, UA Negative     Ketones, UA Trace (A)     Bilirubin, UA Negative     Blood, UA Trace (A)     Protein, UA Trace (A)     Leuk Esterase, UA Moderate (2+) (A)     Nitrite, UA Negative     Urobilinogen, UA 1.0 E.U./dL    Urinalysis, Microscopic Only - Urine, Clean Catch [[id number]]  (Abnormal) Collected:  03/12/18 1943    Specimen:  Urine from Urine, Clean Catch Updated:  03/12/18 2016     RBC, UA 3-5 (A) /HPF      WBC, UA Too Numerous to Count (A) /HPF      Bacteria, UA None Seen /HPF      Squamous Epithelial Cells, UA 0-2 /HPF      Hyaline Casts, UA 0-2 /LPF      Methodology Automated Microscopy           Imaging:  Imaging Results (last 24 hours)     ** No results found for the last 24 hours. **             Assessment and Plan:     Principal Problem:    Non-traumatic rhabdomyolysis  Active Problems:    AF (atrial fibrillation)    Gait instability    Hypertension    Idiopathic Parkinson's disease    Dysarthria    Anticoagulated    Weakness of voice    Obstructive sleep apnea, adult    Fall    Progressive supranuclear palsy    Bruising    Aspiration pneumonia    Sepsis    He definitely looks more alert today and his walks with physical therapy.  At this time I am not convinced that he needs any further neurologic workup.  I strongly suspect that this patient has recurrent significant low blood pressure, as we have documented here even, and that resulted in the decreased responsiveness slurred speech and left arm weakness.  In short, I do not have a problem with continuing his Xarelto as he was taking it, and though I do not believe he suffered an embolic stroke.    I will sign off in follow-up when necessary reconsult thank you      Vaibhav Archuleta MD  03/13/18  4:44 PM

## 2018-03-13 NOTE — PROGRESS NOTES
"    Patient Name: Rommel Gonzalez  :1945  72 y.o.      Patient Care Team:  Luiz Onofre MD as PCP - General (Family Medicine)    Chief Complaint: follow up atrial fibrillation/flutter    Interval History: Alert in bed. Stood with physical therapy yesterday. No complaints. Minimally interactive.        Objective   Vital Signs  Temp:  [98.1 °F (36.7 °C)-98.7 °F (37.1 °C)] 98.1 °F (36.7 °C)  Heart Rate:  [] 94  Resp:  [16-18] 16  BP: ()/(54-83) 128/83  FiO2 (%):  [28 %] 28 %    Intake/Output Summary (Last 24 hours) at 18 1118  Last data filed at 18 0900   Gross per 24 hour   Intake              985 ml   Output             1200 ml   Net             -215 ml     Flowsheet Rows    Flowsheet Row First Filed Value   Admission Height 182.9 cm (72\") Documented at 2018 1448   Admission Weight 81.6 kg (180 lb) Documented at 2018 1448          Physical Exam:   General Appearance:    Alert, cooperative, in no acute distress   Lungs:     Clear to auscultation.  Normal respiratory effort and rate.      Heart:    irregular rhythm and normal rate, normal S1 and S2, no murmurs, gallops or rubs.     Chest Wall:    No abnormalities observed   Abdomen:     Soft, nontender, positive bowel sounds.     Extremities:   no cyanosis, clubbing or edema.  No marked joint deformities.  Adequate musculoskeletal strength.       Results Review:      Results from last 7 days  Lab Units 18  0641   SODIUM mmol/L 142   POTASSIUM mmol/L 3.9   CHLORIDE mmol/L 106   CO2 mmol/L 27.9   BUN mg/dL 25*   CREATININE mg/dL 0.93   GLUCOSE mg/dL 88   CALCIUM mg/dL 7.8*       Results from last 7 days  Lab Units 18  0641 18  0548 18  0658   CK TOTAL U/L 1,161* 2,814* 941*       Results from last 7 days  Lab Units 18  0641   WBC 10*3/mm3 20.69*   HEMOGLOBIN g/dL 10.0*   HEMATOCRIT % 31.4*   PLATELETS 10*3/mm3 138*       Results from last 7 days  Lab Units 18  1500   INR  2.57*           "             Medication Review:     carbidopa-levodopa 2 tablet Oral TID   carbidopa-levodopa 0.5 tablet Oral TID   carvedilol 9.375 mg Oral BID With Meals   levothyroxine 112 mcg Oral Q AM   midodrine 2.5 mg Oral TID AC   multivitamin with minerals 1 tablet Oral Daily   piperacillin-tazobactam 3.375 g Intravenous Q8H   rivaroxaban 20 mg Oral Daily With Dinner   terazosin 1 mg Oral Nightly   vancomycin 1,250 mg Intravenous Q12H          lactated ringers 100 mL/hr Last Rate: 100 mL/hr (03/13/18 5242)   Pharmacy to dose vancomycin         Assessment/Plan   1. Atrial fibrillation/ flutter -  Rate controlled. He is on Xarrelto. Multiple falls at home. Plan to discharge to live with daughter now looking for SNF placement. Long discussion with sister at bedside re: safety of anticoagulation moving forward. Will need to reassess safety at discharge by Dr. Bennett.   2. Progressive supranuclear palsy + parkinsons + autonomic dysfunction  - on midodrine  3. Chronic diastolic heart failure - diuretics on hold  4. HTN - blood pressure currently stable. On BB and midodrine.   5. Prolonged QT interval  6. Orthostatic hypotension  7. Poor oral intake- sister states they are considering feeding tube. Will need to hold Xarelto if that is the case.   8. pnuemonia  9. encephalopathy    JOVANY Parnell  [facility]  03/13/18  11:18 AM

## 2018-03-13 NOTE — PLAN OF CARE
Problem: Patient Care Overview  Goal: Plan of Care Review  Outcome: Ongoing (interventions implemented as appropriate)   03/13/18 9061   Coping/Psychosocial   Plan of Care Reviewed With patient   OTHER   Outcome Summary Pt required less assist for mobility this pm. Less rigid today, but still ambulates on toes despite cueing.

## 2018-03-13 NOTE — THERAPY TREATMENT NOTE
Acute Care - Occupational Therapy Progress Note  [facility]     Patient Name: Rommel Gonzalez  : 1945  MRN: [id number]  Today's Date: 3/13/2018     Date of Referral to OT: 18       Admit Date: 3/7/2018       ICD-10-CM ICD-9-CM   1. Traumatic rhabdomyolysis, initial encounter T79.6XXA 958.6   2. Fall at home, initial encounter W19.XXXA E888.9    Y92.099 E849.0   3. Dysarthria R47.1 784.51   4. Progressive supranuclear palsy G23.1 333.0   5. Impaired functional mobility, balance, gait, and endurance Z74.09 V49.89     Patient Active Problem List   Diagnosis   • AF (atrial fibrillation)   • Depression   • Gait instability   • Hypertension   • Insomnia   • Idiopathic Parkinson's disease   • Peripheral neuropathy   • Dysarthria   • Atrial flutter   • Anticoagulated   • Health care maintenance   • Hypothyroidism (acquired)   • Weakness of voice   • Abnormal chest CT   • Facial droop   • Hypersomnia    • Hyperlipidemia   • High risk medication use   • CORINNE on CPAP   • Obstructive sleep apnea, adult   • Non-traumatic rhabdomyolysis   • Fall   • Progressive supranuclear palsy   • Bruising   • Aspiration pneumonia   • Sepsis     Past Medical History:   Diagnosis Date   • AF (atrial fibrillation)    • Atrial flutter    • Atypical chest pain    • Chest pain    • Colon polyps    • Confusion    • Depression    • Disease of thyroid gland    • Disorder of thyroid    • Dyspnea and respiratory abnormalities    • Elevated TSH    • Fatigue    • Gait instability    • Hay fever    • Hyperlipidemia    • Hypertension    • Hypothyroidism    • Insomnia    • Measles    • Mumps    • Palpitations    • Parkinson disease    • Peripheral neuropathy    • Pneumonia    • Poor sleep pattern    • Slurred speech    • Tremor      History reviewed. No pertinent surgical history.    Therapy Treatment    Therapy Treatment / Health Promotion    Treatment Time/Intention  Discipline: occupational therapist  Document Type: therapy note (daily  note)  Subjective Information: no complaints  Mode of Treatment: occupational therapy  Patient/Family Observations: supine in bed  Care Plan Review: care plan/treatment goals reviewed  Therapy Frequency (OT Eval): 5 times/wk  Plan of Care Review  Plan of Care Reviewed With: patient    Vitals/Pain/Safety       Mobility,ADL,Motor, Modality     ADL Assessment/Intervention  BADL Assessment/Intervention: upper body dressing, grooming  Upper Body Dressing Assessment/Training  Upper Body Dressing [place] Level: supervision  Upper Body Dressing Position: sitting up in bed  Grooming Assessment/Training  [place] Level (Grooming): supervision  Grooming Position: sitting up in bed  Comment (Grooming): able to wash face and simulate combing hair  Therapeutic Exercise  Exercise Type (Therapeutic Exercise): AROM (active range of motion)           ROM/MMT             Sensory, Edema, Orthotics          Cognition, Communication, Swallow       Outcome Summary       Occupational Therapy Education     Title: PT OT SLP Therapies (Done)     Topic: Occupational Therapy (Done)     Point: ADL training (Done)     Description: Instruct learner(s) on proper safety adaptation and remediation techniques during self care or transfers.   Instruct in proper use of assistive devices.   Learning Progress Summary     Learner Status Readiness Method Response Comment Documented by    Patient Done Acceptance D,TB,E VU,DU VC and A for posture and to correct R lean in standing.  VC for hand placement and body position during xfers.  Ed on risk of falls and agree with d/c to [facility]'s home for increase assist. LE 03/09/18 1404          Point: Body mechanics (Done)     Description: Instruct learner(s) on proper positioning and spine alignment during self-care, functional mobility activities and/or exercises.   Learning Progress Summary     Learner Status Readiness Method Response Comment Documented by    Patient Done Acceptance D,TB,E VU,DU VC and A  for posture and to correct R lean in standing.  VC for hand placement and body position during xfers.  Ed on risk of falls and agree with d/c to [facility]'s home for increase assist.  03/09/18 1404                      User Key     Initials Effective Dates Name Provider Type Discipline    LE 03/07/18 -  Evelyne Zepeda OTR Occupational Therapist OT                  OT Recommendation and Plan  Therapy Frequency (OT Eval): 5 times/wk  Plan of Care Review  Plan of Care Reviewed With: patient  Plan of Care Reviewed With: patient  Progress: no change  Outcome Summary: Pt will continue to benefit from OT for increasing functional strength and endurance to assist  with safety and independence for adls        Outcome Measures     Row Name 03/13/18 1514 03/12/18 1520 03/12/18 1355       How much help from another person do you currently need...    Turning from your back to your side while in flat bed without using bedrails?  -- 3  -RW  --    Moving from lying on back to sitting on the side of a flat bed without bedrails?  -- 2  -RW  --    Moving to and from a bed to a chair (including a wheelchair)?  -- 2  -RW  --    Standing up from a chair using your arms (e.g., wheelchair, bedside chair)?  -- 2  -RW  --    Climbing 3-5 steps with a railing?  -- 1  -RW  --    To walk in hospital room?  -- 2  -RW  --    AM-PAC 6 Clicks Score  -- 12  -RW  --       How much help from another is currently needed...    Putting on and taking off regular lower body clothing? 2  -SG  -- 1  -KP    Bathing (including washing, rinsing, and drying) 2  -SG  -- 2  -KP    Toileting (which includes using toilet bed pan or urinal) 2  -SG  -- 2  -KP    Putting on and taking off regular upper body clothing 3  -SG  -- 3  -KP    Taking care of personal grooming (such as brushing teeth) 3  -SG  -- 3  -KP    Eating meals 1  -SG  -- 3  -KP    Score 13  -SG  -- 14  -KP       Functional Assessment    Outcome Measure Options  -- AM-PAC 6 Clicks Basic Mobility (PT)  -RW  AM-PAC 6 Clicks Daily Activity (OT)  -    Row Name 03/10/18 1700             How much help from another person do you currently need...    Turning from your back to your side while in flat bed without using bedrails? 3  -SP      Moving from lying on back to sitting on the side of a flat bed without bedrails? 3  -SP      Moving to and from a bed to a chair (including a wheelchair)? 3  -SP      Standing up from a chair using your arms (e.g., wheelchair, bedside chair)? 3  -SP      Climbing 3-5 steps with a railing? 2  -SP      To walk in hospital room? 3  -SP      AM-PAC 6 Clicks Score 17  -SP         Functional Assessment    Outcome Measure Options AM-PAC 6 Clicks Basic Mobility (PT)  -SP        User Key  (r) = Recorded By, (t) = Taken By, (c) = Cosigned By    Initials Name Provider Type    DARWIN Rice, OTR Occupational Therapist    DARSHAN Lepe, OTR Occupational Therapist    RW Luz Montgomery, PTA Physical Therapy Assistant    SP Monica Booker, PT Physical Therapist           Time Calculation:         Time Calculation- OT     Row Name 03/13/18 1515             Time Calculation- OT    OT Start Time 1448  -SG      OT Stop Time 1459  -      OT Time Calculation (min) 11 min  -      OT Received On 03/13/18  -      OT Goal Re-Cert Due Date 03/20/18  -        User Key  (r) = Recorded By, (t) = Taken By, (c) = Cosigned By    Initials Name Provider Type     Lisa Rice OTR Occupational Therapist           Therapy Charges for Today     Code Description Service Date Service Provider Modifiers Qty    40697060532  OT SELF CARE/MGMT/TRAIN EA 15 MIN 3/13/2018 JAVIER Hollis GO 1               JAVIER Hollis  3/13/2018

## 2018-03-13 NOTE — PLAN OF CARE
Problem: Patient Care Overview  Goal: Individualization and Mutuality  Outcome: Ongoing (interventions implemented as appropriate)      Problem: Skin Injury Risk (Adult)  Goal: Skin Health and Integrity  Outcome: Ongoing (interventions implemented as appropriate)   03/13/18 1542   Skin Injury Risk (Adult)   Skin Health and Integrity making progress toward outcome

## 2018-03-13 NOTE — PLAN OF CARE
Problem: Nutrition, Imbalanced: Inadequate Oral Intake (Adult)  Goal: Improved Oral Intake  Outcome: Ongoing (interventions implemented as appropriate)    Goal: Prevent Further Weight Loss  Outcome: Ongoing (interventions implemented as appropriate)      Problem: Patient Care Overview  Goal: Plan of Care Review  Outcome: Ongoing (interventions implemented as appropriate)   03/13/18 0355   Coping/Psychosocial   Plan of Care Reviewed With patient   OTHER   Outcome Summary meds per order, no falls, no skin breakdown noted, skin care per protocol, ST to see today, see labs and vs   Plan of Care Review   Progress improving     Goal: Individualization and Mutuality  Outcome: Ongoing (interventions implemented as appropriate)    Goal: Discharge Needs Assessment  Outcome: Ongoing (interventions implemented as appropriate)      Problem: Skin Injury Risk (Adult)  Goal: Identify Related Risk Factors and Signs and Symptoms  Outcome: Outcome(s) achieved Date Met: 03/13/18    Goal: Skin Health and Integrity  Outcome: Ongoing (interventions implemented as appropriate)

## 2018-03-13 NOTE — PLAN OF CARE
Problem: Patient Care Overview  Goal: Plan of Care Review  Outcome: Ongoing (interventions implemented as appropriate)   03/13/18 1025   Coping/Psychosocial   Plan of Care Reviewed With patient;sibling   OTHER   Outcome Summary Re-eval of swallow as FEES completed. Recommend NPO secondary to fatigue and risk of aspiration with residue. Pt may have meds crushed with puree. Pt may have 4 ounce pleasure feeds of puree and honey thick liquids twice a day.

## 2018-03-13 NOTE — PROGRESS NOTES
Continued Stay Note  [facility]     Patient Name: Rommel Gonzalez  MRN: [id number]  Today's Date: 3/13/2018    Admit Date: 3/7/2018          Discharge Plan     Row Name 03/13/18 1032       Plan    Plan Referrals to SNF- pt will need [employer] Medicare Precert    Patient/Family in Agreement with Plan yes    Plan Comments CCP spoke with patients daughter Christianne via in bound call and she states pt is too weak to return home at [place]. CCP disucssed SNF and precert process. Reviewed SNF facilties [facility]. and Daughter requested referrals around there, CCP explained medicare.gov, for list of SNF choices (as  she is unable to get to hospital till until tonight). Christianne will aske other family for choices and/or first choice. CCP made referral to [facility]/Daniel gomez, [facility]/Roland Carrera, [facility]/Mynor Paige, and [facility]/Dev Varela. Packet started in ccp office. Patrick DORMAN/CCP              Discharge Codes    No documentation.           Shaneka Couch, RN

## 2018-03-14 ENCOUNTER — APPOINTMENT (OUTPATIENT)
Dept: PHYSICAL THERAPY | Facility: HOSPITAL | Age: 73
End: 2018-03-14

## 2018-03-14 ENCOUNTER — APPOINTMENT (OUTPATIENT)
Dept: OCCUPATIONAL THERAPY | Facility: HOSPITAL | Age: 73
End: 2018-03-14

## 2018-03-14 LAB
ANION GAP SERPL CALCULATED.3IONS-SCNC: 12.9 MMOL/L
BACTERIA SPEC AEROBE CULT: NO GROWTH
BUN BLD-MCNC: 23 MG/DL (ref 8–23)
BUN/CREAT SERPL: 25.3 (ref 7–25)
CALCIUM SPEC-SCNC: 7.9 MG/DL (ref 8.6–10.5)
CHLORIDE SERPL-SCNC: 107 MMOL/L (ref 98–107)
CK SERPL-CCNC: 568 U/L (ref 20–200)
CO2 SERPL-SCNC: 23.1 MMOL/L (ref 22–29)
CREAT BLD-MCNC: 0.91 MG/DL (ref 0.76–1.27)
DEPRECATED RDW RBC AUTO: 48.4 FL (ref 37–54)
ERYTHROCYTE [DISTWIDTH] IN BLOOD BY AUTOMATED COUNT: 13.8 % (ref 11.5–14.5)
GFR SERPL CREATININE-BSD FRML MDRD: 82 ML/MIN/1.73
GLUCOSE BLD-MCNC: 81 MG/DL (ref 65–99)
HCT VFR BLD AUTO: 32.8 % (ref 40.4–52.2)
HEMOCCULT STL QL: NEGATIVE
HGB BLD-MCNC: 10.5 G/DL (ref 13.7–17.6)
MCH RBC QN AUTO: 30.7 PG (ref 27–32.7)
MCHC RBC AUTO-ENTMCNC: 32 G/DL (ref 32.6–36.4)
MCV RBC AUTO: 95.9 FL (ref 79.8–96.2)
PLATELET # BLD AUTO: 150 10*3/MM3 (ref 140–500)
PMV BLD AUTO: 11.7 FL (ref 6–12)
POTASSIUM BLD-SCNC: 4.2 MMOL/L (ref 3.5–5.2)
RBC # BLD AUTO: 3.42 10*6/MM3 (ref 4.6–6)
SODIUM BLD-SCNC: 143 MMOL/L (ref 136–145)
VANCOMYCIN TROUGH SERPL-MCNC: 15.4 MCG/ML (ref 5–20)
WBC NRBC COR # BLD: 15.86 10*3/MM3 (ref 4.5–10.7)

## 2018-03-14 PROCEDURE — 87081 CULTURE SCREEN ONLY: CPT | Performed by: HOSPITALIST

## 2018-03-14 PROCEDURE — 99232 SBSQ HOSP IP/OBS MODERATE 35: CPT | Performed by: NURSE PRACTITIONER

## 2018-03-14 PROCEDURE — 85027 COMPLETE CBC AUTOMATED: CPT | Performed by: HOSPITALIST

## 2018-03-14 PROCEDURE — 97110 THERAPEUTIC EXERCISES: CPT

## 2018-03-14 PROCEDURE — 25010000002 VANCOMYCIN 10 G RECONSTITUTED SOLUTION: Performed by: HOSPITALIST

## 2018-03-14 PROCEDURE — 82550 ASSAY OF CK (CPK): CPT | Performed by: HOSPITALIST

## 2018-03-14 PROCEDURE — 82272 OCCULT BLD FECES 1-3 TESTS: CPT | Performed by: HOSPITALIST

## 2018-03-14 PROCEDURE — 80202 ASSAY OF VANCOMYCIN: CPT | Performed by: HOSPITALIST

## 2018-03-14 PROCEDURE — 25010000002 PIPERACILLIN SOD-TAZOBACTAM PER 1 G: Performed by: HOSPITALIST

## 2018-03-14 PROCEDURE — 25010000002 PIPERACILLIN SOD-TAZOBACTAM PER 1 G: Performed by: INTERNAL MEDICINE

## 2018-03-14 PROCEDURE — 80048 BASIC METABOLIC PNL TOTAL CA: CPT | Performed by: HOSPITALIST

## 2018-03-14 PROCEDURE — 97535 SELF CARE MNGMENT TRAINING: CPT

## 2018-03-14 RX ADMIN — MIDODRINE HYDROCHLORIDE 2.5 MG: 2.5 TABLET ORAL at 11:45

## 2018-03-14 RX ADMIN — CARVEDILOL 9.38 MG: 6.25 TABLET, FILM COATED ORAL at 17:08

## 2018-03-14 RX ADMIN — CARVEDILOL 9.38 MG: 6.25 TABLET, FILM COATED ORAL at 09:31

## 2018-03-14 RX ADMIN — CARBIDOPA AND LEVODOPA 2 TABLET: 25; 100 TABLET ORAL at 09:33

## 2018-03-14 RX ADMIN — CARBIDOPA AND LEVODOPA 2 TABLET: 25; 100 TABLET ORAL at 20:17

## 2018-03-14 RX ADMIN — MULTIPLE VITAMINS W/ MINERALS TAB 1 TABLET: TAB at 09:29

## 2018-03-14 RX ADMIN — MIDODRINE HYDROCHLORIDE 2.5 MG: 2.5 TABLET ORAL at 17:09

## 2018-03-14 RX ADMIN — CARBIDOPA AND LEVODOPA 0.5 TABLET: 25; 250 TABLET ORAL at 15:02

## 2018-03-14 RX ADMIN — MIDODRINE HYDROCHLORIDE 2.5 MG: 2.5 TABLET ORAL at 09:30

## 2018-03-14 RX ADMIN — CARBIDOPA AND LEVODOPA 2 TABLET: 25; 100 TABLET ORAL at 15:01

## 2018-03-14 RX ADMIN — VANCOMYCIN HYDROCHLORIDE 1250 MG: 10 INJECTION, POWDER, LYOPHILIZED, FOR SOLUTION INTRAVENOUS at 09:34

## 2018-03-14 RX ADMIN — RIVAROXABAN 20 MG: 20 TABLET, FILM COATED ORAL at 17:08

## 2018-03-14 RX ADMIN — TAZOBACTAM SODIUM AND PIPERACILLIN SODIUM 3.38 G: 375; 3 INJECTION, SOLUTION INTRAVENOUS at 17:42

## 2018-03-14 RX ADMIN — TAZOBACTAM SODIUM AND PIPERACILLIN SODIUM 3.38 G: 375; 3 INJECTION, SOLUTION INTRAVENOUS at 01:58

## 2018-03-14 RX ADMIN — ZOLPIDEM TARTRATE 5 MG: 5 TABLET ORAL at 20:17

## 2018-03-14 RX ADMIN — CARBIDOPA AND LEVODOPA 0.5 TABLET: 25; 250 TABLET ORAL at 09:30

## 2018-03-14 RX ADMIN — VANCOMYCIN HYDROCHLORIDE 1250 MG: 10 INJECTION, POWDER, LYOPHILIZED, FOR SOLUTION INTRAVENOUS at 20:18

## 2018-03-14 RX ADMIN — CARBIDOPA AND LEVODOPA 0.5 TABLET: 25; 250 TABLET ORAL at 20:17

## 2018-03-14 RX ADMIN — TAZOBACTAM SODIUM AND PIPERACILLIN SODIUM 3.38 G: 375; 3 INJECTION, SOLUTION INTRAVENOUS at 09:33

## 2018-03-14 RX ADMIN — SODIUM CHLORIDE, POTASSIUM CHLORIDE, SODIUM LACTATE AND CALCIUM CHLORIDE 100 ML/HR: 600; 310; 30; 20 INJECTION, SOLUTION INTRAVENOUS at 05:58

## 2018-03-14 RX ADMIN — TERAZOSIN HYDROCHLORIDE 1 MG: 1 CAPSULE ORAL at 23:00

## 2018-03-14 NOTE — PLAN OF CARE
Problem: Patient Care Overview  Goal: Plan of Care Review  Outcome: Ongoing (interventions implemented as appropriate)   03/14/18 7214   Coping/Psychosocial   Plan of Care Reviewed With patient;family   OTHER   Outcome Summary Pt motivated for therapy today. Increasing functional strength and endurance for therapy.   Plan of Care Review   Progress improving

## 2018-03-14 NOTE — PLAN OF CARE
Problem: Patient Care Overview  Goal: Plan of Care Review  Outcome: Ongoing (interventions implemented as appropriate)   03/14/18 1131   Coping/Psychosocial   Plan of Care Reviewed With patient   OTHER   Outcome Summary Pt increased ambulation distance this am. Less cueing required for increased B heel strike, but assist still required for maneuvering RW during turns.

## 2018-03-14 NOTE — PLAN OF CARE
Problem: Nutrition, Imbalanced: Inadequate Oral Intake (Adult)  Goal: Improved Oral Intake  Outcome: Ongoing (interventions implemented as appropriate)    Goal: Prevent Further Weight Loss  Outcome: Ongoing (interventions implemented as appropriate)      Problem: Patient Care Overview  Goal: Plan of Care Review  Outcome: Ongoing (interventions implemented as appropriate)   03/14/18 1505   Coping/Psychosocial   Plan of Care Reviewed With patient;family   OTHER   Outcome Summary Patient up to chair this afternoon. Walked with PT in the velez and with staff to the bathroom. Stool sample collected and resulted negative for occult blood. Medications given per MD order. vss. will continue to monitor.       Problem: Skin Injury Risk (Adult)  Goal: Skin Health and Integrity  Outcome: Ongoing (interventions implemented as appropriate)

## 2018-03-14 NOTE — THERAPY TREATMENT NOTE
Acute Care - Occupational Therapy Progress Note  [facility]     Patient Name: Rommel Gonzalez  : 1945  MRN: [id number]  Today's Date: 3/14/2018     Date of Referral to OT: 18       Admit Date: 3/7/2018       ICD-10-CM ICD-9-CM   1. Traumatic rhabdomyolysis, initial encounter T79.6XXA 958.6   2. Fall at home, initial encounter W19.XXXA E888.9    Y92.099 E849.0   3. Dysarthria R47.1 784.51   4. Progressive supranuclear palsy G23.1 333.0   5. Impaired functional mobility, balance, gait, and endurance Z74.09 V49.89     Patient Active Problem List   Diagnosis   • AF (atrial fibrillation)   • Depression   • Gait instability   • Hypertension   • Insomnia   • Idiopathic Parkinson's disease   • Peripheral neuropathy   • Dysarthria   • Atrial flutter   • Anticoagulated   • Health care maintenance   • Hypothyroidism (acquired)   • Weakness of voice   • Abnormal chest CT   • Facial droop   • Hypersomnia    • Hyperlipidemia   • High risk medication use   • CORINNE on CPAP   • Obstructive sleep apnea, adult   • Non-traumatic rhabdomyolysis   • Fall   • Progressive supranuclear palsy   • Bruising   • Aspiration pneumonia   • Sepsis     Past Medical History:   Diagnosis Date   • AF (atrial fibrillation)    • Atrial flutter    • Atypical chest pain    • Chest pain    • Colon polyps    • Confusion    • Depression    • Disease of thyroid gland    • Disorder of thyroid    • Dyspnea and respiratory abnormalities    • Elevated TSH    • Fatigue    • Gait instability    • Hay fever    • Hyperlipidemia    • Hypertension    • Hypothyroidism    • Insomnia    • Measles    • Mumps    • Palpitations    • Parkinson disease    • Peripheral neuropathy    • Pneumonia    • Poor sleep pattern    • Slurred speech    • Tremor      History reviewed. No pertinent surgical history.    Therapy Treatment    Therapy Treatment / Health Promotion    Treatment Time/Intention  Discipline: occupational therapist (JAVIER Hollis)  Document Type:  therapy note (daily note) (JAVIER Hollis)  Subjective Information: no complaints (JAVIER Hollis)  Mode of Treatment: occupational therapy (JAVIER Hollis)  Patient/Family Observations: sitting in chair (JAVIER Hollis)  Care Plan Review: evaluation/treatment results reviewed (JAVIER Hollis)  Existing Precautions/Restrictions: fall (JAVIER Hollis)  Plan of Care Review  Plan of Care Reviewed With: patient, family (JAVIER Hollis)    Vitals/Pain/Safety  Safety Issues, Functional Mobility  Safety Issues Affecting Function (Mobility): insight into deficits/self awareness (JAVIER Hollis)  Impairments Affecting Function (Mobility): balance, cognition, motor control (JAVIER Hollis)  Positioning and Restraints  Pre-Treatment Position: sitting in chair/recliner (JAVIER Hollis)  In Chair: sitting, call light within reach, encouraged to call for assist, exit alarm on, with family/caregiver (JAVIER Hollis)    Mobility,ADL,Motor, Modality     ADL Assessment/Intervention  BADL Assessment/Intervention: lower body dressing (Lisa Rice, JAVIER)  Upper Body Dressing Assessment/Training  Upper Body Dressing [place] Level: doff, don (hospital gown) (JAVIER Hollis)  Upper Body Dressing Position: supported sitting (sitting in chair) (JAVIER Hollis)  Lower Body Dressing Assessment/Training  Lower Body Dressing [place] Level: doff, don, socks, moderate assist (50% patient effort) (JAVIER Hollis)  Lower Body Dressing Position:  (sitting in chair) (JAVIER Hollis)  Grooming Assessment/Training  [place] Level (Grooming): set up, supervision, wash face, hands (Lisa Rice, JAVIER)  Grooming Position: supported sitting (JAVIER Hollis)  Motor Skills Assessment/Interventions  Additional Documentation: Therapeutic Exercise Interventions (Group), Neuromuscular Re-education (Group) (JAVIER Hollis)  Therapeutic  Exercise  Upper Extremity Range of Motion (Therapeutic Exercise): shoulder flexion/extension, bilateral, elbow flexion/extension, bilateral, wrist flexion/extension, bilateral (JAVIER Hollis)  Hand (Therapeutic Exercise): hand , right, hand , left (JAVIER Hollis)           ROM/MMT             Sensory, Edema, Orthotics          Cognition, Communication, Swallow  Cognitive Assessment/Intervention- PT/OT  Orientation Status (Cognition): oriented to, person, place, time (JAVIER Hollis)  Follows Commands (Cognition): over 90% accuracy (JAVIER Hollis)    Outcome Summary       Occupational Therapy Education     Title: PT OT SLP Therapies (Done)     Topic: Occupational Therapy (Done)     Point: ADL training (Done)     Description: Instruct learner(s) on proper safety adaptation and remediation techniques during self care or transfers.   Instruct in proper use of assistive devices.   Learning Progress Summary     Learner Status Readiness Method Response Comment Documented by    Patient Done Acceptance D,TB,E VU,DU VC and A for posture and to correct R lean in standing.  VC for hand placement and body position during xfers.  Ed on risk of falls and agree with d/c to [facility]'s home for increase assist.  03/09/18 1404          Point: Body mechanics (Done)     Description: Instruct learner(s) on proper positioning and spine alignment during self-care, functional mobility activities and/or exercises.   Learning Progress Summary     Learner Status Readiness Method Response Comment Documented by    Patient Done Acceptance D,TB,E VU,DU VC and A for posture and to correct R lean in standing.  VC for hand placement and body position during xfers.  Ed on risk of falls and agree with d/c to [facility]'s home for increase assist.  03/09/18 1404                      User Key     Initials Effective Dates Name Provider Type Discipline    LE 03/07/18 -  Evelyne Zepeda OTR Occupational Therapist OT                   OT Recommendation and Plan  Therapy Frequency (OT Eval): 5 times/wk  Plan of Care Review  Plan of Care Reviewed With: patient, family  Plan of Care Reviewed With: patient, family  Outcome Summary: Pt motivated for therapy today.  Increasing functional strength and endurance for therapy.        Outcome Measures     Row Name 03/14/18 1505 03/14/18 1110 03/13/18 1537       How much help from another person do you currently need...    Turning from your back to your side while in flat bed without using bedrails?  -- 3  -RW 3  -RW    Moving from lying on back to sitting on the side of a flat bed without bedrails?  -- 3  -RW 3  -RW    Moving to and from a bed to a chair (including a wheelchair)?  -- 3  -RW 3  -RW    Standing up from a chair using your arms (e.g., wheelchair, bedside chair)?  -- 3  -RW 3  -RW    Climbing 3-5 steps with a railing?  -- 1  -RW 1  -RW    To walk in hospital room?  -- 3  -RW 3  -RW    AM-PAC 6 Clicks Score  -- 16  -RW 16  -RW       How much help from another is currently needed...    Putting on and taking off regular lower body clothing? 2  -SG  --  --    Bathing (including washing, rinsing, and drying) 2  -SG  --  --    Toileting (which includes using toilet bed pan or urinal) 2  -SG  --  --    Putting on and taking off regular upper body clothing 3  -SG  --  --    Taking care of personal grooming (such as brushing teeth) 3  -SG  --  --    Eating meals 1  -SG  --  --    Score 13  -SG  --  --       Functional Assessment    Outcome Measure Options  -- AM-PAC 6 Clicks Basic Mobility (PT)  -RW AM-PAC 6 Clicks Basic Mobility (PT)  -RW    Row Name 03/13/18 1514 03/12/18 1520 03/12/18 1355       How much help from another person do you currently need...    Turning from your back to your side while in flat bed without using bedrails?  -- 3  -RW  --    Moving from lying on back to sitting on the side of a flat bed without bedrails?  -- 2  -RW  --    Moving to and from a bed to a chair (including  a wheelchair)?  -- 2  -RW  --    Standing up from a chair using your arms (e.g., wheelchair, bedside chair)?  -- 2  -RW  --    Climbing 3-5 steps with a railing?  -- 1  -RW  --    To walk in hospital room?  -- 2  -RW  --    AM-PAC 6 Clicks Score  -- 12  -RW  --       How much help from another is currently needed...    Putting on and taking off regular lower body clothing? 2  -SG  -- 1  -KP    Bathing (including washing, rinsing, and drying) 2  -SG  -- 2  -KP    Toileting (which includes using toilet bed pan or urinal) 2  -SG  -- 2  -KP    Putting on and taking off regular upper body clothing 3  -SG  -- 3  -KP    Taking care of personal grooming (such as brushing teeth) 3  -SG  -- 3  -KP    Eating meals 1  -SG  -- 3  -KP    Score 13  -SG  -- 14  -KP       Functional Assessment    Outcome Measure Options  -- AM-PAC 6 Clicks Basic Mobility (PT)  -RW AM-PAC 6 Clicks Daily Activity (OT)  -      User Key  (r) = Recorded By, (t) = Taken By, (c) = Cosigned By    Initials Name Provider Type     Lisa Rice OTR Occupational Therapist    DARSHAN Lepe, OTR Occupational Therapist    RW Luz Montgomery, PTA Physical Therapy Assistant           Time Calculation:         Time Calculation- OT     Row Name 03/14/18 1505             Time Calculation- OT    OT Start Time 1327  -SG      OT Stop Time 1337  -SG      OT Time Calculation (min) 10 min  -      OT Received On 03/14/18  -        User Key  (r) = Recorded By, (t) = Taken By, (c) = Cosigned By    Initials Name Provider Type     JAVIER Hollis Occupational Therapist           Therapy Charges for Today     Code Description Service Date Service Provider Modifiers Qty    92792877069 HC OT SELF CARE/MGMT/TRAIN EA 15 MIN 3/13/2018 JAVIER Hollis GO 1    29683646566 HC OT SELF CARE/MGMT/TRAIN EA 15 MIN 3/14/2018 JAVIER Hollis GO 1               JAVIER Hollis  3/14/2018

## 2018-03-14 NOTE — PROGRESS NOTES
"    Patient Name: Rommel Gonzalez  :1945  72 y.o.      Patient Care Team:  Luiz Onofre MD as PCP - General (Family Medicine)    Chief Complaint: follow up atrial fibrillation/flutter    Interval History: walked well with physical therapy. Still a little slow to respond but overall more interactive and improving.        Objective   Vital Signs  Temp:  [98.1 °F (36.7 °C)-98.6 °F (37 °C)] 98.6 °F (37 °C)  Heart Rate:  [84-93] 93  Resp:  [16] 16  BP: (114-135)/(64-93) 135/93    Intake/Output Summary (Last 24 hours) at 18 1302  Last data filed at 18 0558   Gross per 24 hour   Intake             1903 ml   Output              450 ml   Net             1453 ml     Flowsheet Rows    Flowsheet Row First Filed Value   Admission Height 182.9 cm (72\") Documented at 2018 1448   Admission Weight 81.6 kg (180 lb) Documented at 2018 1448          Physical Exam:   General Appearance:    Alert, cooperative, in no acute distress   Lungs:     Clear to auscultation.  Normal respiratory effort and rate.      Heart:    irregular rhythm and normal rate, normal S1 and S2, no murmurs, gallops or rubs.     Chest Wall:    No abnormalities observed   Abdomen:     Soft, nontender, positive bowel sounds.     Extremities:   no cyanosis, clubbing or edema.  No marked joint deformities.  Adequate musculoskeletal strength.       Results Review:      Results from last 7 days  Lab Units 18  0552   SODIUM mmol/L 143   POTASSIUM mmol/L 4.2   CHLORIDE mmol/L 107   CO2 mmol/L 23.1   BUN mg/dL 23   CREATININE mg/dL 0.91   GLUCOSE mg/dL 81   CALCIUM mg/dL 7.9*       Results from last 7 days  Lab Units 18  0552 18  0641 18  0548   CK TOTAL U/L 568* 1,161* 2,814*       Results from last 7 days  Lab Units 18  0552   WBC 10*3/mm3 15.86*   HEMOGLOBIN g/dL 10.5*   HEMATOCRIT % 32.8*   PLATELETS 10*3/mm3 150       Results from last 7 days  Lab Units 18  1500   INR  2.57*                     "   Medication Review:     carbidopa-levodopa 2 tablet Oral TID   carbidopa-levodopa 0.5 tablet Oral TID   carvedilol 9.375 mg Oral BID With Meals   levothyroxine 112 mcg Oral Q AM   midodrine 2.5 mg Oral TID AC   multivitamin with minerals 1 tablet Oral Daily   piperacillin-tazobactam 3.375 g Intravenous Q8H   rivaroxaban 20 mg Oral Daily With Dinner   terazosin 1 mg Oral Nightly   vancomycin 1,250 mg Intravenous Q12H          lactated ringers 100 mL/hr Last Rate: 100 mL/hr (03/14/18 0558)   Pharmacy to dose vancomycin         Assessment/Plan   1. Atrial fibrillation/ flutter -  Rate controlled. He is on Xarrelto. Multiple falls at home. Plan to discharge to live with daughter now looking for SNF placement. Long discussion with sister at bedside re: safety of anticoagulation moving forward. Will need to reassess safety at discharge by Dr. Bennett.   2. Progressive supranuclear palsy + parkinsons + autonomic dysfunction  - on midodrine  3. Chronic diastolic heart failure - diuretics on hold  4. HTN - blood pressure currently stable. On BB and midodrine.   5. Prolonged QT interval  6. Orthostatic hypotension  7. Poor oral intake + dysphagia- they are considering feeding tube. Xarelto will need to be held if this is planned. Last dose was yesterday.  8. pnuemonia  9. Encephalopathy    Seems to be improving, however still unable to maintain adequate nutritional intake. Family to discuss feeding tube vs palliative care today with Dr. Whiteside.    JOVANY Parnell  [facility]  03/14/18  1:02 PM

## 2018-03-14 NOTE — THERAPY TREATMENT NOTE
Acute Care - Physical Therapy Treatment Note  [facility]     Patient Name: Rommel Gonzalez  : 1945  MRN: [id number]  Today's Date: 3/14/2018     Date of Referral to PT: 18       Admit Date: 3/7/2018    Visit Dx:    ICD-10-CM ICD-9-CM   1. Traumatic rhabdomyolysis, initial encounter T79.6XXA 958.6   2. Fall at home, initial encounter W19.XXXA E888.9    Y92.099 E849.0   3. Dysarthria R47.1 784.51   4. Progressive supranuclear palsy G23.1 333.0   5. Impaired functional mobility, balance, gait, and endurance Z74.09 V49.89     Patient Active Problem List   Diagnosis   • AF (atrial fibrillation)   • Depression   • Gait instability   • Hypertension   • Insomnia   • Idiopathic Parkinson's disease   • Peripheral neuropathy   • Dysarthria   • Atrial flutter   • Anticoagulated   • Health care maintenance   • Hypothyroidism (acquired)   • Weakness of voice   • Abnormal chest CT   • Facial droop   • Hypersomnia    • Hyperlipidemia   • High risk medication use   • CORINNE on CPAP   • Obstructive sleep apnea, adult   • Non-traumatic rhabdomyolysis   • Fall   • Progressive supranuclear palsy   • Bruising   • Aspiration pneumonia   • Sepsis       Therapy Treatment    Therapy Treatment / Health Promotion    Treatment Time/Intention  Discipline: physical therapy assistant  Document Type: therapy note (daily note)  Subjective Information: no complaints  Patient Effort: good  Plan of Care Review  Plan of Care Reviewed With: patient    Vitals/Pain/Safety  Pain Scale: Numbers Pre/Post-Treatment  Pain Scale: Numbers, Pretreatment: 0/10 - no pain  Safety Issues, Functional Mobility  Safety Issues Affecting Function (Mobility): at risk behavior observed, insight into deficits/self awareness, judgment, safety precaution awareness, safety precautions follow-through/compliance  Impairments Affecting Function (Mobility): balance, coordination, endurance/activity tolerance, strength  Positioning and Restraints  Pre-Treatment Position:  in bed  Post Treatment Position: chair  In Chair: sitting, call light within reach, encouraged to call for assist, exit alarm on, with family/caregiver    Mobility,ADL,Motor, Modality  Bed Mobility Assessment/Treatment  Supine-Sit [place] (Bed Mobility): minimum assist (75% patient effort), verbal cues, nonverbal cues (demo/gesture)  Sit-Supine [place] (Bed Mobility): not tested (Pt up in chair)  Assistive Device (Bed Mobility): bed rails, head of bed elevated  Transfer Assessment/Treatment  Transfer Assessment/Treatment: sit-stand transfer, stand-sit transfer  Comment (Transfers): Cueing required for hand placement  Sit-Stand Transfer  Sit-Stand [place] (Transfers): minimum assist (75% patient effort), verbal cues, 1 person assist, 1 person to manage equipment  Assistive Device (Sit-Stand Transfers): walker, front-wheeled  Stand-Sit Transfer  Stand-Sit [place] (Transfers): minimum assist (75% patient effort), 1 person assist, 1 person to manage equipment, verbal cues  Assistive Device (Stand-Sit Transfers): walker, front-wheeled  Gait/Stairs Assessment/Training  [place] Level (Gait): minimum assist (75% patient effort), 1 person assist, 1 person to manage equipment, verbal cues  Assistive Device (Gait): walker, front-wheeled  Distance in Feet (Gait): 100  Pattern (Gait): swing-through  Deviations/Abnormal Patterns (Gait): base of support, narrow, adri decreased, gait speed decreased, stride length decreased  Bilateral Gait Deviations: heel strike decreased  Comment (Gait/Stairs): Assist required to steer RW during turns                 ROM/MMT             Sensory, Edema, Orthotics          Cognition, Communication, Swallow  Cognitive Assessment/Intervention- PT/OT  Affect/Mental Status (Cognitive): flat/blunted affect  Orientation Status (Cognition): oriented to, person, place  Follows Commands (Cognition): 75-90% accuracy, physical/tactile prompts required, repetition of directions  required, verbal cues/prompting required  Executive Function Deficit (Cognition): mild deficit, insight/awareness of deficits, judgment  Personal Safety Interventions: fall prevention program maintained, gait belt, nonskid shoes/slippers when out of bed    Outcome Summary             Physical Therapy Education     Title: PT OT SLP Therapies (Done)     Topic: Physical Therapy (Done)     Point: Mobility training (Done)    Learning Progress Summary     Learner Status Readiness Method Response Comment Documented by    Patient Done Acceptance E,TB,D VU,NR   03/14/18 1132     Done Acceptance E,TB,D VU,NR   03/13/18 1538     Done Acceptance E,D VU,NR   03/12/18 1535     Done Acceptance E VU,NR ed on gait impairment corrections, and ed on safety, up only with assist. SP 03/10/18 1702     Done Acceptance E VU  MA 03/09/18 1445    Family Done Acceptance E,TB,D VU,[facility] 03/14/18 1132     Done Acceptance E,D VU,[facility] 03/12/18 1535          Point: Home exercise program (Done)    Learning Progress Summary     Learner Status Readiness Method Response Comment Documented by    Patient Done Acceptance E VU  MA 03/09/18 1445          Point: Body mechanics (Done)    Learning Progress Summary     Learner Status Readiness Method Response Comment Documented by    Patient Done Acceptance E,TB,D VU,NR   03/14/18 1132     Done Acceptance E,TB,D VU,[facility] 03/13/18 1538     Done Acceptance E,D VU,[facility] 03/12/18 1535     Done Acceptance E VU  MA 03/09/18 1445    Family Done Acceptance E,TB,D VU,[facility] 03/14/18 1132     Done Acceptance E,D VU,[facility] 03/12/18 1535          Point: Precautions (Done)    Learning Progress Summary     Learner Status Readiness Method Response Comment Documented by    Patient Done Acceptance E,TB,D VU,[facility] 03/14/18 1132     Done Acceptance E,TB,D VU,[facility] 03/13/18 1538     Done Acceptance E,D VU,[facility] 03/12/18 1535     Done Acceptance E VU,NR ed on gait impairment corrections, and ed on safety, up only  with assist. SP 03/10/18 1702     Done Acceptance E VU  MA 03/09/18 1445    Family Done Acceptance E,TB,D VU,NR   03/14/18 1132     Done Acceptance E,D VU,NR   03/12/18 1535                      User Key     Initials Effective Dates Name Provider Type Discipline     03/07/18 -  Luz Montgomery, PTA Physical Therapy Assistant PT    MA 03/07/18 -  Eve Allen, PT Physical Therapist PT    SP 01/09/17 -  Monica Booker, PT Physical Therapist PT                    PT Recommendation and Plan     Plan of Care Reviewed With: patient  Outcome Summary: Pt increased ambulation distance this am. Less cueing required for increased B heel strike, but assist still required for maneuvering RW during turns.           Outcome Measures     Row Name 03/14/18 1110 03/13/18 1537 03/13/18 1514       How much help from another person do you currently need...    Turning from your back to your side while in flat bed without using bedrails? 3  -RW 3  -RW  --    Moving from lying on back to sitting on the side of a flat bed without bedrails? 3  -RW 3  -RW  --    Moving to and from a bed to a chair (including a wheelchair)? 3  -RW 3  -RW  --    Standing up from a chair using your arms (e.g., wheelchair, bedside chair)? 3  -RW 3  -RW  --    Climbing 3-5 steps with a railing? 1  -RW 1  -RW  --    To walk in hospital room? 3  -RW 3  -RW  --    AM-PAC 6 Clicks Score 16  -RW 16  -RW  --       How much help from another is currently needed...    Putting on and taking off regular lower body clothing?  --  -- 2  -SG    Bathing (including washing, rinsing, and drying)  --  -- 2  -SG    Toileting (which includes using toilet bed pan or urinal)  --  -- 2  -SG    Putting on and taking off regular upper body clothing  --  -- 3  -SG    Taking care of personal grooming (such as brushing teeth)  --  -- 3  -SG    Eating meals  --  -- 1  -SG    Score  --  -- 13  -SG       Functional Assessment    Outcome Measure Options AM-PAC 6 Clicks Basic  Mobility (PT)  -RW AM-PAC 6 Clicks Basic Mobility (PT)  -RW  --    Row Name 03/12/18 1520 03/12/18 1355          How much help from another person do you currently need...    Turning from your back to your side while in flat bed without using bedrails? 3  -RW  --     Moving from lying on back to sitting on the side of a flat bed without bedrails? 2  -RW  --     Moving to and from a bed to a chair (including a wheelchair)? 2  -RW  --     Standing up from a chair using your arms (e.g., wheelchair, bedside chair)? 2  -RW  --     Climbing 3-5 steps with a railing? 1  -RW  --     To walk in hospital room? 2  -RW  --     AM-PAC 6 Clicks Score 12  -RW  --        How much help from another is currently needed...    Putting on and taking off regular lower body clothing?  -- 1  -KP     Bathing (including washing, rinsing, and drying)  -- 2  -KP     Toileting (which includes using toilet bed pan or urinal)  -- 2  -KP     Putting on and taking off regular upper body clothing  -- 3  -KP     Taking care of personal grooming (such as brushing teeth)  -- 3  -KP     Eating meals  -- 3  -KP     Score  -- 14  -KP        Functional Assessment    Outcome Measure Options AM-PAC 6 Clicks Basic Mobility (PT)  -RW AM-PAC 6 Clicks Daily Activity (OT)  -       User Key  (r) = Recorded By, (t) = Taken By, (c) = Cosigned By    Initials Name Provider Type    DARWIN Rice, OTR Occupational Therapist    DARSHAN Lepe, OTR Occupational Therapist    FAUSTINA Montgomery PTA Physical Therapy Assistant           Time Calculation:         PT Charges     Row Name 03/14/18 1131             Time Calculation    Start Time 1103  -RW      Stop Time 1120  -RW      Time Calculation (min) 17 min  -RW      PT Received On 03/14/18  -RW      PT - Next Appointment 03/15/18  -RW        User Key  (r) = Recorded By, (t) = Taken By, (c) = Cosigned By    Initials Name Provider Type    FAUSTINA Montgomery, CLARENCE Physical Therapy Assistant          Therapy  Charges for Today     Code Description Service Date Service Provider Modifiers Qty    06572020861 HC PT THER PROC EA 15 MIN 3/13/2018 Luz Montgomery, PTA GP 1    02390011857 HC PT THER SUPP EA 15 MIN 3/13/2018 Luz Montgomery, PTA GP 1    51421788957 HC PT THER PROC EA 15 MIN 3/14/2018 Luz Montgomery, PTA GP 1    55672440364 HC PT THER SUPP EA 15 MIN 3/14/2018 Luz Montgomery, PTA GP 1          PT G-Codes  Outcome Measure Options: AM-PAC 6 Clicks Basic Mobility (PT)    Luz Montgomery PTA  3/14/2018

## 2018-03-14 NOTE — PROGRESS NOTES
[facility]     LOS: 7 days     Name: Rommel Gonzalez  Age: 72 y.o.  Sex: male  :  1945  MRN: [id number]         Primary Care Physician: Luiz Onofre MD    NPO Diet NPO Except: Other; Other: Except meds crushed in applesauce    Subjective    Alert today. No complaints. Walked with PTx2. Discussed with patient and multiple family regarding aspiration PNA and dysphagia and possible PEG and goals of care and progression of disease and prognosis. Time: 35 minutes, greater than 50% spent in counseling and coordination of care.     Objective   Temp:  [98.1 °F (36.7 °C)-98.6 °F (37 °C)] 98.6 °F (37 °C)  Heart Rate:  [84-93] 93  Resp:  [16] 16  BP: (114-135)/(64-93) 135/93  SpO2:  [92 %-96 %] 93 %  on  Flow (L/min):  [2] 2;   Device (Oxygen Therapy): room air  Body mass index is 24.95 kg/m².    Physical Exam   Constitutional: No distress.   Cardiovascular: Normal rate and regular rhythm.    Pulmonary/Chest: Effort normal and breath sounds normal. No respiratory distress.   Abdominal: Soft. There is no tenderness.   Musculoskeletal: He exhibits no edema.   Neurological: He is alert.   Oriented to [place], 2018 but slow. Appropriate.   Skin: Skin is warm and dry.   Psychiatric: He is slowed.     Reviewed medications and new clinical results    carbidopa-levodopa 2 tablet Oral TID   carbidopa-levodopa 0.5 tablet Oral TID   carvedilol 9.375 mg Oral BID With Meals   levothyroxine 112 mcg Oral Q AM   midodrine 2.5 mg Oral TID AC   multivitamin with minerals 1 tablet Oral Daily   piperacillin-tazobactam 3.375 g Intravenous Q8H   rivaroxaban 20 mg Oral Daily With Dinner   terazosin 1 mg Oral Nightly   vancomycin 1,250 mg Intravenous Q12H       lactated ringers 100 mL/hr Last Rate: 100 mL/hr (18 0558)   Pharmacy to dose vancomycin         Results from last 7 days  Lab Units 18  0552 18  0641 18  1209 18  0548 18  2272  03/08/18  0554 03/07/18  1500   WBC 10*3/mm3 15.86* 20.69* 29.32* 27.15* 27.54* 7.15 8.99   HEMOGLOBIN g/dL 10.5* 10.0* 10.4* 10.4* 10.8* 10.4* 13.1*   PLATELETS 10*3/mm3 150 138* 138* 144 135* 176 202       Results from last 7 days  Lab Units 03/14/18  0552 03/13/18  0641 03/12/18  0548 03/11/18  0658 03/10/18  0604 03/09/18  0720 03/08/18  0554   SODIUM mmol/L 143 142 141 137 140 141 141   POTASSIUM mmol/L 4.2 3.9 4.3 4.3 4.0 3.9 3.4*   CHLORIDE mmol/L 107 106 104 99 106 108* 108*   CO2 mmol/L 23.1 27.9 25.1 23.5 27.5 29.2* 29.8*   BUN mg/dL 23 25* 20 8 7* 10 12   CREATININE mg/dL 0.91 0.93 1.19 0.85 0.77 0.73* 0.86   CALCIUM mg/dL 7.9* 7.8* 7.5* 8.2* 7.7* 7.7* 7.5*   GLUCOSE mg/dL 81 88 95 87 101* 86 96     Lab Results   Component Value Date    ANIONGAP 12.9 03/14/2018     Estimated Creatinine Clearance: 85.4 mL/min (by C-G formula based on SCr of 0.91 mg/dL).    Results from last 7 days  Lab Units 03/14/18  0552 03/13/18  0641 03/12/18  0548   CK TOTAL U/L 568* 1,161* 2,814*     Assessment/Plan   Active Hospital Problems (** Indicates Principal Problem)    Diagnosis Date Noted   • **Non-traumatic rhabdomyolysis [M62.82] 03/07/2018   • Aspiration pneumonia [J69.0] 03/12/2018   • Sepsis [A41.9] 03/12/2018   • Fall [W19.XXXA] 03/07/2018   • Progressive supranuclear palsy [G23.1] 03/07/2018   • Bruising [T14.8XXA] 03/07/2018   • Obstructive sleep apnea, adult [G47.33] 10/26/2017   • Weakness of voice [R49.8] 06/23/2016   • Anticoagulated [Z79.01] 04/05/2016   • Idiopathic Parkinson's disease [G20] 03/28/2016   • Hypertension [I10] 03/28/2016   • AF (atrial fibrillation) [I48.91] 03/28/2016   • Gait instability [R26.81] 03/28/2016   • Dysarthria [R47.1] 03/28/2016      Resolved Hospital Problems    Diagnosis Date Noted Date Resolved   No resolved problems to display.     · Aspiration PNA: Continue Zosyn. PCT and WBC improving. Check MRSA screen.  · Rhabdomyolysis: CPK improving. Stopped statin. Continue IV fluids  more now that he is NPO.  · Elevated BUN:Cr. Hgb stable. Occult blood negative. Could be muscle  · Encephalopathy: improved. Likely due to pneumonia. Dysphagia: discussed with patient as well as multiple family members feeding tube versus allowing nothing by mouth and goals of care and quality of life. They will discuss further amongst themselves.  · Parkinson's disease, supranuclear palsy, autonomic dysfunction. Stable BPs at present.  · Atrial fibrillation: Continues on Xarelto, coreg. If they decide on PEG will need to stop xarelto.  · Disposition: plans were with daughter on discharge but probably will need skilled facility.  · I discussed the patient's findings and my recommendations with patient, family and nursing staff.    Aleksey Whiteside MD   03/14/18  1:29 PM

## 2018-03-14 NOTE — PROGRESS NOTES
"Pharmacokinetic Consult - Vancomycin Dosing (Follow-up Note)    Rommel Gonzalez is on day #3  pharmacy to dose vancomycin for PNA.  Pharmacy dosing vancomycin per Dr Mora 's request.   Goal trough: 15-20 mg/L   Admit date: 3/7/2018  2:47 PM     Relevant clinical data and objective history reviewed:  72 y.o. male 182.9 cm (72\") 83.5 kg (184 lb)    Past Medical History:   Diagnosis Date   • AF (atrial fibrillation)    • Atrial flutter    • Atypical chest pain    • Chest pain    • Colon polyps    • Confusion    • Depression    • Disease of thyroid gland    • Disorder of thyroid    • Dyspnea and respiratory abnormalities    • Elevated TSH    • Fatigue    • Gait instability    • Hay fever    • Hyperlipidemia    • Hypertension    • Hypothyroidism    • Insomnia    • Measles    • Mumps    • Palpitations    • Parkinson disease    • Peripheral neuropathy    • Pneumonia    • Poor sleep pattern    • Slurred speech    • Tremor      Creatinine   Date Value Ref Range Status   03/14/2018 0.91 0.76 - 1.27 mg/dL Final   03/13/2018 0.93 0.76 - 1.27 mg/dL Final   03/12/2018 1.19 0.76 - 1.27 mg/dL Final     BUN   Date Value Ref Range Status   03/14/2018 23 8 - 23 mg/dL Final     Estimated Creatinine Clearance: 85.4 mL/min (by C-G formula based on SCr of 0.91 mg/dL).    Lab Results   Component Value Date    WBC 15.86 (H) 03/14/2018     Temp Readings from Last 3 Encounters:   03/13/18 98.6 °F (37 °C) (Axillary)   02/02/18 97.9 °F (36.6 °C)   11/01/17 97.6 °F (36.4 °C)        Baseline cultures/source:  3/11 BCx NGTD  3/11 PCT 55.39  3/11 Lactate 1.9  3/12 WBC 27.15, 3/13 WBC 20.69  3/14 WBC 15.86  3/12 UCx NGTD     Anti-Infectives     Ordered     Dose/Rate Route Frequency Start Stop    03/13/18 0805  vancomycin 1250 mg/250 mL 0.9% NS IVPB ([facility])     Ordering Provider:  Aleksey Whiteside MD    1,250 mg  over 125 Minutes Intravenous Every 12 Hours 03/13/18 0900 03/18/18 2059 03/12/18 1717  vancomycin (VANCOCIN) in iso-osmotic dextrose IVPB 1 " g (premix) 200 mL     Ordering Provider:  Aleksey Whiteside MD    1,000 mg  over 100 Minutes Intravenous Once 03/12/18 1730 03/12/18 2007 03/11/18 1956  piperacillin-tazobactam (ZOSYN) 3.375 g in iso-osmotic dextrose 50 ml (premix)     Ordering Provider:  Kyara Mora MD    3.375 g  over 4 Hours Intravenous Every 8 Hours 03/12/18 0230 03/16/18 0229    03/11/18 2003  vancomycin 1500 mg/500 mL 0.9% NS IVPB ([facility])     Ordering Provider:  Kyara Mora MD    20 mg/kg × 80.5 kg  over 150 Minutes Intravenous Once 03/11/18 2045 03/12/18 0031 03/11/18 1956  piperacillin-tazobactam (ZOSYN) 4.5 g in iso-osmotic dextrose 100 mL IVPB (premix)     Ordering Provider:  Kyara Mora MD    4.5 g  over 30 Minutes Intravenous Once 03/11/18 2030 03/11/18 2110 03/11/18 1955  Pharmacy to dose vancomycin     Aleksandra Newton [facility] reviewed the order on 03/12/18 1128.   Ordering Provider:  Aleksey Whiteside MD     Does not apply Continuous PRN 03/11/18 1953 03/18/18 1952         Lab Results   Component Value Date    VANCOTROUGH 15.40 03/14/2018     Lab Results   Component Value Date    VANCORANDOM 10.50 03/13/2018     VANCOMYCIN DOSING SCHEDULE ( INCLUDING LEVELS)  3/11 2201 vancomycin 1500 mg iv once, followed by 1250 mg iv q12h.              3/12 0911 random level = 10.5 mcg/mL, 10 hr  3/12 1827 vancomycin 1g iv once -given late due to trouble with line              3/13 0641 random level = 10.5 mcg/mL, 12 hr  3/13 1951 vancomycin 1250mg iv q 12 hours              3/14 0822 vancomycin trough = 15.4mcg/ml , 12 hr      Assessment/Plan   1. See above for dosing schedule.....cont same regimen.   2. Monitor renal function…bmp is ordered daily  3. Encourage hydration to prevent toxic accumulation   4. Monitor for s/sx of toxicity including incr in SCr and decr in UOP  5. Pharmacy will continue to follow and adjust accordingly    Palak Pace RPH

## 2018-03-15 PROBLEM — M62.82 NON-TRAUMATIC RHABDOMYOLYSIS: Status: RESOLVED | Noted: 2018-03-07 | Resolved: 2018-03-15

## 2018-03-15 PROBLEM — R13.10 DYSPHAGIA: Status: ACTIVE | Noted: 2018-03-07

## 2018-03-15 PROBLEM — G93.41 METABOLIC ENCEPHALOPATHY: Status: ACTIVE | Noted: 2018-03-15

## 2018-03-15 LAB
ANION GAP SERPL CALCULATED.3IONS-SCNC: 13.3 MMOL/L
BUN BLD-MCNC: 6 MG/DL (ref 8–23)
BUN/CREAT SERPL: 7 (ref 7–25)
CALCIUM SPEC-SCNC: 7.6 MG/DL (ref 8.6–10.5)
CHLORIDE SERPL-SCNC: 108 MMOL/L (ref 98–107)
CK SERPL-CCNC: 308 U/L (ref 20–200)
CO2 SERPL-SCNC: 19.7 MMOL/L (ref 22–29)
CREAT BLD-MCNC: 0.86 MG/DL (ref 0.76–1.27)
DEPRECATED RDW RBC AUTO: 47.6 FL (ref 37–54)
ERYTHROCYTE [DISTWIDTH] IN BLOOD BY AUTOMATED COUNT: 13.5 % (ref 11.5–14.5)
GFR SERPL CREATININE-BSD FRML MDRD: 87 ML/MIN/1.73
GLUCOSE BLD-MCNC: 74 MG/DL (ref 65–99)
HCT VFR BLD AUTO: 34.2 % (ref 40.4–52.2)
HGB BLD-MCNC: 11.1 G/DL (ref 13.7–17.6)
MCH RBC QN AUTO: 31.1 PG (ref 27–32.7)
MCHC RBC AUTO-ENTMCNC: 32.5 G/DL (ref 32.6–36.4)
MCV RBC AUTO: 95.8 FL (ref 79.8–96.2)
PLATELET # BLD AUTO: 188 10*3/MM3 (ref 140–500)
PMV BLD AUTO: 11.6 FL (ref 6–12)
POTASSIUM BLD-SCNC: 4.1 MMOL/L (ref 3.5–5.2)
RBC # BLD AUTO: 3.57 10*6/MM3 (ref 4.6–6)
SODIUM BLD-SCNC: 141 MMOL/L (ref 136–145)
WBC NRBC COR # BLD: 11.9 10*3/MM3 (ref 4.5–10.7)

## 2018-03-15 PROCEDURE — 80048 BASIC METABOLIC PNL TOTAL CA: CPT | Performed by: HOSPITALIST

## 2018-03-15 PROCEDURE — 25010000002 VANCOMYCIN 10 G RECONSTITUTED SOLUTION: Performed by: HOSPITALIST

## 2018-03-15 PROCEDURE — 25010000002 PIPERACILLIN SOD-TAZOBACTAM PER 1 G: Performed by: HOSPITALIST

## 2018-03-15 PROCEDURE — 82550 ASSAY OF CK (CPK): CPT | Performed by: HOSPITALIST

## 2018-03-15 PROCEDURE — 99231 SBSQ HOSP IP/OBS SF/LOW 25: CPT | Performed by: SURGERY

## 2018-03-15 PROCEDURE — 85027 COMPLETE CBC AUTOMATED: CPT | Performed by: HOSPITALIST

## 2018-03-15 PROCEDURE — 99232 SBSQ HOSP IP/OBS MODERATE 35: CPT | Performed by: INTERNAL MEDICINE

## 2018-03-15 PROCEDURE — 97110 THERAPEUTIC EXERCISES: CPT

## 2018-03-15 RX ORDER — DEXTROSE, SODIUM CHLORIDE, AND POTASSIUM CHLORIDE 5; .45; .15 G/100ML; G/100ML; G/100ML
75 INJECTION INTRAVENOUS CONTINUOUS
Status: DISCONTINUED | OUTPATIENT
Start: 2018-03-15 | End: 2018-03-17

## 2018-03-15 RX ADMIN — CARVEDILOL 9.38 MG: 6.25 TABLET, FILM COATED ORAL at 08:02

## 2018-03-15 RX ADMIN — SODIUM CHLORIDE, POTASSIUM CHLORIDE, SODIUM LACTATE AND CALCIUM CHLORIDE 100 ML/HR: 600; 310; 30; 20 INJECTION, SOLUTION INTRAVENOUS at 02:22

## 2018-03-15 RX ADMIN — MIDODRINE HYDROCHLORIDE 2.5 MG: 2.5 TABLET ORAL at 08:02

## 2018-03-15 RX ADMIN — CARVEDILOL 9.38 MG: 6.25 TABLET, FILM COATED ORAL at 17:50

## 2018-03-15 RX ADMIN — VANCOMYCIN HYDROCHLORIDE 1250 MG: 10 INJECTION, POWDER, LYOPHILIZED, FOR SOLUTION INTRAVENOUS at 09:35

## 2018-03-15 RX ADMIN — TAZOBACTAM SODIUM AND PIPERACILLIN SODIUM 3.38 G: 375; 3 INJECTION, SOLUTION INTRAVENOUS at 02:21

## 2018-03-15 RX ADMIN — TAZOBACTAM SODIUM AND PIPERACILLIN SODIUM 3.38 G: 375; 3 INJECTION, SOLUTION INTRAVENOUS at 09:35

## 2018-03-15 RX ADMIN — POTASSIUM CHLORIDE, DEXTROSE MONOHYDRATE AND SODIUM CHLORIDE 75 ML/HR: 150; 5; 450 INJECTION, SOLUTION INTRAVENOUS at 17:49

## 2018-03-15 RX ADMIN — MIDODRINE HYDROCHLORIDE 2.5 MG: 2.5 TABLET ORAL at 17:50

## 2018-03-15 RX ADMIN — TAZOBACTAM SODIUM AND PIPERACILLIN SODIUM 3.38 G: 375; 3 INJECTION, SOLUTION INTRAVENOUS at 17:51

## 2018-03-15 RX ADMIN — TERAZOSIN HYDROCHLORIDE 1 MG: 1 CAPSULE ORAL at 20:20

## 2018-03-15 RX ADMIN — CARBIDOPA AND LEVODOPA 0.5 TABLET: 25; 250 TABLET ORAL at 08:01

## 2018-03-15 RX ADMIN — CARBIDOPA AND LEVODOPA 0.5 TABLET: 25; 250 TABLET ORAL at 20:21

## 2018-03-15 RX ADMIN — MIDODRINE HYDROCHLORIDE 2.5 MG: 2.5 TABLET ORAL at 11:49

## 2018-03-15 RX ADMIN — CARBIDOPA AND LEVODOPA 2 TABLET: 25; 100 TABLET ORAL at 17:49

## 2018-03-15 RX ADMIN — CARBIDOPA AND LEVODOPA 0.5 TABLET: 25; 250 TABLET ORAL at 17:50

## 2018-03-15 RX ADMIN — MULTIPLE VITAMINS W/ MINERALS TAB 1 TABLET: TAB at 08:02

## 2018-03-15 RX ADMIN — CARBIDOPA AND LEVODOPA 2 TABLET: 25; 100 TABLET ORAL at 20:20

## 2018-03-15 RX ADMIN — ZOLPIDEM TARTRATE 5 MG: 5 TABLET ORAL at 20:28

## 2018-03-15 RX ADMIN — VANCOMYCIN HYDROCHLORIDE 1250 MG: 10 INJECTION, POWDER, LYOPHILIZED, FOR SOLUTION INTRAVENOUS at 20:20

## 2018-03-15 RX ADMIN — CARBIDOPA AND LEVODOPA 2 TABLET: 25; 100 TABLET ORAL at 08:02

## 2018-03-15 RX ADMIN — LEVOTHYROXINE SODIUM 112 MCG: 112 TABLET ORAL at 05:55

## 2018-03-15 NOTE — PROGRESS NOTES
Name: Rommel Gonzalez ADMIT: 3/7/2018   : 1945  PCP: Luiz Onofre MD    MRN: [id number] LOS: 8 days   AGE/SEX: 72 y.o. male  ROOM: [facility]   Subjective   Subjective  Events noted over the last few days. Patient now better and sitting up in chair. States he feels good. Denies complaints except that he is hungry.    Objective   Vital Signs  Temp:  [97.7 °F (36.5 °C)-98.2 °F (36.8 °C)] 98.1 °F (36.7 °C)  Heart Rate:  [64-95] 87  Resp:  [16-18] 18  BP: (122-158)/(79-97) 158/97  SpO2:  [94 %-98 %] 98 %  on  Flow (L/min):  [2] 2;   Device (Oxygen Therapy): nasal cannula  Body mass index is 24.95 kg/m².    Physical Exam   Constitutional: He is oriented to person, place, and time. He appears well-developed. He is cooperative. No distress.   Neck: No JVD present. No tracheal deviation present. No thyromegaly present.   Cardiovascular: Normal rate and regular rhythm.    No murmur heard.  Pulmonary/Chest: Effort normal and breath sounds normal. No respiratory distress.   Abdominal: Soft. Normal appearance and bowel sounds are normal. He exhibits no distension. There is no tenderness.   Neurological: He is alert and oriented to person, place, and time.   Masked facies. Sluggish speech.   Skin: Skin is warm and dry. No rash noted.   Psychiatric: He has a normal mood and affect. His behavior is normal.   Nursing note and vitals reviewed.      Results Review:       I reviewed the patient's new clinical results.    Results from last 7 days  Lab Units 03/15/18  0441 18  0552 18  0641 18  1209   WBC 10*3/mm3 11.90* 15.86* 20.69* 29.32*   HEMOGLOBIN g/dL 11.1* 10.5* 10.0* 10.4*   PLATELETS 10*3/mm3 188 150 138* 138*       Results from last 7 days  Lab Units 03/15/18  0441 18  0552 18  0641 18  0548   SODIUM mmol/L 141 143 142 141   POTASSIUM mmol/L 4.1 4.2 3.9 4.3   CHLORIDE mmol/L 108* 107 106 104   CO2 mmol/L 19.7* 23.1 27.9 25.1   BUN mg/dL 6* 23 25* 20   CREATININE mg/dL 0.86 0.91  0.93 1.19   GLUCOSE mg/dL 74 81 88 95   Estimated Creatinine Clearance: 90.4 mL/min (by C-G formula based on SCr of 0.86 mg/dL).    Results from last 7 days  Lab Units 03/15/18  0441 03/14/18  0552 03/13/18  0641 03/12/18  0548   CALCIUM mg/dL 7.6* 7.9* 7.8* 7.5*       Results from last 7 days  Lab Units 03/15/18  0441 03/14/18  0552 03/13/18  0641 03/12/18  0548 03/11/18  0658   CK TOTAL U/L 308* 568* 1,161* 2,814* 941*             carbidopa-levodopa 2 tablet Oral TID   carbidopa-levodopa 0.5 tablet Oral TID   carvedilol 9.375 mg Oral BID With Meals   levothyroxine 112 mcg Oral Q AM   midodrine 2.5 mg Oral TID AC   multivitamin with minerals 1 tablet Oral Daily   piperacillin-tazobactam 3.375 g Intravenous Q8H   rivaroxaban 20 mg Oral Daily With Dinner   terazosin 1 mg Oral Nightly   vancomycin 1,250 mg Intravenous Q12H       lactated ringers 100 mL/hr Last Rate: 100 mL/hr (03/15/18 0222)   Pharmacy to dose vancomycin     NPO Diet NPO Except: Other; Other: Except meds crushed in applesauce      Assessment/Plan   Active Hospital Problems (** Indicates Principal Problem)    Diagnosis Date Noted   • **Non-traumatic rhabdomyolysis [M62.82] 03/07/2018   • Aspiration pneumonia [J69.0] 03/12/2018   • Sepsis [A41.9] 03/12/2018   • Fall [W19.XXXA] 03/07/2018   • Progressive supranuclear palsy [G23.1] 03/07/2018   • Bruising [T14.8XXA] 03/07/2018   • Obstructive sleep apnea, adult [G47.33] 10/26/2017   • Weakness of voice [R49.8] 06/23/2016   • Anticoagulated [Z79.01] 04/05/2016   • Idiopathic Parkinson's disease [G20] 03/28/2016   • Hypertension [I10] 03/28/2016   • AF (atrial fibrillation) [I48.91] 03/28/2016   • Gait instability [R26.81] 03/28/2016   • Dysarthria [R47.1] 03/28/2016      Resolved Hospital Problems    Diagnosis Date Noted Date Resolved   No resolved problems to display.       Mr. Gonzalez is a 72 y.o. male with a history of Parkinson's disease and progressive supranuclear palsy who was admitted with mild  rhabdomyolysis after sustaining a fall.    · Events noted over last few days regarding aspiration pneumonia. Seems to be responding to treatment for this. Currently on Zosyn and vancomycin. MRSA screen pending. If negative will DC vancomycin.  · Speech therapy recommendations noted. A lengthy discussion with patient and family at bedside explaining the pros and cons of a PEG tube. Explained the ongoing significant risk of recurrent aspiration, recurrent pneumonia and possible death. He is agreeable to proceeding with PEG tube placement. Will discontinue Xarelto. Last taken yesterday evening.  · Dr. Manley feels likely orthostatic hypotension due to autonomic dysfxn and midodrine has been started. BP up slightly since midodrine started. On Coreg for Afib. On Xarelto per Dr. Bennett.  · Discussed with Dr. Daron Vang. Possible PEG tube placement tomorrow.  · Daughter looking in to SNU placement prior to transitioning home to live with her.      Fahad Schafer MD  [facility]  03/15/18  8:48 AM

## 2018-03-15 NOTE — PLAN OF CARE
Problem: Patient Care Overview (Adult)  Goal: Plan of Care Review  Outcome: Ongoing (interventions implemented as appropriate)   03/15/18 1646   Patient Care Overview   Progress no change   Outcome Evaluation   Outcome Summary/Follow up Plan Patient alert and confused. Medications per MD order. NPO. No falls vss. Will continue to monitor.    Coping/Psychosocial Response Interventions   Plan Of Care Reviewed With patient       Problem: Fall Risk (Adult)  Goal: Absence of Falls  Outcome: Ongoing (interventions implemented as appropriate)    Goal: Absence of Fall  Outcome: Ongoing (interventions implemented as appropriate)    Goal: Identify Related Risk Factors and Signs and Symptoms  Outcome: Ongoing (interventions implemented as appropriate)    Goal: Absence of Fall  Outcome: Ongoing (interventions implemented as appropriate)      Problem: Pain, Acute (Adult)  Goal: Acceptable Pain Control/Comfort Level  Outcome: Ongoing (interventions implemented as appropriate)      Problem: Nutrition, Imbalanced: Inadequate Oral Intake (Adult)  Goal: Improved Oral Intake  Outcome: Ongoing (interventions implemented as appropriate)    Goal: Prevent Further Weight Loss  Outcome: Ongoing (interventions implemented as appropriate)      Problem: Skin Injury Risk (Adult)  Goal: Skin Health and Integrity  Outcome: Ongoing (interventions implemented as appropriate)

## 2018-03-15 NOTE — PLAN OF CARE
Problem: Patient Care Overview (Adult)  Goal: Plan of Care Review  Outcome: Ongoing (interventions implemented as appropriate)   03/15/18 0507   Patient Care Overview   Progress progress toward functional goals as expected   Outcome Evaluation   Outcome Summary/Follow up Plan meds per order, npo cont, no falls, no skin breakdown noted, see labs and vs   Coping/Psychosocial Response Interventions   Plan Of Care Reviewed With patient     Goal: Adult Individualization and Mutuality  Outcome: Ongoing (interventions implemented as appropriate)    Goal: Discharge Needs Assessment  Outcome: Ongoing (interventions implemented as appropriate)      Problem: Fall Risk (Adult)  Goal: Absence of Fall  Outcome: Ongoing (interventions implemented as appropriate)      Problem: Pain, Acute (Adult)  Goal: Acceptable Pain Control/Comfort Level  Outcome: Ongoing (interventions implemented as appropriate)      Problem: Nutrition, Imbalanced: Inadequate Oral Intake (Adult)  Goal: Improved Oral Intake  Outcome: Ongoing (interventions implemented as appropriate)    Goal: Prevent Further Weight Loss  Outcome: Ongoing (interventions implemented as appropriate)      Problem: Skin Injury Risk (Adult)  Goal: Skin Health and Integrity  Outcome: Ongoing (interventions implemented as appropriate)

## 2018-03-15 NOTE — PROGRESS NOTES
[facility]  Progress note: 3/15/2018    Patient Identification:  Name:Rommel Gonzalez  Age:72 y.o.  Sex: male  :  1945  MRN: [id number]           CC:  follow up atrial fibrillation/flutter    Interval history:  Noted plans for PEG placement.  Xarelto has been held.  Parient is quiet in relatively good spirits    Vital Signs:   Temp:  [97.7 °F (36.5 °C)-98.2 °F (36.8 °C)] 98.2 °F (36.8 °C)  Heart Rate:  [67-95] 79  Resp:  [16-18] 18  BP: (130-158)/(79-99) 149/99    Intake/Output Summary (Last 24 hours) at 03/15/18 1631  Last data filed at 03/15/18 0554   Gross per 24 hour   Intake             1355 ml   Output              250 ml   Net             1105 ml       Physical Examination:    General Appearance No acute distress   Neck No adenopathy, supple, trachea midline, no thyromegaly, no carotid bruit, no JVD   Lungs Clear to auscultation,respirations regular, even and unlabored   Heart Regular rhythm and normal rate, normal S1 and S2, no murmur, no gallop, no rub, no click   Chest wall No abnormalities observed   Abdomen Normal bowel sounds, no masses, no hepatomegaly, soft   Extremities Moves all extremities well, no edema, no cyanosis, no redness   Neurological Alert and oriented x 3     Lab Review:  Personally reviewed the labs, radiology imaging and other cardiac procedures.     Results from last 7 days  Lab Units 03/15/18  0441   SODIUM mmol/L 141   POTASSIUM mmol/L 4.1   CHLORIDE mmol/L 108*   CO2 mmol/L 19.7*   BUN mg/dL 6*   CREATININE mg/dL 0.86   CALCIUM mg/dL 7.6*   GLUCOSE mg/dL 74       Results from last 7 days  Lab Units 03/15/18  0441 18  0552 18  0641   CK TOTAL U/L 308* 568* 1,161*     )    Results from last 7 days  Lab Units 03/15/18  0441 18  0552 18  0641   WBC 10*3/mm3 11.90* 15.86* 20.69*   HEMOGLOBIN g/dL 11.1* 10.5* 10.0*   HEMATOCRIT % 34.2* 32.8* 31.4*   PLATELETS 10*3/mm3 188 150 138*           Medication Review:   Meds reviewed  Scheduled  Meds:    carbidopa-levodopa 2 tablet Oral TID   carbidopa-levodopa 0.5 tablet Oral TID   carvedilol 9.375 mg Oral BID With Meals   levothyroxine 112 mcg Oral Q AM   midodrine 2.5 mg Oral TID AC   multivitamin with minerals 1 tablet Oral Daily   piperacillin-tazobactam 3.375 g Intravenous Q8H   terazosin 1 mg Oral Nightly   vancomycin 1,250 mg Intravenous Q12H     Continuous Infusions:    dextrose 5 % and sodium chloride 0.45 % with KCl 20 mEq/L 75 mL/hr   Pharmacy to dose vancomycin      I personally viewed and interpreted the patient's EKG/Telemetry data    Assessment and Plan  1. Atrial fibrillation/ flutter -  Rate controlled. He is on Xarrelto. Multiple falls at home.   2. Progressive supranuclear palsy + parkinsons + autonomic dysfunction  - on midodrine  3. Chronic diastolic heart failure - diuretics on hold  4. HTN - blood pressure currently stable.  5. Prolonged QT interval  6. Orthostatic hypotension  7. Poor oral intake + dysphagia, PEG tube replaced tomorrow.  8. Puemonia  9. Encephalopathy      Mini Bennett  3/15/440585:34 AM  25min spent in reviewing records, discussion and examination of the patient and discussion with other members of the patient's medical team.     Dictated utilizing Dragon dictation

## 2018-03-15 NOTE — THERAPY TREATMENT NOTE
Acute Care - Physical Therapy Treatment Note  [facility]     Patient Name: Rommel Gonzalez  : 1945  MRN: [id number]  Today's Date: 3/15/2018     Date of Referral to PT: 18       Admit Date: 3/7/2018    Visit Dx:    ICD-10-CM ICD-9-CM   1. Traumatic rhabdomyolysis, initial encounter T79.6XXA 958.6   2. Fall at home, initial encounter W19.XXXA E888.9    Y92.099 E849.0   3. Dysarthria R47.1 784.51   4. Progressive supranuclear palsy G23.1 333.0   5. Impaired functional mobility, balance, gait, and endurance Z74.09 V49.89     Patient Active Problem List   Diagnosis   • AF (atrial fibrillation)   • Depression   • Gait instability   • Hypertension   • Insomnia   • Idiopathic Parkinson's disease   • Peripheral neuropathy   • Dysarthria   • Atrial flutter   • Anticoagulated   • Health care maintenance   • Hypothyroidism (acquired)   • Weakness of voice   • Abnormal chest CT   • Facial droop   • Hypersomnia    • Hyperlipidemia   • High risk medication use   • CORINNE on CPAP   • Obstructive sleep apnea, adult   • Fall   • Progressive supranuclear palsy   • Bruising   • Aspiration pneumonia   • Sepsis   • Metabolic encephalopathy       Therapy Treatment    Therapy Treatment / Health Promotion    Treatment Time/Intention  Discipline: physical therapy assistant (Luz Montgomery PTA)  Document Type: therapy note (daily note) (Luz Montgomery PTA)  Subjective Information: no complaints (Luz Montgomery PTA)  Patient Effort: good (Luz Montgomery PTA)  Plan of Care Review  Plan of Care Reviewed With: patient (Luz Montgomery PTA)    Vitals/Pain/Safety  Vital Signs  Pre SpO2 (%): 94 (Luz Montgomery PTA)  O2 Delivery Pre Treatment: supplemental O2 (2L) (Luz Montgomery PTA)  O2 Delivery Intra Treatment: room air (Luz Montgomery PTA)  Post SpO2 (%): 93 (Luz Montgomery PTA)  O2 Delivery Post Treatment: room air (Luz Montgomery PTA)  Pain Scale: Numbers Pre/Post-Treatment  Pain Scale: Numbers, Pretreatment:  0/10 - no pain (Luz Montgomery, PTA)  Safety Issues, Functional Mobility  Safety Issues Affecting Function (Mobility): at risk behavior observed, impulsivity, insight into deficits/self awareness, safety precaution awareness, safety precautions follow-through/compliance (Luz Montgomery PTA)  Impairments Affecting Function (Mobility): balance, coordination, endurance/activity tolerance, motor control, strength (Luz Montgomery PTA)  Positioning and Restraints  Pre-Treatment Position: in bed (Luz Montgomery PTA)  Post Treatment Position: chair (Luz Montgomery PTA)  In Chair: sitting, call light within reach, encouraged to call for assist, exit alarm on (Luz Montgomery PTA)    Mobility,ADL,Motor, Modality  Bed Mobility Assessment/Treatment  Supine-Sit [place] (Bed Mobility): contact guard, verbal cues (Luz Montgomery PTA)  Sit-Supine [place] (Bed Mobility): not tested (Pt up in chair) (Luz Montgomery PTA)  Assistive Device (Bed Mobility): bed rails, head of bed elevated (Luz Montgomery PTA)  Transfer Assessment/Treatment  Transfer Assessment/Treatment: sit-stand transfer, stand-sit transfer (Luz Montgomery PTA)  Comment (Transfers): pt stood prior to staff being ready to assist (Luz Montgomery PTA)  Sit-Stand Transfer  Sit-Stand [place] (Transfers): minimum assist (75% patient effort), verbal cues, nonverbal cues (demo/gesture) (Luz Montgomery, PTA)  Assistive Device (Sit-Stand Transfers): walker, front-wheeled (Luz Montgomery PTA)  Stand-Sit Transfer  Stand-Sit [place] (Transfers): minimum assist (75% patient effort), verbal cues, nonverbal cues (demo/gesture) (Luz Montgomery, PTA)  Assistive Device (Stand-Sit Transfers): walker, front-wheeled (Luz Montgomery, PTA)  Gait/Stairs Assessment/Training  [place] Level (Gait): minimum assist (75% patient effort), 1 person assist, 1 person to manage equipment, verbal cues (Luz Montgomery, PTA)  Assistive Device (Gait):  walker, front-wheeled (Luz Montgomery, PTA)  Distance in Feet (Gait): 150 (Luz Montgomery PTA)  Pattern (Gait): swing-through (Luz Montgomery PTA)  Deviations/Abnormal Patterns (Gait): base of support, narrow, adri decreased, gait speed decreased, stride length decreased (Luz Montgomery PTA)  Left Sided Gait Deviations: heel strike decreased (Luz Montgomery PTA)  Comment (Gait/Stairs): More assist required during turns. B heel strike improved w/ increased distance.  (Luz Montgomery PTA)                 ROM/MMT             Sensory, Edema, Orthotics          Cognition, Communication, Swallow  Cognitive Assessment/Intervention- PT/OT  Affect/Mental Status (Cognitive): flat/blunted affect (Luz Montgomery PTA)  Orientation Status (Cognition): oriented x 3 (Luz Montgomery PTA)  Follows Commands (Cognition): over 90% accuracy, physical/tactile prompts required, repetition of directions required, verbal cues/prompting required (Luz Montgomery PTA)  Executive Function Deficit (Cognition): mild deficit (Luz Montgomery PTA)  Personal Safety Interventions: fall prevention program maintained, gait belt, nonskid shoes/slippers when out of bed (Luz Montgomery PTA)  Speaking Valve  Pre SpO2 (%): 94 (Luz Montgomery PTA)  Post SpO2 (%): 93 (Luz Montgomery PTA)  General Eating/Swallowing Observations  Pre SpO2 (%): 94 (Luz Montgomery PTA)  Post SpO2 (%): 93 (Luz Montgomery PTA)    Outcome Summary             Physical Therapy Education     Title: PT OT SLP Therapies (Done)     Topic: Physical Therapy (Done)     Point: Mobility training (Done)    Learning Progress Summary     Learner Status Readiness Method Response Comment Documented by    Patient Done Acceptance VLADISLAV CALZADA D VU,NR  RW 03/15/18 0909     Done Acceptance VLADISLAV CALZADA D VU,NR  RW 03/14/18 1132     Done Acceptance VLADISLAV CALZADA D VU,NR  RW 03/13/18 1538     Done Acceptance EPATEL,NR  RW 03/12/18 1535     Done Acceptance E VU,NR ed on gait impairment corrections,  and ed on safety, up only with assist. SP 03/10/18 1702     Done Acceptance E VU  MA 03/09/18 1445    Family Done Acceptance E,TB,D VU,NR   03/14/18 1132     Done Acceptance E,D VU,NR   03/12/18 1535          Point: Home exercise program (Done)    Learning Progress Summary     Learner Status Readiness Method Response Comment Documented by    Patient Done Acceptance E VU  MA 03/09/18 1445          Point: Body mechanics (Done)    Learning Progress Summary     Learner Status Readiness Method Response Comment Documented by    Patient Done Acceptance E,TB,D VU,NR   03/15/18 0909     Done Acceptance E,TB,D VU,NR   03/14/18 1132     Done Acceptance E,TB,D VU,NR   03/13/18 1538     Done Acceptance E,D VU,[facility] 03/12/18 1535     Done Acceptance E VU  MA 03/09/18 1445    Family Done Acceptance E,TB,D VU,NR   03/14/18 1132     Done Acceptance E,D VU,NR   03/12/18 1535          Point: Precautions (Done)    Learning Progress Summary     Learner Status Readiness Method Response Comment Documented by    Patient Done Acceptance E,TB,D VU,NR   03/15/18 0909     Done Acceptance E,TB,D VU,NR   03/14/18 1132     Done Acceptance E,TB,D VU,[facility] 03/13/18 1538     Done Acceptance E,D VU,NR   03/12/18 1535     Done Acceptance E VU,NR ed on gait impairment corrections, and ed on safety, up only with assist.  03/10/18 1702     Done Acceptance E VU  MA 03/09/18 1445    Family Done Acceptance E,TB,D VU,NR   03/14/18 1132     Done Acceptance E,D VU,NR   03/12/18 1535                      User Key     Initials Effective Dates Name Provider Type Discipline     03/07/18 -  Luz Montgomery, PTA Physical Therapy Assistant PT    MA 03/07/18 -  Eve Allen, PT Physical Therapist PT    SP 01/09/17 -  Monica Booker, PT Physical Therapist PT                    PT Recommendation and Plan     Plan of Care Reviewed With: patient  Outcome Summary: Pt increased ambulation distance. Heel strike better bilaterally w/  increased distance. More impulsive today w/ bed mobility and transfers.          Outcome Measures     Row Name 03/15/18 0908 03/14/18 1505 03/14/18 1110       How much help from another person do you currently need...    Turning from your back to your side while in flat bed without using bedrails? 3  -RW  -- 3  -RW    Moving from lying on back to sitting on the side of a flat bed without bedrails? 3  -RW  -- 3  -RW    Moving to and from a bed to a chair (including a wheelchair)? 3  -RW  -- 3  -RW    Standing up from a chair using your arms (e.g., wheelchair, bedside chair)? 3  -RW  -- 3  -RW    Climbing 3-5 steps with a railing? 1  -RW  -- 1  -RW    To walk in hospital room? 3  -RW  -- 3  -RW    AM-PAC 6 Clicks Score 16  -RW  -- 16  -RW       How much help from another is currently needed...    Putting on and taking off regular lower body clothing?  -- 2  -SG  --    Bathing (including washing, rinsing, and drying)  -- 2  -SG  --    Toileting (which includes using toilet bed pan or urinal)  -- 2  -SG  --    Putting on and taking off regular upper body clothing  -- 3  -SG  --    Taking care of personal grooming (such as brushing teeth)  -- 3  -SG  --    Eating meals  -- 1  -SG  --    Score  -- 13  -SG  --       Functional Assessment    Outcome Measure Options AM-PAC 6 Clicks Basic Mobility (PT)  -RW  -- AM-PAC 6 Clicks Basic Mobility (PT)  -RW    Row Name 03/13/18 1537 03/13/18 1514 03/12/18 1520       How much help from another person do you currently need...    Turning from your back to your side while in flat bed without using bedrails? 3  -RW  -- 3  -RW    Moving from lying on back to sitting on the side of a flat bed without bedrails? 3  -RW  -- 2  -RW    Moving to and from a bed to a chair (including a wheelchair)? 3  -RW  -- 2  -RW    Standing up from a chair using your arms (e.g., wheelchair, bedside chair)? 3  -RW  -- 2  -RW    Climbing 3-5 steps with a railing? 1  -RW  -- 1  -RW    To walk in hospital room?  3  -RW  -- 2  -RW    AM-PAC 6 Clicks Score 16  -RW  -- 12  -RW       How much help from another is currently needed...    Putting on and taking off regular lower body clothing?  -- 2  -SG  --    Bathing (including washing, rinsing, and drying)  -- 2  -SG  --    Toileting (which includes using toilet bed pan or urinal)  -- 2  -SG  --    Putting on and taking off regular upper body clothing  -- 3  -SG  --    Taking care of personal grooming (such as brushing teeth)  -- 3  -SG  --    Eating meals  -- 1  -SG  --    Score  -- 13  -SG  --       Functional Assessment    Outcome Measure Options AM-PAC 6 Clicks Basic Mobility (PT)  -RW  -- AM-PAC 6 Clicks Basic Mobility (PT)  -RW    Row Name 03/12/18 1355             How much help from another is currently needed...    Putting on and taking off regular lower body clothing? 1  -KP      Bathing (including washing, rinsing, and drying) 2  -KP      Toileting (which includes using toilet bed pan or urinal) 2  -KP      Putting on and taking off regular upper body clothing 3  -KP      Taking care of personal grooming (such as brushing teeth) 3  -KP      Eating meals 3  -KP      Score 14  -KP         Functional Assessment    Outcome Measure Options AM-PAC 6 Clicks Daily Activity (OT)  -        User Key  (r) = Recorded By, (t) = Taken By, (c) = Cosigned By    Initials Name Provider Type     Lisa Rice, OTR Occupational Therapist    DARSHAN Lepe, OTR Occupational Therapist    FAUSTINA Montgomery PTA Physical Therapy Assistant           Time Calculation:         PT Charges     Row Name 03/15/18 0852             Time Calculation    Start Time 0847  -RW      Stop Time 0911  -RW      Time Calculation (min) 24 min  -RW      PT Received On 03/15/18  -RW      PT - Next Appointment 03/16/18  -RW        User Key  (r) = Recorded By, (t) = Taken By, (c) = Cosigned By    Initials Name Provider Type    FAUSTINA Montgomery, CLARENCE Physical Therapy Assistant          Therapy Charges  for Today     Code Description Service Date Service Provider Modifiers Qty    53045614608 HC PT THER PROC EA 15 MIN 3/14/2018 Luz Montgomery, PTA GP 1    63879087511 HC PT THER SUPP EA 15 MIN 3/14/2018 Luz Montgomery, CLARENCE GP 1    19866991368 HC PT THER PROC EA 15 MIN 3/15/2018 Luz Montgomery, CLARENCE GP 2    83179519070 HC PT THER SUPP EA 15 MIN 3/15/2018 Luz Montgomery, LCARENCE GP 2          PT G-Codes  Outcome Measure Options: AM-PAC 6 Clicks Basic Mobility (PT)    Luz Montgomery PTA  3/15/2018

## 2018-03-15 NOTE — PROGRESS NOTES
"Pharmacokinetic Consult - Vancomycin Dosing (Follow-up Note)    Rommel Gonzalez is on day #4  pharmacy to dose vancomycin for PNA.  Pharmacy dosing vancomycin per Dr Mora's request.   Goal trough: 15-20 mg/L   Admit date: 3/7/2018  2:47 PM    Relevant clinical data and objective history reviewed:  72 y.o. male 182.9 cm (72\") 83.5 kg (184 lb)    Past Medical History:   Diagnosis Date   • AF (atrial fibrillation)    • Atrial flutter    • Atypical chest pain    • Chest pain    • Colon polyps    • Confusion    • Depression    • Disease of thyroid gland    • Disorder of thyroid    • Dyspnea and respiratory abnormalities    • Elevated TSH    • Fatigue    • Gait instability    • Hay fever    • Hyperlipidemia    • Hypertension    • Hypothyroidism    • Insomnia    • Measles    • Mumps    • Palpitations    • Parkinson disease    • Peripheral neuropathy    • Pneumonia    • Poor sleep pattern    • Slurred speech    • Tremor      Creatinine   Date Value Ref Range Status   03/15/2018 0.86 0.76 - 1.27 mg/dL Final   03/14/2018 0.91 0.76 - 1.27 mg/dL Final   03/13/2018 0.93 0.76 - 1.27 mg/dL Final     BUN   Date Value Ref Range Status   03/15/2018 6 (L) 8 - 23 mg/dL Final     Estimated Creatinine Clearance: 90.4 mL/min (by C-G formula based on SCr of 0.86 mg/dL).    Lab Results   Component Value Date    WBC 11.90 (H) 03/15/2018     Temp Readings from Last 3 Encounters:   03/15/18 98.1 °F (36.7 °C) (Oral)   02/02/18 97.9 °F (36.6 °C)   11/01/17 97.6 °F (36.4 °C)     Baseline cultures/source:  3/11 BCx NGTD x 3 days  3/11 PCT 55.39  3/11 Lactate 1.9  3/12 WBC 27.15, 3/13 WBC 20.69  3/14 WBC 15.86  3/12 UCx NGTD    3/14 MRSA swab in process    Anti-Infectives     Ordered     Dose/Rate Route Frequency Start Stop    03/13/18 0805  vancomycin 1250 mg/250 mL 0.9% NS IVPB ([facility])     Ordering Provider:  Aleksey Whiteside MD    1,250 mg  over 125 Minutes Intravenous Every 12 Hours 03/13/18 0900 03/18/18 2059    03/12/18 1717  vancomycin " (VANCOCIN) in iso-osmotic dextrose IVPB 1 g (premix) 200 mL     Ordering Provider:  Aleksey Whiteside MD    1,000 mg  over 100 Minutes Intravenous Once 03/12/18 1730 03/12/18 2007 03/11/18 1956  piperacillin-tazobactam (ZOSYN) 3.375 g in iso-osmotic dextrose 50 ml (premix)     Aleksey Whiteside MD reviewed the order on 03/14/18 1332.   Ordering Provider:  Aleksey Whiteside MD    3.375 g  over 4 Hours Intravenous Every 8 Hours 03/12/18 0230 03/21/18 1331    03/11/18 2003  vancomycin 1500 mg/500 mL 0.9% NS IVPB ([facility])     Ordering Provider:  Kyara Mora MD    20 mg/kg × 80.5 kg  over 150 Minutes Intravenous Once 03/11/18 2045 03/12/18 0031    03/11/18 1956  piperacillin-tazobactam (ZOSYN) 4.5 g in iso-osmotic dextrose 100 mL IVPB (premix)     Ordering Provider:  Kyara Mora MD    4.5 g  over 30 Minutes Intravenous Once 03/11/18 2030 03/11/18 2110 03/11/18 1955  Pharmacy to dose vancomycin     Aleksandra [facility] reviewed the order on 03/12/18 1128.   Ordering Provider:  Aleksey Whiteside MD     Does not apply Continuous PRN 03/11/18 1953 03/18/18 1952         Lab Results   Component Value Date    VANCOTROUGH 15.40 03/14/2018     Lab Results   Component Value Date    VANCORANDOM 10.50 03/13/2018     VANCOMYCIN DOSING SCHEDULE ( INCLUDING LEVELS)  3/11 2201 vancomycin 1500 mg iv once, followed by 1250 mg iv q12h.              3/12 0911 random level = 10.5 mcg/mL, 10 hr  3/12 1827 vancomycin 1g iv once -given late due to trouble with line              3/13 0641 random level = 10.5 mcg/mL, 12 hr  3/13 1951 vancomycin 1250mg iv q 12 hours              3/14 0822 vancomycin trough = 15.4mcg/ml , 12 hr        Assessment/Plan   1. See above for dosing schedule.....cont same regimen.   2. Monitor renal function…bmp is ordered daily  3. Encourage hydration to prevent toxic accumulation   4. Monitor for s/sx of toxicity including incr in SCr and decr in UOP  5. Pharmacy will continue to follow and adjust  accordingly  6. Per Dr Whiteside's notes from 3/14:  Aspiration PNA: Continue Zosyn. PCT and WBC improving. Check MRSA screen    Palak Pace [facility]

## 2018-03-15 NOTE — CONSULTS
Adult Nutrition  Assessment/PES    Patient Name:  Rommel Gonzalez  YOB: 1945  MRN: [id number]  Admit Date:  3/7/2018    Assessment Date:  3/15/2018    Comments:  Speech eval noted.  If family/pt wishes feeding tube, rec Jevity 1.5 with goal at 60ml/hr( or 6 cans bolus per day).          Adult Nutrition Assessment     Row Name 03/15/18 0844       Calculation Measurements    Weight Used For Calculations 83.5 kg (184 lb 1.4 oz)       Estimated/Assessed Needs    Additional Documentation Calorie Requirements (Group);Protein Requirements (Group);Fluid Requirements (Group)       Calorie Requirements    Estimated Calorie Requirement (kcal/day) 2085   25kcal/kg       KCAL/KG    14 Kcal/Kg (kcal) 1169    15 Kcal/Kg (kcal) 1252.5    18 Kcal/Kg (kcal) 1503    20 Kcal/Kg (kcal) 1670    25 Kcal/Kg (kcal) 2087.5    30 Kcal/Kg (kcal) 2505    35 Kcal/Kg (kcal) 2922.5    40 Kcal/Kg (kcal) 3340    45 Kcal/Kg (kcal) 3757.5    50 Kcal/Kg (kcal) 4175       Troy-St. Jeor Equation    RMR (Troy-St. Jeor Equation) 1618       Protein Requirements    Est Protein Requirement Amount (gms/kg) 1.0 gm protein    Estimated Protein Requirements (gms/day) 83.5       Fluid Requirements    Estimated Fluid Requirements (mL/day) 2085   1cc/kcal    RDA Method (mL) 2085    [place]-Segar Method (over 20 kg) 3170       Nutrition Prescription PO    Current PO Diet NPO    Row Name 03/15/18 0843       Nutrition/Diet History    Typical Food/Fluid Intake family considering TF's       Labs/Procedures/Meds    Lab Results Reviewed reviewed       Physical Findings    Skin --   bruise/abrasion    Row Name 03/15/18 0842       Reason for Assessment    Reason For Assessment follow-up protocol    Identified At Risk by Screening Criteria difficulty chewing/swallowing   speech eval, rec's npo          Problem/Interventions:                  Intervention Goal     Row Name 03/15/18 0846       Intervention Goal    General Maintain nutrition    TF/PN  Inititiate TF/PN   if family wishes    Weight Maintain weight            Nutrition Intervention     Row Name 03/15/18 0846       Nutrition Intervention    RD/Tech Action Follow Tx progress;Care plan reviewd;Recommend/ordered    Recommended/Ordered EN            Nutrition Prescription     Row Name 03/15/18 0847       Nutrition Prescription EN    Enteral Prescription Enteral begin/change;Enteral to supply    Product Jevity 1.5 river    TF Delivery Method Continuous    Continuous TF Goal Rate (mL/hr) 60 mL/hr   or 6 cans bolus per day    Water flush (mL)  30 mL    Water Flush Frequency Every 4 hours       EN to Supply    Kcal/Day 2160 Kcal/Day    Protein (gm/day) 91 gm/day    Meet Estimated Kcal Need (%) 103 %    Meet Estimated Protein Need (%) 100 %    TF Free H2O (mL) 1094 mL    Total Free H2O (mL/day) 1274 mL/day            Education/Evaluation     Row Name 03/15/18 0851       Monitor/Evaluation    Monitor Per protocol        Electronically signed by:  Germania Bliss RD  03/15/18 8:51 AM

## 2018-03-15 NOTE — PLAN OF CARE
Problem: Nutrition, Imbalanced: Inadequate Oral Intake (Adult)  Intervention: Explore Factors Potentially Impacting Oral Intake   03/15/18 0852   Coping/Psychosocial Interventions   Supportive Measures (speech rec's npo)

## 2018-03-16 ENCOUNTER — APPOINTMENT (OUTPATIENT)
Dept: PHYSICAL THERAPY | Facility: HOSPITAL | Age: 73
End: 2018-03-16

## 2018-03-16 ENCOUNTER — ANESTHESIA (OUTPATIENT)
Dept: GASTROENTEROLOGY | Facility: HOSPITAL | Age: 73
End: 2018-03-16

## 2018-03-16 ENCOUNTER — ANESTHESIA EVENT (OUTPATIENT)
Dept: GASTROENTEROLOGY | Facility: HOSPITAL | Age: 73
End: 2018-03-16

## 2018-03-16 ENCOUNTER — APPOINTMENT (OUTPATIENT)
Dept: SPEECH THERAPY | Facility: HOSPITAL | Age: 73
End: 2018-03-16

## 2018-03-16 PROBLEM — A41.9 SEPSIS: Status: RESOLVED | Noted: 2018-03-12 | Resolved: 2018-03-16

## 2018-03-16 PROBLEM — G93.41 METABOLIC ENCEPHALOPATHY: Status: RESOLVED | Noted: 2018-03-15 | Resolved: 2018-03-16

## 2018-03-16 LAB
BACTERIA SPEC AEROBE CULT: NORMAL
BACTERIA SPEC AEROBE CULT: NORMAL
DEPRECATED RDW RBC AUTO: 47.2 FL (ref 37–54)
ERYTHROCYTE [DISTWIDTH] IN BLOOD BY AUTOMATED COUNT: 13.4 % (ref 11.5–14.5)
HCT VFR BLD AUTO: 34.6 % (ref 40.4–52.2)
HGB BLD-MCNC: 11.3 G/DL (ref 13.7–17.6)
MCH RBC QN AUTO: 31.2 PG (ref 27–32.7)
MCHC RBC AUTO-ENTMCNC: 32.7 G/DL (ref 32.6–36.4)
MCV RBC AUTO: 95.6 FL (ref 79.8–96.2)
MRSA SPEC QL CULT: NORMAL
PLATELET # BLD AUTO: 209 10*3/MM3 (ref 140–500)
PMV BLD AUTO: 11.3 FL (ref 6–12)
RBC # BLD AUTO: 3.62 10*6/MM3 (ref 4.6–6)
WBC NRBC COR # BLD: 11.28 10*3/MM3 (ref 4.5–10.7)

## 2018-03-16 PROCEDURE — 99232 SBSQ HOSP IP/OBS MODERATE 35: CPT | Performed by: INTERNAL MEDICINE

## 2018-03-16 PROCEDURE — 85027 COMPLETE CBC AUTOMATED: CPT | Performed by: HOSPITALIST

## 2018-03-16 PROCEDURE — 43246 EGD PLACE GASTROSTOMY TUBE: CPT | Performed by: SURGERY

## 2018-03-16 PROCEDURE — 25010000002 PIPERACILLIN SOD-TAZOBACTAM PER 1 G: Performed by: HOSPITALIST

## 2018-03-16 PROCEDURE — 25010000002 PROPOFOL 10 MG/ML EMULSION: Performed by: NURSE ANESTHETIST, CERTIFIED REGISTERED

## 2018-03-16 PROCEDURE — 0DJ08ZZ INSPECTION OF UPPER INTESTINAL TRACT, VIA NATURAL OR ARTIFICIAL OPENING ENDOSCOPIC: ICD-10-PCS | Performed by: SURGERY

## 2018-03-16 PROCEDURE — 0DH63UZ INSERTION OF FEEDING DEVICE INTO STOMACH, PERCUTANEOUS APPROACH: ICD-10-PCS | Performed by: SURGERY

## 2018-03-16 DEVICE — PERCUTANEOUS ENDOSCOPIC GASTROSTOMY KIT
Type: IMPLANTABLE DEVICE | Site: STOMACH | Status: FUNCTIONAL
Brand: ENDOVIVE SAFETY PEG KIT

## 2018-03-16 RX ORDER — MIDODRINE HYDROCHLORIDE 2.5 MG/1
2.5 TABLET ORAL
Status: DISCONTINUED | OUTPATIENT
Start: 2018-03-17 | End: 2018-03-17

## 2018-03-16 RX ORDER — MORPHINE SULFATE 2 MG/ML
2 INJECTION, SOLUTION INTRAMUSCULAR; INTRAVENOUS
Status: DISCONTINUED | OUTPATIENT
Start: 2018-03-16 | End: 2018-03-17

## 2018-03-16 RX ORDER — SODIUM CHLORIDE, SODIUM LACTATE, POTASSIUM CHLORIDE, CALCIUM CHLORIDE 600; 310; 30; 20 MG/100ML; MG/100ML; MG/100ML; MG/100ML
1000 INJECTION, SOLUTION INTRAVENOUS CONTINUOUS PRN
Status: DISCONTINUED | OUTPATIENT
Start: 2018-03-16 | End: 2018-03-16

## 2018-03-16 RX ORDER — SODIUM CHLORIDE 0.9 % (FLUSH) 0.9 %
3 SYRINGE (ML) INJECTION AS NEEDED
Status: DISCONTINUED | OUTPATIENT
Start: 2018-03-16 | End: 2018-03-16

## 2018-03-16 RX ORDER — LIDOCAINE HYDROCHLORIDE 20 MG/ML
INJECTION, SOLUTION INFILTRATION; PERINEURAL AS NEEDED
Status: DISCONTINUED | OUTPATIENT
Start: 2018-03-16 | End: 2018-03-16 | Stop reason: SURG

## 2018-03-16 RX ORDER — PROPOFOL 10 MG/ML
VIAL (ML) INTRAVENOUS AS NEEDED
Status: DISCONTINUED | OUTPATIENT
Start: 2018-03-16 | End: 2018-03-16 | Stop reason: SURG

## 2018-03-16 RX ADMIN — CARBIDOPA AND LEVODOPA 2 TABLET: 25; 100 TABLET ORAL at 17:16

## 2018-03-16 RX ADMIN — POTASSIUM CHLORIDE, DEXTROSE MONOHYDRATE AND SODIUM CHLORIDE 75 ML/HR: 150; 5; 450 INJECTION, SOLUTION INTRAVENOUS at 09:52

## 2018-03-16 RX ADMIN — ZOLPIDEM TARTRATE 5 MG: 5 TABLET ORAL at 20:53

## 2018-03-16 RX ADMIN — PROPOFOL 20 MG: 10 INJECTION, EMULSION INTRAVENOUS at 12:58

## 2018-03-16 RX ADMIN — TERAZOSIN HYDROCHLORIDE 1 MG: 1 CAPSULE ORAL at 20:53

## 2018-03-16 RX ADMIN — CARVEDILOL 9.38 MG: 6.25 TABLET, FILM COATED ORAL at 17:16

## 2018-03-16 RX ADMIN — MULTIPLE VITAMINS W/ MINERALS TAB 1 TABLET: TAB at 09:52

## 2018-03-16 RX ADMIN — CARBIDOPA AND LEVODOPA 0.5 TABLET: 25; 250 TABLET ORAL at 17:15

## 2018-03-16 RX ADMIN — TAZOBACTAM SODIUM AND PIPERACILLIN SODIUM 3.38 G: 375; 3 INJECTION, SOLUTION INTRAVENOUS at 19:27

## 2018-03-16 RX ADMIN — TAZOBACTAM SODIUM AND PIPERACILLIN SODIUM 3.38 G: 375; 3 INJECTION, SOLUTION INTRAVENOUS at 09:56

## 2018-03-16 RX ADMIN — PROPOFOL 50 MG: 10 INJECTION, EMULSION INTRAVENOUS at 12:52

## 2018-03-16 RX ADMIN — CARBIDOPA AND LEVODOPA 0.5 TABLET: 25; 250 TABLET ORAL at 20:53

## 2018-03-16 RX ADMIN — LIDOCAINE HYDROCHLORIDE 20 MG: 20 INJECTION, SOLUTION INFILTRATION; PERINEURAL at 12:53

## 2018-03-16 RX ADMIN — CARBIDOPA AND LEVODOPA 0.5 TABLET: 25; 250 TABLET ORAL at 09:52

## 2018-03-16 RX ADMIN — PROPOFOL 20 MG: 10 INJECTION, EMULSION INTRAVENOUS at 12:56

## 2018-03-16 RX ADMIN — MIDODRINE HYDROCHLORIDE 2.5 MG: 2.5 TABLET ORAL at 09:53

## 2018-03-16 RX ADMIN — LEVOTHYROXINE SODIUM 112 MCG: 112 TABLET ORAL at 06:17

## 2018-03-16 RX ADMIN — CARBIDOPA AND LEVODOPA 2 TABLET: 25; 100 TABLET ORAL at 09:51

## 2018-03-16 RX ADMIN — SODIUM CHLORIDE, POTASSIUM CHLORIDE, SODIUM LACTATE AND CALCIUM CHLORIDE 1000 ML: 600; 310; 30; 20 INJECTION, SOLUTION INTRAVENOUS at 11:25

## 2018-03-16 RX ADMIN — CARBIDOPA AND LEVODOPA 2 TABLET: 25; 100 TABLET ORAL at 20:53

## 2018-03-16 RX ADMIN — PROPOFOL 20 MG: 10 INJECTION, EMULSION INTRAVENOUS at 13:00

## 2018-03-16 RX ADMIN — PROPOFOL 20 MG: 10 INJECTION, EMULSION INTRAVENOUS at 12:54

## 2018-03-16 RX ADMIN — TAZOBACTAM SODIUM AND PIPERACILLIN SODIUM 3.38 G: 375; 3 INJECTION, SOLUTION INTRAVENOUS at 02:12

## 2018-03-16 RX ADMIN — CARVEDILOL 9.38 MG: 6.25 TABLET, FILM COATED ORAL at 09:51

## 2018-03-16 NOTE — SIGNIFICANT NOTE
03/16/18 1312   Rehab Treatment   Discipline physical therapy assistant   Reason Treatment Not Performed unavailable for treatment  (Pt off floor for PEG placement. Will follow-up tomorrow.)   Recommendation   PT - Next Appointment 03/17/18

## 2018-03-16 NOTE — PROGRESS NOTES
Continued Stay Note  [facility]     Patient Name: Rommel Gonzalez  MRN: [id number]  Today's Date: 3/16/2018    Admit Date: 3/7/2018          Discharge Plan     Row Name 03/16/18 1626       Plan    Plan [facility] [place] [facility] will need [employer] precert for SNF    Patient/Family in Agreement with Plan yes    Plan Comments Pt had PEG placed today. CCP spoke with pts sister Durga and daughter Christianne at bedside, who state they have chosen [facility] as first choice. CCP will follow up on Monday with Elodia/Daniel Augustin when to start precert. Packet in CCP office. Patrick DORMAN/CCP              Discharge Codes    No documentation.           Shaneka Couch, RN

## 2018-03-16 NOTE — PROGRESS NOTES
[facility]  Progress note: 3/17/2018    Patient Identification:  Name:Rommel Gonzalez  Age:72 y.o.  Sex: male  :  1945  MRN: [id number]           CC:  follow up atrial fibrillation/flutter    Interval history:  Blood pressure remains elevated will better.  During dose was decreased.  PEG tube placed on Xarelto restarted.  He is in good spirits with a smile.  He denies any chest pain or shortness of breath.    Vital Signs:   Temp:  [98 °F (36.7 °C)-98.6 °F (37 °C)] 98 °F (36.7 °C)  Heart Rate:  [78-95] 95  Resp:  [14-16] 16  BP: (124-152)/(75-95) 147/94    Intake/Output Summary (Last 24 hours) at 18 1447  Last data filed at 18 1300   Gross per 24 hour   Intake             2701 ml   Output              700 ml   Net             2001 ml       Physical Examination:    General Appearance No acute distress   Neck No adenopathy, supple, trachea midline, no thyromegaly, no carotid bruit, no JVD   Lungs Clear to auscultation,respirations regular, even and unlabored   Heart Regular rhythm and normal rate, normal S1 and S2, no murmur, no gallop, no rub, no click   Chest wall No abnormalities observed   Abdomen Normal bowel sounds, no masses, no hepatomegaly, soft   Extremities Moves all extremities well, no edema, no cyanosis, no redness   Neurological Alert and oriented x 3     Lab Review:  Personally reviewed the labs, radiology imaging and other cardiac procedures.     Results from last 7 days  Lab Units 18  0620   SODIUM mmol/L 140   POTASSIUM mmol/L 4.0   CHLORIDE mmol/L 104   CO2 mmol/L 25.3   BUN mg/dL 8   CREATININE mg/dL 0.84   CALCIUM mg/dL 7.9*   GLUCOSE mg/dL 111*       Results from last 7 days  Lab Units 03/15/18  0441 18  0552 18  0641   CK TOTAL U/L 308* 568* 1,161*     )    Results from last 7 days  Lab Units 18  0620 18  0428 03/15/18  0441   WBC 10*3/mm3 10.25 11.28* 11.90*   HEMOGLOBIN g/dL 12.0* 11.3* 11.1*   HEMATOCRIT % 37.4* 34.6* 34.2*    PLATELETS 10*3/mm3 255 209 188           Medication Review:   Meds reviewed  Scheduled Meds:    amoxicillin-clavulanate 500 mg Per G Tube Q8H   carbidopa-levodopa 2 tablet Per G Tube TID   carbidopa-levodopa 0.5 tablet Per G Tube TID   carvedilol 9.375 mg Per G Tube BID With Meals   [START ON 3/18/2018] levothyroxine 112 mcg Per G Tube Q AM   [START ON 3/18/2018] midodrine 2.5 mg Per G Tube Q AM   multivitamin 1 tablet Per G Tube Daily   rivaroxaban 20 mg Per G Tube Daily With Dinner   terazosin 1 mg Per G Tube Nightly     Continuous Infusions:   I personally viewed and interpreted the patient's EKG/Telemetry data    Assessment and Plan    1. Atrial fibrillation/ flutter, rates controlled.  His Xarelto on hold for PEG tube placement.  2. Progressive supranuclear palsy + parkinsons + autonomic dysfunction,   3. Chronic diastolic heart failure, diuretics on hold  4. HTN, as above decrease frequency of Midrin to use only in a.m. when orthostasis more likely.  Would keep his blood pressure around 140/80.  5. Prolonged QT interval  6. Orthostatic hypotension, as above  7. Poor oral intake + dysphagia, PEG tube replaced tomorrow.  8. Puemonia  9. Encephalopathy    CV status stable.  We'll sign off for now and have him follow-up with me or my nurse practitioner in 4 weeks in 4 weeks.     Laura Bennett  3/17/78394:33 AM  25min spent in reviewing records, discussion and examination of the patient and discussion with other members of the patient's medical team.     Dictated utilizing Dragon dictation

## 2018-03-16 NOTE — PROGRESS NOTES
Name: Rommel Gonzalez ADMIT: 3/7/2018   : 1945  PCP: Luiz Onofre MD    MRN: [id number] LOS: 9 days   AGE/SEX: 72 y.o. male  ROOM: [facility]   Subjective   Subjective  Seen after PEG. Slightly groggy.    Objective   Vital Signs  Temp:  [97.9 °F (36.6 °C)-98.2 °F (36.8 °C)] 98 °F (36.7 °C)  Heart Rate:  [79-90] 90  Resp:  [18] 18  BP: (133-149)/() 146/102  SpO2:  [93 %-96 %] 93 %  on  Flow (L/min):  [2] 2;   Device (Oxygen Therapy): nasal cannula  Body mass index is 25.63 kg/m².    Physical Exam   Constitutional: He is oriented to person, place, and time. He appears well-developed. He is cooperative. No distress.   Cardiovascular: Normal rate and regular rhythm.    No murmur heard.  Pulmonary/Chest: Effort normal and breath sounds normal. No respiratory distress.   Abdominal: Soft. Normal appearance and bowel sounds are normal. He exhibits no distension. There is no tenderness.   + PEG   Neurological: He is alert and oriented to person, place, and time.   Masked facies. Sluggish speech.   Skin: Skin is warm and dry. No rash noted.   Nursing note and vitals reviewed.      Results Review:       I reviewed the patient's new clinical results.    Results from last 7 days  Lab Units 18  0428 03/15/18  0441 03/14/18  0552 18  0641   WBC 10*3/mm3 11.28* 11.90* 15.86* 20.69*   HEMOGLOBIN g/dL 11.3* 11.1* 10.5* 10.0*   PLATELETS 10*3/mm3 209 188 150 138*       Results from last 7 days  Lab Units 03/15/18  0441 03/14/18  0552 18  0641 18  0548   SODIUM mmol/L 141 143 142 141   POTASSIUM mmol/L 4.1 4.2 3.9 4.3   CHLORIDE mmol/L 108* 107 106 104   CO2 mmol/L 19.7* 23.1 27.9 25.1   BUN mg/dL 6* 23 25* 20   CREATININE mg/dL 0.86 0.91 0.93 1.19   GLUCOSE mg/dL 74 81 88 95   Estimated Creatinine Clearance: 92.7 mL/min (by C-G formula based on SCr of 0.86 mg/dL).    Results from last 7 days  Lab Units 03/15/18  0441 18  0552 18  0641 18  0548   CALCIUM mg/dL 7.6* 7.9* 7.8*  7.5*       EGD WITH PEG TUBE INSERTION  3/16/2018  Surgeon:  Lopez Vang Jr., MD      carbidopa-levodopa 2 tablet Oral TID   carbidopa-levodopa 0.5 tablet Oral TID   carvedilol 9.375 mg Oral BID With Meals   levothyroxine 112 mcg Oral Q AM   midodrine 2.5 mg Oral TID AC   multivitamin with minerals 1 tablet Oral Daily   piperacillin-tazobactam 3.375 g Intravenous Q8H   terazosin 1 mg Oral Nightly   vancomycin 1,250 mg Intravenous Q12H       dextrose 5 % and sodium chloride 0.45 % with KCl 20 mEq/L 75 mL/hr Last Rate: 75 mL/hr (03/15/18 1100)   Pharmacy to dose vancomycin     NPO Diet NPO Except: Other; Other: Except meds crushed in applesauce      Assessment/Plan   Active Hospital Problems (** Indicates Principal Problem)    Diagnosis Date Noted   • Aspiration pneumonia [J69.0] 03/12/2018   • Fall [W19.XXXA] 03/07/2018   • Progressive supranuclear palsy [G23.1] 03/07/2018   • Dysphagia [R13.10] 03/07/2018   • Obstructive sleep apnea, adult [G47.33] 10/26/2017   • Weakness of voice [R49.8] 06/23/2016   • Anticoagulated [Z79.01] 04/05/2016   • Idiopathic Parkinson's disease [G20] 03/28/2016   • Hypertension [I10] 03/28/2016   • AF (atrial fibrillation) [I48.91] 03/28/2016   • Gait instability [R26.81] 03/28/2016   • Dysarthria [R47.1] 03/28/2016      Resolved Hospital Problems    Diagnosis Date Noted Date Resolved   • **Non-traumatic rhabdomyolysis [M62.82] 03/07/2018 03/15/2018   • Metabolic encephalopathy [G93.41] 03/15/2018 03/16/2018   • Sepsis [A41.9] 03/12/2018 03/16/2018       Mr. Gonzalez is a 72 y.o. male with a history of Parkinson's disease and progressive supranuclear palsy who was admitted with mild rhabdomyolysis after sustaining a fall.    · On ZOSYN day 5 for aspiration pneumonia. Seems to be responding to treatment for this. MRSA screen negative. DC vancomycin.  · S/P PEG today. TFs to start tonight. Dietician recommended Jevity 1.5 with goal at 60 ml/hr (or 6 cans bolus per day). Probably stop IVFs  tomorrow and change antibiotics to via PEG.  · Restart XARELTO when okay with surgery.  · Dr. Manley feels likely orthostatic hypotension due to autonomic dysfxn and midodrine has been started. BP up slightly since midodrine started. On Coreg for Afib. On Xarelto per Dr. Bennett.  · Daughter looking in to SNU placement prior to transitioning home to live with her. Will need precert. Anticipate discharge Monday.      Fahad Schafer MD  [facility]  03/16/18  8:20 AM

## 2018-03-16 NOTE — OP NOTE
Surgeon: Lopez Vang Jr., M.D.    Preoperative Diagnosis: Dysphagia    Postoperative Diagnosis: Dysphagia    Procedure Performed: EGD with PEG    Indications:     The patient is a very pleasant 72-year-old male with progressive Parkinson's and dysphagia.  He presents for EGD with PEG.  The patient understands the indications, alternatives, risks, and benefits of the procedure and wishes to proceed.    Procedure:     The patient was identified, taken to the endoscopy suite, and place in the left side down decubitus procedure.  The patient underwent a MAC anesthesia and was appropriately monitored through the case by the anesthesia personnel.  The gastroscope was introduced into the oropharynx and advanced are carefully down the esophagus and into the stomach.  The stomach was insufflated and inspected.  The stomach was normal.  An area was selected for PEG tube placement based on light reflex and impulse.  The area corresponding to this on the abdominal wall was prepped and draped in the standard surgical fashion and then infiltrated with 1% lidocaine without epinephrine.  An incision was made with the scalpel and carried through the skin into the subcutaneous tissues tissue.  An Angiocath was placed to the incision and advanced into the stomach with direct visualization intragastrically using the gastroscope.  A guidewire was advanced through the Angiocath, grasped with the gastroscope, and brought out through the mouth.  The PEG tube was then placed over the guidewire in the standard fashion and brought out through the abdominal wall.  The gastroscope was returned to the stomach with a PEG tube was in good position with no bleeding.  The PEG tube was secured with the apparatus provided in the kit.  A sterile dressing was applied.  The patient tolerated the procedure well.    Findings:    There were no abnormality in the stomach.  The PEG is in good position.    Recommendations:     1.  Start tube feeds at 2100  tonight.

## 2018-03-16 NOTE — CONSULTS
Inpatient General Surgery Consult  Consult performed by: CLARK DICKINSON JR  Consult ordered by: HERMINIA HDZ          Patient Care Team:  Luiz Onofre MD as PCP - General (Family Medicine)    Chief complaint:  Dysphagia    Subjective     History of Present Illness     The patient is a very pleasant 72-year-old female with progressive Parkinson's disease who has developed dysphagia.  He has aspiration pneumonia and is currently being treated.  Request made for consultation for PEG tube placement.    Review of Systems   Constitutional: Positive for activity change and appetite change. Negative for fatigue and fever.   Respiratory: Negative for chest tightness and shortness of breath.    Cardiovascular: Negative for chest pain and palpitations.   Gastrointestinal: Negative for abdominal pain, blood in stool, constipation, diarrhea, nausea and vomiting.        Past Medical History:   Diagnosis Date   • AF (atrial fibrillation)    • Atrial flutter    • Atypical chest pain    • Chest pain    • Colon polyps    • Confusion    • Depression    • Disease of thyroid gland    • Disorder of thyroid    • Dyspnea and respiratory abnormalities    • Elevated TSH    • Fatigue    • Gait instability    • Hay fever    • Hyperlipidemia    • Hypertension    • Hypothyroidism    • Insomnia    • Measles    • Mumps    • Palpitations    • Parkinson disease    • Peripheral neuropathy    • Pneumonia    • Poor sleep pattern    • Slurred speech    • Tremor    , History reviewed. No pertinent surgical history.,   Family History   Problem Relation Age of Onset   • Hypertension Mother    • Glaucoma Mother    • Throat cancer Father    • Alcohol abuse Father    • Hypertension Sister    • Cancer Sister      malignant neoplasm of urinary bladder   • Lung cancer Brother    • Heart attack Other    • Lung disease Other    ,   Social History   Substance Use Topics   • Smoking status: Former Smoker   • Smokeless tobacco: Never Used      Comment: seldom  caffeine   • Alcohol use Yes      Comment: social use    and Allergies:  Review of patient's allergies indicates no known allergies.    Objective      Vital Signs  Temp:  [97.9 °F (36.6 °C)-98.4 °F (36.9 °C)] 98.4 °F (36.9 °C)  Heart Rate:  [79-90] 87  Resp:  [15-18] 15  BP: (133-149)/() 141/86    Physical Exam   Constitutional: He is oriented to person, place, and time. He appears well-developed and well-nourished.  Non-toxic appearance.   Eyes: EOM are normal. No scleral icterus.   Pulmonary/Chest: Effort normal. No respiratory distress.   Abdominal: Soft. Normal appearance. There is no tenderness.   Neurological: He is alert and oriented to person, place, and time.   Skin: Skin is warm and dry.   Psychiatric: He has a normal mood and affect. His behavior is normal. Judgment and thought content normal.       Results Review:    I reviewed the patient's new clinical results.        Assessment/Plan     Active Problems:    AF (atrial fibrillation)    Gait instability    Hypertension    Idiopathic Parkinson's disease    Dysarthria    Anticoagulated    Weakness of voice    Obstructive sleep apnea, adult    Fall    Progressive supranuclear palsy    Bruising    Aspiration pneumonia    Sepsis    Metabolic encephalopathy    Dysphagia      Assessment & Plan     1.  Dysphagia: Plan EGD with PEG.  The patient understands the indications, alternatives, risks, and benefits of the procedure and wishes to proceed.    I discussed the patients findings and my recommendations with patient    Lopez Vang Jr., MD  03/16/18  12:51 PM

## 2018-03-16 NOTE — SIGNIFICANT NOTE
03/16/18 1259   Rehab Treatment   Discipline occupational therapist   Reason Treatment Not Performed unavailable for treatment  (endo at this time 1254)

## 2018-03-16 NOTE — PLAN OF CARE
Problem: Patient Care Overview (Adult)  Goal: Plan of Care Review  Outcome: Ongoing (interventions implemented as appropriate)   03/16/18 9831   Patient Care Overview   Progress no change   Outcome Evaluation   Outcome Summary/Follow up Plan Patients vitals mostly stable. Patient required oxygen at 2 Liters mid shift due to o2 at 88%. Patient denies pain and discomfort. Patient has some confusion. Patient tolerating IV antibiotics without difficulty. Patient still NPO with meds crushed in applesause. Will continue to monitor.   Coping/Psychosocial Response Interventions   Plan Of Care Reviewed With patient       Problem: Fall Risk (Adult)  Goal: Identify Related Risk Factors and Signs and Symptoms  Outcome: Outcome(s) achieved Date Met: 03/16/18    Goal: Absence of Fall  Outcome: Ongoing (interventions implemented as appropriate)      Problem: Nutrition, Imbalanced: Inadequate Oral Intake (Adult)  Goal: Improved Oral Intake  Outcome: Ongoing (interventions implemented as appropriate)    Goal: Prevent Further Weight Loss  Outcome: Ongoing (interventions implemented as appropriate)      Problem: Skin Injury Risk (Adult)  Goal: Skin Health and Integrity  Outcome: Ongoing (interventions implemented as appropriate)

## 2018-03-16 NOTE — ANESTHESIA POSTPROCEDURE EVALUATION
"Patient: Rommel Gonzalez    Procedure Summary     Date:  03/16/18 Room / Location:   [facility]    Anesthesia Start:  1245 Anesthesia Stop:  1320    Procedure:  ESOPHAGOGASTRODUODENOSCOPY WITH PERCUTANEOUS ENDOSCOPIC GASTROSTOMY TUBE INSERTION (N/A Esophagus) Diagnosis:       Dysphagia, unspecified type      (Dysphagia, unspecified type [R13.10])    Surgeon:  Lopez Vang Jr., MD Provider:  Pascale Jules MD    Anesthesia Type:  MAC ASA Status:  4          Anesthesia Type: MAC  Last vitals  BP   107/73 (03/16/18 1330)   Temp   36.6 °C (97.9 °F) (03/16/18 1330)   Pulse   74 (03/16/18 1330)   Resp   14 (03/16/18 1330)     SpO2   96 % (03/16/18 1330)     Post Anesthesia Care and Evaluation    Patient location during evaluation: bedside  Patient participation: complete - patient participated  Level of consciousness: awake and alert  Pain management: adequate  Airway patency: patent  Anesthetic complications: No anesthetic complications  PONV Status: none  Cardiovascular status: acceptable  Respiratory status: acceptable  Hydration status: acceptable    Comments: /73 (BP Location: Left arm, Patient Position: Lying)   Pulse 74   Temp 36.6 °C (97.9 °F) (Axillary)   Resp 14   Ht 182.9 cm (72\")   Wt 85.7 kg (189 lb)   SpO2 96%   BMI 25.63 kg/m²         "

## 2018-03-16 NOTE — ANESTHESIA PREPROCEDURE EVALUATION
Anesthesia Evaluation     Patient summary reviewed   NPO Solid Status: > 2 hours  NPO Liquid Status: > 2 hours           Airway   Mallampati: II  TM distance: >3 FB  Dental      Pulmonary    (+) pneumonia improving , shortness of breath, sleep apnea on CPAP,   Cardiovascular     Rhythm: irregular  Rate: normal    (+) hypertension, dysrhythmias Atrial Fib, Atrial Flutter, hyperlipidemia,       Neuro/Psych    ROS Comment: Supracranial palsy.  GI/Hepatic/Renal/Endo    (+)   hypothyroidism,     Musculoskeletal     Abdominal    Substance History      OB/GYN          Other                        Anesthesia Plan    ASA 4     MAC   total IV anesthesia  Anesthetic plan and risks discussed with child.    Plan discussed with CRNA.

## 2018-03-17 LAB
ANION GAP SERPL CALCULATED.3IONS-SCNC: 10.7 MMOL/L
BUN BLD-MCNC: 8 MG/DL (ref 8–23)
BUN/CREAT SERPL: 9.5 (ref 7–25)
CALCIUM SPEC-SCNC: 7.9 MG/DL (ref 8.6–10.5)
CHLORIDE SERPL-SCNC: 104 MMOL/L (ref 98–107)
CO2 SERPL-SCNC: 25.3 MMOL/L (ref 22–29)
CREAT BLD-MCNC: 0.84 MG/DL (ref 0.76–1.27)
DEPRECATED RDW RBC AUTO: 46.5 FL (ref 37–54)
ERYTHROCYTE [DISTWIDTH] IN BLOOD BY AUTOMATED COUNT: 13.5 % (ref 11.5–14.5)
GFR SERPL CREATININE-BSD FRML MDRD: 90 ML/MIN/1.73
GLUCOSE BLD-MCNC: 111 MG/DL (ref 65–99)
HCT VFR BLD AUTO: 37.4 % (ref 40.4–52.2)
HGB BLD-MCNC: 12 G/DL (ref 13.7–17.6)
MCH RBC QN AUTO: 30.6 PG (ref 27–32.7)
MCHC RBC AUTO-ENTMCNC: 32.1 G/DL (ref 32.6–36.4)
MCV RBC AUTO: 95.4 FL (ref 79.8–96.2)
PLATELET # BLD AUTO: 255 10*3/MM3 (ref 140–500)
PMV BLD AUTO: 11.4 FL (ref 6–12)
POTASSIUM BLD-SCNC: 4 MMOL/L (ref 3.5–5.2)
RBC # BLD AUTO: 3.92 10*6/MM3 (ref 4.6–6)
SODIUM BLD-SCNC: 140 MMOL/L (ref 136–145)
WBC NRBC COR # BLD: 10.25 10*3/MM3 (ref 4.5–10.7)

## 2018-03-17 PROCEDURE — 80048 BASIC METABOLIC PNL TOTAL CA: CPT | Performed by: HOSPITALIST

## 2018-03-17 PROCEDURE — 85027 COMPLETE CBC AUTOMATED: CPT | Performed by: HOSPITALIST

## 2018-03-17 PROCEDURE — 97116 GAIT TRAINING THERAPY: CPT | Performed by: PHYSICAL THERAPIST

## 2018-03-17 PROCEDURE — 99231 SBSQ HOSP IP/OBS SF/LOW 25: CPT | Performed by: SURGERY

## 2018-03-17 PROCEDURE — 25010000002 PIPERACILLIN SOD-TAZOBACTAM PER 1 G: Performed by: HOSPITALIST

## 2018-03-17 RX ORDER — CARBIDOPA/LEVODOPA 25MG-250MG
0.5 TABLET ORAL 3 TIMES DAILY
Status: DISCONTINUED | OUTPATIENT
Start: 2018-03-17 | End: 2018-03-19 | Stop reason: HOSPADM

## 2018-03-17 RX ORDER — TERAZOSIN 1 MG/1
1 CAPSULE ORAL NIGHTLY
Status: DISCONTINUED | OUTPATIENT
Start: 2018-03-17 | End: 2018-03-19 | Stop reason: HOSPADM

## 2018-03-17 RX ORDER — AMOXICILLIN AND CLAVULANATE POTASSIUM 250; 62.5 MG/5ML; MG/5ML
500 POWDER, FOR SUSPENSION ORAL EVERY 8 HOURS SCHEDULED
Status: DISCONTINUED | OUTPATIENT
Start: 2018-03-17 | End: 2018-03-19 | Stop reason: HOSPADM

## 2018-03-17 RX ORDER — MIDODRINE HYDROCHLORIDE 2.5 MG/1
2.5 TABLET ORAL
Status: DISCONTINUED | OUTPATIENT
Start: 2018-03-18 | End: 2018-03-19 | Stop reason: HOSPADM

## 2018-03-17 RX ORDER — LEVOTHYROXINE SODIUM 112 UG/1
112 TABLET ORAL
Status: DISCONTINUED | OUTPATIENT
Start: 2018-03-18 | End: 2018-03-19 | Stop reason: HOSPADM

## 2018-03-17 RX ORDER — AMOXICILLIN AND CLAVULANATE POTASSIUM 250; 62.5 MG/5ML; MG/5ML
500 POWDER, FOR SUSPENSION ORAL EVERY 8 HOURS SCHEDULED
Status: DISCONTINUED | OUTPATIENT
Start: 2018-03-17 | End: 2018-03-17

## 2018-03-17 RX ORDER — DIPHENOXYLATE HYDROCHLORIDE AND ATROPINE SULFATE 2.5; .025 MG/1; MG/1
1 TABLET ORAL DAILY
Status: DISCONTINUED | OUTPATIENT
Start: 2018-03-17 | End: 2018-03-19 | Stop reason: HOSPADM

## 2018-03-17 RX ADMIN — POTASSIUM CHLORIDE, DEXTROSE MONOHYDRATE AND SODIUM CHLORIDE 75 ML/HR: 150; 5; 450 INJECTION, SOLUTION INTRAVENOUS at 06:31

## 2018-03-17 RX ADMIN — CARBIDOPA AND LEVODOPA 2 TABLET: 25; 100 TABLET ORAL at 08:10

## 2018-03-17 RX ADMIN — CARBIDOPA AND LEVODOPA 2 TABLET: 25; 100 TABLET ORAL at 16:00

## 2018-03-17 RX ADMIN — AMOXICILLIN AND CLAVULANATE POTASSIUM 500 MG: 250; 62.5 POWDER, FOR SUSPENSION ORAL at 18:41

## 2018-03-17 RX ADMIN — CARBIDOPA AND LEVODOPA 0.5 TABLET: 25; 250 TABLET ORAL at 08:10

## 2018-03-17 RX ADMIN — TAZOBACTAM SODIUM AND PIPERACILLIN SODIUM 3.38 G: 375; 3 INJECTION, SOLUTION INTRAVENOUS at 11:27

## 2018-03-17 RX ADMIN — TAZOBACTAM SODIUM AND PIPERACILLIN SODIUM 3.38 G: 375; 3 INJECTION, SOLUTION INTRAVENOUS at 02:30

## 2018-03-17 RX ADMIN — CARBIDOPA AND LEVODOPA 2 TABLET: 25; 100 TABLET ORAL at 20:47

## 2018-03-17 RX ADMIN — LEVOTHYROXINE SODIUM 112 MCG: 112 TABLET ORAL at 06:04

## 2018-03-17 RX ADMIN — MULTIPLE VITAMINS W/ MINERALS TAB 1 TABLET: TAB at 08:10

## 2018-03-17 RX ADMIN — CARBIDOPA AND LEVODOPA 0.5 TABLET: 25; 250 TABLET ORAL at 20:47

## 2018-03-17 RX ADMIN — Medication 1 TABLET: at 16:00

## 2018-03-17 RX ADMIN — CARVEDILOL 9.38 MG: 6.25 TABLET, FILM COATED ORAL at 08:10

## 2018-03-17 RX ADMIN — CARBIDOPA AND LEVODOPA 0.5 TABLET: 25; 250 TABLET ORAL at 16:00

## 2018-03-17 RX ADMIN — TERAZOSIN HYDROCHLORIDE 1 MG: 1 CAPSULE ORAL at 20:47

## 2018-03-17 RX ADMIN — CARVEDILOL 9.38 MG: 6.25 TABLET, FILM COATED ORAL at 18:09

## 2018-03-17 RX ADMIN — AMOXICILLIN AND CLAVULANATE POTASSIUM 500 MG: 250; 62.5 POWDER, FOR SUSPENSION ORAL at 21:02

## 2018-03-17 RX ADMIN — ZOLPIDEM TARTRATE 5 MG: 5 TABLET ORAL at 20:47

## 2018-03-17 RX ADMIN — MIDODRINE HYDROCHLORIDE 2.5 MG: 2.5 TABLET ORAL at 06:04

## 2018-03-17 RX ADMIN — RIVAROXABAN 20 MG: 20 TABLET, FILM COATED ORAL at 18:09

## 2018-03-17 NOTE — PLAN OF CARE
Problem: Fall Risk (Adult)  Goal: Absence of Falls  Outcome: Ongoing (interventions implemented as appropriate)   03/16/18 2028   Retired CPM F14 SGRP CPG HR FALL RISK (ADULT)   Retired CPM F14 ROW GHR ABSENCE OF FALLS.FALL RISK (ADULT) making progress toward outcome       Problem: Pain, Acute (Adult)  Goal: Acceptable Pain Control/Comfort Level  Outcome: Ongoing (interventions implemented as appropriate)   03/16/18 2028   Pain, Acute (Adult)   Acceptable Pain Control/Comfort Level making progress toward outcome       Problem: Patient Care Overview  Goal: Individualization and Mutuality  Outcome: Ongoing (interventions implemented as appropriate)   03/13/18 0355 03/14/18 0443   Individualization   Patient Specific Preferences cpap at hs --    Patient Specific Goals (Include Timeframe) --  pt will remain free from injury this shift   Patient Specific Interventions --  falls protocol       Problem: Skin Injury Risk (Adult)  Goal: Skin Health and Integrity  Outcome: Ongoing (interventions implemented as appropriate)   03/16/18 2028   Skin Injury Risk (Adult)   Skin Health and Integrity making progress toward outcome

## 2018-03-17 NOTE — PLAN OF CARE
Problem: Patient Care Overview (Adult)  Goal: Plan of Care Review  Outcome: Ongoing (interventions implemented as appropriate)   03/17/18 6431   Patient Care Overview   Progress no change   Outcome Evaluation   Outcome Summary/Follow up Plan Patients vitals stable. Patient denies pain and discomfort. Patients g-tube dressing dry and intact patient has no residual. Patient tolerating IV antibiotics without difficulty. Patient confused at times and pulled out both of his IV's. Will continue to monitor.   Coping/Psychosocial Response Interventions   Plan Of Care Reviewed With patient       Problem: Fall Risk (Adult)  Goal: Absence of Falls  Outcome: Ongoing (interventions implemented as appropriate)      Problem: Pain, Acute (Adult)  Goal: Acceptable Pain Control/Comfort Level  Outcome: Ongoing (interventions implemented as appropriate)      Problem: Skin Injury Risk (Adult)  Goal: Skin Health and Integrity  Outcome: Ongoing (interventions implemented as appropriate)

## 2018-03-17 NOTE — PLAN OF CARE
Problem: Nutrition, Imbalanced: Inadequate Oral Intake (Adult)  Goal: Prevent Further Weight Loss  Outcome: Ongoing (interventions implemented as appropriate)      Problem: Nutrition, Imbalanced: Inadequate Oral Intake (Adult)  Intervention: Promote/Optimize Nutrition   03/17/18 1205   Nutrition Interventions   Oral Nutrition Promotion PEG placed and TF to begin today

## 2018-03-17 NOTE — PLAN OF CARE
Problem: Patient Care Overview (Adult)  Goal: Plan of Care Review  Outcome: Ongoing (interventions implemented as appropriate)   03/17/18 7800   Patient Care Overview   Progress progress towards functional goals is fair   Outcome Evaluation   Outcome Summary/Follow up Plan Patient with slight decrease in distance ambulated today likely due to placement of PEG tube but unable to verify. He is motivated and should continue to benefit from skilled PT.   Coping/Psychosocial Response Interventions   Plan Of Care Reviewed With patient

## 2018-03-17 NOTE — THERAPY PROGRESS REPORT/RE-CERT
Acute Care - Physical Therapy Re-Evaluation  [facility]     Patient Name: Rommel Gonzalez  : 1945  MRN: [id number]  Today's Date: 3/17/2018      Date of Referral to PT: 18         Admit Date: 3/7/2018    Visit Dx:     ICD-10-CM ICD-9-CM   1. Traumatic rhabdomyolysis, initial encounter T79.6XXA 958.6   2. Fall at home, initial encounter W19.XXXA E888.9    Y92.099 E849.0   3. Dysarthria R47.1 784.51   4. Progressive supranuclear palsy G23.1 333.0   5. Impaired functional mobility, balance, gait, and endurance Z74.09 V49.89   6. Dysphagia, unspecified type R13.10 787.20     Patient Active Problem List   Diagnosis   • AF (atrial fibrillation)   • Depression   • Gait instability   • Hypertension   • Insomnia   • Idiopathic Parkinson's disease   • Peripheral neuropathy   • Dysarthria   • Atrial flutter   • Anticoagulated   • Health care maintenance   • Hypothyroidism (acquired)   • Weakness of voice   • Abnormal chest CT   • Facial droop   • Hypersomnia    • Hyperlipidemia   • High risk medication use   • CORINNE on CPAP   • Obstructive sleep apnea, adult   • Fall   • Progressive supranuclear palsy   • Aspiration pneumonia   • Dysphagia     Past Medical History:   Diagnosis Date   • AF (atrial fibrillation)    • Atrial flutter    • Atypical chest pain    • Chest pain    • Colon polyps    • Confusion    • Depression    • Disease of thyroid gland    • Disorder of thyroid    • Dyspnea and respiratory abnormalities    • Elevated TSH    • Fatigue    • Gait instability    • Hay fever    • Hyperlipidemia    • Hypertension    • Hypothyroidism    • Insomnia    • Measles    • Mumps    • Palpitations    • Parkinson disease    • Peripheral neuropathy    • Pneumonia    • Poor sleep pattern    • Slurred speech    • Tremor      Past Surgical History:   Procedure Laterality Date   • ENDOSCOPY W/ PEG TUBE PLACEMENT N/A 3/16/2018    Procedure: ESOPHAGOGASTRODUODENOSCOPY WITH PERCUTANEOUS ENDOSCOPIC GASTROSTOMY TUBE INSERTION;   Surgeon: Lopez Vang Jr., MD;  Location: [facility] END[facility];  Service: Gastroenterology        PT ASSESSMENT (last 72 hours)      Physical Therapy Evaluation    No documentation.         Physical Therapy Education     Title: PT OT SLP Therapies (Done)     Topic: Physical Therapy (Done)     Point: Mobility training (Done)    Learning Progress Summary     Learner Status Readiness Method Response Comment Documented by    Patient Done Acceptance E VU,DU safety, benefits of activity  03/17/18 1704     Done Acceptance E,TB,D VU,NR   03/15/18 0909     Done Acceptance E,TB,D VU,NR   03/14/18 1132     Done Acceptance E,TB,D VU,NR   03/13/18 1538     Done Acceptance E,D VU,NR   03/12/18 1535     Done Acceptance E VU,NR ed on gait impairment corrections, and ed on safety, up only with assist.  03/10/18 1702     Done Acceptance E VU  MA 03/09/18 1445    Family Done Acceptance E VU,DU safety, benefits of activity  03/17/18 1704     Done Acceptance E,TB,D VU,NR   03/14/18 1132     Done Acceptance E,D VU,NR   03/12/18 1535          Point: Home exercise program (Done)    Learning Progress Summary     Learner Status Readiness Method Response Comment Documented by    Patient Done Acceptance E VU,DU safety, benefits of activity  03/17/18 1704     Done Acceptance E VU  MA 03/09/18 1445    Family Done Acceptance E VU,DU safety, benefits of activity  03/17/18 1704          Point: Body mechanics (Done)    Learning Progress Summary     Learner Status Readiness Method Response Comment Documented by    Patient Done Acceptance E VU,DU safety, benefits of activity  03/17/18 1704     Done Acceptance E,TB,D VU,NR   03/15/18 0909     Done Acceptance E,TB,D VU,NR   03/14/18 1132     Done Acceptance E,TB,D VU,NR   03/13/18 1538     Done Acceptance E,D VU,NR   03/12/18 1535     Done Acceptance E VU  MA 03/09/18 1445    Family Done Acceptance E VU,DU safety, benefits of activity  03/17/18 1704     Done Acceptance  E,TB,D VU,NR  RW 03/14/18 1132     Done Acceptance E,D VU,NR  RW 03/12/18 1535          Point: Precautions (Done)    Learning Progress Summary     Learner Status Readiness Method Response Comment Documented by    Patient Done Acceptance E VU,DU safety, benefits of activity GR 03/17/18 1704     Done Acceptance E,TB,D VU,NR  RW 03/15/18 0909     Done Acceptance E,TB,D VU,NR  RW 03/14/18 1132     Done Acceptance E,TB,D VU,NR  RW 03/13/18 1538     Done Acceptance E,D VU,NR  RW 03/12/18 1535     Done Acceptance E VU,NR ed on gait impairment corrections, and ed on safety, up only with assist. SP 03/10/18 1702     Done Acceptance E VU  MA 03/09/18 1445    Family Done Acceptance E VU,DU safety, benefits of activity  03/17/18 1704     Done Acceptance E,TB,D VU,NR   03/14/18 1132     Done Acceptance E,D VU,NR  RW 03/12/18 1535                      User Key     Initials Effective Dates Name Provider Type Discipline     10/03/16 -  Jd gO, PT Physical Therapist PT    RW 03/07/18 -  Luz Montgomery, PTA Physical Therapy Assistant PT    MA 03/07/18 -  Eve Allen, PT Physical Therapist PT    SP 01/09/17 -  Monica Booker, PT Physical Therapist PT                PT Recommendation and Plan                Outcome Measures     Row Name 03/17/18 1700 03/15/18 0908          How much help from another person do you currently need...    Turning from your back to your side while in flat bed without using bedrails? 3  -GR 3  -RW     Moving from lying on back to sitting on the side of a flat bed without bedrails? 3  -GR 3  -RW     Moving to and from a bed to a chair (including a wheelchair)? 3  -GR 3  -RW     Standing up from a chair using your arms (e.g., wheelchair, bedside chair)? 3  -GR 3  -RW     Climbing 3-5 steps with a railing? 2  -GR 1  -RW     To walk in hospital room? 3  -GR 3  -RW     AM-PAC 6 Clicks Score 17  -GR 16  -RW        Functional Assessment    Outcome Measure Options AM-PAC 6 Clicks Basic  Mobility (PT)  -GR AM-PAC 6 Clicks Basic Mobility (PT)  -RW       User Key  (r) = Recorded By, (t) = Taken By, (c) = Cosigned By    Initials Name Provider Type    GR Jd Og, PT Physical Therapist    RW Luz Montgomery, PTA Physical Therapy Assistant           Time Calculation:         PT Charges     Row Name 03/17/18 1705             Time Calculation    Start Time 1527  -GR      Stop Time 1540  -GR      Time Calculation (min) 13 min  -GR      PT Received On 03/17/18  -GR      PT - Next Appointment 03/18/18  -GR      PT Goal Re-Cert Due Date 03/24/18  -        User Key  (r) = Recorded By, (t) = Taken By, (c) = Cosigned By    Initials Name Provider Type    BUTCH Og, PT Physical Therapist          Therapy Charges for Today     Code Description Service Date Service Provider Modifiers Qty    [id number] HC GAIT TRAINING EA 15 MIN 3/17/2018 Jd Og PT GP 1          PT G-Codes  Outcome Measure Options: AM-PAC 6 Clicks Basic Mobility (PT)      Jd Og PT  3/17/2018

## 2018-03-17 NOTE — PROGRESS NOTES
Follow-up PEG    Postoperative day 1 placement percutaneous feeding access    Tolerating tube feeds, no evidence of considerable pain.    Objective:  Patient afebrile, vitals are stable  Gen.: Mildly confused, but no acute distress  Abdomen: Soft and benign, site is clean    Hemoglobin stable at 12.0 today.    Assessment and plan:  Postoperative day #1 placement percutaneous feeding access  Continue to advance to goal, may use for medicines as well.  No other issues evident at this time.  Please contact for any further questions.    Nirav Ware MD  General and Endoscopic Surgery  [facility]    [address] [place] [address], [address]  [place], [place], [address]  P: [phone]  F: [phone]

## 2018-03-17 NOTE — PROGRESS NOTES
Name: Rommel Gonzalez ADMIT: 3/7/2018   : 1945  PCP: Luiz Onofre MD    MRN: [id number] LOS: 10 days   AGE/SEX: 72 y.o. male  ROOM: [facility]   Subjective   Subjective  Looks good today. No complaints.    Objective   Vital Signs  Temp:  [97.9 °F (36.6 °C)-98.6 °F (37 °C)] 98 °F (36.7 °C)  Heart Rate:  [68-93] 78  Resp:  [14-16] 16  BP: ()/(64-95) 152/95  SpO2:  [93 %-96 %] 96 %  on  Flow (L/min):  [3] 3;   Device (Oxygen Therapy): room air  Body mass index is 24.68 kg/m².    Physical Exam   Constitutional: He is oriented to person, place, and time. He appears well-developed. He is cooperative. No distress.   Cardiovascular: Normal rate and regular rhythm.    No murmur heard.  Pulmonary/Chest: Effort normal and breath sounds normal. No respiratory distress.   Abdominal: Soft. Normal appearance and bowel sounds are normal. He exhibits no distension. There is no tenderness.   + PEG   Neurological: He is alert and oriented to person, place, and time.   Masked facies. Sluggish speech.   Skin: Skin is warm and dry. No rash noted.   Nursing note and vitals reviewed.      Results Review:       I reviewed the patient's new clinical results.    Results from last 7 days  Lab Units 18  0620 18  0428 03/15/18  0441 03/14/18  0552   WBC 10*3/mm3 10.25 11.28* 11.90* 15.86*   HEMOGLOBIN g/dL 12.0* 11.3* 11.1* 10.5*   PLATELETS 10*3/mm3 255 209 188 150       Results from last 7 days  Lab Units 18  0620 03/15/18  0441 18  0552 18  0641   SODIUM mmol/L 140 141 143 142   POTASSIUM mmol/L 4.0 4.1 4.2 3.9   CHLORIDE mmol/L 104 108* 107 106   CO2 mmol/L 25.3 19.7* 23.1 27.9   BUN mg/dL 8 6* 23 25*   CREATININE mg/dL 0.84 0.86 0.91 0.93   GLUCOSE mg/dL 111* 74 81 88   Estimated Creatinine Clearance: 91.5 mL/min (by C-G formula based on SCr of 0.84 mg/dL).    Results from last 7 days  Lab Units 18  0620 03/15/18  0441 18  0552 18  0641   CALCIUM mg/dL 7.9* 7.6* 7.9* 7.8*        EGD WITH PEG TUBE INSERTION  3/16/2018  Surgeon:  Lopez Vang Jr., MD      carbidopa-levodopa 2 tablet Oral TID   carbidopa-levodopa 0.5 tablet Oral TID   carvedilol 9.375 mg Oral BID With Meals   levothyroxine 112 mcg Oral Q AM   midodrine 2.5 mg Oral Q AM   multivitamin with minerals 1 tablet Oral Daily   piperacillin-tazobactam 3.375 g Intravenous Q8H   terazosin 1 mg Oral Nightly       dextrose 5 % and sodium chloride 0.45 % with KCl 20 mEq/L 75 mL/hr Last Rate: 75 mL/hr (03/17/18 0631)   NPO Diet NPO Except: Other; Other: Except meds crushed in applesauce      Assessment/Plan   Active Hospital Problems (** Indicates Principal Problem)    Diagnosis Date Noted   • Aspiration pneumonia [J69.0] 03/12/2018   • Fall [W19.XXXA] 03/07/2018   • Progressive supranuclear palsy [G23.1] 03/07/2018   • Dysphagia [R13.10] 03/07/2018   • Obstructive sleep apnea, adult [G47.33] 10/26/2017   • Weakness of voice [R49.8] 06/23/2016   • Anticoagulated [Z79.01] 04/05/2016   • Idiopathic Parkinson's disease [G20] 03/28/2016   • Hypertension [I10] 03/28/2016   • AF (atrial fibrillation) [I48.91] 03/28/2016   • Gait instability [R26.81] 03/28/2016   • Dysarthria [R47.1] 03/28/2016      Resolved Hospital Problems    Diagnosis Date Noted Date Resolved   • **Non-traumatic rhabdomyolysis [M62.82] 03/07/2018 03/15/2018   • Metabolic encephalopathy [G93.41] 03/15/2018 03/16/2018   • Sepsis [A41.9] 03/12/2018 03/16/2018       Mr. Gonzalez is a 72 y.o. male with a history of Parkinson's disease and progressive supranuclear palsy who was admitted with mild rhabdomyolysis after sustaining a fall.    · On ZOSYN day 6 for aspiration pneumonia. Will change to AUGMENTIN via PEG for 10d total.  · S/P PEG. Dietician recommended Jevity 1.5 with goal at 60 ml/hr (or 6 cans bolus per day). Not sure why this was not initiated last evening as per my order yesterday. Seems to be tolerating thus far. We will SLIV.  · Restart XARELTO today. Discussed  with Dr. Vang yesterday.  · Dr. Manley feels likely orthostatic hypotension due to autonomic dysfxn and midodrine has been started. BP up slightly since midodrine started. On Coreg for Afib. On Xarelto per Dr. Bennett.  · Daughter looking in to SNU placement prior to transitioning home to live with her. Will need precert. Anticipate discharge Monday.      Fahad Schafer MD  [facility]  03/17/18  12:37 PM

## 2018-03-17 NOTE — CONSULTS
Adult Nutrition  Assessment/PES    Patient Name:  Rommel Gonzalez  YOB: 1945  MRN: [id number]  Admit Date:  3/7/2018    Assessment Date:  3/17/2018    Comments:  TF assessment complete. Pt received PEG yesterday.  Jevity 1.5 at 60cc/hr will meet estimated needs. Added 25cc/hr water for hydration - may need more. If tolerates continuous feeds, would transition to bolusTF regimen (6 cans daily). Will follow.          Adult Nutrition Assessment     Row Name 03/17/18 1200       Nutrition Prescription PO    Current PO Diet NPO       Nutrition Prescription EN    Enteral Route PEG       Fluid Intake Evaluation    Parenteral Fluid (mL) 1800    Row Name 03/17/18 1159       Labs/Procedures/Meds    Lab Results Reviewed reviewed, pertinent       Physical Findings    Gastrointestinal feeding tube    Tubes gastrostomy tube    Skin other (see comments)   bruise, abrasion    Row Name 03/17/18 1158       Reason for Assessment    Reason For Assessment TF/PN;physician consult    Diagnosis --   PEG placed 3/16       Nutrition/Diet History    Factors Affecting Nutritional Intake --   hungry    Row Name 03/17/18 0500       Anthropometrics    Weight 82.6 kg (182 lb)      Problem/Interventions:        Intervention Goal     Row Name 03/17/18 1201       Intervention Goal    General Maintain nutrition;Nutrition support treatment;Meet nutritional needs for age/condition;Reduce/improve symptoms;Disease management/therapy    TF/PN Inititiate TF/PN;Tolerate TF at goal;Deliver (%) goal    Deliver % of Goal 100 %    Weight Maintain weight            Nutrition Intervention     Row Name 03/17/18 1201       Nutrition Intervention    RD/Tech Action Care plan reviewd;Follow Tx progress;Recommend/ordered    Recommended/Ordered EN            Nutrition Prescription     Row Name 03/17/18 1201       Nutrition Prescription EN    Enteral Route PEG    Product Jevity 1.5 river    TF Delivery Method Continuous    Continuous TF Goal Rate (mL/hr) 60  mL/hr    Continuous TF Starting Rate (mL/hr) 30 mL/hr    Water flush (mL)  25 mL    Water Flush Frequency Per hour    New EN Prescription Ordered? Yes       EN to Supply    Kcal/Day 2160 Kcal/Day    Protein (gm/day) 91 gm/day    Meet Estimated Kcal Need (%) 103 %    Meet Estimated Protein Need (%) 100 %    TF Free H2O (mL) 1094 mL    Total Free H2O (mL/day) 1694 mL/day            Education/Evaluation     Row Name 03/17/18 1202       Education    Education Provided education regarding;Education topics    Education Topics TF instruction   pt and family       Monitor/Evaluation    Monitor Skin status;Per protocol;Symptoms;I&O;Pertinent labs;TF delivery/tolerance;Weight        Electronically signed by:  Suzy Estrada RD  03/17/18 12:02 PM

## 2018-03-18 PROCEDURE — 97110 THERAPEUTIC EXERCISES: CPT

## 2018-03-18 RX ADMIN — LEVOTHYROXINE SODIUM 112 MCG: 112 TABLET ORAL at 05:53

## 2018-03-18 RX ADMIN — AMOXICILLIN AND CLAVULANATE POTASSIUM 500 MG: 250; 62.5 POWDER, FOR SUSPENSION ORAL at 22:25

## 2018-03-18 RX ADMIN — CARBIDOPA AND LEVODOPA 0.5 TABLET: 25; 250 TABLET ORAL at 08:36

## 2018-03-18 RX ADMIN — AMOXICILLIN AND CLAVULANATE POTASSIUM 500 MG: 250; 62.5 POWDER, FOR SUSPENSION ORAL at 18:17

## 2018-03-18 RX ADMIN — CARBIDOPA AND LEVODOPA 0.5 TABLET: 25; 250 TABLET ORAL at 18:17

## 2018-03-18 RX ADMIN — CARBIDOPA AND LEVODOPA 2 TABLET: 25; 100 TABLET ORAL at 18:17

## 2018-03-18 RX ADMIN — MIDODRINE HYDROCHLORIDE 2.5 MG: 2.5 TABLET ORAL at 05:53

## 2018-03-18 RX ADMIN — CARVEDILOL 9.38 MG: 6.25 TABLET, FILM COATED ORAL at 08:35

## 2018-03-18 RX ADMIN — CARBIDOPA AND LEVODOPA 0.5 TABLET: 25; 250 TABLET ORAL at 22:23

## 2018-03-18 RX ADMIN — Medication 1 TABLET: at 08:36

## 2018-03-18 RX ADMIN — AMOXICILLIN AND CLAVULANATE POTASSIUM 500 MG: 250; 62.5 POWDER, FOR SUSPENSION ORAL at 05:53

## 2018-03-18 RX ADMIN — CARBIDOPA AND LEVODOPA 2 TABLET: 25; 100 TABLET ORAL at 08:35

## 2018-03-18 RX ADMIN — CARBIDOPA AND LEVODOPA 2 TABLET: 25; 100 TABLET ORAL at 22:25

## 2018-03-18 RX ADMIN — TERAZOSIN HYDROCHLORIDE 1 MG: 1 CAPSULE ORAL at 22:25

## 2018-03-18 RX ADMIN — RIVAROXABAN 20 MG: 20 TABLET, FILM COATED ORAL at 18:17

## 2018-03-18 RX ADMIN — CARVEDILOL 9.38 MG: 6.25 TABLET, FILM COATED ORAL at 18:17

## 2018-03-18 NOTE — PROGRESS NOTES
Name: Rommel Gonzalez ADMIT: 3/7/2018   : 1945  PCP: Luiz Onofre MD    MRN: [id number] LOS: 11 days   AGE/SEX: 72 y.o. male  ROOM: [facility]   Subjective   Subjective  Looks good today. No complaints.    Objective   Vital Signs  Temp:  [97.9 °F (36.6 °C)-98.1 °F (36.7 °C)] 97.9 °F (36.6 °C)  Heart Rate:  [68-99] 94  Resp:  [16] 16  BP: (130-147)/(88-94) 133/89  SpO2:  [92 %-96 %] 94 %  on   ;   Device (Oxygen Therapy): room air  Body mass index is 25.48 kg/m².    Physical Exam   Constitutional: He is oriented to person, place, and time. He appears well-developed. He is cooperative. No distress.   Cardiovascular: Normal rate and regular rhythm.    No murmur heard.  Pulmonary/Chest: Effort normal and breath sounds normal. No respiratory distress.   Abdominal: Soft. Normal appearance and bowel sounds are normal. He exhibits no distension. There is no tenderness.   + PEG   Neurological: He is alert and oriented to person, place, and time.   Masked facies. Sluggish speech.   Skin: Skin is warm and dry. No rash noted.   Nursing note and vitals reviewed.      Results Review:       I reviewed the patient's new clinical results.    Results from last 7 days  Lab Units 18  0620 18  0428 03/15/18  0441 18  0552   WBC 10*3/mm3 10.25 11.28* 11.90* 15.86*   HEMOGLOBIN g/dL 12.0* 11.3* 11.1* 10.5*   PLATELETS 10*3/mm3 255 209 188 150       Results from last 7 days  Lab Units 18  0620 03/15/18  0441 18  0552 18  0641   SODIUM mmol/L 140 141 143 142   POTASSIUM mmol/L 4.0 4.1 4.2 3.9   CHLORIDE mmol/L 104 108* 107 106   CO2 mmol/L 25.3 19.7* 23.1 27.9   BUN mg/dL 8 6* 23 25*   CREATININE mg/dL 0.84 0.86 0.91 0.93   GLUCOSE mg/dL 111* 74 81 88   Estimated Creatinine Clearance: 94.4 mL/min (by C-G formula based on SCr of 0.84 mg/dL).    Results from last 7 days  Lab Units 18  0620 03/15/18  0441 18  0552 18  0641   CALCIUM mg/dL 7.9* 7.6* 7.9* 7.8*       EGD WITH PEG  TUBE INSERTION  3/16/2018  Surgeon:  Lopez Vang Jr., MD      amoxicillin-clavulanate 500 mg Per G Tube Q8H   carbidopa-levodopa 2 tablet Per G Tube TID   carbidopa-levodopa 0.5 tablet Per G Tube TID   carvedilol 9.375 mg Per G Tube BID With Meals   levothyroxine 112 mcg Per G Tube Q AM   midodrine 2.5 mg Per G Tube Q AM   multivitamin 1 tablet Per G Tube Daily   rivaroxaban 20 mg Per G Tube Daily With Dinner   terazosin 1 mg Per G Tube Nightly      NPO Diet NPO Except: Other; Other: Except meds crushed in applesauce      Assessment/Plan   Active Hospital Problems (** Indicates Principal Problem)    Diagnosis Date Noted   • Aspiration pneumonia [J69.0] 03/12/2018   • Fall [W19.XXXA] 03/07/2018   • Progressive supranuclear palsy [G23.1] 03/07/2018   • Dysphagia [R13.10] 03/07/2018   • Obstructive sleep apnea, adult [G47.33] 10/26/2017   • Weakness of voice [R49.8] 06/23/2016   • Anticoagulated [Z79.01] 04/05/2016   • Idiopathic Parkinson's disease [G20] 03/28/2016   • Hypertension [I10] 03/28/2016   • AF (atrial fibrillation) [I48.91] 03/28/2016   • Gait instability [R26.81] 03/28/2016   • Dysarthria [R47.1] 03/28/2016      Resolved Hospital Problems    Diagnosis Date Noted Date Resolved   • **Non-traumatic rhabdomyolysis [M62.82] 03/07/2018 03/15/2018   • Metabolic encephalopathy [G93.41] 03/15/2018 03/16/2018   • Sepsis [A41.9] 03/12/2018 03/16/2018       Mr. Gonzalez is a 72 y.o. male with a history of Parkinson's disease and progressive supranuclear palsy who was admitted with mild rhabdomyolysis after sustaining a fall.    · ZOSYN changed to AUGMENTIN via PEG for 10d total. Today is day 7 of abx.  · S/P PEG. Dietician recommended Jevity 1.5 with goal at 60 ml/hr (or 6 cans bolus per day). Seems to be tolerating thus far.   · Restarted XARELTO yesterday.   · Dr. Manley feels likely orthostatic hypotension due to autonomic dysfxn and midodrine has been started. On Coreg and Xarelto for Afib per   Bennett.  · Daughter looking in to SNU placement prior to transitioning home to live with her. Will need precert. Anticipate discharge Monday.      Fahad Schafer MD  [facility]  03/18/18  10:49 AM

## 2018-03-18 NOTE — THERAPY TREATMENT NOTE
Acute Care - Physical Therapy Treatment Note  [facility]     Patient Name: Rommel Gonzalez  : 1945  MRN: [id number]  Today's Date: 3/18/2018     Date of Referral to PT: 18       Admit Date: 3/7/2018    Visit Dx:    ICD-10-CM ICD-9-CM   1. Traumatic rhabdomyolysis, initial encounter T79.6XXA 958.6   2. Fall at home, initial encounter W19.XXXA E888.9    Y92.099 E849.0   3. Dysarthria R47.1 784.51   4. Progressive supranuclear palsy G23.1 333.0   5. Impaired functional mobility, balance, gait, and endurance Z74.09 V49.89   6. Dysphagia, unspecified type R13.10 787.20     Patient Active Problem List   Diagnosis   • AF (atrial fibrillation)   • Depression   • Gait instability   • Hypertension   • Insomnia   • Idiopathic Parkinson's disease   • Peripheral neuropathy   • Dysarthria   • Atrial flutter   • Anticoagulated   • Health care maintenance   • Hypothyroidism (acquired)   • Weakness of voice   • Abnormal chest CT   • Facial droop   • Hypersomnia    • Hyperlipidemia   • High risk medication use   • CORINNE on CPAP   • Obstructive sleep apnea, adult   • Fall   • Progressive supranuclear palsy   • Aspiration pneumonia   • Dysphagia       Therapy Treatment    Therapy Treatment / Health Promotion    Treatment Time/Intention  Discipline: physical therapy assistant (Ronan Onofre PTA)  Document Type: therapy note (daily note) (Ronan Onofre PTA)  Subjective Information: no complaints (Ronan Onofre PTA)  Mode of Treatment: physical therapy (Ronan Onofre PTA)  Patient Effort: good (Ronan Onofre PTA)  Patient Response to Treatment:  (good) (Ronan Onofre PTA)  Coping  Observed Emotional State: accepting, cooperative (Ronan Onofre PTA)  Verbalized Emotional State: acceptance (Ronan Onofre PTA)    Vitals/Pain/Safety  Vital Signs  O2 Delivery Intra Treatment: room air (Ronan Onofre PTA)  Pain Scale: Numbers Pre/Post-Treatment  Pain Scale: Numbers, Pretreatment: 0/10 -  no pain (Ronan Onofre PTA)  Safety Issues, Functional Mobility  Safety Issues Affecting Function (Mobility): at risk behavior observed (Ronan Onofre PTA)  Positioning and Restraints  Pre-Treatment Position: in bed (Ronan Onofre PTA)  Post Treatment Position: chair (Ronan Onofre PTA)  In Chair: sitting, call light within reach, with family/caregiver (Ronan Onofre PTA)    Mobility,ADL,Motor, Modality  Mobility Assessment/Intervention  Extremity Weight-bearing Status:  (WBAT) (Ronan Onofre PTA)  Bed Mobility Assessment/Treatment  Supine-Sit [place] (Bed Mobility): conditional independence (Ronan Onofre PTA)  Sit-Supine [place] (Bed Mobility):  (in chair) (Ronan Onofre PTA)  Sit-Stand Transfer  Sit-Stand [place] (Transfers): supervision (Ronan Onofre PTA)  Stand-Sit Transfer  Stand-Sit [place] (Transfers): supervision (Ronan Onofre PTA)  Assistive Device (Stand-Sit Transfers): walker, front-wheeled (Ronan Onofre PTA)  Stand Pivot/Stand Step Transfer  Stand Pivot/Stand Step [place]: contact guard (Ronan Onofre PTA)  Gait/Stairs Assessment/Training  [place] Level (Gait): contact guard (Ronan Onofre PTA)  Assistive Device (Gait): walker, front-wheeled (Ronan Onofre PTA)  Distance in Feet (Gait): 170 (Ronan Onofre PTA)  Pattern (Gait): step-to (slow pace) (Ronan Onofre PTA)        Static Sitting Balance  Level of [place] (Unsupported Sitting, Static Balance):  ((I) sitting EOB/chair) (Ronan Onofre PTA)  Static Standing Balance  Level of [place] (Supported Standing, Static Balance):  (SBA with UE support) (Ronan Onofre PTA)        ROM/MMT  General ROM  GENERAL ROM COMMENTS: WFL (Ronan Onofre PTA)          Sensory, Edema, Orthotics          Cognition, Communication, Swallow  Cognitive Assessment/Intervention- PT/OT  Affect/Mental Status (Cognitive): WFL (Ronan Onofre  PTA)    Outcome Summary  Outcome Summary/Treatment Plan (PT)  Daily Summary of Progress (PT): progress toward functional goals is good (Ronan Onofre, PTA)            PT Rehab Goals     Row Name 03/17/18 1700             Bed Mobility Goal 1 (PT)    Activity/Assistive Device (Bed Mobility Goal 1, PT) bed mobility activities, all  -GR      [place] Level/Cues Needed (Bed Mobility Goal 1, PT) conditional independence  -GR      Time Frame (Bed Mobility Goal 1, PT) 5 days  -GR         Transfer Goal 1 (PT)    Activity/Assistive Device (Transfer Goal 1, PT) sit-to-stand/stand-to-sit  -GR      [place] Level/Cues Needed (Transfer Goal 1, PT) conditional independence  -GR      Time Frame (Transfer Goal 1, PT) 5 days  -GR         Gait Training Goal 1 (PT)    Activity/Assistive Device (Gait Training Goal 1, PT) gait (walking locomotion)  -GR      [place]ills Level (Gait Training Goal 1, PT) contact guard assist  -GR      Distance (Gait Goal 1, PT) 150  -GR      Time Frame (Gait Training Goal 1, PT) 5 days  -GR        User Key  (r) = Recorded By, (t) = Taken By, (c) = Cosigned By    Initials Name Provider Type     Jd Og, PT Physical Therapist          Physical Therapy Education     Title: PT OT SLP Therapies (Done)     Topic: Physical Therapy (Done)     Point: Mobility training (Done)    Learning Progress Summary     Learner Status Readiness Method Response Comment Documented by    Patient Done Acceptance E VU,DU safety, benefits of activity  03/17/18 1704     Done Acceptance E,TB,D VU,NR   03/15/18 0909     Done Acceptance E,TB,D VU,NR   03/14/18 1132     Done Acceptance E,TB,D VU,NR   03/13/18 1538     Done Acceptance E,D VU,NR   03/12/18 1535     Done Acceptance E VU,NR ed on gait impairment corrections, and ed on safety, up only with assist. SP 03/10/18 1702     Done Acceptance E VU  MA 03/09/18 1445    Family Done Acceptance E VU,DU safety, benefits of activity GR 03/17/18 1704     Done  Acceptance E,TB,D VU,NR   03/14/18 1132     Done Acceptance E,D VU,NR   03/12/18 1535          Point: Home exercise program (Done)    Learning Progress Summary     Learner Status Readiness Method Response Comment Documented by    Patient Done Acceptance E VU,DU safety, benefits of activity  03/17/18 1704     Done Acceptance E VU  MA 03/09/18 1445    Family Done Acceptance E VU,DU safety, benefits of activity  03/17/18 1704          Point: Body mechanics (Done)    Learning Progress Summary     Learner Status Readiness Method Response Comment Documented by    Patient Done Acceptance E VU,DU safety, benefits of activity  03/17/18 1704     Done Acceptance E,TB,D VU,NR   03/15/18 0909     Done Acceptance E,TB,D VU,NR   03/14/18 1132     Done Acceptance E,TB,D VU,NR   03/13/18 1538     Done Acceptance E,D VU,NR   03/12/18 1535     Done Acceptance E VU  MA 03/09/18 1445    Family Done Acceptance E VU,DU safety, benefits of activity  03/17/18 1704     Done Acceptance E,TB,D VU,NR   03/14/18 1132     Done Acceptance E,D VU,NR   03/12/18 1535          Point: Precautions (Done)    Learning Progress Summary     Learner Status Readiness Method Response Comment Documented by    Patient Done Acceptance E VU,DU safety, benefits of activity  03/17/18 1704     Done Acceptance E,TB,D VU,NR   03/15/18 0909     Done Acceptance E,TB,D VU,NR   03/14/18 1132     Done Acceptance E,TB,D VU,NR   03/13/18 1538     Done Acceptance E,D VU,NR   03/12/18 1535     Done Acceptance E VU,NR ed on gait impairment corrections, and ed on safety, up only with assist. SP 03/10/18 1702     Done Acceptance E VU  MA 03/09/18 1445    Family Done Acceptance E VU,DU safety, benefits of activity  03/17/18 1704     Done Acceptance E,TB,D VU,NR   03/14/18 1132     Done Acceptance E,D VU,NR   03/12/18 1535                      User Key     Initials Effective Dates Name Provider Type Discipline     10/03/16 -  Jd SUMMERS  Lenka, PT Physical Therapist PT    RW 03/07/18 -  Luz Montgomery PTA Physical Therapy Assistant PT    MA 03/07/18 -  Eve Allen, PT Physical Therapist PT    SP 01/09/17 -  Monica Booker, PT Physical Therapist PT                    PT Recommendation and Plan     Outcome Summary/Treatment Plan (PT)  Daily Summary of Progress (PT): progress toward functional goals is good          Outcome Measures     Row Name 03/18/18 1100 03/17/18 1700          How much help from another person do you currently need...    Turning from your back to your side while in flat bed without using bedrails? 3  -RH 3  -GR     Moving from lying on back to sitting on the side of a flat bed without bedrails? 3  -RH 3  -GR     Moving to and from a bed to a chair (including a wheelchair)? 3  -RH 3  -GR     Standing up from a chair using your arms (e.g., wheelchair, bedside chair)? 3  -RH 3  -GR     Climbing 3-5 steps with a railing? 3  -RH 2  -GR     To walk in hospital room? 3  -RH 3  -GR     AM-PAC 6 Clicks Score 18  -RH 17  -GR        Functional Assessment    Outcome Measure Options  -- AM-PAC 6 Clicks Basic Mobility (PT)  -GR       User Key  (r) = Recorded By, (t) = Taken By, (c) = Cosigned By    Initials Name Provider Type     Jd Og, PT Physical Therapist     Ronan Onofre PTA Physical Therapy Assistant           Time Calculation:         PT Charges     Row Name 03/18/18 1159             Time Calculation    Start Time 1137  -      Stop Time 1157  -      Time Calculation (min) 20 min  -      PT Received On 03/18/18  -      PT - Next Appointment 03/19/18  -        User Key  (r) = Recorded By, (t) = Taken By, (c) = Cosigned By    Initials Name Provider Type     Ronan Onofre PTA Physical Therapy Assistant          Therapy Charges for Today     Code Description Service Date Service Provider Modifiers Qty    [id number] HC PT THER PROC EA 15 MIN 3/18/2018 Ronan Onofre PTA GP 1          PT  G-Codes  Outcome Measure Options: AM-PAC 6 Clicks Basic Mobility (PT)    Ronan Onofre, PTA  3/18/2018

## 2018-03-18 NOTE — PLAN OF CARE
Problem: Fall Risk (Adult)  Goal: Absence of Fall  Outcome: Ongoing (interventions implemented as appropriate)      Problem: Pain, Acute (Adult)  Goal: Acceptable Pain Control/Comfort Level  Outcome: Ongoing (interventions implemented as appropriate)      Problem: Nutrition, Imbalanced: Inadequate Oral Intake (Adult)  Goal: Improved Oral Intake  Outcome: Ongoing (interventions implemented as appropriate)    Goal: Prevent Further Weight Loss  Outcome: Ongoing (interventions implemented as appropriate)      Problem: Patient Care Overview  Goal: Plan of Care Review  Outcome: Ongoing (interventions implemented as appropriate)   03/18/18 0501   Coping/Psychosocial   Plan of Care Reviewed With patient   OTHER   Outcome Summary Patients vitals stable. Patient denies pain and discomfort. Patient has some periods of confusion. Patient tube feedings increased twice and patient tolerated without difficulty. Will continue to monitor.   Plan of Care Review   Progress no change       Problem: Skin Injury Risk (Adult)  Goal: Skin Health and Integrity  Outcome: Ongoing (interventions implemented as appropriate)

## 2018-03-19 VITALS
WEIGHT: 188.5 LBS | HEART RATE: 84 BPM | SYSTOLIC BLOOD PRESSURE: 155 MMHG | HEIGHT: 72 IN | TEMPERATURE: 98.1 F | BODY MASS INDEX: 25.53 KG/M2 | OXYGEN SATURATION: 96 % | DIASTOLIC BLOOD PRESSURE: 88 MMHG | RESPIRATION RATE: 18 BRPM

## 2018-03-19 PROCEDURE — 97110 THERAPEUTIC EXERCISES: CPT

## 2018-03-19 RX ORDER — AMOXICILLIN AND CLAVULANATE POTASSIUM 250; 62.5 MG/5ML; MG/5ML
500 POWDER, FOR SUSPENSION ORAL EVERY 8 HOURS SCHEDULED
Qty: 90 ML | Refills: 0 | Status: SHIPPED | OUTPATIENT
Start: 2018-03-19 | End: 2018-03-22

## 2018-03-19 RX ORDER — MIDODRINE HYDROCHLORIDE 2.5 MG/1
2.5 TABLET ORAL DAILY
Start: 2018-03-19 | End: 2018-05-18 | Stop reason: SDUPTHER

## 2018-03-19 RX ADMIN — CARVEDILOL 9.38 MG: 6.25 TABLET, FILM COATED ORAL at 09:30

## 2018-03-19 RX ADMIN — LEVOTHYROXINE SODIUM 112 MCG: 112 TABLET ORAL at 07:35

## 2018-03-19 RX ADMIN — CARBIDOPA AND LEVODOPA 2 TABLET: 25; 100 TABLET ORAL at 09:29

## 2018-03-19 RX ADMIN — Medication 1 TABLET: at 09:30

## 2018-03-19 RX ADMIN — CARBIDOPA AND LEVODOPA 0.5 TABLET: 25; 250 TABLET ORAL at 09:31

## 2018-03-19 RX ADMIN — AMOXICILLIN AND CLAVULANATE POTASSIUM 500 MG: 250; 62.5 POWDER, FOR SUSPENSION ORAL at 07:37

## 2018-03-19 NOTE — PLAN OF CARE
Problem: Patient Care Overview  Goal: Plan of Care Review  Outcome: Ongoing (interventions implemented as appropriate)   03/19/18 0514   Coping/Psychosocial   Plan of Care Reviewed With patient   OTHER   Outcome Summary VSS. Pt pleasant. No new complaints. Pt tolerating TF. Will CTM. plan to d/c to facility     Goal: Discharge Needs Assessment  Outcome: Ongoing (interventions implemented as appropriate)

## 2018-03-19 NOTE — DISCHARGE SUMMARY
[facility]    Date of Discharge:  3/19/2018    PCP: Luiz Onofre MD    Discharge Diagnosis:   Active Hospital Problems (** Indicates Principal Problem)    Diagnosis Date Noted   • Aspiration pneumonia [J69.0] 03/12/2018   • Fall [W19.XXXA] 03/07/2018   • Progressive supranuclear palsy [G23.1] 03/07/2018   • Dysphagia [R13.10] 03/07/2018   • Obstructive sleep apnea, adult [G47.33] 10/26/2017   • Weakness of voice [R49.8] 06/23/2016   • Anticoagulated [Z79.01] 04/05/2016   • Idiopathic Parkinson's disease [G20] 03/28/2016   • Hypertension [I10] 03/28/2016   • AF (atrial fibrillation) [I48.91] 03/28/2016   • Gait instability [R26.81] 03/28/2016   • Dysarthria [R47.1] 03/28/2016      Resolved Hospital Problems    Diagnosis Date Noted Date Resolved   • **Non-traumatic rhabdomyolysis [M62.82] 03/07/2018 03/15/2018   • Metabolic encephalopathy [G93.41] 03/15/2018 03/16/2018   • Sepsis [A41.9] 03/12/2018 03/16/2018     Procedures Performed  Procedure(s):  ESOPHAGOGASTRODUODENOSCOPY WITH PERCUTANEOUS ENDOSCOPIC GASTROSTOMY TUBE INSERTION     Consulting Physician(s)             None          Hospital Course  Please see history and physical for details. Patient is a 72 y.o. male with a history of Parkinson's disease and progressive supranuclear palsy who was admitted with mild rhabdomyolysis after sustaining a fall. CK improved. His statin was held and his CK improved. Consider restarting that as an outpatient but will defer to primary care physician.    He was getting better but had worsening mental status and CXR suggested pneumonia in the right base felt due to aspiration. His pro-calcitonin was 55. He was started on antibiotics and since his mental status has improved back to baseline and He clinically responded to antibiotics. The pro-calcitonin improved. His white blood cell count was over 27,000 but has normalized.    Because of the pneumonia speech therapy  saw the patient for suspected dysphagia and FEES was performed showing severe oropharyngeal dysphagia. Speech therapy recommended alternative routes feeding. After multiple discussions with family decided to proceed with feeding tube and an EGD with PEG was done by Dr. Vang on 3/16/18. He is tolerating continuous tube feeds and plans are for him to transition to bolus tube feeds.    Condition on Discharge: Improved. Plan is for him to go to a skilled nursing facility. Precertification has been obtained. I discussed with the patient as well as family, nursing staff, CCP    Temp:  [97.2 °F (36.2 °C)-98.1 °F (36.7 °C)] 97.9 °F (36.6 °C)  Heart Rate:  [83-99] 99  Resp:  [16-18] 18  BP: (119-139)/(73-96) 139/89  Body mass index is 25.57 kg/m².    Physical Exam   Constitutional: No distress.   Cardiovascular: Normal rate and regular rhythm.    Pulmonary/Chest: Effort normal and breath sounds normal. No respiratory distress.   Abdominal: Soft. There is no tenderness.   G-tube. No erythema.   Neurological: He is alert.   slow   Skin: Skin is warm and dry.      Discharge Medications   CarlosRommel hess   Home Medication Instructions CHELSEA:[id number]    Printed on:03/19/18 9804   Medication Information                      amoxicillin-clavulanate (AUGMENTIN) 250-62.5 MG/5ML suspension  10 mL by Per G Tube route Every 8 (Eight) Hours for 3 days.             carbidopa-levodopa (SINEMET)  MG per tablet  Take 2 tablets by mouth 3 (Three) Times a Day.             carbidopa-levodopa (SINEMET)  MG per tablet  Take 0.5 tablets by mouth 3 (Three) Times a Day.             carvedilol (COREG) 6.25 MG tablet  Take 1.5 tablets by mouth 2 (Two) Times a Day With Meals.             doxazosin (CARDURA) 2 MG tablet  Take 1 tablet by mouth Every Night.             levothyroxine (SYNTHROID, LEVOTHROID) 112 MCG tablet  Take 112 mcg by mouth Daily.             midodrine (PROAMATINE) 2.5 MG tablet  1 tablet by Per G Tube route Daily.      "        Multiple Vitamins-Minerals (MULTIVITAL) tablet  Take 1 tablet by mouth daily.             rivaroxaban (XARELTO) 20 MG tablet  Take 1 tablet by mouth Daily With Dinner.                Diet Instructions     Diet: Nothing By Mouth       Discharge Diet:  Nothing By Mouth    Please give medications listed \"by mouth\" by G-tube         Activity Instructions     Activity as Tolerated            Additional Instructions for the Follow-ups that You Need to Schedule     Call MD for problems / concerns.    As directed         Contact information for follow-up providers     Luiz Onofre MD Follow up.    Specialty:  Family Medicine  Why:  after rehab  Contact information:  [address] [facility] [id number] [phone]                   Contact information for after-discharge care     Destination     [facility] Follow up.    Specialties:  Skilled Nursing Facility, Intermediate Care Facility  Contact information:  [address] [facility] [address] [phone]                 Home Medical Care     [facility] Follow up.    Specialty:  Home Health Services  Contact information:  [address]  [facility] [address]  [phone]                            Aleksey Whiteside MD  03/19/18  1:55 PM    Discharge time spent greater than 30 minutes.    "

## 2018-03-19 NOTE — PROGRESS NOTES
Continued Stay Note  [facility]     Patient Name: Rommel Gonzalez  MRN: [id number]  Today's Date: 3/19/2018    Admit Date: 3/7/2018          Discharge Plan     Row Name 03/19/18 0849       Plan    Plan [facility]- await precert with [employer]    Plan Comments [place] [place] [facility] [facility] [employer]. Left message with Ileana and Jerrica with [employer] onsite of anticipated dc and SNF started precert. Packet in CCP office. Patrick DORMAN/CCP              Discharge Codes    No documentation.           Shaneka Couch, RN

## 2018-03-19 NOTE — PLAN OF CARE
Problem: Patient Care Overview (Adult)  Goal: Plan of Care Review   03/19/18 1526   Patient Care Overview   Progress no change   Outcome Evaluation   Outcome Summary/Follow up Plan Pt agrees to OT . Performed UE exercise bed level today.    Coping/Psychosocial Response Interventions   Plan Of Care Reviewed With patient

## 2018-03-19 NOTE — THERAPY TREATMENT NOTE
Acute Care - Occupational Therapy Progress Note  [facility]     Patient Name: Rommel Gonzalez  : 1945  MRN: [id number]  Today's Date: 3/19/2018     Date of Referral to OT: 18       Admit Date: 3/7/2018       ICD-10-CM ICD-9-CM   1. Traumatic rhabdomyolysis, initial encounter T79.6XXA 958.6   2. Fall at home, initial encounter W19.XXXA E888.9    Y92.099 E849.0   3. Dysarthria R47.1 784.51   4. Progressive supranuclear palsy G23.1 333.0   5. Impaired functional mobility, balance, gait, and endurance Z74.09 V49.89   6. Dysphagia, unspecified type R13.10 787.20     Patient Active Problem List   Diagnosis   • AF (atrial fibrillation)   • Depression   • Gait instability   • Hypertension   • Insomnia   • Idiopathic Parkinson's disease   • Peripheral neuropathy   • Dysarthria   • Atrial flutter   • Anticoagulated   • Health care maintenance   • Hypothyroidism (acquired)   • Weakness of voice   • Abnormal chest CT   • Facial droop   • Hypersomnia    • Hyperlipidemia   • High risk medication use   • CORINNE on CPAP   • Obstructive sleep apnea, adult   • Fall   • Progressive supranuclear palsy   • Aspiration pneumonia   • Dysphagia     Past Medical History:   Diagnosis Date   • AF (atrial fibrillation)    • Atrial flutter    • Atypical chest pain    • Chest pain    • Colon polyps    • Confusion    • Depression    • Disease of thyroid gland    • Disorder of thyroid    • Dyspnea and respiratory abnormalities    • Elevated TSH    • Fatigue    • Gait instability    • Hay fever    • Hyperlipidemia    • Hypertension    • Hypothyroidism    • Insomnia    • Measles    • Mumps    • Palpitations    • Parkinson disease    • Peripheral neuropathy    • Pneumonia    • Poor sleep pattern    • Slurred speech    • Tremor      Past Surgical History:   Procedure Laterality Date   • ENDOSCOPY W/ PEG TUBE PLACEMENT N/A 3/16/2018    Procedure: ESOPHAGOGASTRODUODENOSCOPY WITH PERCUTANEOUS ENDOSCOPIC GASTROSTOMY TUBE INSERTION;  Surgeon:  Lopez Vang Jr., MD;  Location: [facility] ENDOSCOPY;  Service: Gastroenterology       Therapy Treatment    Therapy Treatment / Health Promotion    Treatment Time/Intention  Discipline: occupational therapist (JAVIER Hollis)  Document Type: therapy note (daily note) (JAVIER Hollis)  Mode of Treatment: occupational therapy (JAVIER Hollis)  Patient/Family Observations: sitting in bed (JAVIER Hollis)  Care Plan Review: care plan/treatment goals reviewed (JAVIER Hollis)    Vitals/Pain/Safety  Positioning and Restraints  Pre-Treatment Position: in bed (JAVIER Hollis)  Post Treatment Position: bed (JAVIER Hollis)  In Bed: sitting, call light within reach, encouraged to call for assist, exit alarm on (JAVIER Hollis)    Mobility,ADL,Motor, Modality     Upper Body Dressing Assessment/Training  Upper Body Dressing [place] Level: doff, don, set up, supervision (JAVIER Hollis)  Upper Body Dressing Position:  (sitting in bed) (JAVIER Hollis)  Grooming Assessment/Training  [place] Level (Grooming): set up, supervision, verbal cues (JAVIER Hollis)  Grooming Position: sitting up in bed (JAVIER Hollis)  Therapeutic Exercise  Upper Extremity Range of Motion (Therapeutic Exercise): shoulder flexion/extension, bilateral, elbow flexion/extension, bilateral (JAVIER Hollis)  Hand (Therapeutic Exercise): hand , bilateral (JAVIER Hollis)  Exercise Type (Therapeutic Exercise): AROM (active range of motion) (JAVIER Hollis)           ROM/MMT             Sensory, Edema, Orthotics          Cognition, Communication, Swallow  Cognitive Assessment/Intervention- PT/OT  Orientation Status (Cognition): oriented to, person, place, time (JAVIER Hollis)  Follows Commands (Cognition): follows one step commands, increased processing time needed (JAVIER Hollis)    Outcome Summary       Occupational Therapy Education     Title:  PT OT SLP Therapies (Done)     Topic: Occupational Therapy (Done)     Point: ADL training (Done)     Description: Instruct learner(s) on proper safety adaptation and remediation techniques during self care or transfers.   Instruct in proper use of assistive devices.   Learning Progress Summary     Learner Status Readiness Method Response Comment Documented by    Patient Done Acceptance D,TB,E VU,DU VC and A for posture and to correct R lean in standing.  VC for hand placement and body position during xfers.  Ed on risk of falls and agree with d/c to [facility] home for increase assist.  03/09/18 1404          Point: Body mechanics (Done)     Description: Instruct learner(s) on proper positioning and spine alignment during self-care, functional mobility activities and/or exercises.   Learning Progress Summary     Learner Status Readiness Method Response Comment Documented by    Patient Done Acceptance D,TB,E VU,DU VC and A for posture and to correct R lean in standing.  VC for hand placement and body position during xfers.  Ed on risk of falls and agree with d/c to [facility]'s home for increase assist.  03/09/18 1404                      User Key     Initials Effective Dates Name Provider Type Discipline     03/07/18 -  Evelyne Zepeda, OTR Occupational Therapist OT                  OT Recommendation and Plan  Therapy Frequency (OT Eval): 5 times/wk  Plan of Care Review  Plan of Care Reviewed With: patient, family  Plan of Care Reviewed With: patient, family  Outcome Summary: Pt motivated for therapy today.  Increasing functional strength and endurance for therapy.        Outcome Measures     Row Name 03/19/18 1527 03/18/18 1100 03/17/18 1700       How much help from another person do you currently need...    Turning from your back to your side while in flat bed without using bedrails?  -- 3  -RH 3  -GR    Moving from lying on back to sitting on the side of a flat bed without bedrails?  -- 3  -RH 3  -GR    Moving to and  from a bed to a chair (including a wheelchair)?  -- 3  -RH 3  -GR    Standing up from a chair using your arms (e.g., wheelchair, bedside chair)?  -- 3  -RH 3  -GR    Climbing 3-5 steps with a railing?  -- 3  -RH 2  -GR    To walk in hospital room?  -- 3  -RH 3  -GR    AM-PAC 6 Clicks Score  -- 18  -RH 17  -GR       How much help from another is currently needed...    Putting on and taking off regular lower body clothing? 2  -SG  --  --    Bathing (including washing, rinsing, and drying) 2  -SG  --  --    Toileting (which includes using toilet bed pan or urinal) 2  -SG  --  --    Putting on and taking off regular upper body clothing 3  -SG  --  --    Taking care of personal grooming (such as brushing teeth) 3  -SG  --  --    Eating meals 1  -SG  --  --    Score 13  -SG  --  --       Functional Assessment    Outcome Measure Options  --  -- AM-PAC 6 Clicks Basic Mobility (PT)  -GR      User Key  (r) = Recorded By, (t) = Taken By, (c) = Cosigned By    Initials Name Provider Type     JAVIER Hollis Occupational Therapist    GR Jd Og, PT Physical Therapist    RH Ronan Onofre, PTA Physical Therapy Assistant           Time Calculation:         Time Calculation- OT     Row Name 03/19/18 1528             Time Calculation- OT    OT Start Time 1047  -SG      OT Stop Time 1056  -SG      OT Time Calculation (min) 9 min  -SG      OT Received On 03/19/18  -        User Key  (r) = Recorded By, (t) = Taken By, (c) = Cosigned By    Initials Name Provider Type     JAVIER Hollis Occupational Therapist           Therapy Charges for Today     Code Description Service Date Service Provider Modifiers Qty    [id number]  OT THER PROC EA 15 MIN 3/19/2018 JAVIER Hollis GO 1               JAVIER Hollis  3/19/2018

## 2018-03-19 NOTE — PROGRESS NOTES
Continued Stay Note  [facility]     Patient Name: Rommel Gonzalez  MRN: [id number]  Today's Date: 3/19/2018    Admit Date: 3/7/2018          Discharge Plan     Row Name 03/19/18 1433       Plan    Plan [facility] precert obtained from [facility]    Patient/Family in Agreement with Plan yes    Plan Comments Spoke with [facility] Augustin and their Facility wc van can transport pt at 3pm. DC summary faxed and packet given to DANDY Aldana, no prescriptions. Patrick DORMAN/CCP    Row Name 03/19/18 1341       Plan    Plan [facility] precert obtained from [employer]    Patient/Family in Agreement with Plan yes    Plan Comments Spoke with [facility] precert obtained from [employer] Replacement for SNF today. Notified Dr. Whiteside and he will dc patient. CCP spoke with pts daughter Christianne and she requests ambulance transfer. CCP explained will complete medical necessity form but can not guarantee insurance will pay for transportation and could get a bill. Christianne Verbalized understanding. [facility] arranged for 6:30pm. CCP will speak with [facility]odiaG[facility] Augustin who will see if WC van is able to transport patient today. PATRICK DORMAN/CCP              Discharge Codes    No documentation.       Expected Discharge Date and Time     Expected Discharge Date Expected Discharge Time    Mar 19, 2018             Shaneka Couch RN

## 2018-03-19 NOTE — PLAN OF CARE
Problem: Patient Care Overview (Adult)  Goal: Plan of Care Review  Outcome: Outcome(s) achieved Date Met: 03/19/18    Goal: Adult Individualization and Mutuality  Outcome: Outcome(s) achieved Date Met: 03/19/18    Goal: Discharge Needs Assessment  Outcome: Outcome(s) achieved Date Met: 03/19/18      Problem: Fall Risk (Adult)  Goal: Absence of Falls  Outcome: Outcome(s) achieved Date Met: 03/19/18    Goal: Absence of Fall  Outcome: Outcome(s) achieved Date Met: 03/19/18    Goal: Absence of Fall  Outcome: Outcome(s) achieved Date Met: 03/19/18      Problem: Pain, Acute (Adult)  Goal: Acceptable Pain Control/Comfort Level  Outcome: Outcome(s) achieved Date Met: 03/19/18      Problem: Nutrition, Imbalanced: Inadequate Oral Intake (Adult)  Goal: Prevent Further Weight Loss  Outcome: Outcome(s) achieved Date Met: 03/19/18      Problem: Nutrition, Imbalanced: Inadequate Oral Intake (Adult)  Goal: Improved Oral Intake  Outcome: Outcome(s) achieved Date Met: 03/19/18    Goal: Prevent Further Weight Loss  Outcome: Outcome(s) achieved Date Met: 03/19/18      Problem: Patient Care Overview  Goal: Plan of Care Review  Outcome: Outcome(s) achieved Date Met: 03/19/18    Goal: Individualization and Mutuality  Outcome: Outcome(s) achieved Date Met: 03/19/18    Goal: Discharge Needs Assessment  Outcome: Outcome(s) achieved Date Met: 03/19/18    Goal: Interprofessional Rounds/Family Conf  Outcome: Outcome(s) achieved Date Met: 03/19/18      Problem: Skin Injury Risk (Adult)  Goal: Skin Health and Integrity  Outcome: Outcome(s) achieved Date Met: 03/19/18

## 2018-03-19 NOTE — PROGRESS NOTES
[facility]               ASSOCIATES     LOS: 12 days     Name: Rommel Gonzalez  Age: 72 y.o.  Sex: male  :  1945  MRN: [id number]         Primary Care Physician: Luiz Onofre MD    NPO Diet NPO Except: Other; Other: Except meds crushed in applesauce    Subjective   No complaints.    Objective   Temp:  [97.2 °F (36.2 °C)-98.1 °F (36.7 °C)] 97.9 °F (36.6 °C)  Heart Rate:  [83-99] 99  Resp:  [16-18] 18  BP: (119-139)/(73-96) 139/89  SpO2:  [92 %-93 %] 93 %  on   ;   Device (Oxygen Therapy): room air  Body mass index is 25.57 kg/m².    Physical Exam   Constitutional: No distress.   Cardiovascular: Normal rate and regular rhythm.    Pulmonary/Chest: Effort normal and breath sounds normal. No respiratory distress.   Abdominal: Soft. There is no tenderness.   G-tube. No erythema.   Neurological: He is alert.   slow   Skin: Skin is warm and dry.     Reviewed medications and new clinical results    amoxicillin-clavulanate 500 mg Per G Tube Q8H   carbidopa-levodopa 2 tablet Per G Tube TID   carbidopa-levodopa 0.5 tablet Per G Tube TID   carvedilol 9.375 mg Per G Tube BID With Meals   levothyroxine 112 mcg Per G Tube Q AM   midodrine 2.5 mg Per G Tube Q AM   multivitamin 1 tablet Per G Tube Daily   rivaroxaban 20 mg Per G Tube Daily With Dinner   terazosin 1 mg Per G Tube Nightly      Results from last 7 days  Lab Units 18  0620 18  0428 03/15/18  0441 18  0552 18  0641   WBC 10*3/mm3 10.25 11.28* 11.90* 15.86* 20.69*   HEMOGLOBIN g/dL 12.0* 11.3* 11.1* 10.5* 10.0*   PLATELETS 10*3/mm3 255 209 188 150 138*     Results from last 7 days  Lab Units 18  0620 03/15/18  0441 18  0552 18  0641   SODIUM mmol/L 140 141 143 142   POTASSIUM mmol/L 4.0 4.1 4.2 3.9   CHLORIDE mmol/L 104 108* 107 106   CO2 mmol/L 25.3 19.7* 23.1 27.9   BUN mg/dL 8 6* 23 25*   CREATININE mg/dL 0.84 0.86 0.91 0.93   CALCIUM mg/dL 7.9* 7.6* 7.9* 7.8*   GLUCOSE mg/dL 111* 74  81 88     Lab Results   Component Value Date    ANIONGAP 10.7 03/17/2018     Estimated Creatinine Clearance: 94.7 mL/min (by C-G formula based on SCr of 0.84 mg/dL).    Assessment/Plan   Active Hospital Problems (** Indicates Principal Problem)    Diagnosis Date Noted   • Aspiration pneumonia [J69.0] 03/12/2018   • Fall [W19.XXXA] 03/07/2018   • Progressive supranuclear palsy [G23.1] 03/07/2018   • Dysphagia [R13.10] 03/07/2018   • Obstructive sleep apnea, adult [G47.33] 10/26/2017   • Weakness of voice [R49.8] 06/23/2016   • Anticoagulated [Z79.01] 04/05/2016   • Idiopathic Parkinson's disease [G20] 03/28/2016   • Hypertension [I10] 03/28/2016   • AF (atrial fibrillation) [I48.91] 03/28/2016   • Gait instability [R26.81] 03/28/2016   • Dysarthria [R47.1] 03/28/2016      Resolved Hospital Problems    Diagnosis Date Noted Date Resolved   • **Non-traumatic rhabdomyolysis [M62.82] 03/07/2018 03/15/2018   • Metabolic encephalopathy [G93.41] 03/15/2018 03/16/2018   • Sepsis [A41.9] 03/12/2018 03/16/2018     · Continue antibiotics treating aspiration pneumonia. Encephalopathy resolved. Likely due to pneumonia.  · Status post PEG tube. Tolerating continuous feeds.  · Xarelto resumed for atrial fibrillation. Coreg.  · Awaiting precertification for rehabilitation  · I discussed the patient's findings and my recommendations with patient, nursing staff and CCP.    Aleksey Whiteside MD   03/19/18  12:50 PM

## 2018-03-20 NOTE — PROGRESS NOTES
Case Management Discharge Note    Final Note: [facility] skilled rehab via [facility], confirmed with [facility] Harjeet Nguyen RN/CCP    Destination - Selection Complete     Service Request Status Selected Specialties Address Phone Number Fax Number    [facility] Selected Skilled Nursing Facility [address], [facility] [id number] [phone]    [facility] Accepted N/A [address] [address] [phone]    [facility] Accepted N/A [address], [facility] [id number] [phone]    [facility] Accepted N/A [address], [facility] [id number] [phone]    [facility] Pending - Request Sent N/A [address], [facility] [id number] [phone]    [facility] HOME Declined  Do not take [employer] N/A [address], [facility] [id number] [phone]      Durable Medical Equipment     No service coordination in this encounter.      Dialysis/Infusion     No service coordination in this encounter.      Home Medical Care     Service Request Status Selected Specialties Address Phone Number Fax Number    [facility] Selected Home Health Services [address] [address] [phone]      Social Care     No service coordination in this encounter.             Final Discharge Disposition Code: 03 - skilled nursing facility (SNF)

## 2018-03-21 ENCOUNTER — APPOINTMENT (OUTPATIENT)
Dept: PHYSICAL THERAPY | Facility: HOSPITAL | Age: 73
End: 2018-03-21

## 2018-03-21 ENCOUNTER — APPOINTMENT (OUTPATIENT)
Dept: OCCUPATIONAL THERAPY | Facility: HOSPITAL | Age: 73
End: 2018-03-21

## 2018-03-23 ENCOUNTER — APPOINTMENT (OUTPATIENT)
Dept: PHYSICAL THERAPY | Facility: HOSPITAL | Age: 73
End: 2018-03-23

## 2018-03-23 ENCOUNTER — APPOINTMENT (OUTPATIENT)
Dept: SPEECH THERAPY | Facility: HOSPITAL | Age: 73
End: 2018-03-23

## 2018-03-27 ENCOUNTER — HOSPITAL ENCOUNTER (OUTPATIENT)
Dept: SLEEP MEDICINE | Facility: HOSPITAL | Age: 73
End: 2018-03-27

## 2018-03-28 ENCOUNTER — APPOINTMENT (OUTPATIENT)
Dept: OCCUPATIONAL THERAPY | Facility: HOSPITAL | Age: 73
End: 2018-03-28

## 2018-03-28 ENCOUNTER — APPOINTMENT (OUTPATIENT)
Dept: PHYSICAL THERAPY | Facility: HOSPITAL | Age: 73
End: 2018-03-28

## 2018-03-30 ENCOUNTER — APPOINTMENT (OUTPATIENT)
Dept: PHYSICAL THERAPY | Facility: HOSPITAL | Age: 73
End: 2018-03-30

## 2018-03-30 ENCOUNTER — APPOINTMENT (OUTPATIENT)
Dept: SPEECH THERAPY | Facility: HOSPITAL | Age: 73
End: 2018-03-30

## 2018-04-04 ENCOUNTER — APPOINTMENT (OUTPATIENT)
Dept: OCCUPATIONAL THERAPY | Facility: HOSPITAL | Age: 73
End: 2018-04-04

## 2018-04-06 ENCOUNTER — APPOINTMENT (OUTPATIENT)
Dept: SPEECH THERAPY | Facility: HOSPITAL | Age: 73
End: 2018-04-06

## 2018-04-11 ENCOUNTER — APPOINTMENT (OUTPATIENT)
Dept: OCCUPATIONAL THERAPY | Facility: HOSPITAL | Age: 73
End: 2018-04-11

## 2018-04-11 ENCOUNTER — OFFICE VISIT (OUTPATIENT)
Dept: FAMILY MEDICINE CLINIC | Facility: CLINIC | Age: 73
End: 2018-04-11

## 2018-04-11 VITALS
RESPIRATION RATE: 18 BRPM | HEIGHT: 72 IN | SYSTOLIC BLOOD PRESSURE: 122 MMHG | BODY MASS INDEX: 22.89 KG/M2 | HEART RATE: 64 BPM | TEMPERATURE: 97.4 F | DIASTOLIC BLOOD PRESSURE: 68 MMHG | WEIGHT: 169 LBS | OXYGEN SATURATION: 99 %

## 2018-04-11 DIAGNOSIS — G20 IDIOPATHIC PARKINSON'S DISEASE (HCC): Primary | ICD-10-CM

## 2018-04-11 DIAGNOSIS — E03.9 HYPOTHYROIDISM (ACQUIRED): ICD-10-CM

## 2018-04-11 DIAGNOSIS — Z79.899 HIGH RISK MEDICATION USE: ICD-10-CM

## 2018-04-11 DIAGNOSIS — I48.20 CHRONIC ATRIAL FIBRILLATION (HCC): ICD-10-CM

## 2018-04-11 DIAGNOSIS — Z79.01 ANTICOAGULATED: ICD-10-CM

## 2018-04-11 DIAGNOSIS — G20 ATYPICAL PARKINSONISM (HCC): ICD-10-CM

## 2018-04-11 DIAGNOSIS — I10 ESSENTIAL HYPERTENSION: ICD-10-CM

## 2018-04-11 PROBLEM — G24.1 TORSION DYSTONIA: Status: ACTIVE | Noted: 2018-01-09

## 2018-04-11 PROBLEM — R29.2 HYPERREFLEXIA: Status: ACTIVE | Noted: 2018-01-09

## 2018-04-11 PROBLEM — K59.00 CONSTIPATION: Status: ACTIVE | Noted: 2018-01-09

## 2018-04-11 LAB
ALBUMIN SERPL-MCNC: 3.9 G/DL (ref 3.5–5.2)
ALBUMIN/GLOB SERPL: 1.6 G/DL
ALP SERPL-CCNC: 61 U/L (ref 39–117)
ALT SERPL-CCNC: 6 U/L (ref 1–41)
AST SERPL-CCNC: 14 U/L (ref 1–40)
BASOPHILS # BLD AUTO: 0.03 10*3/MM3 (ref 0–0.2)
BASOPHILS NFR BLD AUTO: 0.4 % (ref 0–1.5)
BILIRUB SERPL-MCNC: 0.3 MG/DL (ref 0.1–1.2)
BUN SERPL-MCNC: 13 MG/DL (ref 8–23)
BUN/CREAT SERPL: 16.3 (ref 7–25)
CALCIUM SERPL-MCNC: 9.2 MG/DL (ref 8.6–10.5)
CHLORIDE SERPL-SCNC: 99 MMOL/L (ref 98–107)
CO2 SERPL-SCNC: 30.2 MMOL/L (ref 22–29)
CREAT SERPL-MCNC: 0.8 MG/DL (ref 0.76–1.27)
EOSINOPHIL # BLD AUTO: 0.11 10*3/MM3 (ref 0–0.7)
EOSINOPHIL NFR BLD AUTO: 1.6 % (ref 0.3–6.2)
ERYTHROCYTE [DISTWIDTH] IN BLOOD BY AUTOMATED COUNT: 14.7 % (ref 11.5–14.5)
GFR SERPLBLD CREATININE-BSD FMLA CKD-EPI: 115 ML/MIN/1.73
GFR SERPLBLD CREATININE-BSD FMLA CKD-EPI: 95 ML/MIN/1.73
GLOBULIN SER CALC-MCNC: 2.5 GM/DL
GLUCOSE SERPL-MCNC: 75 MG/DL (ref 65–99)
HCT VFR BLD AUTO: 40.2 % (ref 40.4–52.2)
HGB BLD-MCNC: 12.1 G/DL (ref 13.7–17.6)
IMM GRANULOCYTES # BLD: 0 10*3/MM3 (ref 0–0.03)
IMM GRANULOCYTES NFR BLD: 0 % (ref 0–0.5)
LYMPHOCYTES # BLD AUTO: 1.42 10*3/MM3 (ref 0.9–4.8)
LYMPHOCYTES NFR BLD AUTO: 20.2 % (ref 19.6–45.3)
MCH RBC QN AUTO: 30.9 PG (ref 27–32.7)
MCHC RBC AUTO-ENTMCNC: 30.1 G/DL (ref 32.6–36.4)
MCV RBC AUTO: 102.6 FL (ref 79.8–96.2)
MONOCYTES # BLD AUTO: 0.53 10*3/MM3 (ref 0.2–1.2)
MONOCYTES NFR BLD AUTO: 7.5 % (ref 5–12)
NEUTROPHILS # BLD AUTO: 4.94 10*3/MM3 (ref 1.9–8.1)
NEUTROPHILS NFR BLD AUTO: 70.3 % (ref 42.7–76)
PLATELET # BLD AUTO: 200 10*3/MM3 (ref 140–500)
POTASSIUM SERPL-SCNC: 4.5 MMOL/L (ref 3.5–5.2)
PROT SERPL-MCNC: 6.4 G/DL (ref 6–8.5)
RBC # BLD AUTO: 3.92 10*6/MM3 (ref 4.6–6)
SODIUM SERPL-SCNC: 140 MMOL/L (ref 136–145)
T4 FREE SERPL-MCNC: 1.5 NG/DL (ref 0.93–1.7)
TSH SERPL DL<=0.005 MIU/L-ACNC: 2.53 MIU/ML (ref 0.27–4.2)
WBC # BLD AUTO: 7.03 10*3/MM3 (ref 4.5–10.7)

## 2018-04-11 PROCEDURE — 99214 OFFICE O/P EST MOD 30 MIN: CPT | Performed by: FAMILY MEDICINE

## 2018-04-11 RX ORDER — LEVOTHYROXINE SODIUM 0.12 MG/1
125 TABLET ORAL ONCE
COMMUNITY
Start: 2018-04-08 | End: 2018-05-18 | Stop reason: SDUPTHER

## 2018-04-11 NOTE — PROGRESS NOTES
HPI  Rommel Gonzalez is a 72 y.o. male who is here for follow up her recent hospitalization apparently for aspiration pneumonia and other complications followed by nursing home care.  Has recently returned home with home health and according to his daughter is doing much better.  Prior to leaving the nursing home had a repeat swallow study and was told he can resume eating etc.  For now is continuing tube feedings and medications through his PEG tube.  Will be resuming home speech therapy and hopefully eventually will again be able to take medications and all nutrition by mouth.  Overall nutritional status seems to be much improved and there has been no choking or any other evidence of aspiration.  He continues on medication for parkinsonism though at one point neurologist question supranuclear palsy?    Review of Systems   Constitutional: Positive for activity change and unexpected weight change.   HENT: Positive for trouble swallowing and voice change.    Respiratory: Negative for cough, choking, shortness of breath and wheezing.    Cardiovascular: Negative for chest pain.   Gastrointestinal: Positive for constipation.   Neurological: Positive for speech difficulty.   All other systems reviewed and are negative.        Past Medical History:   Diagnosis Date   • AF (atrial fibrillation)    • Anticoagulated    • Aspiration pneumonia    • Atrial flutter    • Atypical chest pain    • Chest pain    • Colon polyps    • Confusion    • Depression    • Disease of thyroid gland    • Disorder of thyroid    • Dysarthria    • Dysphagia    • Dyspnea and respiratory abnormalities    • Elevated TSH    • Fall    • Fatigue    • Gait instability    • Hay fever    • Hyperlipidemia    • Hypertension    • Hypothyroidism    • Idiopathic Parkinson's disease    • Insomnia    • Measles    • Metabolic encephalopathy    • Mumps    • Non-traumatic rhabdomyolysis    • CORINNE (obstructive sleep apnea)    • Palpitations    • Parkinson disease    •  Peripheral neuropathy    • Pneumonia    • Poor sleep pattern    • Progressive supranuclear palsy    • Sepsis    • Slurred speech    • Tremor    • Weakness of voice        Past Surgical History:   Procedure Laterality Date   • ENDOSCOPY W/ PEG TUBE PLACEMENT N/A 3/16/2018    Procedure: ESOPHAGOGASTRODUODENOSCOPY WITH PERCUTANEOUS ENDOSCOPIC GASTROSTOMY TUBE INSERTION;  Surgeon: Lopez Vang Jr., MD;  Location: [facility] ENDOSCOPY;  Service: Gastroenterology       Family History   Problem Relation Age of Onset   • Hypertension Mother    • Glaucoma Mother    • Throat cancer Father    • Alcohol abuse Father    • Hypertension Sister    • Cancer Sister      malignant neoplasm of urinary bladder   • Lung cancer Brother    • Heart attack Other    • Lung disease Other        Social History     Social History   • Marital status:      Spouse name: N/A   • Number of children: N/A   • Years of education: N/A     Occupational History   • Not on file.     Social History Main Topics   • Smoking status: Former Smoker   • Smokeless tobacco: Never Used      Comment: seldom caffeine   • Alcohol use Yes      Comment: social use   • Drug use: No   • Sexual activity: No     Other Topics Concern   • Not on file     Social History Narrative   • No narrative on file         Physical Exam   Constitutional: He appears well-developed and well-nourished. No distress.   HENT:   Mouth/Throat: Oropharynx is clear and moist.   Flat facies   Eyes: Conjunctivae are normal. Pupils are equal, round, and reactive to light.   Cardiovascular: Normal rate.    Pulmonary/Chest: Effort normal. No respiratory distress.   Abdominal: Soft.   PEG tube in place   Musculoskeletal: He exhibits no edema or deformity.   Neurological: He is alert. He exhibits abnormal muscle tone. Coordination abnormal.   Skin: Skin is warm and dry.   Psychiatric: He has a normal mood and affect. His behavior is normal.   Nursing note and vitals  reviewed.        Assessment/Plan    Rommel was seen today for fall, pneumonia and blood infection.    Diagnoses and all orders for this visit:    Idiopathic Parkinson's disease    Atypical Parkinsonism    High risk medication use  -     CBC & Differential  -     Comprehensive Metabolic Panel    Anticoagulated    Chronic atrial fibrillation    Essential hypertension    Hypothyroidism (acquired)  -     TSH+Free T4      Patient here for follow-up especially after recent hospitalization followed by nursing home care.  Remains with feeding tube but recently passed a swallow study and has resumed oral intake.  Has returned home to [place] under the care of daughter with home health nurse and therapies to be arranged.  Overall seems to be doing extremely well on current regimen which will be continued.  Does have obvious parkinsonism with slow movement and flat facies.  Has had low blood pressure in the past but seems to be well controlled at this time.  Will get routine lab as noted above, continue current medications and therapy per home health.  Follow-up in one month.    Current outpatient and discharge medications have been reconciled for the patient.    This note includes information entered using a voice recognition dictation system.  Though reviewed, some nonsensible errors may remain.    Luiz Onofre MD

## 2018-04-13 ENCOUNTER — APPOINTMENT (OUTPATIENT)
Dept: SPEECH THERAPY | Facility: HOSPITAL | Age: 73
End: 2018-04-13

## 2018-04-16 ENCOUNTER — OFFICE VISIT (OUTPATIENT)
Dept: CARDIOLOGY | Facility: CLINIC | Age: 73
End: 2018-04-16

## 2018-04-16 VITALS
HEIGHT: 72 IN | BODY MASS INDEX: 22.89 KG/M2 | WEIGHT: 169 LBS | HEART RATE: 64 BPM | DIASTOLIC BLOOD PRESSURE: 70 MMHG | SYSTOLIC BLOOD PRESSURE: 114 MMHG

## 2018-04-16 DIAGNOSIS — I48.20 CHRONIC ATRIAL FIBRILLATION (HCC): ICD-10-CM

## 2018-04-16 DIAGNOSIS — I48.3 TYPICAL ATRIAL FLUTTER (HCC): Primary | ICD-10-CM

## 2018-04-16 DIAGNOSIS — I10 ESSENTIAL HYPERTENSION: ICD-10-CM

## 2018-04-16 PROCEDURE — 93000 ELECTROCARDIOGRAM COMPLETE: CPT | Performed by: NURSE PRACTITIONER

## 2018-04-16 PROCEDURE — 99214 OFFICE O/P EST MOD 30 MIN: CPT | Performed by: NURSE PRACTITIONER

## 2018-04-16 RX ORDER — NITROFURANTOIN 25; 75 MG/1; MG/1
100 CAPSULE ORAL 2 TIMES DAILY
COMMUNITY
Start: 2018-04-13 | End: 2018-06-04

## 2018-04-16 RX ORDER — POLYETHYLENE GLYCOL 3350 17 G/17G
17 POWDER, FOR SOLUTION ORAL DAILY
COMMUNITY
End: 2019-01-15

## 2018-04-16 NOTE — PROGRESS NOTES
Date of Office Visit: 2018  Encounter Provider: JOVANY Leggett  Place of Service: [facility]  Patient Name: Rommel Gonzalez  :1945    Chief Complaint   Patient presents with   • Atrial Flutter   :     HPI: Rommel Gonzalez is a 72 y.o. male comes in today for Four-week follow-up.  He is a patient of Dr. Bennett.  I am seeing him for the first time today and have reviewed his records.      He has a history of atrial flutter and atrial fibrillation along with falls, hypertension, sleep apnea, and a pulmonary nodule.    He also has Parkinson’s disease.    In 2015, he was noted to have atrial fibrillation with RVR.  Coreg and Xarelto were added to his regimen.  An echocardiogram at that time showed an ejection fraction of 51%, grade 2 diastolic dysfunction, moderate left atrial enlargement, mild mitral regurgitation, and normal right-sided pressures.  In 2015, he had a Lexiscan perfusion study that was negative for ischemia.  There was an inferior wall defect consistent with attenuation.     In 2018, he was in to see Dr. Bennett.  There were concerns that he had a transient ischemic attack earlier in the week.      In 2018, he was admitted after a fall.  He had mild rhabdomyolysis.  He developed aspiration pneumonia in the right lung.  His procalcitonin was 55.  He ended up having a feeding tube placed.  His Xarelto was held during his hospital stay but replaced after PEG tube placement.     Denies palpitations or tachycardia, shortness of breath, edema, dizziness, chest pain, fatigue, orthopnea or PND. Passed a swallow study. No falls since being home.     Past Medical History:   Diagnosis Date   • AF (atrial fibrillation)    • Anticoagulated    • Aspiration pneumonia    • Atrial flutter    • Atypical chest pain    • Chest pain    • Colon polyps    • Confusion    • Depression    • Disease of thyroid gland    • Disorder of thyroid    • Dysarthria    •  "Dysphagia    • Dyspnea and respiratory abnormalities    • Elevated TSH    • Fall    • Fatigue    • Gait instability    • Hay fever    • Hyperlipidemia    • Hypertension    • Hypothyroidism    • Idiopathic Parkinson's disease    • Insomnia    • Measles    • Metabolic encephalopathy    • Mumps    • Non-traumatic rhabdomyolysis    • CORINNE (obstructive sleep apnea)    • Palpitations    • Parkinson disease    • Peripheral neuropathy    • Pneumonia    • Poor sleep pattern    • Progressive supranuclear palsy    • Sepsis    • Slurred speech    • Tremor    • Weakness of voice        Past Surgical History:   Procedure Laterality Date   • ENDOSCOPY W/ PEG TUBE PLACEMENT N/A 3/16/2018    Procedure: ESOPHAGOGASTRODUODENOSCOPY WITH PERCUTANEOUS ENDOSCOPIC GASTROSTOMY TUBE INSERTION;  Surgeon: Lopez Vang Jr., MD;  Location: [facility] ENDOSCOPY;  Service: Gastroenterology           Review of Systems   All other systems reviewed and are negative.    All other systems reviewed and are negative    No Known Allergies    All aspects of family and social history reviewed.           Objective:     Vitals:    04/16/18 1123   BP: 114/70   Pulse: 64   Weight: 76.7 kg (169 lb)   Height: 182.9 cm (72\")     Body mass index is 22.92 kg/m².    PHYSICAL EXAM:  Physical Exam   Constitutional: He is oriented to person, place, and time. He appears well-developed and well-nourished.   HENT:   Head: Normocephalic.   Nose: Nose normal.   Mouth/Throat: Oropharynx is clear and moist.   Eyes: Conjunctivae and EOM are normal. Pupils are equal, round, and reactive to light. Right eye exhibits no discharge. No scleral icterus.   Neck: Normal range of motion. Neck supple. No JVD present. No thyromegaly present.   Cardiovascular: Normal rate, normal heart sounds and intact distal pulses.  An irregularly irregular rhythm present. Exam reveals no gallop and no friction rub.    No murmur heard.  Pulses:       Carotid pulses are 2+ on the right side, and 2+ on the " left side.       Radial pulses are 2+ on the right side, and 2+ on the left side.        Femoral pulses are 2+ on the right side, and 2+ on the left side.       Popliteal pulses are 2+ on the right side, and 2+ on the left side.        Dorsalis pedis pulses are 2+ on the right side, and 2+ on the left side.        Posterior tibial pulses are 2+ on the right side, and 2+ on the left side.   Pulmonary/Chest: Effort normal and breath sounds normal. No respiratory distress. He has no wheezes. He has no rales.   Abdominal: Soft. Bowel sounds are normal. He exhibits no distension. There is no hepatosplenomegaly. There is no tenderness. There is no rebound.   Musculoskeletal: Normal range of motion. He exhibits no edema or tenderness.   Neurological: He is alert and oriented to person, place, and time.   Skin: Skin is warm and dry. No rash noted. No erythema.   Psychiatric: He has a normal mood and affect. His behavior is normal. Judgment and thought content normal.   Vitals reviewed.        ECG 12 Lead  Date/Time: 4/16/2018 11:37 AM  Performed by: ELLIOTT SUTTON  Authorized by: ELLIOTT SUTTON   Comparison: compared with previous ECG from 3/12/2018  Similar to previous ECG  Rhythm: atrial flutter and atrial fibrillation  Ectopy: infrequent PVCs  Rate: normal  BPM: 64  Conduction: conduction normal  ST Segments: ST segments normal  QRS axis: normal  Clinical impression: abnormal ECG  Comments: Indication: afib/flutter                Assessment:       Diagnosis Plan   1. Typical atrial flutter     2. Chronic atrial fibrillation     3. Essential hypertension          Orders Placed This Encounter   Procedures   • ECG 12 Lead     This order was created via procedure documentation       Current Outpatient Prescriptions   Medication Sig Dispense Refill   • carbidopa-levodopa (SINEMET)  MG per tablet Take 2.5 tablets by mouth 3 (Three) Times a Day.     • carvedilol (COREG) 6.25 MG tablet Take 1.5 tablets by mouth 2  (Two) Times a Day With Meals. 270 tablet 1   • doxazosin (CARDURA) 2 MG tablet Take 1 tablet by mouth Every Night. 90 tablet 3   • levothyroxine (SYNTHROID, LEVOTHROID) 125 MCG tablet Take 125 mcg by mouth 1 (One) Time.     • midodrine (PROAMATINE) 2.5 MG tablet 1 tablet by Per G Tube route Daily.     • Multiple Vitamins-Minerals (MULTIVITAL) tablet Take 1 tablet by mouth daily.     • nitrofurantoin, macrocrystal-monohydrate, (MACROBID) 100 MG capsule Take 100 mg by mouth 2 (Two) Times a Day.     • polyethylene glycol (MIRALAX) packet Take 17 g by mouth Daily.     • rivaroxaban (XARELTO) 20 MG tablet Take 1 tablet by mouth Daily With Dinner. 90 tablet 1     No current facility-administered medications for this visit.             Plan:       1. Afib/flutter  Atrial Fibrillation and Atrial Flutter  Assessment  • The patient has persistent atrial fibrillation  • The patient's CHADS2-VASc score is 2  • A [id number] score of 2 or more is considered a high risk for a thromboembolic event  • Warfarin not prescribed  • Dabigatran not prescribed  • Rivaroxaban prescribed  • Apixaban not prescribed  • Aspirin not prescribed    Plan  • Continue in atrial fibrillation with rate control  • Add rivaroxaban for antithrombotic therapy  • Continue beta blocker for rate control      2. Parkinsons with progrssive suranuclear palsy-pt agreeable to wheelchair. INcreased risk of falls. Use of a manual wheelchair will significantly improve the patient's ability to participate in MRADLS. Patient will use it on a regular basis in the home    3. Diastolic heart failure-euvolemic    4. HTN-stable    5. Prolonged QT    6. Orthostatic hypotension    7. Pneumonia            Follow up in office in 6-8 weeks with Dr. Bennett. No changes. Doing well    As always, it has been a pleasure to participate in this patient's care.      Sincerely,      JOVANY Leggett

## 2018-04-18 ENCOUNTER — APPOINTMENT (OUTPATIENT)
Dept: OCCUPATIONAL THERAPY | Facility: HOSPITAL | Age: 73
End: 2018-04-18

## 2018-04-20 ENCOUNTER — APPOINTMENT (OUTPATIENT)
Dept: SPEECH THERAPY | Facility: HOSPITAL | Age: 73
End: 2018-04-20

## 2018-04-23 DIAGNOSIS — G20 ATYPICAL PARKINSONISM (HCC): Primary | ICD-10-CM

## 2018-04-25 ENCOUNTER — TELEPHONE (OUTPATIENT)
Dept: FAMILY MEDICINE CLINIC | Facility: CLINIC | Age: 73
End: 2018-04-25

## 2018-04-25 RX ORDER — BUMETANIDE 0.5 MG/1
0.5 TABLET ORAL DAILY PRN
Qty: 30 TABLET | Refills: 5 | Status: SHIPPED | OUTPATIENT
Start: 2018-04-25 | End: 2018-09-14 | Stop reason: SDUPTHER

## 2018-04-25 NOTE — TELEPHONE ENCOUNTER
----- Message from Kyara Celis sent at 4/25/2018  3:00 PM EDT -----  ROSALINO FROM [facility] WOULD LIKE TO TALK TO YOU REGARDING PT.  PLEASE CALL [phone]

## 2018-05-02 DIAGNOSIS — G20 IDIOPATHIC PARKINSON'S DISEASE (HCC): Primary | ICD-10-CM

## 2018-05-02 DIAGNOSIS — R26.89 BALANCE PROBLEMS: ICD-10-CM

## 2018-05-02 DIAGNOSIS — G93.41 METABOLIC ENCEPHALOPATHY: ICD-10-CM

## 2018-05-07 RX ORDER — DOXAZOSIN 2 MG/1
2 TABLET ORAL NIGHTLY
Qty: 90 TABLET | Refills: 3 | Status: SHIPPED | OUTPATIENT
Start: 2018-05-07 | End: 2018-05-07

## 2018-05-07 RX ORDER — MIDODRINE HYDROCHLORIDE 2.5 MG/1
2.5 TABLET ORAL DAILY
Status: CANCELLED
Start: 2018-05-07

## 2018-05-18 RX ORDER — CARVEDILOL 6.25 MG/1
9.38 TABLET ORAL 2 TIMES DAILY WITH MEALS
Qty: 90 TABLET | Refills: 5 | Status: SHIPPED | OUTPATIENT
Start: 2018-05-18 | End: 2018-09-14 | Stop reason: SDUPTHER

## 2018-05-18 RX ORDER — DOXAZOSIN 2 MG/1
2 TABLET ORAL NIGHTLY
Qty: 30 TABLET | Refills: 5 | Status: SHIPPED | OUTPATIENT
Start: 2018-05-18 | End: 2018-09-12 | Stop reason: SDUPTHER

## 2018-05-18 RX ORDER — MIDODRINE HYDROCHLORIDE 2.5 MG/1
2.5 TABLET ORAL DAILY
Qty: 30 TABLET | Refills: 5 | Status: SHIPPED | OUTPATIENT
Start: 2018-05-18 | End: 2018-09-12 | Stop reason: SDUPTHER

## 2018-05-18 RX ORDER — CARBIDOPA AND LEVODOPA 25; 100 MG/1; MG/1
TABLET, EXTENDED RELEASE ORAL
Qty: 225 TABLET | Refills: 5 | Status: SHIPPED | OUTPATIENT
Start: 2018-05-18 | End: 2018-09-12 | Stop reason: SDUPTHER

## 2018-05-18 RX ORDER — LEVOTHYROXINE SODIUM 0.12 MG/1
125 TABLET ORAL ONCE
Qty: 30 TABLET | Refills: 5 | OUTPATIENT
Start: 2018-05-18 | End: 2022-01-01 | Stop reason: HOSPADM

## 2018-06-04 ENCOUNTER — OFFICE VISIT (OUTPATIENT)
Dept: FAMILY MEDICINE CLINIC | Facility: CLINIC | Age: 73
End: 2018-06-04

## 2018-06-04 VITALS
BODY MASS INDEX: 23.98 KG/M2 | RESPIRATION RATE: 16 BRPM | SYSTOLIC BLOOD PRESSURE: 132 MMHG | OXYGEN SATURATION: 98 % | HEIGHT: 72 IN | DIASTOLIC BLOOD PRESSURE: 60 MMHG | WEIGHT: 177 LBS | HEART RATE: 97 BPM | TEMPERATURE: 97.9 F

## 2018-06-04 DIAGNOSIS — R30.0 DYSURIA: ICD-10-CM

## 2018-06-04 DIAGNOSIS — G23.1 PROGRESSIVE SUPRANUCLEAR PALSY (HCC): Primary | ICD-10-CM

## 2018-06-04 DIAGNOSIS — I48.20 CHRONIC ATRIAL FIBRILLATION (HCC): ICD-10-CM

## 2018-06-04 DIAGNOSIS — R47.1 DYSARTHRIA: ICD-10-CM

## 2018-06-04 DIAGNOSIS — R49.8 WEAKNESS OF VOICE: ICD-10-CM

## 2018-06-04 DIAGNOSIS — R26.81 GAIT INSTABILITY: ICD-10-CM

## 2018-06-04 DIAGNOSIS — Z46.59 ENCOUNTER FOR CARE RELATED TO FEEDING TUBE: ICD-10-CM

## 2018-06-04 DIAGNOSIS — N39.0 URINARY TRACT INFECTION WITHOUT HEMATURIA, SITE UNSPECIFIED: ICD-10-CM

## 2018-06-04 LAB
BILIRUB BLD-MCNC: NEGATIVE MG/DL
CLARITY, POC: ABNORMAL
COLOR UR: YELLOW
GLUCOSE UR STRIP-MCNC: NEGATIVE MG/DL
KETONES UR QL: NEGATIVE
LEUKOCYTE EST, POC: ABNORMAL
NITRITE UR-MCNC: POSITIVE MG/ML
PH UR: 6 [PH] (ref 5–8)
PROT UR STRIP-MCNC: NEGATIVE MG/DL
RBC # UR STRIP: NEGATIVE /UL
SP GR UR: 1.02 (ref 1–1.03)
UROBILINOGEN UR QL: NORMAL

## 2018-06-04 PROCEDURE — 81003 URINALYSIS AUTO W/O SCOPE: CPT | Performed by: FAMILY MEDICINE

## 2018-06-04 RX ORDER — SULFAMETHOXAZOLE AND TRIMETHOPRIM 800; 160 MG/1; MG/1
1 TABLET ORAL 2 TIMES DAILY
Qty: 20 TABLET | Refills: 0 | Status: SHIPPED | OUTPATIENT
Start: 2018-06-04 | End: 2019-01-15

## 2018-06-04 NOTE — PROGRESS NOTES
HPI  Rommel Gonzalez is a 72 y.o. male who is here for follow up after recent hospitalization reportedly for pneumonia and urinary tract infection.  Daughter reports again hoarse voice and some cough and she is concerned about recurrence of pneumonia.  Patient does have super nuclear palsy and risk of aspiration.  Continues with a feeding tube but would like to have it removed.  Daughter reports he has been eating on his own for over 2 months and feeding tube is not being used.  Speech therapist is concerned about removing feeding tube just in case it has to be most used in the future.  This all was discussed.      Review of Systems   HENT: Positive for congestion, trouble swallowing and voice change.    Respiratory: Positive for cough. Negative for shortness of breath.    Neurological: Positive for weakness.         Past Medical History:   Diagnosis Date   • AF (atrial fibrillation)    • Anticoagulated    • Aspiration pneumonia    • Atrial flutter    • Atypical chest pain    • Chest pain    • Colon polyps    • Confusion    • Depression    • Disease of thyroid gland    • Disorder of thyroid    • Dysarthria    • Dysphagia    • Dyspnea and respiratory abnormalities    • Elevated TSH    • Fall    • Fatigue    • Gait instability    • Hay fever    • Hyperlipidemia    • Hypertension    • Hypothyroidism    • Idiopathic Parkinson's disease    • Insomnia    • Measles    • Metabolic encephalopathy    • Mumps    • Non-traumatic rhabdomyolysis    • CORINNE (obstructive sleep apnea)    • Palpitations    • Parkinson disease    • Peripheral neuropathy    • Pneumonia    • Poor sleep pattern    • Progressive supranuclear palsy    • Sepsis    • Slurred speech    • Tremor    • Weakness of voice        Past Surgical History:   Procedure Laterality Date   • ENDOSCOPY W/ PEG TUBE PLACEMENT N/A 3/16/2018    Procedure: ESOPHAGOGASTRODUODENOSCOPY WITH PERCUTANEOUS ENDOSCOPIC GASTROSTOMY TUBE INSERTION;  Surgeon: Lopez Vang Jr., MD;   Location: [facility] ENDOSCOPY;  Service: Gastroenterology       Family History   Problem Relation Age of Onset   • Hypertension Mother    • Glaucoma Mother    • Throat cancer Father    • Alcohol abuse Father    • Hypertension Sister    • Cancer Sister         malignant neoplasm of urinary bladder   • Lung cancer Brother    • Heart attack Other    • Lung disease Other        Social History     Social History   • Marital status:      Spouse name: N/A   • Number of children: N/A   • Years of education: N/A     Occupational History   • Not on file.     Social History Main Topics   • Smoking status: Former Smoker   • Smokeless tobacco: Never Used      Comment: Daily caffeine use   • Alcohol use Yes      Comment: social use   • Drug use: No   • Sexual activity: No     Other Topics Concern   • Not on file     Social History Narrative   • No narrative on file         Physical Exam   Constitutional: He is oriented to person, place, and time. He appears well-developed and well-nourished. No distress.   HENT:   Head: Normocephalic and atraumatic.   Nose: Nose normal.   Mouth/Throat: Oropharynx is clear and moist.   Eyes: EOM are normal. Pupils are equal, round, and reactive to light.   Neck: Normal range of motion.   Cardiovascular: Normal rate and regular rhythm.    Pulmonary/Chest: Effort normal. No respiratory distress. He has no wheezes. He has no rales.   Abdominal:   Epigastric feeding tube in place   Musculoskeletal:   Ambulates with a walker   Neurological: He is alert and oriented to person, place, and time. Coordination abnormal.   Skin: Skin is warm and dry.   Psychiatric: He has a normal mood and affect. His behavior is normal. Judgment and thought content normal.   Vitals reviewed.        Assessment/Plan    Rommel was seen today for fall.    Diagnoses and all orders for this visit:    Progressive supranuclear palsy    Weakness of voice    Gait instability    Dysarthria    Chronic atrial  fibrillation    Dysuria  -     Urine Culture - Urine, Urine, Clean Catch  -     POCT urinalysis dipstick, automated    Encounter for care related to feeding tube  -     Ambulatory Referral to General Surgery    Urinary tract infection without hematuria, site unspecified    Other orders  -     sulfamethoxazole-trimethoprim (BACTRIM DS,SEPTRA DS) 800-160 MG per tablet; Take 1 tablet by mouth 2 (Two) Times a Day.        Patient brought in by his daughter because of concerns of recurrent infection.  Patient was hospitalized several months ago for recurrent aspiration pneumonia and also was found to have a urinary tract infection.  Has questionable super nuclear palsy or parkinsonism which has led to some swallowing difficulties.  Has a feeding tube in place but apparently has not been used since March?  Patient would like to have it removed and this was discussed especially since not being used.  Will get consultation with general surgeon who apparently inserted 2 initially.  There is no evidence of pneumonia at this time with lungs sounding clear and oxygen saturation being good.  However urinalysis in the office is nitrite and leukocyte esterase positive and will start empiric therapy for urinary tract infection while await culture results.  Otherwise patient's overall status seems fairly stable on current regimen which will be continued with close follow-up in one month.    This note includes information entered using a voice recognition dictation system.  Though reviewed, some nonsensible errors may remain.

## 2018-06-04 NOTE — PROGRESS NOTES
HPI  Rommel Gonzalez is a 72 y.o. male who is here for follow up       Review of Systems      Past Medical History:   Diagnosis Date   • AF (atrial fibrillation)    • Anticoagulated    • Aspiration pneumonia    • Atrial flutter    • Atypical chest pain    • Chest pain    • Colon polyps    • Confusion    • Depression    • Disease of thyroid gland    • Disorder of thyroid    • Dysarthria    • Dysphagia    • Dyspnea and respiratory abnormalities    • Elevated TSH    • Fall    • Fatigue    • Gait instability    • Hay fever    • Hyperlipidemia    • Hypertension    • Hypothyroidism    • Idiopathic Parkinson's disease    • Insomnia    • Measles    • Metabolic encephalopathy    • Mumps    • Non-traumatic rhabdomyolysis    • CORINNE (obstructive sleep apnea)    • Palpitations    • Parkinson disease    • Peripheral neuropathy    • Pneumonia    • Poor sleep pattern    • Progressive supranuclear palsy    • Sepsis    • Slurred speech    • Tremor    • Weakness of voice        Past Surgical History:   Procedure Laterality Date   • ENDOSCOPY W/ PEG TUBE PLACEMENT N/A 3/16/2018    Procedure: ESOPHAGOGASTRODUODENOSCOPY WITH PERCUTANEOUS ENDOSCOPIC GASTROSTOMY TUBE INSERTION;  Surgeon: Lopez Vang Jr., MD;  Location: [facility] ENDOSCOPY;  Service: Gastroenterology       Family History   Problem Relation Age of Onset   • Hypertension Mother    • Glaucoma Mother    • Throat cancer Father    • Alcohol abuse Father    • Hypertension Sister    • Cancer Sister         malignant neoplasm of urinary bladder   • Lung cancer Brother    • Heart attack Other    • Lung disease Other        Social History     Social History   • Marital status:      Spouse name: N/A   • Number of children: N/A   • Years of education: N/A     Occupational History   • Not on file.     Social History Main Topics   • Smoking status: Former Smoker   • Smokeless tobacco: Never Used      Comment: Daily caffeine use   • Alcohol use Yes      Comment: social use   • Drug  use: No   • Sexual activity: No     Other Topics Concern   • Not on file     Social History Narrative   • No narrative on file         Physical Exam      Assessment/Plan    There are no diagnoses linked to this encounter.

## 2018-06-08 LAB
BACTERIA UR CULT: ABNORMAL
BACTERIA UR CULT: ABNORMAL
OTHER ANTIBIOTIC SUSC ISLT: ABNORMAL

## 2018-06-14 ENCOUNTER — OFFICE VISIT (OUTPATIENT)
Dept: SURGERY | Facility: CLINIC | Age: 73
End: 2018-06-14

## 2018-06-14 VITALS
SYSTOLIC BLOOD PRESSURE: 128 MMHG | OXYGEN SATURATION: 97 % | BODY MASS INDEX: 23.32 KG/M2 | DIASTOLIC BLOOD PRESSURE: 82 MMHG | HEART RATE: 80 BPM | WEIGHT: 172 LBS

## 2018-06-14 DIAGNOSIS — Z43.1 ENCOUNTER FOR PEG (PERCUTANEOUS ENDOSCOPIC GASTROSTOMY) (HCC): Primary | ICD-10-CM

## 2018-06-14 PROCEDURE — 99212 OFFICE O/P EST SF 10 MIN: CPT | Performed by: SURGERY

## 2018-06-14 RX ORDER — LEVOTHYROXINE SODIUM 0.12 MG/1
125 TABLET ORAL DAILY
COMMUNITY
End: 2018-09-12 | Stop reason: SDUPTHER

## 2018-06-14 NOTE — PROGRESS NOTES
Subjective   Rommel Gonzalez is a 72 y.o. male returns to the office for a PEG that is no longer necessary.    History of Present Illness     The patient has progressive Parkinson's disease and developed dysphagia.  He also had an aspiration pneumonia.  A PEG was placed on 3/16/2018 for nutritional support.  He has now improved and is able to tolerate a diet without difficulty.  He is no longer using the PEG.  He is on Xarelto.    Review of Systems   Constitutional: Positive for activity change and fatigue. Negative for appetite change and fever.   Respiratory: Negative for chest tightness and shortness of breath.    Cardiovascular: Negative for chest pain and palpitations.   Gastrointestinal: Negative for abdominal pain, blood in stool, constipation, diarrhea, nausea and vomiting.     Past Medical History:   Diagnosis Date   • AF (atrial fibrillation)    • Anticoagulated    • Aspiration pneumonia    • Atrial flutter    • Atypical chest pain    • Chest pain    • Colon polyps    • Confusion    • Depression    • Disease of thyroid gland    • Disorder of thyroid    • Dysarthria    • Dysphagia    • Dyspnea and respiratory abnormalities    • Elevated TSH    • Fall    • Fatigue    • Gait instability    • Hay fever    • Hyperlipidemia    • Hypertension    • Hypothyroidism    • Idiopathic Parkinson's disease    • Insomnia    • Measles    • Metabolic encephalopathy    • Mumps    • Non-traumatic rhabdomyolysis    • CORINNE (obstructive sleep apnea)    • Palpitations    • Parkinson disease    • Peripheral neuropathy    • Pneumonia    • Poor sleep pattern    • Progressive supranuclear palsy    • Sepsis    • Slurred speech    • Tremor    • Weakness of voice      Past Surgical History:   Procedure Laterality Date   • ENDOSCOPY W/ PEG TUBE PLACEMENT N/A 3/16/2018    Procedure: ESOPHAGOGASTRODUODENOSCOPY WITH PERCUTANEOUS ENDOSCOPIC GASTROSTOMY TUBE INSERTION;  Surgeon: Lopez Vang Jr., MD;  Location: [facility];  Service:  Gastroenterology     Family History   Problem Relation Age of Onset   • Hypertension Mother    • Glaucoma Mother    • Throat cancer Father    • Alcohol abuse Father    • Hypertension Sister    • Cancer Sister         malignant neoplasm of urinary bladder   • Lung cancer Brother    • Heart attack Other    • Lung disease Other      Social History     Social History   • Marital status:      Spouse name: N/A   • Number of children: N/A   • Years of education: N/A     Occupational History   • Not on file.     Social History Main Topics   • Smoking status: Former Smoker   • Smokeless tobacco: Never Used      Comment: Daily caffeine use   • Alcohol use Yes      Comment: social use   • Drug use: No   • Sexual activity: No     Other Topics Concern   • Not on file     Social History Narrative   • No narrative on file       Objective   Physical Exam   Constitutional: He appears well-developed and well-nourished.  Non-toxic appearance.   Eyes: EOM are normal. No scleral icterus.   Pulmonary/Chest: Effort normal. No respiratory distress.   Abdominal: Normal appearance.   Skin: Skin is warm and dry.   Psychiatric: He has a normal mood and affect. His behavior is normal.       Assessment/Plan       The encounter diagnosis was Encounter for PEG (percutaneous endoscopic gastrostomy).    The patient has a PEG that is no longer needed.  He is ready for PEG tube removal.  He is on Xarelto and the bleeding risk of removal was discussed with the patient and his family.  They would like to wait until he can be off the Xarelto for 2 days in order to reduce the bleeding risk.  He will follow-up in 2 weeks for PEG tube removal.

## 2018-06-26 ENCOUNTER — OFFICE VISIT (OUTPATIENT)
Dept: SURGERY | Facility: CLINIC | Age: 73
End: 2018-06-26

## 2018-06-26 VITALS
WEIGHT: 183.4 LBS | BODY MASS INDEX: 24.87 KG/M2 | HEART RATE: 81 BPM | OXYGEN SATURATION: 93 % | SYSTOLIC BLOOD PRESSURE: 125 MMHG | DIASTOLIC BLOOD PRESSURE: 79 MMHG

## 2018-06-26 DIAGNOSIS — Z43.1 ENCOUNTER FOR PEG (PERCUTANEOUS ENDOSCOPIC GASTROSTOMY) (HCC): Primary | ICD-10-CM

## 2018-06-26 PROCEDURE — 99213 OFFICE O/P EST LOW 20 MIN: CPT | Performed by: SURGERY

## 2018-06-26 NOTE — PROGRESS NOTES
Subjective   Rommel Gonzalez is a 72 y.o. male who returns to the office to have the PEG removed.    History of Present Illness     The patient has progressive Parkinson's disease and developed dysphagia with aspiration pneumonia and required a PEG to be placed on 3/16/2018 for nutritional support.  He no longer requires tube feeds and has been off Xarelto for 2 full days.  He presents for PEG tube removal.    Review of Systems   Constitutional: Positive for activity change. Negative for appetite change, fatigue and fever.   HENT: Positive for trouble swallowing. Negative for voice change.    Respiratory: Negative for chest tightness and shortness of breath.    Cardiovascular: Negative for chest pain and palpitations.   Gastrointestinal: Negative for abdominal pain, blood in stool, constipation, diarrhea, nausea and vomiting.   Endocrine: Negative for cold intolerance and heat intolerance.   Genitourinary: Negative for dysuria and flank pain.   Neurological: Negative for dizziness and light-headedness.   Hematological: Negative for adenopathy. Does not bruise/bleed easily.   Psychiatric/Behavioral: Negative for agitation and confusion.     Past Medical History:   Diagnosis Date   • AF (atrial fibrillation)    • Anticoagulated    • Aspiration pneumonia    • Atrial flutter    • Atypical chest pain    • Chest pain    • Colon polyps    • Confusion    • Depression    • Disease of thyroid gland    • Disorder of thyroid    • Dysarthria    • Dysphagia    • Dyspnea and respiratory abnormalities    • Elevated TSH    • Fall    • Fatigue    • Gait instability    • Hay fever    • Hyperlipidemia    • Hypertension    • Hypothyroidism    • Idiopathic Parkinson's disease    • Insomnia    • Measles    • Metabolic encephalopathy    • Mumps    • Non-traumatic rhabdomyolysis    • CORINNE (obstructive sleep apnea)    • Palpitations    • Parkinson disease    • Peripheral neuropathy    • Pneumonia    • Poor sleep pattern    • Progressive  supranuclear palsy    • Sepsis    • Slurred speech    • Tremor    • Weakness of voice      Past Surgical History:   Procedure Laterality Date   • ENDOSCOPY W/ PEG TUBE PLACEMENT N/A 3/16/2018    Procedure: ESOPHAGOGASTRODUODENOSCOPY WITH PERCUTANEOUS ENDOSCOPIC GASTROSTOMY TUBE INSERTION;  Surgeon: Lopez Vang Jr., MD;  Location: [facility] ENDOSCOPY;  Service: Gastroenterology     Family History   Problem Relation Age of Onset   • Hypertension Mother    • Glaucoma Mother    • Throat cancer Father    • Alcohol abuse Father    • Hypertension Sister    • Cancer Sister         malignant neoplasm of urinary bladder   • Lung cancer Brother    • Heart attack Other    • Lung disease Other      Social History     Social History   • Marital status:      Spouse name: N/A   • Number of children: N/A   • Years of education: N/A     Occupational History   • Not on file.     Social History Main Topics   • Smoking status: Former Smoker   • Smokeless tobacco: Never Used      Comment: Daily caffeine use   • Alcohol use Yes      Comment: social use   • Drug use: No   • Sexual activity: No     Other Topics Concern   • Not on file     Social History Narrative   • No narrative on file       Objective   Physical Exam   Constitutional: He is oriented to person, place, and time. He appears well-developed and well-nourished.  Non-toxic appearance.   Eyes: EOM are normal. No scleral icterus.   Pulmonary/Chest: Effort normal. No respiratory distress.   Abdominal: Soft. Normal appearance. There is no tenderness.   PEG site clean.   Neurological: He is alert and oriented to person, place, and time.   Skin: Skin is warm and dry.   Psychiatric: He has a normal mood and affect. His behavior is normal. Judgment and thought content normal.       Assessment/Plan       The encounter diagnosis was Encounter for PEG (percutaneous endoscopic gastrostomy).    The patient has a PEG that is no longer needed.  The PEG was removed in the office.  The  patient tolerated procedure well.  Local wound care was discussed with him.  He will follow-up on an as-needed basis.

## 2018-07-06 ENCOUNTER — OFFICE VISIT (OUTPATIENT)
Dept: CARDIOLOGY | Facility: CLINIC | Age: 73
End: 2018-07-06

## 2018-07-06 VITALS
DIASTOLIC BLOOD PRESSURE: 80 MMHG | BODY MASS INDEX: 24.46 KG/M2 | HEART RATE: 89 BPM | WEIGHT: 180.6 LBS | HEIGHT: 72 IN | SYSTOLIC BLOOD PRESSURE: 144 MMHG

## 2018-07-06 DIAGNOSIS — I48.20 CHRONIC ATRIAL FIBRILLATION (HCC): Primary | ICD-10-CM

## 2018-07-06 DIAGNOSIS — I10 ESSENTIAL HYPERTENSION: ICD-10-CM

## 2018-07-06 DIAGNOSIS — Z99.89 OSA ON CPAP: ICD-10-CM

## 2018-07-06 DIAGNOSIS — Z79.01 ANTICOAGULATED: ICD-10-CM

## 2018-07-06 DIAGNOSIS — G47.33 OSA ON CPAP: ICD-10-CM

## 2018-07-06 PROCEDURE — 99213 OFFICE O/P EST LOW 20 MIN: CPT | Performed by: INTERNAL MEDICINE

## 2018-07-06 NOTE — PROGRESS NOTES
Date of Office Visit: 2018  Encounter Provider: Laura Bennett MD  Place of Service: [facility] CARDIOLOGY  Patient Name: Rommel Gonzalez  :1945    Chief complaint  Follow-up of atrial flutter and fibrillation    History of Present Illness  The patient is a 72-year-old gentleman with history of hypertension, hyperlipidemia, and Parkinson’s disease.  In 2015, he was noted to have atrial fibrillation with rapid ventricular rates.  Coreg and Xarelto were added to his regimen and rate control was pursued.  An echocardiogram revealed normal left ventricular size and function with an ejection fraction of 51%, grade 2 diastolic dysfunction, moderate left atrial enlargement, mild mitral regurgitation, and normal right-sided pressures.  In 2015, he had a Lexiscan perfusion study that was negative for ischemia.  He had an inferior wall defect consistent with attenuation. He was admitted in 3/18 with weakness, rhabdomyalysis, pneumonia and dysphagia. He had a PEG placed and tube feed. This was subsequently removed as an outpatient. He has regained some strength.     Since the last visit he has had no chest pain, shortness of breath, palpitations or lightheadedness. She has mild dependent edema.    Past Medical History:   Diagnosis Date   • AF (atrial fibrillation) (CMS/HCC)    • Anticoagulated    • Aspiration pneumonia (CMS/HCC)    • Atrial flutter (CMS/HCC)    • Atypical chest pain    • Chest pain    • Colon polyps    • Confusion    • Depression    • Disease of thyroid gland    • Disorder of thyroid    • Dysarthria    • Dysphagia    • Dyspnea and respiratory abnormalities    • Elevated TSH    • Fall    • Fatigue    • Gait instability    • Hay fever    • Hyperlipidemia    • Hypertension    • Hypothyroidism    • Idiopathic Parkinson's disease (CMS/HCC)    • Insomnia    • Measles    • Metabolic encephalopathy    • Mumps    • Non-traumatic rhabdomyolysis    • CORINNE (obstructive sleep  apnea)    • Palpitations    • Parkinson disease (CMS/HCC)    • Peripheral neuropathy    • Pneumonia    • Poor sleep pattern    • Progressive supranuclear palsy (CMS/HCC)    • Sepsis (CMS/HCC)    • Slurred speech    • Tremor    • Weakness of voice      Past Surgical History:   Procedure Laterality Date   • ENDOSCOPY W/ PEG TUBE PLACEMENT N/A 3/16/2018    Procedure: ESOPHAGOGASTRODUODENOSCOPY WITH PERCUTANEOUS ENDOSCOPIC GASTROSTOMY TUBE INSERTION;  Surgeon: Lopez Vang Jr., MD;  Location: [facility] ENDOSCOPY;  Service: Gastroenterology     Outpatient Medications Prior to Visit   Medication Sig Dispense Refill   • bumetanide (BUMEX) 0.5 MG tablet Take 1 tablet by mouth Daily As Needed (edema). 30 tablet 5   • carbidopa-levodopa ER (SINEMET CR)  MG per tablet TAKE 2 1/2 TABLETS  (3) THREE TIMES A DAY. 225 tablet 5   • carvedilol (COREG) 6.25 MG tablet Take 1.5 tablets by mouth 2 (Two) Times a Day With Meals. 90 tablet 5   • doxazosin (CARDURA) 2 MG tablet Take 1 tablet by mouth Every Night. 30 tablet 5   • levothyroxine (SYNTHROID, LEVOTHROID) 125 MCG tablet Take 125 mcg by mouth Daily.     • midodrine (PROAMATINE) 2.5 MG tablet Take 1 tablet by mouth Daily. 30 tablet 5   • Multiple Vitamins-Minerals (MULTIVITAL) tablet Take 1 tablet by mouth Daily. 30 tablet 5   • rivaroxaban (XARELTO) 20 MG tablet Take 1 tablet by mouth Daily With Dinner. 30 tablet 5   • polyethylene glycol (MIRALAX) packet Take 17 g by mouth Daily.     • sulfamethoxazole-trimethoprim (BACTRIM DS,SEPTRA DS) 800-160 MG per tablet Take 1 tablet by mouth 2 (Two) Times a Day. 20 tablet 0     No facility-administered medications prior to visit.        Allergies as of 07/06/2018   • (No Known Allergies)     Social History     Social History   • Marital status:      Spouse name: N/A   • Number of children: N/A   • Years of education: N/A     Occupational History   • Not on file.     Social History Main Topics   • Smoking status: Former Smoker  "  • Smokeless tobacco: Never Used      Comment: Daily caffeine use   • Alcohol use Yes      Comment: social use   • Drug use: No   • Sexual activity: No     Other Topics Concern   • Not on file     Social History Narrative   • No narrative on file     Family History   Problem Relation Age of Onset   • Hypertension Mother    • Glaucoma Mother    • Throat cancer Father    • Alcohol abuse Father    • Hypertension Sister    • Cancer Sister         malignant neoplasm of urinary bladder   • Lung cancer Brother    • Heart attack Other    • Lung disease Other      Review of Systems   Constitution: Negative for fever, malaise/fatigue, weight gain and weight loss.   HENT: Negative for ear pain, hearing loss, nosebleeds and sore throat.    Eyes: Negative for double vision, pain, vision loss in left eye and vision loss in right eye.   Cardiovascular: Positive for leg swelling.        See history of present illness.   Respiratory: Negative for cough, shortness of breath, sleep disturbances due to breathing, snoring and wheezing.    Endocrine: Negative for cold intolerance, heat intolerance and polyuria.   Skin: Negative for itching, poor wound healing and rash.   Musculoskeletal: Negative for joint pain, joint swelling and myalgias.   Gastrointestinal: Negative for abdominal pain, diarrhea, hematochezia, nausea and vomiting.   Genitourinary: Negative for hematuria and hesitancy.   Neurological: Negative for numbness, paresthesias and seizures.   Psychiatric/Behavioral: Negative for depression. The patient is not nervous/anxious.         Objective:     Vitals:    07/06/18 1148   BP: 144/80   BP Location: Right arm   Pulse: 89   Weight: 81.9 kg (180 lb 9.6 oz)   Height: 182.9 cm (72\")     Body mass index is 24.49 kg/m².    Physical Exam   Constitutional: He is oriented to person, place, and time. He appears well-developed and well-nourished.   HENT:   Head: Normocephalic.   Nose: Nose normal.   Mouth/Throat: Oropharynx is clear " and moist.   Eyes: Conjunctivae and EOM are normal. Pupils are equal, round, and reactive to light. Right eye exhibits no discharge. No scleral icterus.   Neck: Normal range of motion. Neck supple. No JVD present. No thyromegaly present.   Cardiovascular: Normal rate, regular rhythm, normal heart sounds and intact distal pulses.  Exam reveals no gallop and no friction rub.    No murmur heard.  Pulses:       Carotid pulses are 2+ on the right side, and 2+ on the left side.       Radial pulses are 2+ on the right side, and 2+ on the left side.        Femoral pulses are 2+ on the right side, and 2+ on the left side.       Popliteal pulses are 2+ on the right side, and 2+ on the left side.        Dorsalis pedis pulses are 2+ on the right side, and 2+ on the left side.        Posterior tibial pulses are 2+ on the right side, and 2+ on the left side.   Pulmonary/Chest: Effort normal and breath sounds normal. No respiratory distress. He has no wheezes. He has no rales.   Abdominal: Soft. Bowel sounds are normal. He exhibits no distension. There is no hepatosplenomegaly. There is no tenderness. There is no rebound.   Musculoskeletal: Normal range of motion. He exhibits no edema or tenderness.   Neurological: He is alert and oriented to person, place, and time.   Skin: Skin is warm and dry. No rash noted. No erythema.   Psychiatric: He has a normal mood and affect. His behavior is normal. Judgment and thought content normal.   Vitals reviewed.    Lab Review:     ECG 12 Lead  Date/Time: 7/8/2018 9:26 PM  Performed by: EL CORDERO  Authorized by: EL CORDERO   Rhythm: atrial flutter  Clinical impression: abnormal ECG          Assessment:       Diagnosis Plan   1. Chronic atrial fibrillation (CMS/HCC)     2. Essential hypertension     3. CORINNE on CPAP     4. Anticoagulated       Plan:       1.  Recent TIA. I talked to Dr Pickett who confirmed that patient had symptoms concerning for a TIA and it was recommended he add a baby  asa. No recurrent symptoms  2.  Falls with syncope and near syncope.  No recent falls  2.  Atrial flutter/fibrillation.  On Xarelto therapy. ASA stopped with rectal bleeding in the past  3.  Xarelto therapy, tolerating well  4.  History of rectal bleeding. No recurrance  5.  Hypertension. AM readings are slightly elevated as today but improve as he is up. No hypotensive events.  6.  Pulmonary nodule, by Dr. Onofre  7.  Sleep apnea, untreated    Atrial Fibrillation and Atrial Flutter  Assessment  • The patient has persistent atrial flutter  • The patient's CHADS2-VASc score is 2  • A [id number] score of 2 or more is considered a high risk for a thromboembolic event  • Warfarin not prescribed  • Dabigatran not prescribed  • Rivaroxaban prescribed  • Apixaban not prescribed  • Aspirin not prescribed    Plan  • Continue in atrial fibrillation with rate control  • Add rivaroxaban for antithrombotic therapy  • Continue beta blocker for rate control         Your medication list          Accurate as of 7/6/18 11:59 PM. If you have any questions, ask your nurse or doctor.               CONTINUE taking these medications      Instructions Last Dose Given Next Dose Due   bumetanide 0.5 MG tablet  Commonly known as:  BUMEX      Take 1 tablet by mouth Daily As Needed (edema).       carbidopa-levodopa ER  MG per tablet  Commonly known as:  SINEMET CR      TAKE 2 1/2 TABLETS  (3) THREE TIMES A DAY.       carvedilol 6.25 MG tablet  Commonly known as:  COREG      Take 1.5 tablets by mouth 2 (Two) Times a Day With Meals.       doxazosin 2 MG tablet  Commonly known as:  CARDURA      Take 1 tablet by mouth Every Night.       levothyroxine 125 MCG tablet  Commonly known as:  SYNTHROID, LEVOTHROID      Take 125 mcg by mouth Daily.       midodrine 2.5 MG tablet  Commonly known as:  PROAMATINE      Take 1 tablet by mouth Daily.       MULTIVITAL tablet      Take 1 tablet by mouth Daily.       polyethylene glycol packet  Commonly  known as:  MIRALAX      Take 17 g by mouth Daily.       rivaroxaban 20 MG tablet  Commonly known as:  XARELTO      Take 1 tablet by mouth Daily With Dinner.       sulfamethoxazole-trimethoprim 800-160 MG per tablet  Commonly known as:  BACTRIM DS,SEPTRA DS      Take 1 tablet by mouth 2 (Two) Times a Day.                  Dictated utilizing Dragon dictation

## 2018-07-08 PROCEDURE — 93000 ELECTROCARDIOGRAM COMPLETE: CPT | Performed by: INTERNAL MEDICINE

## 2018-07-30 ENCOUNTER — TELEPHONE (OUTPATIENT)
Dept: CARDIOLOGY | Facility: CLINIC | Age: 73
End: 2018-07-30

## 2018-07-30 ENCOUNTER — OFFICE VISIT (OUTPATIENT)
Dept: FAMILY MEDICINE CLINIC | Facility: CLINIC | Age: 73
End: 2018-07-30

## 2018-07-30 VITALS
SYSTOLIC BLOOD PRESSURE: 100 MMHG | RESPIRATION RATE: 16 BRPM | BODY MASS INDEX: 24.84 KG/M2 | OXYGEN SATURATION: 99 % | DIASTOLIC BLOOD PRESSURE: 62 MMHG | TEMPERATURE: 97.9 F | WEIGHT: 183.4 LBS | HEART RATE: 94 BPM | HEIGHT: 72 IN

## 2018-07-30 DIAGNOSIS — I48.20 CHRONIC ATRIAL FIBRILLATION (HCC): Primary | ICD-10-CM

## 2018-07-30 DIAGNOSIS — Z79.899 HIGH RISK MEDICATION USE: ICD-10-CM

## 2018-07-30 DIAGNOSIS — R60.0 LOCALIZED EDEMA: ICD-10-CM

## 2018-07-30 DIAGNOSIS — G20 ATYPICAL PARKINSONISM (HCC): ICD-10-CM

## 2018-07-30 PROCEDURE — 99213 OFFICE O/P EST LOW 20 MIN: CPT | Performed by: FAMILY MEDICINE

## 2018-07-30 PROCEDURE — 93000 ELECTROCARDIOGRAM COMPLETE: CPT | Performed by: FAMILY MEDICINE

## 2018-07-30 NOTE — PROGRESS NOTES
HPI  Rommel Gonzalez is a 72 y.o. male who is here for concerns about variability of heart rate.  Patient was seen by occupational therapist this morning and apparently when pulse oximeter was placed on finger heart rate was noted to be extremely variable and office was called.  Of note patient has known atrial fibrillation.  Also ankle edema noted but patient went [place] a  [place] the country this last weekend which did take a prolonged car ride.  He denies any shortness of air.  Daughter apparently did give him Bumex today.  Again patient denies any complaints and seems to be doing well.       Review of Systems   Respiratory: Negative for cough and shortness of breath.    Cardiovascular: Positive for leg swelling. Negative for chest pain.         Past Medical History:   Diagnosis Date   • AF (atrial fibrillation) (CMS/HCC)    • Anticoagulated    • Aspiration pneumonia (CMS/HCC)    • Atrial flutter (CMS/HCC)    • Atypical chest pain    • Chest pain    • Colon polyps    • Confusion    • Depression    • Disease of thyroid gland    • Disorder of thyroid    • Dysarthria    • Dysphagia    • Dyspnea and respiratory abnormalities    • Elevated TSH    • Fall    • Fatigue    • Gait instability    • Hay fever    • Hyperlipidemia    • Hypertension    • Hypothyroidism    • Idiopathic Parkinson's disease (CMS/HCC)    • Insomnia    • Measles    • Metabolic encephalopathy    • Mumps    • Non-traumatic rhabdomyolysis    • CORINNE (obstructive sleep apnea)    • Palpitations    • Parkinson disease (CMS/HCC)    • Peripheral neuropathy    • Pneumonia    • Poor sleep pattern    • Progressive supranuclear palsy (CMS/HCC)    • Sepsis (CMS/HCC)    • Slurred speech    • Tremor    • Weakness of voice        Past Surgical History:   Procedure Laterality Date   • ENDOSCOPY W/ PEG TUBE PLACEMENT N/A 3/16/2018    Procedure: ESOPHAGOGASTRODUODENOSCOPY WITH PERCUTANEOUS ENDOSCOPIC GASTROSTOMY TUBE INSERTION;  Surgeon: Lopez Vang Jr., MD;   Location: [facility] ENDOSCOPY;  Service: Gastroenterology       Family History   Problem Relation Age of Onset   • Hypertension Mother    • Glaucoma Mother    • Throat cancer Father    • Alcohol abuse Father    • Hypertension Sister    • Cancer Sister         malignant neoplasm of urinary bladder   • Lung cancer Brother    • Heart attack Other    • Lung disease Other        Social History     Social History   • Marital status:      Spouse name: N/A   • Number of children: N/A   • Years of education: N/A     Occupational History   • Not on file.     Social History Main Topics   • Smoking status: Former Smoker   • Smokeless tobacco: Never Used      Comment: Daily caffeine use   • Alcohol use Yes      Comment: social use   • Drug use: No   • Sexual activity: No     Other Topics Concern   • Not on file     Social History Narrative   • No narrative on file         Physical Exam   Constitutional: He appears well-developed and well-nourished.   Eyes: Pupils are equal, round, and reactive to light.   Cardiovascular: Normal rate.  An irregularly irregular rhythm present.   Pulmonary/Chest: Effort normal. No respiratory distress. He has no wheezes.   Abdominal: Soft.   Musculoskeletal: He exhibits edema. He exhibits no deformity.   Neurological: He is alert.   Skin: Skin is warm and dry.   Psychiatric: He has a normal mood and affect. His behavior is normal. Judgment and thought content normal.   Nursing note and vitals reviewed.        Assessment/Plan    Rommel was seen today for atrial fibrillation and edema.    Diagnoses and all orders for this visit:    Chronic atrial fibrillation (CMS/HCC)    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HCC)    High risk medication use    Localized edema      Patient is here mostly because therapist was concerned about heart irregularity.  Also ankle edema noted but patient did take long car rides over the weekend going to family members  out [place] the country.  Patient is totally asymptomatic and  in no distress.  Has 2+ edema at most in both ankles.  Lungs are clear.  Will get EKG and check routine lab since last done 3 months ago.  Recommend elevating feet as much as possible and avoid prolonged sitting.  Otherwise resume previous care and follow-up.    This note includes information entered using a voice recognition dictation system.  Though reviewed, some nonsensible errors may remain.    EKG continues to show atrial flutter with controlled ventricular response currently 67 bpm.  No significant change compared to tracing done earlier this month.

## 2018-07-31 LAB
BASOPHILS # BLD AUTO: 0 X10E3/UL (ref 0–0.2)
BASOPHILS NFR BLD AUTO: 0 %
BUN SERPL-MCNC: 12 MG/DL (ref 8–27)
BUN/CREAT SERPL: 13 (ref 10–24)
CALCIUM SERPL-MCNC: 9.1 MG/DL (ref 8.6–10.2)
CHLORIDE SERPL-SCNC: 102 MMOL/L (ref 96–106)
CO2 SERPL-SCNC: 27 MMOL/L (ref 20–29)
CREAT SERPL-MCNC: 0.92 MG/DL (ref 0.76–1.27)
EOSINOPHIL # BLD AUTO: 0.1 X10E3/UL (ref 0–0.4)
EOSINOPHIL NFR BLD AUTO: 2 %
ERYTHROCYTE [DISTWIDTH] IN BLOOD BY AUTOMATED COUNT: 14.6 % (ref 12.3–15.4)
GLUCOSE SERPL-MCNC: 102 MG/DL (ref 65–99)
HCT VFR BLD AUTO: 36.8 % (ref 37.5–51)
HGB BLD-MCNC: 12.1 G/DL (ref 13–17.7)
IMM GRANULOCYTES # BLD: 0 X10E3/UL (ref 0–0.1)
IMM GRANULOCYTES NFR BLD: 0 %
LYMPHOCYTES # BLD AUTO: 2.1 X10E3/UL (ref 0.7–3.1)
LYMPHOCYTES NFR BLD AUTO: 34 %
MCH RBC QN AUTO: 30.9 PG (ref 26.6–33)
MCHC RBC AUTO-ENTMCNC: 32.9 G/DL (ref 31.5–35.7)
MCV RBC AUTO: 94 FL (ref 79–97)
MONOCYTES # BLD AUTO: 0.3 X10E3/UL (ref 0.1–0.9)
MONOCYTES NFR BLD AUTO: 6 %
NEUTROPHILS # BLD AUTO: 3.6 X10E3/UL (ref 1.4–7)
NEUTROPHILS NFR BLD AUTO: 58 %
PLATELET # BLD AUTO: 178 X10E3/UL (ref 150–379)
POTASSIUM SERPL-SCNC: 4.3 MMOL/L (ref 3.5–5.2)
RBC # BLD AUTO: 3.91 X10E6/UL (ref 4.14–5.8)
SODIUM SERPL-SCNC: 143 MMOL/L (ref 134–144)
WBC # BLD AUTO: 6.2 X10E3/UL (ref 3.4–10.8)

## 2018-08-09 NOTE — TELEPHONE ENCOUNTER
Like legs are swollen after a long car trip.  Dr. Onofre told him to elevate legs and obtain an EKG that showed atrial fibrillation with controlled rate. (not new).  Please see if edema has now resolved. kriss

## 2018-08-28 ENCOUNTER — TELEPHONE (OUTPATIENT)
Dept: CARDIOLOGY | Facility: CLINIC | Age: 73
End: 2018-08-28

## 2018-08-28 NOTE — TELEPHONE ENCOUNTER
I do not have anything to add. Can only emphasize he go to the ER and still needs to see pc-p as to why he fell. If this continues may have to stop use of Xarelto. Is he dizzy, is BP or HR low? Go to er. kriss

## 2018-08-28 NOTE — TELEPHONE ENCOUNTER
I called the pt's daughter Christianne to verify he went to the ER.  Pt is currently at the ER and they are doing a CT-Head.  She will let us know the outcome.  EDUARDOI: he has NOT been using his walker.

## 2018-08-28 NOTE — TELEPHONE ENCOUNTER
Jessica from [facility] at home left a voicemail that patient fell and hit his head.  He does have a knot the size of a dime on his head.  Jessica advised pt to go to the ER.  Jessica said patient is refusing to go anywhere to be seen.  I called patient to see how he is doing and got his voicemail. Patient's daughter Christianne called me back.  She said her dad seems fine. She said he hit his head above his eyebrow and that is where the knot is.  Christianne said he has a dentist appt tomorrow and he doesn't want to miss it.  Christianne said she has tried everything she can think of to do to get him to go to the ER and he is still refusing.  Christianne asked if he should be doing anything different. Patient does take Xarelto 20mg daily.  Please advise.   Neyda Lopez Human nurse#[phone] ext [phone]    Christianne#[phone]

## 2018-08-28 NOTE — TELEPHONE ENCOUNTER
Spoke with pt and he said he will go to the ER and get checked out.  Advised him NOT to wait because he may have bleed that needs to be checked out.  Ok with pt.  He tripped over his grandson and hit his head.  He did NOT feel dizzy or lightheaded just clumsy.  Pt's daughter (Christianne) was there to take him.  Told him I would check on him in a little bit.

## 2018-08-29 NOTE — TELEPHONE ENCOUNTER
DEA: Spoke with pt's daughter (Christianne).  Pt did not have a brain bleed.  He is sore but doing fine.  Big thank you for making him get checked out.  (done)

## 2018-09-12 RX ORDER — LEVOTHYROXINE SODIUM 0.12 MG/1
125 TABLET ORAL DAILY
Qty: 90 TABLET | Refills: 0 | Status: SHIPPED | OUTPATIENT
Start: 2018-09-12 | End: 2018-09-14 | Stop reason: SDUPTHER

## 2018-09-12 RX ORDER — DOXAZOSIN 2 MG/1
2 TABLET ORAL NIGHTLY
Qty: 90 TABLET | Refills: 0 | Status: SHIPPED | OUTPATIENT
Start: 2018-09-12 | End: 2018-09-14 | Stop reason: SDUPTHER

## 2018-09-12 RX ORDER — MIDODRINE HYDROCHLORIDE 2.5 MG/1
2.5 TABLET ORAL DAILY
Qty: 90 TABLET | Refills: 0 | Status: SHIPPED | OUTPATIENT
Start: 2018-09-12 | End: 2018-09-14 | Stop reason: SDUPTHER

## 2018-09-12 RX ORDER — CARBIDOPA AND LEVODOPA 25; 100 MG/1; MG/1
TABLET, EXTENDED RELEASE ORAL
Qty: 675 TABLET | Refills: 0 | Status: SHIPPED | OUTPATIENT
Start: 2018-09-12 | End: 2018-09-14 | Stop reason: SDUPTHER

## 2018-09-14 RX ORDER — MIDODRINE HYDROCHLORIDE 2.5 MG/1
2.5 TABLET ORAL DAILY
Qty: 90 TABLET | Refills: 0 | Status: SHIPPED | OUTPATIENT
Start: 2018-09-14 | End: 2019-02-26 | Stop reason: SDUPTHER

## 2018-09-14 RX ORDER — LEVOTHYROXINE SODIUM 0.12 MG/1
125 TABLET ORAL DAILY
Qty: 90 TABLET | Refills: 0 | Status: SHIPPED | OUTPATIENT
Start: 2018-09-14 | End: 2019-02-26 | Stop reason: SDUPTHER

## 2018-09-14 RX ORDER — BUMETANIDE 0.5 MG/1
0.5 TABLET ORAL DAILY PRN
Qty: 90 TABLET | Refills: 0 | Status: SHIPPED | OUTPATIENT
Start: 2018-09-14 | End: 2019-02-26 | Stop reason: SDUPTHER

## 2018-09-14 RX ORDER — DOXAZOSIN 2 MG/1
2 TABLET ORAL NIGHTLY
Qty: 90 TABLET | Refills: 0 | Status: SHIPPED | OUTPATIENT
Start: 2018-09-14 | End: 2018-12-09 | Stop reason: SDUPTHER

## 2018-09-14 RX ORDER — CARBIDOPA AND LEVODOPA 25; 100 MG/1; MG/1
TABLET, EXTENDED RELEASE ORAL
Qty: 675 TABLET | Refills: 0 | Status: SHIPPED | OUTPATIENT
Start: 2018-09-14 | End: 2019-02-26 | Stop reason: SDUPTHER

## 2018-09-14 RX ORDER — CARVEDILOL 6.25 MG/1
9.38 TABLET ORAL 2 TIMES DAILY WITH MEALS
Qty: 90 TABLET | Refills: 5 | Status: SHIPPED | OUTPATIENT
Start: 2018-09-14 | End: 2018-10-10 | Stop reason: SDUPTHER

## 2018-10-10 ENCOUNTER — TELEPHONE (OUTPATIENT)
Dept: FAMILY MEDICINE CLINIC | Facility: CLINIC | Age: 73
End: 2018-10-10

## 2018-10-10 RX ORDER — CARVEDILOL 6.25 MG/1
9.38 TABLET ORAL 2 TIMES DAILY WITH MEALS
Qty: 270 TABLET | Refills: 3 | Status: SHIPPED | OUTPATIENT
Start: 2018-10-10 | End: 2019-02-26 | Stop reason: SDUPTHER

## 2018-10-10 NOTE — TELEPHONE ENCOUNTER
Phoned daughter Christianne and left voice message to return my call.    Thank you.    ----- Message from Elodia Regalado sent at 10/10/2018  9:31 AM EDT -----  Patient's daughter needs to speak with you about refilling his carvedilol    Christianne [phone]

## 2018-12-10 RX ORDER — DOXAZOSIN 2 MG/1
TABLET ORAL
Qty: 30 TABLET | Refills: 0 | Status: SHIPPED | OUTPATIENT
Start: 2018-12-10 | End: 2019-01-10 | Stop reason: SDUPTHER

## 2019-01-10 RX ORDER — DOXAZOSIN 2 MG/1
TABLET ORAL
Qty: 30 TABLET | Refills: 0 | Status: SHIPPED | OUTPATIENT
Start: 2019-01-10 | End: 2019-02-05 | Stop reason: SDUPTHER

## 2019-01-15 ENCOUNTER — OFFICE VISIT (OUTPATIENT)
Dept: CARDIOLOGY | Facility: CLINIC | Age: 74
End: 2019-01-15

## 2019-01-15 VITALS
DIASTOLIC BLOOD PRESSURE: 70 MMHG | HEART RATE: 86 BPM | SYSTOLIC BLOOD PRESSURE: 120 MMHG | WEIGHT: 202 LBS | HEIGHT: 72 IN | BODY MASS INDEX: 27.36 KG/M2

## 2019-01-15 DIAGNOSIS — I48.20 CHRONIC ATRIAL FIBRILLATION (HCC): ICD-10-CM

## 2019-01-15 DIAGNOSIS — Z99.89 OSA ON CPAP: ICD-10-CM

## 2019-01-15 DIAGNOSIS — I10 ESSENTIAL HYPERTENSION: ICD-10-CM

## 2019-01-15 DIAGNOSIS — G47.33 OSA ON CPAP: ICD-10-CM

## 2019-01-15 DIAGNOSIS — I48.3 TYPICAL ATRIAL FLUTTER (HCC): Primary | ICD-10-CM

## 2019-01-15 DIAGNOSIS — Z79.899 HIGH RISK MEDICATION USE: ICD-10-CM

## 2019-01-15 PROCEDURE — 93000 ELECTROCARDIOGRAM COMPLETE: CPT | Performed by: INTERNAL MEDICINE

## 2019-01-15 PROCEDURE — 99213 OFFICE O/P EST LOW 20 MIN: CPT | Performed by: INTERNAL MEDICINE

## 2019-01-15 NOTE — PROGRESS NOTES
Date of Office Visit: 01/15/2019  Encounter Provider: Laura Bennett MD  Place of Service: [facility] CARDIOLOGY  Patient Name: Rommel Gonzalez  :1945    Chief complaint  Follow-up of atrial flutter and fibrillation    History of Present Illness  The patient is a 73-year-old gentleman with history of hypertension, hyperlipidemia, and Parkinson’s disease.  In 2015, he was noted to have atrial fibrillation with rapid ventricular rates.  Coreg and Xarelto were added to his regimen and rate control was pursued.  An echocardiogram revealed normal left ventricular size and function with an ejection fraction of 51%, grade 2 diastolic dysfunction, moderate left atrial enlargement, mild mitral regurgitation, and normal right-sided pressures.  In 2015, he had a Lexiscan perfusion study that was negative for ischemia.  He had an inferior wall defect consistent with attenuation. He was admitted in 3/18 with weakness, rhabdomyalysis, pneumonia and dysphagia. He had a PEG placed and tube feed. This was subsequently removed as an outpatient. He has regained some strength.    Since last visit he is exercising about 2 times a week walking around his home.  He denies any chest pain, palpitations, syncope near syncope.  He occasionally has mild dependent ankle edema.    Past Medical History:   Diagnosis Date   • AF (atrial fibrillation) (CMS/HCC)    • Anticoagulated    • Aspiration pneumonia (CMS/HCC)    • Atrial flutter (CMS/HCC)    • Atypical chest pain    • Chest pain    • Colon polyps    • Confusion    • Depression    • Disease of thyroid gland    • Disorder of thyroid    • Dysarthria    • Dysphagia    • Dyspnea and respiratory abnormalities    • Elevated TSH    • Fall    • Fatigue    • Gait instability    • Hay fever    • Hyperlipidemia    • Hypertension    • Hypothyroidism    • Idiopathic Parkinson's disease (CMS/HCC)    • Insomnia    • Measles    • Metabolic encephalopathy    • Mumps    •  Non-traumatic rhabdomyolysis    • CORINNE (obstructive sleep apnea)    • Palpitations    • Parkinson disease (CMS/HCC)    • Peripheral neuropathy    • Pneumonia    • Poor sleep pattern    • Progressive supranuclear palsy (CMS/HCC)    • Sepsis (CMS/HCC)    • Slurred speech    • Tremor    • Weakness of voice      Past Surgical History:   Procedure Laterality Date   • ENDOSCOPY W/ PEG TUBE PLACEMENT N/A 3/16/2018    Procedure: ESOPHAGOGASTRODUODENOSCOPY WITH PERCUTANEOUS ENDOSCOPIC GASTROSTOMY TUBE INSERTION;  Surgeon: Lopez Vang Jr., MD;  Location: [facility] ENDOSCOPY;  Service: Gastroenterology     Outpatient Medications Prior to Visit   Medication Sig Dispense Refill   • bumetanide (BUMEX) 0.5 MG tablet Take 1 tablet by mouth Daily As Needed (edema). 90 tablet 0   • carbidopa-levodopa ER (SINEMET CR)  MG per tablet TAKE 2 1/2 TABLETS  (3) THREE TIMES A DAY. 675 tablet 0   • carvedilol (COREG) 6.25 MG tablet Take 1.5 tablets by mouth 2 (Two) Times a Day With Meals. 270 tablet 3   • doxazosin (CARDURA) 2 MG tablet TAKE ONE TABLET BY MOUTH EVERY NIGHT AT BEDTIME --NEED APPOINTMENT 30 tablet 0   • levothyroxine (SYNTHROID, LEVOTHROID) 125 MCG tablet Take 1 tablet by mouth Daily. 90 tablet 0   • midodrine (PROAMATINE) 2.5 MG tablet Take 1 tablet by mouth Daily. 90 tablet 0   • Multiple Vitamins-Minerals (MULTIVITAL) tablet Take 1 tablet by mouth Daily. 90 tablet 0   • rivaroxaban (XARELTO) 20 MG tablet Take 1 tablet by mouth Daily With Dinner. 90 tablet 0   • polyethylene glycol (MIRALAX) packet Take 17 g by mouth Daily.     • sulfamethoxazole-trimethoprim (BACTRIM DS,SEPTRA DS) 800-160 MG per tablet Take 1 tablet by mouth 2 (Two) Times a Day. 20 tablet 0     No facility-administered medications prior to visit.        Allergies as of 01/15/2019   • (No Known Allergies)     Social History     Socioeconomic History   • Marital status:      Spouse name: Not on file   • Number of children: Not on file   • Years of  education: Not on file   • Highest education level: Not on file   Social Needs   • Financial resource strain: Not on file   • Food insecurity - worry: Not on file   • Food insecurity - inability: Not on file   • Transportation needs - medical: Not on file   • Transportation needs - non-medical: Not on file   Occupational History   • Not on file   Tobacco Use   • Smoking status: Former Smoker   • Smokeless tobacco: Never Used   • Tobacco comment: Daily caffeine use   Substance and Sexual Activity   • Alcohol use: No     Frequency: Never   • Drug use: No   • Sexual activity: No   Other Topics Concern   • Not on file   Social History Narrative   • Not on file     Family History   Problem Relation Age of Onset   • Hypertension Mother    • Glaucoma Mother    • Throat cancer Father    • Alcohol abuse Father    • Hypertension Sister    • Cancer Sister         malignant neoplasm of urinary bladder   • Lung cancer Brother    • Heart attack Other    • Lung disease Other      Review of Systems   Constitution: Negative for fever, malaise/fatigue, weight gain and weight loss.   HENT: Negative for ear pain, hearing loss, nosebleeds and sore throat.    Eyes: Negative for double vision, pain, vision loss in left eye and vision loss in right eye.   Cardiovascular: Positive for leg swelling.        See history of present illness.   Respiratory: Negative for cough, shortness of breath, sleep disturbances due to breathing, snoring and wheezing.    Endocrine: Negative for cold intolerance, heat intolerance and polyuria.   Skin: Negative for itching, poor wound healing and rash.   Musculoskeletal: Negative for joint pain, joint swelling and myalgias.   Gastrointestinal: Negative for abdominal pain, diarrhea, hematochezia, nausea and vomiting.   Genitourinary: Negative for hematuria and hesitancy.   Neurological: Negative for numbness, paresthesias and seizures.   Psychiatric/Behavioral: Negative for depression. The patient is not  "nervous/anxious.         Objective:     Vitals:    01/15/19 1259   BP: 120/70   Pulse: 86   Weight: 91.6 kg (202 lb)   Height: 182.9 cm (72\")     Body mass index is 27.4 kg/m².    Physical Exam   Constitutional: He is oriented to person, place, and time. He appears well-developed and well-nourished.   HENT:   Head: Normocephalic.   Nose: Nose normal.   Mouth/Throat: Oropharynx is clear and moist.   Eyes: Conjunctivae and EOM are normal. Pupils are equal, round, and reactive to light. Right eye exhibits no discharge. No scleral icterus.   Neck: Normal range of motion. Neck supple. No JVD present. No thyromegaly present.   Cardiovascular: Normal rate, regular rhythm, normal heart sounds and intact distal pulses. Exam reveals no gallop and no friction rub.   No murmur heard.  Pulses:       Carotid pulses are 2+ on the right side, and 2+ on the left side.       Radial pulses are 2+ on the right side, and 2+ on the left side.        Femoral pulses are 2+ on the right side, and 2+ on the left side.       Popliteal pulses are 2+ on the right side, and 2+ on the left side.        Dorsalis pedis pulses are 2+ on the right side, and 2+ on the left side.        Posterior tibial pulses are 2+ on the right side, and 2+ on the left side.   Trace bilateral edema   Pulmonary/Chest: Effort normal and breath sounds normal. No respiratory distress. He has no wheezes. He has no rales.   Abdominal: Soft. Bowel sounds are normal. He exhibits no distension. There is no hepatosplenomegaly. There is no tenderness. There is no rebound.   Musculoskeletal: Normal range of motion. He exhibits edema. He exhibits no tenderness.   Neurological: He is alert and oriented to person, place, and time.   Skin: Skin is warm and dry. No rash noted. No erythema.   Psychiatric: He has a normal mood and affect. His behavior is normal. Judgment and thought content normal.   Vitals reviewed.    Lab Review:     ECG 12 Lead  Date/Time: 1/15/2019 1:00 " PM  Performed by: Laura Bennett MD  Authorized by: Laura Bennett MD   Comparison: compared with previous ECG   Similar to previous ECG  Rhythm: atrial flutter  Ectopy: PVCs  QRS axis: left  Clinical impression: abnormal ECG          Assessment:       Diagnosis Plan   1. Typical atrial flutter (CMS/HCC)  ECG 12 Lead   2. Chronic atrial fibrillation (CMS/HCC)     3. Essential hypertension     4. CORINNE on CPAP     5. High risk medication use       Plan:       1.  History of TIA.  Clinically stable on Xarelto.  2.  Falls with syncope and near syncope.  No recent falls  2.  Atrial flutter/fibrillation.  On Xarelto therapy. ASA stopped with rectal bleeding in the past  3.  Xarelto therapy, tolerating well  4.  History of rectal bleeding. No recurrance  5.  Hypertension.  Controlled  6.  Pulmonary nodule, by Dr. Onofre  7.  Sleep apnea, untreated    Atrial Fibrillation and Atrial Flutter  Assessment  • The patient has persistent atrial flutter  • The patient's CHADS2-VASc score is 2  • A [id number] score of 2 or more is considered a high risk for a thromboembolic event  • Warfarin not prescribed  • Dabigatran not prescribed  • Rivaroxaban prescribed  • Apixaban not prescribed  • Aspirin not prescribed    Plan  • Continue in atrial fibrillation with rate control  • Add rivaroxaban for antithrombotic therapy  • Continue beta blocker for rate control         Your medication list           Accurate as of 1/15/19 11:59 PM. If you have any questions, ask your nurse or doctor.               CONTINUE taking these medications      Instructions Last Dose Given Next Dose Due   bumetanide 0.5 MG tablet  Commonly known as:  BUMEX      Take 1 tablet by mouth Daily As Needed (edema).       carbidopa-levodopa ER  MG per tablet  Commonly known as:  SINEMET CR      TAKE 2 1/2 TABLETS  (3) THREE TIMES A DAY.       carvedilol 6.25 MG tablet  Commonly known as:  COREG      Take 1.5 tablets by mouth 2 (Two) Times a Day With Meals.        doxazosin 2 MG tablet  Commonly known as:  CARDURA      TAKE ONE TABLET BY MOUTH EVERY NIGHT AT BEDTIME --NEED APPOINTMENT       levothyroxine 125 MCG tablet  Commonly known as:  SYNTHROID, LEVOTHROID      Take 1 tablet by mouth Daily.       midodrine 2.5 MG tablet  Commonly known as:  PROAMATINE      Take 1 tablet by mouth Daily.       MULTIVITAL tablet      Take 1 tablet by mouth Daily.       rivaroxaban 20 MG tablet  Commonly known as:  XARELTO      Take 1 tablet by mouth Daily With Dinner.          STOP taking these medications    polyethylene glycol packet  Commonly known as:  MIRALAX  Stopped by:  Laura Bennett MD        sulfamethoxazole-trimethoprim 800-160 MG per tablet  Commonly known as:  BACTRIM DS,SEPTRA DS  Stopped by:  Laura Bennett MD               Patient is no longer taking bactrim, miralax  I corrected the med list to reflect this.  I did not stop these medications.    Dictated utilizing Dragon dictation

## 2019-01-16 ENCOUNTER — TELEPHONE (OUTPATIENT)
Dept: CARDIOLOGY | Facility: CLINIC | Age: 74
End: 2019-01-16

## 2019-01-16 NOTE — TELEPHONE ENCOUNTER
Idania with HH calls and reports that the Pt has not seen his PCP and the PCP will not agree to HH orders. She is requesting that you be the face to face provider, send the last office notes, demos and medication list to the below fax number    Are you ok with agreeing to HH for the Pt      Fax [phone]  Phone [phone]    This is a [facility] number fyi

## 2019-01-17 NOTE — TELEPHONE ENCOUNTER
Pt's daughter (Christianne) called to discuss us ordering HH.    Called her back LMM re: call to discuss.

## 2019-01-17 NOTE — TELEPHONE ENCOUNTER
I did not previously order home health on this patient and he does not need home health from a cardiac standpoint. 's contact Idania and PCP office to address further.. kriss

## 2019-01-18 DIAGNOSIS — R26.89 DECREASED MOBILITY: Primary | ICD-10-CM

## 2019-02-05 ENCOUNTER — OFFICE VISIT (OUTPATIENT)
Dept: FAMILY MEDICINE CLINIC | Facility: CLINIC | Age: 74
End: 2019-02-05

## 2019-02-05 VITALS
TEMPERATURE: 97.7 F | SYSTOLIC BLOOD PRESSURE: 140 MMHG | BODY MASS INDEX: 24.81 KG/M2 | RESPIRATION RATE: 16 BRPM | WEIGHT: 183.2 LBS | OXYGEN SATURATION: 98 % | DIASTOLIC BLOOD PRESSURE: 76 MMHG | HEART RATE: 80 BPM | HEIGHT: 72 IN

## 2019-02-05 DIAGNOSIS — Z79.01 ANTICOAGULATED: ICD-10-CM

## 2019-02-05 DIAGNOSIS — Z99.89 OSA ON CPAP: ICD-10-CM

## 2019-02-05 DIAGNOSIS — I10 ESSENTIAL HYPERTENSION: ICD-10-CM

## 2019-02-05 DIAGNOSIS — E78.5 HYPERLIPIDEMIA, UNSPECIFIED HYPERLIPIDEMIA TYPE: ICD-10-CM

## 2019-02-05 DIAGNOSIS — G47.33 OSA ON CPAP: ICD-10-CM

## 2019-02-05 DIAGNOSIS — I48.20 CHRONIC ATRIAL FIBRILLATION (HCC): ICD-10-CM

## 2019-02-05 DIAGNOSIS — G23.1 PROGRESSIVE SUPRANUCLEAR PALSY (HCC): ICD-10-CM
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
## 2019-02-05 DIAGNOSIS — E03.9 HYPOTHYROIDISM (ACQUIRED): Primary | ICD-10-CM

## 2019-02-05 DIAGNOSIS — Z79.899 HIGH RISK MEDICATION USE: ICD-10-CM

## 2019-02-05 DIAGNOSIS — G20 ATYPICAL PARKINSONISM (HCC): ICD-10-CM

## 2019-02-05 LAB
ALBUMIN SERPL-MCNC: 4 G/DL (ref 3.5–5.2)
ALBUMIN/GLOB SERPL: 1.3 G/DL
ALP SERPL-CCNC: 62 U/L (ref 39–117)
ALT SERPL-CCNC: 10 U/L (ref 1–41)
AST SERPL-CCNC: 23 U/L (ref 1–40)
BASOPHILS # BLD AUTO: 0.03 10*3/MM3 (ref 0–0.2)
BASOPHILS NFR BLD AUTO: 0.4 % (ref 0–1.5)
BILIRUB SERPL-MCNC: 0.4 MG/DL (ref 0.1–1.2)
BUN SERPL-MCNC: 12 MG/DL (ref 8–23)
BUN/CREAT SERPL: 12.9 (ref 7–25)
CALCIUM SERPL-MCNC: 9.4 MG/DL (ref 8.6–10.5)
CHLORIDE SERPL-SCNC: 98 MMOL/L (ref 98–107)
CO2 SERPL-SCNC: 27.9 MMOL/L (ref 22–29)
CREAT SERPL-MCNC: 0.93 MG/DL (ref 0.76–1.27)
EOSINOPHIL # BLD AUTO: 0.11 10*3/MM3 (ref 0–0.7)
EOSINOPHIL NFR BLD AUTO: 1.5 % (ref 0.3–6.2)
ERYTHROCYTE [DISTWIDTH] IN BLOOD BY AUTOMATED COUNT: 13.9 % (ref 11.5–14.5)
GLOBULIN SER CALC-MCNC: 3.1 GM/DL
GLUCOSE SERPL-MCNC: 60 MG/DL (ref 65–99)
HCT VFR BLD AUTO: 41.6 % (ref 40.4–52.2)
HGB BLD-MCNC: 12.8 G/DL (ref 13.7–17.6)
IMM GRANULOCYTES # BLD AUTO: 0.02 10*3/MM3 (ref 0–0.03)
IMM GRANULOCYTES NFR BLD AUTO: 0.3 % (ref 0–0.5)
LYMPHOCYTES # BLD AUTO: 2.03 10*3/MM3 (ref 0.9–4.8)
LYMPHOCYTES NFR BLD AUTO: 28.6 % (ref 19.6–45.3)
MCH RBC QN AUTO: 30.9 PG (ref 27–32.7)
MCHC RBC AUTO-ENTMCNC: 30.8 G/DL (ref 32.6–36.4)
MCV RBC AUTO: 100.5 FL (ref 79.8–96.2)
MONOCYTES # BLD AUTO: 0.44 10*3/MM3 (ref 0.2–1.2)
MONOCYTES NFR BLD AUTO: 6.2 % (ref 5–12)
NEUTROPHILS # BLD AUTO: 4.48 10*3/MM3 (ref 1.9–8.1)
NEUTROPHILS NFR BLD AUTO: 63 % (ref 42.7–76)
PLATELET # BLD AUTO: 291 10*3/MM3 (ref 140–500)
POTASSIUM SERPL-SCNC: 4.7 MMOL/L (ref 3.5–5.2)
PROT SERPL-MCNC: 7.1 G/DL (ref 6–8.5)
RBC # BLD AUTO: 4.14 10*6/MM3 (ref 4.6–6)
SODIUM SERPL-SCNC: 141 MMOL/L (ref 136–145)
T4 FREE SERPL-MCNC: 1.61 NG/DL (ref 0.93–1.7)
TSH SERPL DL<=0.005 MIU/L-ACNC: 1.12 MIU/ML (ref 0.27–4.2)
WBC # BLD AUTO: 7.11 10*3/MM3 (ref 4.5–10.7)

## 2019-02-05 PROCEDURE — 99214 OFFICE O/P EST MOD 30 MIN: CPT | Performed by: FAMILY MEDICINE

## 2019-02-05 RX ORDER — DOXAZOSIN 2 MG/1
TABLET ORAL
Qty: 90 TABLET | Refills: 3 | Status: SHIPPED | OUTPATIENT
Start: 2019-02-05 | End: 2019-02-05 | Stop reason: SDUPTHER

## 2019-02-05 RX ORDER — DOXAZOSIN 2 MG/1
TABLET ORAL
Qty: 90 TABLET | Refills: 3 | Status: SHIPPED | OUTPATIENT
Start: 2019-02-05 | End: 2019-02-26 | Stop reason: SDUPTHER
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
## 2019-02-19 ENCOUNTER — TELEPHONE (OUTPATIENT)
Dept: FAMILY MEDICINE CLINIC | Facility: CLINIC | Age: 74
End: 2019-02-19

## 2019-02-19 DIAGNOSIS — G23.1 PROGRESSIVE SUPRANUCLEAR PALSY (HCC): Primary | ICD-10-CM

## 2019-02-19 DIAGNOSIS — R26.81 GAIT INSTABILITY: ICD-10-CM

## 2019-02-19 NOTE — TELEPHONE ENCOUNTER
a home health nurse would like an order for u step walker and send into [facility] in [facility]  #[phone]..    Fax #[phone].  Fax #[phone].    Thank you.

## 2019-02-26 RX ORDER — RIVAROXABAN 20 MG/1
TABLET, FILM COATED ORAL
Qty: 90 TABLET | Refills: 3 | Status: SHIPPED | OUTPATIENT
Start: 2019-02-26 | End: 2019-05-24 | Stop reason: SDUPTHER

## 2019-02-26 RX ORDER — CARVEDILOL 6.25 MG/1
9.38 TABLET ORAL 2 TIMES DAILY WITH MEALS
Qty: 270 TABLET | Refills: 3 | Status: SHIPPED | OUTPATIENT
Start: 2019-02-26 | End: 2019-05-15 | Stop reason: SDUPTHER

## 2019-02-26 RX ORDER — CARBIDOPA AND LEVODOPA 25; 100 MG/1; MG/1
TABLET, EXTENDED RELEASE ORAL
Qty: 675 TABLET | Refills: 3 | Status: SHIPPED | OUTPATIENT
Start: 2019-02-26 | End: 2019-06-17

## 2019-02-26 RX ORDER — BUMETANIDE 0.5 MG/1
0.5 TABLET ORAL DAILY PRN
Qty: 90 TABLET | Refills: 3 | Status: SHIPPED | OUTPATIENT
Start: 2019-02-26 | End: 2020-02-24 | Stop reason: HOSPADM

## 2019-02-26 RX ORDER — LEVOTHYROXINE SODIUM 0.12 MG/1
125 TABLET ORAL DAILY
Qty: 90 TABLET | Refills: 3 | Status: SHIPPED | OUTPATIENT
Start: 2019-02-26 | End: 2022-01-01 | Stop reason: HOSPADM

## 2019-02-26 RX ORDER — DOXAZOSIN 2 MG/1
TABLET ORAL
Qty: 90 TABLET | Refills: 3 | Status: SHIPPED | OUTPATIENT
Start: 2019-02-26 | End: 2019-06-17

## 2019-02-26 RX ORDER — MIDODRINE HYDROCHLORIDE 2.5 MG/1
2.5 TABLET ORAL DAILY
Qty: 90 TABLET | Refills: 3 | Status: SHIPPED | OUTPATIENT
Start: 2019-02-26 | End: 2019-06-19 | Stop reason: HOSPADM

## 2019-05-15 ENCOUNTER — TELEPHONE (OUTPATIENT)
Dept: FAMILY MEDICINE CLINIC | Facility: CLINIC | Age: 74
End: 2019-05-15

## 2019-05-15 RX ORDER — CARVEDILOL 6.25 MG/1
6.25 TABLET ORAL 2 TIMES DAILY WITH MEALS
Qty: 270 TABLET | Refills: 3
Start: 2019-05-15 | End: 2020-02-24 | Stop reason: HOSPADM

## 2019-05-15 NOTE — TELEPHONE ENCOUNTER
Talked with nurse practitioner patient's home.  Heart rate is down in the 40s and currently taking Coreg 1-1/2 6.25 mg tablets twice a day.  Recommend decreasing to 1 tablet twice a day and monitor blood pressure and pulse

## 2019-05-24 ENCOUNTER — OFFICE VISIT (OUTPATIENT)
Dept: FAMILY MEDICINE CLINIC | Facility: CLINIC | Age: 74
End: 2019-05-24

## 2019-05-24 VITALS
BODY MASS INDEX: 26.66 KG/M2 | SYSTOLIC BLOOD PRESSURE: 146 MMHG | DIASTOLIC BLOOD PRESSURE: 72 MMHG | HEIGHT: 72 IN | OXYGEN SATURATION: 97 % | HEART RATE: 61 BPM | RESPIRATION RATE: 16 BRPM | TEMPERATURE: 97.7 F | WEIGHT: 196.8 LBS

## 2019-05-24 DIAGNOSIS — R30.0 DYSURIA: ICD-10-CM

## 2019-05-24 DIAGNOSIS — Z79.01 ANTICOAGULATED: ICD-10-CM

## 2019-05-24 DIAGNOSIS — I48.20 CHRONIC ATRIAL FIBRILLATION (HCC): ICD-10-CM

## 2019-05-24 DIAGNOSIS — D64.9 ANEMIA, UNSPECIFIED TYPE: ICD-10-CM

## 2019-05-24 DIAGNOSIS — Z79.899 HIGH RISK MEDICATION USE: ICD-10-CM

## 2019-05-24 DIAGNOSIS — G20 IDIOPATHIC PARKINSON'S DISEASE (HCC): Primary | ICD-10-CM

## 2019-05-24 PROBLEM — G45.9 TRANSIENT CEREBRAL ISCHEMIA: Status: ACTIVE | Noted: 2018-08-06

## 2019-05-24 LAB
BILIRUB BLD-MCNC: NEGATIVE MG/DL
CLARITY, POC: CLEAR
COLOR UR: YELLOW
GLUCOSE UR STRIP-MCNC: NEGATIVE MG/DL
KETONES UR QL: NEGATIVE
LEUKOCYTE EST, POC: NEGATIVE
NITRITE UR-MCNC: NEGATIVE MG/ML
PH UR: 7 [PH] (ref 5–8)
PROT UR STRIP-MCNC: NEGATIVE MG/DL
RBC # UR STRIP: NEGATIVE /UL
SP GR UR: 1.01 (ref 1–1.03)
UROBILINOGEN UR QL: NORMAL

## 2019-05-24 PROCEDURE — 99213 OFFICE O/P EST LOW 20 MIN: CPT | Performed by: FAMILY MEDICINE

## 2019-05-24 NOTE — PROGRESS NOTES
HPI  Rommel Gonzalez is a 73 y.o. male who is here for follow up of parkinsonism as well as other medical problems.  By home health and ever since decreased dose of carvedilol is doing much better.  Daughter has been monitoring blood pressures at home and mostly staying in the 140s over 70s.  With his parkinsonism there has been an issue with orthostatic hypotension and especially since taking Xarelto concerned with falls etc.  All of this was discussed.  Daughter is worried because of recent hospitalization for urinary tract infection and requesting recheck urinalysis.  Of note daughter has not given Bumex over the last several months.  As long as he uses recliner and able to keep feet up has not had significant swelling.      Review of Systems   All other systems reviewed and are negative.        Past Medical History:   Diagnosis Date   • AF (atrial fibrillation) (CMS/HCC)    • Anticoagulated    • Aspiration pneumonia (CMS/HCC)    • Atrial flutter (CMS/HCC)    • Atypical chest pain    • Chest pain    • Colon polyps    • Confusion    • Depression    • Disease of thyroid gland    • Disorder of thyroid    • Dysarthria    • Dysphagia    • Dyspnea and respiratory abnormalities    • Elevated TSH    • Fall    • Fatigue    • Gait instability    • Hay fever    • Hyperlipidemia    • Hypertension    • Hypothyroidism    • Idiopathic Parkinson's disease (CMS/HCC)    • Insomnia    • Measles    • Metabolic encephalopathy    • Mumps    • Non-traumatic rhabdomyolysis    • CORINNE (obstructive sleep apnea)    • Palpitations    • Parkinson disease (CMS/HCC)    • Peripheral neuropathy    • Pneumonia    • Poor sleep pattern    • Progressive supranuclear palsy (CMS/HCC)    • Sepsis (CMS/HCC)    • Slurred speech    • Tremor    • Weakness of voice        Past Surgical History:   Procedure Laterality Date   • ENDOSCOPY W/ PEG TUBE PLACEMENT N/A 3/16/2018    Procedure: ESOPHAGOGASTRODUODENOSCOPY WITH PERCUTANEOUS ENDOSCOPIC GASTROSTOMY TUBE  INSERTION;  Surgeon: Lopez Vang Jr., MD;  Location: [facility] ENDOSCOPY;  Service: Gastroenterology       Family History   Problem Relation Age of Onset   • Hypertension Mother    • Glaucoma Mother    • Throat cancer Father    • Alcohol abuse Father    • Hypertension Sister    • Cancer Sister         malignant neoplasm of urinary bladder   • Lung cancer Brother    • Heart attack Other    • Lung disease Other        Social History     Socioeconomic History   • Marital status:      Spouse name: Not on file   • Number of children: Not on file   • Years of education: Not on file   • Highest education level: Not on file   Tobacco Use   • Smoking status: Former Smoker   • Smokeless tobacco: Never Used   • Tobacco comment: Daily caffeine use   Substance and Sexual Activity   • Alcohol use: No     Frequency: Never   • Drug use: No   • Sexual activity: No         Physical Exam   Constitutional: He appears well-developed and well-nourished.   HENT:   Head: Normocephalic.   Eyes: Conjunctivae are normal. Pupils are equal, round, and reactive to light.   Neck: Normal range of motion.   Cardiovascular: Normal rate and regular rhythm.   Pulmonary/Chest: Effort normal. No respiratory distress.   Musculoskeletal: Normal range of motion. He exhibits no edema or deformity.   Neurological: He is alert.   Flat facial expression as well as bradycardia kinesis all consistent with known Parkinson's   Skin: Skin is warm and dry.   Psychiatric: He has a normal mood and affect. His behavior is normal. Judgment and thought content normal.   Nursing note and vitals reviewed.        Assessment/Plan    Rommel was seen today for hypothyroidism and urinary tract infection.    Diagnoses and all orders for this visit:    Idiopathic Parkinson's disease (CMS/HCC)    High risk medication use  -     CBC & Differential    Anemia, unspecified type  -     CBC & Differential    Anticoagulated    Chronic atrial fibrillation (CMS/HCC)        Patient  here for routine follow-up of above-noted medical problems.  Reported history of atrial fibrillation though pulse seems very regular at this time.  Was having some bradycardia but has resolved with decreased dose of beta-blocker.  Overall seems to be doing extremely well on current regimen which will be continued including follow-up in 3 months.  Will recheck thyroid function etc. at that time.  Did have recent hospitalization for urinary tract infection, repeat urinalysis in the office today is totally negative.    This note includes information entered using a voice recognition dictation system.  Though reviewed, some nonsensible errors may remain.

## 2019-05-25 LAB
BASOPHILS # BLD AUTO: 0.04 10*3/MM3 (ref 0–0.2)
BASOPHILS NFR BLD AUTO: 0.6 % (ref 0–1.5)
EOSINOPHIL # BLD AUTO: 0.15 10*3/MM3 (ref 0–0.4)
EOSINOPHIL NFR BLD AUTO: 2.3 % (ref 0.3–6.2)
ERYTHROCYTE [DISTWIDTH] IN BLOOD BY AUTOMATED COUNT: 13.5 % (ref 12.3–15.4)
HCT VFR BLD AUTO: 41 % (ref 37.5–51)
HGB BLD-MCNC: 13.1 G/DL (ref 13–17.7)
IMM GRANULOCYTES # BLD AUTO: 0.02 10*3/MM3 (ref 0–0.05)
IMM GRANULOCYTES NFR BLD AUTO: 0.3 % (ref 0–0.5)
LYMPHOCYTES # BLD AUTO: 1.44 10*3/MM3 (ref 0.7–3.1)
LYMPHOCYTES NFR BLD AUTO: 21.9 % (ref 19.6–45.3)
MCH RBC QN AUTO: 30.9 PG (ref 26.6–33)
MCHC RBC AUTO-ENTMCNC: 32 G/DL (ref 31.5–35.7)
MCV RBC AUTO: 96.7 FL (ref 79–97)
MONOCYTES # BLD AUTO: 0.36 10*3/MM3 (ref 0.1–0.9)
MONOCYTES NFR BLD AUTO: 5.5 % (ref 5–12)
NEUTROPHILS # BLD AUTO: 4.58 10*3/MM3 (ref 1.7–7)
NEUTROPHILS NFR BLD AUTO: 69.4 % (ref 42.7–76)
NRBC BLD AUTO-RTO: 0.3 /100 WBC (ref 0–0.2)
PLATELET # BLD AUTO: 210 10*3/MM3 (ref 140–450)
RBC # BLD AUTO: 4.24 10*6/MM3 (ref 4.14–5.8)
WBC # BLD AUTO: 6.59 10*3/MM3 (ref 3.4–10.8)

## 2019-06-17 ENCOUNTER — APPOINTMENT (OUTPATIENT)
Dept: GENERAL RADIOLOGY | Facility: HOSPITAL | Age: 74
End: 2019-06-17

## 2019-06-17 ENCOUNTER — HOSPITAL ENCOUNTER (OUTPATIENT)
Facility: HOSPITAL | Age: 74
Setting detail: OBSERVATION
LOS: 1 days | Discharge: NURSING, REHAB OR LONG-TERM CARE | End: 2019-06-19
Attending: EMERGENCY MEDICINE | Admitting: HOSPITALIST

## 2019-06-17 ENCOUNTER — APPOINTMENT (OUTPATIENT)
Dept: CT IMAGING | Facility: HOSPITAL | Age: 74
End: 2019-06-17

## 2019-06-17 DIAGNOSIS — J18.9 PNEUMONIA OF BOTH LUNGS DUE TO INFECTIOUS ORGANISM, UNSPECIFIED PART OF LUNG: Primary | ICD-10-CM

## 2019-06-17 DIAGNOSIS — S22.43XA CLOSED FRACTURE OF MULTIPLE RIBS OF BOTH SIDES, INITIAL ENCOUNTER: ICD-10-CM

## 2019-06-17 DIAGNOSIS — S32.039A CLOSED FRACTURE OF THIRD LUMBAR VERTEBRA, UNSPECIFIED FRACTURE MORPHOLOGY, INITIAL ENCOUNTER (HCC): ICD-10-CM

## 2019-06-17 DIAGNOSIS — S32.010A CLOSED COMPRESSION FRACTURE OF FIRST LUMBAR VERTEBRA, INITIAL ENCOUNTER: ICD-10-CM

## 2019-06-17 DIAGNOSIS — R26.89 DECREASED MOBILITY: ICD-10-CM

## 2019-06-17 DIAGNOSIS — R53.1 GENERALIZED WEAKNESS: ICD-10-CM

## 2019-06-17 PROBLEM — S22.49XA CLOSED FRACTURE OF MULTIPLE RIBS: Status: ACTIVE | Noted: 2019-06-17

## 2019-06-17 PROBLEM — S32.009A CLOSED FRACTURE OF TRANSVERSE PROCESS OF LUMBAR VERTEBRA: Status: ACTIVE | Noted: 2019-06-17

## 2019-06-17 LAB
ALBUMIN SERPL-MCNC: 4 G/DL (ref 3.5–5.2)
ALBUMIN/GLOB SERPL: 1.5 G/DL
ALP SERPL-CCNC: 72 U/L (ref 39–117)
ALT SERPL W P-5'-P-CCNC: 5 U/L (ref 1–41)
ANION GAP SERPL CALCULATED.3IONS-SCNC: 9.1 MMOL/L
AST SERPL-CCNC: 16 U/L (ref 1–40)
BACTERIA UR QL AUTO: ABNORMAL /HPF
BASOPHILS # BLD AUTO: 0.03 10*3/MM3 (ref 0–0.2)
BASOPHILS NFR BLD AUTO: 0.4 % (ref 0–1.5)
BILIRUB SERPL-MCNC: 0.6 MG/DL (ref 0.2–1.2)
BILIRUB UR QL STRIP: NEGATIVE
BUN BLD-MCNC: 11 MG/DL (ref 8–23)
BUN/CREAT SERPL: 14.5 (ref 7–25)
CALCIUM SPEC-SCNC: 8.8 MG/DL (ref 8.6–10.5)
CHLORIDE SERPL-SCNC: 103 MMOL/L (ref 98–107)
CLARITY UR: CLEAR
CO2 SERPL-SCNC: 29.9 MMOL/L (ref 22–29)
COLOR UR: YELLOW
CREAT BLD-MCNC: 0.76 MG/DL (ref 0.76–1.27)
D-LACTATE SERPL-SCNC: 0.9 MMOL/L (ref 0.5–2)
DEPRECATED RDW RBC AUTO: 49.1 FL (ref 37–54)
EOSINOPHIL # BLD AUTO: 0.15 10*3/MM3 (ref 0–0.4)
EOSINOPHIL NFR BLD AUTO: 2 % (ref 0.3–6.2)
ERYTHROCYTE [DISTWIDTH] IN BLOOD BY AUTOMATED COUNT: 13.8 % (ref 12.3–15.4)
GFR SERPL CREATININE-BSD FRML MDRD: 100 ML/MIN/1.73
GLOBULIN UR ELPH-MCNC: 2.6 GM/DL
GLUCOSE BLD-MCNC: 99 MG/DL (ref 65–99)
GLUCOSE UR STRIP-MCNC: NEGATIVE MG/DL
HCT VFR BLD AUTO: 39.4 % (ref 37.5–51)
HGB BLD-MCNC: 12.5 G/DL (ref 13–17.7)
HGB UR QL STRIP.AUTO: ABNORMAL
HOLD SPECIMEN: NORMAL
HOLD SPECIMEN: NORMAL
HYALINE CASTS UR QL AUTO: ABNORMAL /LPF
IMM GRANULOCYTES # BLD AUTO: 0.02 10*3/MM3 (ref 0–0.05)
IMM GRANULOCYTES NFR BLD AUTO: 0.3 % (ref 0–0.5)
KETONES UR QL STRIP: NEGATIVE
LEUKOCYTE ESTERASE UR QL STRIP.AUTO: NEGATIVE
LYMPHOCYTES # BLD AUTO: 1.51 10*3/MM3 (ref 0.7–3.1)
LYMPHOCYTES NFR BLD AUTO: 19.9 % (ref 19.6–45.3)
MAGNESIUM SERPL-MCNC: 2.1 MG/DL (ref 1.6–2.4)
MCH RBC QN AUTO: 30.7 PG (ref 26.6–33)
MCHC RBC AUTO-ENTMCNC: 31.7 G/DL (ref 31.5–35.7)
MCV RBC AUTO: 96.8 FL (ref 79–97)
MONOCYTES # BLD AUTO: 0.58 10*3/MM3 (ref 0.1–0.9)
MONOCYTES NFR BLD AUTO: 7.6 % (ref 5–12)
NEUTROPHILS # BLD AUTO: 5.3 10*3/MM3 (ref 1.7–7)
NEUTROPHILS NFR BLD AUTO: 69.8 % (ref 42.7–76)
NITRITE UR QL STRIP: NEGATIVE
NRBC BLD AUTO-RTO: 0 /100 WBC (ref 0–0.2)
PH UR STRIP.AUTO: 7.5 [PH] (ref 5–8)
PLATELET # BLD AUTO: 253 10*3/MM3 (ref 140–450)
PMV BLD AUTO: 11.5 FL (ref 6–12)
POTASSIUM BLD-SCNC: 4.1 MMOL/L (ref 3.5–5.2)
PROCALCITONIN SERPL-MCNC: 0.04 NG/ML (ref 0.1–0.25)
PROT SERPL-MCNC: 6.6 G/DL (ref 6–8.5)
PROT UR QL STRIP: NEGATIVE
RBC # BLD AUTO: 4.07 10*6/MM3 (ref 4.14–5.8)
RBC # UR: ABNORMAL /HPF
REF LAB TEST METHOD: ABNORMAL
SODIUM BLD-SCNC: 142 MMOL/L (ref 136–145)
SP GR UR STRIP: 1.02 (ref 1–1.03)
SQUAMOUS #/AREA URNS HPF: ABNORMAL /HPF
TROPONIN T SERPL-MCNC: <0.01 NG/ML (ref 0–0.03)
UROBILINOGEN UR QL STRIP: ABNORMAL
WBC NRBC COR # BLD: 7.59 10*3/MM3 (ref 3.4–10.8)
WBC UR QL AUTO: ABNORMAL /HPF
WHOLE BLOOD HOLD SPECIMEN: NORMAL
WHOLE BLOOD HOLD SPECIMEN: NORMAL

## 2019-06-17 PROCEDURE — 84145 PROCALCITONIN (PCT): CPT | Performed by: EMERGENCY MEDICINE

## 2019-06-17 PROCEDURE — G0378 HOSPITAL OBSERVATION PER HR: HCPCS

## 2019-06-17 PROCEDURE — 83735 ASSAY OF MAGNESIUM: CPT | Performed by: EMERGENCY MEDICINE

## 2019-06-17 PROCEDURE — 99285 EMERGENCY DEPT VISIT HI MDM: CPT

## 2019-06-17 PROCEDURE — 70450 CT HEAD/BRAIN W/O DYE: CPT

## 2019-06-17 PROCEDURE — 85025 COMPLETE CBC W/AUTO DIFF WBC: CPT | Performed by: EMERGENCY MEDICINE

## 2019-06-17 PROCEDURE — 93010 ELECTROCARDIOGRAM REPORT: CPT | Performed by: INTERNAL MEDICINE

## 2019-06-17 PROCEDURE — 96361 HYDRATE IV INFUSION ADD-ON: CPT

## 2019-06-17 PROCEDURE — 74176 CT ABD & PELVIS W/O CONTRAST: CPT

## 2019-06-17 PROCEDURE — P9612 CATHETERIZE FOR URINE SPEC: HCPCS

## 2019-06-17 PROCEDURE — 83605 ASSAY OF LACTIC ACID: CPT | Performed by: EMERGENCY MEDICINE

## 2019-06-17 PROCEDURE — 87147 CULTURE TYPE IMMUNOLOGIC: CPT | Performed by: EMERGENCY MEDICINE

## 2019-06-17 PROCEDURE — 71045 X-RAY EXAM CHEST 1 VIEW: CPT

## 2019-06-17 PROCEDURE — 81001 URINALYSIS AUTO W/SCOPE: CPT | Performed by: EMERGENCY MEDICINE

## 2019-06-17 PROCEDURE — 93005 ELECTROCARDIOGRAM TRACING: CPT | Performed by: EMERGENCY MEDICINE

## 2019-06-17 PROCEDURE — 80053 COMPREHEN METABOLIC PANEL: CPT | Performed by: EMERGENCY MEDICINE

## 2019-06-17 PROCEDURE — 25010000002 PIPERACILLIN SOD-TAZOBACTAM PER 1 G: Performed by: EMERGENCY MEDICINE

## 2019-06-17 PROCEDURE — 87150 DNA/RNA AMPLIFIED PROBE: CPT | Performed by: EMERGENCY MEDICINE

## 2019-06-17 PROCEDURE — 84484 ASSAY OF TROPONIN QUANT: CPT | Performed by: EMERGENCY MEDICINE

## 2019-06-17 PROCEDURE — 96365 THER/PROPH/DIAG IV INF INIT: CPT

## 2019-06-17 PROCEDURE — 25010000002 PIPERACILLIN SOD-TAZOBACTAM PER 1 G: Performed by: INTERNAL MEDICINE

## 2019-06-17 PROCEDURE — 87040 BLOOD CULTURE FOR BACTERIA: CPT | Performed by: EMERGENCY MEDICINE

## 2019-06-17 RX ORDER — NITROGLYCERIN 0.4 MG/1
0.4 TABLET SUBLINGUAL
Status: DISCONTINUED | OUTPATIENT
Start: 2019-06-17 | End: 2019-06-19 | Stop reason: HOSPADM

## 2019-06-17 RX ORDER — HYDROCODONE BITARTRATE AND ACETAMINOPHEN 5; 325 MG/1; MG/1
1 TABLET ORAL EVERY 6 HOURS PRN
Status: DISCONTINUED | OUTPATIENT
Start: 2019-06-17 | End: 2019-06-19 | Stop reason: HOSPADM

## 2019-06-17 RX ORDER — CARBIDOPA AND LEVODOPA 25; 100 MG/1; MG/1
3 TABLET, EXTENDED RELEASE ORAL 3 TIMES DAILY
Status: DISCONTINUED | OUTPATIENT
Start: 2019-06-17 | End: 2019-06-19 | Stop reason: HOSPADM

## 2019-06-17 RX ORDER — TERAZOSIN 2 MG/1
2 CAPSULE ORAL NIGHTLY
Status: DISCONTINUED | OUTPATIENT
Start: 2019-06-17 | End: 2019-06-19 | Stop reason: HOSPADM

## 2019-06-17 RX ORDER — DOXAZOSIN 2 MG/1
2 TABLET ORAL NIGHTLY
COMMUNITY
End: 2020-10-11

## 2019-06-17 RX ORDER — MORPHINE SULFATE 2 MG/ML
1 INJECTION, SOLUTION INTRAMUSCULAR; INTRAVENOUS EVERY 4 HOURS PRN
Status: DISCONTINUED | OUTPATIENT
Start: 2019-06-17 | End: 2019-06-19 | Stop reason: HOSPADM

## 2019-06-17 RX ORDER — CARVEDILOL 6.25 MG/1
6.25 TABLET ORAL 2 TIMES DAILY WITH MEALS
Status: DISCONTINUED | OUTPATIENT
Start: 2019-06-17 | End: 2019-06-19 | Stop reason: HOSPADM

## 2019-06-17 RX ORDER — SODIUM CHLORIDE 0.9 % (FLUSH) 0.9 %
3 SYRINGE (ML) INJECTION EVERY 12 HOURS SCHEDULED
Status: DISCONTINUED | OUTPATIENT
Start: 2019-06-17 | End: 2019-06-19 | Stop reason: HOSPADM

## 2019-06-17 RX ORDER — SODIUM CHLORIDE 9 MG/ML
100 INJECTION, SOLUTION INTRAVENOUS CONTINUOUS
Status: DISCONTINUED | OUTPATIENT
Start: 2019-06-17 | End: 2019-06-19 | Stop reason: HOSPADM

## 2019-06-17 RX ORDER — SODIUM CHLORIDE 0.9 % (FLUSH) 0.9 %
3-10 SYRINGE (ML) INJECTION AS NEEDED
Status: DISCONTINUED | OUTPATIENT
Start: 2019-06-17 | End: 2019-06-19 | Stop reason: HOSPADM

## 2019-06-17 RX ORDER — SODIUM CHLORIDE 9 MG/ML
125 INJECTION, SOLUTION INTRAVENOUS CONTINUOUS
Status: DISCONTINUED | OUTPATIENT
Start: 2019-06-17 | End: 2019-06-18

## 2019-06-17 RX ORDER — FOLIC ACID/MV,IRON,MIN/LUTEIN 0.4-18-25
1 TABLET ORAL DAILY
COMMUNITY
End: 2020-02-24 | Stop reason: HOSPADM

## 2019-06-17 RX ORDER — ONDANSETRON 2 MG/ML
4 INJECTION INTRAMUSCULAR; INTRAVENOUS EVERY 6 HOURS PRN
Status: DISCONTINUED | OUTPATIENT
Start: 2019-06-17 | End: 2019-06-19 | Stop reason: HOSPADM

## 2019-06-17 RX ORDER — MIDODRINE HYDROCHLORIDE 2.5 MG/1
2.5 TABLET ORAL DAILY
Status: DISCONTINUED | OUTPATIENT
Start: 2019-06-18 | End: 2019-06-19 | Stop reason: HOSPADM

## 2019-06-17 RX ORDER — CARBIDOPA AND LEVODOPA 25; 100 MG/1; MG/1
2.5 TABLET, EXTENDED RELEASE ORAL 3 TIMES DAILY
COMMUNITY
End: 2019-08-23

## 2019-06-17 RX ORDER — ACETAMINOPHEN 325 MG/1
650 TABLET ORAL EVERY 4 HOURS PRN
Status: DISCONTINUED | OUTPATIENT
Start: 2019-06-17 | End: 2019-06-19 | Stop reason: HOSPADM

## 2019-06-17 RX ORDER — LEVOTHYROXINE SODIUM 0.12 MG/1
125 TABLET ORAL
Status: DISCONTINUED | OUTPATIENT
Start: 2019-06-18 | End: 2019-06-19 | Stop reason: HOSPADM

## 2019-06-17 RX ORDER — SODIUM CHLORIDE 0.9 % (FLUSH) 0.9 %
10 SYRINGE (ML) INJECTION AS NEEDED
Status: DISCONTINUED | OUTPATIENT
Start: 2019-06-17 | End: 2019-06-19 | Stop reason: HOSPADM

## 2019-06-17 RX ORDER — BUMETANIDE 0.5 MG/1
0.5 TABLET ORAL DAILY PRN
Status: DISCONTINUED | OUTPATIENT
Start: 2019-06-17 | End: 2019-06-19 | Stop reason: HOSPADM

## 2019-06-17 RX ORDER — NALOXONE HCL 0.4 MG/ML
0.4 VIAL (ML) INJECTION
Status: DISCONTINUED | OUTPATIENT
Start: 2019-06-17 | End: 2019-06-19 | Stop reason: HOSPADM

## 2019-06-17 RX ADMIN — TERAZOSIN HYDROCHLORIDE 2 MG: 2 CAPSULE ORAL at 21:03

## 2019-06-17 RX ADMIN — SODIUM CHLORIDE, PRESERVATIVE FREE 3 ML: 5 INJECTION INTRAVENOUS at 21:02

## 2019-06-17 RX ADMIN — SODIUM CHLORIDE 125 ML/HR: 9 INJECTION, SOLUTION INTRAVENOUS at 12:12

## 2019-06-17 RX ADMIN — CARBIDOPA AND LEVODOPA 3 TABLET: 25; 100 TABLET, EXTENDED RELEASE ORAL at 21:04

## 2019-06-17 RX ADMIN — TAZOBACTAM SODIUM AND PIPERACILLIN SODIUM 3.38 G: 375; 3 INJECTION, SOLUTION INTRAVENOUS at 14:02

## 2019-06-17 RX ADMIN — TAZOBACTAM SODIUM AND PIPERACILLIN SODIUM 3.38 G: 375; 3 INJECTION, SOLUTION INTRAVENOUS at 21:03

## 2019-06-17 RX ADMIN — RIVAROXABAN 20 MG: 20 TABLET, FILM COATED ORAL at 21:03

## 2019-06-17 RX ADMIN — SODIUM CHLORIDE 100 ML/HR: 9 INJECTION, SOLUTION INTRAVENOUS at 21:02

## 2019-06-17 RX ADMIN — CARVEDILOL 6.25 MG: 6.25 TABLET, FILM COATED ORAL at 21:04

## 2019-06-17 NOTE — PROGRESS NOTES
Clinical Pharmacy Services: Medication History    Rommel Gonzalez is a 73 y.o. male presenting to [facility] for   Chief Complaint   Patient presents with   • Fall       He  has a past medical history of AF (atrial fibrillation) (CMS/HCC), Anticoagulated, Aspiration pneumonia (CMS/HCC), Atrial flutter (CMS/HCC), Atypical chest pain, Chest pain, Colon polyps, Confusion, Depression, Disease of thyroid gland, Disorder of thyroid, Dysarthria, Dysphagia, Dyspnea and respiratory abnormalities, Elevated TSH, Fall, Fatigue, Gait instability, Hay fever, Hyperlipidemia, Hypertension, Hypothyroidism, Idiopathic Parkinson's disease (CMS/HCC), Insomnia, Measles, Metabolic encephalopathy, Mumps, Non-traumatic rhabdomyolysis, CORINNE (obstructive sleep apnea), Palpitations, Parkinson disease (CMS/HCC), Peripheral neuropathy, Pneumonia, Poor sleep pattern, Progressive supranuclear palsy (CMS/HCC), Sepsis (CMS/HCC), Slurred speech, Tremor, and Weakness of voice.    Allergies as of 06/17/2019   • (No Known Allergies)       Medication information was obtained from: Spouse, medication list  Pharmacy and Phone Number: [facility] [phone]    Prior to Admission Medications     Prescriptions Last Dose Informant Patient Reported? Taking?    bumetanide (BUMEX) 0.5 MG tablet  Spouse/Significant Other No Yes    Take 1 tablet by mouth Daily As Needed (edema).    carbidopa-levodopa ER (SINEMET CR)  MG per tablet 6/17/2019 Spouse/Significant Other Yes Yes    Take 2.5 tablets by mouth 3 (Three) Times a Day.    carvedilol (COREG) 6.25 MG tablet 6/17/2019 Spouse/Significant Other No Yes    Take 1 tablet by mouth 2 (Two) Times a Day With Meals.    doxazosin (CARDURA) 2 MG tablet 6/16/2019 Spouse/Significant Other Yes Yes    Take 2 mg by mouth Every Night.    levothyroxine (SYNTHROID, LEVOTHROID) 125 MCG tablet 6/17/2019 Spouse/Significant Other No Yes    Take 1 tablet by mouth Daily.    midodrine (PROAMATINE) 2.5 MG tablet 6/17/2019  Spouse/Significant Other No Yes    Take 1 tablet by mouth Daily.    Multiple Vitamins-Minerals (CERTAVITE/ANTIOXIDANTS) tablet 6/17/2019 Spouse/Significant Other Yes Yes    Take 1 tablet by mouth Daily.    rivaroxaban (XARELTO) 20 MG tablet 6/16/2019 Spouse/Significant Other No Yes    Take 1 tablet by mouth Daily With Dinner.            Medication notes: None    This medication list is complete to the best of my knowledge as of 6/17/2019    Please call if questions.    Gisell Goel, Medication History Technician  6/17/2019 4:46 PM

## 2019-06-17 NOTE — ED PROVIDER NOTES
EMERGENCY DEPARTMENT ENCOUNTER    CHIEF COMPLAINT  Chief Complaint: Multiple falls  History given by: Family  History limited by: PD  Room Number: N437/1  PMD: Luiz Onofre MD      HPI:  Pt is a 73 y.o. male, Hx of PD, who presents to the ED for multiple falls past 2 weeks. Per family, the pt has had generalized weakness for several months that has progressively worseneded. Per family, the pt has fallen 4 times in the past couple of weeks and is now unable to walk. Pt was seen at [facility] for one of the falls. Pt c/o abd pain and points to left upper abdomen. Pt lives with daughter. Pt normally ambulates with walker at home. Pt denies headache or chest pain. Per family, the pt had a UTI a month ago. Pt is on Xarelto for A-fib.  Patient is unaware of why he is in the emergency department.  He states he is here to get checked out.  Patient denies any complaint other than his abdominal pain.  Patient's history is very unreliable and history is obtained from family member.    Duration:  Couple of weeks  Onset: gradual  Timing: episodic  Location: n/a  Radiation: none  Quality: multiple falls  Intensity/Severity: moderate  Progression: unchanged  Associated Symptoms: abd pain  Aggravating Factors: none  Alleviating Factors: none    PAST MEDICAL HISTORY  Active Ambulatory Problems     Diagnosis Date Noted   • AF (atrial fibrillation) (CMS/[facility]) 03/28/2016   • Depression 03/28/2016   • Gait instability 03/28/2016   • Hypertension 03/28/2016   • Insomnia 03/28/2016   • Idiopathic Parkinson's disease (CMS/[facility]) 03/28/2016   • Peripheral neuropathy 03/28/2016   • Dysarthria 03/28/2016   • Atrial flutter (CMS/[facility]) 04/03/2016   • Anticoagulated 04/05/2016   • Health care maintenance 04/19/2016   • Hypothyroidism (acquired) 06/23/2016   • Weakness of voice 06/23/2016   • Abnormal chest CT 03/13/2017   • Facial droop 04/19/2017   • Hypersomnia  04/19/2017   • Hyperlipidemia 08/16/2017   • High risk medication use  08/16/2017   • CORINNE on CPAP 08/16/2017   • Obstructive sleep apnea, adult 10/26/2017   • Fall 03/07/2018   • Progressive supranuclear palsy (CMS/HCC) 03/07/2018   • Aspiration pneumonia (CMS/HCC) 03/12/2018   • Dysphagia 03/07/2018   • 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   • Constipation 01/09/2018   • Hyperreflexia 01/09/2018   • 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   • Transient cerebral ischemia 08/06/2018   • Pneumonia of both lungs due to infectious organism 06/17/2019     Resolved Ambulatory Problems     Diagnosis Date Noted   • Atypical chest pain 03/28/2016   • Chest pain 03/28/2016   • Colon polyp 03/28/2016   • Confusional state 03/28/2016   • Shortness of breath 03/28/2016   • Fatigue 03/28/2016   • Hyperlipidemia 03/28/2016   • Palpitations 03/28/2016   • Parkinson's disease (CMS/HCC) 03/28/2016   • Tremor 03/28/2016   • Hematuria, gross 04/12/2016   • Persistent cough for 3 weeks or longer 05/02/2016   • Near syncope 04/19/2017   • Non-traumatic rhabdomyolysis 03/07/2018   • Sepsis (CMS/HCC) 03/12/2018   • Metabolic encephalopathy 03/15/2018     Past Medical History:   Diagnosis Date   • AF (atrial fibrillation) (CMS/HCC)    • Anticoagulated    • Aspiration pneumonia (CMS/HCC)    • Atrial flutter (CMS/HCC)    • Atypical chest pain    • Chest pain    • Colon polyps    • Confusion    • Depression    • Disease of thyroid gland    • Disorder of thyroid    • Dysarthria    • Dysphagia    • Dyspnea and respiratory abnormalities    • Elevated TSH    • Fall    • Fatigue    • Gait instability    • Hay fever    • Hyperlipidemia    • Hypertension    • Hypothyroidism    • Idiopathic Parkinson's disease (CMS/HCC)    • Insomnia    • Measles    • Metabolic encephalopathy    • Mumps    • Non-traumatic rhabdomyolysis    • CORINNE (obstructive sleep apnea)    • Palpitations    • Parkinson disease (CMS/HCC)    • Peripheral neuropathy    • Pneumonia    • Poor sleep pattern    • Progressive supranuclear palsy (CMS/HCC)    • Sepsis  (CMS/HCC)    • Slurred speech    • Tremor    • Weakness of voice        PAST SURGICAL HISTORY  Past Surgical History:   Procedure Laterality Date   • ENDOSCOPY W/ PEG TUBE PLACEMENT N/A 3/16/2018    Procedure: ESOPHAGOGASTRODUODENOSCOPY WITH PERCUTANEOUS ENDOSCOPIC GASTROSTOMY TUBE INSERTION;  Surgeon: Lopez Vang Jr., MD;  Location: [facility] ENDOSCOPY;  Service: Gastroenterology       FAMILY HISTORY  Family History   Problem Relation Age of Onset   • Hypertension Mother    • Glaucoma Mother    • Throat cancer Father    • Alcohol abuse Father    • Hypertension Sister    • Cancer Sister         malignant neoplasm of urinary bladder   • Lung cancer Brother    • Heart attack Other    • Lung disease Other        SOCIAL HISTORY  Social History     Socioeconomic History   • Marital status:      Spouse name: Not on file   • Number of children: Not on file   • Years of education: Not on file   • Highest education level: Not on file   Tobacco Use   • Smoking status: Former Smoker   • Smokeless tobacco: Never Used   • Tobacco comment: Daily caffeine use   Substance and Sexual Activity   • Alcohol use: No     Frequency: Never   • Drug use: No   • Sexual activity: No       ALLERGIES  Patient has no known allergies.    REVIEW OF SYSTEMS  Review of Systems   Unable to perform ROS: Other (PD)       PHYSICAL EXAM  ED Triage Vitals [06/17/19 1034]   Temp Heart Rate Resp BP SpO2   97.9 °F (36.6 °C) 52 18 150/75 94 %      Temp src Heart Rate Source Patient Position BP Location FiO2 (%)   Tympanic -- -- -- --       Physical Exam   Constitutional: No distress.   Pt has a blank face.    HENT:   Head: Normocephalic and atraumatic.   Mouth/Throat: Oropharynx is clear and moist. No oropharyngeal exudate.   Eyes: EOM are normal. Pupils are equal, round, and reactive to light.   Neck: Normal range of motion. Neck supple.   Cardiovascular: Normal rate, regular rhythm, normal heart sounds and intact distal pulses.   Pulmonary/Chest:  Effort normal. No respiratory distress. He has no wheezes. He has rales (Patient has mild rales in the bases bilaterally's.).   Abdominal: Soft. There is tenderness (She complains of some tenderness it is left costal margin with palpation.  There is normal inspection.  Abdomen is soft.). There is no rebound and no guarding.   Normal abd inspection. I see no sings of trauma to abdomen.    Genitourinary: Testes/scrotum normal and penis normal.   Musculoskeletal: Normal range of motion. He exhibits no edema.   Normal back inspection. No pain to C-,T-, or L-spine   Neurological: He is alert. He has intact cranial nerves.   Disoriented to month and year. Tremors to upper extremities. Diffusely weak. No focal weakness.    Skin: Skin is warm and dry.   Nursing note and vitals reviewed.      LAB RESULTS  Lab Results (last 24 hours)     Procedure Component Value Units Date/Time    Comprehensive Metabolic Panel [[id number]]  (Abnormal) Collected:  06/17/19 1207    Specimen:  Blood Updated:  06/17/19 1324     Glucose 99 mg/dL      BUN 11 mg/dL      Creatinine 0.76 mg/dL      Sodium 142 mmol/L      Potassium 4.1 mmol/L      Chloride 103 mmol/L      CO2 29.9 mmol/L      Calcium 8.8 mg/dL      Total Protein 6.6 g/dL      Albumin 4.00 g/dL      ALT (SGPT) 5 U/L      AST (SGOT) 16 U/L      Alkaline Phosphatase 72 U/L      Total Bilirubin 0.6 mg/dL      eGFR Non African Amer 100 mL/min/1.73      Globulin 2.6 gm/dL      A/G Ratio 1.5 g/dL      BUN/Creatinine Ratio 14.5     Anion Gap 9.1 mmol/L     Narrative:       GFR Normal >60  Chronic Kidney Disease <60  Kidney Failure <15    Troponin [[id number]]  (Normal) Collected:  06/17/19 1207    Specimen:  Blood Updated:  06/17/19 1315     Troponin T <0.010 ng/mL     Narrative:       Troponin T Reference Range:  <= 0.03 ng/mL-   Negative for AMI  >0.03 ng/mL-     Abnormal for myocardial necrosis.  Clinicians would have to utilize clinical acumen, EKG, Troponin and serial changes to determine  "if it is an Acute Myocardial Infarction or myocardial injury due to an underlying chronic condition.     Magnesium [[id number]]  (Normal) Collected:  06/17/19 1207    Specimen:  Blood Updated:  06/17/19 1312     Magnesium 2.1 mg/dL     Procalcitonin [[id number]]  (Abnormal) Collected:  06/17/19 1207    Specimen:  Blood Updated:  06/17/19 1425     Procalcitonin 0.04 ng/mL     Narrative:       As a Marker for Sepsis (Non-Neonates):   1. <0.5 ng/mL represents a low risk of severe sepsis and/or septic shock.  1. >2 ng/mL represents a high risk of severe sepsis and/or septic shock.    As a Marker for Lower Respiratory Tract Infections that require antibiotic therapy:  PCT on Admission     Antibiotic Therapy             6-12 Hrs later  > 0.5                Strongly Recommended            >0.25 - <0.5         Recommended  0.1 - 0.25           Discouraged                   Remeasure/reassess PCT  <0.1                 Strongly Discouraged          Remeasure/reassess PCT      As 28 day mortality risk marker: \"Change in Procalcitonin Result\" (> 80 % or <=80 %) if Day 0 (or Day 1) and Day 4 values are available. Refer to http://www.[facility]-pct-calculator.com/   Change in PCT <=80 %   A decrease of PCT levels below or equal to 80 % defines a positive change in PCT test result representing a higher risk for 28-day all-cause mortality of patients diagnosed with severe sepsis or septic shock.  Change in PCT > 80 %   A decrease of PCT levels of more than 80 % defines a negative change in PCT result representing a lower risk for 28-day all-cause mortality of patients diagnosed with severe sepsis or septic shock.                Urinalysis With Microscopic If Indicated (No Culture) - Urine, Catheter [[id number]]  (Abnormal) Collected:  06/17/19 1210    Specimen:  Urine, Catheter Updated:  06/17/19 1233     Color, UA Yellow     Appearance, UA Clear     pH, UA 7.5     Specific Gravity, UA 1.018     Glucose, UA Negative     Ketones, UA " Negative     Bilirubin, UA Negative     Blood, UA Trace     Protein, UA Negative     Leuk Esterase, UA Negative     Nitrite, UA Negative     Urobilinogen, UA 1.0 E.U./dL    Urinalysis, Microscopic Only - Urine, Catheter [[id number]]  (Abnormal) Collected:  06/17/19 1210    Specimen:  Urine, Catheter Updated:  06/17/19 1233     RBC, UA 6-12 /HPF      WBC, UA 0-2 /HPF      Bacteria, UA None Seen /HPF      Squamous Epithelial Cells, UA 0-2 /HPF      Hyaline Casts, UA None Seen /LPF      Methodology Automated Microscopy    CBC & Differential [[id number]] Collected:  06/17/19 1211    Specimen:  Blood Updated:  06/17/19 1532    Narrative:       The following orders were created for panel order CBC & Differential.  Procedure                               Abnormality         Status                     ---------                               -----------         ------                     CBC Auto Differential[[id number]]        Abnormal            Final result                 Please view results for these tests on the individual orders.    CBC Auto Differential [[id number]]  (Abnormal) Collected:  06/17/19 1211    Specimen:  Blood Updated:  06/17/19 1532     WBC 7.59 10*3/mm3      RBC 4.07 10*6/mm3      Hemoglobin 12.5 g/dL      Hematocrit 39.4 %      MCV 96.8 fL      MCH 30.7 pg      MCHC 31.7 g/dL      RDW 13.8 %      RDW-SD 49.1 fl      MPV 11.5 fL      Platelets 253 10*3/mm3      Neutrophil % 69.8 %      Lymphocyte % 19.9 %      Monocyte % 7.6 %      Eosinophil % 2.0 %      Basophil % 0.4 %      Immature Grans % 0.3 %      Neutrophils, Absolute 5.30 10*3/mm3      Lymphocytes, Absolute 1.51 10*3/mm3      Monocytes, Absolute 0.58 10*3/mm3      Eosinophils, Absolute 0.15 10*3/mm3      Basophils, Absolute 0.03 10*3/mm3      Immature Grans, Absolute 0.02 10*3/mm3      nRBC 0.0 /100 WBC     Lactic Acid, Plasma [[id number]]  (Normal) Collected:  06/17/19 1401    Specimen:  Blood Updated:  06/17/19 1429     Lactate 0.9 mmol/L      Blood Culture - Blood, Arm, Left [[id number]] Collected:  06/17/19 1401    Specimen:  Blood from Arm, Left Updated:  06/17/19 1419    Blood Culture - Blood, Arm, Left [[id number]] Collected:  06/17/19 1401    Specimen:  Blood from Arm, Left Updated:  06/17/19 1410          I ordered the above labs and reviewed the results    RADIOLOGY  XR Chest 1 View   Final Result   Minimal atelectasis or infiltrate at the bases. Tortuous   aorta.       This report was finalized on 6/17/2019 2:51 PM by Dr. Taj Miller M.D.          CT Head Without Contrast   Final Result       There is no evidence for acute intracranial pathology.       Postsurgical changes within the paranasal sinuses as well as mild   changes of inflammatory paranasal sinus disease are incidentally noted   as discussed above.        Radiation dose reduction techniques were utilized, including automated   exposure control and exposure modulation based on body size.       This report was finalized on 6/17/2019 2:42 PM by Dr. Marcus Cagle M.D.          CT Abdomen Pelvis Without Contrast   Preliminary Result   1. There is an acute L1 compression fracture with greater than 50% loss   of height and 6 mm retropulsion into the spinal canal.   2. Acute nondisplaced left lateral 8th and 9th rib fractures. Acute or   subacute fracture of the left L3 transverse process. There is also a   fracture at the posterior right 12th rib which is age indeterminate   given the artifact present as described above.   3. Airspace opacities at the dependent aspect of both lower lobes may   represent atelectasis, but pneumonia is suspected. Please correlate   clinically for aspiration pneumonia.       Discussed with Dr. Loredo.               I ordered the above noted radiological studies. Interpreted by radiologist. Discussed with radiologist (Gurjit Mcconnell). Reviewed by me in PACS.       PROCEDURES  Procedures     EKG          EKG time: 1248  Rhythm/Rate: NSR, 54  P  waves and SD: nml  QRS, axis: narrow complex, nml axis   ST and T waves: diffuse nonspecific ST-T wave changes.   Normal QT  Movement artifact     Interpreted Contemporaneously by me, independently viewed  Nonspecific changes compared to prior 3/18      PROGRESS AND CONSULTS       1348  Discussed CT results with Dr. Hoa Griffith, Radiology. There is bilateral basilar pneumonia concerning for aspiration. acute left 8th and 9th rib Fx. pt has age indeterminate 12th rib Fx on right. L3 transverse process fracture. L1 compression fracture.     1351  Ordered IV Zosyn for bibasilar pneumonia.     1423  Per Dr. Cagle, CT Head is negative acute.     1538  Pt recheck. pt awake but is too weak to get up. normal heart rate. O2 sats = 96% on RA. Pt's BP is 163/86. Notified pt and family of CT results. Updated pt and family on plan of amdission. Pt is in NAD. Family understands and agrees with plan, all concerns addressed.      1646  Discussed the pt's case with Dr. Schaffer, [facility], who agrees to admit the pt.     MEDICAL DECISION MAKING  Results were reviewed/discussed with the patient and they were also made aware of online access. Pt also made aware that some labs, such as cultures, will not be resulted during ER visit and follow up with PMD is necessary.     MDM  Number of Diagnoses or Management Options     Amount and/or Complexity of Data Reviewed  Clinical lab tests: ordered and reviewed (procal= 0.04; troponin negative; lactate = 0.9; CBC: WBC=7.59)  Tests in the radiology section of CPT®: ordered and reviewed (CT abd/pelvis shows bilateral basilar pneumonia concerning for aspiration. acute left 8th and 9th rib Fx. pt has age indeterminate 12th rib Fx on right. L3 transverse process fracture. L1 compression fracture.  CT head negative acute)  Discussion of test results with the performing providers: (Dr Griffith, Dr. Cagle)           DIAGNOSIS  Final diagnoses:   Pneumonia of both lungs due to infectious organism, unspecified part  of lung   Closed fracture of multiple ribs of both sides, initial encounter   Closed compression fracture of first lumbar vertebra, initial encounter (CMS/[facility])   Closed fracture of third lumbar vertebra, unspecified fracture morphology, initial encounter (CMS/[facility])       DISPOSITION  ADMISSION    Discussed treatment plan and reason for admission with pt/family and admitting physician.  Pt/family voiced understanding of the plan for admission for further testing/treatment as needed.         Latest Documented Vital Signs:  As of 6:48 PM  BP- (!) 207/91 HR- 65 Temp- 98.4 °F (36.9 °C) (Oral) O2 sat- 95%    --  Documentation assistance provided by dm Torres for Dr. Loredo.  Information recorded by the scribe was done at my direction and has been verified and validated by me.               Ike Torres  06/17/19 1839       Jamie Loredo MD  06/17/19 2707

## 2019-06-17 NOTE — ED NOTES
EMS reports the patient's family called 911 for pt having multiple falls since Friday. Pt has a history of stroke and is normally at A&O X 2, currently at baseline and denies head injury. Pt complains of left flank pain.     Danna Benavides, RN  06/17/19 2092

## 2019-06-17 NOTE — H&P
Internal medicine history and physical  INTERNAL MEDICINE   [facility]       Patient Identification:  Name: Rommel Gonzalez  Age: 73 y.o.  Sex: male  :  1945  MRN: [id number]                   Primary Care Physician: Luiz Onofre MD                                   Chief Complaint: Generalized weakness, recurrent multiple falls really worse in the last 4 days in fact fell today.  Has fallen 4 times in the last couple of weeks.  Unable to walk since this morning.    History of Present Illness:   Source of information emergency room records patient's family members and patient himself which is limited.  Patient is a 73-year-old male who appears older than stated age has complicated past medical history as noted below including Parkinson's disease and unsteadiness and propensity to fall.  He was diagnosed and treated for urinary tract infection about a month ago and according to the family members seem to have recovered from it until couple weeks ago when he started to go downhill again and started to become weak.  Patient supposed to walk with the help of walker but.  Patient is at times stubborn and forgets to use walker and attempts to walk by himself and falls.  He fell yesterday and fell this morning and after that he was unable to get up and walk or move and was complaining of pain in the upper abdomen and left side of the chest.  Nonmonogamous called and patient was brought to the emergency room via EMS.  He does have decreased appetite but denies any cough congestion or difficulty breathing.    Past Medical History:  Past Medical History:   Diagnosis Date   • AF (atrial fibrillation) (CMS/HCC)    • Anticoagulated    • Aspiration pneumonia (CMS/HCC)    • Atrial flutter (CMS/HCC)    • Atypical chest pain    • Chest pain    • Colon polyps    • Confusion    • Depression    • Disease of thyroid gland    • Disorder of thyroid    • Dysarthria    • Dysphagia    • Dyspnea and respiratory  abnormalities    • Elevated TSH    • Fall    • Fatigue    • Gait instability    • Hay fever    • Hyperlipidemia    • Hypertension    • Hypothyroidism    • Idiopathic Parkinson's disease (CMS/HCC)    • Insomnia    • Measles    • Metabolic encephalopathy    • Mumps    • Non-traumatic rhabdomyolysis    • CORINNE (obstructive sleep apnea)    • Palpitations    • Parkinson disease (CMS/HCC)    • Peripheral neuropathy    • Pneumonia    • Poor sleep pattern    • Progressive supranuclear palsy (CMS/HCC)    • Sepsis (CMS/HCC)    • Slurred speech    • Tremor    • Weakness of voice      Past Surgical History:  Past Surgical History:   Procedure Laterality Date   • ENDOSCOPY W/ PEG TUBE PLACEMENT N/A 3/16/2018    Procedure: ESOPHAGOGASTRODUODENOSCOPY WITH PERCUTANEOUS ENDOSCOPIC GASTROSTOMY TUBE INSERTION;  Surgeon: Lopez Vang Jr., MD;  Location: [facility] ENDOSCOPY;  Service: Gastroenterology      Home Meds:  Medications Prior to Admission   Medication Sig Dispense Refill Last Dose   • bumetanide (BUMEX) 0.5 MG tablet Take 1 tablet by mouth Daily As Needed (edema). 90 tablet 3 6/17/2019 at Unknown time   • carbidopa-levodopa ER (SINEMET CR)  MG per tablet Take 2.5 tablets by mouth 3 (Three) Times a Day.   6/17/2019 at 0800 am   • carvedilol (COREG) 6.25 MG tablet Take 1 tablet by mouth 2 (Two) Times a Day With Meals. 270 tablet 3 6/17/2019 at 0800 am   • doxazosin (CARDURA) 2 MG tablet Take 2 mg by mouth Every Night.   6/16/2019 at Unknown time   • levothyroxine (SYNTHROID, LEVOTHROID) 125 MCG tablet Take 1 tablet by mouth Daily. 90 tablet 3 6/17/2019 at Unknown time   • midodrine (PROAMATINE) 2.5 MG tablet Take 1 tablet by mouth Daily. 90 tablet 3 6/17/2019 at Unknown time   • Multiple Vitamins-Minerals (CERTAVITE/ANTIOXIDANTS) tablet Take 1 tablet by mouth Daily.   6/17/2019 at Unknown time   • rivaroxaban (XARELTO) 20 MG tablet Take 1 tablet by mouth Daily With Dinner. 90 tablet 3 6/16/2019 at Unknown time     Current  "Meds:     Current Facility-Administered Medications:   •  sodium chloride 0.9 % flush 10 mL, 10 mL, Intravenous, PRN, Jamie Loredo MD  •  sodium chloride 0.9 % infusion, 125 mL/hr, Intravenous, Continuous, Jamie Loredo MD, Last Rate: 125 mL/hr at 06/17/19 1212, 125 mL/hr at 06/17/19 1212  Allergies:  No Known Allergies  Social History:   Social History     Tobacco Use   • Smoking status: Former Smoker   • Smokeless tobacco: Never Used   • Tobacco comment: Daily caffeine use   Substance Use Topics   • Alcohol use: No     Frequency: Never      Family History:  Family History   Problem Relation Age of Onset   • Hypertension Mother    • Glaucoma Mother    • Throat cancer Father    • Alcohol abuse Father    • Hypertension Sister    • Cancer Sister         malignant neoplasm of urinary bladder   • Lung cancer Brother    • Heart attack Other    • Lung disease Other           Review of Systems  See history of present illness and past medical history.    Constitutional: Remarkable for no fever or chills  Cardiovascular: Remarkable for no shortness of breath orthopnea PND  Respiratory: Remarkable for no cough or congestion  Musculoskeletal: Remarkable for discomfort in the left chest and lower back  Neurological: Unremarkable for weakness and falls but no forgetfulness or altered mental status  GI: Remarkable for no nausea vomiting or diarrhea  : Remarkable for no burning in urination frequency urgency.      Vitals:   BP (!) 207/91 (BP Location: Left arm, Patient Position: Lying)   Pulse 65   Temp 98.4 °F (36.9 °C) (Oral)   Resp 18   Ht 182.9 cm (72\")   Wt 90.3 kg (199 lb)   SpO2 95%   BMI 26.99 kg/m²   I/O:     Intake/Output Summary (Last 24 hours) at 6/17/2019 1842  Last data filed at 6/17/2019 1500  Gross per 24 hour   Intake 50 ml   Output --   Net 50 ml     Exam:  General Appearance:   Awake interactive cooperative male does not appear to be in any acute distress appears older than stated age and is marked " facies.   Head:    Normocephalic, without obvious abnormality, atraumatic   Eyes:    PERRL, conjunctiva/corneas clear, EOM's intact, both eyes   Ears:    Normal external ear canals, both ears   Nose:   Nares normal, septum midline, mucosa normal, no drainage    or sinus tenderness   Throat:   Lips, tongue, gums normal; oral mucosa pink and dry   Neck:   Supple, symmetrical, trachea midline, no adenopathy;     thyroid:  no enlargement/tenderness/nodules; no carotid    bruit or JVD   Back:    Mild lower spine tenderness.   Lungs:    Decreased breath sounds at the bases no obvious use of accessory muscles of breathing noted.   Chest Wall:   Left lower chest wall tenderness    Heart:    Regular rate and rhythm, S1 and S2 normal, no murmur, rub   or gallop   Abdomen:     Soft, non-tender, bowel sounds active all four quadrants,        Extremities:  Occasional bruising but no acute abnormalities noted.   Pulses:   Pulses palpable in all extremities; symmetric all extremities   Skin:   Skin color normal, Skin is warm and dry,  no rashes or palpable lesions   Neurologic:  Tremors but no focal weakness has peculiar masked facies.       Data Review:      I reviewed the patient's new clinical results.  Results from last 7 days   Lab Units 06/17/19  1211   WBC 10*3/mm3 7.59   HEMOGLOBIN g/dL 12.5*   PLATELETS 10*3/mm3 253     Results from last 7 days   Lab Units 06/17/19  1207   SODIUM mmol/L 142   POTASSIUM mmol/L 4.1   CHLORIDE mmol/L 103   CO2 mmol/L 29.9*   BUN mg/dL 11   CREATININE mg/dL 0.76   CALCIUM mg/dL 8.8   GLUCOSE mg/dL 99     Ct Abdomen Pelvis Without Contrast    Result Date: 6/17/2019  1. There is an acute L1 compression fracture with greater than 50% loss of height and 6 mm retropulsion into the spinal canal. 2. Acute nondisplaced left lateral 8th and 9th rib fractures. Acute or subacute fracture of the left L3 transverse process. There is also a fracture at the posterior right 12th rib which is age  indeterminate given the artifact present as described above. 3. Airspace opacities at the dependent aspect of both lower lobes may represent atelectasis, but pneumonia is suspected. Please correlate clinically for aspiration pneumonia.  Discussed with Dr. Loredo.      Ct Head Without Contrast    Result Date: 6/17/2019   There is no evidence for acute intracranial pathology.  Postsurgical changes within the paranasal sinuses as well as mild changes of inflammatory paranasal sinus disease are incidentally noted as discussed above.  Radiation dose reduction techniques were utilized, including automated exposure control and exposure modulation based on body size.  This report was finalized on 6/17/2019 2:42 PM by Dr. Marcus Cagle M.D.      Xr Chest 1 View    Result Date: 6/17/2019  Minimal atelectasis or infiltrate at the bases. Tortuous aorta.  This report was finalized on 6/17/2019 2:51 PM by Dr. Taj Miller M.D.      Lactic acid level 0.9 procalcitonin 0.04    ECG 12 Lead   Final Result   HEART RATE= 54  bpm   RR Interval= 1108  ms   TN Interval= 148  ms   P Horizontal Axis= -12  deg   P Front Axis= 32  deg   QRSD Interval= 111  ms   QT Interval= 465  ms   QRS Axis= 7  deg   T Wave Axis= -5  deg   - BORDERLINE ECG -   Sinus rhythm   Borderline T abnormalities, inferior leads   Poor quality tracing repeat   Electronically Signed By: Michael HornZHENG) ([facility]) 17-Jun-2019 14:14:24   Date and Time of Study: 2019-06-17 12:48:32          Assessment:  Active Hospital Problems    Diagnosis POA   • **Aspiration pneumonia (CMS/HCC) [J69.0] Yes   • Closed fracture of multiple ribs [S22.49XA] Unknown   • Closed compression fracture of L1 lumbar vertebra (CMS/HCC) [S32.010A] Unknown   • Closed fracture of transverse process of lumbar vertebra (CMS/HCC) [S32.009A] Unknown   • Fall [W19.XXXA] Yes   • Progressive supranuclear palsy (CMS/HCC) [G23.1] Yes   • CORINNE on CPAP [G47.33, Z99.89] Not Applicable   • Hypothyroidism  (acquired) [E03.9] Yes   • Anticoagulated [Z79.01] Not Applicable   • AF (atrial fibrillation) (CMS/HCC) [I48.91] Yes   • Hypertension [I10] Yes   • Idiopathic Parkinson's disease (CMS/HCC) [G20] Yes       Medical decision making:  Progressive generalized weakness and recurrent falls with immobility and weakness resulting in EMS being called in the background of underlying Parkinson's disease and superimposed progressive supranuclear palsy and radiological evidence of dependent atelectasis/infiltrate no clinical evidence of active sepsis or pneumonia.  I think the progressive generalized weakness and recurrent falls resulting from progression of underlying Parkinson's disease and his lack of adherence to fall prevention program including use of walker.  Patient has been given Zosyn in the emergency room for suspected aspiration pneumonia seen on the scan but clinically does not appear to have one.  Plan is to admit the patient continue his home regimen for Parkinson's disease provide him before precautions and get neurology consultation.  Since all the markers of sepsis are negative and patient does not have any respiratory symptoms we will hold antibiotic therapy with close monitoring of his clinical course.  Multiple left rib fracture-closed and nondisplaced involving eighth and ninth rib-provide him with pain control and incentive spirometry.  L1 compression fracture and L3 transverse process fracture-secondary to fall plan is to provide pain relief.  Get spine surgery consultation.  Consult physical therapy.  Atrial fibrillation on chronic anticoagulation therapy currently on sinus rhythm-plan is to continue his current regimen including anticoagulants and therapy.  Parkinson's disease and underlying progressive supranuclear palsy-continue his present regimen and consult neurologist to see if he needs further adjustments in his medications.  Hypothyroidism-continue with thyroid replacement therapy.  Obstructive  sleep apnea on CPAP plan is to allow him to use his home CPAP device and monitor his mental status and pulse ox.  Low threshold to check ABG if he becomes somnolent to rule out hypercapnic failure.    Jero Schaffer MD   6/17/2019  6:42 PM  Much of this encounter note is an electronic transcription/translation of spoken language to printed text. The electronic translation of spoken language may permit erroneous, or at times, nonsensical words or phrases to be inadvertently transcribed; Although I have reviewed the note for such errors, some may still exist

## 2019-06-18 LAB
ALBUMIN SERPL-MCNC: 3.3 G/DL (ref 3.5–5.2)
ALBUMIN/GLOB SERPL: 1.3 G/DL
ALP SERPL-CCNC: 68 U/L (ref 39–117)
ALT SERPL W P-5'-P-CCNC: <5 U/L (ref 1–41)
ANION GAP SERPL CALCULATED.3IONS-SCNC: 10.4 MMOL/L
AST SERPL-CCNC: 13 U/L (ref 1–40)
BACTERIA BLD CULT: ABNORMAL
BASOPHILS # BLD AUTO: 0.04 10*3/MM3 (ref 0–0.2)
BASOPHILS NFR BLD AUTO: 0.6 % (ref 0–1.5)
BILIRUB SERPL-MCNC: 1.1 MG/DL (ref 0.2–1.2)
BUN BLD-MCNC: 8 MG/DL (ref 8–23)
BUN/CREAT SERPL: 9.3 (ref 7–25)
CALCIUM SPEC-SCNC: 8.3 MG/DL (ref 8.6–10.5)
CHLORIDE SERPL-SCNC: 105 MMOL/L (ref 98–107)
CO2 SERPL-SCNC: 25.6 MMOL/L (ref 22–29)
CREAT BLD-MCNC: 0.86 MG/DL (ref 0.76–1.27)
DEPRECATED RDW RBC AUTO: 47.8 FL (ref 37–54)
EOSINOPHIL # BLD AUTO: 0.17 10*3/MM3 (ref 0–0.4)
EOSINOPHIL NFR BLD AUTO: 2.6 % (ref 0.3–6.2)
ERYTHROCYTE [DISTWIDTH] IN BLOOD BY AUTOMATED COUNT: 13.7 % (ref 12.3–15.4)
GFR SERPL CREATININE-BSD FRML MDRD: 87 ML/MIN/1.73
GLOBULIN UR ELPH-MCNC: 2.5 GM/DL
GLUCOSE BLD-MCNC: 86 MG/DL (ref 65–99)
GLUCOSE BLDC GLUCOMTR-MCNC: 126 MG/DL (ref 70–130)
GLUCOSE BLDC GLUCOMTR-MCNC: 90 MG/DL (ref 70–130)
GLUCOSE BLDC GLUCOMTR-MCNC: 90 MG/DL (ref 70–130)
HCT VFR BLD AUTO: 35.4 % (ref 37.5–51)
HGB BLD-MCNC: 11.4 G/DL (ref 13–17.7)
IMM GRANULOCYTES # BLD AUTO: 0.02 10*3/MM3 (ref 0–0.05)
IMM GRANULOCYTES NFR BLD AUTO: 0.3 % (ref 0–0.5)
LYMPHOCYTES # BLD AUTO: 1.6 10*3/MM3 (ref 0.7–3.1)
LYMPHOCYTES NFR BLD AUTO: 24.5 % (ref 19.6–45.3)
MCH RBC QN AUTO: 30.7 PG (ref 26.6–33)
MCHC RBC AUTO-ENTMCNC: 32.2 G/DL (ref 31.5–35.7)
MCV RBC AUTO: 95.4 FL (ref 79–97)
MONOCYTES # BLD AUTO: 0.61 10*3/MM3 (ref 0.1–0.9)
MONOCYTES NFR BLD AUTO: 9.3 % (ref 5–12)
NEUTROPHILS # BLD AUTO: 4.1 10*3/MM3 (ref 1.7–7)
NEUTROPHILS NFR BLD AUTO: 62.7 % (ref 42.7–76)
NRBC BLD AUTO-RTO: 0 /100 WBC (ref 0–0.2)
PLATELET # BLD AUTO: 237 10*3/MM3 (ref 140–450)
PMV BLD AUTO: 11.1 FL (ref 6–12)
POTASSIUM BLD-SCNC: 3.9 MMOL/L (ref 3.5–5.2)
PROT SERPL-MCNC: 5.8 G/DL (ref 6–8.5)
RBC # BLD AUTO: 3.71 10*6/MM3 (ref 4.14–5.8)
SODIUM BLD-SCNC: 141 MMOL/L (ref 136–145)
WBC NRBC COR # BLD: 6.54 10*3/MM3 (ref 3.4–10.8)

## 2019-06-18 PROCEDURE — 80053 COMPREHEN METABOLIC PANEL: CPT | Performed by: INTERNAL MEDICINE

## 2019-06-18 PROCEDURE — G0378 HOSPITAL OBSERVATION PER HR: HCPCS

## 2019-06-18 PROCEDURE — 97162 PT EVAL MOD COMPLEX 30 MIN: CPT

## 2019-06-18 PROCEDURE — 92610 EVALUATE SWALLOWING FUNCTION: CPT

## 2019-06-18 PROCEDURE — 99214 OFFICE O/P EST MOD 30 MIN: CPT | Performed by: PSYCHIATRY & NEUROLOGY

## 2019-06-18 PROCEDURE — 96366 THER/PROPH/DIAG IV INF ADDON: CPT

## 2019-06-18 PROCEDURE — 25010000002 PIPERACILLIN SOD-TAZOBACTAM PER 1 G: Performed by: HOSPITALIST

## 2019-06-18 PROCEDURE — 99203 OFFICE O/P NEW LOW 30 MIN: CPT | Performed by: ORTHOPAEDIC SURGERY

## 2019-06-18 PROCEDURE — 97535 SELF CARE MNGMENT TRAINING: CPT

## 2019-06-18 PROCEDURE — 97165 OT EVAL LOW COMPLEX 30 MIN: CPT

## 2019-06-18 PROCEDURE — 25010000002 PIPERACILLIN SOD-TAZOBACTAM PER 1 G: Performed by: INTERNAL MEDICINE

## 2019-06-18 PROCEDURE — 97530 THERAPEUTIC ACTIVITIES: CPT

## 2019-06-18 PROCEDURE — 82962 GLUCOSE BLOOD TEST: CPT

## 2019-06-18 PROCEDURE — 96361 HYDRATE IV INFUSION ADD-ON: CPT

## 2019-06-18 PROCEDURE — 85025 COMPLETE CBC W/AUTO DIFF WBC: CPT | Performed by: INTERNAL MEDICINE

## 2019-06-18 RX ADMIN — CARBIDOPA AND LEVODOPA 3 TABLET: 25; 100 TABLET, EXTENDED RELEASE ORAL at 08:28

## 2019-06-18 RX ADMIN — TAZOBACTAM SODIUM AND PIPERACILLIN SODIUM 3.38 G: 375; 3 INJECTION, SOLUTION INTRAVENOUS at 18:37

## 2019-06-18 RX ADMIN — SODIUM CHLORIDE, PRESERVATIVE FREE 3 ML: 5 INJECTION INTRAVENOUS at 08:29

## 2019-06-18 RX ADMIN — TERAZOSIN HYDROCHLORIDE 2 MG: 2 CAPSULE ORAL at 21:06

## 2019-06-18 RX ADMIN — CARBIDOPA AND LEVODOPA 3 TABLET: 25; 100 TABLET, EXTENDED RELEASE ORAL at 17:27

## 2019-06-18 RX ADMIN — CARVEDILOL 6.25 MG: 6.25 TABLET, FILM COATED ORAL at 08:29

## 2019-06-18 RX ADMIN — CARBIDOPA AND LEVODOPA 3 TABLET: 25; 100 TABLET, EXTENDED RELEASE ORAL at 21:06

## 2019-06-18 RX ADMIN — LEVOTHYROXINE SODIUM 125 MCG: 125 TABLET ORAL at 07:29

## 2019-06-18 RX ADMIN — RIVAROXABAN 20 MG: 20 TABLET, FILM COATED ORAL at 17:27

## 2019-06-18 RX ADMIN — SODIUM CHLORIDE 100 ML/HR: 9 INJECTION, SOLUTION INTRAVENOUS at 12:57

## 2019-06-18 RX ADMIN — CARVEDILOL 6.25 MG: 6.25 TABLET, FILM COATED ORAL at 17:27

## 2019-06-18 RX ADMIN — SODIUM CHLORIDE 100 ML/HR: 9 INJECTION, SOLUTION INTRAVENOUS at 18:38

## 2019-06-18 RX ADMIN — TAZOBACTAM SODIUM AND PIPERACILLIN SODIUM 3.38 G: 375; 3 INJECTION, SOLUTION INTRAVENOUS at 04:55

## 2019-06-18 RX ADMIN — SODIUM CHLORIDE, PRESERVATIVE FREE 3 ML: 5 INJECTION INTRAVENOUS at 21:07

## 2019-06-18 RX ADMIN — SODIUM CHLORIDE 100 ML/HR: 9 INJECTION, SOLUTION INTRAVENOUS at 07:29

## 2019-06-18 RX ADMIN — MIDODRINE HYDROCHLORIDE 2.5 MG: 2.5 TABLET ORAL at 08:29

## 2019-06-18 NOTE — PLAN OF CARE
Problem: Patient Care Overview  Goal: Plan of Care Review  Outcome: Ongoing (interventions implemented as appropriate)   06/18/19 6731   Coping/Psychosocial   Plan of Care Reviewed With patient   OTHER   Outcome Summary Pt presents to OT with decreased strength and endurance, c/o pain with left side due to rib fx per pt. which limits safety and independence for adls. Pt unable to stand from chair with assist of one today. Pt will continue to benefit from OT

## 2019-06-18 NOTE — CONSULTS
Patient Identification:  NAME:  Rommel Gonzalez  Age:  73 y.o.   Sex:  male   :  1945   MRN:  [id number]       Chief complaint: He does not have one reason for consult Parkinson's disease gait abnormality    History of present illness: This patient is a 73-year-old right-handed white male with a previous diagnosis of progressive supranuclear palsy depression confusion chest pain atrial flutter who is on anticoagulation he has had some falls has an L1 compression fracture in his back as a context quality slow movements and poor balance other associated symptoms moving slowly but he does not have a resting tremor modifying factors he is on a rather large dose of Sinemet CR on a scheduled basis at this time and does not have any tremor denies hallucinations or vivid dreams he takes Sinemet CR 25/100, three tablets at a time, 3 times per day. (9 tabs per day)    Past medical history:  Past Medical History:   Diagnosis Date   • AF (atrial fibrillation) (CMS/HCC)    • Anticoagulated    • Aspiration pneumonia (CMS/HCC)    • Atrial flutter (CMS/HCC)    • Atypical chest pain    • Chest pain    • Colon polyps    • Confusion    • Depression    • Disease of thyroid gland    • Disorder of thyroid    • Dysarthria    • Dysphagia    • Dyspnea and respiratory abnormalities    • Elevated TSH    • Fall    • Fatigue    • Gait instability    • Hay fever    • Hyperlipidemia    • Hypertension    • Hypothyroidism    • Idiopathic Parkinson's disease (CMS/HCC)    • Insomnia    • Measles    • Metabolic encephalopathy    • Mumps    • Non-traumatic rhabdomyolysis    • CORINNE (obstructive sleep apnea)    • Palpitations    • Parkinson disease (CMS/HCC)    • Peripheral neuropathy    • Pneumonia    • Poor sleep pattern    • Progressive supranuclear palsy (CMS/HCC)    • Sepsis (CMS/HCC)    • Slurred speech    • Tremor    • Weakness of voice        Past surgical history:  Past Surgical History:   Procedure Laterality Date   • ENDOSCOPY W/ PEG  TUBE PLACEMENT N/A 3/16/2018    Procedure: ESOPHAGOGASTRODUODENOSCOPY WITH PERCUTANEOUS ENDOSCOPIC GASTROSTOMY TUBE INSERTION;  Surgeon: Lopez Vang Jr., MD;  Location: [facility] ENDOSCOPY;  Service: Gastroenterology       Allergies:  Patient has no known allergies.    Home medications:  Medications Prior to Admission   Medication Sig Dispense Refill Last Dose   • bumetanide (BUMEX) 0.5 MG tablet Take 1 tablet by mouth Daily As Needed (edema). 90 tablet 3 6/17/2019 at Unknown time   • carbidopa-levodopa ER (SINEMET CR)  MG per tablet Take 2.5 tablets by mouth 3 (Three) Times a Day.   6/17/2019 at 0800 am   • carvedilol (COREG) 6.25 MG tablet Take 1 tablet by mouth 2 (Two) Times a Day With Meals. 270 tablet 3 6/17/2019 at 0800 am   • doxazosin (CARDURA) 2 MG tablet Take 2 mg by mouth Every Night.   6/16/2019 at Unknown time   • levothyroxine (SYNTHROID, LEVOTHROID) 125 MCG tablet Take 1 tablet by mouth Daily. 90 tablet 3 6/17/2019 at Unknown time   • midodrine (PROAMATINE) 2.5 MG tablet Take 1 tablet by mouth Daily. 90 tablet 3 6/17/2019 at Unknown time   • Multiple Vitamins-Minerals (CERTAVITE/ANTIOXIDANTS) tablet Take 1 tablet by mouth Daily.   6/17/2019 at Unknown time   • rivaroxaban (XARELTO) 20 MG tablet Take 1 tablet by mouth Daily With Dinner. 90 tablet 3 6/16/2019 at Unknown time        Hospital medications:    carbidopa-levodopa ER 3 tablet Oral TID   carvedilol 6.25 mg Oral BID With Meals   levothyroxine 125 mcg Oral Q AM   midodrine 2.5 mg Oral Daily   rivaroxaban 20 mg Oral Daily With Dinner   sodium chloride 3 mL Intravenous Q12H   terazosin 2 mg Oral Nightly       sodium chloride 100 mL/hr Last Rate: 100 mL/hr (06/18/19 0729)     •  acetaminophen  •  bumetanide  •  HYDROcodone-acetaminophen  •  Morphine **AND** naloxone  •  nitroglycerin  •  ondansetron  •  sodium chloride  •  sodium chloride    Family history:  Family History   Problem Relation Age of Onset   • Hypertension Mother    • Glaucoma  Mother    • Throat cancer Father    • Alcohol abuse Father    • Hypertension Sister    • Cancer Sister         malignant neoplasm of urinary bladder   • Lung cancer Brother    • Heart attack Other    • Lung disease Other        Social history:  Social History     Tobacco Use   • Smoking status: Former Smoker   • Smokeless tobacco: Never Used   • Tobacco comment: Daily caffeine use   Substance Use Topics   • Alcohol use: No     Frequency: Never   • Drug use: No       Review of systems:    He denies a resting tremor he denies hallucinations or vivid dreams no hair eyes ears nose throat skin joint  lymphatic hematologic or oncologic complaints no neck pain chest pain abdominal pain bowel bladder incontinence fever chills or rash she does have some back pain    Objective:  Vitals Ranges:   Temp:  [97.9 °F (36.6 °C)-98.4 °F (36.9 °C)] 98.2 °F (36.8 °C)  Heart Rate:  [49-65] 61  Resp:  [18] 18  BP: (134-207)/() 169/80      Physical Exam: Awake alert oriented x2 fund of knowledge poor attention span concentration good recent remote memory poor language function dysphonia without a aphasia elderly in no distress pupils one half constricting to 1 extraocular movements are full horizontally there may be some restriction of upgaze but not much downgaze is excellent nasolabial folds palate tongue symmetrical decreased hearing normal facial sensation head turning shoulder shrug he has a mask face.  He has no resting tremor very mild rigidity definite bradykinesia reflexes absent throughout no atrophy or fasciculations overall strength 4+ out of 5 x4 definite bradykinesia.  Sensation normal light touch face both arms both legs coordination slow but normal in the upper extremity Station and gait nearly impossible to get him up I was afraid he would fall heart is regular without murmur next rigid without bruits extremities no clubbing cyanosis there is some peripheral edema visual acuity normal at 3 feet    Results  review:   I reviewed the patient's new clinical results.    Data review:  Lab Results (last 24 hours)     Procedure Component Value Units Date/Time    POC Glucose Once [[id number]]  (Normal) Collected:  06/18/19 1137    Specimen:  Blood Updated:  06/18/19 1143     Glucose 90 mg/dL     Comprehensive Metabolic Panel [[id number]]  (Abnormal) Collected:  06/18/19 0515    Specimen:  Blood Updated:  06/18/19 0656     Glucose 86 mg/dL      BUN 8 mg/dL      Creatinine 0.86 mg/dL      Sodium 141 mmol/L      Potassium 3.9 mmol/L      Chloride 105 mmol/L      CO2 25.6 mmol/L      Calcium 8.3 mg/dL      Total Protein 5.8 g/dL      Albumin 3.30 g/dL      ALT (SGPT) <5 U/L      AST (SGOT) 13 U/L      Alkaline Phosphatase 68 U/L      Total Bilirubin 1.1 mg/dL      eGFR Non African Amer 87 mL/min/1.73      Globulin 2.5 gm/dL      A/G Ratio 1.3 g/dL      BUN/Creatinine Ratio 9.3     Anion Gap 10.4 mmol/L     Narrative:       GFR Normal >60  Chronic Kidney Disease <60  Kidney Failure <15    CBC Auto Differential [[id number]]  (Abnormal) Collected:  06/18/19 0515    Specimen:  Blood Updated:  06/18/19 0621     WBC 6.54 10*3/mm3      RBC 3.71 10*6/mm3      Hemoglobin 11.4 g/dL      Hematocrit 35.4 %      MCV 95.4 fL      MCH 30.7 pg      MCHC 32.2 g/dL      RDW 13.7 %      RDW-SD 47.8 fl      MPV 11.1 fL      Platelets 237 10*3/mm3      Neutrophil % 62.7 %      Lymphocyte % 24.5 %      Monocyte % 9.3 %      Eosinophil % 2.6 %      Basophil % 0.6 %      Immature Grans % 0.3 %      Neutrophils, Absolute 4.10 10*3/mm3      Lymphocytes, Absolute 1.60 10*3/mm3      Monocytes, Absolute 0.61 10*3/mm3      Eosinophils, Absolute 0.17 10*3/mm3      Basophils, Absolute 0.04 10*3/mm3      Immature Grans, Absolute 0.02 10*3/mm3      nRBC 0.0 /100 WBC     CBC & Differential [[id number]] Collected:  06/17/19 1211    Specimen:  Blood Updated:  06/17/19 1532    Narrative:       The following orders were created for panel order CBC &  Differential.  Procedure                               Abnormality         Status                     ---------                               -----------         ------                     CBC Auto Differential[[id number]]        Abnormal            Final result                 Please view results for these tests on the individual orders.    CBC Auto Differential [[id number]]  (Abnormal) Collected:  06/17/19 1211    Specimen:  Blood Updated:  06/17/19 1532     WBC 7.59 10*3/mm3      RBC 4.07 10*6/mm3      Hemoglobin 12.5 g/dL      Hematocrit 39.4 %      MCV 96.8 fL      MCH 30.7 pg      MCHC 31.7 g/dL      RDW 13.8 %      RDW-SD 49.1 fl      MPV 11.5 fL      Platelets 253 10*3/mm3      Neutrophil % 69.8 %      Lymphocyte % 19.9 %      Monocyte % 7.6 %      Eosinophil % 2.0 %      Basophil % 0.4 %      Immature Grans % 0.3 %      Neutrophils, Absolute 5.30 10*3/mm3      Lymphocytes, Absolute 1.51 10*3/mm3      Monocytes, Absolute 0.58 10*3/mm3      Eosinophils, Absolute 0.15 10*3/mm3      Basophils, Absolute 0.03 10*3/mm3      Immature Grans, Absolute 0.02 10*3/mm3      nRBC 0.0 /100 WBC     Lactic Acid, Plasma [[id number]]  (Normal) Collected:  06/17/19 1401    Specimen:  Blood Updated:  06/17/19 1429     Lactate 0.9 mmol/L     Procalcitonin [[id number]]  (Abnormal) Collected:  06/17/19 1207    Specimen:  Blood Updated:  06/17/19 1425     Procalcitonin 0.04 ng/mL     Narrative:       As a Marker for Sepsis (Non-Neonates):   1. <0.5 ng/mL represents a low risk of severe sepsis and/or septic shock.  1. >2 ng/mL represents a high risk of severe sepsis and/or septic shock.    As a Marker for Lower Respiratory Tract Infections that require antibiotic therapy:  PCT on Admission     Antibiotic Therapy             6-12 Hrs later  > 0.5                Strongly Recommended            >0.25 - <0.5         Recommended  0.1 - 0.25           Discouraged                   Remeasure/reassess PCT  <0.1                 Strongly  "Discouraged          Remeasure/reassess PCT      As 28 day mortality risk marker: \"Change in Procalcitonin Result\" (> 80 % or <=80 %) if Day 0 (or Day 1) and Day 4 values are available. Refer to http://[email or web address]-pct-calculator.com/   Change in PCT <=80 %   A decrease of PCT levels below or equal to 80 % defines a positive change in PCT test result representing a higher risk for 28-day all-cause mortality of patients diagnosed with severe sepsis or septic shock.  Change in PCT > 80 %   A decrease of PCT levels of more than 80 % defines a negative change in PCT result representing a lower risk for 28-day all-cause mortality of patients diagnosed with severe sepsis or septic shock.                Blood Culture - Blood, Arm, Left [[id number]] Collected:  06/17/19 1401    Specimen:  Blood from Arm, Left Updated:  06/17/19 1419    Blood Culture - Blood, Arm, Left [[id number]] Collected:  06/17/19 1401    Specimen:  Blood from Arm, Left Updated:  06/17/19 1410    Comprehensive Metabolic Panel [[id number]]  (Abnormal) Collected:  06/17/19 1207    Specimen:  Blood Updated:  06/17/19 1324     Glucose 99 mg/dL      BUN 11 mg/dL      Creatinine 0.76 mg/dL      Sodium 142 mmol/L      Potassium 4.1 mmol/L      Chloride 103 mmol/L      CO2 29.9 mmol/L      Calcium 8.8 mg/dL      Total Protein 6.6 g/dL      Albumin 4.00 g/dL      ALT (SGPT) 5 U/L      AST (SGOT) 16 U/L      Alkaline Phosphatase 72 U/L      Total Bilirubin 0.6 mg/dL      eGFR Non African Amer 100 mL/min/1.73      Globulin 2.6 gm/dL      A/G Ratio 1.5 g/dL      BUN/Creatinine Ratio 14.5     Anion Gap 9.1 mmol/L     Narrative:       GFR Normal >60  Chronic Kidney Disease <60  Kidney Failure <15    Troponin [[id number]]  (Normal) Collected:  06/17/19 1207    Specimen:  Blood Updated:  06/17/19 1315     Troponin T <0.010 ng/mL     Narrative:       Troponin T Reference Range:  <= 0.03 ng/mL-   Negative for AMI  >0.03 ng/mL-     Abnormal for myocardial necrosis.  Clinicians " would have to utilize clinical acumen, EKG, Troponin and serial changes to determine if it is an Acute Myocardial Infarction or myocardial injury due to an underlying chronic condition.     [place] Draw [[id number]] Collected:  06/17/19 1207    Specimen:  Blood Updated:  06/17/19 1315    Narrative:       The following orders were created for panel order [place] Draw.  Procedure                               Abnormality         Status                     ---------                               -----------         ------                     Light Blue Top[[id number]]                                   Final result               Green Top (Gel)[[id number]]                                  Final result               Lavender Top[[id number]]                                     Final result               Gold Top - SST[[id number]]                                   Final result                 Please view results for these tests on the individual orders.    Light Blue Top [[id number]] Collected:  06/17/19 1207    Specimen:  Blood Updated:  06/17/19 1315     Extra Tube hold for add-on     Comment: Auto resulted       Green Top (Gel) [[id number]] Collected:  06/17/19 1207    Specimen:  Blood Updated:  06/17/19 1315     Extra Tube Hold for add-ons.     Comment: Auto resulted.       Lavender Top [[id number]] Collected:  06/17/19 1207    Specimen:  Blood Updated:  06/17/19 1315     Extra Tube hold for add-on     Comment: Auto resulted       Gold Top - SST [[id number]] Collected:  06/17/19 1207    Specimen:  Blood Updated:  06/17/19 1315     Extra Tube Hold for add-ons.     Comment: Auto resulted.       Magnesium [[id number]]  (Normal) Collected:  06/17/19 1207    Specimen:  Blood Updated:  06/17/19 1312     Magnesium 2.1 mg/dL            Imaging:  Imaging Results (last 24 hours)     Procedure Component Value Units Date/Time    CT Abdomen Pelvis Without Contrast [[id number]] Collected:  06/17/19 1407     Updated:  06/18/19 1047     Narrative:       CT ABDOMEN AND PELVIS WITHOUT IV CONTRAST     HISTORY: 73-year-old male status post a fall. Pain.     TECHNIQUE: Radiation dose reduction techniques were utilized, including  automated exposure control and exposure modulation based on body size.   3 mm images were obtained through the abdomen and pelvis without the  administration of IV contrast. Compared with a previous CT of the  abdomen and pelvis from 07/31/2009 and chest CT from 05/08/2017.     FINDINGS: There are acute appearing nondisplaced fractures at the  lateral aspects of the left 8th and 9th ribs. There is also a fracture  at the posterior aspect of the right 12th rib, new since 2009. There is  some motion artifact in this region as well as streak artifact from the  patient's arms. There is an acute or subacute fracture of the left L3  transverse process. There is an acute appearing wedge compression  fracture of L1 with 6 mm retropulsion. There is >50% loss of height of  the anterior aspect of the L1 vertebral body.     Noncontrasted liver, gallbladder, spleen, pancreas, adrenals, and  kidneys appear unremarkable. No acute bowel abnormality is seen. There  is extensive sigmoid diverticulosis without evidence for diverticulitis.  The appendix appears normal. There are extensive abdominal aortic  atherosclerotic changes and there is 2.8 cm ectasia of the infrarenal  portion. At the visualized lower lung fields, there are airspace  opacities at the dependent aspect of both lung bases.       Impression:       1. There is an acute L1 compression fracture with greater than 50% loss  of height and 6 mm retropulsion into the spinal canal.  2. Acute nondisplaced left lateral 8th and 9th rib fractures. Acute or  subacute fracture of the left L3 transverse process. There is also a  fracture at the posterior right 12th rib which is age indeterminate  given the artifact present as described above.  3. Airspace opacities at the dependent aspect of  both lower lobes may  represent atelectasis, but pneumonia is suspected. Please correlate  clinically for aspiration pneumonia.     Discussed with Dr. Loredo.     This report was finalized on 6/18/2019 10:44 AM by Dr. Hoa Griffith M.D.       XR Chest 1 View [[id number]] Collected:  06/17/19 1450     Updated:  06/17/19 1454    Narrative:       XR CHEST 1 VW-     HISTORY: Male who is 73 years-old,  pneumonia in the bases     TECHNIQUE: Frontal view of the chest     COMPARISON: 03/11/2018     FINDINGS: Heart size is normal. Aorta is tortuous. Pulmonary vasculature  is unremarkable. Minimal atelectasis or infiltrate at the bases. No  pleural effusion or pneumothorax. No acute osseous process.       Impression:       Minimal atelectasis or infiltrate at the bases. Tortuous  aorta.     This report was finalized on 6/17/2019 2:51 PM by Dr. Taj Miller M.D.       CT Head Without Contrast [[id number]] Collected:  06/17/19 1431     Updated:  06/17/19 1445    Narrative:       CT SCAN OF THE HEAD WITHOUT CONTRAST      CLINICAL HISTORY: Recent falls. Confusion. Generalized weakness.     TECHNIQUE: CT scan of the head was obtained with 2 mm axial bone  algorithm and 3 mm axial soft tissue algorithm images. No intravenous  contrast was administered.     FINDINGS:     There is no evidence for a calvarial fracture. There is no evidence for  an acute extra-axial hemorrhage. The ventricles, sulci, and cisterns are  age appropriate. Mild-to-moderate changes of chronic small vessel  ischemic phenomena are identified. Atherosclerotic changes are noted  within the intracranial vasculature.     The extracranial structures are remarkable for postsurgical changes  within the paranasal sinuses including bilateral uncinectomies and  partial ethmoidectomies. Partial opacification of the left aspect of the  frontal sinus is noted.       Impression:          There is no evidence for acute intracranial pathology.     Postsurgical changes  within the paranasal sinuses as well as mild  changes of inflammatory paranasal sinus disease are incidentally noted  as discussed above.      Radiation dose reduction techniques were utilized, including automated  exposure control and exposure modulation based on body size.     This report was finalized on 6/17/2019 2:42 PM by Dr. Marcus Cagle M.D.                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  He does not appear to have primary Parkinson's disease.  He is already taking a pretty good dose of Sinemet and given his current stable condition, albeit poor gait and balance/postural response, he does not have chorea or hallucinations and I am afraid to go up or change any of his anti-Parkinson's medications.  Given the fact he is already on a large dose and even larger amount or adding a secondary medication has risks that outweigh the benefits in my opinion.  I would not recommend further neurologic work-up at this time.      Vaibhav Archuleta MD  06/18/19  12:47 PM

## 2019-06-18 NOTE — PROGRESS NOTES
"Pharmacy to Dose Zosyn    Patient: Rommel Gonzalez ([id number], [id number]  LOS: 1 day )  Relevant clinical data and objective history reviewed:  73 y.o. male 182.9 cm (72\") 90.3 kg (199 lb)  Body mass index is 26.99 kg/m².  he has a past medical history of AF (atrial fibrillation) (CMS/HCC), Anticoagulated, Aspiration pneumonia (CMS/HCC), Atrial flutter (CMS/HCC), Atypical chest pain, Chest pain, Colon polyps, Confusion, Depression, Disease of thyroid gland, Disorder of thyroid, Dysarthria, Dysphagia, Dyspnea and respiratory abnormalities, Elevated TSH, Fall, Fatigue, Gait instability, Hay fever, Hyperlipidemia, Hypertension, Hypothyroidism, Idiopathic Parkinson's disease (CMS/HCC), Insomnia, Measles, Metabolic encephalopathy, Mumps, Non-traumatic rhabdomyolysis, CORINNE (obstructive sleep apnea), Palpitations, Parkinson disease (CMS/HCC), Peripheral neuropathy, Pneumonia, Poor sleep pattern, Progressive supranuclear palsy (CMS/HCC), Sepsis (CMS/HCC), Slurred speech, Tremor, and Weakness of voice.    Day #1  Duration: 7 days  Consult for Dr Esparza  Indication: pna    Cultures:   BCx x2 - [facility]PC (BCID in process)    Other Abx: none     Results from last 7 days   Lab Units 19  0515 19  1211   WBC 10*3/mm3 6.54 7.59   HEMOGLOBIN g/dL 11.4* 12.5*   HEMATOCRIT % 35.4* 39.4   PLATELETS 10*3/mm3 237 253     Lab Results   Lab Value Date/Time    LACTATE 0.9 2019 1401    PROCALCITO 0.04 (L) 2019 1207     Temp (24hrs), Av.2 °F (36.8 °C), Min:97.9 °F (36.6 °C), Max:98.4 °F (36.9 °C)    Serum creatinine: 0.86 mg/dL 19 0515  Estimated creatinine clearance: 96.3 mL/min     IV contrast given? no    Assessment/Plan:   - Zosyn 3.375 gm IV q8h EI per [facility] dosing guidelines.    Sonu Camejo, PharmD  19 4:08 PM        "

## 2019-06-18 NOTE — THERAPY EVALUATION
Acute Care - Speech Language Pathology   Swallow Initial Evaluation [facility]     Patient Name: Rommel Gonzalez  : 1945  MRN: [id number]  Today's Date: 2019  Onset of Illness/Injury or Date of Surgery: 19     Referring Physician: Sarbjit      Admit Date: 2019    Visit Dx:     ICD-10-CM ICD-9-CM   1. Pneumonia of both lungs due to infectious organism, unspecified part of lung J18.9 483.8   2. Closed fracture of multiple ribs of both sides, initial encounter S22.43XA 807.09   3. Closed compression fracture of first lumbar vertebra, initial encounter (CMS/HCC) S32.010A 805.4   4. Closed fracture of third lumbar vertebra, unspecified fracture morphology, initial encounter (CMS/HCC) S32.039A 805.4   5. Decreased mobility R26.89 781.99   6. Generalized weakness R53.1 780.79     Patient Active Problem List   Diagnosis   • AF (atrial fibrillation) (CMS/[facility])   • Depression   • Gait instability   • Hypertension   • Insomnia   • Idiopathic Parkinson's disease (CMS/[facility])   • Peripheral neuropathy   • Dysarthria   • Atrial flutter (CMS/HCC)   • Anticoagulated   • Health care maintenance   • Hypothyroidism (acquired)   • Weakness of voice   • Abnormal chest CT   • Facial droop   • Hypersomnia    • Hyperlipidemia   • High risk medication use   • CORINNE on CPAP   • Obstructive sleep apnea, adult   • Fall   • Progressive supranuclear palsy (CMS/[facility])   • Aspiration pneumonia (CMS/[facility])   • Dysphagia   • 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[facility])   • Constipation   • Hyperreflexia   • Torsion dystonia   • Transient cerebral ischemia   • Pneumonia of both lungs due to infectious organism   • Closed fracture of multiple ribs   • Closed compression fracture of L1 lumbar vertebra (CMS/HCC)   • Closed fracture of transverse process of lumbar vertebra (CMS/HCC)     Past Medical History:   Diagnosis Date   • AF (atrial fibrillation) (CMS/HCC)    • Anticoagulated    • Aspiration pneumonia (CMS/HCC)    • Atrial flutter (CMS/HCC)    •  Atypical chest pain    • Chest pain    • Colon polyps    • Confusion    • Depression    • Disease of thyroid gland    • Disorder of thyroid    • Dysarthria    • Dysphagia    • Dyspnea and respiratory abnormalities    • Elevated TSH    • Fall    • Fatigue    • Gait instability    • Hay fever    • Hyperlipidemia    • Hypertension    • Hypothyroidism    • Idiopathic Parkinson's disease (CMS/HCC)    • Insomnia    • Measles    • Metabolic encephalopathy    • Mumps    • Non-traumatic rhabdomyolysis    • CORINNE (obstructive sleep apnea)    • Palpitations    • Parkinson disease (CMS/HCC)    • Peripheral neuropathy    • Pneumonia    • Poor sleep pattern    • Progressive supranuclear palsy (CMS/HCC)    • Sepsis (CMS/HCC)    • Slurred speech    • Tremor    • Weakness of voice      Past Surgical History:   Procedure Laterality Date   • ENDOSCOPY W/ PEG TUBE PLACEMENT N/A 3/16/2018    Procedure: ESOPHAGOGASTRODUODENOSCOPY WITH PERCUTANEOUS ENDOSCOPIC GASTROSTOMY TUBE INSERTION;  Surgeon: Lopez Vang Jr., MD;  Location: [facility] ENDOSCOPY;  Service: Gastroenterology        SWALLOW EVALUATION (last 72 hours)      SLP Adult Swallow Evaluation     Row Name 06/18/19 1100          Document Type  evaluation  -OC    Subjective Information  no complaints  -OC    Patient Observations  alert;cooperative;agree to therapy  -OC    Patient Effort  good  -OC    Symptoms Noted During/After Treatment  none  -OC          Patient Profile Reviewed  yes  -OC    Pertinent History Of Current Problem  Pt admitted s/p falls. RN reports pt does not currently have PNA. Hx, parkinsons, UTI, dysphagia with PEG 2018. Pt reports PEG removed 2 months ago.  -OC    Current Method of Nutrition  NPO;other (see comments) pt and son report reg/thin at home. pt reports peg removed  -OC    Precautions/Limitations, Vision  WFL;for purposes of eval  -OC    Precautions/Limitations, Hearing  WFL;for purposes of eval  -OC    Prior Level of Function-Swallowing  other (see  comments) 03/2018 FEES recommending NPO, PEG removed 2 months ago  -OC    Plans/Goals Discussed with  patient and family;agreed upon  -OC    Barriers to Rehab  medically complex  -OC    Patient's Goals for Discharge  return to PO diet  -OC    Family Goals for Discharge  patient able to return to PO diet  -OC          Pain Scale: Numbers, Pretreatment  0/10 - no pain  -OC    Pain Scale: Numbers, Post-Treatment  0/10 - no pain  -OC          Dentition Assessment  missing teeth  -OC    Secretion Management  WNL/WFL  -OC    Volitional Swallow  delayed;weak  -OC    Volitional Cough  weak  -OC          Oral Motor General Assessment  generalized oral motor weakness  -OC    Vocal Impairment, Detail. Cranial Nerve X (Vagus)  other (see comments) weak, aphonic at times  -OC          Oral Prep Phase  impaired  -OC    Oral Transit  WFL  -OC    Oral Residue  WFL  -OC    Pharyngeal Phase  no overt signs/symptoms of pharyngeal impairment  -OC    Clinical Swallow Evaluation Summary  Pt's son reports pt is on regular diet at home. Pt reports PEG tube removed about 2 months ago. Pt with weak vocal quality. Pt demonstrated adequate PO acceptance and bolus manipulation. Pt demonstrated munching pattern with soft solids and multiple swallows, suspect piece meal deglutition. No overt s/s aspiration with thin, nectar thick, puree, and mech soft.  No change in vocal quality of difference in vocal qualities between nectar thick and thin trials.   -OC          SLP Swallowing Diagnosis  oral dysfunction;pharyngeal dysfunction  -OC    Functional Impact  risk of aspiration/pneumonia  -OC    Rehab Potential/Prognosis, Swallowing  good, to achieve stated therapy goals  -OC    Swallow Criteria for Skilled Therapeutic Interventions Met  demonstrates skilled criteria  -OC          Therapy Frequency (Swallow)  PRN  -OC    Predicted Duration Therapy Intervention (Days)  until discharge  -OC    SLP Diet Recommendation  soft textures;ground;thin  liquids;other (see comments) if MD in agreement and no concern for PNA at this time  -OC    Recommended Precautions and Strategies  upright posture during/after eating;small bites of food and sips of liquid;alternate between small bites of food and sips of liquid;no straw;other (see comments) slow rate, supervision with PO  -OC    SLP Rec. for Method of Medication Administration  meds whole;with pudding or applesauce;with thin liquids  -OC    Monitor for Signs of Aspiration  yes;notify SLP if any concerns  -OC    Anticipated Dischage Disposition  unknown  -OC          Oral Nutrition/Hydration Goal Selection (SLP)  oral nutrition/hydration, SLP goal 1  -OC          Oral Nutrition/Hydration Goal 1, SLP  Tolerate least restrictive diet with no overt s/s aspiration.   -OC    Time Frame (Oral Nutrition/Hydration Goal 1, SLP)  by discharge  -OC      User Key  (r) = Recorded By, (t) = Taken By, (c) = Cosigned By    Initials Name Effective Dates    OC Jatinder, SHERIF Granger,[facility]-SLP 06/08/18 -           EDUCATION  The patient has been educated in the following areas:   Dysphagia (Swallowing Impairment).    SLP Recommendation and Plan  SLP Swallowing Diagnosis: oral dysfunction, pharyngeal dysfunction  SLP Diet Recommendation: soft textures, ground, thin liquids, other (see comments)(if MD in agreement and no concern for PNA at this time)  Recommended Precautions and Strategies: upright posture during/after eating, small bites of food and sips of liquid, alternate between small bites of food and sips of liquid, no straw, other (see comments)(slow rate, supervision with PO)  SLP Rec. for Method of Medication Administration: meds whole, with pudding or applesauce, with thin liquids     Monitor for Signs of Aspiration: yes, notify SLP if any concerns     Swallow Criteria for Skilled Therapeutic Interventions Met: demonstrates skilled criteria  Anticipated Dischage Disposition: unknown  Rehab Potential/Prognosis, Swallowing: good, to  achieve stated therapy goals  Therapy Frequency (Swallow): PRN  Predicted Duration Therapy Intervention (Days): until discharge       Plan of Care Reviewed With: patient, son  Plan of Care Review  Plan of Care Reviewed With: patient, son  Outcome Summary: Bedside swallow eval completed. If MD in agreement and PNA no longer concerning, recommend mech soft and thins. Meds whole with thin or puree. Recommend upright, alert,slow rate, and supervision with all PO. ST to follow for diet tolerance.    SLP GOALS     Row Name 06/18/19 1100             Oral Nutrition/Hydration Goal 1 (SLP)    Oral Nutrition/Hydration Goal 1, SLP  Tolerate least restrictive diet with no overt s/s aspiration.   -OC      Time Frame (Oral Nutrition/Hydration Goal 1, SLP)  by discharge  -OC        User Key  (r) = Recorded By, (t) = Taken By, (c) = Cosigned By    Initials Name Provider Type    Bárbara Lee MA,CCC-SLP Speech and Language Pathologist           SLP Outcome Measures (last 72 hours)      SLP Outcome Measures     Row Name 06/18/19 1100             SLP Outcome Measures    Outcome Measure Used?  Adult NOMS  -OC         Adult FCM Scores    FCM Chosen  Swallowing  -OC      Swallowing FCM Score  4  -OC        User Key  (r) = Recorded By, (t) = Taken By, (c) = Cosigned By    Initials Name Effective Dates    Bárbara Lee MA, CCC-SLP 06/08/18 -            Time Calculation:   Time Calculation- SLP     Row Name 06/18/19 1417 06/18/19 1114          Time Calculation- SLP    SLP Start Time  1100  -OC  --     SLP Received On  06/18/19  -OC  06/18/19  -OC       User Key  (r) = Recorded By, (t) = Taken By, (c) = Cosigned By    Initials Name Provider Type    Bárbara Lee MA,CCC-SLP Speech and Language Pathologist          Therapy Charges for Today     Code Description Service Date Service Provider Modifiers Qty    61841298190 HC ST EVAL ORAL PHARYNG SWALLOW 4 6/18/2019 Bárbara Tobias MA,CCC-SLP GN 1               Bárbara Tobias  MA,CCC-SLP  6/18/2019

## 2019-06-18 NOTE — PLAN OF CARE
Problem: Patient Care Overview  Goal: Plan of Care Review   06/18/19 1047   Coping/Psychosocial   Plan of Care Reviewed With patient   OTHER   Outcome Summary Pt is a 74 yo male who presents from home with recurrent falls and weakness, L sided rib fractures, chronic compression fx of L1, hx of Parkinson's disease. Upon exam, pt demonstrates generalized weakness, impaired balance, impaired trunk control, and decreased endurance limiting mobility. Pt was independent with walker at home; however, has had multiple falls recently and is currently at high risk of falls. Pt would benefit from continued PT to address impairments and increase independence with functional mobility.

## 2019-06-18 NOTE — THERAPY EVALUATION
Acute Care - Physical Therapy Initial Evaluation  [facility]     Patient Name: Rommel Gonzalez  : 1945  MRN: [id number]  Today's Date: 2019   Onset of Illness/Injury or Date of Surgery: 19     Referring Physician: Sarbjit      Admit Date: 2019    Visit Dx:     ICD-10-CM ICD-9-CM   1. Pneumonia of both lungs due to infectious organism, unspecified part of lung J18.9 483.8   2. Closed fracture of multiple ribs of both sides, initial encounter S22.43XA 807.09   3. Closed compression fracture of first lumbar vertebra, initial encounter (CMS/[facility]) S32.010A 805.4   4. Closed fracture of third lumbar vertebra, unspecified fracture morphology, initial encounter (CMS/HCC) S32.039A 805.4   5. Decreased mobility R26.89 781.99   6. Generalized weakness R53.1 780.79     Patient Active Problem List   Diagnosis   • AF (atrial fibrillation) (CMS/[facility])   • Depression   • Gait instability   • Hypertension   • Insomnia   • Idiopathic Parkinson's disease (CMS/[facility])   • Peripheral neuropathy   • Dysarthria   • Atrial flutter (CMS/HCC)   • Anticoagulated   • Health care maintenance   • Hypothyroidism (acquired)   • Weakness of voice   • Abnormal chest CT   • Facial droop   • Hypersomnia    • Hyperlipidemia   • High risk medication use   • CORINNE on CPAP   • Obstructive sleep apnea, adult   • Fall   • Progressive supranuclear palsy (CMS/[facility])   • Aspiration pneumonia (CMS/[facility])   • Dysphagia   • 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[facility])   • Constipation   • Hyperreflexia   • Torsion dystonia   • Transient cerebral ischemia   • Pneumonia of both lungs due to infectious organism   • Closed fracture of multiple ribs   • Closed compression fracture of L1 lumbar vertebra (CMS/HCC)   • Closed fracture of transverse process of lumbar vertebra (CMS/HCC)     Past Medical History:   Diagnosis Date   • AF (atrial fibrillation) (CMS/HCC)    • Anticoagulated    • Aspiration pneumonia (CMS/HCC)    • Atrial flutter (CMS/HCC)    • Atypical chest  pain    • Chest pain    • Colon polyps    • Confusion    • Depression    • Disease of thyroid gland    • Disorder of thyroid    • Dysarthria    • Dysphagia    • Dyspnea and respiratory abnormalities    • Elevated TSH    • Fall    • Fatigue    • Gait instability    • Hay fever    • Hyperlipidemia    • Hypertension    • Hypothyroidism    • Idiopathic Parkinson's disease (CMS/HCC)    • Insomnia    • Measles    • Metabolic encephalopathy    • Mumps    • Non-traumatic rhabdomyolysis    • CORINNE (obstructive sleep apnea)    • Palpitations    • Parkinson disease (CMS/HCC)    • Peripheral neuropathy    • Pneumonia    • Poor sleep pattern    • Progressive supranuclear palsy (CMS/HCC)    • Sepsis (CMS/HCC)    • Slurred speech    • Tremor    • Weakness of voice      Past Surgical History:   Procedure Laterality Date   • ENDOSCOPY W/ PEG TUBE PLACEMENT N/A 3/16/2018    Procedure: ESOPHAGOGASTRODUODENOSCOPY WITH PERCUTANEOUS ENDOSCOPIC GASTROSTOMY TUBE INSERTION;  Surgeon: Lopez Vang Jr., MD;  Location: [facility] ENDOSCOPY;  Service: Gastroenterology        PT ASSESSMENT (last 12 hours)      Physical Therapy Evaluation     Row Name 06/18/19 1013          PT Evaluation Time/Intention    Subjective Information  complains of;weakness;fatigue;pain  -EE     Document Type  evaluation  -EE     Mode of Treatment  physical therapy;individual therapy  -EE     Patient Effort  good  -EE     Symptoms Noted During/After Treatment  none  -EE     Row Name 06/18/19 1013          General Information    Onset of Illness/Injury or Date of Surgery  06/17/19  -EE     Referring Physician  Sarbjit  -EE     Patient Observations  alert;cooperative;agree to therapy  -EE     Patient/Family Observations  Pt supine in bed in no acute distress.  -EE     Prior Level of Function  independent:;all household mobility frequent falls and worsening weakness recently  -EE     Equipment Currently Used at Home  walker, rolling  -EE     Pertinent History of Current  Functional Problem  Recurrent falls, L sided rib fractures, compression fractures of L1 and transverse process of L3, believed to be chronic in nature. Hx of Parkinson's disease.   -EE     Existing Precautions/Restrictions  fall  -EE     Barriers to Rehab  previous functional deficit  -EE     Row Name 06/18/19 1013          Cognitive Assessment/Interventions    Additional Documentation  Cognitive Assessment/Intervention (Group)  -EE     Row Name 06/18/19 1013          Cognitive Assessment/Intervention- PT/OT    Orientation Status (Cognition)  oriented to;person;place  -EE     Follows Commands (Cognition)  follows one step commands;over 90% accuracy;delayed response/completion;increased processing time needed;repetition of directions required;verbal cues/prompting required  -EE     Cognitive Function (Cognitive)  safety deficit  -EE     Safety Deficit (Cognitive)  moderate deficit;awareness of need for assistance;insight into deficits/self awareness;safety precautions awareness  -EE     Personal Safety Interventions  fall prevention program maintained;gait belt;muscle strengthening facilitated;nonskid shoes/slippers when out of bed;supervised activity  -EE     Row Name 06/18/19 1013          Safety Issues, Functional Mobility    Safety Issues Affecting Function (Mobility)  insight into deficits/self awareness;safety precaution awareness  -EE     Impairments Affecting Function (Mobility)  balance;coordination;endurance/activity tolerance;postural/trunk control;strength  -EE     Row Name 06/18/19 1013          Bed Mobility Assessment/Treatment    Bed Mobility Assessment/Treatment  rolling left;sidelying-sit  -EE     Rolling Left [place] (Bed Mobility)  moderate assist (50% patient effort);2 person assist;verbal cues;nonverbal cues (demo/gesture)  -EE     Sidelying-Sit [place] (Bed Mobility)  moderate assist (50% patient effort);2 person assist;verbal cues;nonverbal cues (demo/gesture)  -EE     Bed Mobility,  Safety Issues  decreased use of arms for pushing/pulling;decreased use of legs for bridging/pushing;impaired trunk control for bed mobility  -EE     Assistive Device (Bed Mobility)  bed rails  -EE     Comment (Bed Mobility)  performed log roll with cues to minimize L rib pain  -EE     Row Name 06/18/19 1013          Transfer Assessment/Treatment    Transfer Assessment/Treatment  sit-stand transfer;stand-sit transfer  -EE     Comment (Transfers)  verbal cues required for hand placement; performed x2 from EOB  -EE     Sit-Stand [place] (Transfers)  minimum assist (75% patient effort);2 person assist;verbal cues  -EE     Stand-Sit [place] (Transfers)  contact guard;minimum assist (75% patient effort);2 person assist;verbal cues  -EE     Row Name 06/18/19 1013          Sit-Stand Transfer    Assistive Device (Sit-Stand Transfers)  walker, front-wheeled  -EE     Row Name 06/18/19 1013          Gait/Stairs Assessment/Training    [place] Level (Gait)  minimum assist (75% patient effort);1 person to manage equipment;verbal cues  -EE     Assistive Device (Gait)  walker, front-wheeled  -EE     Distance in Feet (Gait)  25  -EE     Deviations/Abnormal Patterns (Gait)  adri decreased;festinating/shuffling;stride length decreased  -EE     Bilateral Gait Deviations  forward flexed posture;heel strike decreased;weight shift ability decreased  -EE     Comment (Gait/Stairs)  Verbal and tactile cus for upright posture and increased stride length. Min A required to guide walker around turns.  -EE     Row Name 06/18/19 1013          General ROM    GENERAL ROM COMMENTS  B LE AROM WFL  -EE     Row Name 06/18/19 1013          MMT (Manual Muscle Testing)    General MMT Comments  B LEs grossly 3+/5  -EE     Row Name 06/18/19 1013          Motor Assessment/Intervention    Additional Documentation  Balance (Group)  -EE     Row Name 06/18/19 1013          Balance    Balance  static sitting balance;static standing balance  -EE      Row Name 06/18/19 1013          Static Sitting Balance    Level of [place] (Unsupported Sitting, Static Balance)  standby assist  -EE     Sitting Position (Unsupported Sitting, Static Balance)  sitting on edge of bed  -EE     Time Able to Maintain Position (Unsupported Sitting, Static Balance)  3 to 4 minutes  -EE     Row Name 06/18/19 1013          Static Standing Balance    Level of [place] (Supported Standing, Static Balance)  contact guard assist;minimal assist, 75% patient effort  -EE     Time Able to Maintain Position (Supported Standing, Static Balance)  45 to 60 seconds  -EE     Assistive Device Utilized (Supported Standing, Static Balance)  walker, rolling  -EE     Row Name 06/18/19 1013          Pain Assessment    Additional Documentation  Pain Scale: Numbers Pre/Post-Treatment (Group)  -EE     Row Name 06/18/19 1013          Pain Scale: Numbers Pre/Post-Treatment    Pain Scale: Numbers, Pretreatment  6/10  -EE     Pain Scale: Numbers, Post-Treatment  6/10  -EE     Pain Location - Side  Left  -EE     Pain Location  flank rib fractures  -EE     Pre/Post Treatment Pain Comment  tolerated tx  -EE     Pain Intervention(s)  Repositioned;Ambulation/increased activity  -EE     Row Name 06/18/19 1013          Plan of Care Review    Plan of Care Reviewed With  patient  -EE     [facility] Name 06/18/19 1013          Physical Therapy Clinical Impression    Patient/Family Goals Statement (PT Clinical Impression)  Get stronger  -EE     Criteria for Skilled Interventions Met (PT Clinical Impression)  yes;treatment indicated  -EE     Pathology/Pathophysiology Noted (Describe Specifically for Each System)  musculoskeletal;neuromuscular  -EE     Impairments Found (describe specific impairments)  gait, locomotion, and balance;aerobic capacity/endurance;motor function  -EE     Rehab Potential (PT Clinical Summary)  good, to achieve stated therapy goals  -EE     Row Name 06/18/19 1013          Physical Therapy Goals     Bed Mobility Goal Selection (PT)  bed mobility, PT goal 1  -EE     Transfer Goal Selection (PT)  transfer, PT goal 1  -EE     Gait Training Goal Selection (PT)  gait training, PT goal 1  -EE     Row Name 06/18/19 1013          Bed Mobility Goal 1 (PT)    Activity/Assistive Device (Bed Mobility Goal 1, PT)  bed mobility activities, all  -EE     [place] Level/Cues Needed (Bed Mobility Goal 1, PT)  minimum assist (75% or more patient effort)  -EE     Time Frame (Bed Mobility Goal 1, PT)  1 week  -EE     Progress/Outcomes (Bed Mobility Goal 1, PT)  goal ongoing  -EE     Row Name 06/18/19 1013          Transfer Goal 1 (PT)    Activity/Assistive Device (Transfer Goal 1, PT)  sit-to-stand/stand-to-sit  -EE     [place] Level/Cues Needed (Transfer Goal 1, PT)  contact guard assist  -EE     Time Frame (Transfer Goal 1, PT)  1 week  -EE     Progress/Outcome (Transfer Goal 1, PT)  goal ongoing  -EE     Row Name 06/18/19 1013          Gait Training Goal 1 (PT)    Activity/Assistive Device (Gait Training Goal 1, PT)  gait (walking locomotion);walker, rolling  -EE     [place] Level (Gait Training Goal 1, PT)  contact guard assist  -EE     Distance (Gait Goal 1, PT)  100  -EE     Time Frame (Gait Training Goal 1, PT)  1 week  -EE     Progress/Outcome (Gait Training Goal 1, PT)  goal ongoing  -EE     Row Name 06/18/19 1013          Positioning and Restraints    Pre-Treatment Position  in bed  -EE     Post Treatment Position  chair  -EE     In Chair  reclined;call light within reach;encouraged to call for assist;exit alarm on;legs elevated  -EE     Row Name 06/18/19 1013          Living Environment    Home Accessibility  -- no stairs to enter per pt  -EE       User Key  (r) = Recorded By, (t) = Taken By, (c) = Cosigned By    Initials Name Provider Type    Marcy Silva PT Physical Therapist        Physical Therapy Education     Title: PT OT SLP Therapies (In Progress)     Topic: Physical Therapy (In Progress)      Point: Mobility training (Done)     Learning Progress Summary           Patient Acceptance, E,D, NR,VU by EE at 6/18/2019 10:47 AM                   Point: Body mechanics (Done)     Learning Progress Summary           Patient Acceptance, E,D, NR,VU by  at 6/18/2019 10:47 AM                   Point: Precautions (Done)     Learning Progress Summary           Patient Acceptance, E,D, NR,VU by EE at 6/18/2019 10:47 AM                               User Key     Initials Effective Dates Name Provider Type Discipline     04/03/18 -  Marcy Ramos, PT Physical Therapist PT              PT Recommendation and Plan  Anticipated Discharge Disposition (PT): skilled nursing facility  Planned Therapy Interventions (PT Eval): balance training, bed mobility training, gait training, home exercise program, patient/family education, neuromuscular re-education, motor coordination training, ROM (range of motion), stair training, strengthening, stretching, transfer training  Therapy Frequency (PT Clinical Impression): daily  Outcome Summary/Treatment Plan (PT)  Anticipated Discharge Disposition (PT): skilled nursing facility  Plan of Care Reviewed With: patient  Outcome Summary: Pt is a 72 yo male who presents from home with recurrent falls and weakness, L sided rib fractures, chronic compression fx of L1, hx of Parkinson's disease. Upon exam, pt demonstrates generalized weakness, impaired balance, impaired trunk control, and decreased endurance limiting mobility. Pt was independent with walker at home; however, has had multiple falls recently and is currently at high risk of falls. Pt would benefit from continued PT to address impairments and increase independence with functional mobility.   Outcome Measures     Row Name 06/18/19 1000             How much help from another person do you currently need...    Turning from your back to your side while in flat bed without using bedrails?  2  -EE      Moving from lying on back to sitting  on the side of a flat bed without bedrails?  2  -EE      Moving to and from a bed to a chair (including a wheelchair)?  2  -EE      Standing up from a chair using your arms (e.g., wheelchair, bedside chair)?  2  -EE      Climbing 3-5 steps with a railing?  2  -EE      To walk in hospital room?  2  -EE      AM-PAC 6 Clicks Score  12  -EE         Functional Assessment    Outcome Measure Options  AM-PAC 6 Clicks Basic Mobility (PT)  -EE        User Key  (r) = Recorded By, (t) = Taken By, (c) = Cosigned By    Initials Name Provider Type    EE Marcy Ramos, PT Physical Therapist         Time Calculation:   PT Charges     Row Name 06/18/19 1050             Time Calculation    Start Time  1012  -EE      Stop Time  1036  -EE      Time Calculation (min)  24 min  -EE      PT Received On  06/18/19  -EE      PT - Next Appointment  06/19/19  -EE      PT Goal Re-Cert Due Date  06/25/19  -EE         Time Calculation- PT    Total Timed Code Minutes- PT  11 minute(s)  -EE        User Key  (r) = Recorded By, (t) = Taken By, (c) = Cosigned By    Initials Name Provider Type    EE Marcy Ramos, PT Physical Therapist        Therapy Charges for Today     Code Description Service Date Service Provider Modifiers Qty    01951222044  PT EVAL MOD COMPLEXITY 2 6/18/2019 Marcy Ramos, PT GP 1    [id number]  PT THERAPEUTIC ACT EA 15 MIN 6/18/2019 Marcy Ramos, PT GP 1    [id number] HC PT THER SUPP EA 15 MIN 6/18/2019 Marcy Ramos, PT GP 2          PT G-Codes  Outcome Measure Options: AM-PAC 6 Clicks Basic Mobility (PT)  AM-PAC 6 Clicks Score: 12      Marcy Ramos PT  6/18/2019

## 2019-06-18 NOTE — PROGRESS NOTES
Continued Stay Note  [facility]     Patient Name: Rommel Gonzalez  MRN: [id number]  Today's Date: 6/18/2019    Admit Date: 6/17/2019    Discharge Plan     Row Name 06/18/19 1055       Plan    Plan Comments  Received call from Sammie with [facility], patient is current with their services.     Row Name 06/18/19 1006       Plan    Plan  SNF    Patient/Family in Agreement with Plan  yes    Plan Comments  IMM 6/17/2019. Met with patient and sister Raquel at bedside. Patient granted me permission to speak to him while sister remained in the room.  Face sheet verified. Prior to admission patient was living with Christianne Montanez dt [phone], she would assist him as needed with his ADL's.  Patient has a walker, wheelchair and he does not CPAP.  Patient uses [facility] in [place] denies issues obtaining or affording his medications.  Patient does not have POA or living will on file at the hospital.  Discharge plans discussed, provider list given and road to recovery booklet. Patient and sister agree that short-term rehab would be a solid plan, considering his recent episodes of increased falls and overall weakened condition. Patient and family requested the following rehab facilities. 1. [facility] [place] will review and follow. 2. [facility] -spoke with Lisa crum will review and follow 3. [facility]. Family should transport. Will continue to monitor for new or changing discharge plans.        Discharge Codes    No documentation.             Bessie Fitzgerald RN

## 2019-06-18 NOTE — THERAPY EVALUATION
Acute Care - Occupational Therapy Initial Evaluation  [facility]     Patient Name: Rommel Gonzalez  : 1945  MRN: [id number]  Today's Date: 2019  Onset of Illness/Injury or Date of Surgery: 19     Referring Physician: Sarbjit    Admit Date: 2019       ICD-10-CM ICD-9-CM   1. Pneumonia of both lungs due to infectious organism, unspecified part of lung J18.9 483.8   2. Closed fracture of multiple ribs of both sides, initial encounter S22.43XA 807.09   3. Closed compression fracture of first lumbar vertebra, initial encounter (CMS/[facility]) S32.010A 805.4   4. Closed fracture of third lumbar vertebra, unspecified fracture morphology, initial encounter (CMS/[facility]) S32.039A 805.4   5. Decreased mobility R26.89 781.99   6. Generalized weakness R53.1 780.79     Patient Active Problem List   Diagnosis   • AF (atrial fibrillation) (CMS/[facility])   • Depression   • Gait instability   • Hypertension   • Insomnia   • Idiopathic Parkinson's disease (CMS/HCC)   • Peripheral neuropathy   • Dysarthria   • Atrial flutter (CMS/HCC)   • Anticoagulated   • Health care maintenance   • Hypothyroidism (acquired)   • Weakness of voice   • Abnormal chest CT   • Facial droop   • Hypersomnia    • Hyperlipidemia   • High risk medication use   • CORINNE on CPAP   • Obstructive sleep apnea, adult   • Fall   • Progressive supranuclear palsy (CMS/HCC)   • Aspiration pneumonia (CMS/HCC)   • Dysphagia   • 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HCC)   • Constipation   • Hyperreflexia   • Torsion dystonia   • Transient cerebral ischemia   • Pneumonia of both lungs due to infectious organism   • Closed fracture of multiple ribs   • Closed compression fracture of L1 lumbar vertebra (CMS/HCC)   • Closed fracture of transverse process of lumbar vertebra (CMS/HCC)     Past Medical History:   Diagnosis Date   • AF (atrial fibrillation) (CMS/HCC)    • Anticoagulated    • Aspiration pneumonia (CMS/HCC)    • Atrial flutter (CMS/HCC)    • Atypical chest pain    •  Chest pain    • Colon polyps    • Confusion    • Depression    • Disease of thyroid gland    • Disorder of thyroid    • Dysarthria    • Dysphagia    • Dyspnea and respiratory abnormalities    • Elevated TSH    • Fall    • Fatigue    • Gait instability    • Hay fever    • Hyperlipidemia    • Hypertension    • Hypothyroidism    • Idiopathic Parkinson's disease (CMS/HCC)    • Insomnia    • Measles    • Metabolic encephalopathy    • Mumps    • Non-traumatic rhabdomyolysis    • CORINNE (obstructive sleep apnea)    • Palpitations    • Parkinson disease (CMS/HCC)    • Peripheral neuropathy    • Pneumonia    • Poor sleep pattern    • Progressive supranuclear palsy (CMS/HCC)    • Sepsis (CMS/HCC)    • Slurred speech    • Tremor    • Weakness of voice      Past Surgical History:   Procedure Laterality Date   • ENDOSCOPY W/ PEG TUBE PLACEMENT N/A 3/16/2018    Procedure: ESOPHAGOGASTRODUODENOSCOPY WITH PERCUTANEOUS ENDOSCOPIC GASTROSTOMY TUBE INSERTION;  Surgeon: Lopez Vang Jr., MD;  Location: [facility] ENDOSCOPY;  Service: Gastroenterology          OT ASSESSMENT FLOWSHEET (last 12 hours)      Occupational Therapy Evaluation     Row Name 06/18/19 1792                   OT Evaluation Time/Intention    Subjective Information  complains of;pain  -SG        Document Type  evaluation  -SG        Mode of Treatment  individual therapy;occupational therapy  -SG        Patient Effort  good  -SG           General Information    Patient Profile Reviewed?  yes  -SG        Patient Observations  alert;cooperative;agree to therapy  -SG        Prior Level of Function  independent:;dressing family states pt receives assist with bathing 2x week  -SG        Existing Precautions/Restrictions  fall  -SG           Cognitive Assessment/Intervention- PT/OT    Orientation Status (Cognition)  oriented to;person;place  -SG        Follows Commands (Cognition)  follows one step commands;delayed response/completion;increased processing time needed;initiation  impaired;verbal cues/prompting required  -SG           Bed Mobility Assessment/Treatment    Comment (Bed Mobility)  pt up in chair  -SG           Sit-Stand Transfer    Sit-Stand [place] (Transfers)  -- pt states fatigued from working with PT. MD [place] room  -        Assistive Device (Sit-Stand Transfers)  -- attempted to stand from chair. Unable to with assist x1  -SG           ADL Assessment/Intervention    BADL Assessment/Intervention  lower body dressing  -SG           Lower Body Dressing Assessment/Training    Lower Body Dressing [place] Level  lower body dressing skills;dependent (less than 25% patient effort)  -        Lower Body Dressing Position  supported sitting  -SG           General ROM    GENERAL ROM COMMENTS  BUE's WFL AROM  -SG           Positioning and Restraints    Pre-Treatment Position  sitting in chair/recliner  -SG        Post Treatment Position  chair  -SG        In Chair  reclined;call light within reach;encouraged to call for assist;exit alarm on  -SG           Pain Assessment    Additional Documentation  Pain Scale: Word Pre/Post-Treatment (Group)  -SG           Pain Scale: Word Pre/Post-Treatment    Pain: Word Scale, Pretreatment  6 - moderate-severe pain  -SG        Pain: Word Scale, Post-Treatment  6 - moderate-severe pain  -SG        Pre/Post Treatment Pain Comment  left rib. Md enters room and aware  -SG           Plan of Care Review    Plan of Care Reviewed With  patient  -SG           Clinical Impression (OT)    OT Diagnosis  need for assist with personal care  -        Criteria for Skilled Therapeutic Interventions Met (OT Eval)  yes;treatment indicated  -        Therapy Frequency (OT Eval)  5 times/wk  -SG        Care Plan Review (OT)  evaluation/treatment results reviewed  -SG           Planned OT Interventions    Planned Therapy Interventions (OT Eval)  activity tolerance training;BADL retraining;transfer/mobility retraining  -           OT Goals    Transfer  Goal Selection (OT)  transfer, OT goal 1  -SG        Dressing Goal Selection (OT)  dressing, OT goal 1  -SG        Toileting Goal Selection (OT)  toileting, OT goal 1  -SG           Transfer Goal 1 (OT)    Activity/Assistive Device (Transfer Goal 1, OT)  toilet  -SG        [place] Level/Cues Needed (Transfer Goal 1, OT)  minimum assist (75% or more patient effort)  -SG        Time Frame (Transfer Goal 1, OT)  1 week  -SG        Progress/Outcome (Transfer Goal 1, OT)  goal ongoing  -SG           Dressing Goal 1 (OT)    Activity/Assistive Device (Dressing Goal 1, OT)  dressing skills, all  -SG        [place]/Cues Needed (Dressing Goal 1, OT)  minimum assist (75% or more patient effort)  -SG        Time Frame (Dressing Goal 1, OT)  1 week  -SG        Progress/Outcome (Dressing Goal 1, OT)  goal ongoing  -SG           Toileting Goal 1 (OT)    Activity/Device (Toileting Goal 1, OT)  toileting skills, all  -SG        [place] Level/Cues Needed (Toileting Goal 1, OT)  minimum assist (75% or more patient effort)  -SG        Time Frame (Toileting Goal 1, OT)  1 week  -SG        Progress/Outcome (Toileting Goal 1, OT)  goal ongoing  -SG          User Key  (r) = Recorded By, (t) = Taken By, (c) = Cosigned By    Initials Name Effective Dates    Lisa Weber OTR 06/08/18 -          Occupational Therapy Education     Title: PT OT SLP Therapies (In Progress)     Topic: Occupational Therapy (In Progress)     Point: ADL training (Done)     Description: Instruct learner(s) on proper safety adaptation and remediation techniques during self care or transfers.   Instruct in proper use of assistive devices.    Learning Progress Summary           Patient Acceptance, E,TB,D, VU by  at 6/18/2019  1:48 PM    Comment:  role of OT and POC, safety for adls   Family Acceptance, E,TB,D, VU by  at 6/18/2019  1:48 PM    Comment:  role of OT and POC, safety for adls                               User Key     Initials  Effective Dates Name Provider Type Discipline     06/08/18 -  Lisa Rice OTR Occupational Therapist OT                  OT Recommendation and Plan  Planned Therapy Interventions (OT Eval): activity tolerance training, BADL retraining, transfer/mobility retraining  Therapy Frequency (OT Eval): 5 times/wk  Plan of Care Review  Plan of Care Reviewed With: patient  Plan of Care Reviewed With: patient  Outcome Summary: Pt presents to OT with decreased strength and endurance, c/o pain with left side due to rib fx per pt. which limits safety and independence for adls. Pt unable to stand from chair with assist of one today. Pt will continue to benefit from OT    Outcome Measures     Row Name 06/18/19 1351 06/18/19 1000          How much help from another person do you currently need...    Turning from your back to your side while in flat bed without using bedrails?  --  2  -EE     Moving from lying on back to sitting on the side of a flat bed without bedrails?  --  2  -EE     Moving to and from a bed to a chair (including a wheelchair)?  --  2  -EE     Standing up from a chair using your arms (e.g., wheelchair, bedside chair)?  --  2  -EE     Climbing 3-5 steps with a railing?  --  2  -EE     To walk in hospital room?  --  2  -EE     AM-PAC 6 Clicks Score  --  12  -EE        How much help from another is currently needed...    Putting on and taking off regular lower body clothing?  2  -SG  --     Bathing (including washing, rinsing, and drying)  2  -SG  --     Toileting (which includes using toilet bed pan or urinal)  2  -SG  --     Putting on and taking off regular upper body clothing  3  -SG  --     Taking care of personal grooming (such as brushing teeth)  3  -SG  --     Eating meals  1  -SG  --     Score  13  -SG  --        Functional Assessment    Outcome Measure Options  AM-PAC 6 Clicks Daily Activity (OT)  -SG  AM-PAC 6 Clicks Basic Mobility (PT)  -EE       User Key  (r) = Recorded By, (t) = Taken By, (c) =  Cosigned By    Initials Name Provider Type    Lisa Weber OTR Occupational Therapist    Marcy Silva, PT Physical Therapist          Time Calculation:   Time Calculation- OT     Row Name 06/18/19 1352             Time Calculation- OT    OT Start Time  1044  -SG      OT Stop Time  1104  -      OT Time Calculation (min)  20 min  -      Total Timed Code Minutes- OT  10 minute(s)  -      OT Received On  06/18/19  -      OT Goal Re-Cert Due Date  06/25/19  -        User Key  (r) = Recorded By, (t) = Taken By, (c) = Cosigned By    Initials Name Provider Type     Lisa Rice, OTR Occupational Therapist        Therapy Charges for Today     Code Description Service Date Service Provider Modifiers Qty    [id number]  OT EVAL LOW COMPLEXITY 2 6/18/2019 Lisa Rice OTR GO 1    [id number]  OT SELF CARE/MGMT/TRAIN EA 15 MIN 6/18/2019 Lisa Rice OTR GO 1               JAVIER Hollis  6/18/2019

## 2019-06-18 NOTE — PROGRESS NOTES
Discharge Planning Assessment  [facility]     Patient Name: Rommel Gonzalez  MRN: [id number]  Today's Date: 6/18/2019    Admit Date: 6/17/2019    Discharge Needs Assessment     Row Name 06/18/19 0959       Living Environment    Lives With  child(hyacinth), adult    Name(s) of Who Lives With Patient   Christianne Montanez [facility] [phone] and her family    Current Living Arrangements  home/apartment/condo    Primary Care Provided by  self    Provides Primary Care For  no one    Family Caregiver if Needed  child(hyacinth), adult    Family Caregiver Names   Christianne Montanez [facility] [phone]    Quality of Family Relationships  supportive    Able to Return to Prior Arrangements  no       Resource/Environmental Concerns    Resource/Environmental Concerns  none    Transportation Concerns  car, none       Transition Planning    Patient/Family Anticipates Transition to  inpatient rehabilitation facility    Patient/Family Anticipated Services at Transition  rehabilitation services    Transportation Anticipated  family or friend will provide       Discharge Needs Assessment    Readmission Within the Last 30 Days  no previous admission in last 30 days    Concerns to be Addressed  discharge planning    Equipment Currently Used at Home  walker, rolling    Discharge Facility/Level of Care Needs  rehabilitation facility    Offered/Gave Vendor List  yes    Current Discharge Risk  chronically ill        Discharge Plan     Row Name 06/18/19 1006       Plan    Plan  SNF    Patient/Family in Agreement with Plan  yes    Plan Comments  IMM 6/17/2019. Met with patient and sister Raquel at bedside. Patient granted me permission to speak to him while sister remained in the room.  Face sheet verified. Prior to admission patient was living with Christianne Montanez dtame [address], she would assist him as needed with his ADL's.  Patient has a walker, wheelchair and he does not CPAP.  Patient uses the [facility] pharmacy in [place] denies issues obtaining or affording his  medications.  Patient does not have POA or living will on file at the hospital.  Discharge plans discussed, provider list given and road to recovery booklet. Patient and sister agree that short-term rehab would be a solid plan, considering his recent episodes of increased falls and overall weakened condition. Patient and family requested the following rehab facilities. 1. [facility] post-Acute- Elodia will review and follow. 2. [facility] -spoke with Lisa she will review and follow 3. [facility]. Family should transport. Will continue to monitor for new or changing discharge plans.        Destination      Service Provider Request Status Selected Services Address Phone Number Fax Number    [facility] Pending - Request Sent N/A [id number] [facility] [phone]       Bessie Fitzgerald RN 6/18/2019 0952    6/18/2019 first choice, spoke with Elodia she will review and follow.                [facility] Pending - Request Sent N/A [id number] [facility] [id number] [phone]       Bessie Fitzgerald RN 6/18/2019 0954    6/18/2019 2nd choice. Spoke with lisa she will review and follow.                [facility] Pending - Request Sent N/A [address] [address] [facility] [phone] [phone]       Bessie Fitzgerald RN 6/18/2019 0957    6/18/2019 3rd choice.                  Durable Medical Equipment      No service coordination in this encounter.      Dialysis/Infusion      No service coordination in this encounter.      Home Medical Care      No service coordination in this encounter.      Therapy      No service coordination in this encounter.      Community Resources      No service coordination in this encounter.          Demographic Summary     Row Name 06/18/19 0957       General Information    Admission Type  inpatient    Arrived From  emergency department    Required Notices Provided  Important Message  from Medicare    Referral Source  admission list    Reason for Consult  discharge planning    Preferred Language  English     Used During This Interaction  no       Contact Information    Permission Granted to Share Info With  family/designee  Christianne Montanez [facility] [phone]        Functional Status     Row Name 06/18/19 0958       Functional Status    Usual Activity Tolerance  moderate    Current Activity Tolerance  fair       Functional Status, IADL    Medications  assistive person    Meal Preparation  assistive person    Housekeeping  completely dependent    Laundry  completely dependent    Shopping  completely dependent       Mental Status    General Appearance WDL  appearance    General Appearance  unshaven;unkempt       Mental Status Summary    Recent Changes in Mental Status/Cognitive Functioning  ability to speak clearly       Employment/    Employment Status  retired        Psychosocial    No documentation.       Abuse/Neglect    No documentation.       Legal    No documentation.       Substance Abuse    No documentation.       Patient Forms     Row Name 06/18/19 1006       Patient Forms    Provider Choice List  Delivered    Delivered to  Support person    Method of delivery  In person            Bessie Fitzgerald RN

## 2019-06-18 NOTE — CONSULTS
Orthopedic Consult      Patient: Rommel Gonzalez    Date of Admission: 6/17/2019 10:36 AM    YOB: 1945    Medical Record Number: [id number]    Attending Physician: Leon Esparza MD    Consulting Physician: Leon Esparza MD      Chief Complaints: Weakness and falling, reported L1 fracture      History of Present Illness: 73 y.o. male admitted to [facility] to services of Leon Esparza MD with pneumonia, weakness and following and concern for L1 fracture.  The patient has a plethora of neurologic abnormalities including supranuclear palsy, atypical Parkinson's disease, history of peripheral neuropathy and multiple medical issues.  He is fallen at least 4 times in the recent past according to his sister.  The patient answers simple questions in a straightforward manner but has a speech impediment to the extent it is difficult to understand on more complicated concerns.  He denies any acute neurologic changes but notes difficulty walking and normally uses a walker with some difficulty.  Letter complaints.      Allergies: No Known Allergies    Medications:   Home Medications:  No current facility-administered medications on file prior to encounter.      Current Outpatient Medications on File Prior to Encounter   Medication Sig   • bumetanide (BUMEX) 0.5 MG tablet Take 1 tablet by mouth Daily As Needed (edema).   • carbidopa-levodopa ER (SINEMET CR)  MG per tablet Take 2.5 tablets by mouth 3 (Three) Times a Day.   • carvedilol (COREG) 6.25 MG tablet Take 1 tablet by mouth 2 (Two) Times a Day With Meals.   • doxazosin (CARDURA) 2 MG tablet Take 2 mg by mouth Every Night.   • levothyroxine (SYNTHROID, LEVOTHROID) 125 MCG tablet Take 1 tablet by mouth Daily.   • midodrine (PROAMATINE) 2.5 MG tablet Take 1 tablet by mouth Daily.   • Multiple Vitamins-Minerals (CERTAVITE/ANTIOXIDANTS) tablet Take 1 tablet by mouth Daily.   • rivaroxaban (XARELTO) 20 MG tablet Take 1 tablet by  mouth Daily With Dinner.     Current Medications:  Scheduled Meds:  carbidopa-levodopa ER 3 tablet Oral TID   carvedilol 6.25 mg Oral BID With Meals   levothyroxine 125 mcg Oral Q AM   midodrine 2.5 mg Oral Daily   rivaroxaban 20 mg Oral Daily With Dinner   sodium chloride 3 mL Intravenous Q12H   terazosin 2 mg Oral Nightly     Continuous Infusions:  sodium chloride 100 mL/hr Last Rate: 100 mL/hr (06/17/19 2102)     PRN Meds:.•  acetaminophen  •  bumetanide  •  HYDROcodone-acetaminophen  •  Morphine **AND** naloxone  •  nitroglycerin  •  ondansetron  •  sodium chloride  •  sodium chloride    Past Medical History:   Diagnosis Date   • AF (atrial fibrillation) (CMS/HCC)    • Anticoagulated    • Aspiration pneumonia (CMS/HCC)    • Atrial flutter (CMS/HCC)    • Atypical chest pain    • Chest pain    • Colon polyps    • Confusion    • Depression    • Disease of thyroid gland    • Disorder of thyroid    • Dysarthria    • Dysphagia    • Dyspnea and respiratory abnormalities    • Elevated TSH    • Fall    • Fatigue    • Gait instability    • Hay fever    • Hyperlipidemia    • Hypertension    • Hypothyroidism    • Idiopathic Parkinson's disease (CMS/HCC)    • Insomnia    • Measles    • Metabolic encephalopathy    • Mumps    • Non-traumatic rhabdomyolysis    • CORINNE (obstructive sleep apnea)    • Palpitations    • Parkinson disease (CMS/HCC)    • Peripheral neuropathy    • Pneumonia    • Poor sleep pattern    • Progressive supranuclear palsy (CMS/HCC)    • Sepsis (CMS/HCC)    • Slurred speech    • Tremor    • Weakness of voice      Past Surgical History:   Procedure Laterality Date   • ENDOSCOPY W/ PEG TUBE PLACEMENT N/A 3/16/2018    Procedure: ESOPHAGOGASTRODUODENOSCOPY WITH PERCUTANEOUS ENDOSCOPIC GASTROSTOMY TUBE INSERTION;  Surgeon: Lopez Vang Jr., MD;  Location: [facility] ENDOSCOPY;  Service: Gastroenterology     Social History     Occupational History   • Not on file   Tobacco Use   • Smoking status: Former Smoker   •  Smokeless tobacco: Never Used   • Tobacco comment: Daily caffeine use   Substance and Sexual Activity   • Alcohol use: No     Frequency: Never   • Drug use: No   • Sexual activity: No    Social History     Social History Narrative   • Not on file     Family History   Problem Relation Age of Onset   • Hypertension Mother    • Glaucoma Mother    • Throat cancer Father    • Alcohol abuse Father    • Hypertension Sister    • Cancer Sister         malignant neoplasm of urinary bladder   • Lung cancer Brother    • Heart attack Other    • Lung disease Other          Review of Systems:     Constitutional:  Denies fever, shaking or chills   Eyes:  Denies change in visual acuity   HEENT:  Denies nasal congestion or sore throat   Respiratory:  Denies cough or shortness of breath   Cardiovascular:  Denies chest pain or edema  Endocrine: Denies tremors, palpitations, intolerance of heat or cold, polyuria, polydipsia.  GI:  Denies abdominal pain, nausea, vomiting, bloody stools or diarrhea  :  Denies frequency, urgency, incontinence, retention, or nocturia.  Musculoskeletal:  Denies numbness tingling or loss of motor function except as above  Integument:  Denies rash, lesion or ulceration   Neurologic:  Denies headache or focal weakness, deficits  Heme:  Denies epistaxis, spontaneous or excessive bleeding, epistaxis, hematuria, melena, fatigue, enlarged or tender lymph nodes.      All other pertinent positives and negatives as noted above in HPI.    Physical Exam: 73 y.o. male    General:  Awake, alert. No acute distress.      Head/Neck:  Normocephalic, atraumatic.  Conjunctiva and sclera clear.  Hearing adequate for the exam.  Neck is supple with normal ROM.    Psych:  Affect and demeanor appropriate.    CV:  Regular rate and rhythm.  Hemodynamically stable.    Lungs:  Good chest expansion, breathing unlabored.    Abdomen:  Soft.  Non-tender, non-distended.    He is lying supine in a comfortable position.  The legs are  extended in the feet are in a hyperflexed position bilaterally.  The back is completely nontender to palpation in the skin about the area is normal he does have some mild tenderness in the lateral and anterolateral aspect of the left chest and subcostal area.  No palpable crepitus.  He has good strength in the legs but has bilateral Achilles contractures of mild extent.  Also appears to have an element of clonus.    All other extremities atraumatic without gross abnormality.       Diagnostic Tests:    Admission on 06/17/2019   Component Date Value Ref Range Status   • Extra Tube 06/17/2019 hold for add-on   Final    Auto resulted   • Extra Tube 06/17/2019 Hold for add-ons.   Final    Auto resulted.   • Extra Tube 06/17/2019 hold for add-on   Final    Auto resulted   • Extra Tube 06/17/2019 Hold for add-ons.   Final    Auto resulted.   • Color, UA 06/17/2019 Yellow  Yellow, Straw Final   • Appearance, UA 06/17/2019 Clear  Clear Final   • pH, UA 06/17/2019 7.5  5.0 - 8.0 Final   • Specific Gravity, UA 06/17/2019 1.018  1.005 - 1.030 Final   • Glucose, UA 06/17/2019 Negative  Negative Final   • Ketones, UA 06/17/2019 Negative  Negative Final   • Bilirubin, UA 06/17/2019 Negative  Negative Final   • Blood, UA 06/17/2019 Trace* Negative Final   • Protein, UA 06/17/2019 Negative  Negative Final   • Leuk Esterase, UA 06/17/2019 Negative  Negative Final   • Nitrite, UA 06/17/2019 Negative  Negative Final   • Urobilinogen, UA 06/17/2019 1.0 E.U./dL  0.2 - 1.0 E.U./dL Final   • Glucose 06/17/2019 99  65 - 99 mg/dL Final   • BUN 06/17/2019 11  8 - 23 mg/dL Final   • Creatinine 06/17/2019 0.76  0.76 - 1.27 mg/dL Final   • Sodium 06/17/2019 142  136 - 145 mmol/L Final   • Potassium 06/17/2019 4.1  3.5 - 5.2 mmol/L Final   • Chloride 06/17/2019 103  98 - 107 mmol/L Final   • CO2 06/17/2019 29.9* 22.0 - 29.0 mmol/L Final   • Calcium 06/17/2019 8.8  8.6 - 10.5 mg/dL Final   • Total Protein 06/17/2019 6.6  6.0 - 8.5 g/dL Final   •  Albumin 06/17/2019 4.00  3.50 - 5.20 g/dL Final   • ALT (SGPT) 06/17/2019 5  1 - 41 U/L Final   • AST (SGOT) 06/17/2019 16  1 - 40 U/L Final   • Alkaline Phosphatase 06/17/2019 72  39 - 117 U/L Final   • Total Bilirubin 06/17/2019 0.6  0.2 - 1.2 mg/dL Final   • eGFR Non African Amer 06/17/2019 100  >60 mL/min/1.73 Final   • Globulin 06/17/2019 2.6  gm/dL Final   • A/G Ratio 06/17/2019 1.5  g/dL Final   • BUN/Creatinine Ratio 06/17/2019 14.5  7.0 - 25.0 Final   • Anion Gap 06/17/2019 9.1  mmol/L Final   • Troponin T 06/17/2019 <0.010  0.000 - 0.030 ng/mL Final   • Magnesium 06/17/2019 2.1  1.6 - 2.4 mg/dL Final   • WBC 06/17/2019 7.59  3.40 - 10.80 10*3/mm3 Final   • RBC 06/17/2019 4.07* 4.14 - 5.80 10*6/mm3 Final   • Hemoglobin 06/17/2019 12.5* 13.0 - 17.7 g/dL Final   • Hematocrit 06/17/2019 39.4  37.5 - 51.0 % Final   • MCV 06/17/2019 96.8  79.0 - 97.0 fL Final   • MCH 06/17/2019 30.7  26.6 - 33.0 pg Final   • MCHC 06/17/2019 31.7  31.5 - 35.7 g/dL Final   • RDW 06/17/2019 13.8  12.3 - 15.4 % Final   • RDW-SD 06/17/2019 49.1  37.0 - 54.0 fl Final   • MPV 06/17/2019 11.5  6.0 - 12.0 fL Final   • Platelets 06/17/2019 253  140 - 450 10*3/mm3 Final   • Neutrophil % 06/17/2019 69.8  42.7 - 76.0 % Final   • Lymphocyte % 06/17/2019 19.9  19.6 - 45.3 % Final   • Monocyte % 06/17/2019 7.6  5.0 - 12.0 % Final   • Eosinophil % 06/17/2019 2.0  0.3 - 6.2 % Final   • Basophil % 06/17/2019 0.4  0.0 - 1.5 % Final   • Immature Grans % 06/17/2019 0.3  0.0 - 0.5 % Final   • Neutrophils, Absolute 06/17/2019 5.30  1.70 - 7.00 10*3/mm3 Final   • Lymphocytes, Absolute 06/17/2019 1.51  0.70 - 3.10 10*3/mm3 Final   • Monocytes, Absolute 06/17/2019 0.58  0.10 - 0.90 10*3/mm3 Final   • Eosinophils, Absolute 06/17/2019 0.15  0.00 - 0.40 10*3/mm3 Final   • Basophils, Absolute 06/17/2019 0.03  0.00 - 0.20 10*3/mm3 Final   • Immature Grans, Absolute 06/17/2019 0.02  0.00 - 0.05 10*3/mm3 Final   • nRBC 06/17/2019 0.0  0.0 - 0.2 /100 WBC Final    • RBC, UA 06/17/2019 6-12* None Seen, 0-2 /HPF Final   • WBC, UA 06/17/2019 0-2  None Seen, 0-2 /HPF Final   • Bacteria, UA 06/17/2019 None Seen  None Seen /HPF Final   • Squamous Epithelial Cells, UA 06/17/2019 0-2  None Seen, 0-2 /HPF Final   • Hyaline Casts, UA 06/17/2019 None Seen  None Seen /LPF Final   • Methodology 06/17/2019 Automated Microscopy   Final   • Procalcitonin 06/17/2019 0.04* 0.10 - 0.25 ng/mL Final   • Lactate 06/17/2019 0.9  0.5 - 2.0 mmol/L Final   • Glucose 06/18/2019 86  65 - 99 mg/dL Final   • BUN 06/18/2019 8  8 - 23 mg/dL Final   • Creatinine 06/18/2019 0.86  0.76 - 1.27 mg/dL Final   • Sodium 06/18/2019 141  136 - 145 mmol/L Final   • Potassium 06/18/2019 3.9  3.5 - 5.2 mmol/L Final   • Chloride 06/18/2019 105  98 - 107 mmol/L Final   • CO2 06/18/2019 25.6  22.0 - 29.0 mmol/L Final   • Calcium 06/18/2019 8.3* 8.6 - 10.5 mg/dL Final   • Total Protein 06/18/2019 5.8* 6.0 - 8.5 g/dL Final   • Albumin 06/18/2019 3.30* 3.50 - 5.20 g/dL Final   • ALT (SGPT) 06/18/2019 <5  1 - 41 U/L Final   • AST (SGOT) 06/18/2019 13  1 - 40 U/L Final   • Alkaline Phosphatase 06/18/2019 68  39 - 117 U/L Final   • Total Bilirubin 06/18/2019 1.1  0.2 - 1.2 mg/dL Final   • eGFR Non  Amer 06/18/2019 87  >60 mL/min/1.73 Final   • Globulin 06/18/2019 2.5  gm/dL Final   • A/G Ratio 06/18/2019 1.3  g/dL Final   • BUN/Creatinine Ratio 06/18/2019 9.3  7.0 - 25.0 Final   • Anion Gap 06/18/2019 10.4  mmol/L Final   • WBC 06/18/2019 6.54  3.40 - 10.80 10*3/mm3 Final   • RBC 06/18/2019 3.71* 4.14 - 5.80 10*6/mm3 Final   • Hemoglobin 06/18/2019 11.4* 13.0 - 17.7 g/dL Final   • Hematocrit 06/18/2019 35.4* 37.5 - 51.0 % Final   • MCV 06/18/2019 95.4  79.0 - 97.0 fL Final   • MCH 06/18/2019 30.7  26.6 - 33.0 pg Final   • MCHC 06/18/2019 32.2  31.5 - 35.7 g/dL Final   • RDW 06/18/2019 13.7  12.3 - 15.4 % Final   • RDW-SD 06/18/2019 47.8  37.0 - 54.0 fl Final   • MPV 06/18/2019 11.1  6.0 - 12.0 fL Final   • Platelets  06/18/2019 237  140 - 450 10*3/mm3 Final   • Neutrophil % 06/18/2019 62.7  42.7 - 76.0 % Final   • Lymphocyte % 06/18/2019 24.5  19.6 - 45.3 % Final   • Monocyte % 06/18/2019 9.3  5.0 - 12.0 % Final   • Eosinophil % 06/18/2019 2.6  0.3 - 6.2 % Final   • Basophil % 06/18/2019 0.6  0.0 - 1.5 % Final   • Immature Grans % 06/18/2019 0.3  0.0 - 0.5 % Final   • Neutrophils, Absolute 06/18/2019 4.10  1.70 - 7.00 10*3/mm3 Final   • Lymphocytes, Absolute 06/18/2019 1.60  0.70 - 3.10 10*3/mm3 Final   • Monocytes, Absolute 06/18/2019 0.61  0.10 - 0.90 10*3/mm3 Final   • Eosinophils, Absolute 06/18/2019 0.17  0.00 - 0.40 10*3/mm3 Final   • Basophils, Absolute 06/18/2019 0.04  0.00 - 0.20 10*3/mm3 Final   • Immature Grans, Absolute 06/18/2019 0.02  0.00 - 0.05 10*3/mm3 Final   • nRBC 06/18/2019 0.0  0.0 - 0.2 /100 WBC Final     Lab Results (last 24 hours)     Procedure Component Value Units Date/Time    Comprehensive Metabolic Panel [[id number]]  (Abnormal) Collected:  06/18/19 0515    Specimen:  Blood Updated:  06/18/19 0656     Glucose 86 mg/dL      BUN 8 mg/dL      Creatinine 0.86 mg/dL      Sodium 141 mmol/L      Potassium 3.9 mmol/L      Chloride 105 mmol/L      CO2 25.6 mmol/L      Calcium 8.3 mg/dL      Total Protein 5.8 g/dL      Albumin 3.30 g/dL      ALT (SGPT) <5 U/L      AST (SGOT) 13 U/L      Alkaline Phosphatase 68 U/L      Total Bilirubin 1.1 mg/dL      eGFR Non African Amer 87 mL/min/1.73      Globulin 2.5 gm/dL      A/G Ratio 1.3 g/dL      BUN/Creatinine Ratio 9.3     Anion Gap 10.4 mmol/L     Narrative:       GFR Normal >60  Chronic Kidney Disease <60  Kidney Failure <15    CBC Auto Differential [[id number]]  (Abnormal) Collected:  06/18/19 0515    Specimen:  Blood Updated:  06/18/19 0621     WBC 6.54 10*3/mm3      RBC 3.71 10*6/mm3      Hemoglobin 11.4 g/dL      Hematocrit 35.4 %      MCV 95.4 fL      MCH 30.7 pg      MCHC 32.2 g/dL      RDW 13.7 %      RDW-SD 47.8 fl      MPV 11.1 fL      Platelets 237 10*3/mm3       Neutrophil % 62.7 %      Lymphocyte % 24.5 %      Monocyte % 9.3 %      Eosinophil % 2.6 %      Basophil % 0.6 %      Immature Grans % 0.3 %      Neutrophils, Absolute 4.10 10*3/mm3      Lymphocytes, Absolute 1.60 10*3/mm3      Monocytes, Absolute 0.61 10*3/mm3      Eosinophils, Absolute 0.17 10*3/mm3      Basophils, Absolute 0.04 10*3/mm3      Immature Grans, Absolute 0.02 10*3/mm3      nRBC 0.0 /100 WBC     CBC & Differential [[id number]] Collected:  06/17/19 1211    Specimen:  Blood Updated:  06/17/19 1532    Narrative:       The following orders were created for panel order CBC & Differential.  Procedure                               Abnormality         Status                     ---------                               -----------         ------                     CBC Auto Differential[[id number]]        Abnormal            Final result                 Please view results for these tests on the individual orders.    CBC Auto Differential [[id number]]  (Abnormal) Collected:  06/17/19 1211    Specimen:  Blood Updated:  06/17/19 1532     WBC 7.59 10*3/mm3      RBC 4.07 10*6/mm3      Hemoglobin 12.5 g/dL      Hematocrit 39.4 %      MCV 96.8 fL      MCH 30.7 pg      MCHC 31.7 g/dL      RDW 13.8 %      RDW-SD 49.1 fl      MPV 11.5 fL      Platelets 253 10*3/mm3      Neutrophil % 69.8 %      Lymphocyte % 19.9 %      Monocyte % 7.6 %      Eosinophil % 2.0 %      Basophil % 0.4 %      Immature Grans % 0.3 %      Neutrophils, Absolute 5.30 10*3/mm3      Lymphocytes, Absolute 1.51 10*3/mm3      Monocytes, Absolute 0.58 10*3/mm3      Eosinophils, Absolute 0.15 10*3/mm3      Basophils, Absolute 0.03 10*3/mm3      Immature Grans, Absolute 0.02 10*3/mm3      nRBC 0.0 /100 WBC     Lactic Acid, Plasma [[id number]]  (Normal) Collected:  06/17/19 1401    Specimen:  Blood Updated:  06/17/19 1429     Lactate 0.9 mmol/L     Procalcitonin [[id number]]  (Abnormal) Collected:  06/17/19 1207    Specimen:  Blood Updated:  06/17/19 1425  "    Procalcitonin 0.04 ng/mL     Narrative:       As a Marker for Sepsis (Non-Neonates):   1. <0.5 ng/mL represents a low risk of severe sepsis and/or septic shock.  1. >2 ng/mL represents a high risk of severe sepsis and/or septic shock.    As a Marker for Lower Respiratory Tract Infections that require antibiotic therapy:  PCT on Admission     Antibiotic Therapy             6-12 Hrs later  > 0.5                Strongly Recommended            >0.25 - <0.5         Recommended  0.1 - 0.25           Discouraged                   Remeasure/reassess PCT  <0.1                 Strongly Discouraged          Remeasure/reassess PCT      As 28 day mortality risk marker: \"Change in Procalcitonin Result\" (> 80 % or <=80 %) if Day 0 (or Day 1) and Day 4 values are available. Refer to http://[email or web address]ct-[email or web address]/   Change in PCT <=80 %   A decrease of PCT levels below or equal to 80 % defines a positive change in PCT test result representing a higher risk for 28-day all-cause mortality of patients diagnosed with severe sepsis or septic shock.  Change in PCT > 80 %   A decrease of PCT levels of more than 80 % defines a negative change in PCT result representing a lower risk for 28-day all-cause mortality of patients diagnosed with severe sepsis or septic shock.                Blood Culture - Blood, Arm, Left [[id number]] Collected:  06/17/19 1401    Specimen:  Blood from Arm, Left Updated:  06/17/19 1419    Blood Culture - Blood, Arm, Left [[id number]] Collected:  06/17/19 1401    Specimen:  Blood from Arm, Left Updated:  06/17/19 1410    Comprehensive Metabolic Panel [[id number]]  (Abnormal) Collected:  06/17/19 1207    Specimen:  Blood Updated:  06/17/19 1324     Glucose 99 mg/dL      BUN 11 mg/dL      Creatinine 0.76 mg/dL      Sodium 142 mmol/L      Potassium 4.1 mmol/L      Chloride 103 mmol/L      CO2 29.9 mmol/L      Calcium 8.8 mg/dL      Total Protein 6.6 g/dL      Albumin 4.00 g/dL      ALT (SGPT) 5 U/L      AST " (SGOT) 16 U/L      Alkaline Phosphatase 72 U/L      Total Bilirubin 0.6 mg/dL      eGFR Non African Amer 100 mL/min/1.73      Globulin 2.6 gm/dL      A/G Ratio 1.5 g/dL      BUN/Creatinine Ratio 14.5     Anion Gap 9.1 mmol/L     Narrative:       GFR Normal >60  Chronic Kidney Disease <60  Kidney Failure <15    Troponin [[id number]]  (Normal) Collected:  06/17/19 1207    Specimen:  Blood Updated:  06/17/19 1315     Troponin T <0.010 ng/mL     Narrative:       Troponin T Reference Range:  <= 0.03 ng/mL-   Negative for AMI  >0.03 ng/mL-     Abnormal for myocardial necrosis.  Clinicians would have to utilize clinical acumen, EKG, Troponin and serial changes to determine if it is an Acute Myocardial Infarction or myocardial injury due to an underlying chronic condition.     Hall Draw [[id number]] Collected:  06/17/19 1207    Specimen:  Blood Updated:  06/17/19 1315    Narrative:       The following orders were created for panel order Hall Draw.  Procedure                               Abnormality         Status                     ---------                               -----------         ------                     Light Blue Top[[id number]]                                   Final result               Green Top (Gel)[[id number]]                                  Final result               Lavender Top[[id number]]                                     Final result               Gold Top - SST[[id number]]                                   Final result                 Please view results for these tests on the individual orders.    Light Blue Top [[id number]] Collected:  06/17/19 1207    Specimen:  Blood Updated:  06/17/19 1315     Extra Tube hold for add-on     Comment: Auto resulted       Green Top (Gel) [[id number]] Collected:  06/17/19 1207    Specimen:  Blood Updated:  06/17/19 1315     Extra Tube Hold for add-ons.     Comment: Auto resulted.       Lavender Top [[id number]] Collected:  06/17/19 1207    Specimen:  Blood  Updated:  06/17/19 1315     Extra Tube hold for add-on     Comment: Auto resulted       Gold Top - SST [[id number]] Collected:  06/17/19 1207    Specimen:  Blood Updated:  06/17/19 1315     Extra Tube Hold for add-ons.     Comment: Auto resulted.       Magnesium [[id number]]  (Normal) Collected:  06/17/19 1207    Specimen:  Blood Updated:  06/17/19 1312     Magnesium 2.1 mg/dL     Urinalysis With Microscopic If Indicated (No Culture) - Urine, Catheter [[id number]]  (Abnormal) Collected:  06/17/19 1210    Specimen:  Urine, Catheter Updated:  06/17/19 1233     Color, UA Yellow     Appearance, UA Clear     pH, UA 7.5     Specific Gravity, UA 1.018     Glucose, UA Negative     Ketones, UA Negative     Bilirubin, UA Negative     Blood, UA Trace     Protein, UA Negative     Leuk Esterase, UA Negative     Nitrite, UA Negative     Urobilinogen, UA 1.0 E.U./dL    Urinalysis, Microscopic Only - Urine, Catheter [[id number]]  (Abnormal) Collected:  06/17/19 1210    Specimen:  Urine, Catheter Updated:  06/17/19 1233     RBC, UA 6-12 /HPF      WBC, UA 0-2 /HPF      Bacteria, UA None Seen /HPF      Squamous Epithelial Cells, UA 0-2 /HPF      Hyaline Casts, UA None Seen /LPF      Methodology Automated Microscopy          Imaging: A CT scan of the abdomen and pelvis is reviewed.  He does have a 50% compression fracture of L1, but I do not believe it is acute.  Indeed this fracture was present on the 2016 CT scan of the chest.  He does have some spinal stenosis in the lower lumbar aspect of probably moderate extent the L3 transverse process fracture is at least in a healing stage if not chronic as judged by surrounding callus.  Finally I disagree with the radiologist report that this fracture is acute and that it is new since prior studies.    Assessment: I suspect his rib fractures are acute but the extent of pain is well controlled.  I think that his weakness and falling are unrelated to his back, and that there is no evidence of  recent fracture of L1.    Plan: Dr. Archuleta has been consulted for further evaluation of his underlying neurologic abnormalities.  Meantime I see no reason why he cannot be mobilized as tolerated with close assistance on a walker and should follow-up with his primary care physician for the rib fracture complaints.  I am happy to see him again if there arises new concern for the back.    Date: 6/18/2019    Jayant Herrera MD    CC: Leon Esparza MD

## 2019-06-18 NOTE — CONSULTS
I did get a call for routine consult from the answering service for L1 wedge compression.   Dr Colón has seen the patient.   I will sign off.

## 2019-06-18 NOTE — PLAN OF CARE
Problem: Patient Care Overview  Goal: Plan of Care Review  Outcome: Ongoing (interventions implemented as appropriate)   06/18/19 1821   Coping/Psychosocial   Plan of Care Reviewed With patient   Plan of Care Review   Progress no change   OTHER   Outcome Summary VSS, kaiser pills with sips water, NPO till seen by ST for poss aspiration PNA, cont IVF, IV Zosyn as ordered, c/o pain to left rib fx area, pt denies need for prn pain meds, sister Raquel at , safety maintained

## 2019-06-18 NOTE — PLAN OF CARE
Problem: Patient Care Overview  Goal: Plan of Care Review  Outcome: Ongoing (interventions implemented as appropriate)   06/18/19 7442   Coping/Psychosocial   Plan of Care Reviewed With patient   Plan of Care Review   Progress improving   OTHER   Outcome Summary blood cx positive. antibiotics started. waiting placement for rehab.

## 2019-06-18 NOTE — PROGRESS NOTES
LOS: 1 day     Name: Rommel Gonzalez  Age/Sex: 73 y.o. male  :  1945        PCP: Luiz Onofre MD  Chief Complaint   Patient presents with   • Fall      Subjective   He is feeling okay today.  Pain is controlled currently.  Worked with physical therapy is recommending discharge to a skilled nursing facility.  Family and patient are on board with that decision.  General: No Fever or Chills, Cardiac: No Chest Pain or Palpitations, Resp: No Cough or SOA, GI: No Nausea, Vomiting, or Diarrhea and Other: No bleeding      carbidopa-levodopa ER 3 tablet Oral TID   carvedilol 6.25 mg Oral BID With Meals   levothyroxine 125 mcg Oral Q AM   midodrine 2.5 mg Oral Daily   rivaroxaban 20 mg Oral Daily With Dinner   sodium chloride 3 mL Intravenous Q12H   terazosin 2 mg Oral Nightly       sodium chloride 100 mL/hr Last Rate: 100 mL/hr (19 0729)       Objective   Vital Signs  Temp:  [97.9 °F (36.6 °C)-98.4 °F (36.9 °C)] 98.2 °F (36.8 °C)  Heart Rate:  [49-65] 61  Resp:  [18] 18  BP: (134-207)/() 169/80  Body mass index is 26.99 kg/m².    Intake/Output Summary (Last 24 hours) at 2019 09  Last data filed at 2019 0455  Gross per 24 hour   Intake 1150 ml   Output --   Net 1150 ml       Physical Exam   Constitutional: He is oriented to person, place, and time. He appears well-developed and well-nourished.   Eyes: Conjunctivae and EOM are normal.   Cardiovascular: Normal rate and regular rhythm.   Pulmonary/Chest: Effort normal and breath sounds normal. No respiratory distress.   Abdominal: Soft. Bowel sounds are normal.   Neurological: He is alert and oriented to person, place, and time.   Skin: Skin is warm and dry.   Nursing note and vitals reviewed.        Results Review:       I reviewed the patient's new clinical results.  Results from last 7 days   Lab Units 19  0515 19  1211   WBC 10*3/mm3 6.54 7.59   HEMOGLOBIN g/dL 11.4* 12.5*   PLATELETS 10*3/mm3 237 253     Results from  last 7 days   Lab Units 06/18/19  0515 06/17/19  1207   SODIUM mmol/L 141 142   POTASSIUM mmol/L 3.9 4.1   CHLORIDE mmol/L 105 103   CO2 mmol/L 25.6 29.9*   BUN mg/dL 8 11   CREATININE mg/dL 0.86 0.76   CALCIUM mg/dL 8.3* 8.8   MAGNESIUM mg/dL  --  2.1   Estimated Creatinine Clearance: 96.3 mL/min (by C-G formula based on SCr of 0.86 mg/dL).      Assessment/Plan     Aspiration pneumonia (CMS/HCC)    AF (atrial fibrillation) (CMS/HCC)    Hypertension    Idiopathic Parkinson's disease (CMS/HCC)    Anticoagulated    Hypothyroidism (acquired)    CORINNE on CPAP    Fall    Progressive supranuclear palsy (CMS/HCC)    Closed fracture of multiple ribs    Closed compression fracture of L1 lumbar vertebra (CMS/HCC)    Closed fracture of transverse process of lumbar vertebra (CMS/HCC)      PLAN  - HUGO recs reviewed  - SLP recs reviewed  - PT recs reveiwed  - Plan SNF when bed available  - continue supportive care for fractures      Disposition  DC when bed available      Leon Esparza MD  [facility]  06/18/19  9:19 AM

## 2019-06-18 NOTE — PLAN OF CARE
Problem: Patient Care Overview  Goal: Plan of Care Review   06/18/19 1100   Coping/Psychosocial   Plan of Care Reviewed With patient;son   OTHER   Outcome Summary Bedside swallow eval completed. If MD in agreement and PNA no longer concerning, recommend mech soft and thins. Meds whole with thin or puree. Recommend upright, alert,slow rate, and supervision with all PO. ST to follow for diet tolerance.

## 2019-06-19 VITALS
WEIGHT: 199 LBS | SYSTOLIC BLOOD PRESSURE: 145 MMHG | HEIGHT: 72 IN | OXYGEN SATURATION: 95 % | RESPIRATION RATE: 16 BRPM | BODY MASS INDEX: 26.95 KG/M2 | HEART RATE: 85 BPM | TEMPERATURE: 98.7 F | DIASTOLIC BLOOD PRESSURE: 83 MMHG

## 2019-06-19 LAB
BACTERIA SPEC AEROBE CULT: ABNORMAL
GLUCOSE BLDC GLUCOMTR-MCNC: 89 MG/DL (ref 70–130)
GRAM STN SPEC: ABNORMAL
ISOLATED FROM: ABNORMAL

## 2019-06-19 PROCEDURE — 96361 HYDRATE IV INFUSION ADD-ON: CPT

## 2019-06-19 PROCEDURE — G0378 HOSPITAL OBSERVATION PER HR: HCPCS

## 2019-06-19 PROCEDURE — 82962 GLUCOSE BLOOD TEST: CPT

## 2019-06-19 PROCEDURE — 25010000002 PIPERACILLIN SOD-TAZOBACTAM PER 1 G: Performed by: HOSPITALIST

## 2019-06-19 PROCEDURE — 96366 THER/PROPH/DIAG IV INF ADDON: CPT

## 2019-06-19 RX ORDER — MIDODRINE HYDROCHLORIDE 2.5 MG/1
2.5 TABLET ORAL DAILY
Qty: 30 TABLET | Refills: 0 | Status: SHIPPED | OUTPATIENT
Start: 2019-06-20 | End: 2019-08-20 | Stop reason: SDUPTHER

## 2019-06-19 RX ORDER — ACETAMINOPHEN 325 MG/1
650 TABLET ORAL EVERY 4 HOURS PRN
Start: 2019-06-19 | End: 2020-02-24 | Stop reason: HOSPADM

## 2019-06-19 RX ORDER — HYDROCODONE BITARTRATE AND ACETAMINOPHEN 5; 325 MG/1; MG/1
1 TABLET ORAL EVERY 6 HOURS PRN
Qty: 20 TABLET | Refills: 0 | Status: SHIPPED | OUTPATIENT
Start: 2019-06-19 | End: 2019-06-28 | Stop reason: HOSPADM

## 2019-06-19 RX ADMIN — SODIUM CHLORIDE 100 ML/HR: 9 INJECTION, SOLUTION INTRAVENOUS at 04:37

## 2019-06-19 RX ADMIN — SODIUM CHLORIDE 100 ML/HR: 9 INJECTION, SOLUTION INTRAVENOUS at 09:15

## 2019-06-19 RX ADMIN — MIDODRINE HYDROCHLORIDE 2.5 MG: 2.5 TABLET ORAL at 09:02

## 2019-06-19 RX ADMIN — LEVOTHYROXINE SODIUM 125 MCG: 125 TABLET ORAL at 06:46

## 2019-06-19 RX ADMIN — CARVEDILOL 6.25 MG: 6.25 TABLET, FILM COATED ORAL at 09:02

## 2019-06-19 RX ADMIN — SODIUM CHLORIDE, PRESERVATIVE FREE 3 ML: 5 INJECTION INTRAVENOUS at 09:03

## 2019-06-19 RX ADMIN — TAZOBACTAM SODIUM AND PIPERACILLIN SODIUM 3.38 G: 375; 3 INJECTION, SOLUTION INTRAVENOUS at 01:49

## 2019-06-19 RX ADMIN — CARBIDOPA AND LEVODOPA 3 TABLET: 25; 100 TABLET, EXTENDED RELEASE ORAL at 09:02

## 2019-06-19 RX ADMIN — HYDROCODONE BITARTRATE AND ACETAMINOPHEN 1 TABLET: 5; 325 TABLET ORAL at 12:13

## 2019-06-19 RX ADMIN — TAZOBACTAM SODIUM AND PIPERACILLIN SODIUM 3.38 G: 375; 3 INJECTION, SOLUTION INTRAVENOUS at 09:02

## 2019-06-19 NOTE — DISCHARGE SUMMARY
Patient Name: Rommel Gonzalez  : 1945  MRN: [id number]    Date of Admission: 2019  Date of Discharge:  2019  Primary Care Physician: Luiz Onofre MD      Chief Complaint:   Fall      Discharge Diagnoses     Active Hospital Problems    Diagnosis  POA   • **Aspiration pneumonia (CMS/HCC) [J69.0]  Yes   • Closed fracture of multiple ribs [S22.49XA]  Unknown   • Closed compression fracture of L1 lumbar vertebra (CMS/HCC) [S32.010A]  Unknown   • Closed fracture of transverse process of lumbar vertebra (CMS/HCC) [S32.009A]  Unknown   • Pneumonia of both lungs due to infectious organism [J18.9]  Yes   • Fall [W19.XXXA]  Yes   • Progressive supranuclear palsy (CMS/HCC) [G23.1]  Yes   • CORINNE on CPAP [G47.33, Z99.89]  Not Applicable   • Hypothyroidism (acquired) [E03.9]  Yes   • Anticoagulated [Z79.01]  Not Applicable   • AF (atrial fibrillation) (CMS/HCC) [I48.91]  Yes   • Hypertension [I10]  Yes   • Idiopathic Parkinson's disease (CMS/HCC) [G20]  Yes      Resolved Hospital Problems   No resolved problems to display.        Hospital Course     Mr. Gonzalez is a 73 y.o. male non-smoker with a history of Parkinson's disease who presented to [facility] initially complaining of fall with chest pain.  Please see the admitting history and physical for further details.  He was found to have multiple left-sided rib fractures and was admitted to the hospital for further evaluation and treatment.  There is also some question of possible aspiration pneumonia on admission.  His procalcitonin was negative is not elevated and he was afebrile.  Therefore he was not treated for aspiration pneumonia.  The first thing in the hospital his blood culture did come back with some possible bacteria.  He was found to be staph epi was likely contaminant.  It was only 1 out of 2 blood cultures again he is remained afebrile no antibiotics will be prescribed prescribed at discharge.  Given his Parkinson's disease  he is been somewhat unsteady on his feet and plan at this point is to discharge to skilled nursing facility in hopes that he can regain some stability and return to his current level of care.  Plans been discussed with his family at length and they are agreeable with discharge to skilled nursing facility today.  I have not been pleased with their neurology care outside of this facility and wanting to switch neurologist.  I I have referred him to see Dr. Rios Weiss in about 6 weeks.        Day of Discharge     HPI:   Feeling better denies new issues or complaints today.  Still sore on the left side of his chest but is otherwise feeling better he is been up to the chair working with physical therapy    Physical Exam:  Temp:  [97.8 °F (36.6 °C)-98.7 °F (37.1 °C)] 98.7 °F (37.1 °C)  Heart Rate:  [52-85] 85  Resp:  [16-20] 16  BP: (126-183)/(68-83) 145/83  Body mass index is 26.99 kg/m².  Physical Exam   Constitutional: He appears well-developed and well-nourished.   HENT:   Head: Normocephalic and atraumatic.   Eyes: EOM are normal.   Cardiovascular: Normal rate, regular rhythm and normal heart sounds.   Pulmonary/Chest: Effort normal and breath sounds normal.   Abdominal: Soft. Bowel sounds are normal.   Musculoskeletal: Normal range of motion. He exhibits no edema.   Neurological: He is alert.   Skin: Skin is warm and dry. Capillary refill takes less than 2 seconds.   Nursing note and vitals reviewed.      Consultants     Consult Orders (all) (From admission, onward)    Start     Ordered    06/18/19 0419  Inpatient Neurology Consult General  Once     Specialty:  Neurology  Provider:  Vaibhav Archuleta MD    06/18/19 0418 06/18/19 0418  Inpatient Orthopedic Surgery Consult  Once     Specialty:  Orthopedic Surgery  Provider:  Jayant Herrera MD    06/18/19 0418    06/17/19 2308  Inpatient Case Management  Consult  Once     Provider:  (Not yet assigned)    06/17/19 5406    06/17/19 2342  [facility] (on-call MD  unless specified)  Once     Specialty:  Internal Medicine  Provider:  (Not yet assigned)    06/17/19 1537        Consulting Physician(s)     Provider Relationship Specialty    Leon Esparza MD Consulting Physician Internal Medicine        Procedures     * Surgery not found *    Imaging Results (all)     Procedure Component Value Units Date/Time    CT Abdomen Pelvis Without Contrast [[id number]] Collected:  06/17/19 1407     Updated:  06/18/19 1047    Narrative:       CT ABDOMEN AND PELVIS WITHOUT IV CONTRAST     HISTORY: 73-year-old male status post a fall. Pain.     TECHNIQUE: Radiation dose reduction techniques were utilized, including  automated exposure control and exposure modulation based on body size.   3 mm images were obtained through the abdomen and pelvis without the  administration of IV contrast. Compared with a previous CT of the  abdomen and pelvis from 07/31/2009 and chest CT from 05/08/2017.     FINDINGS: There are acute appearing nondisplaced fractures at the  lateral aspects of the left 8th and 9th ribs. There is also a fracture  at the posterior aspect of the right 12th rib, new since 2009. There is  some motion artifact in this region as well as streak artifact from the  patient's arms. There is an acute or subacute fracture of the left L3  transverse process. There is an acute appearing wedge compression  fracture of L1 with 6 mm retropulsion. There is >50% loss of height of  the anterior aspect of the L1 vertebral body.     Noncontrasted liver, gallbladder, spleen, pancreas, adrenals, and  kidneys appear unremarkable. No acute bowel abnormality is seen. There  is extensive sigmoid diverticulosis without evidence for diverticulitis.  The appendix appears normal. There are extensive abdominal aortic  atherosclerotic changes and there is 2.8 cm ectasia of the infrarenal  portion. At the visualized lower lung fields, there are airspace  opacities at the dependent aspect of both lung bases.        Impression:       1. There is an acute L1 compression fracture with greater than 50% loss  of height and 6 mm retropulsion into the spinal canal.  2. Acute nondisplaced left lateral 8th and 9th rib fractures. Acute or  subacute fracture of the left L3 transverse process. There is also a  fracture at the posterior right 12th rib which is age indeterminate  given the artifact present as described above.  3. Airspace opacities at the dependent aspect of both lower lobes may  represent atelectasis, but pneumonia is suspected. Please correlate  clinically for aspiration pneumonia.     Discussed with Dr. Loredo.     This report was finalized on 6/18/2019 10:44 AM by Dr. Hoa Griffith M.D.       XR Chest 1 View [[id number]] Collected:  06/17/19 1450     Updated:  06/17/19 1454    Narrative:       XR CHEST 1 VW-     HISTORY: Male who is 73 years-old,  pneumonia in the bases     TECHNIQUE: Frontal view of the chest     COMPARISON: 03/11/2018     FINDINGS: Heart size is normal. Aorta is tortuous. Pulmonary vasculature  is unremarkable. Minimal atelectasis or infiltrate at the bases. No  pleural effusion or pneumothorax. No acute osseous process.       Impression:       Minimal atelectasis or infiltrate at the bases. Tortuous  aorta.     This report was finalized on 6/17/2019 2:51 PM by Dr. Taj Miller M.D.       CT Head Without Contrast [[id number]] Collected:  06/17/19 1431     Updated:  06/17/19 1445    Narrative:       CT SCAN OF THE HEAD WITHOUT CONTRAST      CLINICAL HISTORY: Recent falls. Confusion. Generalized weakness.     TECHNIQUE: CT scan of the head was obtained with 2 mm axial bone  algorithm and 3 mm axial soft tissue algorithm images. No intravenous  contrast was administered.     FINDINGS:     There is no evidence for a calvarial fracture. There is no evidence for  an acute extra-axial hemorrhage. The ventricles, sulci, and cisterns are  age appropriate. Mild-to-moderate changes of chronic small  vessel  ischemic phenomena are identified. Atherosclerotic changes are noted  within the intracranial vasculature.     The extracranial structures are remarkable for postsurgical changes  within the paranasal sinuses including bilateral uncinectomies and  partial ethmoidectomies. Partial opacification of the left aspect of the  frontal sinus is noted.       Impression:          There is no evidence for acute intracranial pathology.     Postsurgical changes within the paranasal sinuses as well as mild  changes of inflammatory paranasal sinus disease are incidentally noted  as discussed above.      Radiation dose reduction techniques were utilized, including automated  exposure control and exposure modulation based on body size.     This report was finalized on 6/17/2019 2:42 PM by Dr. Marcus Cagle M.D.                  Results for orders placed during the hospital encounter of 03/07/18   Adult Transthoracic Echo Complete W/ Cont if Necessary Per Protocol    Narrative · Left ventricular systolic function is normal. Estimated EF = 58%.  · Mild tricuspid valve regurgitation is present.        Pertinent Labs     Results from last 7 days   Lab Units 06/18/19  0515 06/17/19  1211   WBC 10*3/mm3 6.54 7.59   HEMOGLOBIN g/dL 11.4* 12.5*   PLATELETS 10*3/mm3 237 253     Results from last 7 days   Lab Units 06/18/19  0515 06/17/19  1207   SODIUM mmol/L 141 142   POTASSIUM mmol/L 3.9 4.1   CHLORIDE mmol/L 105 103   CO2 mmol/L 25.6 29.9*   BUN mg/dL 8 11   CREATININE mg/dL 0.86 0.76   GLUCOSE mg/dL 86 99   Estimated Creatinine Clearance: 96.3 mL/min (by C-G formula based on SCr of 0.86 mg/dL).  Results from last 7 days   Lab Units 06/18/19  0515 06/17/19  1207   ALBUMIN g/dL 3.30* 4.00   BILIRUBIN mg/dL 1.1 0.6   ALK PHOS U/L 68 72   AST (SGOT) U/L 13 16   ALT (SGPT) U/L <5 5     Results from last 7 days   Lab Units 06/18/19  0515 06/17/19  1207   CALCIUM mg/dL 8.3* 8.8   ALBUMIN g/dL 3.30* 4.00   MAGNESIUM mg/dL  --  2.1        Results from last 7 days   Lab Units 06/17/19  1207   TROPONIN T ng/mL <0.010           Invalid input(s): LDLCALC  Results from last 7 days   Lab Units 06/17/19  1401   BLOODCX  No growth at 24 hours  Staphylococcus, coagulase negative*   BCIDPCR  Staphylococcus spp, not aureus. Identification by BCID PCR.*       Test Results Pending at Discharge   None   Order Current Status    Blood Culture - Blood, Arm, Left Preliminary result          Discharge Details        Discharge Medications      New Medications      Instructions Start Date   acetaminophen 325 MG tablet  Commonly known as:  TYLENOL   650 mg, Oral, Every 4 Hours PRN      HYDROcodone-acetaminophen 5-325 MG per tablet  Commonly known as:  NORCO   1 tablet, Oral, Every 6 Hours PRN         Continue These Medications      Instructions Start Date   bumetanide 0.5 MG tablet  Commonly known as:  BUMEX   0.5 mg, Oral, Daily PRN      carbidopa-levodopa ER  MG per tablet  Commonly known as:  SINEMET CR   2.5 tablets, Oral, 3 Times Daily      carvedilol 6.25 MG tablet  Commonly known as:  COREG   6.25 mg, Oral, 2 Times Daily With Meals      CERTAVITE/ANTIOXIDANTS tablet   1 tablet, Oral, Daily      doxazosin 2 MG tablet  Commonly known as:  CARDURA   2 mg, Oral, Nightly      levothyroxine 125 MCG tablet  Commonly known as:  SYNTHROID, LEVOTHROID   125 mcg, Oral, Daily      rivaroxaban 20 MG tablet  Commonly known as:  XARELTO   20 mg, Oral, Daily With Dinner         ASK your doctor about these medications      Instructions Start Date   midodrine 2.5 MG tablet  Commonly known as:  PROAMATINE   2.5 mg, Oral, Daily             No Known Allergies      Discharge Disposition:  Skilled Nursing Facility (DC - External)    Discharge Diet:  Diet Order   Procedures   • Diet Soft Texture; Ground; Cardiac       Discharge Activity:   Activity Instructions     Activity as Tolerated            CODE STATUS:    Code Status and Medical Interventions:   Ordered at: 06/17/19 5666      Code Status:    CPR     Medical Interventions (Level of Support Prior to Arrest):    Full       Future Appointments   Date Time Provider Department Center   7/16/2019  1:30 PM Gail Sargent, DNP, APRN [facility] None     Additional Instructions for the Follow-ups that You Need to Schedule     Discharge Follow-up with PCP   As directed       Currently Documented PCP:    Luiz Onofre MD    PCP Phone Number:    [phone]     Follow Up Details:  4-6 weeks         Discharge Follow-up with Specified Provider: Dr Rios Weiss; 6 Weeks   As directed      To:  Dr Rios Weiss    Follow Up:  6 Weeks           Follow-up Information     Luiz Onofre MD .    Specialty:  Family Medicine  Why:  4-6 weeks  Contact information:  [address] [place] [address]  [phone]                   Additional Instructions for the Follow-ups that You Need to Schedule     Discharge Follow-up with PCP   As directed       Currently Documented PCP:    Luiz Onofre MD    PCP Phone Number:    [phone]     Follow Up Details:  4-6 weeks         Discharge Follow-up with Specified Provider: Dr Rios Weiss; 6 Weeks   As directed      To:  Dr Rios Weiss    Follow Up:  6 Weeks           Time Spent on Discharge:  Greater than 30 minutes      Electronically signed by Leon Esparza MD, 6/19/2019, 1:44 PM

## 2019-06-19 NOTE — PROGRESS NOTES
Continued Stay Note  [facility]     Patient Name: Rommel Gonzalez  MRN: [id number]  Today's Date: 6/19/2019    Admit Date: 6/17/2019    Discharge Plan     Row Name 06/19/19 1216       Plan    Plan  SNF    Plan Comments  [employer] Medicare Replacement precert received/approved [facility].  Spoke with Lisa regarding discharge this afternoon.  Family will transport.          Discharge Codes    No documentation.             Bessie Fitzgerald RN

## 2019-06-19 NOTE — PLAN OF CARE
Problem: Patient Care Overview  Goal: Plan of Care Review  Outcome: Ongoing (interventions implemented as appropriate)   06/19/19 0557   Coping/Psychosocial   Plan of Care Reviewed With patient   Plan of Care Review   Progress no change   OTHER   Outcome Summary VSS. C/O PAIN LEFT SIDE WHEN REPOSITIONED, OTHERWISE COMFORTABLE AT REST. REFUSES PAIN MEDICATION. SLEPT AT LONG INTERVALS IN BETWEEN CARE/TURNS.       Problem: Fall Risk (Adult)  Goal: Absence of Fall  Outcome: Ongoing (interventions implemented as appropriate)      Problem: Skin Injury Risk (Adult)  Goal: Skin Health and Integrity  Outcome: Ongoing (interventions implemented as appropriate)

## 2019-06-19 NOTE — PROGRESS NOTES
Case Management Discharge Note    Final Note: [employer] Precert obtained/approved for [facility]. Lisa aware and following.     Destination - Selection Complete      Service Provider Request Status Selected Services Address Phone Number Fax Number    [facility] Selected Skilled Nursing [address], [facility] [address] [phone]       Bessie Fitzgerald RN 6/18/2019 0954    6/18/2019 2nd choice. Spoke with lisa she will review and follow.                  Durable Medical Equipment      No service has been selected for the patient.      Dialysis/Infusion      No service has been selected for the patient.      Home Medical Care      No service has been selected for the patient.      Therapy      No service has been selected for the patient.      Community Resources      No service has been selected for the patient.        Transportation Services  Private: Car    Final Discharge Disposition Code: 03 - skilled nursing facility (SNF)

## 2019-06-22 LAB — BACTERIA SPEC AEROBE CULT: NORMAL

## 2019-06-28 ENCOUNTER — APPOINTMENT (OUTPATIENT)
Dept: GENERAL RADIOLOGY | Facility: HOSPITAL | Age: 74
End: 2019-06-28

## 2019-06-28 ENCOUNTER — APPOINTMENT (OUTPATIENT)
Dept: CT IMAGING | Facility: HOSPITAL | Age: 74
End: 2019-06-28

## 2019-06-28 ENCOUNTER — HOSPITAL ENCOUNTER (OUTPATIENT)
Facility: HOSPITAL | Age: 74
Setting detail: OBSERVATION
Discharge: NURSING, REHAB OR LONG-TERM CARE | End: 2019-06-28
Attending: EMERGENCY MEDICINE | Admitting: HOSPITALIST

## 2019-06-28 VITALS
SYSTOLIC BLOOD PRESSURE: 166 MMHG | OXYGEN SATURATION: 96 % | DIASTOLIC BLOOD PRESSURE: 79 MMHG | WEIGHT: 190.5 LBS | HEART RATE: 51 BPM | BODY MASS INDEX: 25.8 KG/M2 | HEIGHT: 72 IN | RESPIRATION RATE: 16 BRPM | TEMPERATURE: 97 F

## 2019-06-28 DIAGNOSIS — R41.82 ALTERED MENTAL STATUS, UNSPECIFIED ALTERED MENTAL STATUS TYPE: Primary | ICD-10-CM

## 2019-06-28 DIAGNOSIS — F03.91 DEMENTIA WITH BEHAVIORAL DISTURBANCE, UNSPECIFIED DEMENTIA TYPE: ICD-10-CM

## 2019-06-28 PROBLEM — R29.6 FREQUENT FALLS: Status: ACTIVE | Noted: 2019-06-28

## 2019-06-28 LAB
ALBUMIN SERPL-MCNC: 3.8 G/DL (ref 3.5–5.2)
ALBUMIN/GLOB SERPL: 1.4 G/DL
ALP SERPL-CCNC: 78 U/L (ref 39–117)
ALT SERPL W P-5'-P-CCNC: <5 U/L (ref 1–41)
AMPHET+METHAMPHET UR QL: NEGATIVE
ANION GAP SERPL CALCULATED.3IONS-SCNC: 8.2 MMOL/L (ref 5–15)
AST SERPL-CCNC: 16 U/L (ref 1–40)
BARBITURATES UR QL SCN: NEGATIVE
BASOPHILS # BLD AUTO: 0.05 10*3/MM3 (ref 0–0.2)
BASOPHILS NFR BLD AUTO: 0.6 % (ref 0–1.5)
BENZODIAZ UR QL SCN: NEGATIVE
BILIRUB SERPL-MCNC: 0.6 MG/DL (ref 0.2–1.2)
BILIRUB UR QL STRIP: NEGATIVE
BUN BLD-MCNC: 11 MG/DL (ref 8–23)
BUN/CREAT SERPL: 12.2 (ref 7–25)
CALCIUM SPEC-SCNC: 9.2 MG/DL (ref 8.6–10.5)
CANNABINOIDS SERPL QL: NEGATIVE
CHLORIDE SERPL-SCNC: 103 MMOL/L (ref 98–107)
CLARITY UR: CLEAR
CO2 SERPL-SCNC: 25.8 MMOL/L (ref 22–29)
COCAINE UR QL: NEGATIVE
COLOR UR: YELLOW
CREAT BLD-MCNC: 0.9 MG/DL (ref 0.76–1.27)
D-LACTATE SERPL-SCNC: 1.2 MMOL/L (ref 0.5–2)
DEPRECATED RDW RBC AUTO: 48.8 FL (ref 37–54)
EOSINOPHIL # BLD AUTO: 0.24 10*3/MM3 (ref 0–0.4)
EOSINOPHIL NFR BLD AUTO: 3.1 % (ref 0.3–6.2)
ERYTHROCYTE [DISTWIDTH] IN BLOOD BY AUTOMATED COUNT: 13.7 % (ref 12.3–15.4)
ETHANOL BLD-MCNC: <10 MG/DL (ref 0–10)
ETHANOL UR QL: <0.01 %
GFR SERPL CREATININE-BSD FRML MDRD: 82 ML/MIN/1.73
GLOBULIN UR ELPH-MCNC: 2.8 GM/DL
GLUCOSE BLD-MCNC: 97 MG/DL (ref 65–99)
GLUCOSE UR STRIP-MCNC: NEGATIVE MG/DL
HCT VFR BLD AUTO: 38 % (ref 37.5–51)
HGB BLD-MCNC: 12.1 G/DL (ref 13–17.7)
HGB UR QL STRIP.AUTO: NEGATIVE
IMM GRANULOCYTES # BLD AUTO: 0.02 10*3/MM3 (ref 0–0.05)
IMM GRANULOCYTES NFR BLD AUTO: 0.3 % (ref 0–0.5)
KETONES UR QL STRIP: ABNORMAL
LEUKOCYTE ESTERASE UR QL STRIP.AUTO: NEGATIVE
LYMPHOCYTES # BLD AUTO: 2.01 10*3/MM3 (ref 0.7–3.1)
LYMPHOCYTES NFR BLD AUTO: 26 % (ref 19.6–45.3)
MCH RBC QN AUTO: 30.6 PG (ref 26.6–33)
MCHC RBC AUTO-ENTMCNC: 31.8 G/DL (ref 31.5–35.7)
MCV RBC AUTO: 96.2 FL (ref 79–97)
METHADONE UR QL SCN: NEGATIVE
MONOCYTES # BLD AUTO: 0.68 10*3/MM3 (ref 0.1–0.9)
MONOCYTES NFR BLD AUTO: 8.8 % (ref 5–12)
NEUTROPHILS # BLD AUTO: 4.73 10*3/MM3 (ref 1.7–7)
NEUTROPHILS NFR BLD AUTO: 61.2 % (ref 42.7–76)
NITRITE UR QL STRIP: NEGATIVE
NRBC BLD AUTO-RTO: 0 /100 WBC (ref 0–0.2)
OPIATES UR QL: NEGATIVE
OXYCODONE UR QL SCN: NEGATIVE
PH UR STRIP.AUTO: 6.5 [PH] (ref 5–8)
PLATELET # BLD AUTO: 266 10*3/MM3 (ref 140–450)
PMV BLD AUTO: 11.2 FL (ref 6–12)
POTASSIUM BLD-SCNC: 3.8 MMOL/L (ref 3.5–5.2)
PROCALCITONIN SERPL-MCNC: 0.04 NG/ML (ref 0.1–0.25)
PROT SERPL-MCNC: 6.6 G/DL (ref 6–8.5)
PROT UR QL STRIP: NEGATIVE
RBC # BLD AUTO: 3.95 10*6/MM3 (ref 4.14–5.8)
SODIUM BLD-SCNC: 137 MMOL/L (ref 136–145)
SP GR UR STRIP: 1.02 (ref 1–1.03)
TROPONIN T SERPL-MCNC: <0.01 NG/ML (ref 0–0.03)
UROBILINOGEN UR QL STRIP: ABNORMAL
WBC NRBC COR # BLD: 7.73 10*3/MM3 (ref 3.4–10.8)

## 2019-06-28 PROCEDURE — 80307 DRUG TEST PRSMV CHEM ANLYZR: CPT | Performed by: PHYSICIAN ASSISTANT

## 2019-06-28 PROCEDURE — G0378 HOSPITAL OBSERVATION PER HR: HCPCS

## 2019-06-28 PROCEDURE — 83605 ASSAY OF LACTIC ACID: CPT | Performed by: PHYSICIAN ASSISTANT

## 2019-06-28 PROCEDURE — 99285 EMERGENCY DEPT VISIT HI MDM: CPT

## 2019-06-28 PROCEDURE — 70450 CT HEAD/BRAIN W/O DYE: CPT

## 2019-06-28 PROCEDURE — 84145 PROCALCITONIN (PCT): CPT | Performed by: PHYSICIAN ASSISTANT

## 2019-06-28 PROCEDURE — 36415 COLL VENOUS BLD VENIPUNCTURE: CPT

## 2019-06-28 PROCEDURE — 90791 PSYCH DIAGNOSTIC EVALUATION: CPT

## 2019-06-28 PROCEDURE — 84484 ASSAY OF TROPONIN QUANT: CPT | Performed by: PHYSICIAN ASSISTANT

## 2019-06-28 PROCEDURE — 80053 COMPREHEN METABOLIC PANEL: CPT | Performed by: PHYSICIAN ASSISTANT

## 2019-06-28 PROCEDURE — 81003 URINALYSIS AUTO W/O SCOPE: CPT | Performed by: PHYSICIAN ASSISTANT

## 2019-06-28 PROCEDURE — 85025 COMPLETE CBC W/AUTO DIFF WBC: CPT | Performed by: PHYSICIAN ASSISTANT

## 2019-06-28 PROCEDURE — 71046 X-RAY EXAM CHEST 2 VIEWS: CPT

## 2019-06-28 RX ORDER — SODIUM CHLORIDE 0.9 % (FLUSH) 0.9 %
1-10 SYRINGE (ML) INJECTION AS NEEDED
Status: DISCONTINUED | OUTPATIENT
Start: 2019-06-28 | End: 2019-06-28 | Stop reason: HOSPADM

## 2019-06-28 RX ORDER — SODIUM CHLORIDE 0.9 % (FLUSH) 0.9 %
3 SYRINGE (ML) INJECTION EVERY 12 HOURS SCHEDULED
Status: DISCONTINUED | OUTPATIENT
Start: 2019-06-28 | End: 2019-06-28 | Stop reason: HOSPADM

## 2019-06-28 RX ORDER — ACETAMINOPHEN 325 MG/1
650 TABLET ORAL EVERY 4 HOURS PRN
Status: DISCONTINUED | OUTPATIENT
Start: 2019-06-28 | End: 2019-06-28 | Stop reason: HOSPADM

## 2019-06-28 RX ORDER — ONDANSETRON 4 MG/1
4 TABLET, FILM COATED ORAL EVERY 6 HOURS PRN
Status: DISCONTINUED | OUTPATIENT
Start: 2019-06-28 | End: 2019-06-28 | Stop reason: HOSPADM

## 2019-06-28 RX ORDER — ONDANSETRON 2 MG/ML
4 INJECTION INTRAMUSCULAR; INTRAVENOUS EVERY 6 HOURS PRN
Status: DISCONTINUED | OUTPATIENT
Start: 2019-06-28 | End: 2019-06-28 | Stop reason: HOSPADM

## 2019-07-15 RX ORDER — QUETIAPINE FUMARATE 25 MG/1
25 TABLET, FILM COATED ORAL NIGHTLY
Qty: 45 TABLET | Refills: 11 | Status: SHIPPED | OUTPATIENT
Start: 2019-07-15 | End: 2022-01-01 | Stop reason: HOSPADM

## 2019-07-22 ENCOUNTER — HOSPITAL ENCOUNTER (EMERGENCY)
Facility: HOSPITAL | Age: 74
Discharge: HOME | End: 2019-07-22
Attending: EMERGENCY MEDICINE | Admitting: EMERGENCY MEDICINE

## 2019-07-22 ENCOUNTER — APPOINTMENT (OUTPATIENT)
Dept: CT IMAGING | Facility: HOSPITAL | Age: 74
End: 2019-07-22

## 2019-07-22 ENCOUNTER — APPOINTMENT (OUTPATIENT)
Dept: GENERAL RADIOLOGY | Facility: HOSPITAL | Age: 74
End: 2019-07-22

## 2019-07-22 VITALS
BODY MASS INDEX: 27.94 KG/M2 | HEIGHT: 71 IN | HEART RATE: 51 BPM | WEIGHT: 199.6 LBS | TEMPERATURE: 97.2 F | DIASTOLIC BLOOD PRESSURE: 90 MMHG | OXYGEN SATURATION: 96 % | SYSTOLIC BLOOD PRESSURE: 180 MMHG | RESPIRATION RATE: 14 BRPM

## 2019-07-22 DIAGNOSIS — S22.41XA CLOSED FRACTURE OF MULTIPLE RIBS OF RIGHT SIDE, INITIAL ENCOUNTER: ICD-10-CM
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
## 2019-07-22 DIAGNOSIS — S30.1XXA CONTUSION OF ABDOMINAL WALL, INITIAL ENCOUNTER: ICD-10-CM

## 2019-07-22 LAB
ALBUMIN SERPL-MCNC: 3.9 G/DL (ref 3.5–5.2)
ALBUMIN/GLOB SERPL: 1.1 G/DL
ALP SERPL-CCNC: 68 U/L (ref 39–117)
ALT SERPL W P-5'-P-CCNC: 5 U/L (ref 1–41)
ANION GAP SERPL CALCULATED.3IONS-SCNC: 11.1 MMOL/L (ref 5–15)
AST SERPL-CCNC: 18 U/L (ref 1–40)
BASOPHILS # BLD AUTO: 0.03 10*3/MM3 (ref 0–0.2)
BASOPHILS NFR BLD AUTO: 0.3 % (ref 0–1.5)
BILIRUB SERPL-MCNC: 0.4 MG/DL (ref 0.2–1.2)
BUN BLD-MCNC: 13 MG/DL (ref 8–23)
BUN/CREAT SERPL: 15.5 (ref 7–25)
CALCIUM SPEC-SCNC: 9.5 MG/DL (ref 8.6–10.5)
CHLORIDE SERPL-SCNC: 101 MMOL/L (ref 98–107)
CO2 SERPL-SCNC: 29.9 MMOL/L (ref 22–29)
CREAT BLD-MCNC: 0.84 MG/DL (ref 0.76–1.27)
DEPRECATED RDW RBC AUTO: 49.9 FL (ref 37–54)
EOSINOPHIL # BLD AUTO: 0.11 10*3/MM3 (ref 0–0.4)
EOSINOPHIL NFR BLD AUTO: 1.1 % (ref 0.3–6.2)
ERYTHROCYTE [DISTWIDTH] IN BLOOD BY AUTOMATED COUNT: 13.8 % (ref 12.3–15.4)
GFR SERPL CREATININE-BSD FRML MDRD: 89 ML/MIN/1.73
GLOBULIN UR ELPH-MCNC: 3.6 GM/DL
GLUCOSE BLD-MCNC: 157 MG/DL (ref 65–99)
HCT VFR BLD AUTO: 39.8 % (ref 37.5–51)
HGB BLD-MCNC: 12.6 G/DL (ref 13–17.7)
IMM GRANULOCYTES # BLD AUTO: 0.03 10*3/MM3 (ref 0–0.05)
IMM GRANULOCYTES NFR BLD AUTO: 0.3 % (ref 0–0.5)
LIPASE SERPL-CCNC: 37 U/L (ref 13–60)
LYMPHOCYTES # BLD AUTO: 1.68 10*3/MM3 (ref 0.7–3.1)
LYMPHOCYTES NFR BLD AUTO: 16.6 % (ref 19.6–45.3)
MCH RBC QN AUTO: 31 PG (ref 26.6–33)
MCHC RBC AUTO-ENTMCNC: 31.7 G/DL (ref 31.5–35.7)
MCV RBC AUTO: 97.8 FL (ref 79–97)
MONOCYTES # BLD AUTO: 0.48 10*3/MM3 (ref 0.1–0.9)
MONOCYTES NFR BLD AUTO: 4.8 % (ref 5–12)
NEUTROPHILS # BLD AUTO: 7.77 10*3/MM3 (ref 1.7–7)
NEUTROPHILS NFR BLD AUTO: 76.9 % (ref 42.7–76)
NRBC BLD AUTO-RTO: 0 /100 WBC (ref 0–0.2)
PLATELET # BLD AUTO: 217 10*3/MM3 (ref 140–450)
PMV BLD AUTO: 10.7 FL (ref 6–12)
POTASSIUM BLD-SCNC: 4 MMOL/L (ref 3.5–5.2)
PROT SERPL-MCNC: 7.5 G/DL (ref 6–8.5)
RBC # BLD AUTO: 4.07 10*6/MM3 (ref 4.14–5.8)
SODIUM BLD-SCNC: 142 MMOL/L (ref 136–145)
WBC NRBC COR # BLD: 10.1 10*3/MM3 (ref 3.4–10.8)

## 2019-07-22 PROCEDURE — 74177 CT ABD & PELVIS W/CONTRAST: CPT

## 2019-07-22 PROCEDURE — 99283 EMERGENCY DEPT VISIT LOW MDM: CPT

## 2019-07-22 PROCEDURE — 25010000002 IOPAMIDOL 61 % SOLUTION: Performed by: EMERGENCY MEDICINE

## 2019-07-22 PROCEDURE — 80053 COMPREHEN METABOLIC PANEL: CPT | Performed by: PHYSICIAN ASSISTANT

## 2019-07-22 PROCEDURE — 71045 X-RAY EXAM CHEST 1 VIEW: CPT

## 2019-07-22 PROCEDURE — 83690 ASSAY OF LIPASE: CPT | Performed by: PHYSICIAN ASSISTANT

## 2019-07-22 PROCEDURE — 85025 COMPLETE CBC W/AUTO DIFF WBC: CPT | Performed by: PHYSICIAN ASSISTANT

## 2019-07-22 RX ORDER — ONDANSETRON 4 MG/1
4 TABLET, FILM COATED ORAL EVERY 8 HOURS PRN
Qty: 15 TABLET | Refills: 0 | Status: SHIPPED | OUTPATIENT
Start: 2019-07-22 | End: 2020-10-11

## 2019-07-22 RX ORDER — HYDROCODONE BITARTRATE AND ACETAMINOPHEN 5; 325 MG/1; MG/1
1 TABLET ORAL EVERY 6 HOURS PRN
Qty: 15 TABLET | Refills: 0 | Status: ON HOLD | OUTPATIENT
Start: 2019-07-22 | End: 2020-02-24 | Stop reason: SDUPTHER

## 2019-07-22 RX ORDER — SODIUM CHLORIDE 0.9 % (FLUSH) 0.9 %
10 SYRINGE (ML) INJECTION AS NEEDED
Status: DISCONTINUED | OUTPATIENT
Start: 2019-07-22 | End: 2019-07-23 | Stop reason: HOSPADM

## 2019-07-22 RX ADMIN — IOPAMIDOL 85 ML: 612 INJECTION, SOLUTION INTRAVENOUS at 20:29

## 2019-08-01 ENCOUNTER — OFFICE VISIT (OUTPATIENT)
Dept: FAMILY MEDICINE CLINIC | Facility: CLINIC | Age: 74
End: 2019-08-01

## 2019-08-01 VITALS
OXYGEN SATURATION: 96 % | TEMPERATURE: 98.8 F | BODY MASS INDEX: 27.58 KG/M2 | HEIGHT: 71 IN | RESPIRATION RATE: 15 BRPM | HEART RATE: 68 BPM | SYSTOLIC BLOOD PRESSURE: 138 MMHG | DIASTOLIC BLOOD PRESSURE: 82 MMHG | WEIGHT: 197 LBS

## 2019-08-01 DIAGNOSIS — S32.010S CLOSED COMPRESSION FRACTURE OF FIRST LUMBAR VERTEBRA, SEQUELA: ICD-10-CM

## 2019-08-01 DIAGNOSIS — Z99.89 OSA ON CPAP: ICD-10-CM

## 2019-08-01 DIAGNOSIS — S22.42XA CLOSED FRACTURE OF MULTIPLE RIBS OF LEFT SIDE, INITIAL ENCOUNTER: ICD-10-CM

## 2019-08-01 DIAGNOSIS — G47.33 OSA ON CPAP: ICD-10-CM

## 2019-08-01 DIAGNOSIS — Z79.899 HIGH RISK MEDICATION USE: ICD-10-CM

## 2019-08-01 DIAGNOSIS — G20 ATYPICAL PARKINSONISM (HCC): ICD-10-CM

## 2019-08-01 DIAGNOSIS — R26.81 GAIT INSTABILITY: ICD-10-CM

## 2019-08-01 DIAGNOSIS — I48.20 CHRONIC ATRIAL FIBRILLATION (HCC): Primary | ICD-10-CM

## 2019-08-01 PROBLEM — J69.0 ASPIRATION PNEUMONIA (HCC): Status: RESOLVED | Noted: 2018-03-12 | Resolved: 2019-08-01

## 2019-08-01 PROCEDURE — 99214 OFFICE O/P EST MOD 30 MIN: CPT | Performed by: FAMILY MEDICINE

## 2019-08-01 NOTE — PROGRESS NOTES
HPI  Rommel Gonzalez is a 73 y.o. male who is here for follow up multiple ongoing medical issues including parkinsonism and recent falls fracturing ribs etc.  Patient currently is in a new memory care unit and caregivers are asking if should change to staff physician.  Advantages and disadvantages discussed and caregivers are to the side.  Also considering changing to a different facility.  Also they are not happy with current neurologist especially his attitude and also the location of his office which is not very close.  Overall patient actually seems to be doing extremely well on current regimen which will be continued.  Does have significant chest wall bruising and though diagnosis indicate left rib fractures these actually seem to be more so on the right?  This is complicated by patient's anticoagulation therapy for true fibrillation.      Review of Systems   Constitutional: Negative for unexpected weight change.   Respiratory: Negative for cough and shortness of breath.    Cardiovascular: Positive for chest pain.   Musculoskeletal: Positive for gait problem.   Neurological: Positive for speech difficulty.   Psychiatric/Behavioral: Positive for confusion. Negative for behavioral problems.   All other systems reviewed and are negative.        Past Medical History:   Diagnosis Date   • AF (atrial fibrillation) (CMS/HCC)    • Altered mental status    • Anticoagulated    • Aspiration pneumonia (CMS/HCC)    • Atrial flutter (CMS/HCC)    • Atypical chest pain    • Chest pain    • Colon polyps    • Confusion    • Depression    • Disease of thyroid gland    • Disorder of thyroid    • Dysarthria    • Dysphagia    • Dyspnea and respiratory abnormalities    • Elevated TSH    • Fall    • Fatigue    • Gait instability    • Hay fever    • Hyperlipidemia    • Hypertension    • Hypothyroidism    • Idiopathic Parkinson's disease (CMS/HCC)    • Insomnia    • Measles    • Metabolic encephalopathy    • Mumps    • Non-traumatic  rhabdomyolysis    • CORINNE (obstructive sleep apnea)    • Palpitations    • Parkinson disease (CMS/HCC)    • Peripheral neuropathy    • Pneumonia    • Poor sleep pattern    • Progressive supranuclear palsy (CMS/HCC)    • Sepsis (CMS/HCC)    • Slurred speech    • Tremor    • Weakness of voice        Past Surgical History:   Procedure Laterality Date   • ENDOSCOPY W/ PEG TUBE PLACEMENT N/A 3/16/2018    Procedure: ESOPHAGOGASTRODUODENOSCOPY WITH PERCUTANEOUS ENDOSCOPIC GASTROSTOMY TUBE INSERTION;  Surgeon: Lopez Vang Jr., MD;  [facility] END[facility]COPY;  Service: Gastroenterology       Family History   Problem Relation Age of Onset   • Hypertension Mother    • Glaucoma Mother    • Throat cancer Father    • Alcohol abuse Father    • Hypertension Sister    • Cancer Sister         malignant neoplasm of urinary bladder   • Lung cancer Brother    • Heart attack Other    • Lung disease Other        Social History     Socioeconomic History   • Marital status:      Spouse name: Not on file   • Number of children: Not on file   • Years of education: Not on file   • Highest education level: Not on file   Tobacco Use   • Smoking status: Former Smoker   • Smokeless tobacco: Never Used   • Tobacco comment: Daily caffeine use   Substance and Sexual Activity   • Alcohol use: No     Frequency: Never   • Drug use: No   • Sexual activity: No         Physical Exam   Constitutional: He is oriented to person, place, and time. He appears well-developed and well-nourished.   HENT:   Head: Normocephalic and atraumatic.   Eyes: EOM are normal. Pupils are equal, round, and reactive to light.   Neck: Normal range of motion.   Cardiovascular: Normal rate.   Pulmonary/Chest: Effort normal. No respiratory distress.   Abdominal: Soft. He exhibits no distension.   Musculoskeletal:   Ambulates with walker   Neurological: He is alert and oriented to person, place, and time. He displays abnormal reflex. He exhibits abnormal muscle tone.  Coordination and gait abnormal.   Bradycardia kinesis with very flat facial expressions, signs consistent with parkinsonism   Skin: Skin is warm and dry.   Significant bruising of chest wall related to recent contusions   Psychiatric: His behavior is normal. His affect is blunt. His speech is slurred. He is not actively hallucinating. He is attentive.   Nursing note and vitals reviewed.        Assessment/Plan    Rommel was seen today for rib injury and med management.    Diagnoses and all orders for this visit:    Chronic atrial fibrillation (CMS/HCC)    CORINNE on CPAP    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HCC)    Closed fracture of multiple ribs of left side, initial encounter    Closed compression fracture of first lumbar vertebra, sequela    Gait instability    High risk medication use      Patient here for routine follow-up of above-noted medical problems.  Has had recent falls related to his balance difficulties from parkinsonism.  This is complicated by his anticoagulation therapy for atrial fibrillation.  Caregivers requesting a new neurologist because of location and also they do not care for his attitude in general.  Will refer to a [facility] neurologist which they prefer.  Records from previous neurologist are available in the epic system.  Caregivers also are considering using staff physician at his care facility.  Also considering changing to a facility that specializes in neurological conditions?  All of this was discussed.  Again overall seems to be very stable on current regimen which will be continued.  Very concerned about fall risks and may be trying to get a new walker and also hopefully will be getting therapy at the facility.    This note includes information entered using a voice recognition dictation system.  Though reviewed, some nonsensible errors may remain.

## 2019-08-02 NOTE — PROGRESS NOTES
Outpatient Speech Language Pathology   Adult Speech Language Cognitive Treatment Note  [facility]     Patient Name: Rommel Gonzalez  : 1945  MRN: [id number]  Today's Date: 2017         Visit Date: 2017   Patient Active Problem List   Diagnosis   • AF (atrial fibrillation)   • Depression   • Increased thyroid stimulating hormone level   • Gait instability   • Hypertension   • Insomnia   • Idiopathic Parkinson's disease   • Peripheral neuropathy   • Dysarthria   • Atrial flutter   • Anticoagulated   • Health care maintenance   • Hypothyroidism (acquired)   • Weakness of voice   • Pure hypercholesterolemia   • Abnormal chest CT          Visit Dx:    ICD-10-CM ICD-9-CM   1. Parkinson's disease G20 332.0   2. Dysarthria R47.1 784.51   3. Dysphonia R49.0 784.42                               SLP OP Goals       17 1300       Subjective Comments    Subjective Comments (P)  Pt was cooperative and motivated for session today.  -JG     Subjective Pain    Able to rate subjective pain? (P)  yes  -JG     Pre-Treatment Pain Level (P)  0  -JG     Post-Treatment Pain Level (P)  0  -JG     Dysarthria Goals    Patient will be able to engage in speech at the conversational level with maximum articulatory accuracy so that speech is understood by familiar /unfamiliar listeners (P)  90%:;without cues  -JG     Status: Patient will be able to engage in speech at the conversational level with maximum articulatory accuracy so that speech is understood by familiar /unfamiliar listeners (P)  Progressing as expected  -JG     Status: Patient will be able to communicate a message that is comprehensible to familiar/unfamiliar listeners utilizing verbal speech and augmentative strategies (P)  Progressing as expected  -JG     Patient will improve comprehensibility of verbal messages by speaking at an appropriate vocal intensity (P)  90%:;without cues  -JG     Status: Patient will improve comprehensibility of verbal messages  Zaira is a 6 year old female presenting with her mother for evaluation of a pruritic skin rash that has developed during the last 4 days. The girl was at an outdoor Girl  camp last week. The family has given Benadryl as needed by mouth.  Review of systems:  Negative for fever, nasal congestion, rhinitis, sore throat, cough, swallowing or breathing difficulty, vomiting, abdominal pain, diarrhea, dysuria or musculoskeletal pain.    No known  with similar rash.    No chronic medical problems.    ALLERGIES:  No Known Allergies      No current outpatient medications on file prior to visit.  No current facility-administered medications on file prior to visit.   Immunizations are up to date.    OBJECTIVE:  Alert and not in distress.  Vitals:    08/02/19 1717   BP: 96/58   Pulse: 100   Temp: 99.1 °F (37.3 °C)   TempSrc: Temporal   Weight: 21.5 kg   Height: 4' 1.75\" (1.264 m)     HEENT: Oral mucous membranes moist, no lesions.  NECK:  Supple, lymph nodes not enlarged.  LUNGS:  Clear to auscultation, ventilated to bases bilaterally.  CARDIOVASCULAR:  Heart with regular rate and rhythm, no murmur.  SKIN:  Well-perfused, normal turgor. There are patchy and linear papulosquamous pink blanching lesions on face, trunk and extremities.    ASSESSMENT:  Plant allergic contact dermatitis, likely poison ivy or oak.    PLAN:  Provided verbal and written information about condition and symptomatic treatment.  Seven day oral prednisolone taper was was prescribed.  Give Zyrtec 10 mg by mouth daily as needed.  Benadryl 10 ML by mouth can be given once or twice daily as needed additionally for pruritus.  Apply 0.1% transit alone cream to skin lesion 3 times a day until resolved.  Follow-up as needed if dermatitis does not improve within 2 days and resolve during the next week.   "by speaking at an appropriate vocal intensity (P)  Progressing as expected  -JG     Comments: Patient will improve comprehensibility of verbal messages by speaking at an appropriate vocal intensity (P)  Pt demonstrated 75% accuracy with cues when verbalizing sentences with increased intensity. Pt requires min cues to take deep inhalation prior to phonation and speak loudly when reading sentences. Pt's intensity continues to be improved at the beginning of reading tasks.  -JG     Patient will compensate for reduced respiratory support for speech by deeper inhalation before beginning an utterance (P)  90%:;without cues  -JG     Status: Patient will compensate for reduced respiratory support for speech by deeper inhalation before beginning an utterance (P)  Progressing as expected  -JG     Comments: Patient will compensate for reduced respiratory support for speech by deeper inhalation before beginning an utterance (P)  Pt demonstrated 70% accuracy without cues when taking inhalation prior to spoken sentences when reading common statements.  -JG     Patient will apply compensatory strategies to improve intelligibility of speech during in spontaneous speech (P)  90%:;without cues  -JG     Status: Patient will apply compensatory strategies to improve intelligibility of speech during in spontaneous speech (P)  Progressing as expected  -JG     Comments: Patient will apply compensatory strategies to improve intelligibility of speech during in spontaneous speech (P)  Pt demonstrated 65% accuracy without cues when reading sentences with overarticulation and focus on intonation.  -JG     \"Patient will increase strength and power  of articulators so they can move more quickly and accurately to improve speech intelligibility by completing lip exercises\" (P)  90%:;without cues  -JG     Status: Patient will increase strength and power  of articulators so they can move more quickly and accurately to improve speech intelligibility " by completing lip exercises (P)  Progressing as expected  -JG     Comments: Patient will increase strength and power  of articulators so they can move more quickly and accurately to improve speech intelligibility by completing lip exercises (P)  Pt demonstrated 80% accuracy without cues when performing oral motor exercises.  -JG       User Key  (r) = Recorded By, (t) = Taken By, (c) = Cosigned By    Initials Name Provider Type    JOSHUA Parham Speech Therapy Student Speech Therapy Student                         Time Calculation:   SLP Start Time: (P) 1015  SLP Stop Time: (P) 1100  SLP Time Calculation (min): (P) 45 min    Therapy Charges for Today     Code Description Service Date Service Provider Modifiers Qty    [id number]  ST TREATMENT SPEECH 3 4/11/2017 Haylee Parham Speech Therapy Student GN 1                   Haylee Parham Speech Therapy Student  4/11/2017

## 2019-08-18 RX ORDER — CARBIDOPA AND LEVODOPA 25; 100 MG/1; MG/1
TABLET, EXTENDED RELEASE ORAL
Qty: 675 TABLET | Refills: 0 | Status: SHIPPED | OUTPATIENT
Start: 2019-08-18 | End: 2019-08-23

## 2019-08-20 ENCOUNTER — TELEPHONE (OUTPATIENT)
Dept: FAMILY MEDICINE CLINIC | Facility: CLINIC | Age: 74
End: 2019-08-20

## 2019-08-20 RX ORDER — MIDODRINE HYDROCHLORIDE 2.5 MG/1
2.5 TABLET ORAL DAILY
Qty: 30 TABLET | Refills: 5 | Status: SHIPPED | OUTPATIENT
Start: 2019-08-20 | End: 2020-02-24 | Stop reason: HOSPADM

## 2019-08-20 NOTE — TELEPHONE ENCOUNTER
----- Message from Kyara Celis sent at 8/20/2019  9:49 AM EDT -----  midodrine (PROAMATINE) 2.5 MG tablet  Sig: Take 1 tablet by mouth Daily.    [facility] [place] [address] [address] [facility] [address]  - [phone]  - [phone] FX

## 2019-08-23 RX ORDER — CARBIDOPA AND LEVODOPA 50; 200 MG/1; MG/1
2 TABLET, EXTENDED RELEASE ORAL 2 TIMES DAILY
Qty: 120 TABLET | Refills: 11 | Status: SHIPPED | OUTPATIENT
Start: 2019-08-23

## 2019-09-28 ENCOUNTER — HOSPITAL ENCOUNTER (EMERGENCY)
Facility: HOSPITAL | Age: 74
Discharge: NURSING, REHAB OR LONG-TERM CARE | End: 2019-09-29
Attending: EMERGENCY MEDICINE | Admitting: EMERGENCY MEDICINE

## 2019-09-28 ENCOUNTER — APPOINTMENT (OUTPATIENT)
Dept: GENERAL RADIOLOGY | Facility: HOSPITAL | Age: 74
End: 2019-09-28

## 2019-09-28 ENCOUNTER — APPOINTMENT (OUTPATIENT)
Dept: CT IMAGING | Facility: HOSPITAL | Age: 74
End: 2019-09-28
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
## 2019-09-28 DIAGNOSIS — S80.01XA CONTUSION OF RIGHT KNEE, INITIAL ENCOUNTER: ICD-10-CM

## 2019-09-28 DIAGNOSIS — F03.90 DEMENTIA WITHOUT BEHAVIORAL DISTURBANCE, UNSPECIFIED DEMENTIA TYPE: ICD-10-CM

## 2019-09-28 DIAGNOSIS — S50.311A ABRASION OF RIGHT ELBOW, INITIAL ENCOUNTER: ICD-10-CM

## 2019-09-28 PROCEDURE — 99284 EMERGENCY DEPT VISIT MOD MDM: CPT

## 2019-09-28 PROCEDURE — 73070 X-RAY EXAM OF ELBOW: CPT

## 2019-09-28 PROCEDURE — 73562 X-RAY EXAM OF KNEE 3: CPT

## 2019-09-28 PROCEDURE — 70450 CT HEAD/BRAIN W/O DYE: CPT

## 2019-09-28 RX ORDER — LANOLIN ALCOHOL/MO/W.PET/CERES
1000 CREAM (GRAM) TOPICAL DAILY
COMMUNITY
End: 2020-02-24 | Stop reason: HOSPADM

## 2019-09-28 RX ORDER — CARBIDOPA AND LEVODOPA 50; 200 MG/1; MG/1
2 TABLET, EXTENDED RELEASE ORAL 2 TIMES DAILY
COMMUNITY
End: 2020-02-24 | Stop reason: HOSPADM

## 2019-09-29 VITALS
OXYGEN SATURATION: 96 % | DIASTOLIC BLOOD PRESSURE: 94 MMHG | WEIGHT: 194.7 LBS | TEMPERATURE: 97.9 F | HEART RATE: 65 BPM | BODY MASS INDEX: 26.37 KG/M2 | HEIGHT: 72 IN | RESPIRATION RATE: 18 BRPM | SYSTOLIC BLOOD PRESSURE: 134 MMHG

## 2019-10-07 ENCOUNTER — TELEPHONE (OUTPATIENT)
Dept: FAMILY MEDICINE CLINIC | Facility: CLINIC | Age: 74
End: 2019-10-07

## 2019-10-07 NOTE — TELEPHONE ENCOUNTER
Pt was instructed to go to ER or [facility] yesterday. Nurse researched note and found that message. Nurse informed again and she found orders for antibiotic from ER.

## 2020-01-05 ENCOUNTER — HOSPITAL ENCOUNTER (EMERGENCY)
Facility: HOSPITAL | Age: 75
Discharge: NURSING, REHAB OR LONG-TERM CARE | End: 2020-01-06
Attending: EMERGENCY MEDICINE | Admitting: EMERGENCY MEDICINE

## 2020-01-05 ENCOUNTER — APPOINTMENT (OUTPATIENT)
Dept: CT IMAGING | Facility: HOSPITAL | Age: 75
End: 2020-01-05

## 2020-01-05 ENCOUNTER — APPOINTMENT (OUTPATIENT)
Dept: GENERAL RADIOLOGY | Facility: HOSPITAL | Age: 75
End: 2020-01-05
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
## 2020-01-05 DIAGNOSIS — S50.01XA CONTUSION OF RIGHT ELBOW, INITIAL ENCOUNTER: ICD-10-CM

## 2020-01-05 DIAGNOSIS — S09.90XA INJURY OF HEAD, INITIAL ENCOUNTER: ICD-10-CM

## 2020-01-05 LAB
ALBUMIN SERPL-MCNC: 3.3 G/DL (ref 3.5–5.2)
ALBUMIN/GLOB SERPL: 1.1 G/DL
ALP SERPL-CCNC: 58 U/L (ref 39–117)
ALT SERPL W P-5'-P-CCNC: <5 U/L (ref 1–41)
ANION GAP SERPL CALCULATED.3IONS-SCNC: 11.7 MMOL/L (ref 5–15)
AST SERPL-CCNC: 16 U/L (ref 1–40)
BASOPHILS # BLD AUTO: 0.04 10*3/MM3 (ref 0–0.2)
BASOPHILS NFR BLD AUTO: 0.8 % (ref 0–1.5)
BILIRUB SERPL-MCNC: 0.2 MG/DL (ref 0.2–1.2)
BUN BLD-MCNC: 13 MG/DL (ref 8–23)
BUN/CREAT SERPL: 16 (ref 7–25)
CALCIUM SPEC-SCNC: 8.7 MG/DL (ref 8.6–10.5)
CHLORIDE SERPL-SCNC: 102 MMOL/L (ref 98–107)
CO2 SERPL-SCNC: 27.3 MMOL/L (ref 22–29)
CREAT BLD-MCNC: 0.81 MG/DL (ref 0.76–1.27)
DEPRECATED RDW RBC AUTO: 47.7 FL (ref 37–54)
EOSINOPHIL # BLD AUTO: 0.25 10*3/MM3 (ref 0–0.4)
EOSINOPHIL NFR BLD AUTO: 4.9 % (ref 0.3–6.2)
ERYTHROCYTE [DISTWIDTH] IN BLOOD BY AUTOMATED COUNT: 13.8 % (ref 12.3–15.4)
GFR SERPL CREATININE-BSD FRML MDRD: 93 ML/MIN/1.73
GLOBULIN UR ELPH-MCNC: 3 GM/DL
GLUCOSE BLD-MCNC: 100 MG/DL (ref 65–99)
HCT VFR BLD AUTO: 35.1 % (ref 37.5–51)
HGB BLD-MCNC: 11.1 G/DL (ref 13–17.7)
HOLD SPECIMEN: NORMAL
HOLD SPECIMEN: NORMAL
IMM GRANULOCYTES # BLD AUTO: 0.01 10*3/MM3 (ref 0–0.05)
IMM GRANULOCYTES NFR BLD AUTO: 0.2 % (ref 0–0.5)
LYMPHOCYTES # BLD AUTO: 1.69 10*3/MM3 (ref 0.7–3.1)
LYMPHOCYTES NFR BLD AUTO: 33.3 % (ref 19.6–45.3)
MCH RBC QN AUTO: 29.8 PG (ref 26.6–33)
MCHC RBC AUTO-ENTMCNC: 31.6 G/DL (ref 31.5–35.7)
MCV RBC AUTO: 94.4 FL (ref 79–97)
MONOCYTES # BLD AUTO: 0.53 10*3/MM3 (ref 0.1–0.9)
MONOCYTES NFR BLD AUTO: 10.5 % (ref 5–12)
NEUTROPHILS # BLD AUTO: 2.55 10*3/MM3 (ref 1.7–7)
NEUTROPHILS NFR BLD AUTO: 50.3 % (ref 42.7–76)
NRBC BLD AUTO-RTO: 0 /100 WBC (ref 0–0.2)
PLATELET # BLD AUTO: 227 10*3/MM3 (ref 140–450)
PMV BLD AUTO: 10.4 FL (ref 6–12)
POTASSIUM BLD-SCNC: 4.2 MMOL/L (ref 3.5–5.2)
PROT SERPL-MCNC: 6.3 G/DL (ref 6–8.5)
RBC # BLD AUTO: 3.72 10*6/MM3 (ref 4.14–5.8)
SODIUM BLD-SCNC: 141 MMOL/L (ref 136–145)
WBC NRBC COR # BLD: 5.07 10*3/MM3 (ref 3.4–10.8)
WHOLE BLOOD HOLD SPECIMEN: NORMAL
WHOLE BLOOD HOLD SPECIMEN: NORMAL

## 2020-01-05 PROCEDURE — 70450 CT HEAD/BRAIN W/O DYE: CPT

## 2020-01-05 PROCEDURE — 73070 X-RAY EXAM OF ELBOW: CPT

## 2020-01-05 PROCEDURE — 85025 COMPLETE CBC W/AUTO DIFF WBC: CPT

## 2020-01-05 PROCEDURE — 99284 EMERGENCY DEPT VISIT MOD MDM: CPT

## 2020-01-05 PROCEDURE — 80053 COMPREHEN METABOLIC PANEL: CPT | Performed by: EMERGENCY MEDICINE

## 2020-01-06 VITALS
RESPIRATION RATE: 16 BRPM | TEMPERATURE: 97.9 F | OXYGEN SATURATION: 98 % | BODY MASS INDEX: 27.09 KG/M2 | WEIGHT: 200 LBS | HEIGHT: 72 IN | DIASTOLIC BLOOD PRESSURE: 77 MMHG | HEART RATE: 68 BPM | SYSTOLIC BLOOD PRESSURE: 159 MMHG

## 2020-01-06 NOTE — ED PROVIDER NOTES
MD ATTESTATION NOTE    The RAQUEL and I have discussed this patient's history, physical exam, and treatment plan.  I have reviewed the documentation and personally had a face to face interaction with the patient. I affirm the documentation and agree with the treatment and plan.  The attached note describes my personal findings.      History  74-year-old male with A. fib on Xarelto presents after witnessed fall earlier today.    Physical Exam  Vital Signs reviewed  Patient drowsy but arousable, he is at neurologic baseline per sister at the bedside  Examination of the head reveals no significant swelling or ecchymosis.      Disposition  I discussed patient's evaluation and treatment with PA Abisai cotton.  Imaging studies are negative for acute fracture or hemorrhage.  Will DC home.     Boubacar Bianchi MD  01/06/20 1614

## 2020-01-06 NOTE — ED TRIAGE NOTES
Pt to ED from [facility] per [facility].      Pt takes xarelto for a fib and had witnessed fall at 2040.  No LOC, no emesis, but pt is c/o headache.      Pt is awake, at neuro baseline, has history of progressive supranuclearopthomyoplagia.

## 2020-01-06 NOTE — ED NOTES
Spoke with DANDY Renner at [facility] to confirm pt neuro status. Per RN pt is at baseline; alert and oriented to self, situation, place but not oriented to time.   RN also stated that after getting pt up from fall he had a L facial droop and L pupil was sluggish which lasted about 5 minutes. No symptoms at this time.  Provider notified. Orders already in place.        Lenka Avila RN  01/05/20 [id number]

## 2020-01-06 NOTE — ED PROVIDER NOTES
EMERGENCY DEPARTMENT ENCOUNTER    CHIEF COMPLAINT  Chief Complaint: headache  History given by: patient  History limited by: Parkinson's  Room Number: 23/23  PMD: Dennis Goncalves MD      HPI:  Pt is a 74 y.o. male with history of Afib, anticoagulated on Xarelto, who presents to the ED via EMS from [facility] complaining of gradual onset, constant, worsening headache s/p witnessed fall this evening, at 2040. Also c/o R elbow pain from fall. Denies LOC, neck pain, or back pain. Sister at bedside reports pt is at his baseline. Past Medical History of supranuclear opthomyoplagia, Parkinson's,  gait instability, peripheral neuropathy, and chronic slurred speech.    MEDICAL RECORD REVIEW    Pt was last seen here on 9/28/19 s/p fall.    PAST MEDICAL HISTORY  Active Ambulatory Problems     Diagnosis Date Noted   • AF (atrial fibrillation) (CMS/[facility]) 03/28/2016   • Depression 03/28/2016   • Gait instability 03/28/2016   • Hypertension 03/28/2016   • Insomnia 03/28/2016   • Idiopathic Parkinson's disease (CMS/[facility]) 03/28/2016   • Peripheral neuropathy 03/28/2016   • Dysarthria 03/28/2016   • Atrial flutter (CMS/[facility]) 04/03/2016   • Anticoagulated 04/05/2016   • Health care maintenance 04/19/2016   • Hypothyroidism (acquired) 06/23/2016   • Weakness of voice 06/23/2016   • Abnormal chest CT 03/13/2017   • Facial droop 04/19/2017   • Hypersomnia  04/19/2017   • Hyperlipidemia 08/16/2017   • High risk medication use 08/16/2017   • CORINNE on CPAP 08/16/2017   • Obstructive sleep apnea, adult 10/26/2017   • Fall 03/07/2018   • Progressive supranuclear palsy (CMS/HCC) 03/07/2018   • Dysphagia 03/07/2018   • 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/2018   • Constipation 01/09/2018   • Hyperreflexia 01/09/2018   • 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   • Transient cerebral ischemia 08/06/2018   • Pneumonia of both lungs due to infectious organism 06/17/2019   • Closed fracture of multiple ribs 06/17/2019   • Closed compression fracture of L1  lumbar vertebra 06/17/2019   • Closed fracture of transverse process of lumbar vertebra (CMS/HCC) 06/17/2019   • Altered mental status 06/28/2019   • Frequent falls 06/28/2019     Resolved Ambulatory Problems     Diagnosis Date Noted   • Atypical chest pain 03/28/2016   • Chest pain 03/28/2016   • Colon polyp 03/28/2016   • Confusional state 03/28/2016   • Shortness of breath 03/28/2016   • Fatigue 03/28/2016   • Hyperlipidemia 03/28/2016   • Palpitations 03/28/2016   • Parkinson's disease (CMS/HCC) 03/28/2016   • Tremor 03/28/2016   • Hematuria, gross 04/12/2016   • Persistent cough for 3 weeks or longer 05/02/2016   • Near syncope 04/19/2017   • Non-traumatic rhabdomyolysis 03/07/2018   • Aspiration pneumonia (CMS/[facility]) 03/12/2018   • Sepsis (CMS/[facility]) 03/12/2018   • Metabolic encephalopathy 03/15/2018     Past Medical History:   Diagnosis Date   • Colon polyps    • Confusion    • Disease of thyroid gland    • Disorder of thyroid    • Dyspnea and respiratory abnormalities    • Elevated TSH    • Hay fever    • Hypothyroidism    • Measles    • Mumps    • CORINNE (obstructive sleep apnea)    • Parkinson disease (CMS/HCC)    • Pneumonia    • Poor sleep pattern    • Slurred speech        PAST SURGICAL HISTORY  Past Surgical History:   Procedure Laterality Date   • ENDOSCOPY W/ PEG TUBE PLACEMENT N/A 3/16/2018    Procedure: ESOPHAGOGASTRODUODENOSCOPY WITH PERCUTANEOUS ENDOSCOPIC GASTROSTOMY TUBE INSERTION;  Surgeon: Lopez Vang Jr., MD;  Location: [facility];  Service: Gastroenterology       FAMILY HISTORY  Family History   Problem Relation Age of Onset   • Hypertension Mother    • Glaucoma Mother    • Throat cancer Father    • Alcohol abuse Father    • Hypertension Sister    • Cancer Sister         malignant neoplasm of urinary bladder   • Lung cancer Brother    • Heart attack Other    • Lung disease Other        SOCIAL HISTORY  Social History     Socioeconomic History   • Marital status:      Spouse name:  Not on file   • Number of children: Not on file   • Years of education: Not on file   • Highest education level: Not on file   Tobacco Use   • Smoking status: Former Smoker   • Smokeless tobacco: Never Used   • Tobacco comment: Daily caffeine use   Substance and Sexual Activity   • Alcohol use: No     Frequency: Never   • Drug use: No   • Sexual activity: Never       ALLERGIES  Ppd [tuberculin purified protein derivative]    REVIEW OF SYSTEMS  Review of Systems   Unable to perform ROS: Other (Parkinson's)   Musculoskeletal:        (+) R elbow pain   Neurological: Positive for headaches.       All systems reviewed and negative except for those discussed in HPI.    PHYSICAL EXAM  ED Triage Vitals [01/05/20 2122]   Temp Heart Rate Resp BP SpO2   97.9 °F (36.6 °C) 68 16 131/79 94 %      Temp src Heart Rate Source Patient Position BP Location FiO2 (%)   Tympanic Monitor Sitting Right arm --       Physical Exam   Constitutional: No distress.   HENT:   Head: Normocephalic and atraumatic.   Mouth/Throat: Mucous membranes are normal. Mucous membranes are not dry.   Eyes: Pupils are equal, round, and reactive to light. EOM are normal.   Neck: Normal range of motion.   Cardiovascular: Normal rate, regular rhythm and intact distal pulses.   Pulmonary/Chest: No respiratory distress. He exhibits no tenderness (chest wall).   Abdominal: There is no tenderness.   Musculoskeletal: He exhibits no edema (peripheral).   Hematoma over R elbow  Pelvis stable  FROM in extremities x4   Neurological: He is alert. He displays no weakness and facial symmetry. No cranial nerve deficit.   No focal deficits   Skin: Skin is warm and dry.   Nursing note and vitals reviewed.      LAB RESULTS  Lab Results (last 24 hours)     Procedure Component Value Units Date/Time    CBC & Differential [[id number]] Collected:  01/05/20 2223    Specimen:  Blood Updated:  01/05/20 2239    Narrative:       The following orders were created for panel order CBC &  Differential.  Procedure                               Abnormality         Status                     ---------                               -----------         ------                     CBC Auto Differential[[id number]]        Abnormal            Final result                 Please view results for these tests on the individual orders.    Comprehensive Metabolic Panel [[id number]]  (Abnormal) Collected:  01/05/20 2223    Specimen:  Blood Updated:  01/05/20 2253     Glucose 100 mg/dL      BUN 13 mg/dL      Creatinine 0.81 mg/dL      Sodium 141 mmol/L      Potassium 4.2 mmol/L      Chloride 102 mmol/L      CO2 27.3 mmol/L      Calcium 8.7 mg/dL      Total Protein 6.3 g/dL      Albumin 3.30 g/dL      ALT (SGPT) <5 U/L      AST (SGOT) 16 U/L      Alkaline Phosphatase 58 U/L      Total Bilirubin 0.2 mg/dL      eGFR Non African Amer 93 mL/min/1.73      Globulin 3.0 gm/dL      A/G Ratio 1.1 g/dL      BUN/Creatinine Ratio 16.0     Anion Gap 11.7 mmol/L     Narrative:       GFR Normal >60  Chronic Kidney Disease <60  Kidney Failure <15      CBC Auto Differential [[id number]]  (Abnormal) Collected:  01/05/20 2223    Specimen:  Blood Updated:  01/05/20 2239     WBC 5.07 10*3/mm3      RBC 3.72 10*6/mm3      Hemoglobin 11.1 g/dL      Hematocrit 35.1 %      MCV 94.4 fL      MCH 29.8 pg      MCHC 31.6 g/dL      RDW 13.8 %      RDW-SD 47.7 fl      MPV 10.4 fL      Platelets 227 10*3/mm3      Neutrophil % 50.3 %      Lymphocyte % 33.3 %      Monocyte % 10.5 %      Eosinophil % 4.9 %      Basophil % 0.8 %      Immature Grans % 0.2 %      Neutrophils, Absolute 2.55 10*3/mm3      Lymphocytes, Absolute 1.69 10*3/mm3      Monocytes, Absolute 0.53 10*3/mm3      Eosinophils, Absolute 0.25 10*3/mm3      Basophils, Absolute 0.04 10*3/mm3      Immature Grans, Absolute 0.01 10*3/mm3      nRBC 0.0 /100 WBC           I ordered the above labs and reviewed the results.    RADIOLOGY  XR Elbow 2 View Right   Final Result   1. No acute osseous  abnormality.       This report was finalized on 1/6/2020 5:52 AM by Champ Ahn M.D.          CT Head Without Contrast   Final Result   1. No acute intracranial abnormality.                       This report was finalized on 1/6/2020 5:52 AM by Champ Ahn M.D.               I ordered the above noted radiological studies. Interpreted by radiologist. Reviewed by me in PACS. See dictation for official radiology interpretation.    PROGRESS AND CONSULTS       2239- HR 55. O2 sat 94% on RA. Discussed w/ pt and family plan to check labs and imaging of head/elbow. Pt declines pain medicine for HA. Pt understands and agrees with the plan, all questions answered.     2303- Ordered XR Elbow for further evaluation.    0031- Reviewed pt's history and workup with Dr. Bainchi.  After a bedside evaluation, Dr. Bianchi agrees with the plan of care.    MEDICAL DECISION MAKING  Results were reviewed/discussed with the patient and they were also made aware of online access. Pt also made aware that some labs, such as cultures, will not be resulted during ER visit and follow up with PMD is necessary.          DIAGNOSIS  Final diagnoses:   Fall, initial encounter   Injury of head, initial encounter   Contusion of right elbow, initial encounter       DISPOSITION  DISCHARGE    Patient discharged in stable condition.    Reviewed implications of results, diagnosis, meds, responsibility to follow up, warning signs and symptoms of possible worsening, potential complications and reasons to return to ER, including worsening symptoms.    Patient/Family voiced understanding of above instructions.    Discussed plan for discharge, as there is no emergent indication for admission. Patient referred to primary care provider for BP management due to today's BP. Pt/family is agreeable and understands need for follow up and repeat testing.  Pt is aware that discharge does not mean that nothing is wrong but it indicates no emergency is present that  requires admission and they must continue care with follow-up as given below or physician of their choice.     FOLLOW-UP  Dennis Goncalves MD  [address] [id number] [phone]    In 1 day  For further evaluation and treatment         Medication List      No changes were made to your prescriptions during this visit.           Latest Documented Vital Signs:  As of 6:18 AM  BP- 159/77 HR- 68 Temp- 97.9 °F (36.6 °C) (Tympanic) O2 sat- 98%    --  Documentation assistance provided by dm Valladares for Abisai Solis PA-C.  Information recorded by the scribe was done at my direction and has been verified and validated by me.     Luz Valladares  01/06/20 0038       Ronan Solis III, PA  01/06/20 0618

## 2020-02-14 ENCOUNTER — LAB REQUISITION (OUTPATIENT)
Dept: LAB | Facility: HOSPITAL | Age: 75
End: 2020-02-14

## 2020-02-14 DIAGNOSIS — Z00.00 ROUTINE GENERAL MEDICAL EXAMINATION AT A HEALTH CARE FACILITY: ICD-10-CM

## 2020-02-14 LAB
BILIRUB UR QL STRIP: NEGATIVE
CLARITY UR: ABNORMAL
COLOR UR: ABNORMAL
GLUCOSE UR STRIP-MCNC: NEGATIVE MG/DL
HGB UR QL STRIP.AUTO: NEGATIVE
KETONES UR QL STRIP: ABNORMAL
LEUKOCYTE ESTERASE UR QL STRIP.AUTO: NEGATIVE
NITRITE UR QL STRIP: NEGATIVE
PH UR STRIP.AUTO: 5.5 [PH] (ref 5–8)
PROT UR QL STRIP: ABNORMAL
SP GR UR STRIP: >=1.03 (ref 1–1.03)
UROBILINOGEN UR QL STRIP: ABNORMAL

## 2020-02-14 PROCEDURE — 81003 URINALYSIS AUTO W/O SCOPE: CPT

## 2020-02-15 ENCOUNTER — HOSPITAL ENCOUNTER (INPATIENT)
Facility: HOSPITAL | Age: 75
LOS: 9 days | Discharge: HOME HEALTH | End: 2020-02-24
Attending: EMERGENCY MEDICINE | Admitting: HOSPITALIST

## 2020-02-15 ENCOUNTER — APPOINTMENT (OUTPATIENT)
Dept: GENERAL RADIOLOGY | Facility: HOSPITAL | Age: 75
End: 2020-02-15

## 2020-02-15 DIAGNOSIS — A41.9 SEPSIS WITHOUT ACUTE ORGAN DYSFUNCTION, DUE TO UNSPECIFIED ORGANISM (HCC): Primary | ICD-10-CM

## 2020-02-15 DIAGNOSIS — J18.9 PNEUMONIA OF BOTH LOWER LOBES DUE TO INFECTIOUS ORGANISM: ICD-10-CM

## 2020-02-15 DIAGNOSIS — J12.3 PNEUMONIA DUE TO HUMAN METAPNEUMOVIRUS: ICD-10-CM

## 2020-02-15 DIAGNOSIS — G20 IDIOPATHIC PARKINSON'S DISEASE (HCC): ICD-10-CM

## 2020-02-15 LAB
ALBUMIN SERPL-MCNC: 3.9 G/DL (ref 3.5–5.2)
ALBUMIN/GLOB SERPL: 1.1 G/DL
ALP SERPL-CCNC: 65 U/L (ref 39–117)
ALT SERPL W P-5'-P-CCNC: 9 U/L (ref 1–41)
ANION GAP SERPL CALCULATED.3IONS-SCNC: 11.7 MMOL/L (ref 5–15)
AST SERPL-CCNC: 27 U/L (ref 1–40)
B PARAPERT DNA SPEC QL NAA+PROBE: NOT DETECTED
B PERT DNA SPEC QL NAA+PROBE: NOT DETECTED
BASOPHILS # BLD AUTO: 0.02 10*3/MM3 (ref 0–0.2)
BASOPHILS NFR BLD AUTO: 0.2 % (ref 0–1.5)
BILIRUB SERPL-MCNC: 0.3 MG/DL (ref 0.2–1.2)
BUN BLD-MCNC: 14 MG/DL (ref 8–23)
BUN/CREAT SERPL: 13.9 (ref 7–25)
C PNEUM DNA NPH QL NAA+NON-PROBE: NOT DETECTED
CALCIUM SPEC-SCNC: 8.8 MG/DL (ref 8.6–10.5)
CHLORIDE SERPL-SCNC: 99 MMOL/L (ref 98–107)
CO2 SERPL-SCNC: 28.3 MMOL/L (ref 22–29)
CREAT BLD-MCNC: 1.01 MG/DL (ref 0.76–1.27)
D-LACTATE SERPL-SCNC: 1.1 MMOL/L (ref 0.5–2)
DEPRECATED RDW RBC AUTO: 45.6 FL (ref 37–54)
EOSINOPHIL # BLD AUTO: 0.03 10*3/MM3 (ref 0–0.4)
EOSINOPHIL NFR BLD AUTO: 0.3 % (ref 0.3–6.2)
ERYTHROCYTE [DISTWIDTH] IN BLOOD BY AUTOMATED COUNT: 13.6 % (ref 12.3–15.4)
FLUAV H1 2009 PAND RNA NPH QL NAA+PROBE: NOT DETECTED
FLUAV H1 HA GENE NPH QL NAA+PROBE: NOT DETECTED
FLUAV H3 RNA NPH QL NAA+PROBE: NOT DETECTED
FLUAV SUBTYP SPEC NAA+PROBE: NOT DETECTED
FLUBV RNA ISLT QL NAA+PROBE: NOT DETECTED
GFR SERPL CREATININE-BSD FRML MDRD: 72 ML/MIN/1.73
GLOBULIN UR ELPH-MCNC: 3.5 GM/DL
GLUCOSE BLD-MCNC: 107 MG/DL (ref 65–99)
HADV DNA SPEC NAA+PROBE: NOT DETECTED
HCOV 229E RNA SPEC QL NAA+PROBE: NOT DETECTED
HCOV HKU1 RNA SPEC QL NAA+PROBE: NOT DETECTED
HCOV NL63 RNA SPEC QL NAA+PROBE: NOT DETECTED
HCOV OC43 RNA SPEC QL NAA+PROBE: NOT DETECTED
HCT VFR BLD AUTO: 35.6 % (ref 37.5–51)
HGB BLD-MCNC: 11.9 G/DL (ref 13–17.7)
HMPV RNA NPH QL NAA+NON-PROBE: DETECTED
HPIV1 RNA SPEC QL NAA+PROBE: NOT DETECTED
HPIV2 RNA SPEC QL NAA+PROBE: NOT DETECTED
HPIV3 RNA NPH QL NAA+PROBE: NOT DETECTED
HPIV4 P GENE NPH QL NAA+PROBE: NOT DETECTED
IMM GRANULOCYTES # BLD AUTO: 0.02 10*3/MM3 (ref 0–0.05)
IMM GRANULOCYTES NFR BLD AUTO: 0.2 % (ref 0–0.5)
LYMPHOCYTES # BLD AUTO: 1.02 10*3/MM3 (ref 0.7–3.1)
LYMPHOCYTES NFR BLD AUTO: 11.6 % (ref 19.6–45.3)
M PNEUMO IGG SER IA-ACNC: NOT DETECTED
MCH RBC QN AUTO: 31.1 PG (ref 26.6–33)
MCHC RBC AUTO-ENTMCNC: 33.4 G/DL (ref 31.5–35.7)
MCV RBC AUTO: 93 FL (ref 79–97)
MONOCYTES # BLD AUTO: 0.55 10*3/MM3 (ref 0.1–0.9)
MONOCYTES NFR BLD AUTO: 6.3 % (ref 5–12)
NEUTROPHILS # BLD AUTO: 7.15 10*3/MM3 (ref 1.7–7)
NEUTROPHILS NFR BLD AUTO: 81.4 % (ref 42.7–76)
NRBC BLD AUTO-RTO: 0 /100 WBC (ref 0–0.2)
NT-PROBNP SERPL-MCNC: 290.6 PG/ML (ref 5–1800)
PLATELET # BLD AUTO: 226 10*3/MM3 (ref 140–450)
PMV BLD AUTO: 10.3 FL (ref 6–12)
POTASSIUM BLD-SCNC: 4.4 MMOL/L (ref 3.5–5.2)
PROCALCITONIN SERPL-MCNC: 0.06 NG/ML (ref 0.1–0.25)
PROT SERPL-MCNC: 7.4 G/DL (ref 6–8.5)
RBC # BLD AUTO: 3.83 10*6/MM3 (ref 4.14–5.8)
RHINOVIRUS RNA SPEC NAA+PROBE: NOT DETECTED
RSV RNA NPH QL NAA+NON-PROBE: NOT DETECTED
SODIUM BLD-SCNC: 139 MMOL/L (ref 136–145)
TROPONIN T SERPL-MCNC: <0.01 NG/ML (ref 0–0.03)
WBC NRBC COR # BLD: 8.79 10*3/MM3 (ref 3.4–10.8)

## 2020-02-15 PROCEDURE — 83605 ASSAY OF LACTIC ACID: CPT | Performed by: EMERGENCY MEDICINE

## 2020-02-15 PROCEDURE — 71045 X-RAY EXAM CHEST 1 VIEW: CPT

## 2020-02-15 PROCEDURE — 83880 ASSAY OF NATRIURETIC PEPTIDE: CPT | Performed by: EMERGENCY MEDICINE

## 2020-02-15 PROCEDURE — 84145 PROCALCITONIN (PCT): CPT | Performed by: EMERGENCY MEDICINE

## 2020-02-15 PROCEDURE — 99285 EMERGENCY DEPT VISIT HI MDM: CPT

## 2020-02-15 PROCEDURE — 93005 ELECTROCARDIOGRAM TRACING: CPT | Performed by: EMERGENCY MEDICINE

## 2020-02-15 PROCEDURE — 80053 COMPREHEN METABOLIC PANEL: CPT | Performed by: EMERGENCY MEDICINE

## 2020-02-15 PROCEDURE — 85025 COMPLETE CBC W/AUTO DIFF WBC: CPT | Performed by: EMERGENCY MEDICINE

## 2020-02-15 PROCEDURE — 25010000003 CEFTRIAXONE PER 250 MG: Performed by: EMERGENCY MEDICINE

## 2020-02-15 PROCEDURE — 0100U HC BIOFIRE FILMARRAY RESP PANEL 2: CPT | Performed by: EMERGENCY MEDICINE

## 2020-02-15 PROCEDURE — 25010000002 AZITHROMYCIN PER 500 MG: Performed by: EMERGENCY MEDICINE

## 2020-02-15 PROCEDURE — 87040 BLOOD CULTURE FOR BACTERIA: CPT | Performed by: EMERGENCY MEDICINE

## 2020-02-15 PROCEDURE — 93010 ELECTROCARDIOGRAM REPORT: CPT | Performed by: INTERNAL MEDICINE

## 2020-02-15 PROCEDURE — 84484 ASSAY OF TROPONIN QUANT: CPT | Performed by: EMERGENCY MEDICINE

## 2020-02-15 RX ORDER — ESCITALOPRAM OXALATE 10 MG/1
10 TABLET ORAL DAILY
COMMUNITY

## 2020-02-15 RX ORDER — ACETAMINOPHEN 650 MG/1
650 SUPPOSITORY RECTAL ONCE
Status: COMPLETED | OUTPATIENT
Start: 2020-02-15 | End: 2020-02-15

## 2020-02-15 RX ORDER — CEFTRIAXONE SODIUM 2 G/50ML
2 INJECTION, SOLUTION INTRAVENOUS ONCE
Status: COMPLETED | OUTPATIENT
Start: 2020-02-15 | End: 2020-02-15

## 2020-02-15 RX ORDER — IBUPROFEN 800 MG/1
800 TABLET ORAL ONCE
Status: DISCONTINUED | OUTPATIENT
Start: 2020-02-15 | End: 2020-02-15

## 2020-02-15 RX ORDER — SODIUM CHLORIDE 0.9 % (FLUSH) 0.9 %
10 SYRINGE (ML) INJECTION AS NEEDED
Status: DISCONTINUED | OUTPATIENT
Start: 2020-02-15 | End: 2020-02-24 | Stop reason: HOSPADM

## 2020-02-15 RX ORDER — DONEPEZIL HYDROCHLORIDE 5 MG/1
10 TABLET, FILM COATED ORAL NIGHTLY
COMMUNITY

## 2020-02-15 RX ADMIN — AZITHROMYCIN DIHYDRATE 500 MG: 500 INJECTION, POWDER, LYOPHILIZED, FOR SOLUTION INTRAVENOUS at 23:23

## 2020-02-15 RX ADMIN — SODIUM CHLORIDE, POTASSIUM CHLORIDE, SODIUM LACTATE AND CALCIUM CHLORIDE 1000 ML: 600; 310; 30; 20 INJECTION, SOLUTION INTRAVENOUS at 21:19

## 2020-02-15 RX ADMIN — CEFTRIAXONE SODIUM 2 G: 2 INJECTION, SOLUTION INTRAVENOUS at 22:56

## 2020-02-15 RX ADMIN — ACETAMINOPHEN 650 MG: 650 SUPPOSITORY RECTAL at 22:14

## 2020-02-16 LAB
ALBUMIN SERPL-MCNC: 3.4 G/DL (ref 3.5–5.2)
ALBUMIN/GLOB SERPL: 1.1 G/DL
ALP SERPL-CCNC: 60 U/L (ref 39–117)
ALT SERPL W P-5'-P-CCNC: 25 U/L (ref 1–41)
ANION GAP SERPL CALCULATED.3IONS-SCNC: 11.3 MMOL/L (ref 5–15)
AST SERPL-CCNC: 33 U/L (ref 1–40)
BASOPHILS # BLD AUTO: 0.02 10*3/MM3 (ref 0–0.2)
BASOPHILS NFR BLD AUTO: 0.2 % (ref 0–1.5)
BILIRUB SERPL-MCNC: 0.5 MG/DL (ref 0.2–1.2)
BUN BLD-MCNC: 12 MG/DL (ref 8–23)
BUN/CREAT SERPL: 13.6 (ref 7–25)
CALCIUM SPEC-SCNC: 8.3 MG/DL (ref 8.6–10.5)
CHLORIDE SERPL-SCNC: 99 MMOL/L (ref 98–107)
CO2 SERPL-SCNC: 25.7 MMOL/L (ref 22–29)
CREAT BLD-MCNC: 0.88 MG/DL (ref 0.76–1.27)
DEPRECATED RDW RBC AUTO: 43 FL (ref 37–54)
EOSINOPHIL # BLD AUTO: 0.02 10*3/MM3 (ref 0–0.4)
EOSINOPHIL NFR BLD AUTO: 0.2 % (ref 0.3–6.2)
ERYTHROCYTE [DISTWIDTH] IN BLOOD BY AUTOMATED COUNT: 13.1 % (ref 12.3–15.4)
GFR SERPL CREATININE-BSD FRML MDRD: 84 ML/MIN/1.73
GLOBULIN UR ELPH-MCNC: 3.2 GM/DL
GLUCOSE BLD-MCNC: 107 MG/DL (ref 65–99)
HCT VFR BLD AUTO: 33 % (ref 37.5–51)
HGB BLD-MCNC: 10.9 G/DL (ref 13–17.7)
IMM GRANULOCYTES # BLD AUTO: 0.05 10*3/MM3 (ref 0–0.05)
IMM GRANULOCYTES NFR BLD AUTO: 0.5 % (ref 0–0.5)
LYMPHOCYTES # BLD AUTO: 0.84 10*3/MM3 (ref 0.7–3.1)
LYMPHOCYTES NFR BLD AUTO: 7.8 % (ref 19.6–45.3)
MCH RBC QN AUTO: 29.9 PG (ref 26.6–33)
MCHC RBC AUTO-ENTMCNC: 33 G/DL (ref 31.5–35.7)
MCV RBC AUTO: 90.4 FL (ref 79–97)
MONOCYTES # BLD AUTO: 0.59 10*3/MM3 (ref 0.1–0.9)
MONOCYTES NFR BLD AUTO: 5.5 % (ref 5–12)
NEUTROPHILS # BLD AUTO: 9.24 10*3/MM3 (ref 1.7–7)
NEUTROPHILS NFR BLD AUTO: 85.8 % (ref 42.7–76)
NRBC BLD AUTO-RTO: 0 /100 WBC (ref 0–0.2)
PLATELET # BLD AUTO: 206 10*3/MM3 (ref 140–450)
PMV BLD AUTO: 10.6 FL (ref 6–12)
POTASSIUM BLD-SCNC: 4.1 MMOL/L (ref 3.5–5.2)
PROT SERPL-MCNC: 6.6 G/DL (ref 6–8.5)
RBC # BLD AUTO: 3.65 10*6/MM3 (ref 4.14–5.8)
SODIUM BLD-SCNC: 136 MMOL/L (ref 136–145)
WBC NRBC COR # BLD: 10.76 10*3/MM3 (ref 3.4–10.8)

## 2020-02-16 PROCEDURE — 94799 UNLISTED PULMONARY SVC/PX: CPT

## 2020-02-16 PROCEDURE — 25010000002 CEFTRIAXONE PER 250 MG: Performed by: HOSPITALIST

## 2020-02-16 PROCEDURE — 85025 COMPLETE CBC W/AUTO DIFF WBC: CPT | Performed by: HOSPITALIST

## 2020-02-16 PROCEDURE — 94640 AIRWAY INHALATION TREATMENT: CPT

## 2020-02-16 PROCEDURE — 80053 COMPREHEN METABOLIC PANEL: CPT | Performed by: HOSPITALIST

## 2020-02-16 PROCEDURE — 25010000002 METHYLPREDNISOLONE PER 40 MG: Performed by: HOSPITALIST

## 2020-02-16 RX ORDER — ACETAMINOPHEN 650 MG/1
650 SUPPOSITORY RECTAL EVERY 4 HOURS PRN
Status: DISCONTINUED | OUTPATIENT
Start: 2020-02-16 | End: 2020-02-24

## 2020-02-16 RX ORDER — SODIUM CHLORIDE 9 MG/ML
50 INJECTION, SOLUTION INTRAVENOUS CONTINUOUS
Status: DISCONTINUED | OUTPATIENT
Start: 2020-02-16 | End: 2020-02-19

## 2020-02-16 RX ORDER — CEFTRIAXONE SODIUM 1 G/50ML
1 INJECTION, SOLUTION INTRAVENOUS EVERY 24 HOURS
Status: COMPLETED | OUTPATIENT
Start: 2020-02-16 | End: 2020-02-22

## 2020-02-16 RX ORDER — CHOLECALCIFEROL (VITAMIN D3) 125 MCG
1000 CAPSULE ORAL DAILY
Status: DISCONTINUED | OUTPATIENT
Start: 2020-02-16 | End: 2020-02-17

## 2020-02-16 RX ORDER — CETIRIZINE HYDROCHLORIDE 10 MG/1
10 TABLET ORAL DAILY
Status: DISCONTINUED | OUTPATIENT
Start: 2020-02-16 | End: 2020-02-24 | Stop reason: HOSPADM

## 2020-02-16 RX ORDER — ESCITALOPRAM OXALATE 10 MG/1
10 TABLET ORAL DAILY
Status: DISCONTINUED | OUTPATIENT
Start: 2020-02-16 | End: 2020-02-16

## 2020-02-16 RX ORDER — LEVOTHYROXINE SODIUM 0.12 MG/1
125 TABLET ORAL DAILY
Status: DISCONTINUED | OUTPATIENT
Start: 2020-02-16 | End: 2020-02-24 | Stop reason: HOSPADM

## 2020-02-16 RX ORDER — IPRATROPIUM BROMIDE AND ALBUTEROL SULFATE 2.5; .5 MG/3ML; MG/3ML
3 SOLUTION RESPIRATORY (INHALATION)
Status: DISCONTINUED | OUTPATIENT
Start: 2020-02-16 | End: 2020-02-24 | Stop reason: HOSPADM

## 2020-02-16 RX ORDER — DONEPEZIL HYDROCHLORIDE 5 MG/1
5 TABLET, FILM COATED ORAL NIGHTLY
Status: DISCONTINUED | OUTPATIENT
Start: 2020-02-16 | End: 2020-02-24 | Stop reason: HOSPADM

## 2020-02-16 RX ORDER — GUAIFENESIN 600 MG/1
600 TABLET, EXTENDED RELEASE ORAL EVERY 12 HOURS SCHEDULED
Status: DISCONTINUED | OUTPATIENT
Start: 2020-02-16 | End: 2020-02-24 | Stop reason: HOSPADM

## 2020-02-16 RX ORDER — QUETIAPINE FUMARATE 25 MG/1
25 TABLET, FILM COATED ORAL NIGHTLY
Status: DISCONTINUED | OUTPATIENT
Start: 2020-02-16 | End: 2020-02-24 | Stop reason: HOSPADM

## 2020-02-16 RX ORDER — METHYLPREDNISOLONE SODIUM SUCCINATE 40 MG/ML
40 INJECTION, POWDER, LYOPHILIZED, FOR SOLUTION INTRAMUSCULAR; INTRAVENOUS EVERY 12 HOURS
Status: DISCONTINUED | OUTPATIENT
Start: 2020-02-16 | End: 2020-02-17

## 2020-02-16 RX ORDER — HYDROCODONE BITARTRATE AND ACETAMINOPHEN 5; 325 MG/1; MG/1
1 TABLET ORAL EVERY 6 HOURS PRN
Status: DISCONTINUED | OUTPATIENT
Start: 2020-02-16 | End: 2020-02-24

## 2020-02-16 RX ORDER — MULTIPLE VITAMINS W/ MINERALS TAB 9MG-400MCG
1 TAB ORAL DAILY
Status: DISCONTINUED | OUTPATIENT
Start: 2020-02-16 | End: 2020-02-16

## 2020-02-16 RX ORDER — TERAZOSIN 1 MG/1
1 CAPSULE ORAL NIGHTLY
Status: DISCONTINUED | OUTPATIENT
Start: 2020-02-16 | End: 2020-02-24 | Stop reason: HOSPADM

## 2020-02-16 RX ORDER — ESCITALOPRAM OXALATE 10 MG/1
10 TABLET ORAL NIGHTLY
Status: DISCONTINUED | OUTPATIENT
Start: 2020-02-16 | End: 2020-02-24 | Stop reason: HOSPADM

## 2020-02-16 RX ORDER — CARBIDOPA AND LEVODOPA 50; 200 MG/1; MG/1
2 TABLET, EXTENDED RELEASE ORAL 2 TIMES DAILY
Status: DISCONTINUED | OUTPATIENT
Start: 2020-02-16 | End: 2020-02-24 | Stop reason: HOSPADM

## 2020-02-16 RX ORDER — MIDODRINE HYDROCHLORIDE 2.5 MG/1
2.5 TABLET ORAL
Status: DISCONTINUED | OUTPATIENT
Start: 2020-02-16 | End: 2020-02-16

## 2020-02-16 RX ORDER — CEFTRIAXONE SODIUM 2 G/50ML
2 INJECTION, SOLUTION INTRAVENOUS ONCE
Status: DISCONTINUED | OUTPATIENT
Start: 2020-02-16 | End: 2020-02-16

## 2020-02-16 RX ADMIN — ACETAMINOPHEN 650 MG: 650 SUPPOSITORY RECTAL at 10:05

## 2020-02-16 RX ADMIN — IPRATROPIUM BROMIDE AND ALBUTEROL SULFATE 3 ML: 2.5; .5 SOLUTION RESPIRATORY (INHALATION) at 11:51

## 2020-02-16 RX ADMIN — IPRATROPIUM BROMIDE AND ALBUTEROL SULFATE 3 ML: 2.5; .5 SOLUTION RESPIRATORY (INHALATION) at 16:03

## 2020-02-16 RX ADMIN — METHYLPREDNISOLONE SODIUM SUCCINATE 40 MG: 40 INJECTION, POWDER, FOR SOLUTION INTRAMUSCULAR; INTRAVENOUS at 16:58

## 2020-02-16 RX ADMIN — CEFTRIAXONE SODIUM 1 G: 1 INJECTION, SOLUTION INTRAVENOUS at 22:10

## 2020-02-16 RX ADMIN — IPRATROPIUM BROMIDE AND ALBUTEROL SULFATE 3 ML: 2.5; .5 SOLUTION RESPIRATORY (INHALATION) at 20:14

## 2020-02-16 RX ADMIN — SODIUM CHLORIDE 75 ML/HR: 9 INJECTION, SOLUTION INTRAVENOUS at 10:02

## 2020-02-17 LAB
ALBUMIN SERPL-MCNC: 3.3 G/DL (ref 3.5–5.2)
ALBUMIN/GLOB SERPL: 1.1 G/DL
ALP SERPL-CCNC: 60 U/L (ref 39–117)
ALT SERPL W P-5'-P-CCNC: 21 U/L (ref 1–41)
ANION GAP SERPL CALCULATED.3IONS-SCNC: 11.7 MMOL/L (ref 5–15)
AST SERPL-CCNC: 26 U/L (ref 1–40)
BILIRUB SERPL-MCNC: 0.2 MG/DL (ref 0.2–1.2)
BUN BLD-MCNC: 16 MG/DL (ref 8–23)
BUN/CREAT SERPL: 22.2 (ref 7–25)
CALCIUM SPEC-SCNC: 8 MG/DL (ref 8.6–10.5)
CHLORIDE SERPL-SCNC: 102 MMOL/L (ref 98–107)
CHOLEST SERPL-MCNC: 153 MG/DL (ref 0–200)
CO2 SERPL-SCNC: 23.3 MMOL/L (ref 22–29)
CREAT BLD-MCNC: 0.72 MG/DL (ref 0.76–1.27)
D-LACTATE SERPL-SCNC: 1.6 MMOL/L (ref 0.5–2)
DEPRECATED RDW RBC AUTO: 43.4 FL (ref 37–54)
ERYTHROCYTE [DISTWIDTH] IN BLOOD BY AUTOMATED COUNT: 13.1 % (ref 12.3–15.4)
GFR SERPL CREATININE-BSD FRML MDRD: 106 ML/MIN/1.73
GLOBULIN UR ELPH-MCNC: 3.1 GM/DL
GLUCOSE BLD-MCNC: 127 MG/DL (ref 65–99)
GLUCOSE BLDC GLUCOMTR-MCNC: 113 MG/DL (ref 70–130)
HBA1C MFR BLD: 5.46 % (ref 4.8–5.6)
HCT VFR BLD AUTO: 33.5 % (ref 37.5–51)
HDLC SERPL-MCNC: 44 MG/DL (ref 40–60)
HGB BLD-MCNC: 11.1 G/DL (ref 13–17.7)
LDLC SERPL CALC-MCNC: 93 MG/DL (ref 0–100)
LDLC/HDLC SERPL: 2.12 {RATIO}
LYMPHOCYTES # BLD MANUAL: 0.29 10*3/MM3 (ref 0.7–3.1)
LYMPHOCYTES NFR BLD MANUAL: 2 % (ref 19.6–45.3)
MCH RBC QN AUTO: 29.9 PG (ref 26.6–33)
MCHC RBC AUTO-ENTMCNC: 33.1 G/DL (ref 31.5–35.7)
MCV RBC AUTO: 90.3 FL (ref 79–97)
NEUTROPHILS # BLD AUTO: 14.24 10*3/MM3 (ref 1.7–7)
NEUTROPHILS NFR BLD MANUAL: 98 % (ref 42.7–76)
NT-PROBNP SERPL-MCNC: 938.2 PG/ML (ref 5–1800)
PLAT MORPH BLD: NORMAL
PLATELET # BLD AUTO: 209 10*3/MM3 (ref 140–450)
PMV BLD AUTO: 10.5 FL (ref 6–12)
POTASSIUM BLD-SCNC: 4.3 MMOL/L (ref 3.5–5.2)
PROCALCITONIN SERPL-MCNC: 0.23 NG/ML (ref 0.1–0.25)
PROT SERPL-MCNC: 6.4 G/DL (ref 6–8.5)
RBC # BLD AUTO: 3.71 10*6/MM3 (ref 4.14–5.8)
RBC MORPH BLD: NORMAL
SODIUM BLD-SCNC: 137 MMOL/L (ref 136–145)
TRIGL SERPL-MCNC: 78 MG/DL (ref 0–150)
TSH SERPL DL<=0.05 MIU/L-ACNC: 0.83 UIU/ML (ref 0.27–4.2)
VIT B12 BLD-MCNC: 1041 PG/ML (ref 211–946)
VLDLC SERPL-MCNC: 15.6 MG/DL (ref 5–40)
WBC MORPH BLD: NORMAL
WBC NRBC COR # BLD: 14.53 10*3/MM3 (ref 3.4–10.8)

## 2020-02-17 PROCEDURE — 80053 COMPREHEN METABOLIC PANEL: CPT | Performed by: HOSPITALIST

## 2020-02-17 PROCEDURE — 84145 PROCALCITONIN (PCT): CPT | Performed by: HOSPITALIST

## 2020-02-17 PROCEDURE — 83605 ASSAY OF LACTIC ACID: CPT | Performed by: HOSPITALIST

## 2020-02-17 PROCEDURE — 25010000002 CEFTRIAXONE PER 250 MG: Performed by: HOSPITALIST

## 2020-02-17 PROCEDURE — 85025 COMPLETE CBC W/AUTO DIFF WBC: CPT | Performed by: HOSPITALIST

## 2020-02-17 PROCEDURE — 25010000002 METHYLPREDNISOLONE PER 40 MG: Performed by: HOSPITALIST

## 2020-02-17 PROCEDURE — 80061 LIPID PANEL: CPT | Performed by: HOSPITALIST

## 2020-02-17 PROCEDURE — 82962 GLUCOSE BLOOD TEST: CPT

## 2020-02-17 PROCEDURE — 92610 EVALUATE SWALLOWING FUNCTION: CPT

## 2020-02-17 PROCEDURE — 83036 HEMOGLOBIN GLYCOSYLATED A1C: CPT | Performed by: HOSPITALIST

## 2020-02-17 PROCEDURE — 85007 BL SMEAR W/DIFF WBC COUNT: CPT | Performed by: HOSPITALIST

## 2020-02-17 PROCEDURE — 82607 VITAMIN B-12: CPT | Performed by: HOSPITALIST

## 2020-02-17 PROCEDURE — 94799 UNLISTED PULMONARY SVC/PX: CPT

## 2020-02-17 PROCEDURE — 83880 ASSAY OF NATRIURETIC PEPTIDE: CPT | Performed by: HOSPITALIST

## 2020-02-17 PROCEDURE — 84443 ASSAY THYROID STIM HORMONE: CPT | Performed by: HOSPITALIST

## 2020-02-17 PROCEDURE — 25010000002 AZITHROMYCIN PER 500 MG: Performed by: HOSPITALIST

## 2020-02-17 RX ORDER — METHYLPREDNISOLONE SODIUM SUCCINATE 40 MG/ML
20 INJECTION, POWDER, LYOPHILIZED, FOR SOLUTION INTRAMUSCULAR; INTRAVENOUS EVERY 12 HOURS
Status: DISCONTINUED | OUTPATIENT
Start: 2020-02-18 | End: 2020-02-19

## 2020-02-17 RX ADMIN — QUETIAPINE FUMARATE 25 MG: 25 TABLET ORAL at 20:00

## 2020-02-17 RX ADMIN — SODIUM CHLORIDE 125 ML/HR: 9 INJECTION, SOLUTION INTRAVENOUS at 14:06

## 2020-02-17 RX ADMIN — Medication 1000 MCG: at 12:01

## 2020-02-17 RX ADMIN — IPRATROPIUM BROMIDE AND ALBUTEROL SULFATE 3 ML: 2.5; .5 SOLUTION RESPIRATORY (INHALATION) at 23:08

## 2020-02-17 RX ADMIN — GUAIFENESIN 600 MG: 600 TABLET, EXTENDED RELEASE ORAL at 20:00

## 2020-02-17 RX ADMIN — IPRATROPIUM BROMIDE AND ALBUTEROL SULFATE 3 ML: 2.5; .5 SOLUTION RESPIRATORY (INHALATION) at 19:09

## 2020-02-17 RX ADMIN — AZITHROMYCIN DIHYDRATE 500 MG: 500 INJECTION, POWDER, LYOPHILIZED, FOR SOLUTION INTRAVENOUS at 00:18

## 2020-02-17 RX ADMIN — SODIUM CHLORIDE 125 ML/HR: 9 INJECTION, SOLUTION INTRAVENOUS at 02:58

## 2020-02-17 RX ADMIN — RIVAROXABAN 20 MG: 10 TABLET, FILM COATED ORAL at 17:41

## 2020-02-17 RX ADMIN — AZITHROMYCIN DIHYDRATE 500 MG: 500 INJECTION, POWDER, LYOPHILIZED, FOR SOLUTION INTRAVENOUS at 22:08

## 2020-02-17 RX ADMIN — CARBIDOPA AND LEVODOPA 2 TABLET: 50; 200 TABLET, EXTENDED RELEASE ORAL at 20:00

## 2020-02-17 RX ADMIN — CEFTRIAXONE SODIUM 1 G: 1 INJECTION, SOLUTION INTRAVENOUS at 22:08

## 2020-02-17 RX ADMIN — TERAZOSIN HYDROCHLORIDE 1 MG: 1 CAPSULE ORAL at 20:00

## 2020-02-17 RX ADMIN — IPRATROPIUM BROMIDE AND ALBUTEROL SULFATE 3 ML: 2.5; .5 SOLUTION RESPIRATORY (INHALATION) at 16:32

## 2020-02-17 RX ADMIN — METHYLPREDNISOLONE SODIUM SUCCINATE 40 MG: 40 INJECTION, POWDER, FOR SOLUTION INTRAMUSCULAR; INTRAVENOUS at 14:06

## 2020-02-17 RX ADMIN — CETIRIZINE HYDROCHLORIDE 10 MG: 10 TABLET, FILM COATED ORAL at 12:02

## 2020-02-17 RX ADMIN — LEVOTHYROXINE SODIUM 125 MCG: 125 TABLET ORAL at 12:02

## 2020-02-17 RX ADMIN — IPRATROPIUM BROMIDE AND ALBUTEROL SULFATE 3 ML: 2.5; .5 SOLUTION RESPIRATORY (INHALATION) at 05:36

## 2020-02-17 RX ADMIN — IPRATROPIUM BROMIDE AND ALBUTEROL SULFATE 3 ML: 2.5; .5 SOLUTION RESPIRATORY (INHALATION) at 10:50

## 2020-02-17 RX ADMIN — GUAIFENESIN 600 MG: 600 TABLET, EXTENDED RELEASE ORAL at 12:02

## 2020-02-17 RX ADMIN — METHYLPREDNISOLONE SODIUM SUCCINATE 40 MG: 40 INJECTION, POWDER, FOR SOLUTION INTRAMUSCULAR; INTRAVENOUS at 05:51

## 2020-02-17 RX ADMIN — DONEPEZIL HYDROCHLORIDE 5 MG: 5 TABLET, FILM COATED ORAL at 20:00

## 2020-02-17 RX ADMIN — CARBIDOPA AND LEVODOPA 2 TABLET: 50; 200 TABLET, EXTENDED RELEASE ORAL at 12:02

## 2020-02-17 RX ADMIN — ESCITALOPRAM 10 MG: 10 TABLET, FILM COATED ORAL at 20:00

## 2020-02-18 ENCOUNTER — APPOINTMENT (OUTPATIENT)
Dept: GENERAL RADIOLOGY | Facility: HOSPITAL | Age: 75
End: 2020-02-18

## 2020-02-18 LAB
ANION GAP SERPL CALCULATED.3IONS-SCNC: 10.5 MMOL/L (ref 5–15)
BASOPHILS # BLD AUTO: 0.01 10*3/MM3 (ref 0–0.2)
BASOPHILS NFR BLD AUTO: 0.1 % (ref 0–1.5)
BUN BLD-MCNC: 17 MG/DL (ref 8–23)
BUN/CREAT SERPL: 27.4 (ref 7–25)
CALCIUM SPEC-SCNC: 8.2 MG/DL (ref 8.6–10.5)
CHLORIDE SERPL-SCNC: 104 MMOL/L (ref 98–107)
CO2 SERPL-SCNC: 22.5 MMOL/L (ref 22–29)
CREAT BLD-MCNC: 0.62 MG/DL (ref 0.76–1.27)
DEPRECATED RDW RBC AUTO: 43.3 FL (ref 37–54)
EOSINOPHIL # BLD AUTO: 0 10*3/MM3 (ref 0–0.4)
EOSINOPHIL NFR BLD AUTO: 0 % (ref 0.3–6.2)
ERYTHROCYTE [DISTWIDTH] IN BLOOD BY AUTOMATED COUNT: 13.3 % (ref 12.3–15.4)
GFR SERPL CREATININE-BSD FRML MDRD: 126 ML/MIN/1.73
GLUCOSE BLD-MCNC: 136 MG/DL (ref 65–99)
HCT VFR BLD AUTO: 31.8 % (ref 37.5–51)
HGB BLD-MCNC: 10.9 G/DL (ref 13–17.7)
LYMPHOCYTES # BLD AUTO: 0.82 10*3/MM3 (ref 0.7–3.1)
LYMPHOCYTES NFR BLD AUTO: 4.3 % (ref 19.6–45.3)
MCH RBC QN AUTO: 31.3 PG (ref 26.6–33)
MCHC RBC AUTO-ENTMCNC: 34.3 G/DL (ref 31.5–35.7)
MCV RBC AUTO: 91.4 FL (ref 79–97)
MONOCYTES # BLD AUTO: 0.33 10*3/MM3 (ref 0.1–0.9)
MONOCYTES NFR BLD AUTO: 1.7 % (ref 5–12)
NEUTROPHILS # BLD AUTO: 17.99 10*3/MM3 (ref 1.7–7)
NEUTROPHILS NFR BLD AUTO: 93.3 % (ref 42.7–76)
PLATELET # BLD AUTO: 300 10*3/MM3 (ref 140–450)
PMV BLD AUTO: 11.1 FL (ref 6–12)
POTASSIUM BLD-SCNC: 4.1 MMOL/L (ref 3.5–5.2)
RBC # BLD AUTO: 3.48 10*6/MM3 (ref 4.14–5.8)
SODIUM BLD-SCNC: 137 MMOL/L (ref 136–145)
WBC NRBC COR # BLD: 19.26 10*3/MM3 (ref 3.4–10.8)

## 2020-02-18 PROCEDURE — 97162 PT EVAL MOD COMPLEX 30 MIN: CPT

## 2020-02-18 PROCEDURE — 92611 MOTION FLUOROSCOPY/SWALLOW: CPT

## 2020-02-18 PROCEDURE — 74230 X-RAY XM SWLNG FUNCJ C+: CPT

## 2020-02-18 PROCEDURE — 94799 UNLISTED PULMONARY SVC/PX: CPT

## 2020-02-18 PROCEDURE — 25010000002 METHYLPREDNISOLONE PER 40 MG: Performed by: HOSPITALIST

## 2020-02-18 PROCEDURE — 85025 COMPLETE CBC W/AUTO DIFF WBC: CPT | Performed by: HOSPITALIST

## 2020-02-18 PROCEDURE — 97535 SELF CARE MNGMENT TRAINING: CPT

## 2020-02-18 PROCEDURE — 97110 THERAPEUTIC EXERCISES: CPT

## 2020-02-18 PROCEDURE — 97166 OT EVAL MOD COMPLEX 45 MIN: CPT

## 2020-02-18 PROCEDURE — 80048 BASIC METABOLIC PNL TOTAL CA: CPT | Performed by: HOSPITALIST

## 2020-02-18 PROCEDURE — 25010000002 CEFTRIAXONE PER 250 MG: Performed by: HOSPITALIST

## 2020-02-18 PROCEDURE — 25010000002 AZITHROMYCIN PER 500 MG: Performed by: HOSPITALIST

## 2020-02-18 RX ADMIN — BARIUM SULFATE 55 ML: 0.81 POWDER, FOR SUSPENSION ORAL at 11:29

## 2020-02-18 RX ADMIN — TERAZOSIN HYDROCHLORIDE 1 MG: 1 CAPSULE ORAL at 20:14

## 2020-02-18 RX ADMIN — IPRATROPIUM BROMIDE AND ALBUTEROL SULFATE 3 ML: 2.5; .5 SOLUTION RESPIRATORY (INHALATION) at 16:04

## 2020-02-18 RX ADMIN — SODIUM CHLORIDE, PRESERVATIVE FREE 10 ML: 5 INJECTION INTRAVENOUS at 20:14

## 2020-02-18 RX ADMIN — METHYLPREDNISOLONE SODIUM SUCCINATE 20 MG: 40 INJECTION, POWDER, LYOPHILIZED, FOR SOLUTION INTRAMUSCULAR; INTRAVENOUS at 14:53

## 2020-02-18 RX ADMIN — LEVOTHYROXINE SODIUM 125 MCG: 125 TABLET ORAL at 09:36

## 2020-02-18 RX ADMIN — METHYLPREDNISOLONE SODIUM SUCCINATE 20 MG: 40 INJECTION, POWDER, LYOPHILIZED, FOR SOLUTION INTRAMUSCULAR; INTRAVENOUS at 03:30

## 2020-02-18 RX ADMIN — DONEPEZIL HYDROCHLORIDE 5 MG: 5 TABLET, FILM COATED ORAL at 20:14

## 2020-02-18 RX ADMIN — SODIUM CHLORIDE, PRESERVATIVE FREE 10 ML: 5 INJECTION INTRAVENOUS at 09:36

## 2020-02-18 RX ADMIN — CARBIDOPA AND LEVODOPA 2 TABLET: 50; 200 TABLET, EXTENDED RELEASE ORAL at 20:14

## 2020-02-18 RX ADMIN — ESCITALOPRAM 10 MG: 10 TABLET, FILM COATED ORAL at 20:14

## 2020-02-18 RX ADMIN — IPRATROPIUM BROMIDE AND ALBUTEROL SULFATE 3 ML: 2.5; .5 SOLUTION RESPIRATORY (INHALATION) at 20:22

## 2020-02-18 RX ADMIN — IPRATROPIUM BROMIDE AND ALBUTEROL SULFATE 3 ML: 2.5; .5 SOLUTION RESPIRATORY (INHALATION) at 12:11

## 2020-02-18 RX ADMIN — AZITHROMYCIN DIHYDRATE 500 MG: 500 INJECTION, POWDER, LYOPHILIZED, FOR SOLUTION INTRAVENOUS at 22:15

## 2020-02-18 RX ADMIN — SODIUM CHLORIDE 75 ML/HR: 9 INJECTION, SOLUTION INTRAVENOUS at 09:36

## 2020-02-18 RX ADMIN — CARBIDOPA AND LEVODOPA 2 TABLET: 50; 200 TABLET, EXTENDED RELEASE ORAL at 09:36

## 2020-02-18 RX ADMIN — QUETIAPINE FUMARATE 25 MG: 25 TABLET ORAL at 20:14

## 2020-02-18 RX ADMIN — BARIUM SULFATE 4 ML: 980 POWDER, FOR SUSPENSION ORAL at 11:29

## 2020-02-18 RX ADMIN — RIVAROXABAN 20 MG: 10 TABLET, FILM COATED ORAL at 18:14

## 2020-02-18 RX ADMIN — CEFTRIAXONE SODIUM 1 G: 1 INJECTION, SOLUTION INTRAVENOUS at 23:38

## 2020-02-18 RX ADMIN — GUAIFENESIN 600 MG: 600 TABLET, EXTENDED RELEASE ORAL at 09:36

## 2020-02-18 RX ADMIN — BARIUM SULFATE 55 ML: 0.81 POWDER, FOR SUSPENSION ORAL at 11:28

## 2020-02-18 RX ADMIN — CETIRIZINE HYDROCHLORIDE 10 MG: 10 TABLET, FILM COATED ORAL at 09:36

## 2020-02-18 RX ADMIN — IPRATROPIUM BROMIDE AND ALBUTEROL SULFATE 3 ML: 2.5; .5 SOLUTION RESPIRATORY (INHALATION) at 03:10

## 2020-02-19 LAB
ANION GAP SERPL CALCULATED.3IONS-SCNC: 9.4 MMOL/L (ref 5–15)
BASOPHILS # BLD AUTO: 0.01 10*3/MM3 (ref 0–0.2)
BASOPHILS NFR BLD AUTO: 0.1 % (ref 0–1.5)
BUN BLD-MCNC: 18 MG/DL (ref 8–23)
BUN/CREAT SERPL: 26.9 (ref 7–25)
CALCIUM SPEC-SCNC: 8.1 MG/DL (ref 8.6–10.5)
CHLORIDE SERPL-SCNC: 105 MMOL/L (ref 98–107)
CO2 SERPL-SCNC: 24.6 MMOL/L (ref 22–29)
CREAT BLD-MCNC: 0.67 MG/DL (ref 0.76–1.27)
DEPRECATED RDW RBC AUTO: 46.5 FL (ref 37–54)
EOSINOPHIL # BLD AUTO: 0 10*3/MM3 (ref 0–0.4)
EOSINOPHIL NFR BLD AUTO: 0 % (ref 0.3–6.2)
ERYTHROCYTE [DISTWIDTH] IN BLOOD BY AUTOMATED COUNT: 13.6 % (ref 12.3–15.4)
GFR SERPL CREATININE-BSD FRML MDRD: 116 ML/MIN/1.73
GLUCOSE BLD-MCNC: 111 MG/DL (ref 65–99)
GLUCOSE BLDC GLUCOMTR-MCNC: 115 MG/DL (ref 70–130)
GLUCOSE BLDC GLUCOMTR-MCNC: 130 MG/DL (ref 70–130)
HCT VFR BLD AUTO: 31.9 % (ref 37.5–51)
HGB BLD-MCNC: 10.1 G/DL (ref 13–17.7)
IMM GRANULOCYTES # BLD AUTO: 0.13 10*3/MM3 (ref 0–0.05)
IMM GRANULOCYTES NFR BLD AUTO: 0.9 % (ref 0–0.5)
LYMPHOCYTES # BLD AUTO: 0.61 10*3/MM3 (ref 0.7–3.1)
LYMPHOCYTES NFR BLD AUTO: 4.3 % (ref 19.6–45.3)
MCH RBC QN AUTO: 29.8 PG (ref 26.6–33)
MCHC RBC AUTO-ENTMCNC: 31.7 G/DL (ref 31.5–35.7)
MCV RBC AUTO: 94.1 FL (ref 79–97)
MONOCYTES # BLD AUTO: 0.21 10*3/MM3 (ref 0.1–0.9)
MONOCYTES NFR BLD AUTO: 1.5 % (ref 5–12)
NEUTROPHILS # BLD AUTO: 13.35 10*3/MM3 (ref 1.7–7)
NEUTROPHILS NFR BLD AUTO: 93.2 % (ref 42.7–76)
NRBC BLD AUTO-RTO: 0.1 /100 WBC (ref 0–0.2)
PLATELET # BLD AUTO: 239 10*3/MM3 (ref 140–450)
PMV BLD AUTO: 11.1 FL (ref 6–12)
POTASSIUM BLD-SCNC: 4.2 MMOL/L (ref 3.5–5.2)
RBC # BLD AUTO: 3.39 10*6/MM3 (ref 4.14–5.8)
SODIUM BLD-SCNC: 139 MMOL/L (ref 136–145)
WBC NRBC COR # BLD: 14.31 10*3/MM3 (ref 3.4–10.8)

## 2020-02-19 PROCEDURE — 25010000002 CEFTRIAXONE PER 250 MG: Performed by: HOSPITALIST

## 2020-02-19 PROCEDURE — 63710000001 PREDNISONE PER 1 MG: Performed by: HOSPITALIST

## 2020-02-19 PROCEDURE — 25010000002 METHYLPREDNISOLONE PER 40 MG: Performed by: HOSPITALIST

## 2020-02-19 PROCEDURE — 94799 UNLISTED PULMONARY SVC/PX: CPT

## 2020-02-19 PROCEDURE — 85025 COMPLETE CBC W/AUTO DIFF WBC: CPT | Performed by: HOSPITALIST

## 2020-02-19 PROCEDURE — 80048 BASIC METABOLIC PNL TOTAL CA: CPT | Performed by: HOSPITALIST

## 2020-02-19 PROCEDURE — 82962 GLUCOSE BLOOD TEST: CPT

## 2020-02-19 PROCEDURE — 92612 ENDOSCOPY SWALLOW (FEES) VID: CPT

## 2020-02-19 RX ORDER — AZITHROMYCIN 250 MG/1
500 TABLET, FILM COATED ORAL NIGHTLY
Status: COMPLETED | OUTPATIENT
Start: 2020-02-19 | End: 2020-02-21

## 2020-02-19 RX ORDER — PREDNISONE 20 MG/1
20 TABLET ORAL
Status: DISCONTINUED | OUTPATIENT
Start: 2020-02-19 | End: 2020-02-20

## 2020-02-19 RX ADMIN — DONEPEZIL HYDROCHLORIDE 5 MG: 5 TABLET, FILM COATED ORAL at 20:24

## 2020-02-19 RX ADMIN — METHYLPREDNISOLONE SODIUM SUCCINATE 20 MG: 40 INJECTION, POWDER, LYOPHILIZED, FOR SOLUTION INTRAMUSCULAR; INTRAVENOUS at 03:38

## 2020-02-19 RX ADMIN — QUETIAPINE FUMARATE 25 MG: 25 TABLET ORAL at 20:24

## 2020-02-19 RX ADMIN — ESCITALOPRAM 10 MG: 10 TABLET, FILM COATED ORAL at 20:24

## 2020-02-19 RX ADMIN — SODIUM CHLORIDE 50 ML/HR: 9 INJECTION, SOLUTION INTRAVENOUS at 08:24

## 2020-02-19 RX ADMIN — CARBIDOPA AND LEVODOPA 2 TABLET: 50; 200 TABLET, EXTENDED RELEASE ORAL at 20:24

## 2020-02-19 RX ADMIN — RIVAROXABAN 20 MG: 10 TABLET, FILM COATED ORAL at 17:22

## 2020-02-19 RX ADMIN — IPRATROPIUM BROMIDE AND ALBUTEROL SULFATE 3 ML: 2.5; .5 SOLUTION RESPIRATORY (INHALATION) at 00:00

## 2020-02-19 RX ADMIN — LEVOTHYROXINE SODIUM 125 MCG: 125 TABLET ORAL at 09:46

## 2020-02-19 RX ADMIN — CARBIDOPA AND LEVODOPA 2 TABLET: 50; 200 TABLET, EXTENDED RELEASE ORAL at 09:50

## 2020-02-19 RX ADMIN — CETIRIZINE HYDROCHLORIDE 10 MG: 10 TABLET, FILM COATED ORAL at 09:46

## 2020-02-19 RX ADMIN — TERAZOSIN HYDROCHLORIDE 1 MG: 1 CAPSULE ORAL at 20:24

## 2020-02-19 RX ADMIN — GUAIFENESIN 600 MG: 600 TABLET, EXTENDED RELEASE ORAL at 20:24

## 2020-02-19 RX ADMIN — GUAIFENESIN 600 MG: 600 TABLET, EXTENDED RELEASE ORAL at 09:46

## 2020-02-19 RX ADMIN — IPRATROPIUM BROMIDE AND ALBUTEROL SULFATE 3 ML: 2.5; .5 SOLUTION RESPIRATORY (INHALATION) at 12:47

## 2020-02-19 RX ADMIN — IPRATROPIUM BROMIDE AND ALBUTEROL SULFATE 3 ML: 2.5; .5 SOLUTION RESPIRATORY (INHALATION) at 03:30

## 2020-02-19 RX ADMIN — SODIUM CHLORIDE, PRESERVATIVE FREE 10 ML: 5 INJECTION INTRAVENOUS at 20:25

## 2020-02-19 RX ADMIN — PREDNISONE 20 MG: 20 TABLET ORAL at 14:33

## 2020-02-19 RX ADMIN — CEFTRIAXONE SODIUM 1 G: 1 INJECTION, SOLUTION INTRAVENOUS at 22:37

## 2020-02-19 RX ADMIN — IPRATROPIUM BROMIDE AND ALBUTEROL SULFATE 3 ML: 2.5; .5 SOLUTION RESPIRATORY (INHALATION) at 20:30

## 2020-02-19 RX ADMIN — IPRATROPIUM BROMIDE AND ALBUTEROL SULFATE 3 ML: 2.5; .5 SOLUTION RESPIRATORY (INHALATION) at 15:42

## 2020-02-19 RX ADMIN — IPRATROPIUM BROMIDE AND ALBUTEROL SULFATE 3 ML: 2.5; .5 SOLUTION RESPIRATORY (INHALATION) at 07:13

## 2020-02-19 RX ADMIN — AZITHROMYCIN DIHYDRATE 500 MG: 250 TABLET, FILM COATED ORAL at 20:24

## 2020-02-20 LAB
ANION GAP SERPL CALCULATED.3IONS-SCNC: 9.8 MMOL/L (ref 5–15)
BACTERIA SPEC AEROBE CULT: NORMAL
BACTERIA SPEC AEROBE CULT: NORMAL
BASOPHILS # BLD AUTO: 0.03 10*3/MM3 (ref 0–0.2)
BASOPHILS NFR BLD AUTO: 0.3 % (ref 0–1.5)
BUN BLD-MCNC: 17 MG/DL (ref 8–23)
BUN/CREAT SERPL: 25.4 (ref 7–25)
CALCIUM SPEC-SCNC: 8 MG/DL (ref 8.6–10.5)
CHLORIDE SERPL-SCNC: 103 MMOL/L (ref 98–107)
CO2 SERPL-SCNC: 27.2 MMOL/L (ref 22–29)
CREAT BLD-MCNC: 0.67 MG/DL (ref 0.76–1.27)
DEPRECATED RDW RBC AUTO: 45.2 FL (ref 37–54)
EOSINOPHIL # BLD AUTO: 0 10*3/MM3 (ref 0–0.4)
EOSINOPHIL NFR BLD AUTO: 0 % (ref 0.3–6.2)
ERYTHROCYTE [DISTWIDTH] IN BLOOD BY AUTOMATED COUNT: 13.3 % (ref 12.3–15.4)
GFR SERPL CREATININE-BSD FRML MDRD: 116 ML/MIN/1.73
GLUCOSE BLD-MCNC: 104 MG/DL (ref 65–99)
HCT VFR BLD AUTO: 31.5 % (ref 37.5–51)
HGB BLD-MCNC: 10.4 G/DL (ref 13–17.7)
IMM GRANULOCYTES # BLD AUTO: 0.23 10*3/MM3 (ref 0–0.05)
IMM GRANULOCYTES NFR BLD AUTO: 2.2 % (ref 0–0.5)
LYMPHOCYTES # BLD AUTO: 1.2 10*3/MM3 (ref 0.7–3.1)
LYMPHOCYTES NFR BLD AUTO: 11.6 % (ref 19.6–45.3)
MAGNESIUM SERPL-MCNC: 2.1 MG/DL (ref 1.6–2.4)
MCH RBC QN AUTO: 30.3 PG (ref 26.6–33)
MCHC RBC AUTO-ENTMCNC: 33 G/DL (ref 31.5–35.7)
MCV RBC AUTO: 91.8 FL (ref 79–97)
MONOCYTES # BLD AUTO: 0.67 10*3/MM3 (ref 0.1–0.9)
MONOCYTES NFR BLD AUTO: 6.5 % (ref 5–12)
NEUTROPHILS # BLD AUTO: 8.2 10*3/MM3 (ref 1.7–7)
NEUTROPHILS NFR BLD AUTO: 79.4 % (ref 42.7–76)
NRBC BLD AUTO-RTO: 0 /100 WBC (ref 0–0.2)
PLATELET # BLD AUTO: 246 10*3/MM3 (ref 140–450)
PMV BLD AUTO: 10.8 FL (ref 6–12)
POTASSIUM BLD-SCNC: 4.2 MMOL/L (ref 3.5–5.2)
RBC # BLD AUTO: 3.43 10*6/MM3 (ref 4.14–5.8)
SODIUM BLD-SCNC: 140 MMOL/L (ref 136–145)
WBC NRBC COR # BLD: 10.33 10*3/MM3 (ref 3.4–10.8)

## 2020-02-20 PROCEDURE — 94799 UNLISTED PULMONARY SVC/PX: CPT

## 2020-02-20 PROCEDURE — 80048 BASIC METABOLIC PNL TOTAL CA: CPT | Performed by: HOSPITALIST

## 2020-02-20 PROCEDURE — 93010 ELECTROCARDIOGRAM REPORT: CPT | Performed by: INTERNAL MEDICINE

## 2020-02-20 PROCEDURE — 93005 ELECTROCARDIOGRAM TRACING: CPT | Performed by: HOSPITALIST

## 2020-02-20 PROCEDURE — 97110 THERAPEUTIC EXERCISES: CPT

## 2020-02-20 PROCEDURE — 83735 ASSAY OF MAGNESIUM: CPT | Performed by: HOSPITALIST

## 2020-02-20 PROCEDURE — 63710000001 PREDNISONE PER 1 MG: Performed by: HOSPITALIST

## 2020-02-20 PROCEDURE — 25010000002 CEFTRIAXONE PER 250 MG: Performed by: HOSPITALIST

## 2020-02-20 PROCEDURE — 85025 COMPLETE CBC W/AUTO DIFF WBC: CPT | Performed by: HOSPITALIST

## 2020-02-20 RX ORDER — PREDNISONE 10 MG/1
10 TABLET ORAL
Status: DISCONTINUED | OUTPATIENT
Start: 2020-02-21 | End: 2020-02-21

## 2020-02-20 RX ADMIN — GUAIFENESIN 600 MG: 600 TABLET, EXTENDED RELEASE ORAL at 21:34

## 2020-02-20 RX ADMIN — IPRATROPIUM BROMIDE AND ALBUTEROL SULFATE 3 ML: 2.5; .5 SOLUTION RESPIRATORY (INHALATION) at 07:18

## 2020-02-20 RX ADMIN — CARBIDOPA AND LEVODOPA 2 TABLET: 50; 200 TABLET, EXTENDED RELEASE ORAL at 21:34

## 2020-02-20 RX ADMIN — ESCITALOPRAM 10 MG: 10 TABLET, FILM COATED ORAL at 21:35

## 2020-02-20 RX ADMIN — LEVOTHYROXINE SODIUM 125 MCG: 125 TABLET ORAL at 09:42

## 2020-02-20 RX ADMIN — RIVAROXABAN 20 MG: 10 TABLET, FILM COATED ORAL at 18:55

## 2020-02-20 RX ADMIN — IPRATROPIUM BROMIDE AND ALBUTEROL SULFATE 3 ML: 2.5; .5 SOLUTION RESPIRATORY (INHALATION) at 11:35

## 2020-02-20 RX ADMIN — TERAZOSIN HYDROCHLORIDE 1 MG: 1 CAPSULE ORAL at 21:35

## 2020-02-20 RX ADMIN — GUAIFENESIN 600 MG: 600 TABLET, EXTENDED RELEASE ORAL at 09:42

## 2020-02-20 RX ADMIN — CEFTRIAXONE SODIUM 1 G: 1 INJECTION, SOLUTION INTRAVENOUS at 21:35

## 2020-02-20 RX ADMIN — SODIUM CHLORIDE, PRESERVATIVE FREE 10 ML: 5 INJECTION INTRAVENOUS at 09:43

## 2020-02-20 RX ADMIN — IPRATROPIUM BROMIDE AND ALBUTEROL SULFATE 3 ML: 2.5; .5 SOLUTION RESPIRATORY (INHALATION) at 00:25

## 2020-02-20 RX ADMIN — IPRATROPIUM BROMIDE AND ALBUTEROL SULFATE 3 ML: 2.5; .5 SOLUTION RESPIRATORY (INHALATION) at 20:04

## 2020-02-20 RX ADMIN — AZITHROMYCIN DIHYDRATE 500 MG: 250 TABLET, FILM COATED ORAL at 21:35

## 2020-02-20 RX ADMIN — CETIRIZINE HYDROCHLORIDE 10 MG: 10 TABLET, FILM COATED ORAL at 09:42

## 2020-02-20 RX ADMIN — IPRATROPIUM BROMIDE AND ALBUTEROL SULFATE 3 ML: 2.5; .5 SOLUTION RESPIRATORY (INHALATION) at 15:41

## 2020-02-20 RX ADMIN — CARBIDOPA AND LEVODOPA 2 TABLET: 50; 200 TABLET, EXTENDED RELEASE ORAL at 09:42

## 2020-02-20 RX ADMIN — PREDNISONE 20 MG: 20 TABLET ORAL at 09:42

## 2020-02-20 RX ADMIN — DONEPEZIL HYDROCHLORIDE 5 MG: 5 TABLET, FILM COATED ORAL at 21:35

## 2020-02-20 RX ADMIN — QUETIAPINE FUMARATE 25 MG: 25 TABLET ORAL at 21:34

## 2020-02-21 LAB
ANION GAP SERPL CALCULATED.3IONS-SCNC: 10.6 MMOL/L (ref 5–15)
BUN BLD-MCNC: 17 MG/DL (ref 8–23)
BUN/CREAT SERPL: 23.6 (ref 7–25)
CALCIUM SPEC-SCNC: 7.9 MG/DL (ref 8.6–10.5)
CHLORIDE SERPL-SCNC: 102 MMOL/L (ref 98–107)
CO2 SERPL-SCNC: 27.4 MMOL/L (ref 22–29)
CREAT BLD-MCNC: 0.72 MG/DL (ref 0.76–1.27)
DEPRECATED RDW RBC AUTO: 47.8 FL (ref 37–54)
ERYTHROCYTE [DISTWIDTH] IN BLOOD BY AUTOMATED COUNT: 13.8 % (ref 12.3–15.4)
GFR SERPL CREATININE-BSD FRML MDRD: 106 ML/MIN/1.73
GLUCOSE BLD-MCNC: 90 MG/DL (ref 65–99)
HCT VFR BLD AUTO: 33.4 % (ref 37.5–51)
HGB BLD-MCNC: 10.5 G/DL (ref 13–17.7)
LYMPHOCYTES # BLD MANUAL: 1.47 10*3/MM3 (ref 0.7–3.1)
LYMPHOCYTES NFR BLD MANUAL: 15.3 % (ref 19.6–45.3)
LYMPHOCYTES NFR BLD MANUAL: 4.1 % (ref 5–12)
MCH RBC QN AUTO: 29.5 PG (ref 26.6–33)
MCHC RBC AUTO-ENTMCNC: 31.4 G/DL (ref 31.5–35.7)
MCV RBC AUTO: 93.8 FL (ref 79–97)
MONOCYTES # BLD AUTO: 0.39 10*3/MM3 (ref 0.1–0.9)
NEUTROPHILS # BLD AUTO: 7.74 10*3/MM3 (ref 1.7–7)
NEUTROPHILS NFR BLD MANUAL: 80.6 % (ref 42.7–76)
PLAT MORPH BLD: NORMAL
PLATELET # BLD AUTO: 263 10*3/MM3 (ref 140–450)
PMV BLD AUTO: 11.1 FL (ref 6–12)
POTASSIUM BLD-SCNC: 4.1 MMOL/L (ref 3.5–5.2)
RBC # BLD AUTO: 3.56 10*6/MM3 (ref 4.14–5.8)
RBC MORPH BLD: NORMAL
SODIUM BLD-SCNC: 140 MMOL/L (ref 136–145)
WBC MORPH BLD: NORMAL
WBC NRBC COR # BLD: 9.6 10*3/MM3 (ref 3.4–10.8)

## 2020-02-21 PROCEDURE — 93005 ELECTROCARDIOGRAM TRACING: CPT | Performed by: HOSPITALIST

## 2020-02-21 PROCEDURE — 85025 COMPLETE CBC W/AUTO DIFF WBC: CPT | Performed by: HOSPITALIST

## 2020-02-21 PROCEDURE — 63710000001 PREDNISONE PER 5 MG: Performed by: HOSPITALIST

## 2020-02-21 PROCEDURE — 94799 UNLISTED PULMONARY SVC/PX: CPT

## 2020-02-21 PROCEDURE — 99221 1ST HOSP IP/OBS SF/LOW 40: CPT | Performed by: INTERNAL MEDICINE

## 2020-02-21 PROCEDURE — 80048 BASIC METABOLIC PNL TOTAL CA: CPT | Performed by: HOSPITALIST

## 2020-02-21 PROCEDURE — 85007 BL SMEAR W/DIFF WBC COUNT: CPT | Performed by: HOSPITALIST

## 2020-02-21 PROCEDURE — 93010 ELECTROCARDIOGRAM REPORT: CPT | Performed by: INTERNAL MEDICINE

## 2020-02-21 PROCEDURE — 97110 THERAPEUTIC EXERCISES: CPT

## 2020-02-21 RX ORDER — NIFEDIPINE 30 MG/1
30 TABLET, EXTENDED RELEASE ORAL
Status: DISCONTINUED | OUTPATIENT
Start: 2020-02-21 | End: 2020-02-24 | Stop reason: HOSPADM

## 2020-02-21 RX ADMIN — DONEPEZIL HYDROCHLORIDE 5 MG: 5 TABLET, FILM COATED ORAL at 21:32

## 2020-02-21 RX ADMIN — RIVAROXABAN 20 MG: 10 TABLET, FILM COATED ORAL at 18:06

## 2020-02-21 RX ADMIN — IPRATROPIUM BROMIDE AND ALBUTEROL SULFATE 3 ML: 2.5; .5 SOLUTION RESPIRATORY (INHALATION) at 08:20

## 2020-02-21 RX ADMIN — CARBIDOPA AND LEVODOPA 2 TABLET: 50; 200 TABLET, EXTENDED RELEASE ORAL at 21:32

## 2020-02-21 RX ADMIN — GUAIFENESIN 600 MG: 600 TABLET, EXTENDED RELEASE ORAL at 10:17

## 2020-02-21 RX ADMIN — CARBIDOPA AND LEVODOPA 2 TABLET: 50; 200 TABLET, EXTENDED RELEASE ORAL at 10:16

## 2020-02-21 RX ADMIN — NIFEDIPINE 30 MG: 30 TABLET, FILM COATED, EXTENDED RELEASE ORAL at 14:15

## 2020-02-21 RX ADMIN — IPRATROPIUM BROMIDE AND ALBUTEROL SULFATE 3 ML: 2.5; .5 SOLUTION RESPIRATORY (INHALATION) at 19:09

## 2020-02-21 RX ADMIN — QUETIAPINE FUMARATE 25 MG: 25 TABLET ORAL at 21:32

## 2020-02-21 RX ADMIN — ESCITALOPRAM 10 MG: 10 TABLET, FILM COATED ORAL at 21:32

## 2020-02-21 RX ADMIN — TERAZOSIN HYDROCHLORIDE 1 MG: 1 CAPSULE ORAL at 21:29

## 2020-02-21 RX ADMIN — IPRATROPIUM BROMIDE AND ALBUTEROL SULFATE 3 ML: 2.5; .5 SOLUTION RESPIRATORY (INHALATION) at 16:40

## 2020-02-21 RX ADMIN — IPRATROPIUM BROMIDE AND ALBUTEROL SULFATE 3 ML: 2.5; .5 SOLUTION RESPIRATORY (INHALATION) at 23:12

## 2020-02-21 RX ADMIN — PREDNISONE 10 MG: 10 TABLET ORAL at 10:16

## 2020-02-21 RX ADMIN — AZITHROMYCIN DIHYDRATE 500 MG: 250 TABLET, FILM COATED ORAL at 21:32

## 2020-02-21 RX ADMIN — CETIRIZINE HYDROCHLORIDE 10 MG: 10 TABLET, FILM COATED ORAL at 10:16

## 2020-02-21 RX ADMIN — GUAIFENESIN 600 MG: 600 TABLET, EXTENDED RELEASE ORAL at 21:32

## 2020-02-21 RX ADMIN — LEVOTHYROXINE SODIUM 125 MCG: 125 TABLET ORAL at 10:16

## 2020-02-22 PROCEDURE — 94799 UNLISTED PULMONARY SVC/PX: CPT

## 2020-02-22 PROCEDURE — 25010000002 CEFTRIAXONE PER 250 MG: Performed by: HOSPITALIST

## 2020-02-22 RX ADMIN — IPRATROPIUM BROMIDE AND ALBUTEROL SULFATE 3 ML: 2.5; .5 SOLUTION RESPIRATORY (INHALATION) at 03:18

## 2020-02-22 RX ADMIN — GUAIFENESIN 600 MG: 600 TABLET, EXTENDED RELEASE ORAL at 22:09

## 2020-02-22 RX ADMIN — IPRATROPIUM BROMIDE AND ALBUTEROL SULFATE 3 ML: 2.5; .5 SOLUTION RESPIRATORY (INHALATION) at 08:41

## 2020-02-22 RX ADMIN — NIFEDIPINE 30 MG: 30 TABLET, FILM COATED, EXTENDED RELEASE ORAL at 10:30

## 2020-02-22 RX ADMIN — RIVAROXABAN 20 MG: 10 TABLET, FILM COATED ORAL at 18:50

## 2020-02-22 RX ADMIN — LEVOTHYROXINE SODIUM 125 MCG: 125 TABLET ORAL at 10:30

## 2020-02-22 RX ADMIN — QUETIAPINE FUMARATE 25 MG: 25 TABLET ORAL at 22:09

## 2020-02-22 RX ADMIN — IPRATROPIUM BROMIDE AND ALBUTEROL SULFATE 3 ML: 2.5; .5 SOLUTION RESPIRATORY (INHALATION) at 23:56

## 2020-02-22 RX ADMIN — IPRATROPIUM BROMIDE AND ALBUTEROL SULFATE 3 ML: 2.5; .5 SOLUTION RESPIRATORY (INHALATION) at 19:11

## 2020-02-22 RX ADMIN — CETIRIZINE HYDROCHLORIDE 10 MG: 10 TABLET, FILM COATED ORAL at 10:31

## 2020-02-22 RX ADMIN — IPRATROPIUM BROMIDE AND ALBUTEROL SULFATE 3 ML: 2.5; .5 SOLUTION RESPIRATORY (INHALATION) at 16:10

## 2020-02-22 RX ADMIN — GUAIFENESIN 600 MG: 600 TABLET, EXTENDED RELEASE ORAL at 10:30

## 2020-02-22 RX ADMIN — TERAZOSIN HYDROCHLORIDE 1 MG: 1 CAPSULE ORAL at 22:05

## 2020-02-22 RX ADMIN — CEFTRIAXONE SODIUM 1 G: 1 INJECTION, SOLUTION INTRAVENOUS at 00:18

## 2020-02-22 RX ADMIN — ESCITALOPRAM 10 MG: 10 TABLET, FILM COATED ORAL at 22:08

## 2020-02-22 RX ADMIN — IPRATROPIUM BROMIDE AND ALBUTEROL SULFATE 3 ML: 2.5; .5 SOLUTION RESPIRATORY (INHALATION) at 12:37

## 2020-02-22 RX ADMIN — DONEPEZIL HYDROCHLORIDE 5 MG: 5 TABLET, FILM COATED ORAL at 22:05

## 2020-02-22 RX ADMIN — CARBIDOPA AND LEVODOPA 2 TABLET: 50; 200 TABLET, EXTENDED RELEASE ORAL at 22:09

## 2020-02-22 RX ADMIN — CARBIDOPA AND LEVODOPA 2 TABLET: 50; 200 TABLET, EXTENDED RELEASE ORAL at 10:30

## 2020-02-23 PROCEDURE — 94799 UNLISTED PULMONARY SVC/PX: CPT

## 2020-02-23 PROCEDURE — 97110 THERAPEUTIC EXERCISES: CPT

## 2020-02-23 RX ADMIN — IPRATROPIUM BROMIDE AND ALBUTEROL SULFATE 3 ML: 2.5; .5 SOLUTION RESPIRATORY (INHALATION) at 23:35

## 2020-02-23 RX ADMIN — RIVAROXABAN 20 MG: 10 TABLET, FILM COATED ORAL at 17:52

## 2020-02-23 RX ADMIN — IPRATROPIUM BROMIDE AND ALBUTEROL SULFATE 3 ML: 2.5; .5 SOLUTION RESPIRATORY (INHALATION) at 03:38

## 2020-02-23 RX ADMIN — IPRATROPIUM BROMIDE AND ALBUTEROL SULFATE 3 ML: 2.5; .5 SOLUTION RESPIRATORY (INHALATION) at 11:54

## 2020-02-23 RX ADMIN — IPRATROPIUM BROMIDE AND ALBUTEROL SULFATE 3 ML: 2.5; .5 SOLUTION RESPIRATORY (INHALATION) at 17:03

## 2020-02-23 RX ADMIN — CARBIDOPA AND LEVODOPA 2 TABLET: 50; 200 TABLET, EXTENDED RELEASE ORAL at 09:21

## 2020-02-23 RX ADMIN — GUAIFENESIN 600 MG: 600 TABLET, EXTENDED RELEASE ORAL at 21:41

## 2020-02-23 RX ADMIN — QUETIAPINE FUMARATE 25 MG: 25 TABLET ORAL at 21:41

## 2020-02-23 RX ADMIN — IPRATROPIUM BROMIDE AND ALBUTEROL SULFATE 3 ML: 2.5; .5 SOLUTION RESPIRATORY (INHALATION) at 19:08

## 2020-02-23 RX ADMIN — NIFEDIPINE 30 MG: 30 TABLET, FILM COATED, EXTENDED RELEASE ORAL at 09:21

## 2020-02-23 RX ADMIN — ESCITALOPRAM 10 MG: 10 TABLET, FILM COATED ORAL at 21:41

## 2020-02-23 RX ADMIN — CARBIDOPA AND LEVODOPA 2 TABLET: 50; 200 TABLET, EXTENDED RELEASE ORAL at 21:41

## 2020-02-23 RX ADMIN — DONEPEZIL HYDROCHLORIDE 5 MG: 5 TABLET, FILM COATED ORAL at 21:48

## 2020-02-23 RX ADMIN — GUAIFENESIN 600 MG: 600 TABLET, EXTENDED RELEASE ORAL at 09:21

## 2020-02-23 RX ADMIN — CETIRIZINE HYDROCHLORIDE 10 MG: 10 TABLET, FILM COATED ORAL at 09:21

## 2020-02-23 RX ADMIN — TERAZOSIN HYDROCHLORIDE 1 MG: 1 CAPSULE ORAL at 21:41

## 2020-02-23 RX ADMIN — LEVOTHYROXINE SODIUM 125 MCG: 125 TABLET ORAL at 09:21

## 2020-02-24 VITALS
HEART RATE: 63 BPM | TEMPERATURE: 98.2 F | BODY MASS INDEX: 25.8 KG/M2 | WEIGHT: 190.48 LBS | DIASTOLIC BLOOD PRESSURE: 63 MMHG | OXYGEN SATURATION: 99 % | HEIGHT: 72 IN | RESPIRATION RATE: 16 BRPM | SYSTOLIC BLOOD PRESSURE: 123 MMHG

## 2020-02-24 LAB
ANION GAP SERPL CALCULATED.3IONS-SCNC: 11.2 MMOL/L (ref 5–15)
BASOPHILS # BLD AUTO: 0.05 10*3/MM3 (ref 0–0.2)
BASOPHILS NFR BLD AUTO: 0.5 % (ref 0–1.5)
BUN BLD-MCNC: 25 MG/DL (ref 8–23)
BUN/CREAT SERPL: 27.8 (ref 7–25)
CALCIUM SPEC-SCNC: 8.3 MG/DL (ref 8.6–10.5)
CHLORIDE SERPL-SCNC: 102 MMOL/L (ref 98–107)
CO2 SERPL-SCNC: 24.8 MMOL/L (ref 22–29)
CREAT BLD-MCNC: 0.9 MG/DL (ref 0.76–1.27)
DEPRECATED RDW RBC AUTO: 47.2 FL (ref 37–54)
EOSINOPHIL # BLD AUTO: 0.24 10*3/MM3 (ref 0–0.4)
EOSINOPHIL NFR BLD AUTO: 2.4 % (ref 0.3–6.2)
ERYTHROCYTE [DISTWIDTH] IN BLOOD BY AUTOMATED COUNT: 13.9 % (ref 12.3–15.4)
GFR SERPL CREATININE-BSD FRML MDRD: 82 ML/MIN/1.73
GLUCOSE BLD-MCNC: 87 MG/DL (ref 65–99)
HCT VFR BLD AUTO: 34.8 % (ref 37.5–51)
HGB BLD-MCNC: 11.4 G/DL (ref 13–17.7)
IMM GRANULOCYTES # BLD AUTO: 0.46 10*3/MM3 (ref 0–0.05)
IMM GRANULOCYTES NFR BLD AUTO: 4.6 % (ref 0–0.5)
LYMPHOCYTES # BLD AUTO: 1.98 10*3/MM3 (ref 0.7–3.1)
LYMPHOCYTES NFR BLD AUTO: 19.8 % (ref 19.6–45.3)
MCH RBC QN AUTO: 30.2 PG (ref 26.6–33)
MCHC RBC AUTO-ENTMCNC: 32.8 G/DL (ref 31.5–35.7)
MCV RBC AUTO: 92.1 FL (ref 79–97)
MONOCYTES # BLD AUTO: 0.69 10*3/MM3 (ref 0.1–0.9)
MONOCYTES NFR BLD AUTO: 6.9 % (ref 5–12)
NEUTROPHILS # BLD AUTO: 6.58 10*3/MM3 (ref 1.7–7)
NEUTROPHILS NFR BLD AUTO: 65.8 % (ref 42.7–76)
NRBC BLD AUTO-RTO: 0 /100 WBC (ref 0–0.2)
PLATELET # BLD AUTO: 339 10*3/MM3 (ref 140–450)
PMV BLD AUTO: 10.5 FL (ref 6–12)
POTASSIUM BLD-SCNC: 4.3 MMOL/L (ref 3.5–5.2)
RBC # BLD AUTO: 3.78 10*6/MM3 (ref 4.14–5.8)
SODIUM BLD-SCNC: 138 MMOL/L (ref 136–145)
WBC NRBC COR # BLD: 10 10*3/MM3 (ref 3.4–10.8)

## 2020-02-24 PROCEDURE — 94799 UNLISTED PULMONARY SVC/PX: CPT

## 2020-02-24 PROCEDURE — 80048 BASIC METABOLIC PNL TOTAL CA: CPT | Performed by: HOSPITALIST

## 2020-02-24 PROCEDURE — 85025 COMPLETE CBC W/AUTO DIFF WBC: CPT | Performed by: HOSPITALIST

## 2020-02-24 RX ORDER — HYDROCODONE BITARTRATE AND ACETAMINOPHEN 5; 325 MG/1; MG/1
1 TABLET ORAL EVERY 6 HOURS PRN
Qty: 15 TABLET | Refills: 0 | Status: SHIPPED | OUTPATIENT
Start: 2020-02-24 | End: 2020-02-27

## 2020-02-24 RX ORDER — NIFEDIPINE 30 MG/1
30 TABLET, FILM COATED, EXTENDED RELEASE ORAL
Qty: 30 TABLET | Refills: 0 | Status: SHIPPED | OUTPATIENT
Start: 2020-02-25 | End: 2020-03-26

## 2020-02-24 RX ADMIN — CETIRIZINE HYDROCHLORIDE 10 MG: 10 TABLET, FILM COATED ORAL at 09:51

## 2020-02-24 RX ADMIN — GUAIFENESIN 600 MG: 600 TABLET, EXTENDED RELEASE ORAL at 09:51

## 2020-02-24 RX ADMIN — IPRATROPIUM BROMIDE AND ALBUTEROL SULFATE 3 ML: 2.5; .5 SOLUTION RESPIRATORY (INHALATION) at 12:29

## 2020-02-24 RX ADMIN — LEVOTHYROXINE SODIUM 125 MCG: 125 TABLET ORAL at 09:50

## 2020-02-24 RX ADMIN — IPRATROPIUM BROMIDE AND ALBUTEROL SULFATE 3 ML: 2.5; .5 SOLUTION RESPIRATORY (INHALATION) at 07:47

## 2020-02-24 RX ADMIN — IPRATROPIUM BROMIDE AND ALBUTEROL SULFATE 3 ML: 2.5; .5 SOLUTION RESPIRATORY (INHALATION) at 03:21

## 2020-02-24 RX ADMIN — NIFEDIPINE 30 MG: 30 TABLET, FILM COATED, EXTENDED RELEASE ORAL at 09:51

## 2020-02-24 RX ADMIN — CARBIDOPA AND LEVODOPA 2 TABLET: 50; 200 TABLET, EXTENDED RELEASE ORAL at 09:51

## 2020-04-14 NOTE — TELEPHONE ENCOUNTER
PATIENT IS REQUESTING A 90 DAY SUPPLY OF HIS rivaroxaban (XARELTO) 20 MG tablet. I CONFIRMED THE [facility].

## 2020-04-21 NOTE — PROGRESS NOTES
HPI  Rommel Gonzalez is a 71 y.o. male who is here to establish a new primary care physician.  Has known parkinsonism old by neurologist.  Also chronic atrial fibrillation followed by cardiologist every 6 months.  On thyroid replacement therapy.  Other medications and history reviewed as best possible.  Also found to have a pulmonary nodule with most recent CAT scan done in December with recommendations for follow-up CAT scan in 4-6 months.      Review of Systems   HENT: Positive for drooling.    Respiratory: Positive for apnea.    Gastrointestinal: Positive for constipation.   Endocrine: Positive for polyuria.   Musculoskeletal: Positive for back pain and gait problem.   Skin: Positive for wound.   Neurological: Positive for speech difficulty.        Recent fall bruising his back   Hematological: Bruises/bleeds easily.         Past Medical History   Diagnosis Date   • AF (atrial fibrillation)    • Colon polyps    • Confusion    • Depression    • Disease of thyroid gland    • Gait instability    • Hay fever    • Hyperlipidemia    • Hypertension    • Insomnia    • Measles    • Mumps    • Parkinson disease    • Peripheral neuropathy    • Slurred speech    • Tremor        No past surgical history on file.    Family History   Problem Relation Age of Onset   • Hypertension Mother    • Throat cancer Father    • Alcohol abuse Father    • Hypertension Sister    • Lung cancer Brother    • Heart attack Other    • Lung disease Other        Social History     Social History   • Marital status:      Spouse name: N/A   • Number of children: N/A   • Years of education: N/A     Occupational History   • Not on file.     Social History Main Topics   • Smoking status: Former Smoker   • Smokeless tobacco: Not on file   • Alcohol use No   • Drug use: Not on file   • Sexual activity: Not on file     Other Topics Concern   • Not on file     Social History Narrative         Physical Exam      Assessment/Plan    Rommel was seen today  TRANSFER - OUT REPORT: 
 
Verbal report given to Marnie (name) on Lloyd Pimentel  being transferred to Renal (unit) for routine progression of care Report consisted of patients Situation, Background, Assessment and  
Recommendations(SBAR). Information from the following report(s) SBAR, Kardex, Intake/Output, MAR and Recent Results was reviewed with the receiving nurse. Lines:  
Peripheral IV 04/19/20 Left Forearm (Active) Site Assessment Clean, dry, & intact 4/21/2020  7:40 AM  
Phlebitis Assessment 0 4/21/2020  7:40 AM  
Infiltration Assessment 0 4/21/2020  7:40 AM  
Dressing Status Clean, dry, & intact 4/21/2020  7:40 AM  
Dressing Type Tape;Transparent 4/21/2020  7:40 AM  
Hub Color/Line Status Pink 4/21/2020  7:40 AM  
Alcohol Cap Used No 4/21/2020  7:40 AM  
   
Peripheral IV 04/19/20 Left Antecubital (Active) Site Assessment Clean, dry, & intact 4/21/2020  7:40 AM  
Phlebitis Assessment 0 4/21/2020  7:40 AM  
Infiltration Assessment 0 4/21/2020  7:40 AM  
Dressing Status Clean, dry, & intact 4/21/2020  7:40 AM  
Dressing Type Tape;Transparent 4/21/2020  7:40 AM  
Hub Color/Line Status Pink 4/21/2020  7:40 AM  
Alcohol Cap Used No 4/21/2020  7:40 AM  
  
 
Opportunity for questions and clarification was provided. Patient transported with: 
 Registered Nurse for hypertension and fall.    Diagnoses and all orders for this visit:    Idiopathic Parkinson's disease    Hypothyroidism (acquired)  -     levothyroxine (SYNTHROID, LEVOTHROID) 112 MCG tablet; Take 1 tablet by mouth Daily.    Chronic atrial fibrillation    Iron deficiency  -     Ferritin    High risk medication use  -     CBC & Differential  -     Basic Metabolic Panel    Anticoagulated    Abnormal chest CT  -     CT Chest With & Without Contrast; Future    Essential hypertension      Patient is here to establish a new primary care physician.  Previously followed by Dr. Lee.  Parkinsonism with history of falls.  Is on anticoagulation therapy apparently for chronic atrial fibrillation.  At this point benefits are still most to outweigh risks.  Needs refill especially on thyroid medication.  Also continues on iron therapy but is unclear why.  Will check blood count and iron level and decide if needs to continue.  Nodule noted on most recent CT scan and follow-up CT recommended will be scheduled for May.  Otherwise continue current therapy and follow-up in 6 months.    This note includes information entered using a voice recognition dictation system.  Though reviewed, some nonsensible errors may remain.

## 2020-09-28 NOTE — TELEPHONE ENCOUNTER
cmp ok and ok to stay on xarelto but see pcp re borderline low hbg and thyroid labs. keeleyd   negative...

## 2020-10-11 ENCOUNTER — HOSPITAL ENCOUNTER (INPATIENT)
Facility: HOSPITAL | Age: 75
LOS: 3 days | Discharge: HOME | End: 2020-10-14
Attending: EMERGENCY MEDICINE | Admitting: HOSPITALIST

## 2020-10-11 ENCOUNTER — APPOINTMENT (OUTPATIENT)
Dept: GENERAL RADIOLOGY | Facility: HOSPITAL | Age: 75
End: 2020-10-11

## 2020-10-11 DIAGNOSIS — G20 PARKINSON'S DISEASE (HCC): ICD-10-CM

## 2020-10-11 DIAGNOSIS — R93.3 ABNORMAL ESOPHAGRAM: ICD-10-CM

## 2020-10-11 DIAGNOSIS — T17.308A CHOKING, INITIAL ENCOUNTER: Primary | ICD-10-CM

## 2020-10-11 DIAGNOSIS — R47.02 DYSPHASIA: ICD-10-CM

## 2020-10-11 DIAGNOSIS — J69.0 ASPIRATION PNEUMONIA OF BOTH LOWER LOBES, UNSPECIFIED ASPIRATION PNEUMONIA TYPE (HCC): ICD-10-CM

## 2020-10-11 LAB
ALBUMIN SERPL-MCNC: 3.8 G/DL (ref 3.5–5.2)
ALBUMIN/GLOB SERPL: 1.2 G/DL
ALP SERPL-CCNC: 74 U/L (ref 39–117)
ALT SERPL W P-5'-P-CCNC: <5 U/L (ref 1–41)
ANION GAP SERPL CALCULATED.3IONS-SCNC: 8.8 MMOL/L (ref 5–15)
AST SERPL-CCNC: 14 U/L (ref 1–40)
B PARAPERT DNA SPEC QL NAA+PROBE: NOT DETECTED
B PERT DNA SPEC QL NAA+PROBE: NOT DETECTED
BASOPHILS # BLD AUTO: 0.04 10*3/MM3 (ref 0–0.2)
BASOPHILS NFR BLD AUTO: 0.3 % (ref 0–1.5)
BILIRUB SERPL-MCNC: 0.3 MG/DL (ref 0–1.2)
BUN SERPL-MCNC: 16 MG/DL (ref 8–23)
BUN/CREAT SERPL: 18.6 (ref 7–25)
C PNEUM DNA NPH QL NAA+NON-PROBE: NOT DETECTED
CALCIUM SPEC-SCNC: 9.1 MG/DL (ref 8.6–10.5)
CHLORIDE SERPL-SCNC: 103 MMOL/L (ref 98–107)
CO2 SERPL-SCNC: 28.2 MMOL/L (ref 22–29)
CREAT SERPL-MCNC: 0.86 MG/DL (ref 0.76–1.27)
D-LACTATE SERPL-SCNC: 2 MMOL/L (ref 0.5–2)
DEPRECATED RDW RBC AUTO: 43.6 FL (ref 37–54)
EOSINOPHIL # BLD AUTO: 0.05 10*3/MM3 (ref 0–0.4)
EOSINOPHIL NFR BLD AUTO: 0.4 % (ref 0.3–6.2)
ERYTHROCYTE [DISTWIDTH] IN BLOOD BY AUTOMATED COUNT: 12.7 % (ref 12.3–15.4)
FLUAV H1 2009 PAND RNA NPH QL NAA+PROBE: NOT DETECTED
FLUAV H1 HA GENE NPH QL NAA+PROBE: NOT DETECTED
FLUAV H3 RNA NPH QL NAA+PROBE: NOT DETECTED
FLUAV SUBTYP SPEC NAA+PROBE: NOT DETECTED
FLUBV RNA ISLT QL NAA+PROBE: NOT DETECTED
GFR SERPL CREATININE-BSD FRML MDRD: 86 ML/MIN/1.73
GLOBULIN UR ELPH-MCNC: 3.2 GM/DL
GLUCOSE SERPL-MCNC: 113 MG/DL (ref 65–99)
HADV DNA SPEC NAA+PROBE: NOT DETECTED
HCOV 229E RNA SPEC QL NAA+PROBE: NOT DETECTED
HCOV HKU1 RNA SPEC QL NAA+PROBE: NOT DETECTED
HCOV NL63 RNA SPEC QL NAA+PROBE: NOT DETECTED
HCOV OC43 RNA SPEC QL NAA+PROBE: NOT DETECTED
HCT VFR BLD AUTO: 36.4 % (ref 37.5–51)
HGB BLD-MCNC: 11.7 G/DL (ref 13–17.7)
HMPV RNA NPH QL NAA+NON-PROBE: NOT DETECTED
HPIV1 RNA SPEC QL NAA+PROBE: NOT DETECTED
HPIV2 RNA SPEC QL NAA+PROBE: NOT DETECTED
HPIV3 RNA NPH QL NAA+PROBE: NOT DETECTED
HPIV4 P GENE NPH QL NAA+PROBE: NOT DETECTED
IMM GRANULOCYTES # BLD AUTO: 0.05 10*3/MM3 (ref 0–0.05)
IMM GRANULOCYTES NFR BLD AUTO: 0.4 % (ref 0–0.5)
INR PPP: 1.63 (ref 0.9–1.1)
LYMPHOCYTES # BLD AUTO: 0.77 10*3/MM3 (ref 0.7–3.1)
LYMPHOCYTES NFR BLD AUTO: 5.9 % (ref 19.6–45.3)
M PNEUMO IGG SER IA-ACNC: NOT DETECTED
MCH RBC QN AUTO: 30.4 PG (ref 26.6–33)
MCHC RBC AUTO-ENTMCNC: 32.1 G/DL (ref 31.5–35.7)
MCV RBC AUTO: 94.5 FL (ref 79–97)
MONOCYTES # BLD AUTO: 0.69 10*3/MM3 (ref 0.1–0.9)
MONOCYTES NFR BLD AUTO: 5.3 % (ref 5–12)
NEUTROPHILS NFR BLD AUTO: 11.37 10*3/MM3 (ref 1.7–7)
NEUTROPHILS NFR BLD AUTO: 87.7 % (ref 42.7–76)
NRBC BLD AUTO-RTO: 0 /100 WBC (ref 0–0.2)
NT-PROBNP SERPL-MCNC: 152.5 PG/ML (ref 0–1800)
PLATELET # BLD AUTO: 271 10*3/MM3 (ref 140–450)
PMV BLD AUTO: 10.3 FL (ref 6–12)
POTASSIUM SERPL-SCNC: 4.4 MMOL/L (ref 3.5–5.2)
PROCALCITONIN SERPL-MCNC: 0.05 NG/ML (ref 0–0.25)
PROT SERPL-MCNC: 7 G/DL (ref 6–8.5)
PROTHROMBIN TIME: 18.9 SECONDS (ref 11.7–14.2)
RBC # BLD AUTO: 3.85 10*6/MM3 (ref 4.14–5.8)
RHINOVIRUS RNA SPEC NAA+PROBE: NOT DETECTED
RSV RNA NPH QL NAA+NON-PROBE: NOT DETECTED
SARS-COV-2 RNA NPH QL NAA+NON-PROBE: NOT DETECTED
SODIUM SERPL-SCNC: 140 MMOL/L (ref 136–145)
TROPONIN T SERPL-MCNC: <0.01 NG/ML (ref 0–0.03)
WBC # BLD AUTO: 12.97 10*3/MM3 (ref 3.4–10.8)

## 2020-10-11 PROCEDURE — 25010000002 PIPERACILLIN SOD-TAZOBACTAM PER 1 G: Performed by: EMERGENCY MEDICINE

## 2020-10-11 PROCEDURE — 83880 ASSAY OF NATRIURETIC PEPTIDE: CPT | Performed by: EMERGENCY MEDICINE

## 2020-10-11 PROCEDURE — 87040 BLOOD CULTURE FOR BACTERIA: CPT | Performed by: EMERGENCY MEDICINE

## 2020-10-11 PROCEDURE — 71046 X-RAY EXAM CHEST 2 VIEWS: CPT

## 2020-10-11 PROCEDURE — C9803 HOPD COVID-19 SPEC COLLECT: HCPCS | Performed by: EMERGENCY MEDICINE

## 2020-10-11 PROCEDURE — 80053 COMPREHEN METABOLIC PANEL: CPT | Performed by: EMERGENCY MEDICINE

## 2020-10-11 PROCEDURE — 99285 EMERGENCY DEPT VISIT HI MDM: CPT

## 2020-10-11 PROCEDURE — 83605 ASSAY OF LACTIC ACID: CPT | Performed by: EMERGENCY MEDICINE

## 2020-10-11 PROCEDURE — 85610 PROTHROMBIN TIME: CPT | Performed by: EMERGENCY MEDICINE

## 2020-10-11 PROCEDURE — 84145 PROCALCITONIN (PCT): CPT | Performed by: EMERGENCY MEDICINE

## 2020-10-11 PROCEDURE — 85025 COMPLETE CBC W/AUTO DIFF WBC: CPT | Performed by: EMERGENCY MEDICINE

## 2020-10-11 PROCEDURE — 0202U NFCT DS 22 TRGT SARS-COV-2: CPT | Performed by: EMERGENCY MEDICINE

## 2020-10-11 PROCEDURE — 84484 ASSAY OF TROPONIN QUANT: CPT | Performed by: EMERGENCY MEDICINE

## 2020-10-11 RX ORDER — DONEPEZIL HYDROCHLORIDE 10 MG/1
10 TABLET, FILM COATED ORAL ONCE
Status: COMPLETED | OUTPATIENT
Start: 2020-10-12 | End: 2020-10-12

## 2020-10-11 RX ORDER — CYANOCOBALAMIN (VITAMIN B-12) 500 MCG
1000 TABLET ORAL DAILY
COMMUNITY
End: 2022-01-01 | Stop reason: HOSPADM

## 2020-10-11 RX ORDER — TERAZOSIN 1 MG/1
1 CAPSULE ORAL NIGHTLY
COMMUNITY
End: 2022-01-01 | Stop reason: HOSPADM

## 2020-10-11 RX ORDER — CARBIDOPA AND LEVODOPA 50; 200 MG/1; MG/1
2 TABLET, EXTENDED RELEASE ORAL ONCE
Status: COMPLETED | OUTPATIENT
Start: 2020-10-12 | End: 2020-10-12

## 2020-10-11 RX ORDER — NIFEDIPINE 30 MG/1
30 TABLET, EXTENDED RELEASE ORAL DAILY
COMMUNITY
End: 2022-01-01 | Stop reason: HOSPADM

## 2020-10-11 RX ORDER — ONDANSETRON 2 MG/ML
4 INJECTION INTRAMUSCULAR; INTRAVENOUS EVERY 6 HOURS PRN
Status: DISCONTINUED | OUTPATIENT
Start: 2020-10-11 | End: 2020-10-14

## 2020-10-11 RX ORDER — TERAZOSIN 1 MG/1
1 CAPSULE ORAL ONCE
Status: COMPLETED | OUTPATIENT
Start: 2020-10-12 | End: 2020-10-12

## 2020-10-11 RX ORDER — PANTOPRAZOLE SODIUM 40 MG/10ML
40 INJECTION, POWDER, LYOPHILIZED, FOR SOLUTION INTRAVENOUS
Status: DISCONTINUED | OUTPATIENT
Start: 2020-10-12 | End: 2020-10-12

## 2020-10-11 RX ORDER — QUETIAPINE FUMARATE 25 MG/1
25 TABLET, FILM COATED ORAL ONCE
Status: COMPLETED | OUTPATIENT
Start: 2020-10-12 | End: 2020-10-12

## 2020-10-11 RX ADMIN — TAZOBACTAM SODIUM AND PIPERACILLIN SODIUM 3.38 G: 375; 3 INJECTION, SOLUTION INTRAVENOUS at 18:32

## 2020-10-11 NOTE — ED PROVIDER NOTES
EMERGENCY DEPARTMENT ENCOUNTER    Room Number:  24/24  Date of encounter:  10/11/2020  PCP: Dennis Goncalves MD  Historian: EMS and patient      HPI:  Chief Complaint: Choking today  A complete HPI/ROS/PMH/PSH/SH/FH are unobtainable due to: Patient has advanced Parkinson's disease and history is somewhat limited.  Context: Rommel Gonzalez is a 75 y.o. male who presents to the ED c/o male who was at the [facility].  He was eating pork when he suddenly became choking and short of breath.  Heimlich maneuver was performed with some partial success.  EMS was called as he still was having respiratory distress.  When EMS arrived they used Norma forceps to over the remove the room asked of the foreign body which was food.  Since that time patient is doing much better.  He is having some mild residual cough.  He denies any chest pain and shortness of breath at this moment.  He has history of advanced Parkinson's disease.  And has had a history of dysphasia when I look at old records in the past.  He is not on a G-tube.  At this time other than the cough he denies any other complaints.        Previous Episodes: History of dysphagia in the past  Current Symptoms: See above    MEDICAL HISTORY REVIEWED        PAST MEDICAL HISTORY  Active Ambulatory Problems     Diagnosis Date Noted   • AF (atrial fibrillation) (CMS/[facility]) 03/28/2016   • Depression 03/28/2016   • Gait instability 03/28/2016   • Hypertension 03/28/2016   • Insomnia 03/28/2016   • Idiopathic Parkinson's disease (CMS/[facility]) 03/28/2016   • Peripheral neuropathy 03/28/2016   • Dysarthria 03/28/2016   • Atrial flutter (CMS/[facility]) 04/03/2016   • Anticoagulated 04/05/2016   • Health care maintenance 04/19/2016   • Hypothyroidism (acquired) 06/23/2016   • Weakness of voice 06/23/2016   • Abnormal chest CT 03/13/2017   • Facial droop 04/19/2017   • Hypersomnia  04/19/2017   • Hyperlipidemia 08/16/2017   • High risk medication use 08/16/2017   • CORINNE on CPAP  08/16/2017   • Obstructive sleep apnea, adult 10/26/2017   • Fall 03/07/2018   • Progressive supranuclear palsy (CMS/HCC) 03/07/2018   • Dysphagia 03/07/2018   • 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   • Constipation 01/09/2018   • Hyperreflexia 01/09/2018   • 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   • Transient cerebral ischemia 08/06/2018   • Pneumonia of both lungs due to infectious organism 06/17/2019   • Closed fracture of multiple ribs 06/17/2019   • Closed compression fracture of L1 lumbar vertebra 06/17/2019   • Closed fracture of transverse process of lumbar vertebra (CMS/[facility]) 06/17/2019   • Altered mental status 06/28/2019   • Frequent falls 06/28/2019   • Sepsis without acute organ dysfunction (CMS/[facility]) 02/15/2020     Resolved Ambulatory Problems     Diagnosis Date Noted   • Atypical chest pain 03/28/2016   • Chest pain 03/28/2016   • Colon polyp 03/28/2016   • Confusional state 03/28/2016   • Shortness of breath 03/28/2016   • Fatigue 03/28/2016   • Hyperlipidemia 03/28/2016   • Palpitations 03/28/2016   • Parkinson's disease (CMS/[facility]) 03/28/2016   • Tremor 03/28/2016   • Hematuria, gross 04/12/2016   • Persistent cough for 3 weeks or longer 05/02/2016   • Near syncope 04/19/2017   • Non-traumatic rhabdomyolysis 03/07/2018   • Aspiration pneumonia (CMS/[facility]) 03/12/2018   • Sepsis (CMS/[facility]) 03/12/2018   • Metabolic encephalopathy 03/15/2018     Past Medical History:   Diagnosis Date   • Colon polyps    • Confusion    • Dementia (CMS/[facility])    • Disease of thyroid gland    • Disorder of thyroid    • Dyspnea and respiratory abnormalities    • Elevated TSH    • Gastroesophageal reflux disease    • Hay fever    • Hypothyroidism    • Measles    • Mumps    • CORINNE (obstructive sleep apnea)    • Parkinson disease (CMS/[facility])    • Pneumonia    • Poor sleep pattern    • Slurred speech          PAST SURGICAL HISTORY  Past Surgical History:   Procedure Laterality Date   • ENDOSCOPY W/ PEG TUBE PLACEMENT N/A 3/16/2018     Procedure: ESOPHAGOGASTRODUODENOSCOPY WITH PERCUTANEOUS ENDOSCOPIC GASTROSTOMY TUBE INSERTION;  Surgeon: Lopez Vang Jr., MD;  Location: [facility] ENDOSCOPY;  Service: Gastroenterology         FAMILY HISTORY  Family History   Problem Relation Age of Onset   • Hypertension Mother    • Glaucoma Mother    • Throat cancer Father    • Alcohol abuse Father    • Hypertension Sister    • Cancer Sister         malignant neoplasm of urinary bladder   • Lung cancer Brother    • Heart attack Other    • Lung disease Other          SOCIAL HISTORY  Social History     Socioeconomic History   • Marital status:      Spouse name: Not on file   • Number of children: Not on file   • Years of education: Not on file   • Highest education level: Not on file   Tobacco Use   • Smoking status: Former Smoker   • Smokeless tobacco: Never Used   • Tobacco comment: Daily caffeine use   Substance and Sexual Activity   • Alcohol use: No     Frequency: Never   • Drug use: No   • Sexual activity: Never         ALLERGIES  Ppd [tuberculin purified protein derivative]        REVIEW OF SYSTEMS  Review of Systems     All systems reviewed and negative except for those discussed in HPI.       PHYSICAL EXAM    I have reviewed the triage vital signs and nursing notes.    ED Triage Vitals [10/11/20 1316]   Temp Heart Rate Resp BP SpO2   96.2 °F (35.7 °C) 83 18 105/68 95 %      Temp src Heart Rate Source Patient Position BP Location FiO2 (%)   Tympanic Monitor -- -- --       GENERAL: Elderly male with O2 sat of 100% on 2-1/2 L.  He has an occasional cough.  He has very mild crackles in the base of his lungs bilaterally no acute distress.Vital signs on my initial evaluation unremarkable  HENT: nares patent  Head/neck/ face are symmetric without gross deformity, signs of trauma, or swelling.  Oral pharyngeal exam.  I do not see any foreign body.  He has no airway swelling or discharge.  EYES: no scleral icterus, no conjunctival pallor.  NECK: Supple, no  meningismus  CV: regular rhythm, regular rate with intact distal pulses.  No murmur rub  RESPIRATORY: normal effort and no respiratory distress.  Occasional cough with some crackles in the base of his lungs bilaterally that are mild.  ABDOMEN: soft and non-tender.  MUSCULOSKELETAL: no deformity.  Intact distal pulses no edema  NEURO: alert and appropriate, moves all extremities, follows commands.  Patient is alert and oriented.  He has diffuse weakness with some muscle rigidity.  He has En that is pretty consistent with Parkinson's disease as he has a tremor.  SKIN: warm, dry    Vital signs and nursing notes reviewed.        LAB RESULTS  Recent Results (from the past 24 hour(s))   Comprehensive Metabolic Panel    Collection Time: 10/11/20  3:30 PM    Specimen: Blood   Result Value Ref Range    Glucose 113 (H) 65 - 99 mg/dL    BUN 16 8 - 23 mg/dL    Creatinine 0.86 0.76 - 1.27 mg/dL    Sodium 140 136 - 145 mmol/L    Potassium 4.4 3.5 - 5.2 mmol/L    Chloride 103 98 - 107 mmol/L    CO2 28.2 22.0 - 29.0 mmol/L    Calcium 9.1 8.6 - 10.5 mg/dL    Total Protein 7.0 6.0 - 8.5 g/dL    Albumin 3.80 3.50 - 5.20 g/dL    ALT (SGPT) <5 1 - 41 U/L    AST (SGOT) 14 1 - 40 U/L    Alkaline Phosphatase 74 39 - 117 U/L    Total Bilirubin 0.3 0.0 - 1.2 mg/dL    eGFR Non African Amer 86 >60 mL/min/1.73    Globulin 3.2 gm/dL    A/G Ratio 1.2 g/dL    BUN/Creatinine Ratio 18.6 7.0 - 25.0    Anion Gap 8.8 5.0 - 15.0 mmol/L   BNP    Collection Time: 10/11/20  3:30 PM    Specimen: Blood   Result Value Ref Range    proBNP 152.5 0.0-1,800.0 pg/mL   Troponin    Collection Time: 10/11/20  3:30 PM    Specimen: Blood   Result Value Ref Range    Troponin T <0.010 0.000 - 0.030 ng/mL   Protime-INR    Collection Time: 10/11/20  3:30 PM    Specimen: Blood   Result Value Ref Range    Protime 18.9 (H) 11.7 - 14.2 Seconds    INR 1.63 (H) 0.90 - 1.10   CBC Auto Differential    Collection Time: 10/11/20  3:30 PM    Specimen: Blood   Result Value Ref  Range    WBC 12.97 (H) 3.40 - 10.80 10*3/mm3    RBC 3.85 (L) 4.14 - 5.80 10*6/mm3    Hemoglobin 11.7 (L) 13.0 - 17.7 g/dL    Hematocrit 36.4 (L) 37.5 - 51.0 %    MCV 94.5 79.0 - 97.0 fL    MCH 30.4 26.6 - 33.0 pg    MCHC 32.1 31.5 - 35.7 g/dL    RDW 12.7 12.3 - 15.4 %    RDW-SD 43.6 37.0 - 54.0 fl    MPV 10.3 6.0 - 12.0 fL    Platelets 271 140 - 450 10*3/mm3    Neutrophil % 87.7 (H) 42.7 - 76.0 %    Lymphocyte % 5.9 (L) 19.6 - 45.3 %    Monocyte % 5.3 5.0 - 12.0 %    Eosinophil % 0.4 0.3 - 6.2 %    Basophil % 0.3 0.0 - 1.5 %    Immature Grans % 0.4 0.0 - 0.5 %    Neutrophils, Absolute 11.37 (H) 1.70 - 7.00 10*3/mm3    Lymphocytes, Absolute 0.77 0.70 - 3.10 10*3/mm3    Monocytes, Absolute 0.69 0.10 - 0.90 10*3/mm3    Eosinophils, Absolute 0.05 0.00 - 0.40 10*3/mm3    Basophils, Absolute 0.04 0.00 - 0.20 10*3/mm3    Immature Grans, Absolute 0.05 0.00 - 0.05 10*3/mm3    nRBC 0.0 0.0 - 0.2 /100 WBC   Procalcitonin    Collection Time: 10/11/20  3:30 PM    Specimen: Blood   Result Value Ref Range    Procalcitonin 0.05 0.00 - 0.25 ng/mL   Respiratory Panel PCR w/COVID-19(SARS-CoV-2) [facility]/KAMRYN/ALLAN/PAD/COR/[facility] In-House, NP Swab in [facility], 3-4 HR TAT - Swab, Nasopharynx    Collection Time: 10/11/20  3:32 PM    Specimen: Nasopharynx; Swab   Result Value Ref Range    ADENOVIRUS, PCR Not Detected Not Detected    Coronavirus 229E Not Detected Not Detected    Coronavirus HKU1 Not Detected Not Detected    Coronavirus NL63 Not Detected Not Detected    Coronavirus OC43 Not Detected Not Detected    COVID19 Not Detected Not Detected - Ref. Range    Human Metapneumovirus Not Detected Not Detected    Human Rhinovirus/Enterovirus Not Detected Not Detected    Influenza A PCR Not Detected Not Detected    Influenza A H1 Not Detected Not Detected    Influenza A H1 2009 PCR Not Detected Not Detected    Influenza A H3 Not Detected Not Detected    Influenza B PCR Not Detected Not Detected    Parainfluenza Virus 1 Not Detected Not Detected     Parainfluenza Virus 2 Not Detected Not Detected    Parainfluenza Virus 3 Not Detected Not Detected    Parainfluenza Virus 4 Not Detected Not Detected    RSV, PCR Not Detected Not Detected    Bordetella pertussis pcr Not Detected Not Detected    Bordetella parapertussis PCR Not Detected Not Detected    Chlamydophila pneumoniae PCR Not Detected Not Detected    Mycoplasma pneumo by PCR Not Detected Not Detected       Ordered the above labs and independently reviewed the results.        RADIOLOGY  Xr Chest 2 View    Result Date: 10/11/2020  CHEST TWO VIEWS  HISTORY: Choking on piece of meat.  FINDINGS: Two views of the chest demonstrate the heart to be within normal limits in size. There is mild bibasilar atelectasis/infiltrate, more prominent on the left. There is no evidence of consolidation, effusion or congestive failure. The pulmonary interstitium is prominent suggesting chronic interstitial changes. There is no evidence of a radiopaque foreign body.  The above information was called to and discussed with Dr. Loredo.        I ordered the above noted radiological studies. Reviewed by me and discussed with radiologist.  See dictation for official radiology interpretation.      PROCEDURES    Procedures      MEDICATIONS GIVEN IN ER    Medications   piperacillin-tazobactam (ZOSYN) 3.375 g in iso-osmotic dextrose 50 ml (premix) (has no administration in time range)         PROGRESS, DATA ANALYSIS, CONSULTS, AND MEDICAL DECISION MAKING    We will check x-ray and some other lab work.  We will get a put him on room air and see how he does.  All questions answered at this time.  We are currently under a pandemic from the COVID19 infection.  The patient presented to the emergency department by ambulance or personal vehicle. I followed the current protocols required by Infection Control at [facility] in my evaluation and treatment of the patient. The patient was wearing a face mask during my evaluation and  throughout my encounter. During my whole encounter with this patient I used appropriate personal protective equipment.  This equipment consisted of eye protection, facemask, gown, and gloves.  I applied this equipment before entering the room.      All labs have been independently reviewed by me.  All radiology studies have been reviewed by me and discussed with radiologist dictating the report.   EKG's independently viewed and interpreted by me.  Discussion below represents my analysis of pertinent findings related to patient's condition, differential diagnosis, treatment plan and final disposition.      ED Course as of Oct 11 1739   Sun Oct 11, 2020   1659 Chronic anemia no significant change.   Hemoglobin(!): 11.7 [MM]   1659 WBC(!): 12.97 [MM]   1659 Patient is on rivaroxaban for atrial fibrillation.  That is why his INR is 1.6    [MM]   1659 INR(!): 1.63 [MM]   1700 I reviewed the chest x-ray and reviewed the radiologist report.  Patient has some by basilar atelectasis or possibility of beginning infiltrates more prominent in the left base.    [MM]   1706 I spoke with the patient's sister and reevaluated the patient.  Patient's O2 sat is 95% on room air.  He still has that mild occasional cough.  I reviewed at length the results of the test and the chest x-ray and also talk with him at length about my concerns.  We need to admit this patient he has dysphasia.  He is supposed to be it sounds like on a thickened liquid diet in the past.  He is no longer on that diet.  The sister is unable to elaborate on that.  I had a talk with Dr. Kc but on and put him on some antibiotics for potential aspiration.  As well as his chest x-ray which is not 100% normal.    [MM]   1718 I discussed the case with Dr. Kc who agrees to admit the patient.  He agrees also to antibiotics.  All questions answered.    [MM]      ED Course User Index  [MM] Jamie Loredo MD       AS OF 17:39 EDT VITALS:    BP - 136/68  HR - 83  TEMP -  96.2 °F (35.7 °C) (Tympanic)  02 SATS - 97%        DIAGNOSIS  Final diagnoses:   Choking, initial encounter   Aspiration pneumonia of both lower lobes, unspecified aspiration pneumonia type (CMS/HCC)   Dysphasia   Parkinson's disease (CMS/HCC)         DISPOSITION  I have reviewed the test results with my patient and explained the current treatment plan.  I answered all of the patient's questions.  The patient will be admitted to monitor bed at this time.  The patient is not hypotensive and is tolerating their current disease condition well enough for a monitored bed at this time.  The patient's current condition does not require intensive care treatment at this time.             Jamie Loredo MD  10/11/20 0202

## 2020-10-11 NOTE — ED TRIAGE NOTES
Pt was brought in by ems from [facility]. Pt was eating pork when he choked. Heimlich maneuver done by staff and was able to partially clear airway. Ems used magills forceps to remove rest of foreign body. Pt airway now patent. No distress. No stridor    Pt wearing mask on arrival. Staff wearing mask and goggles at time of triage.

## 2020-10-11 NOTE — ED NOTES
Patient was placed in face mask in first look. Patient was wearing facemask when I entered the room and throughout our encounter. I wore full protective equipment throughout this patient encounter including a face mask, and gloves. Hand hygiene was performed before donning protective equipment and after removal when leaving the room.       Amauri Merida, RN  10/11/20 [id number]

## 2020-10-12 ENCOUNTER — APPOINTMENT (OUTPATIENT)
Dept: GENERAL RADIOLOGY | Facility: HOSPITAL | Age: 75
End: 2020-10-12

## 2020-10-12 LAB
ANION GAP SERPL CALCULATED.3IONS-SCNC: 8.7 MMOL/L (ref 5–15)
BASOPHILS # BLD AUTO: 0.04 10*3/MM3 (ref 0–0.2)
BASOPHILS NFR BLD AUTO: 0.5 % (ref 0–1.5)
BUN SERPL-MCNC: 17 MG/DL (ref 8–23)
BUN/CREAT SERPL: 19.1 (ref 7–25)
CALCIUM SPEC-SCNC: 8.8 MG/DL (ref 8.6–10.5)
CHLORIDE SERPL-SCNC: 104 MMOL/L (ref 98–107)
CO2 SERPL-SCNC: 27.3 MMOL/L (ref 22–29)
CREAT SERPL-MCNC: 0.89 MG/DL (ref 0.76–1.27)
DEPRECATED RDW RBC AUTO: 43.2 FL (ref 37–54)
EOSINOPHIL # BLD AUTO: 0.16 10*3/MM3 (ref 0–0.4)
EOSINOPHIL NFR BLD AUTO: 2.1 % (ref 0.3–6.2)
ERYTHROCYTE [DISTWIDTH] IN BLOOD BY AUTOMATED COUNT: 12.7 % (ref 12.3–15.4)
GFR SERPL CREATININE-BSD FRML MDRD: 83 ML/MIN/1.73
GLUCOSE SERPL-MCNC: 89 MG/DL (ref 65–99)
HCT VFR BLD AUTO: 32.6 % (ref 37.5–51)
HGB BLD-MCNC: 10.7 G/DL (ref 13–17.7)
IMM GRANULOCYTES # BLD AUTO: 0.02 10*3/MM3 (ref 0–0.05)
IMM GRANULOCYTES NFR BLD AUTO: 0.3 % (ref 0–0.5)
LYMPHOCYTES # BLD AUTO: 1.72 10*3/MM3 (ref 0.7–3.1)
LYMPHOCYTES NFR BLD AUTO: 22.7 % (ref 19.6–45.3)
MCH RBC QN AUTO: 30.5 PG (ref 26.6–33)
MCHC RBC AUTO-ENTMCNC: 32.8 G/DL (ref 31.5–35.7)
MCV RBC AUTO: 92.9 FL (ref 79–97)
MONOCYTES # BLD AUTO: 0.51 10*3/MM3 (ref 0.1–0.9)
MONOCYTES NFR BLD AUTO: 6.7 % (ref 5–12)
NEUTROPHILS NFR BLD AUTO: 5.14 10*3/MM3 (ref 1.7–7)
NEUTROPHILS NFR BLD AUTO: 67.7 % (ref 42.7–76)
NRBC BLD AUTO-RTO: 0 /100 WBC (ref 0–0.2)
PLATELET # BLD AUTO: 271 10*3/MM3 (ref 140–450)
PMV BLD AUTO: 10.4 FL (ref 6–12)
POTASSIUM SERPL-SCNC: 4.2 MMOL/L (ref 3.5–5.2)
RBC # BLD AUTO: 3.51 10*6/MM3 (ref 4.14–5.8)
SODIUM SERPL-SCNC: 140 MMOL/L (ref 136–145)
WBC # BLD AUTO: 7.59 10*3/MM3 (ref 3.4–10.8)

## 2020-10-12 PROCEDURE — 80048 BASIC METABOLIC PNL TOTAL CA: CPT | Performed by: HOSPITALIST

## 2020-10-12 PROCEDURE — 99222 1ST HOSP IP/OBS MODERATE 55: CPT | Performed by: INTERNAL MEDICINE

## 2020-10-12 PROCEDURE — 74220 X-RAY XM ESOPHAGUS 1CNTRST: CPT

## 2020-10-12 PROCEDURE — 85025 COMPLETE CBC W/AUTO DIFF WBC: CPT | Performed by: HOSPITALIST

## 2020-10-12 PROCEDURE — 25010000002 PIPERACILLIN SOD-TAZOBACTAM PER 1 G: Performed by: HOSPITALIST

## 2020-10-12 PROCEDURE — 92610 EVALUATE SWALLOWING FUNCTION: CPT | Performed by: SPEECH-LANGUAGE PATHOLOGIST

## 2020-10-12 RX ORDER — CARBIDOPA AND LEVODOPA 50; 200 MG/1; MG/1
2 TABLET, EXTENDED RELEASE ORAL ONCE
Status: DISCONTINUED | OUTPATIENT
Start: 2020-10-12 | End: 2020-10-12

## 2020-10-12 RX ORDER — PANTOPRAZOLE SODIUM 40 MG/10ML
40 INJECTION, POWDER, LYOPHILIZED, FOR SOLUTION INTRAVENOUS EVERY 12 HOURS SCHEDULED
Status: DISCONTINUED | OUTPATIENT
Start: 2020-10-12 | End: 2020-10-14

## 2020-10-12 RX ORDER — QUETIAPINE FUMARATE 25 MG/1
25 TABLET, FILM COATED ORAL NIGHTLY
Status: DISCONTINUED | OUTPATIENT
Start: 2020-10-12 | End: 2020-10-14 | Stop reason: HOSPADM

## 2020-10-12 RX ORDER — CHOLECALCIFEROL (VITAMIN D3) 125 MCG
1000 CAPSULE ORAL DAILY
Status: DISCONTINUED | OUTPATIENT
Start: 2020-10-12 | End: 2020-10-14 | Stop reason: HOSPADM

## 2020-10-12 RX ORDER — DONEPEZIL HYDROCHLORIDE 5 MG/1
5 TABLET, FILM COATED ORAL NIGHTLY
Status: DISCONTINUED | OUTPATIENT
Start: 2020-10-12 | End: 2020-10-14 | Stop reason: HOSPADM

## 2020-10-12 RX ORDER — LEVOTHYROXINE SODIUM 0.12 MG/1
125 TABLET ORAL DAILY
Status: DISCONTINUED | OUTPATIENT
Start: 2020-10-12 | End: 2020-10-14 | Stop reason: HOSPADM

## 2020-10-12 RX ORDER — IPRATROPIUM BROMIDE AND ALBUTEROL SULFATE 2.5; .5 MG/3ML; MG/3ML
3 SOLUTION RESPIRATORY (INHALATION)
Status: DISCONTINUED | OUTPATIENT
Start: 2020-10-12 | End: 2020-10-12

## 2020-10-12 RX ORDER — TERAZOSIN 1 MG/1
1 CAPSULE ORAL ONCE
Status: DISCONTINUED | OUTPATIENT
Start: 2020-10-12 | End: 2020-10-12

## 2020-10-12 RX ORDER — CARBIDOPA AND LEVODOPA 50; 200 MG/1; MG/1
2 TABLET, EXTENDED RELEASE ORAL 2 TIMES DAILY
Status: DISCONTINUED | OUTPATIENT
Start: 2020-10-12 | End: 2020-10-14 | Stop reason: HOSPADM

## 2020-10-12 RX ORDER — QUETIAPINE FUMARATE 25 MG/1
25 TABLET, FILM COATED ORAL ONCE
Status: DISCONTINUED | OUTPATIENT
Start: 2020-10-12 | End: 2020-10-12

## 2020-10-12 RX ORDER — ESCITALOPRAM OXALATE 10 MG/1
10 TABLET ORAL DAILY
Status: DISCONTINUED | OUTPATIENT
Start: 2020-10-12 | End: 2020-10-14 | Stop reason: HOSPADM

## 2020-10-12 RX ORDER — IPRATROPIUM BROMIDE AND ALBUTEROL SULFATE 2.5; .5 MG/3ML; MG/3ML
3 SOLUTION RESPIRATORY (INHALATION) EVERY 4 HOURS PRN
Status: DISCONTINUED | OUTPATIENT
Start: 2020-10-12 | End: 2020-10-14

## 2020-10-12 RX ORDER — NIFEDIPINE 30 MG/1
30 TABLET, EXTENDED RELEASE ORAL DAILY
Status: DISCONTINUED | OUTPATIENT
Start: 2020-10-12 | End: 2020-10-14 | Stop reason: HOSPADM

## 2020-10-12 RX ORDER — DONEPEZIL HYDROCHLORIDE 10 MG/1
10 TABLET, FILM COATED ORAL ONCE
Status: DISCONTINUED | OUTPATIENT
Start: 2020-10-12 | End: 2020-10-12

## 2020-10-12 RX ORDER — TERAZOSIN 1 MG/1
1 CAPSULE ORAL NIGHTLY
Status: DISCONTINUED | OUTPATIENT
Start: 2020-10-12 | End: 2020-10-14 | Stop reason: HOSPADM

## 2020-10-12 RX ADMIN — CARBIDOPA AND LEVODOPA 2 TABLET: 50; 200 TABLET, EXTENDED RELEASE ORAL at 22:39

## 2020-10-12 RX ADMIN — TERAZOSIN HYDROCHLORIDE 1 MG: 1 CAPSULE ORAL at 22:40

## 2020-10-12 RX ADMIN — CARBIDOPA AND LEVODOPA 2 TABLET: 50; 200 TABLET, EXTENDED RELEASE ORAL at 14:44

## 2020-10-12 RX ADMIN — NIFEDIPINE 30 MG: 30 TABLET, FILM COATED, EXTENDED RELEASE ORAL at 14:44

## 2020-10-12 RX ADMIN — TERAZOSIN HYDROCHLORIDE 1 MG: 1 CAPSULE ORAL at 01:53

## 2020-10-12 RX ADMIN — PANTOPRAZOLE SODIUM 40 MG: 40 INJECTION, POWDER, FOR SOLUTION INTRAVENOUS at 08:51

## 2020-10-12 RX ADMIN — Medication 1000 MCG: at 14:43

## 2020-10-12 RX ADMIN — CARBIDOPA AND LEVODOPA 2 TABLET: 50; 200 TABLET, EXTENDED RELEASE ORAL at 01:53

## 2020-10-12 RX ADMIN — TAZOBACTAM SODIUM AND PIPERACILLIN SODIUM 3.38 G: 375; 3 INJECTION, SOLUTION INTRAVENOUS at 01:54

## 2020-10-12 RX ADMIN — DONEPEZIL HYDROCHLORIDE 10 MG: 10 TABLET, FILM COATED ORAL at 01:53

## 2020-10-12 RX ADMIN — PANTOPRAZOLE SODIUM 40 MG: 40 INJECTION, POWDER, FOR SOLUTION INTRAVENOUS at 22:40

## 2020-10-12 RX ADMIN — QUETIAPINE FUMARATE 25 MG: 25 TABLET ORAL at 01:53

## 2020-10-12 RX ADMIN — BARIUM SULFATE 30 ML: 960 POWDER, FOR SUSPENSION ORAL at 11:44

## 2020-10-12 RX ADMIN — DONEPEZIL HYDROCHLORIDE 5 MG: 5 TABLET, FILM COATED ORAL at 22:39

## 2020-10-12 RX ADMIN — TAZOBACTAM SODIUM AND PIPERACILLIN SODIUM 3.38 G: 375; 3 INJECTION, SOLUTION INTRAVENOUS at 08:51

## 2020-10-12 RX ADMIN — TAZOBACTAM SODIUM AND PIPERACILLIN SODIUM 3.38 G: 375; 3 INJECTION, SOLUTION INTRAVENOUS at 18:04

## 2020-10-12 RX ADMIN — ESCITALOPRAM 10 MG: 10 TABLET, FILM COATED ORAL at 14:44

## 2020-10-12 RX ADMIN — QUETIAPINE FUMARATE 25 MG: 25 TABLET ORAL at 22:39

## 2020-10-12 RX ADMIN — LEVOTHYROXINE SODIUM 125 MCG: 125 TABLET ORAL at 14:44

## 2020-10-12 NOTE — CONSULTS
[facility]  Initial Inpatient Consult Note    Referring Provider: Dr. Kc    Reason for Consultation: trouble swallowing    Subjective     History of present illness:    75 y.o. male with a history of Parkinson's disease, progressive supranuclear palsy, and dementia who was admitted 10/11/2020 after he had trouble swallowing pork at his nursing home and began to choke and became short of breath.  Heimlich maneuver was performed with partial success.  EMS was called because of respiratory distress.  EMS used Norma forceps to remove this foreign body from the back of his throat.  There is a question of whether he could have some aspiration pneumonia and he is now on antibiotics for this.  In looking at his discharge summaries, he was admitted in 2 of 2020 with pneumonia.  He was admitted in 6 of 19 with possible pneumonia.  He was admitted in 3 of 2018 with aspiration pneumonia.  During that hospitalization a PEG was placed.  The patient does not remember this PEG placement and the patient is without a PEG now.  Patient is on Rivaroxaban for A. fib.  The patient is going to be seen by speech therapy for swallowing evaluation this admission.    Past Medical History:  Past Medical History:   Diagnosis Date   • AF (atrial fibrillation) (CMS/HCC)    • Altered mental status    • Anticoagulated    • Aspiration pneumonia (CMS/HCC)    • Atrial flutter (CMS/HCC)    • Atypical chest pain    • Chest pain    • Colon polyps    • Confusion    • Dementia (CMS/HCC)    • Depression    • Disease of thyroid gland    • Disorder of thyroid    • Dysarthria    • Dysphagia    • Dyspnea and respiratory abnormalities    • Elevated TSH    • Fall    • Fatigue    • Gait instability    • Gastroesophageal reflux disease    • Hay fever    • Hyperlipidemia    • Hypertension    • Hypothyroidism    • Idiopathic Parkinson's disease (CMS/HCC)    • Insomnia    • Measles    • Metabolic encephalopathy    • Mumps    •  Non-traumatic rhabdomyolysis    • CORINNE (obstructive sleep apnea)    • Palpitations    • Parkinson disease (CMS/HCC)    • Peripheral neuropathy    • Pneumonia    • Poor sleep pattern    • Progressive supranuclear palsy (CMS/HCC)    • Sepsis (CMS/HCC)    • Slurred speech    • Tremor    • Weakness of voice      Past Surgical History:  Past Surgical History:   Procedure Laterality Date   • ENDOSCOPY W/ PEG TUBE PLACEMENT N/A 3/16/2018    Procedure: ESOPHAGOGASTRODUODENOSCOPY WITH PERCUTANEOUS ENDOSCOPIC GASTROSTOMY TUBE INSERTION;  Surgeon: Lopez Vang Jr., MD;  Location: [facility] ENDOSCOPY;  Service: Gastroenterology      Social History:   Social History     Tobacco Use   • Smoking status: Former Smoker   • Smokeless tobacco: Never Used   • Tobacco comment: Daily caffeine use   Substance Use Topics   • Alcohol use: No     Frequency: Never      Family History:  Family History   Problem Relation Age of Onset   • Hypertension Mother    • Glaucoma Mother    • Throat cancer Father    • Alcohol abuse Father    • Hypertension Sister    • Cancer Sister         malignant neoplasm of urinary bladder   • Lung cancer Brother    • Heart attack Other    • Lung disease Other        Home Meds:  Medications Prior to Admission   Medication Sig Dispense Refill Last Dose   • carbidopa-levodopa CR (SINEMET CR)  MG per CR tablet Take 2 tablets by mouth 2 (Two) Times a Day. 120 tablet 11    • Cyanocobalamin (Vitamin B 12) 500 MCG tablet Take 1,000 mcg by mouth Daily.      • donepezil (ARICEPT) 5 MG tablet Take 5 mg by mouth Every Night.      • escitalopram (LEXAPRO) 10 MG tablet Take 10 mg by mouth Daily.      • levothyroxine (SYNTHROID, LEVOTHROID) 125 MCG tablet Take 1 tablet by mouth Daily. 90 tablet 3    • NIFEdipine XL (PROCARDIA XL) 30 MG 24 hr tablet Take 30 mg by mouth Daily.      • QUEtiapine (SEROquel) 25 MG tablet Take 1 tablet by mouth Every Night. 1/2 tablet as needed during the day for agitation (Patient taking  differently: Take 25 mg by mouth Every Night.) 45 tablet 11    • rivaroxaban (Xarelto) 20 MG tablet Take 1 tablet by mouth Daily With Dinner. 90 tablet 3    • terazosin (HYTRIN) 1 MG capsule Take 1 mg by mouth Every Night.        Current Meds:   pantoprazole, 40 mg, Intravenous, Q AM  piperacillin-tazobactam, 3.375 g, Intravenous, Q8H      Allergies:  Allergies   Allergen Reactions   • Ppd [Tuberculin Purified Protein Derivative] Unknown (See Comments)     unknown     Review of Systems  Review of systems could not be obtained due to   patient confusion.     Objective     Vital Signs  Temp:  [96.2 °F (35.7 °C)-98.1 °F (36.7 °C)] 97.8 °F (36.6 °C)  Heart Rate:  [46-88] 52  Resp:  [17-18] 18  BP: (100-142)/(60-72) 118/63  Physical Exam:  General Appearance:    Alert, cooperative, in no acute distress   Head:    Normocephalic, without obvious abnormality, atraumatic   Eyes:            Lids and lashes normal, conjunctivae and sclerae normal, no   icterus   Throat:   No oral lesions, no thrush, oral mucosa moist   Neck:   No adenopathy, supple, trachea midline, no thyromegaly, no   carotid bruit, no JVD   Lungs:     Clear to auscultation,respirations regular, even and                   unlabored    Heart:    Regular rhythm and normal rate, normal S1 and S2, no            murmur, no gallop, no rub, no click   Chest Wall:    No abnormalities observed   Abdomen:     Normal bowel sounds, no masses, no organomegaly, soft        nontender, nondistended, no guarding, no rebound                 tenderness   Rectal:     Deferred   Extremities:   no edema, no cyanosis, no redness   Skin:   No bleeding, bruising or rash   Lymph nodes:   No palpable adenopathy   Psychiatric:  Awake but hard to understand.    Results Review:   I reviewed the patient's new clinical results.    Results from last 7 days   Lab Units 10/12/20  0609 10/11/20  1530   WBC 10*3/mm3 7.59 12.97*   HEMOGLOBIN g/dL 10.7* 11.7*   HEMATOCRIT % 32.6* 36.4*      PLATELETS 10*3/mm3 271 271     Results from last 7 days   Lab Units 10/12/20  0609 10/11/20  1530   SODIUM mmol/L 140 140   POTASSIUM mmol/L 4.2 4.4   CHLORIDE mmol/L 104 103   CO2 mmol/L 27.3 28.2   BUN mg/dL 17 16   CREATININE mg/dL 0.89 0.86   CALCIUM mg/dL 8.8 9.1   BILIRUBIN mg/dL  --  0.3   ALK PHOS U/L  --  74   ALT (SGPT) U/L  --  <5   AST (SGOT) U/L  --  14   GLUCOSE mg/dL 89 113*     Results from last 7 days   Lab Units 10/11/20  1530   INR  1.63*     Lab Results   Lab Value Date/Time    LIPASE 37 07/22/2019 1921       Radiology:  XR Chest 2 View   Final Result      FL Esophagram Complete Single Contrast    (Results Pending)       Assessment/Plan   Assessment:   1.  The patient is on rivaroxaban for atrial fibrillation.  2.  Patient has a history of Parkinson's disease, progressive supranuclear palsy, and severe dementia.  3.  The patient did choke on pork at the nursing home.  4.  The patient may have aspiration pneumonia?  He does have a history of recurrent pneumonias in the past.    Plan:   1.  I am going to order a barium swallow.  2.  I agree with having speech pathology evaluate the patient swallowing.    I discussed the patient's findings and my recommendations with patient and nursing staff.         Gopi Olea M.D.  [facility]  [address] [place] [address]  Office: [phone]

## 2020-10-12 NOTE — PLAN OF CARE
Goal Outcome Evaluation:  Plan of Care Reviewed With: patient  Progress: no change  Outcome Summary: Admit from ER post choking episode at [facility]. Heimlich performed and EMS later removed a peice of meat with forceps. Pt remained with cough and tatiana to ER. On IV ABX for asp pneumonia. Pt speech very slow and soft and Difficult to understand due to Parkinsons. Sister states that pt normally ambulates with walker and assist of one. Pt very stiff, does attempt to follow simple commands. Admit orders noted and GI to consult in am. No cough or soa noted. Meds crushed in applesauce and kaiser well.Sinus Mo as low as high 40's. Safety maintained.

## 2020-10-12 NOTE — H&P
History and physical    Primary care physician  Dr. Goncalves    Chief complaint  Choking     History of present illness  75-year-old white male with history of seizure Parkinson disease and severe dementia other medical problem hypothyroidism gastroesophageal disease and obstructive sleep apnea who is a nursing home resident brought to the emergency room with choking episode while eating pork steak required Heimlich maneuver with partial success by EMS.  Patient further treated in ER with good air movement admit for management.  Patient is demented unable to give history most of history obtained from the wife nursing staff old record.  Patient evaluated for management and still having difficulty swallowing but no other complaint.  Patient denies any fever cough shortness of breath chest pain abdominal pain nausea vomiting diarrhea.     PAST MEDICAL HISTORY  • Colon polyps     • Confusion     • Dementia (CMS/HCC)     • Disease of thyroid gland     • Disorder of thyroid     • Dyspnea and respiratory abnormalities     • Elevated TSH     • Gastroesophageal reflux disease     • Hay fever     • Hypothyroidism     • Measles     • Mumps     • CORINNE (obstructive sleep apnea)     • Parkinson disease (CMS/HCC)     • Pneumonia     • Poor sleep pattern     • Slurred speech        PAST SURGICAL HISTORY              Procedure Laterality Date   • ENDOSCOPY W/ PEG TUBE PLACEMENT N/A 3/16/2018     Procedure: ESOPHAGOGASTRODUODENOSCOPY WITH PERCUTANEOUS ENDOSCOPIC GASTROSTOMY TUBE INSERTION;  Surgeon: Lopez Vang Jr., MD;  Location: [facility] ENDOSCOPY;  Service: Gastroenterology        FAMILY HISTORY           Problem Relation Age of Onset   • Hypertension Mother     • Glaucoma Mother     • Throat cancer Father     • Alcohol abuse Father     • Hypertension Sister     • Cancer Sister           malignant neoplasm of urinary bladder   • Lung cancer Brother     • Heart attack Other     • Lung disease Other        SOCIAL HISTORY                  "  Socioeconomic History   • Marital status:        Spouse name: Not on file   • Number of children: Not on file   • Years of education: Not on file   • Highest education level: Not on file   Tobacco Use   • Smoking status: Former Smoker   • Smokeless tobacco: Never Used   • Tobacco comment: Daily caffeine use   Substance and Sexual Activity   • Alcohol use: No       Frequency: Never   • Drug use: No   • Sexual activity: Never        ALLERGIES  Ppd [tuberculin purified protein derivative]  Nursing home medications reviewed     REVIEW OF SYSTEMS  All systems reviewed and negative except for those discussed in HPI.      PHYSICAL EXAM  Blood pressure 118/63, pulse 52, temperature 97.8 °F (36.6 °C), temperature source Oral, resp. rate 18, height 177.8 cm (70\"), weight 77.7 kg (171 lb 6.4 oz), SpO2 93 %.    GENERAL: Elderly male with O2 sat of 100% on 2-1/2 L.  He has an occasional cough.  He has very mild crackles in the base of his lungs bilaterally no acute distress.Vital signs on my initial evaluation unremarkable  HENT: nares patent  Head/neck/ face are symmetric without gross deformity, signs of trauma, or swelling.  Oral pharyngeal exam.  I do not see any foreign body.  He has no airway swelling or discharge.  EYES: no scleral icterus, no conjunctival pallor.  NECK: Supple, no meningismus  CV: regular rhythm, regular rate with intact distal pulses.  No murmur rub  RESPIRATORY: normal effort and no respiratory distress.  Occasional cough with some crackles in the base of his lungs bilaterally that are mild.  ABDOMEN: soft and non-tender.  MUSCULOSKELETAL: no deformity.  Intact distal pulses no edema  NEURO: alert and appropriate, moves all extremities, follows commands.  Patient is alert and oriented.  He has diffuse weakness with some muscle rigidity.  He has En that is pretty consistent with Parkinson's disease as he has a tremor.  SKIN: warm, dry     LAB RESULTS  Lab Results (last 24 hours)     " Procedure Component Value Units Date/Time    Basic Metabolic Panel [[id number]]  (Normal) Collected: 10/12/20 0609    Specimen: Blood Updated: 10/12/20 0720     Glucose 89 mg/dL      BUN 17 mg/dL      Creatinine 0.89 mg/dL      Sodium 140 mmol/L      Potassium 4.2 mmol/L      Chloride 104 mmol/L      CO2 27.3 mmol/L      Calcium 8.8 mg/dL      eGFR Non African Amer 83 mL/min/1.73      BUN/Creatinine Ratio 19.1     Anion Gap 8.7 mmol/L     Narrative:      GFR Normal >60  Chronic Kidney Disease <60  Kidney Failure <15      CBC & Differential [[id number]]  (Abnormal) Collected: 10/12/20 0609    Specimen: Blood Updated: 10/12/20 0653    Narrative:      The following orders were created for panel order CBC & Differential.  Procedure                               Abnormality         Status                     ---------                               -----------         ------                     CBC Auto Differential[[id number]]        Abnormal            Final result                 Please view results for these tests on the individual orders.    CBC Auto Differential [[id number]]  (Abnormal) Collected: 10/12/20 0609    Specimen: Blood Updated: 10/12/20 0653     WBC 7.59 10*3/mm3      RBC 3.51 10*6/mm3      Hemoglobin 10.7 g/dL      Hematocrit 32.6 %      MCV 92.9 fL      MCH 30.5 pg      MCHC 32.8 g/dL      RDW 12.7 %      RDW-SD 43.2 fl      MPV 10.4 fL      Platelets 271 10*3/mm3      Neutrophil % 67.7 %      Lymphocyte % 22.7 %      Monocyte % 6.7 %      Eosinophil % 2.1 %      Basophil % 0.5 %      Immature Grans % 0.3 %      Neutrophils, Absolute 5.14 10*3/mm3      Lymphocytes, Absolute 1.72 10*3/mm3      Monocytes, Absolute 0.51 10*3/mm3      Eosinophils, Absolute 0.16 10*3/mm3      Basophils, Absolute 0.04 10*3/mm3      Immature Grans, Absolute 0.02 10*3/mm3      nRBC 0.0 /100 WBC     Lactic Acid, Plasma [[id number]]  (Normal) Collected: 10/11/20 1724    Specimen: Blood Updated: 10/11/20 1751     Lactate 2.0 mmol/L      "Procalcitonin [[id number]]  (Normal) Collected: 10/11/20 1530    Specimen: Blood Updated: 10/11/20 1732     Procalcitonin 0.05 ng/mL     Narrative:      As a Marker for Sepsis (Non-Neonates):   1. <0.5 ng/mL represents a low risk of severe sepsis and/or septic shock.  1. >2 ng/mL represents a high risk of severe sepsis and/or septic shock.    As a Marker for Lower Respiratory Tract Infections that require antibiotic therapy:  PCT on Admission     Antibiotic Therapy             6-12 Hrs later  > 0.5                Strongly Recommended            >0.25 - <0.5         Recommended  0.1 - 0.25           Discouraged                   Remeasure/reassess PCT  <0.1                 Strongly Discouraged          Remeasure/reassess PCT      As 28 day mortality risk marker: \"Change in Procalcitonin Result\" (> 80 % or <=80 %) if Day 0 (or Day 1) and Day 4 values are available. Refer to [email or web address]-pct-[email or web address]/   Change in PCT <=80 %   A decrease of PCT levels below or equal to 80 % defines a positive change in PCT test result representing a higher risk for 28-day all-cause mortality of patients diagnosed with severe sepsis or septic shock.  Change in PCT > 80 %   A decrease of PCT levels of more than 80 % defines a negative change in PCT result representing a lower risk for 28-day all-cause mortality of patients diagnosed with severe sepsis or septic shock.                Results may be falsely decreased if patient taking Biotin.     Blood Culture - Blood, Arm, Right [[id number]] Collected: 10/11/20 1724    Specimen: Blood from Arm, Right Updated: 10/11/20 1730    Blood Culture - Blood, Arm, Left [[id number]] Collected: 10/11/20 1725    Specimen: Blood from Arm, Left Updated: 10/11/20 1730    COVID PRE-OP / PRE-PROCEDURE SCREENING ORDER (NO ISOLATION) - Swab, Nasopharynx [[id number]]  (Normal) Collected: 10/11/20 1532    Specimen: Swab from Nasopharynx Updated: 10/11/20 1652    Narrative:      The following orders were " created for panel order COVID PRE-OP / PRE-PROCEDURE SCREENING ORDER (NO ISOLATION) - Swab, Nasopharynx.  Procedure                               Abnormality         Status                     ---------                               -----------         ------                     Respiratory Panel PCR w/...[[id number]]  Normal              Final result                 Please view results for these tests on the individual orders.    Respiratory Panel PCR w/COVID-19(SARS-CoV-2) TRENT/KAMRYN/ALLAN/PAD/[facility] In-House, NP Swab in UTM/VTM, 3-4 HR TAT - Swab, Nasopharynx [[id number]]  (Normal) Collected: 10/11/20 1532    Specimen: Swab from Nasopharynx Updated: 10/11/20 1652     ADENOVIRUS, PCR Not Detected     Coronavirus 229E Not Detected     Coronavirus HKU1 Not Detected     Coronavirus NL63 Not Detected     Coronavirus OC43 Not Detected     COVID19 Not Detected     Human Metapneumovirus Not Detected     Human Rhinovirus/Enterovirus Not Detected     Influenza A PCR Not Detected     Influenza A H1 Not Detected     Influenza A H1 2009 PCR Not Detected     Influenza A H3 Not Detected     Influenza B PCR Not Detected     Parainfluenza Virus 1 Not Detected     Parainfluenza Virus 2 Not Detected     Parainfluenza Virus 3 Not Detected     Parainfluenza Virus 4 Not Detected     RSV, PCR Not Detected     Bordetella pertussis pcr Not Detected     Bordetella parapertussis PCR Not Detected     Chlamydophila pneumoniae PCR Not Detected     Mycoplasma pneumo by PCR Not Detected    Narrative:      Fact sheet for providers: https://docs.[employer]/wp-content/uploads/EEW9804-2105-LD7.1-EUA-Provider-Fact-Sheet-3.pdf    Fact sheet for patients: https://docs.[employer]/wp-content/uploads/OCI5995-7833-PL0.1-EUA-Patient-Fact-Sheet-1.pdf    Comprehensive Metabolic Panel [[id number]]  (Abnormal) Collected: 10/11/20 1530    Specimen: Blood Updated: 10/11/20 1606     Glucose 113 mg/dL      BUN 16 mg/dL      Creatinine 0.86 mg/dL      Sodium 140  mmol/L      Potassium 4.4 mmol/L      Chloride 103 mmol/L      CO2 28.2 mmol/L      Calcium 9.1 mg/dL      Total Protein 7.0 g/dL      Albumin 3.80 g/dL      ALT (SGPT) <5 U/L      AST (SGOT) 14 U/L      Alkaline Phosphatase 74 U/L      Total Bilirubin 0.3 mg/dL      eGFR Non African Amer 86 mL/min/1.73      Globulin 3.2 gm/dL      A/G Ratio 1.2 g/dL      BUN/Creatinine Ratio 18.6     Anion Gap 8.8 mmol/L     Narrative:      GFR Normal >60  Chronic Kidney Disease <60  Kidney Failure <15      Troponin [[id number]]  (Normal) Collected: 10/11/20 1530    Specimen: Blood Updated: 10/11/20 1606     Troponin T <0.010 ng/mL     Narrative:      Troponin T Reference Range:  <= 0.03 ng/mL-   Negative for AMI  >0.03 ng/mL-     Abnormal for myocardial necrosis.  Clinicians would have to utilize clinical acumen, EKG, Troponin and serial changes to determine if it is an Acute Myocardial Infarction or myocardial injury due to an underlying chronic condition.       Results may be falsely decreased if patient taking Biotin.      BNP [[id number]]  (Normal) Collected: 10/11/20 1530    Specimen: Blood Updated: 10/11/20 1605     proBNP 152.5 pg/mL     Narrative:      Among patients with dyspnea, NT-proBNP is highly sensitive for the detection of acute congestive heart failure. In addition NT-proBNP of <300 pg/ml effectively rules out acute congestive heart failure with 99% negative predictive value.    Results may be falsely decreased if patient taking Biotin.      Protime-INR [[id number]]  (Abnormal) Collected: 10/11/20 1530    Specimen: Blood Updated: 10/11/20 1602     Protime 18.9 Seconds      INR 1.63    CBC & Differential [[id number]]  (Abnormal) Collected: 10/11/20 1530    Specimen: Blood Updated: 10/11/20 1547    Narrative:      The following orders were created for panel order CBC & Differential.  Procedure                               Abnormality         Status                     ---------                               -----------          ------                     CBC Auto Differential[[id number]]        Abnormal            Final result                 Please view results for these tests on the individual orders.    CBC Auto Differential [[id number]]  (Abnormal) Collected: 10/11/20 1530    Specimen: Blood Updated: 10/11/20 1547     WBC 12.97 10*3/mm3      RBC 3.85 10*6/mm3      Hemoglobin 11.7 g/dL      Hematocrit 36.4 %      MCV 94.5 fL      MCH 30.4 pg      MCHC 32.1 g/dL      RDW 12.7 %      RDW-SD 43.6 fl      MPV 10.3 fL      Platelets 271 10*3/mm3      Neutrophil % 87.7 %      Lymphocyte % 5.9 %      Monocyte % 5.3 %      Eosinophil % 0.4 %      Basophil % 0.3 %      Immature Grans % 0.4 %      Neutrophils, Absolute 11.37 10*3/mm3      Lymphocytes, Absolute 0.77 10*3/mm3      Monocytes, Absolute 0.69 10*3/mm3      Eosinophils, Absolute 0.05 10*3/mm3      Basophils, Absolute 0.04 10*3/mm3      Immature Grans, Absolute 0.05 10*3/mm3      nRBC 0.0 /100 WBC         Imaging Results (Last 24 Hours)     Procedure Component Value Units Date/Time    FL Esophagram Complete Single Contrast [[id number]] Resulted: 10/12/20 1125     Updated: 10/12/20 1150    XR Chest 2 View [[id number]] Collected: 10/11/20 1601     Updated: 10/11/20 1954    Narrative:      CHEST TWO VIEWS     HISTORY: Choking on piece of meat.     FINDINGS: Two views of the chest demonstrate the heart to be within  normal limits in size. There is mild bibasilar atelectasis/infiltrate,  more prominent on the left. There is no evidence of consolidation,  effusion or congestive failure. The pulmonary interstitium is prominent  suggesting chronic interstitial changes. There is no evidence of a  radiopaque foreign body.     The above information was called to and discussed with Dr. Loredo.     This report was finalized on 10/11/2020 7:51 PM by Dr. Leon Garcia M.D.             Current Facility-Administered Medications:   •  carbidopa-levodopa CR (SINEMET CR)  MG per CR tablet 2  tablet, 2 tablet, Oral, BID, Kanika Kc MD  •  donepezil (ARICEPT) tablet 5 mg, 5 mg, Oral, Nightly, Kanika Kc MD  •  escitalopram (LEXAPRO) tablet 10 mg, 10 mg, Oral, Daily, Kanika Kc MD  •  levothyroxine (SYNTHROID, LEVOTHROID) tablet 125 mcg, 125 mcg, Oral, Daily, Kanika Kc MD  •  NIFEdipine XL (PROCARDIA XL) 24 hr tablet 30 mg, 30 mg, Oral, Daily, Kanika Kc MD  •  ondansetron (ZOFRAN) injection 4 mg, 4 mg, Intravenous, Q6H PRN, Kanika Kc MD  •  pantoprazole (PROTONIX) injection 40 mg, 40 mg, Intravenous, Q12H, Kanika Kc MD  •  piperacillin-tazobactam (ZOSYN) 3.375 g in iso-osmotic dextrose 50 ml (premix), 3.375 g, Intravenous, Q8H, Kanika Kc MD, 3.375 g at 10/12/20 0851  •  QUEtiapine (SEROquel) tablet 25 mg, 25 mg, Oral, Nightly, Kanika Kc MD  •  terazosin (HYTRIN) capsule 1 mg, 1 mg, Oral, Nightly, Kanika Kc MD  •  vitamin B-12 (CYANOCOBALAMIN) tablet 1,000 mcg, 1,000 mcg, Oral, Daily, Kanika Kc MD     ASSESSMENT  Choking episode at nursing home with dysphagia  Possible aspiration with pneumonia  Chronic atrial fibrillation on Xarelto  Severe Parkinson disease  Progressive supranuclear palsy  Severe dementia  Hypothyroidism  Gastroesophageal reflux disease    PLAN  Admit  IV Protonix  Hold Xarelto  Empiric antibiotics  GI consult  Swallow eval  Adjust nursing home medications  Stress ulcer DVT prophylaxis  Supportive care  DNR  Discussed with family and nursing staff  Follow closely further recommendation according to hospital course    KANIAK KC MD

## 2020-10-12 NOTE — PROGRESS NOTES
Discharge Planning Assessment  [facility]     Patient Name: Rommel Gonzalez  MRN: [id number]  Today's Date: 10/12/2020    Admit Date: 10/11/2020    Discharge Needs Assessment     Row Name 10/12/20 4739       Living Environment    Lives With  facility resident    Name(s) of Who Lives With Patient  [facility] care unit    Current Living Arrangements  extended care facility    Primary Care Provided by  other (see comments)    Provides Primary Care For  no one, unable/limited ability to care for self    Family Caregiver if Needed  sibling(s)    Family Caregiver Names  sister Raquel Freitas is [facility] [phone]    Quality of Family Relationships  helpful;involved;supportive    Able to Return to Prior Arrangements  yes       Resource/Environmental Concerns    Transportation Concerns  car, none       Transition Planning    Patient/Family Anticipates Transition to  long-term care facility    Transportation Anticipated  family or friend will provide       Discharge Needs Assessment    Readmission Within the Last 30 Days  no previous admission in last 30 days    Current Outpatient/Agency/Support Group  long-term acute care facility    Equipment Currently Used at Home  walker, standard    Concerns to be Addressed  discharge planning    Anticipated Changes Related to Illness  inability to care for self;inability to care for someone else    Equipment Needed After Discharge  none    Discharge Facility/Level of Care Needs  other (see comments)    Provided Post Acute Provider List?  N/A    Provided Post Acute Provider Quality & Resource List?  N/A    Current Discharge Risk  cognitively impaired        Discharge Plan     Row Name 10/12/20 0604       Plan    Plan  Return to [facility]    Patient/Family in Agreement with Plan  yes    Plan Comments  Facesheet verified, IMM given and CCP role explained.  Patient is from [facility] and he does get PT there as well.  He uses a walker to  get around.  His sister, Raquel is POA and she wants him to return to [facility].  She said he will need an ambulance to transport.  Talked to Chuckie from [facility] and patient ok to return as long as he is 1 assist.  He needs a negative COVID test within 7 days before returning.  CCP will continue to follow.        Continued Care and Services - Admitted Since 10/11/2020     Destination     Service Provider Request Status Selected Services Address Phone Fax    [facility]  Pending - Request Sent N/A [id number] [facility] [id number] [phone]                Demographic Summary     Row Name 10/12/20 1456       General Information    Admission Type  inpatient    Arrived From  subacute/long-term acute care    Reason for Consult  discharge planning    Preferred Language  English     Used During This Interaction  no       Contact Information    Permission Granted to Share Info With  family/designee    Contact Information Comments  Raquel MUÑIZ/sister [phone]        Functional Status     Row Name 10/12/20 1458       Functional Status    Usual Activity Tolerance  fair    Current Activity Tolerance  fair       Functional Status, IADL    Medications  assistive equipment and person    Meal Preparation  assistive equipment and person    Housekeeping  assistive equipment and person    Laundry  assistive equipment and person    Shopping  assistive equipment and person       Mental Status    General Appearance WDL  ex        Psychosocial     Row Name 10/12/20 1452       Values/Beliefs    Spiritual, Cultural Beliefs, Restorationist Practices, Values that Affect Care  no       Behavior WDL    Behavior WDL  WDL       Emotion Mood WDL    Emotion/Mood/Affect WDL  WDL       Speech WDL    Speech WDL  WDL       Perceptual State WDL    Perceptual State WDL  ex       Thought Process WDL    Thought Process WDL  ex;judgment and insight    Judgment and Insight  judgment not  appropriate to situation       Intellectual Performance WDL    Intellectual Performance WDL  WDL       Coping/Stress    Patient Personal Strengths  able to adapt;strong support system    Sources of Support  sibling(s)    Reaction to Health Status  adjusting    Understanding of Condition and Treatment  poor grasp of illness/implications       Developmental Stage (Eriksson's)    Developmental Stage  Stage 8 (65 years-death/Late Adulthood) Integrity vs. Despair        Abuse/Neglect     Row Name 10/12/20 3719       Personal Safety    Feels Unsafe at Home or Work/School  no    Feels Threatened by Someone  no    Does Anyone Try to Keep You From Having Contact with Others or Doing Things Outside Your Home?  no    Physical Signs of Abuse Present  no        Legal    No documentation.       Substance Abuse    No documentation.       Patient Forms    No documentation.           Shannon Epley, RN

## 2020-10-12 NOTE — PLAN OF CARE
"  Problem: Adult Inpatient Plan of Care  Goal: Plan of Care Review  Outcome: Ongoing, Progressing  Flowsheets (Taken 10/12/2020 1709)  Plan of Care Reviewed With:   patient   sibling  Outcome Summary: Clinical swallow eval completed.  Baseline wet, hoarse voice.  Vocal quality wetness continued with thin, nectar, puree, and solids.  No discernible difference between consistencies.  Pt able to masticate solid slowly, but completely with good oral clearance.  Last FEES 2/2020 showed trace posterior aspiration of thin with mech soft, no mixed w/ nectar thick liquids recommended.  Sister rpts \"they weren't aware he needed to be on chopped/ground meats\".  Sister reports he refuses thick liquids and last stated if he \"had to drink thick water, he just wouldn't drink\".  Again today, pt refuses thick coffee and thick water.  REC mech soft diet, ground meats, no mixed consistencies, thin liquids by small single sips.  Meds crushed in puree, may have pills whole w/ puree if unable to be crushedDo not feel repeat FEES needed d/t pt refusal of liquid modifications.       "

## 2020-10-12 NOTE — THERAPY EVALUATION
Acute Care - Speech Language Pathology   Swallow Initial Evaluation [facility]     Patient Name: Rommel Gonzalez  : 1945  MRN: [id number]  Today's Date: 10/12/2020               Admit Date: 10/11/2020    Visit Dx:     ICD-10-CM ICD-9-CM   1. Choking, initial encounter  T17.308A 933.1   2. Aspiration pneumonia of both lower lobes, unspecified aspiration pneumonia type (CMS/HCC)  J69.0 507.0   3. Dysphasia  R47.02 784.59   4. Parkinson's disease (CMS/HCC)  G20 332.0     Patient Active Problem List   Diagnosis   • AF (atrial fibrillation) (CMS/HCC)   • Depression   • Gait instability   • Hypertension   • Insomnia   • Idiopathic Parkinson's disease (CMS/HCC)   • Peripheral neuropathy   • Dysarthria   • Atrial flutter (CMS/HCC)   • Anticoagulated   • Health care maintenance   • Hypothyroidism (acquired)   • Weakness of voice   • Abnormal chest CT   • Facial droop   • Hypersomnia    • Hyperlipidemia   • High risk medication use   • CORINNE on CPAP   • Obstructive sleep apnea, adult   • Fall   • Progressive supranuclear palsy (CMS/HCC)   • Dysphagia   • 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HCC)   • Constipation   • Hyperreflexia   • Torsion dystonia   • Transient cerebral ischemia   • Pneumonia of both lungs due to infectious organism   • Closed fracture of multiple ribs   • Closed compression fracture of L1 lumbar vertebra   • Closed fracture of transverse process of lumbar vertebra (CMS/HCC)   • Altered mental status   • Frequent falls   • Sepsis without acute organ dysfunction (CMS/HCC)   • Choking     Past Medical History:   Diagnosis Date   • AF (atrial fibrillation) (CMS/HCC)    • Altered mental status    • Anticoagulated    • Aspiration pneumonia (CMS/HCC)    • Atrial flutter (CMS/HCC)    • Atypical chest pain    • Chest pain    • Colon polyps    • Confusion    • Dementia (CMS/HCC)    • Depression    • Disease of thyroid gland    • Disorder of thyroid    • Dysarthria    • Dysphagia    • Dyspnea and respiratory  abnormalities    • Elevated TSH    • Fall    • Fatigue    • Gait instability    • Gastroesophageal reflux disease    • Hay fever    • Hyperlipidemia    • Hypertension    • Hypothyroidism    • Idiopathic Parkinson's disease (CMS/HCC)    • Insomnia    • Measles    • Metabolic encephalopathy    • Mumps    • Non-traumatic rhabdomyolysis    • CORINNE (obstructive sleep apnea)    • Palpitations    • Parkinson disease (CMS/HCC)    • Peripheral neuropathy    • Pneumonia    • Poor sleep pattern    • Progressive supranuclear palsy (CMS/HCC)    • Sepsis (CMS/HCC)    • Slurred speech    • Tremor    • Weakness of voice      Past Surgical History:   Procedure Laterality Date   • ENDOSCOPY W/ PEG TUBE PLACEMENT N/A 3/16/2018    Procedure: ESOPHAGOGASTRODUODENOSCOPY WITH PERCUTANEOUS ENDOSCOPIC GASTROSTOMY TUBE INSERTION;  Surgeon: Lopez Vang Jr., MD;  Location: [facility] ENDOSCOPY;  Service: Gastroenterology        SWALLOW EVALUATION (last 72 hours)      SLP Adult Swallow Evaluation     Row Name 10/12/20 8220                   Rehab Evaluation    Document Type  evaluation  -SA        Subjective Information  no complaints pt choked on pork at NH  -SA        Patient Observations  alert;cooperative slow responses  -SA        Patient/Family/Caregiver Comments/Observations  sister present  -SA        Patient Effort  good  -SA        Symptoms Noted During/After Treatment  none  -SA           General Information    Patient Profile Reviewed  yes  -SA        Pertinent History Of Current Problem  Parkinsons disease, aspiration pna  -SA          User Key  (r) = Recorded By, (t) = Taken By, (c) = Cosigned By    Initials Name Effective Dates    SA Lisa Arriaga MS [facility]-SLP 03/07/18 -           EDUCATION  The patient has been educated in the following areas:   Dysphagia (Swallowing Impairment) Modified Diet Instruction.    SLP Recommendation and Plan                                                             Plan of Care Reviewed With: patient,  "sibling  Outcome Summary: Clinical swallow eval completed.  Baseline wet, hoarse voice.  Vocal quality wetness continued with thin, nectar, puree, and solids.  No discernible difference between consistencies.  Pt able to masticate solid slowly, but completely with good oral clearance.  Last FEES 2/2020 showed trace posterior aspiration of thin with mech soft, no mixed w/ nectar thick liquids recommended.  Sister reports he refuses thick liquids and last stated if he \"had to drink thick water, he just wouldn't drink\".  Again today, pt refuses thick coffee and thick water.  REC mech soft diet, no mixed consistencies, thin liquids by small single sips.  Do not feel repeat FEES needed d/t pt refusal of liquid modifications.         SLP Outcome Measures (last 72 hours)      SLP Outcome Measures     Row Name 10/12/20 1700             SLP Outcome Measures    Outcome Measure Used?  Adult NOMS  -         Adult FCM Scores    FCM Chosen  Swallowing  -      Swallowing FCM Score  4  -        User Key  (r) = Recorded By, (t) = Taken By, (c) = Cosigned By    Initials Name Effective Dates    Lisa Whitfield MS CCC-DARREL 03/07/18 -            Time Calculation:   Time Calculation- SLP     Row Name 10/12/20 1720             Time Calculation- SLP    SLP Start Time  1600  -      SLP Received On  10/12/20  -        User Key  (r) = Recorded By, (t) = Taken By, (c) = Cosigned By    Initials Name Provider Type    Lisa Whitfield MS CCC-SLP Speech and Language Pathologist          Therapy Charges for Today     Code Description Service Date Service Provider Modifiers Qty    [id number]  ST EVAL ORAL PHARYNG SWALLOW 4 10/12/2020 Lisa Arriaga MS CCC-DARREL GN 1               MS SANDI Chaidez  10/12/2020  "

## 2020-10-12 NOTE — DISCHARGE PLACEMENT REQUEST
"Rommel Gonzalez (75 y.o. Male)     Date of Birth Social Security Number Address Home Phone MRN    1945  [id number] [id number] [phone] [id number]    [employer] Marital Status          Religious        Admission Date Admission Type Admitting Provider Attending Provider Department, Room/Bed    10/11/20 Emergency Darrell Kc MD Ahmed, Aftab, MD [address], [address]    Discharge Date Discharge Disposition Discharge Destination                       Attending Provider: Darrell Kc MD    Allergies: Ppd [Tuberculin Purified Protein Derivative]    Isolation: None   Infection: None   Code Status: Prior    Ht: 177.8 cm (70\")   Wt: 77.7 kg (171 lb 6.4 oz)    Admission Cmt: None   Principal Problem: None                Active Insurance as of 10/11/2020     Primary Coverage     Payor Plan Insurance Group Employer/Plan Group    HUMANA MEDICARE REPLACEMENT HUMANA MEDICARE REPLACEMENT [id number]     Payor Plan Address Payor Plan Phone Number Payor Plan Fax Number Effective Dates    [address] [address] [phone]  1/1/2018 - None Entered    [facility] [address]       Subscriber Name Subscriber Birth Date Member ID       ROMMEL GONZALEZ 1945 [id number]                 Emergency Contacts      (Rel.) Home Phone Work Phone Mobile Phone    Raquel Reed (POA) (Sister) [phone] -- --    Miya Mcgrath (Sister) [phone] -- --          "

## 2020-10-12 NOTE — PROGRESS NOTES
Pharmacy Consult - Zosyn dosing    Rommel Gonzalez is a 75 y.o. on whom pharmacy has been consulted dose Zosyn for URTI.  Pharmacy dosing per Dr Kc's request.         Relevant clinical data and objective history reviewed:  75 y.o. male     77.7 kg (171 lb 6.4 oz)   Ideal body weight: 77.6 kg (171 lb 1.2 oz)  Adjusted ideal body weight: 77.7 kg (171 lb 3.3 oz)    Past medical history: has a past medical history of AF (atrial fibrillation) (CMS/HCC), Altered mental status, Anticoagulated, Aspiration pneumonia (CMS/HCC), Atrial flutter (CMS/HCC), Atypical chest pain, Chest pain, Colon polyps, Confusion, Dementia (CMS/HCC), Depression, Disease of thyroid gland, Disorder of thyroid, Dysarthria, Dysphagia, Dyspnea and respiratory abnormalities, Elevated TSH, Fall, Fatigue, Gait instability, Gastroesophageal reflux disease, Hay fever, Hyperlipidemia, Hypertension, Hypothyroidism, Idiopathic Parkinson's disease (CMS/HCC), Insomnia, Measles, Metabolic encephalopathy, Mumps, Non-traumatic rhabdomyolysis, CORINNE (obstructive sleep apnea), Palpitations, Parkinson disease (CMS/HCC), Peripheral neuropathy, Pneumonia, Poor sleep pattern, Progressive supranuclear palsy (CMS/HCC), Sepsis (CMS/HCC), Slurred speech, Tremor, and Weakness of voice.  [unfilled]    Other Antibiotics/Anti-infectives on profile: none        Lab Results   Component Value Date    GLUCOSE 113 (H) 10/11/2020    CALCIUM 9.1 10/11/2020     10/11/2020    K 4.4 10/11/2020    CO2 28.2 10/11/2020     10/11/2020    BUN 16 10/11/2020    CREATININE 0.86 10/11/2020    EGFRIFAFRI 96 02/05/2019    EGFRIFNONA 86 10/11/2020    BCR 18.6 10/11/2020    ANIONGAP 8.8 10/11/2020     Lab Results   Component Value Date    WBC 12.97 (H) 10/11/2020    HGB 11.7 (L) 10/11/2020    HCT 36.4 (L) 10/11/2020    MCV 94.5 10/11/2020     10/11/2020       Estimated Creatinine Clearance: 80.3 mL/min (by C-G formula based on SCr of 0.86 mg/dL).  No intake/output data  recorded.  Blood pressure 141/70, pulse (!) 46, temperature 98 °F (36.7 °C), temperature source Oral, resp. rate 18, weight 77.7 kg (171 lb 6.4 oz), SpO2 95 %.        Assessment/Plan    Will start patient on 3.375gm Q8H.  Pharmacy will discontinue the Pharmacy to Dose place card polo at this time.  Renal function will continue to be monitored and dosing adjustments will be made by pharmacy based on renal function if necessary.         Edis Garcia RPH

## 2020-10-12 NOTE — CONSULTS
I stopped by patient's room. He could barely talk and his voice was extremely hoarse. I will stop by to see him tomorrow to see if his voice is better where I can understand him.

## 2020-10-13 ENCOUNTER — HOSPITAL ENCOUNTER (OUTPATIENT)
Facility: HOSPITAL | Age: 75
Setting detail: HOSPITAL OUTPATIENT SURGERY
End: 2020-10-13
Attending: INTERNAL MEDICINE | Admitting: INTERNAL MEDICINE

## 2020-10-13 PROBLEM — R93.3 ABNORMAL ESOPHAGRAM: Status: ACTIVE | Noted: 2020-10-13

## 2020-10-13 LAB
ALBUMIN SERPL-MCNC: 3.5 G/DL (ref 3.5–5.2)
ALBUMIN/GLOB SERPL: 1.5 G/DL
ALP SERPL-CCNC: 58 U/L (ref 39–117)
ALT SERPL W P-5'-P-CCNC: <5 U/L (ref 1–41)
ANION GAP SERPL CALCULATED.3IONS-SCNC: 5.5 MMOL/L (ref 5–15)
AST SERPL-CCNC: 11 U/L (ref 1–40)
BASOPHILS # BLD AUTO: 0.03 10*3/MM3 (ref 0–0.2)
BASOPHILS NFR BLD AUTO: 0.3 % (ref 0–1.5)
BILIRUB SERPL-MCNC: 0.6 MG/DL (ref 0–1.2)
BUN SERPL-MCNC: 13 MG/DL (ref 8–23)
BUN/CREAT SERPL: 14.3 (ref 7–25)
CALCIUM SPEC-SCNC: 8.2 MG/DL (ref 8.6–10.5)
CHLORIDE SERPL-SCNC: 101 MMOL/L (ref 98–107)
CHOLEST SERPL-MCNC: 149 MG/DL (ref 0–200)
CO2 SERPL-SCNC: 28.5 MMOL/L (ref 22–29)
CREAT SERPL-MCNC: 0.91 MG/DL (ref 0.76–1.27)
DEPRECATED RDW RBC AUTO: 43.1 FL (ref 37–54)
EOSINOPHIL # BLD AUTO: 0.21 10*3/MM3 (ref 0–0.4)
EOSINOPHIL NFR BLD AUTO: 2 % (ref 0.3–6.2)
ERYTHROCYTE [DISTWIDTH] IN BLOOD BY AUTOMATED COUNT: 12.5 % (ref 12.3–15.4)
GFR SERPL CREATININE-BSD FRML MDRD: 81 ML/MIN/1.73
GLOBULIN UR ELPH-MCNC: 2.4 GM/DL
GLUCOSE SERPL-MCNC: 88 MG/DL (ref 65–99)
HBA1C MFR BLD: 5 % (ref 4.8–5.6)
HCT VFR BLD AUTO: 31.7 % (ref 37.5–51)
HDLC SERPL-MCNC: 40 MG/DL (ref 40–60)
HGB BLD-MCNC: 10 G/DL (ref 13–17.7)
IMM GRANULOCYTES # BLD AUTO: 0.03 10*3/MM3 (ref 0–0.05)
IMM GRANULOCYTES NFR BLD AUTO: 0.3 % (ref 0–0.5)
LDLC SERPL CALC-MCNC: 93 MG/DL (ref 0–100)
LDLC/HDLC SERPL: 2.31 {RATIO}
LYMPHOCYTES # BLD AUTO: 1.57 10*3/MM3 (ref 0.7–3.1)
LYMPHOCYTES NFR BLD AUTO: 15 % (ref 19.6–45.3)
MCH RBC QN AUTO: 29.6 PG (ref 26.6–33)
MCHC RBC AUTO-ENTMCNC: 31.5 G/DL (ref 31.5–35.7)
MCV RBC AUTO: 93.8 FL (ref 79–97)
MONOCYTES # BLD AUTO: 0.6 10*3/MM3 (ref 0.1–0.9)
MONOCYTES NFR BLD AUTO: 5.7 % (ref 5–12)
NEUTROPHILS NFR BLD AUTO: 76.7 % (ref 42.7–76)
NEUTROPHILS NFR BLD AUTO: 8.01 10*3/MM3 (ref 1.7–7)
NRBC BLD AUTO-RTO: 0 /100 WBC (ref 0–0.2)
NT-PROBNP SERPL-MCNC: 89.9 PG/ML (ref 0–1800)
PLATELET # BLD AUTO: 268 10*3/MM3 (ref 140–450)
PMV BLD AUTO: 10.2 FL (ref 6–12)
POTASSIUM SERPL-SCNC: 4.2 MMOL/L (ref 3.5–5.2)
PROT SERPL-MCNC: 5.9 G/DL (ref 6–8.5)
RBC # BLD AUTO: 3.38 10*6/MM3 (ref 4.14–5.8)
SODIUM SERPL-SCNC: 135 MMOL/L (ref 136–145)
TRIGL SERPL-MCNC: 83 MG/DL (ref 0–150)
TSH SERPL DL<=0.05 MIU/L-ACNC: 2.57 UIU/ML (ref 0.27–4.2)
VIT B12 BLD-MCNC: 504 PG/ML (ref 211–946)
VLDLC SERPL-MCNC: 16 MG/DL (ref 5–40)
WBC # BLD AUTO: 10.45 10*3/MM3 (ref 3.4–10.8)

## 2020-10-13 PROCEDURE — 97110 THERAPEUTIC EXERCISES: CPT

## 2020-10-13 PROCEDURE — 25010000002 PIPERACILLIN SOD-TAZOBACTAM PER 1 G: Performed by: HOSPITALIST

## 2020-10-13 PROCEDURE — 97162 PT EVAL MOD COMPLEX 30 MIN: CPT

## 2020-10-13 PROCEDURE — 80061 LIPID PANEL: CPT | Performed by: HOSPITALIST

## 2020-10-13 PROCEDURE — 84443 ASSAY THYROID STIM HORMONE: CPT | Performed by: HOSPITALIST

## 2020-10-13 PROCEDURE — 83880 ASSAY OF NATRIURETIC PEPTIDE: CPT | Performed by: HOSPITALIST

## 2020-10-13 PROCEDURE — 80053 COMPREHEN METABOLIC PANEL: CPT | Performed by: HOSPITALIST

## 2020-10-13 PROCEDURE — 85025 COMPLETE CBC W/AUTO DIFF WBC: CPT | Performed by: HOSPITALIST

## 2020-10-13 PROCEDURE — 99232 SBSQ HOSP IP/OBS MODERATE 35: CPT | Performed by: INTERNAL MEDICINE

## 2020-10-13 PROCEDURE — 83036 HEMOGLOBIN GLYCOSYLATED A1C: CPT | Performed by: HOSPITALIST

## 2020-10-13 PROCEDURE — 82607 VITAMIN B-12: CPT | Performed by: HOSPITALIST

## 2020-10-13 RX ADMIN — CARBIDOPA AND LEVODOPA 2 TABLET: 50; 200 TABLET, EXTENDED RELEASE ORAL at 20:55

## 2020-10-13 RX ADMIN — NIFEDIPINE 30 MG: 30 TABLET, FILM COATED, EXTENDED RELEASE ORAL at 08:20

## 2020-10-13 RX ADMIN — Medication 1000 MCG: at 08:20

## 2020-10-13 RX ADMIN — TAZOBACTAM SODIUM AND PIPERACILLIN SODIUM 3.38 G: 375; 3 INJECTION, SOLUTION INTRAVENOUS at 03:30

## 2020-10-13 RX ADMIN — TERAZOSIN HYDROCHLORIDE 1 MG: 1 CAPSULE ORAL at 20:55

## 2020-10-13 RX ADMIN — ESCITALOPRAM 10 MG: 10 TABLET, FILM COATED ORAL at 08:20

## 2020-10-13 RX ADMIN — TAZOBACTAM SODIUM AND PIPERACILLIN SODIUM 3.38 G: 375; 3 INJECTION, SOLUTION INTRAVENOUS at 19:46

## 2020-10-13 RX ADMIN — PANTOPRAZOLE SODIUM 40 MG: 40 INJECTION, POWDER, FOR SOLUTION INTRAVENOUS at 20:55

## 2020-10-13 RX ADMIN — QUETIAPINE FUMARATE 25 MG: 25 TABLET ORAL at 20:55

## 2020-10-13 RX ADMIN — CARBIDOPA AND LEVODOPA 2 TABLET: 50; 200 TABLET, EXTENDED RELEASE ORAL at 08:20

## 2020-10-13 RX ADMIN — PANTOPRAZOLE SODIUM 40 MG: 40 INJECTION, POWDER, FOR SOLUTION INTRAVENOUS at 08:20

## 2020-10-13 RX ADMIN — LEVOTHYROXINE SODIUM 125 MCG: 125 TABLET ORAL at 08:20

## 2020-10-13 RX ADMIN — DONEPEZIL HYDROCHLORIDE 5 MG: 5 TABLET, FILM COATED ORAL at 20:55

## 2020-10-13 RX ADMIN — TAZOBACTAM SODIUM AND PIPERACILLIN SODIUM 3.38 G: 375; 3 INJECTION, SOLUTION INTRAVENOUS at 09:25

## 2020-10-13 NOTE — THERAPY EVALUATION
Patient Name: Rommel Gonzalez  : 1945    MRN: [id number]                              Today's Date: 10/13/2020       Admit Date: 10/11/2020    Visit Dx:     ICD-10-CM ICD-9-CM   1. Choking, initial encounter  T17.308A 933.1   2. Aspiration pneumonia of both lower lobes, unspecified aspiration pneumonia type (CMS/HCC)  J69.0 507.0   3. Dysphasia  R47.02 784.59   4. Parkinson's disease (CMS/HCC)  G20 332.0   5. Abnormal esophagram  R93.3 793.4     Patient Active Problem List   Diagnosis   • AF (atrial fibrillation) (CMS/HCC)   • Depression   • Gait instability   • Hypertension   • Insomnia   • Idiopathic Parkinson's disease (CMS/HCC)   • Peripheral neuropathy   • Dysarthria   • Atrial flutter (CMS/HCC)   • Anticoagulated   • Health care maintenance   • Hypothyroidism (acquired)   • Weakness of voice   • Abnormal chest CT   • Facial droop   • Hypersomnia    • Hyperlipidemia   • High risk medication use   • CORINNE on CPAP   • Obstructive sleep apnea, adult   • Fall   • Progressive supranuclear palsy (CMS/HCC)   • Dysphagia   • 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HCC)   • Constipation   • Hyperreflexia   • Torsion dystonia   • Transient cerebral ischemia   • Pneumonia of both lungs due to infectious organism   • Closed fracture of multiple ribs   • Closed compression fracture of L1 lumbar vertebra   • Closed fracture of transverse process of lumbar vertebra (CMS/HCC)   • Altered mental status   • Frequent falls   • Sepsis without acute organ dysfunction (CMS/HCC)   • Choking   • Abnormal esophagram     Past Medical History:   Diagnosis Date   • AF (atrial fibrillation) (CMS/HCC)    • Altered mental status    • Anticoagulated    • Aspiration pneumonia (CMS/HCC)    • Atrial flutter (CMS/HCC)    • Atypical chest pain    • Chest pain    • Colon polyps    • Confusion    • Dementia (CMS/HCC)    • Depression    • Disease of thyroid gland    • Disorder of thyroid    • Dysarthria    • Dysphagia    • Dyspnea and respiratory  abnormalities    • Elevated TSH    • Fall    • Fatigue    • Gait instability    • Gastroesophageal reflux disease    • Hay fever    • Hyperlipidemia    • Hypertension    • Hypothyroidism    • Idiopathic Parkinson's disease (CMS/HCC)    • Insomnia    • Measles    • Metabolic encephalopathy    • Mumps    • Non-traumatic rhabdomyolysis    • CORINNE (obstructive sleep apnea)    • Palpitations    • Parkinson disease (CMS/HCC)    • Peripheral neuropathy    • Pneumonia    • Poor sleep pattern    • Progressive supranuclear palsy (CMS/HCC)    • Sepsis (CMS/HCC)    • Slurred speech    • Tremor    • Weakness of voice      Past Surgical History:   Procedure Laterality Date   • ENDOSCOPY W/ PEG TUBE PLACEMENT N/A 3/16/2018    Procedure: ESOPHAGOGASTRODUODENOSCOPY WITH PERCUTANEOUS ENDOSCOPIC GASTROSTOMY TUBE INSERTION;  Surgeon: Lopez Vang Jr., MD;  Location: [facility] ENDOSCOPY;  Service: Gastroenterology     General Information     Row Name 10/13/20 1513          Physical Therapy Time and Intention    Document Type  evaluation  -EJ     Mode of Treatment  physical therapy  -EJ     Row Name 10/13/20 1513          General Information    Patient Profile Reviewed  yes  -EJ     Prior Level of Function  min assist:;ADL's from [facility], uses a walker at baseline  -EJ     Existing Precautions/Restrictions  fall  -EJ     Barriers to Rehab  cognitive status;previous functional deficit  -EJ     Row Name 10/13/20 1513          Living Environment    Lives With  facility resident  -EJ     Row Name 10/13/20 1513          Home Main Entrance    Number of Stairs, Main Entrance  none  -EJ     Row Name 10/13/20 1513          Cognition    Orientation Status (Cognition)  oriented to;person;unable/difficult to assess  -EJ     Row Name 10/13/20 1513          Safety Issues, Functional Mobility    Impairments Affecting Function (Mobility)  balance;strength;range of motion (ROM)  -EJ       User Key  (r) = Recorded By, (t) = Taken By, (c) =  Cosigned By    Initials Name Provider Type     Shelia Kramer, PT Physical Therapist        Mobility     Row Name 10/13/20 1514          Bed Mobility    Bed Mobility  supine-sit;sit-supine  -EJ     Supine-Sit [place] (Bed Mobility)  verbal cues;nonverbal cues (demo/gesture);minimum assist (75% patient effort);moderate assist (50% patient effort);2 person assist  -EJ     Sit-Supine [place] (Bed Mobility)  verbal cues;minimum assist (75% patient effort);2 person assist  -EJ     Assistive Device (Bed Mobility)  head of bed elevated  -     Row Name 10/13/20 1514          Sit-Stand Transfer    Sit-Stand [place] (Transfers)  verbal cues;minimum assist (75% patient effort);2 person assist  -EJ     Assistive Device (Sit-Stand Transfers)  walker, front-wheeled  -[facility] Name 10/13/20 1514          Gait/Stairs (Locomotion)    [place] Level (Gait)  verbal cues;nonverbal cues (demo/gesture);minimum assist (75% patient effort);2 person assist  -EJ     Assistive Device (Gait)  walker, front-wheeled  -     Distance in Feet (Gait)  80  -EJ     Deviations/Abnormal Patterns (Gait)  adri decreased;stride length decreased  -EJ     Bilateral Gait Deviations  heel strike decreased  -EJ     Comment (Gait/Stairs)  slow pace, assist w walker management, especially when turning, no gross LOB noted  -       User Key  (r) = Recorded By, (t) = Taken By, (c) = Cosigned By    Initials Name Provider Type     Shelia Kramer, PT Physical Therapist        Obj/Interventions     [facility] Name 10/13/20 1515          Range of Motion Comprehensive    General Range of Motion  no range of motion deficits identified  -[facility] Name 10/13/20 1515          Strength Comprehensive (MMT)    Comment, General Manual Muscle Testing (MMT) Assessment  generalized weakness  -[facility] Name 10/13/20 1515          Balance    Balance Assessment  sitting static balance;standing static balance;standing dynamic balance  -      Static Sitting Balance  mild impairment  -EJ     Static Standing Balance  mild impairment  -EJ     Dynamic Standing Balance  mild impairment  -EJ       User Key  (r) = Recorded By, (t) = Taken By, (c) = Cosigned By    Initials Name Provider Type    Shelia Bautista PT Physical Therapist        Goals/Plan     Row Name 10/13/20 1541          Bed Mobility Goal 1 (PT)    Activity/Assistive Device (Bed Mobility Goal 1, PT)  bed mobility activities, all  -EJ     [place] Level/Cues Needed (Bed Mobility Goal 1, PT)  standby assist  -EJ     Time Frame (Bed Mobility Goal 1, PT)  1 week  -EJ     Row Name 10/13/20 1541          Transfer Goal 1 (PT)    Activity/Assistive Device (Transfer Goal 1, PT)  transfers, all;walker, rolling  -EJ     [place] Level/Cues Needed (Transfer Goal 1, PT)  contact guard assist  -EJ     Time Frame (Transfer Goal 1, PT)  1 week  -EJ     Row Name 10/13/20 1541          Gait Training Goal 1 (PT)    Activity/Assistive Device (Gait Training Goal 1, PT)  gait (walking locomotion);walker, rolling  -EJ     [place] Level (Gait Training Goal 1, PT)  contact guard assist  -EJ     Distance (Gait Training Goal 1, PT)  150  -EJ     Time Frame (Gait Training Goal 1, PT)  1 week  -EJ       User Key  (r) = Recorded By, (t) = Taken By, (c) = Cosigned By    Initials Name Provider Type    Shelia Bautista PT Physical Therapist        Clinical Impression     Row Name 10/13/20 1516          Pain Scale: Numbers Pre/Post-Treatment    Pretreatment Pain Rating  0/10 - no pain  -EJ     Posttreatment Pain Rating  0/10 - no pain  -EJ     Row Name 10/13/20 1516          Plan of Care Review    Plan of Care Reviewed With  patient  -EJ     Progress  improving  -EJ     Outcome Summary  Pt agreeable to PT this afternoon. Pt was admitted on 10/11/20 after a choking episode. Pt [facility] in [facility]. Pt has hx of dysphagia, PNA, Parkinsons, A fib, and severe dementia. Pt uses a walker at baseline.   Pt currently presents with increased weakness, decreased activity tolerance, and overall decreased functional mobility.  Today, pt requiring min/mod A x 2 for bed mobility, min A x 2, and min A x 2 to ambulate approx 80 ft with Rwx. Unsure of DC plans at this time. Pt may return to [facility] vs SNU. He will continue to benefit from skilled PT to maximize strength, endurance, and independence with mobility.  -EJ     Row Name 10/13/20 1516          Therapy Assessment/Plan (PT)    Patient/Family Therapy Goals Statement (PT)  memory care vs SNU  -EJ     Rehab Potential (PT)  good, to achieve stated therapy goals  -EJ     Criteria for Skilled Interventions Met (PT)  yes  -EJ     Row Name 10/13/20 1516          Positioning and Restraints    Pre-Treatment Position  in bed  -EJ     Post Treatment Position  bed  -EJ     In Bed  notified nsg;supine;call light within reach;encouraged to call for assist;exit alarm on;with family/caregiver  -EJ       User Key  (r) = Recorded By, (t) = Taken By, (c) = Cosigned By    Initials Name Provider Type    Shelia Bautista, PT Physical Therapist        Outcome Measures     Row Name 10/13/20 1111          How much help from another person do you currently need...    Turning from your back to your side while in flat bed without using bedrails?  3  -EJ     Moving from lying on back to sitting on the side of a flat bed without bedrails?  2  -EJ     Moving to and from a bed to a chair (including a wheelchair)?  3  -EJ     Standing up from a chair using your arms (e.g., wheelchair, bedside chair)?  3  -EJ     Climbing 3-5 steps with a railing?  2  -EJ     To walk in hospital room?  3  -EJ     AM-PAC 6 Clicks Score (PT)  16  -EJ     Row Name 10/13/20 1541          Functional Assessment    Outcome Measure Options  AM-PAC 6 Clicks Basic Mobility (PT)  -EJ       User Key  (r) = Recorded By, (t) = Taken By, (c) = Cosigned By    Initials Name Provider Type    Shelia Bautista, PT  Physical Therapist        Physical Therapy Education                 Title: PT OT SLP Therapies (In Progress)     Topic: Physical Therapy (In Progress)     Point: Mobility training (Done)     Learning Progress Summary           Patient Acceptance, E,TB,D, VU,NR by  at 10/13/2020 1542                   Point: Home exercise program (Not Started)     Learner Progress:  Not documented in this visit.          Point: Body mechanics (Not Started)     Learner Progress:  Not documented in this visit.          Point: Precautions (Not Started)     Learner Progress:  Not documented in this visit.                      User Key     Initials Effective Dates Name Provider Type Discipline     04/03/18 -  Shelia Kramer, PT Physical Therapist PT              PT Recommendation and Plan  Planned Therapy Interventions (PT): bed mobility training, gait training, home exercise program, patient/family education, strengthening, transfer training  Plan of Care Reviewed With: patient  Progress: improving  Outcome Summary: Pt agreeable to PT this afternoon. Pt was admitted on 10/11/20 after a choking episode. Pt lives in [facility]. Pt has hx of dysphagia, PNA, Parkinsons, A fib, and severe dementia. Pt uses a walker at baseline.  Pt currently presents with increased weakness, decreased activity tolerance, and overall decreased functional mobility.  Today, pt requiring min/mod A x 2 for bed mobility, min A x 2, and min A x 2 to ambulate approx 80 ft with Rwx. Unsure of DC plans at this time. Pt may return to [facility] SNU. He will continue to benefit from skilled PT to maximize strength, endurance, and independence with mobility.     Time Calculation:   PT Charges     Row Name 10/13/20 1543             Time Calculation    Start Time  1500  -EJ      Stop Time  1523  -EJ      Time Calculation (min)  23 min  -EJ      PT Received On  10/13/20  -EJ      PT - Next Appointment  10/14/20  -EJ      PT Goal Re-Cert Due Date   10/20/20  -KEN         Time Calculation- PT    Total Timed Code Minutes- PT  13 minute(s)  -EJ        User Key  (r) = Recorded By, (t) = Taken By, (c) = Cosigned By    Initials Name Provider Type    Shelia Bautista, PT Physical Therapist        Therapy Charges for Today     Code Description Service Date Service Provider Modifiers Qty    08109771519 HC PT EVAL MOD COMPLEXITY 2 10/13/2020 Shelia Kramer, PT GP 1    70725593832 HC PT THER PROC EA 15 MIN 10/13/2020 Shelia Kramer, PT GP 1    63622118346 HC PT THER SUPP EA 15 MIN 10/13/2020 Shelia Kramer, PT GP 1          PT G-Codes  Outcome Measure Options: AM-PAC 6 Clicks Basic Mobility (PT)  AM-PAC 6 Clicks Score (PT): 16    Shelia Kramer, SARAH  10/13/2020

## 2020-10-13 NOTE — PLAN OF CARE
Problem: Adult Inpatient Plan of Care  Goal: Plan of Care Review  Recent Flowsheet Documentation  Taken 10/13/2020 1516 by Shelia Kramer, SARAH  Progress: improving  Plan of Care Reviewed With: patient  Outcome Summary: Pt agreeable to PT this afternoon. Pt was admitted on 10/11/20 after a choking episode. Pt lives in [facility]. Pt has hx of dysphagia, PNA, Parkinsons, A fib, and severe dementia. Pt uses a walker at baseline.  Pt currently presents with increased weakness, decreased activity tolerance, and overall decreased functional mobility.  Today, pt requiring min/mod A x 2 for bed mobility, min A x 2, and min A x 2 to ambulate approx 80 ft with Rwx. Unsure of DC plans at this time. Pt may return to [facility] SNU. He will continue to benefit from skilled PT to maximize strength, endurance, and independence with mobility.   Patient was intermittently wearing a face mask during this therapy encounter. Therapist used appropriate personal protective equipment including eye protection, mask, and gloves.  Mask used was standard procedure mask. Appropriate PPE was worn during the entire therapy session. Hand hygiene was completed before and after therapy session. Patient is not in enhanced droplet precautions.

## 2020-10-13 NOTE — PLAN OF CARE
Goal Outcome Evaluation:  Plan of Care Reviewed With: patient  Progress: improving  Outcome Summary: VSS. Pt transferred from 4N to 3P today. Pt has had a few incontinent episodes of bladder. Pt ambulated today w/ physical therapy 80'. Pt [place] and slow speech. Plan for EGD tomorrow. Consent signed and in chart, pt to be NPO after MN. Pt turned q2h, no signs of skin breakdown. Cont to monitor.

## 2020-10-13 NOTE — PROGRESS NOTES
[facility]  Inpatient Progress Note    Reason for Follow Up: Dysphagia    Subjective     Interval History:   Speech and esophagram results noted.  Patient with no new issues overnight.    Current Facility-Administered Medications:   •  carbidopa-levodopa CR (SINEMET CR)  MG per CR tablet 2 tablet, 2 tablet, Oral, BID, Darrell Kc MD, 2 tablet at 10/13/20 0820  •  donepezil (ARICEPT) tablet 5 mg, 5 mg, Oral, Nightly, Darrell Kc MD, 5 mg at 10/12/20 2239  •  escitalopram (LEXAPRO) tablet 10 mg, 10 mg, Oral, Daily, Darrell Kc MD, 10 mg at 10/13/20 0820  •  ipratropium-albuterol (DUO-NEB) nebulizer solution 3 mL, 3 mL, Nebulization, Q4H PRN, Darrell Kc MD  •  levothyroxine (SYNTHROID, LEVOTHROID) tablet 125 mcg, 125 mcg, Oral, Daily, Darrell Kc MD, 125 mcg at 10/13/20 0820  •  NIFEdipine XL (PROCARDIA XL) 24 hr tablet 30 mg, 30 mg, Oral, Daily, Darrell Kc MD, 30 mg at 10/13/20 0820  •  ondansetron (ZOFRAN) injection 4 mg, 4 mg, Intravenous, Q6H PRN, Darrell Kc MD  •  pantoprazole (PROTONIX) injection 40 mg, 40 mg, Intravenous, Q12H, Darrell Kc MD, 40 mg at 10/13/20 0820  •  piperacillin-tazobactam (ZOSYN) 3.375 g in iso-osmotic dextrose 50 ml (premix), 3.375 g, Intravenous, Q8H, Darrell Kc MD, 3.375 g at 10/13/20 0925  •  QUEtiapine (SEROquel) tablet 25 mg, 25 mg, Oral, Nightly, Darrell Kc MD, 25 mg at 10/12/20 2239  •  terazosin (HYTRIN) capsule 1 mg, 1 mg, Oral, Nightly, Darrell Kc MD, 1 mg at 10/12/20 2240  •  vitamin B-12 (CYANOCOBALAMIN) tablet 1,000 mcg, 1,000 mcg, Oral, Daily, Darrell Kc MD, 1,000 mcg at 10/13/20 0820  Review of Systems:    Negative for nausea or vomiting, negative for fevers or chills    Objective     Vital Signs  Temp:  [97.8 °F (36.6 °C)-98.4 °F (36.9 °C)] 98.4 °F (36.9 °C)  Heart Rate:  [51-60] 55  Resp:  [18-20] 20  BP: (118-138)/(59-68) 118/59  Body mass index is 24.59 kg/m².    Intake/Output Summary (Last 24 hours) at  10/13/2020 0926  Last data filed at 10/13/2020 0330  Gross per 24 hour   Intake 770 ml   Output --   Net 770 ml     No intake/output data recorded.     Physical Exam:   General: patient awake, alert     Eyes: Normal lids and lashes, no scleral icterus   Neck: supple, normal ROM   Skin: warm and dry, not jaundiced   Pulm:  regular and unlabored   Abdomen: soft, nontender, nondistended    Extremities: no rash or edema   Psychiatric: Severe dementia, unable to evaluate     Results Review:     I reviewed the patient's new clinical results.    Results from last 7 days   Lab Units 10/13/20  0609 10/12/20  0609 10/11/20  1530   WBC 10*3/mm3 10.45 7.59 12.97*   HEMOGLOBIN g/dL 10.0* 10.7* 11.7*   HEMATOCRIT % 31.7* 32.6* 36.4*   PLATELETS 10*3/mm3 268 271 271     Results from last 7 days   Lab Units 10/13/20  0609 10/12/20  0609 10/11/20  1530   SODIUM mmol/L 135* 140 140   POTASSIUM mmol/L 4.2 4.2 4.4   CHLORIDE mmol/L 101 104 103   CO2 mmol/L 28.5 27.3 28.2   BUN mg/dL 13 17 16   CREATININE mg/dL 0.91 0.89 0.86   CALCIUM mg/dL 8.2* 8.8 9.1   BILIRUBIN mg/dL 0.6  --  0.3   ALK PHOS U/L 58  --  74   ALT (SGPT) U/L <5  --  <5   AST (SGOT) U/L 11  --  14   GLUCOSE mg/dL 88 89 113*     Results from last 7 days   Lab Units 10/11/20  1530   INR  1.63*     Lab Results   Lab Value Date/Time    LIPASE 37 07/22/2019 1921       Radiology:  FL Esophagram Complete Single Contrast   Final Result   1. Short segment of mild irregular narrowing involving the distal   esophagus. Contrast passes readily into the stomach.   2. This could be further evaluated with upper endoscopy if clinically   indicated or a follow-up esophagram may be helpful.   3. No other significant findings are noted.        Fluoroscopy time 52 seconds, 88 images.       This report was finalized on 10/12/2020 4:39 PM by Dr. Wood Vargas M.D.          XR Chest 2 View   Final Result          Assessment/Plan     Patient Active Problem List   Diagnosis   • AF (atrial  fibrillation) (CMS/HCC)   • Depression   • Gait instability   • Hypertension   • Insomnia   • Idiopathic Parkinson's disease (CMS/HCC)   • Peripheral neuropathy   • Dysarthria   • Atrial flutter (CMS/HCC)   • Anticoagulated   • Health care maintenance   • Hypothyroidism (acquired)   • Weakness of voice   • Abnormal chest CT   • Facial droop   • Hypersomnia    • Hyperlipidemia   • High risk medication use   • CORINNE on CPAP   • Obstructive sleep apnea, adult   • Fall   • Progressive supranuclear palsy (CMS/HCC)   • Dysphagia   • 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HCC)   • Constipation   • Hyperreflexia   • Torsion dystonia   • Transient cerebral ischemia   • Pneumonia of both lungs due to infectious organism   • Closed fracture of multiple ribs   • Closed compression fracture of L1 lumbar vertebra   • Closed fracture of transverse process of lumbar vertebra (CMS/HCC)   • Altered mental status   • Frequent falls   • Sepsis without acute organ dysfunction (CMS/HCC)   • Choking       Assessment:  1. Dysphagia -status post choking episode at his nursing facility  2. Possible aspiration-history of recurrent pneumonia  3. History of Parkinson's disease, progressive supranuclear palsy and severe dementia  4. A. fib on Xarelto  5. Abnormal esophagram with distal esophageal narrowing  6. Oropharyngeal dysphagia      Plan:  · Diet per speech recs  · I feel that the episode he was admitted for was likely more related to his oropharyngeal issues -continue mechanical soft diet  · Esophagram with distal esophageal narrowing-discussed with the patient's 2 sisters.  They report that his father had esophageal cancer.  They would like to proceed with an EGD for further evaluation of this finding and dilation if indicated.  Patient has been off his Xarelto since admission.  Will plan for EGD tomorrow.  Risks and benefits of the procedure discussed with the sisters at length.    I discussed the patients findings and my recommendations with patient  and family.    Avis Sargent MD

## 2020-10-13 NOTE — PLAN OF CARE
Goal Outcome Evaluation:  Plan of Care Reviewed With: patient  Progress: improving  Outcome Summary: Safety maintained, pt alert and able to make needs known. No soa or cough noted, meds with applesauce, [facility] well. ABX continue.

## 2020-10-14 ENCOUNTER — ANESTHESIA EVENT (OUTPATIENT)
Dept: GASTROENTEROLOGY | Facility: HOSPITAL | Age: 75
End: 2020-10-14

## 2020-10-14 ENCOUNTER — ANESTHESIA (OUTPATIENT)
Dept: GASTROENTEROLOGY | Facility: HOSPITAL | Age: 75
End: 2020-10-14

## 2020-10-14 VITALS
DIASTOLIC BLOOD PRESSURE: 71 MMHG | SYSTOLIC BLOOD PRESSURE: 149 MMHG | WEIGHT: 178.57 LBS | BODY MASS INDEX: 25 KG/M2 | HEART RATE: 52 BPM | HEIGHT: 71 IN | OXYGEN SATURATION: 97 % | TEMPERATURE: 97.1 F | RESPIRATION RATE: 16 BRPM

## 2020-10-14 LAB
ANION GAP SERPL CALCULATED.3IONS-SCNC: 5.8 MMOL/L (ref 5–15)
BASOPHILS # BLD AUTO: 0.03 10*3/MM3 (ref 0–0.2)
BASOPHILS NFR BLD AUTO: 0.5 % (ref 0–1.5)
BUN SERPL-MCNC: 12 MG/DL (ref 8–23)
BUN/CREAT SERPL: 13.6 (ref 7–25)
CALCIUM SPEC-SCNC: 8.4 MG/DL (ref 8.6–10.5)
CHLORIDE SERPL-SCNC: 104 MMOL/L (ref 98–107)
CO2 SERPL-SCNC: 27.2 MMOL/L (ref 22–29)
CREAT SERPL-MCNC: 0.88 MG/DL (ref 0.76–1.27)
DEPRECATED RDW RBC AUTO: 42.4 FL (ref 37–54)
EOSINOPHIL # BLD AUTO: 0.27 10*3/MM3 (ref 0–0.4)
EOSINOPHIL NFR BLD AUTO: 4.4 % (ref 0.3–6.2)
ERYTHROCYTE [DISTWIDTH] IN BLOOD BY AUTOMATED COUNT: 12.5 % (ref 12.3–15.4)
GFR SERPL CREATININE-BSD FRML MDRD: 84 ML/MIN/1.73
GLUCOSE SERPL-MCNC: 87 MG/DL (ref 65–99)
HCT VFR BLD AUTO: 30.7 % (ref 37.5–51)
HGB BLD-MCNC: 10 G/DL (ref 13–17.7)
IMM GRANULOCYTES # BLD AUTO: 0.01 10*3/MM3 (ref 0–0.05)
IMM GRANULOCYTES NFR BLD AUTO: 0.2 % (ref 0–0.5)
LYMPHOCYTES # BLD AUTO: 1.44 10*3/MM3 (ref 0.7–3.1)
LYMPHOCYTES NFR BLD AUTO: 23.5 % (ref 19.6–45.3)
MCH RBC QN AUTO: 30.1 PG (ref 26.6–33)
MCHC RBC AUTO-ENTMCNC: 32.6 G/DL (ref 31.5–35.7)
MCV RBC AUTO: 92.5 FL (ref 79–97)
MONOCYTES # BLD AUTO: 0.5 10*3/MM3 (ref 0.1–0.9)
MONOCYTES NFR BLD AUTO: 8.2 % (ref 5–12)
NEUTROPHILS NFR BLD AUTO: 3.87 10*3/MM3 (ref 1.7–7)
NEUTROPHILS NFR BLD AUTO: 63.2 % (ref 42.7–76)
NRBC BLD AUTO-RTO: 0 /100 WBC (ref 0–0.2)
PLATELET # BLD AUTO: 263 10*3/MM3 (ref 140–450)
PMV BLD AUTO: 10.4 FL (ref 6–12)
POTASSIUM SERPL-SCNC: 3.9 MMOL/L (ref 3.5–5.2)
RBC # BLD AUTO: 3.32 10*6/MM3 (ref 4.14–5.8)
SODIUM SERPL-SCNC: 137 MMOL/L (ref 136–145)
WBC # BLD AUTO: 6.12 10*3/MM3 (ref 3.4–10.8)

## 2020-10-14 PROCEDURE — 25010000002 PIPERACILLIN SOD-TAZOBACTAM PER 1 G: Performed by: HOSPITALIST

## 2020-10-14 PROCEDURE — 97110 THERAPEUTIC EXERCISES: CPT

## 2020-10-14 PROCEDURE — 43235 EGD DIAGNOSTIC BRUSH WASH: CPT | Performed by: INTERNAL MEDICINE

## 2020-10-14 PROCEDURE — 25010000002 PIPERACILLIN SOD-TAZOBACTAM PER 1 G: Performed by: INTERNAL MEDICINE

## 2020-10-14 PROCEDURE — 25010000002 PROPOFOL 10 MG/ML EMULSION: Performed by: ANESTHESIOLOGY

## 2020-10-14 PROCEDURE — 0DJ08ZZ INSPECTION OF UPPER INTESTINAL TRACT, VIA NATURAL OR ARTIFICIAL OPENING ENDOSCOPIC: ICD-10-PCS | Performed by: INTERNAL MEDICINE

## 2020-10-14 PROCEDURE — 80048 BASIC METABOLIC PNL TOTAL CA: CPT | Performed by: HOSPITALIST

## 2020-10-14 PROCEDURE — 85025 COMPLETE CBC W/AUTO DIFF WBC: CPT | Performed by: HOSPITALIST

## 2020-10-14 RX ORDER — PROPOFOL 10 MG/ML
VIAL (ML) INTRAVENOUS CONTINUOUS PRN
Status: DISCONTINUED | OUTPATIENT
Start: 2020-10-14 | End: 2020-10-14 | Stop reason: SURG

## 2020-10-14 RX ORDER — SODIUM CHLORIDE 9 MG/ML
30 INJECTION, SOLUTION INTRAVENOUS CONTINUOUS PRN
Status: DISCONTINUED | OUTPATIENT
Start: 2020-10-14 | End: 2020-10-14

## 2020-10-14 RX ORDER — PANTOPRAZOLE SODIUM 40 MG/1
40 TABLET, DELAYED RELEASE ORAL
Status: DISCONTINUED | OUTPATIENT
Start: 2020-10-14 | End: 2020-10-14 | Stop reason: HOSPADM

## 2020-10-14 RX ORDER — LIDOCAINE HYDROCHLORIDE 20 MG/ML
INJECTION, SOLUTION INFILTRATION; PERINEURAL AS NEEDED
Status: DISCONTINUED | OUTPATIENT
Start: 2020-10-14 | End: 2020-10-14 | Stop reason: SURG

## 2020-10-14 RX ORDER — GLYCOPYRROLATE 0.2 MG/ML
INJECTION INTRAMUSCULAR; INTRAVENOUS AS NEEDED
Status: DISCONTINUED | OUTPATIENT
Start: 2020-10-14 | End: 2020-10-14 | Stop reason: SURG

## 2020-10-14 RX ORDER — PROPOFOL 10 MG/ML
VIAL (ML) INTRAVENOUS AS NEEDED
Status: DISCONTINUED | OUTPATIENT
Start: 2020-10-14 | End: 2020-10-14 | Stop reason: SURG

## 2020-10-14 RX ORDER — PANTOPRAZOLE SODIUM 40 MG/1
40 TABLET, DELAYED RELEASE ORAL
Status: DISCONTINUED | OUTPATIENT
Start: 2020-10-15 | End: 2020-10-14

## 2020-10-14 RX ORDER — PANTOPRAZOLE SODIUM 40 MG/1
40 TABLET, DELAYED RELEASE ORAL
Qty: 60 TABLET | Refills: 1 | Status: SHIPPED | OUTPATIENT
Start: 2020-10-14 | End: 2020-12-03

## 2020-10-14 RX ADMIN — TAZOBACTAM SODIUM AND PIPERACILLIN SODIUM 3.38 G: 375; 3 INJECTION, SOLUTION INTRAVENOUS at 12:34

## 2020-10-14 RX ADMIN — LEVOTHYROXINE SODIUM 125 MCG: 125 TABLET ORAL at 12:35

## 2020-10-14 RX ADMIN — LIDOCAINE HYDROCHLORIDE 50 MG: 20 INJECTION, SOLUTION INFILTRATION; PERINEURAL at 10:42

## 2020-10-14 RX ADMIN — NIFEDIPINE 30 MG: 30 TABLET, FILM COATED, EXTENDED RELEASE ORAL at 12:35

## 2020-10-14 RX ADMIN — CARBIDOPA AND LEVODOPA 2 TABLET: 50; 200 TABLET, EXTENDED RELEASE ORAL at 12:34

## 2020-10-14 RX ADMIN — ESCITALOPRAM 10 MG: 10 TABLET, FILM COATED ORAL at 12:34

## 2020-10-14 RX ADMIN — SODIUM CHLORIDE 30 ML/HR: 9 INJECTION, SOLUTION INTRAVENOUS at 10:23

## 2020-10-14 RX ADMIN — GLYCOPYRROLATE 0.2 MG: 0.2 INJECTION INTRAMUSCULAR; INTRAVENOUS at 10:42

## 2020-10-14 RX ADMIN — PROPOFOL 50 MG: 10 INJECTION, EMULSION INTRAVENOUS at 10:49

## 2020-10-14 RX ADMIN — PROPOFOL 125 MCG/KG/MIN: 10 INJECTION, EMULSION INTRAVENOUS at 10:44

## 2020-10-14 RX ADMIN — PANTOPRAZOLE SODIUM 40 MG: 40 INJECTION, POWDER, FOR SOLUTION INTRAVENOUS at 12:35

## 2020-10-14 RX ADMIN — PROPOFOL 50 MG: 10 INJECTION, EMULSION INTRAVENOUS at 10:44

## 2020-10-14 RX ADMIN — Medication 1000 MCG: at 12:35

## 2020-10-14 RX ADMIN — TAZOBACTAM SODIUM AND PIPERACILLIN SODIUM 3.38 G: 375; 3 INJECTION, SOLUTION INTRAVENOUS at 02:24

## 2020-10-14 NOTE — PROGRESS NOTES
Case Management Discharge Note      Final Note: Call placed to Chuckie/Kendra Sanchez to inform of dc today. States patient is able to return. Placed copy of negative COVID result in packet. Family transported per private auto. No additional needs noted.    Provided Post Acute Provider List?: N/A  Provided Post Acute Provider Quality & Resource List?: N/A    Selected Continued Care - Admitted Since 10/11/2020     Destination Coordination complete    Service Provider Selected Services Address Phone Fax    [facility]  Assisted Living [address] [facility] [address] [phone]          Durable Medical Equipment    No services have been selected for the patient.              Dialysis/Infusion    No services have been selected for the patient.              Home Medical Care    No services have been selected for the patient.              Therapy    No services have been selected for the patient.              Community Resources    No services have been selected for the patient.                       Final Discharge Disposition Code: 01 - home or self-care

## 2020-10-14 NOTE — ANESTHESIA PREPROCEDURE EVALUATION
Anesthesia Evaluation     Patient summary reviewed   NPO Solid Status: > 8 hours             Airway   No difficulty expected  Dental      Pulmonary    (+) sleep apnea,   Cardiovascular     Rhythm: regular    (+) hypertension, dysrhythmias Atrial Fib,       Neuro/Psych  (+) tremors, dementia,     GI/Hepatic/Renal/Endo      Musculoskeletal     Abdominal    Substance History      OB/GYN          Other                        Anesthesia Plan    ASA 3     MAC       Anesthetic plan, all risks, benefits, and alternatives have been provided, discussed and informed consent has been obtained with: patient.

## 2020-10-14 NOTE — PROGRESS NOTES
EGD today with normal esophagus, no stenosis.  No other abnormal findings.  Issues are related to oral pharyngeal dysphasia.  No esophageal component.  Recommend diet per speech recommendations.  No other recommendations at this time.  We will sign off but are available as needed

## 2020-10-14 NOTE — PLAN OF CARE
Goal Outcome Evaluation:  Plan of Care Reviewed With: patient  Progress: no change  Outcome Summary: NAD. Turned q2h. Inc of urine. NPO after MN for EGD this morning. VSS. Afebrile. Will continue to monitor.

## 2020-10-14 NOTE — PROGRESS NOTES
"Daily progress note    Chief complaint  Status post upper endoscopy  Doing better  No new complaints  Wants to go home  Family at bedside    History of present illness  75-year-old white male with history of seizure Parkinson disease and severe dementia other medical problem hypothyroidism gastroesophageal disease and obstructive sleep apnea who is a nursing home resident brought to the emergency room with choking episode while eating pork steak required Heimlich maneuver with partial success by EMS.  Patient further treated in ER with good air movement admit for management.  Patient is demented unable to give history most of history obtained from the wife nursing staff old record.  Patient evaluated for management and still having difficulty swallowing but no other complaint.  Patient denies any fever cough shortness of breath chest pain abdominal pain nausea vomiting diarrhea.      REVIEW OF SYSTEMS  Unremarkable     PHYSICAL EXAM  Blood pressure 137/71, pulse 58, temperature 97.2 °F (36.2 °C), temperature source Oral, resp. rate 18, height 180.3 cm (71\"), weight 81 kg (178 lb 9.2 oz), SpO2 95 %.    GENERAL: Elderly male with O2 sat of 100% on 2-1/2 L.  He has an occasional cough.  He has very mild crackles in the base of his lungs bilaterally no acute distress.Vital signs on my initial evaluation unremarkable  HENT: nares patent  Head/neck/ face are symmetric without gross deformity, signs of trauma, or swelling.  Oral pharyngeal exam.  I do not see any foreign body.  He has no airway swelling or discharge.  EYES: no scleral icterus, no conjunctival pallor.  NECK: Supple, no meningismus  CV: regular rhythm, regular rate with intact distal pulses.  No murmur rub  RESPIRATORY: normal effort and no respiratory distress.  Occasional cough with some crackles in the base of his lungs bilaterally that are mild.  ABDOMEN: soft and non-tender.  MUSCULOSKELETAL: no deformity.  Intact distal pulses no edema  NEURO: alert " and appropriate, moves all extremities, follows commands.  Patient is alert and oriented.  He has diffuse weakness with some muscle rigidity.  He has En that is pretty consistent with Parkinson's disease as he has a tremor.  SKIN: warm, dry     LAB RESULTS  Lab Results (last 24 hours)     Procedure Component Value Units Date/Time    Basic Metabolic Panel [[id number]]  (Abnormal) Collected: 10/14/20 0528    Specimen: Blood Updated: 10/14/20 0615     Glucose 87 mg/dL      BUN 12 mg/dL      Creatinine 0.88 mg/dL      Sodium 137 mmol/L      Potassium 3.9 mmol/L      Chloride 104 mmol/L      CO2 27.2 mmol/L      Calcium 8.4 mg/dL      eGFR Non African Amer 84 mL/min/1.73      BUN/Creatinine Ratio 13.6     Anion Gap 5.8 mmol/L     Narrative:      GFR Normal >60  Chronic Kidney Disease <60  Kidney Failure <15      CBC & Differential [[id number]]  (Abnormal) Collected: 10/14/20 0528    Specimen: Blood Updated: 10/14/20 0556    Narrative:      The following orders were created for panel order CBC & Differential.  Procedure                               Abnormality         Status                     ---------                               -----------         ------                     CBC Auto Differential[[id number]]        Abnormal            Final result                 Please view results for these tests on the individual orders.    CBC Auto Differential [[id number]]  (Abnormal) Collected: 10/14/20 0528    Specimen: Blood Updated: 10/14/20 0556     WBC 6.12 10*3/mm3      RBC 3.32 10*6/mm3      Hemoglobin 10.0 g/dL      Hematocrit 30.7 %      MCV 92.5 fL      MCH 30.1 pg      MCHC 32.6 g/dL      RDW 12.5 %      RDW-SD 42.4 fl      MPV 10.4 fL      Platelets 263 10*3/mm3      Neutrophil % 63.2 %      Lymphocyte % 23.5 %      Monocyte % 8.2 %      Eosinophil % 4.4 %      Basophil % 0.5 %      Immature Grans % 0.2 %      Neutrophils, Absolute 3.87 10*3/mm3      Lymphocytes, Absolute 1.44 10*3/mm3      Monocytes, Absolute  0.50 10*3/mm3      Eosinophils, Absolute 0.27 10*3/mm3      Basophils, Absolute 0.03 10*3/mm3      Immature Grans, Absolute 0.01 10*3/mm3      nRBC 0.0 /100 WBC     Blood Culture - Blood, Arm, Left [[id number]] Collected: 10/11/20 1725    Specimen: Blood from Arm, Left Updated: 10/13/20 1745     Blood Culture No growth at 2 days    Blood Culture - Blood, Arm, Right [[id number]] Collected: 10/11/20 1724    Specimen: Blood from Arm, Right Updated: 10/13/20 1745     Blood Culture No growth at 2 days        Imaging Results (Last 24 Hours)     ** No results found for the last 24 hours. **          Current Facility-Administered Medications:   •  carbidopa-levodopa CR (SINEMET CR)  MG per CR tablet 2 tablet, 2 tablet, Oral, BID, Avis Sargent MD, 2 tablet at 10/14/20 1234  •  donepezil (ARICEPT) tablet 5 mg, 5 mg, Oral, Nightly, Avis Sargent MD, 5 mg at 10/13/20 2055  •  escitalopram (LEXAPRO) tablet 10 mg, 10 mg, Oral, Daily, Avis Sargent MD, 10 mg at 10/14/20 1234  •  ipratropium-albuterol (DUO-NEB) nebulizer solution 3 mL, 3 mL, Nebulization, Q4H PRN, Avis Sargent MD  •  levothyroxine (SYNTHROID, LEVOTHROID) tablet 125 mcg, 125 mcg, Oral, Daily, Avis Sargent MD, 125 mcg at 10/14/20 1235  •  NIFEdipine XL (PROCARDIA XL) 24 hr tablet 30 mg, 30 mg, Oral, Daily, Avis Sargent MD, 30 mg at 10/14/20 1235  •  ondansetron (ZOFRAN) injection 4 mg, 4 mg, Intravenous, Q6H PRN, Avis Sargent MD  •  pantoprazole (PROTONIX) injection 40 mg, 40 mg, Intravenous, Q12H, Avis Sargent MD, 40 mg at 10/14/20 1235  •  piperacillin-tazobactam (ZOSYN) 3.375 g in iso-osmotic dextrose 50 ml (premix), 3.375 g, Intravenous, Q8H, Avis Sargent MD, 3.375 g at 10/14/20 1234  •  QUEtiapine (SEROquel) tablet 25 mg, 25 mg, Oral, Nightly, Avis Sargent MD, 25 mg at 10/13/20 2055  •  terazosin (HYTRIN) capsule 1 mg, 1 mg, Oral, Nightly, Avis Sargent MD, 1 mg at 10/13/20 2055  •  vitamin B-12  (CYANOCOBALAMIN) tablet 1,000 mcg, 1,000 mcg, Oral, Daily, Avis Sargent MD, 1,000 mcg at 10/14/20 1235     ASSESSMENT  Choking episode at nursing home with dysphagia status post upper endoscopy  Essentially normal upper endoscopy  Possible aspiration with pneumonia  Chronic atrial fibrillation on Xarelto  Severe Parkinson disease  Progressive supranuclear palsy  Severe dementia  Hypothyroidism  Gastroesophageal reflux disease    PLAN  Discharge back to nursing home  Discharge summary dictated    KANIKA ESCOBEDO MD

## 2020-10-14 NOTE — PLAN OF CARE
Problem: Adult Inpatient Plan of Care  Goal: Plan of Care Review  Recent Flowsheet Documentation  Taken 10/14/2020 1526 by Evelyne Platt, PT  Progress: improving  Plan of Care Reviewed With:   patient   sibling  Outcome Summary: Pt agreeable to skilled PT, nonerbal during session, ambulate household disances CGA 80ft rwx, still does require hands on assist for safety. Sister bedside reports pt hopeful to return to [facility] care unit, pt has rwx and WC. Do recommend further skiled Pt at [place] for cont'ed strengthening and endurance, sister reports he was having to use WC recently at facility.    ..Patient was intermittently wearing a face mask during this therapy encounter. Therapist used appropriate personal protective equipment including eye protection, mask, and gloves.  Mask used was standard procedure mask. Appropriate PPE was worn during the entire therapy session. Hand hygiene was completed before and after therapy session. Patient is not in enhanced droplet precautions.

## 2020-10-14 NOTE — THERAPY TREATMENT NOTE
Acute Care - Physical Therapy Treatment Note  [facility]     Patient Name: Rommel Gonzalez  : 1945  MRN: [id number]  Today's Date: 10/14/2020           PT Assessment (last 12 hours)      PT Evaluation and Treatment     Row Name 10/14/20 1526          Physical Therapy Time and Intention    Subjective Information  no complaints nonverbal   -     Document Type  therapy note (daily note)  -     Mode of Treatment  individual therapy;physical therapy  -     Patient Effort  good  -     Row Name 10/14/20 1526          General Information    Patient Profile Reviewed  yes  -     Existing Precautions/Restrictions  fall  -     Barriers to Rehab  cognitive status  -     Row Name 10/14/20 1526          Cognition    Orientation Status (Cognition)  unable/difficult to assess  -     Row Name 10/14/20 1526          Pain Scale: Numbers Pre/Post-Treatment    Pretreatment Pain Rating  0/10 - no pain  -     Posttreatment Pain Rating  0/10 - no pain  -[facility] Name 10/14/20 1526          Bed Mobility    Supine-Sit [place] (Bed Mobility)  standby assist  -     Assistive Device (Bed Mobility)  bed rails;head of bed elevated  -     Comment (Bed Mobility)  increased time needed to complete task  -     Row Name 10/14/20 1526          Transfers    Sit-Stand [place] (Transfers)  contact guard;verbal cues  -[facility] Name 10/14/20 1526          Sit-Stand Transfer    Assistive Device (Sit-Stand Transfers)  walker, front-wheeled  -[facility] Name 10/14/20 1526          Gait/Stairs (Locomotion)    [place] Level (Gait)  contact guard;verbal cues  -     Assistive Device (Gait)  walker, front-wheeled  -     Distance in Feet (Gait)  80  -     Pattern (Gait)  step-through  -     Deviations/Abnormal Patterns (Gait)  festinating/shuffling  -     Bilateral Gait Deviations  forward flexed posture;heel strike decreased  -     Row Name 10/14/20 1526          Plan of Care Review    Plan of Care  Reviewed With  patient;sibling  -     Progress  improving  -     Outcome Summary  Pt agreeable to skilled PT, nonerbal during session, ambulate household disances CGA 80ft rwx, still does require hands on assist for safety. Sister bedside reports pt hopeful to return to [facility] memory care unit, pt has rwx and WC. Do recommend further skiled Pt at [place] for cont'ed strengthening and endurance, sister reports he was having to use WC recently.   -     Row Name 10/14/20 1526          Positioning and Restraints    Pre-Treatment Position  in bed  -     Post Treatment Position  chair  -     In Chair  reclined;call light within reach;encouraged to call for assist;exit alarm on;with family/caregiver  -       User Key  (r) = Recorded By, (t) = Taken By, (c) = Cosigned By    Initials Name Provider Type     Evelyne Platt, PT Physical Therapist        Physical Therapy Education                 Title: PT OT SLP Therapies (In Progress)     Topic: Physical Therapy (In Progress)     Point: Mobility training (In Progress)     Learning Progress Summary           Patient Acceptance, E, NR by  at 10/14/2020 1530    Acceptance, E,TB,D, VU,NR by  at 10/13/2020 1542   Family Acceptance, E, NR by  at 10/14/2020 1530                   Point: Home exercise program (Not Started)     Learner Progress:  Not documented in this visit.          Point: Body mechanics (Not Started)     Learner Progress:  Not documented in this visit.          Point: Precautions (Not Started)     Learner Progress:  Not documented in this visit.                      User Key     Initials Effective Dates Name Provider Type [facility] 04/03/18 -  Evelyne Platt, PT Physical Therapist PT     04/03/18 -  Shelia Kramer, PT Physical Therapist PT              PT Recommendation and Plan     Plan of Care Reviewed With: patient, sibling  Progress: improving  Outcome Summary: Pt agreeable to skilled PT, nonerbal during session, ambulate  household disances CGA 80ft rwx, still does require hands on assist for safety. Sister bedside reports pt hopeful to return to [facility] memory care unit, pt has rwx and WC. Do recommend further skiled Pt at [place] for cont'ed strengthening and endurance, sister reports he was having to use WC recently.   Outcome Measures     Row Name 10/14/20 1500             How much help from another person do you currently need...    Turning from your back to your side while in flat bed without using bedrails?  3  -LH      Moving from lying on back to sitting on the side of a flat bed without bedrails?  3  -LH      Moving to and from a bed to a chair (including a wheelchair)?  3  -LH      Standing up from a chair using your arms (e.g., wheelchair, bedside chair)?  3  -LH      Climbing 3-5 steps with a railing?  3  -LH      To walk in hospital room?  3  -      AM-PAC 6 Clicks Score (PT)    -         Functional Assessment    Outcome Measure Options  AM-PAC 6 Clicks Basic Mobility (PT)  -        User Key  (r) = Recorded By, (t) = Taken By, (c) = Cosigned By    Initials Name Provider Type     Evelyne Platt, PT Physical Therapist           Time Calculation:   PT Charges     Row Name 10/14/20 1531 10/14/20 1120          Time Calculation    Start Time  1513  -  --     Stop Time  1528  -  --     Time Calculation (min)  15 min  -  --     PT Received On  10/14/20  -  --     PT - Next Appointment  10/15/20  -  10/15/20  -       User Key  (r) = Recorded By, (t) = Taken By, (c) = Cosigned By    Initials Name Provider Type    Gisell Jerry, PT Physical Therapist     Evelyne Platt, PT Physical Therapist        Therapy Charges for Today     Code Description Service Date Service Provider Modifiers Qty    83592839874 HC PT THER PROC EA 15 MIN 10/14/2020 Evelyne Platt, PT GP 1          PT G-Codes  Outcome Measure Options: AM-PAC 6 Clicks Basic Mobility (PT)  AM-PAC 6 Clicks Score (PT): 18    Evelyne Platt  PT  10/14/2020

## 2020-10-14 NOTE — ANESTHESIA POSTPROCEDURE EVALUATION
Patient: Rommel Gonzalez    Procedure Summary     Date: 10/14/20 Room / Location: [facility] ENDOSCOPY 10 /  [facility]    Anesthesia Start: 1038 Anesthesia Stop: 1057    Procedure: ESOPHAGOGASTRODUODENOSCOPY (N/A Esophagus) Diagnosis:       Abnormal esophagram      (Abnormal esophagram [R93.3])    Surgeon: Avis Sargent MD Provider: Howie Vargas MD    Anesthesia Type: MAC ASA Status: 3          Anesthesia Type: MAC    Vitals  Vitals Value Taken Time   /71 10/14/20 1124   Temp     Pulse 58 10/14/20 1124   Resp 18 10/14/20 1124   SpO2 95 % 10/14/20 1124           Post Anesthesia Care and Evaluation    Anesthetic complications: No anesthetic complications

## 2020-10-14 NOTE — BRIEF OP NOTE
ESOPHAGOGASTRODUODENOSCOPY  Progress Note    Rommel Gonzalez  10/14/2020    Pre-op Diagnosis:   Abnormal esophagram [R93.3]       Post-Op Diagnosis Codes:     * Abnormal esophagram [R93.3]    Procedure/CPT® Codes:        Procedure(s):  ESOPHAGOGASTRODUODENOSCOPY    Surgeon(s):  Avis Sargent MD    Anesthesia: Monitored Anesthesia Care    Staff:   Endo Technician: Cary Adan  Endo Nurse: Katya Raza RN         Estimated Blood Loss: none    Urine Voided: * No values recorded between 10/14/2020 10:37 AM and 10/14/2020 10:50 AM *    Specimens:                None          Drains: * No LDAs found *    Findings: Normal esophagus, no inflammation or stenosis.  Normal EGD to the second portion of the duodenum    Complications: No immediate              Avis Sargent MD     Date: 10/14/2020  Time: 10:51 EDT

## 2020-10-16 LAB
BACTERIA SPEC AEROBE CULT: NORMAL
BACTERIA SPEC AEROBE CULT: NORMAL

## 2021-01-01 ENCOUNTER — HOSPITAL ENCOUNTER (INPATIENT)
Facility: HOSPITAL | Age: 76
LOS: 6 days | Discharge: NURSING, REHAB OR LONG-TERM CARE | End: 2022-01-05
Attending: EMERGENCY MEDICINE | Admitting: HOSPITALIST

## 2021-01-01 ENCOUNTER — APPOINTMENT (OUTPATIENT)
Dept: GENERAL RADIOLOGY | Facility: HOSPITAL | Age: 76
End: 2021-01-01

## 2021-01-01 DIAGNOSIS — U07.1 COVID: Primary | ICD-10-CM

## 2021-01-01 DIAGNOSIS — R09.02 HYPOXIA: ICD-10-CM

## 2021-01-01 LAB
ALBUMIN SERPL-MCNC: 3.7 G/DL (ref 3.5–5.2)
ALBUMIN SERPL-MCNC: 3.9 G/DL (ref 3.5–5.2)
ALBUMIN/GLOB SERPL: 1.4 G/DL
ALBUMIN/GLOB SERPL: 1.4 G/DL
ALP SERPL-CCNC: 53 U/L (ref 39–117)
ALP SERPL-CCNC: 54 U/L (ref 39–117)
ALT SERPL W P-5'-P-CCNC: 17 U/L (ref 1–41)
ALT SERPL W P-5'-P-CCNC: 8 U/L (ref 1–41)
ANION GAP SERPL CALCULATED.3IONS-SCNC: 7.3 MMOL/L (ref 5–15)
ANION GAP SERPL CALCULATED.3IONS-SCNC: 9.8 MMOL/L (ref 5–15)
AST SERPL-CCNC: 19 U/L (ref 1–40)
AST SERPL-CCNC: 52 U/L (ref 1–40)
B PARAPERT DNA SPEC QL NAA+PROBE: NOT DETECTED
B PERT DNA SPEC QL NAA+PROBE: NOT DETECTED
BACTERIA UR QL AUTO: ABNORMAL /HPF
BASOPHILS # BLD AUTO: 0.02 10*3/MM3 (ref 0–0.2)
BASOPHILS NFR BLD AUTO: 0.4 % (ref 0–1.5)
BILIRUB CONJ SERPL-MCNC: <0.2 MG/DL (ref 0–0.3)
BILIRUB SERPL-MCNC: 0.2 MG/DL (ref 0–1.2)
BILIRUB SERPL-MCNC: 0.2 MG/DL (ref 0–1.2)
BILIRUB UR QL STRIP: NEGATIVE
BUN SERPL-MCNC: 17 MG/DL (ref 8–23)
BUN SERPL-MCNC: 20 MG/DL (ref 8–23)
BUN/CREAT SERPL: 19.5 (ref 7–25)
BUN/CREAT SERPL: 23.8 (ref 7–25)
C PNEUM DNA NPH QL NAA+NON-PROBE: NOT DETECTED
CALCIUM SPEC-SCNC: 8.3 MG/DL (ref 8.6–10.5)
CALCIUM SPEC-SCNC: 8.6 MG/DL (ref 8.6–10.5)
CHLORIDE SERPL-SCNC: 103 MMOL/L (ref 98–107)
CHLORIDE SERPL-SCNC: 99 MMOL/L (ref 98–107)
CHOLEST SERPL-MCNC: 156 MG/DL (ref 0–200)
CLARITY UR: ABNORMAL
CO2 SERPL-SCNC: 24.2 MMOL/L (ref 22–29)
CO2 SERPL-SCNC: 27.7 MMOL/L (ref 22–29)
COLOR UR: ABNORMAL
CREAT SERPL-MCNC: 0.84 MG/DL (ref 0.76–1.27)
CREAT SERPL-MCNC: 0.87 MG/DL (ref 0.76–1.27)
CRP SERPL-MCNC: 6.57 MG/DL (ref 0–0.5)
D DIMER PPP FEU-MCNC: 1.02 MCGFEU/ML (ref 0–0.49)
DEPRECATED RDW RBC AUTO: 45.6 FL (ref 37–54)
DEPRECATED RDW RBC AUTO: 47.4 FL (ref 37–54)
EOSINOPHIL # BLD AUTO: 0 10*3/MM3 (ref 0–0.4)
EOSINOPHIL NFR BLD AUTO: 0 % (ref 0.3–6.2)
ERYTHROCYTE [DISTWIDTH] IN BLOOD BY AUTOMATED COUNT: 13.4 % (ref 12.3–15.4)
ERYTHROCYTE [DISTWIDTH] IN BLOOD BY AUTOMATED COUNT: 13.7 % (ref 12.3–15.4)
FERRITIN SERPL-MCNC: 330 NG/ML (ref 30–400)
FLUAV SUBTYP SPEC NAA+PROBE: NOT DETECTED
FLUBV RNA ISLT QL NAA+PROBE: NOT DETECTED
GFR SERPL CREATININE-BSD FRML MDRD: 85 ML/MIN/1.73
GFR SERPL CREATININE-BSD FRML MDRD: 89 ML/MIN/1.73
GIANT PLATELETS: ABNORMAL
GLOBULIN UR ELPH-MCNC: 2.6 GM/DL
GLOBULIN UR ELPH-MCNC: 2.8 GM/DL
GLUCOSE SERPL-MCNC: 105 MG/DL (ref 65–99)
GLUCOSE SERPL-MCNC: 89 MG/DL (ref 65–99)
GLUCOSE UR STRIP-MCNC: NEGATIVE MG/DL
HADV DNA SPEC NAA+PROBE: NOT DETECTED
HBA1C MFR BLD: 5.2 % (ref 4.8–5.6)
HCOV 229E RNA SPEC QL NAA+PROBE: NOT DETECTED
HCOV HKU1 RNA SPEC QL NAA+PROBE: NOT DETECTED
HCOV NL63 RNA SPEC QL NAA+PROBE: NOT DETECTED
HCOV OC43 RNA SPEC QL NAA+PROBE: NOT DETECTED
HCT VFR BLD AUTO: 33.8 % (ref 37.5–51)
HCT VFR BLD AUTO: 36.3 % (ref 37.5–51)
HDLC SERPL-MCNC: 51 MG/DL (ref 40–60)
HGB BLD-MCNC: 10.9 G/DL (ref 13–17.7)
HGB BLD-MCNC: 11.6 G/DL (ref 13–17.7)
HGB UR QL STRIP.AUTO: ABNORMAL
HMPV RNA NPH QL NAA+NON-PROBE: NOT DETECTED
HPIV1 RNA ISLT QL NAA+PROBE: NOT DETECTED
HPIV2 RNA SPEC QL NAA+PROBE: NOT DETECTED
HPIV3 RNA NPH QL NAA+PROBE: NOT DETECTED
HPIV4 P GENE NPH QL NAA+PROBE: NOT DETECTED
HYALINE CASTS UR QL AUTO: ABNORMAL /LPF
IMM GRANULOCYTES # BLD AUTO: 0.02 10*3/MM3 (ref 0–0.05)
IMM GRANULOCYTES NFR BLD AUTO: 0.4 % (ref 0–0.5)
KETONES UR QL STRIP: ABNORMAL
LDLC SERPL CALC-MCNC: 92 MG/DL (ref 0–100)
LDLC/HDLC SERPL: 1.8 {RATIO}
LEUKOCYTE ESTERASE UR QL STRIP.AUTO: ABNORMAL
LYMPHOCYTES # BLD AUTO: 0.85 10*3/MM3 (ref 0.7–3.1)
LYMPHOCYTES # BLD MANUAL: 0.16 10*3/MM3 (ref 0.7–3.1)
LYMPHOCYTES NFR BLD AUTO: 18.6 % (ref 19.6–45.3)
LYMPHOCYTES NFR BLD MANUAL: 3 % (ref 5–12)
M PNEUMO IGG SER IA-ACNC: NOT DETECTED
MCH RBC QN AUTO: 30 PG (ref 26.6–33)
MCH RBC QN AUTO: 30.2 PG (ref 26.6–33)
MCHC RBC AUTO-ENTMCNC: 32 G/DL (ref 31.5–35.7)
MCHC RBC AUTO-ENTMCNC: 32.2 G/DL (ref 31.5–35.7)
MCV RBC AUTO: 93.1 FL (ref 79–97)
MCV RBC AUTO: 94.5 FL (ref 79–97)
MONOCYTES # BLD AUTO: 0.65 10*3/MM3 (ref 0.1–0.9)
MONOCYTES # BLD: 0.48 10*3/MM3 (ref 0.1–0.9)
MONOCYTES NFR BLD AUTO: 14.3 % (ref 5–12)
NEUTROPHILS # BLD AUTO: 15.28 10*3/MM3 (ref 1.7–7)
NEUTROPHILS NFR BLD AUTO: 3.02 10*3/MM3 (ref 1.7–7)
NEUTROPHILS NFR BLD AUTO: 66.3 % (ref 42.7–76)
NEUTROPHILS NFR BLD MANUAL: 96 % (ref 42.7–76)
NITRITE UR QL STRIP: NEGATIVE
NRBC BLD AUTO-RTO: 0 /100 WBC (ref 0–0.2)
NT-PROBNP SERPL-MCNC: 8468 PG/ML (ref 0–1800)
PH UR STRIP.AUTO: 5.5 [PH] (ref 5–8)
PLATELET # BLD AUTO: 211 10*3/MM3 (ref 140–450)
PLATELET # BLD AUTO: 217 10*3/MM3 (ref 140–450)
PMV BLD AUTO: 11.3 FL (ref 6–12)
PMV BLD AUTO: 11.4 FL (ref 6–12)
POTASSIUM SERPL-SCNC: 4.1 MMOL/L (ref 3.5–5.2)
POTASSIUM SERPL-SCNC: 4.7 MMOL/L (ref 3.5–5.2)
PROCALCITONIN SERPL-MCNC: 0.07 NG/ML (ref 0–0.25)
PROT SERPL-MCNC: 6.3 G/DL (ref 6–8.5)
PROT SERPL-MCNC: 6.7 G/DL (ref 6–8.5)
PROT UR QL STRIP: ABNORMAL
QT INTERVAL: 443 MS
RBC # BLD AUTO: 3.63 10*6/MM3 (ref 4.14–5.8)
RBC # BLD AUTO: 3.84 10*6/MM3 (ref 4.14–5.8)
RBC # UR STRIP: ABNORMAL /HPF
RBC MORPH BLD: NORMAL
REF LAB TEST METHOD: ABNORMAL
RHINOVIRUS RNA SPEC NAA+PROBE: NOT DETECTED
RSV RNA NPH QL NAA+NON-PROBE: NOT DETECTED
SARS-COV-2 RNA NPH QL NAA+NON-PROBE: DETECTED
SODIUM SERPL-SCNC: 134 MMOL/L (ref 136–145)
SODIUM SERPL-SCNC: 137 MMOL/L (ref 136–145)
SP GR UR STRIP: 1.02 (ref 1–1.03)
SQUAMOUS #/AREA URNS HPF: ABNORMAL /HPF
TRIGL SERPL-MCNC: 65 MG/DL (ref 0–150)
TSH SERPL DL<=0.05 MIU/L-ACNC: 1.13 UIU/ML (ref 0.27–4.2)
UROBILINOGEN UR QL STRIP: ABNORMAL
VARIANT LYMPHS NFR BLD MANUAL: 1 % (ref 19.6–45.3)
VIT B12 BLD-MCNC: 1348 PG/ML (ref 211–946)
VLDLC SERPL-MCNC: 13 MG/DL (ref 5–40)
WBC # UR STRIP: ABNORMAL /HPF
WBC MORPH BLD: NORMAL
WBC NRBC COR # BLD: 15.92 10*3/MM3 (ref 3.4–10.8)
WBC NRBC COR # BLD: 4.56 10*3/MM3 (ref 3.4–10.8)
WHOLE BLOOD HOLD SPECIMEN: NORMAL

## 2021-01-01 PROCEDURE — XW033E5 INTRODUCTION OF REMDESIVIR ANTI-INFECTIVE INTO PERIPHERAL VEIN, PERCUTANEOUS APPROACH, NEW TECHNOLOGY GROUP 5: ICD-10-PCS | Performed by: HOSPITALIST

## 2021-01-01 PROCEDURE — 84145 PROCALCITONIN (PCT): CPT | Performed by: PHYSICIAN ASSISTANT

## 2021-01-01 PROCEDURE — 82607 VITAMIN B-12: CPT | Performed by: HOSPITALIST

## 2021-01-01 PROCEDURE — 83880 ASSAY OF NATRIURETIC PEPTIDE: CPT | Performed by: HOSPITALIST

## 2021-01-01 PROCEDURE — 94799 UNLISTED PULMONARY SVC/PX: CPT

## 2021-01-01 PROCEDURE — 97530 THERAPEUTIC ACTIVITIES: CPT

## 2021-01-01 PROCEDURE — 99222 1ST HOSP IP/OBS MODERATE 55: CPT | Performed by: INTERNAL MEDICINE

## 2021-01-01 PROCEDURE — 25010000002 DEXAMETHASONE PER 1 MG: Performed by: HOSPITALIST

## 2021-01-01 PROCEDURE — 80053 COMPREHEN METABOLIC PANEL: CPT | Performed by: PHYSICIAN ASSISTANT

## 2021-01-01 PROCEDURE — 82248 BILIRUBIN DIRECT: CPT | Performed by: HOSPITALIST

## 2021-01-01 PROCEDURE — 84443 ASSAY THYROID STIM HORMONE: CPT | Performed by: HOSPITALIST

## 2021-01-01 PROCEDURE — 36415 COLL VENOUS BLD VENIPUNCTURE: CPT

## 2021-01-01 PROCEDURE — 82728 ASSAY OF FERRITIN: CPT | Performed by: HOSPITALIST

## 2021-01-01 PROCEDURE — 94640 AIRWAY INHALATION TREATMENT: CPT

## 2021-01-01 PROCEDURE — 80061 LIPID PANEL: CPT | Performed by: HOSPITALIST

## 2021-01-01 PROCEDURE — 85379 FIBRIN DEGRADATION QUANT: CPT | Performed by: HOSPITALIST

## 2021-01-01 PROCEDURE — 99285 EMERGENCY DEPT VISIT HI MDM: CPT

## 2021-01-01 PROCEDURE — 85025 COMPLETE CBC W/AUTO DIFF WBC: CPT | Performed by: HOSPITALIST

## 2021-01-01 PROCEDURE — 0202U NFCT DS 22 TRGT SARS-COV-2: CPT | Performed by: PHYSICIAN ASSISTANT

## 2021-01-01 PROCEDURE — 36415 COLL VENOUS BLD VENIPUNCTURE: CPT | Performed by: HOSPITALIST

## 2021-01-01 PROCEDURE — 85025 COMPLETE CBC W/AUTO DIFF WBC: CPT | Performed by: PHYSICIAN ASSISTANT

## 2021-01-01 PROCEDURE — P9612 CATHETERIZE FOR URINE SPEC: HCPCS

## 2021-01-01 PROCEDURE — 97162 PT EVAL MOD COMPLEX 30 MIN: CPT

## 2021-01-01 PROCEDURE — 83036 HEMOGLOBIN GLYCOSYLATED A1C: CPT | Performed by: HOSPITALIST

## 2021-01-01 PROCEDURE — 93005 ELECTROCARDIOGRAM TRACING: CPT | Performed by: HOSPITALIST

## 2021-01-01 PROCEDURE — 93010 ELECTROCARDIOGRAM REPORT: CPT | Performed by: INTERNAL MEDICINE

## 2021-01-01 PROCEDURE — 94664 DEMO&/EVAL PT USE INHALER: CPT

## 2021-01-01 PROCEDURE — 94761 N-INVAS EAR/PLS OXIMETRY MLT: CPT

## 2021-01-01 PROCEDURE — 81001 URINALYSIS AUTO W/SCOPE: CPT | Performed by: PHYSICIAN ASSISTANT

## 2021-01-01 PROCEDURE — 85007 BL SMEAR W/DIFF WBC COUNT: CPT | Performed by: HOSPITALIST

## 2021-01-01 PROCEDURE — 71045 X-RAY EXAM CHEST 1 VIEW: CPT

## 2021-01-01 PROCEDURE — 92610 EVALUATE SWALLOWING FUNCTION: CPT

## 2021-01-01 PROCEDURE — 86140 C-REACTIVE PROTEIN: CPT | Performed by: HOSPITALIST

## 2021-01-01 PROCEDURE — 80053 COMPREHEN METABOLIC PANEL: CPT | Performed by: HOSPITALIST

## 2021-01-01 RX ORDER — NIFEDIPINE 30 MG/1
30 TABLET, EXTENDED RELEASE ORAL DAILY
Status: DISCONTINUED | OUTPATIENT
Start: 2021-01-01 | End: 2022-01-01

## 2021-01-01 RX ORDER — LEVOTHYROXINE SODIUM 0.12 MG/1
125 TABLET ORAL
Status: DISCONTINUED | OUTPATIENT
Start: 2021-01-01 | End: 2022-01-01 | Stop reason: HOSPADM

## 2021-01-01 RX ORDER — FERROUS SULFATE 325(65) MG
325 TABLET ORAL
COMMUNITY
End: 2022-01-01 | Stop reason: HOSPADM

## 2021-01-01 RX ORDER — GUAIFENESIN 600 MG/1
600 TABLET, EXTENDED RELEASE ORAL EVERY 12 HOURS SCHEDULED
Status: DISCONTINUED | OUTPATIENT
Start: 2021-01-01 | End: 2022-01-01

## 2021-01-01 RX ORDER — CHOLECALCIFEROL (VITAMIN D3) 125 MCG
1000 CAPSULE ORAL DAILY
Status: DISCONTINUED | OUTPATIENT
Start: 2021-01-01 | End: 2021-01-01

## 2021-01-01 RX ORDER — MELATONIN
1000 DAILY
Status: DISCONTINUED | OUTPATIENT
Start: 2021-01-01 | End: 2022-01-01 | Stop reason: HOSPADM

## 2021-01-01 RX ORDER — DEXAMETHASONE SODIUM PHOSPHATE 10 MG/ML
6 INJECTION INTRAMUSCULAR; INTRAVENOUS DAILY
Status: DISCONTINUED | OUTPATIENT
Start: 2021-01-01 | End: 2022-01-01

## 2021-01-01 RX ORDER — ACETAMINOPHEN 325 MG/1
650 TABLET ORAL EVERY 4 HOURS PRN
Status: DISCONTINUED | OUTPATIENT
Start: 2021-01-01 | End: 2022-01-01 | Stop reason: HOSPADM

## 2021-01-01 RX ORDER — FAMOTIDINE 20 MG/1
20 TABLET, FILM COATED ORAL 2 TIMES DAILY
Status: DISCONTINUED | OUTPATIENT
Start: 2021-01-01 | End: 2021-01-01

## 2021-01-01 RX ORDER — ESCITALOPRAM OXALATE 10 MG/1
10 TABLET ORAL DAILY
Status: DISCONTINUED | OUTPATIENT
Start: 2021-01-01 | End: 2022-01-01

## 2021-01-01 RX ORDER — CETIRIZINE HYDROCHLORIDE 10 MG/1
5 TABLET ORAL DAILY
Status: DISCONTINUED | OUTPATIENT
Start: 2021-01-01 | End: 2022-01-01

## 2021-01-01 RX ORDER — FAMOTIDINE 10 MG/ML
20 INJECTION, SOLUTION INTRAVENOUS EVERY 12 HOURS SCHEDULED
Status: DISCONTINUED | OUTPATIENT
Start: 2021-01-01 | End: 2021-01-01

## 2021-01-01 RX ORDER — CARBIDOPA AND LEVODOPA 50; 200 MG/1; MG/1
2 TABLET, EXTENDED RELEASE ORAL 2 TIMES DAILY
Status: DISCONTINUED | OUTPATIENT
Start: 2021-01-01 | End: 2021-01-01

## 2021-01-01 RX ORDER — ASCORBIC ACID 500 MG
500 TABLET ORAL DAILY
Status: DISCONTINUED | OUTPATIENT
Start: 2021-01-01 | End: 2022-01-01

## 2021-01-01 RX ORDER — TERAZOSIN 1 MG/1
1 CAPSULE ORAL NIGHTLY
Status: DISCONTINUED | OUTPATIENT
Start: 2021-01-01 | End: 2022-01-01

## 2021-01-01 RX ORDER — DONEPEZIL HYDROCHLORIDE 5 MG/1
5 TABLET, FILM COATED ORAL NIGHTLY
Status: DISCONTINUED | OUTPATIENT
Start: 2021-01-01 | End: 2022-01-01 | Stop reason: HOSPADM

## 2021-01-01 RX ORDER — BUDESONIDE AND FORMOTEROL FUMARATE DIHYDRATE 160; 4.5 UG/1; UG/1
2 AEROSOL RESPIRATORY (INHALATION)
Status: DISCONTINUED | OUTPATIENT
Start: 2021-01-01 | End: 2022-01-01

## 2021-01-01 RX ORDER — QUETIAPINE FUMARATE 25 MG/1
25 TABLET, FILM COATED ORAL NIGHTLY
Status: DISCONTINUED | OUTPATIENT
Start: 2021-01-01 | End: 2022-01-01 | Stop reason: HOSPADM

## 2021-01-01 RX ORDER — CARBIDOPA/LEVODOPA 25MG-250MG
1 TABLET ORAL 3 TIMES DAILY
Status: DISCONTINUED | OUTPATIENT
Start: 2022-01-01 | End: 2022-01-01 | Stop reason: HOSPADM

## 2021-01-01 RX ORDER — FAMOTIDINE 20 MG/1
20 TABLET, FILM COATED ORAL EVERY 12 HOURS SCHEDULED
Status: DISCONTINUED | OUTPATIENT
Start: 2021-01-01 | End: 2022-01-01 | Stop reason: HOSPADM

## 2021-01-01 RX ADMIN — FAMOTIDINE 20 MG: 20 TABLET, FILM COATED ORAL at 22:12

## 2021-01-01 RX ADMIN — DEXAMETHASONE SODIUM PHOSPHATE 6 MG: 10 INJECTION INTRAMUSCULAR; INTRAVENOUS at 22:13

## 2021-01-01 RX ADMIN — BUDESONIDE AND FORMOTEROL FUMARATE DIHYDRATE 2 PUFF: 160; 4.5 AEROSOL RESPIRATORY (INHALATION) at 08:53

## 2021-01-01 RX ADMIN — GUAIFENESIN 600 MG: 600 TABLET, EXTENDED RELEASE ORAL at 22:12

## 2021-01-01 RX ADMIN — BUDESONIDE AND FORMOTEROL FUMARATE DIHYDRATE 2 PUFF: 160; 4.5 AEROSOL RESPIRATORY (INHALATION) at 20:35

## 2021-01-01 RX ADMIN — TERAZOSIN HYDROCHLORIDE 1 MG: 1 CAPSULE ORAL at 22:12

## 2021-01-01 RX ADMIN — REMDESIVIR 100 MG: 100 INJECTION, POWDER, LYOPHILIZED, FOR SOLUTION INTRAVENOUS at 22:09

## 2021-01-01 RX ADMIN — DEXAMETHASONE SODIUM PHOSPHATE 6 MG: 10 INJECTION INTRAMUSCULAR; INTRAVENOUS at 20:22

## 2021-01-01 RX ADMIN — ACETAMINOPHEN 650 MG: 325 TABLET, FILM COATED ORAL at 22:57

## 2021-01-01 RX ADMIN — BUDESONIDE AND FORMOTEROL FUMARATE DIHYDRATE 2 PUFF: 160; 4.5 AEROSOL RESPIRATORY (INHALATION) at 20:59

## 2021-01-01 RX ADMIN — QUETIAPINE FUMARATE 25 MG: 25 TABLET ORAL at 22:13

## 2021-01-01 RX ADMIN — REMDESIVIR 200 MG: 100 INJECTION, POWDER, LYOPHILIZED, FOR SOLUTION INTRAVENOUS at 20:22

## 2021-01-01 RX ADMIN — DONEPEZIL HYDROCHLORIDE 5 MG: 5 TABLET, FILM COATED ORAL at 22:12

## 2021-01-01 RX ADMIN — RIVAROXABAN 20 MG: 20 TABLET, FILM COATED ORAL at 22:12

## 2021-12-30 PROBLEM — U07.1 COVID: Status: ACTIVE | Noted: 2021-01-01

## 2022-01-01 ENCOUNTER — HOSPITAL ENCOUNTER (INPATIENT)
Facility: HOSPITAL | Age: 77
LOS: 1 days | End: 2022-02-05
Attending: HOSPITALIST | Admitting: HOSPITALIST

## 2022-01-01 ENCOUNTER — APPOINTMENT (OUTPATIENT)
Dept: CT IMAGING | Facility: HOSPITAL | Age: 77
End: 2022-01-01

## 2022-01-01 ENCOUNTER — HOSPITAL ENCOUNTER (INPATIENT)
Facility: HOSPITAL | Age: 77
LOS: 9 days | Discharge: HOSPICE | End: 2022-02-04
Attending: EMERGENCY MEDICINE | Admitting: HOSPITALIST

## 2022-01-01 ENCOUNTER — APPOINTMENT (OUTPATIENT)
Dept: GENERAL RADIOLOGY | Facility: HOSPITAL | Age: 77
End: 2022-01-01

## 2022-01-01 VITALS
WEIGHT: 147.93 LBS | OXYGEN SATURATION: 92 % | HEART RATE: 93 BPM | BODY MASS INDEX: 20.71 KG/M2 | DIASTOLIC BLOOD PRESSURE: 76 MMHG | SYSTOLIC BLOOD PRESSURE: 144 MMHG | TEMPERATURE: 102 F | HEIGHT: 71 IN | RESPIRATION RATE: 20 BRPM

## 2022-01-01 VITALS
WEIGHT: 161.16 LBS | DIASTOLIC BLOOD PRESSURE: 69 MMHG | SYSTOLIC BLOOD PRESSURE: 115 MMHG | HEIGHT: 71 IN | OXYGEN SATURATION: 97 % | HEART RATE: 69 BPM | BODY MASS INDEX: 22.56 KG/M2 | TEMPERATURE: 97.9 F | RESPIRATION RATE: 18 BRPM

## 2022-01-01 VITALS — OXYGEN SATURATION: 85 % | DIASTOLIC BLOOD PRESSURE: 76 MMHG | SYSTOLIC BLOOD PRESSURE: 117 MMHG | TEMPERATURE: 106 F

## 2022-01-01 DIAGNOSIS — N39.0 URINARY TRACT INFECTION WITH HEMATURIA, SITE UNSPECIFIED: ICD-10-CM

## 2022-01-01 DIAGNOSIS — Z78.9 IMPAIRED MOBILITY AND ACTIVITIES OF DAILY LIVING: ICD-10-CM

## 2022-01-01 DIAGNOSIS — R41.82 ALTERED MENTAL STATUS, UNSPECIFIED ALTERED MENTAL STATUS TYPE: Primary | ICD-10-CM

## 2022-01-01 DIAGNOSIS — Z74.09 IMPAIRED MOBILITY AND ACTIVITIES OF DAILY LIVING: ICD-10-CM

## 2022-01-01 DIAGNOSIS — R31.9 URINARY TRACT INFECTION WITH HEMATURIA, SITE UNSPECIFIED: ICD-10-CM

## 2022-01-01 LAB
ALBUMIN SERPL-MCNC: 2.6 G/DL (ref 3.5–5.2)
ALBUMIN SERPL-MCNC: 2.8 G/DL (ref 3.5–5.2)
ALBUMIN SERPL-MCNC: 2.9 G/DL (ref 3.5–5.2)
ALBUMIN SERPL-MCNC: 3.1 G/DL (ref 3.5–5.2)
ALBUMIN SERPL-MCNC: 3.1 G/DL (ref 3.5–5.2)
ALBUMIN SERPL-MCNC: 3.2 G/DL (ref 3.5–5.2)
ALBUMIN SERPL-MCNC: 3.6 G/DL (ref 3.5–5.2)
ALBUMIN SERPL-MCNC: 3.6 G/DL (ref 3.5–5.2)
ALBUMIN/GLOB SERPL: 0.9 G/DL
ALBUMIN/GLOB SERPL: 0.9 G/DL
ALBUMIN/GLOB SERPL: 1 G/DL
ALBUMIN/GLOB SERPL: 1.1 G/DL
ALBUMIN/GLOB SERPL: 1.2 G/DL
ALBUMIN/GLOB SERPL: 1.3 G/DL
ALP SERPL-CCNC: 43 U/L (ref 39–117)
ALP SERPL-CCNC: 44 U/L (ref 39–117)
ALP SERPL-CCNC: 46 U/L (ref 39–117)
ALP SERPL-CCNC: 46 U/L (ref 39–117)
ALP SERPL-CCNC: 48 U/L (ref 39–117)
ALP SERPL-CCNC: 61 U/L (ref 39–117)
ALP SERPL-CCNC: 70 U/L (ref 39–117)
ALT SERPL W P-5'-P-CCNC: 12 U/L (ref 1–41)
ALT SERPL W P-5'-P-CCNC: 16 U/L (ref 1–41)
ALT SERPL W P-5'-P-CCNC: 17 U/L (ref 1–41)
ALT SERPL W P-5'-P-CCNC: 20 U/L (ref 1–41)
ALT SERPL W P-5'-P-CCNC: 23 U/L (ref 1–41)
ALT SERPL W P-5'-P-CCNC: 32 U/L (ref 1–41)
ALT SERPL W P-5'-P-CCNC: <5 U/L (ref 1–41)
ANION GAP SERPL CALCULATED.3IONS-SCNC: 10 MMOL/L (ref 5–15)
ANION GAP SERPL CALCULATED.3IONS-SCNC: 10.1 MMOL/L (ref 5–15)
ANION GAP SERPL CALCULATED.3IONS-SCNC: 10.6 MMOL/L (ref 5–15)
ANION GAP SERPL CALCULATED.3IONS-SCNC: 10.9 MMOL/L (ref 5–15)
ANION GAP SERPL CALCULATED.3IONS-SCNC: 11.2 MMOL/L (ref 5–15)
ANION GAP SERPL CALCULATED.3IONS-SCNC: 13 MMOL/L (ref 5–15)
ANION GAP SERPL CALCULATED.3IONS-SCNC: 7.2 MMOL/L (ref 5–15)
ANION GAP SERPL CALCULATED.3IONS-SCNC: 7.5 MMOL/L (ref 5–15)
ANION GAP SERPL CALCULATED.3IONS-SCNC: 8.2 MMOL/L (ref 5–15)
ANION GAP SERPL CALCULATED.3IONS-SCNC: 8.4 MMOL/L (ref 5–15)
AST SERPL-CCNC: 27 U/L (ref 1–40)
AST SERPL-CCNC: 43 U/L (ref 1–40)
AST SERPL-CCNC: 45 U/L (ref 1–40)
AST SERPL-CCNC: 52 U/L (ref 1–40)
AST SERPL-CCNC: 67 U/L (ref 1–40)
AST SERPL-CCNC: 73 U/L (ref 1–40)
AST SERPL-CCNC: 81 U/L (ref 1–40)
BACTERIA BLD CULT: ABNORMAL
BACTERIA SPEC AEROBE CULT: ABNORMAL
BACTERIA SPEC AEROBE CULT: ABNORMAL
BACTERIA SPEC AEROBE CULT: NORMAL
BACTERIA UR QL AUTO: ABNORMAL /HPF
BASOPHILS # BLD AUTO: 0.01 10*3/MM3 (ref 0–0.2)
BASOPHILS # BLD AUTO: 0.02 10*3/MM3 (ref 0–0.2)
BASOPHILS # BLD AUTO: 0.03 10*3/MM3 (ref 0–0.2)
BASOPHILS # BLD AUTO: 0.03 10*3/MM3 (ref 0–0.2)
BASOPHILS NFR BLD AUTO: 0.1 % (ref 0–1.5)
BASOPHILS NFR BLD AUTO: 0.2 % (ref 0–1.5)
BASOPHILS NFR BLD AUTO: 0.3 % (ref 0–1.5)
BASOPHILS NFR BLD AUTO: 0.4 % (ref 0–1.5)
BILIRUB CONJ SERPL-MCNC: 0.2 MG/DL (ref 0–0.3)
BILIRUB CONJ SERPL-MCNC: 0.2 MG/DL (ref 0–0.3)
BILIRUB CONJ SERPL-MCNC: <0.2 MG/DL (ref 0–0.3)
BILIRUB INDIRECT SERPL-MCNC: ABNORMAL MG/DL
BILIRUB SERPL-MCNC: 0.2 MG/DL (ref 0–1.2)
BILIRUB SERPL-MCNC: 0.3 MG/DL (ref 0–1.2)
BILIRUB SERPL-MCNC: 0.4 MG/DL (ref 0–1.2)
BILIRUB SERPL-MCNC: 0.4 MG/DL (ref 0–1.2)
BILIRUB SERPL-MCNC: 0.5 MG/DL (ref 0–1.2)
BILIRUB UR QL STRIP: NEGATIVE
BOTTLE TYPE: ABNORMAL
BUN SERPL-MCNC: 13 MG/DL (ref 8–23)
BUN SERPL-MCNC: 15 MG/DL (ref 8–23)
BUN SERPL-MCNC: 20 MG/DL (ref 8–23)
BUN SERPL-MCNC: 23 MG/DL (ref 8–23)
BUN SERPL-MCNC: 23 MG/DL (ref 8–23)
BUN SERPL-MCNC: 30 MG/DL (ref 8–23)
BUN SERPL-MCNC: 30 MG/DL (ref 8–23)
BUN SERPL-MCNC: 32 MG/DL (ref 8–23)
BUN SERPL-MCNC: 32 MG/DL (ref 8–23)
BUN SERPL-MCNC: 35 MG/DL (ref 8–23)
BUN SERPL-MCNC: 37 MG/DL (ref 8–23)
BUN SERPL-MCNC: 39 MG/DL (ref 8–23)
BUN/CREAT SERPL: 17.8 (ref 7–25)
BUN/CREAT SERPL: 18.9 (ref 7–25)
BUN/CREAT SERPL: 23.8 (ref 7–25)
BUN/CREAT SERPL: 27.4 (ref 7–25)
BUN/CREAT SERPL: 31.5 (ref 7–25)
BUN/CREAT SERPL: 31.9 (ref 7–25)
BUN/CREAT SERPL: 32.7 (ref 7–25)
BUN/CREAT SERPL: 34 (ref 7–25)
BUN/CREAT SERPL: 35.7 (ref 7–25)
BUN/CREAT SERPL: 36.1 (ref 7–25)
BUN/CREAT SERPL: 39.5 (ref 7–25)
BUN/CREAT SERPL: 41.2 (ref 7–25)
CALCIUM SPEC-SCNC: 8 MG/DL (ref 8.6–10.5)
CALCIUM SPEC-SCNC: 8 MG/DL (ref 8.6–10.5)
CALCIUM SPEC-SCNC: 8.1 MG/DL (ref 8.6–10.5)
CALCIUM SPEC-SCNC: 8.2 MG/DL (ref 8.6–10.5)
CALCIUM SPEC-SCNC: 8.2 MG/DL (ref 8.6–10.5)
CALCIUM SPEC-SCNC: 8.3 MG/DL (ref 8.6–10.5)
CALCIUM SPEC-SCNC: 8.4 MG/DL (ref 8.6–10.5)
CALCIUM SPEC-SCNC: 8.4 MG/DL (ref 8.6–10.5)
CALCIUM SPEC-SCNC: 8.5 MG/DL (ref 8.6–10.5)
CALCIUM SPEC-SCNC: 8.5 MG/DL (ref 8.6–10.5)
CALCIUM SPEC-SCNC: 8.6 MG/DL (ref 8.6–10.5)
CALCIUM SPEC-SCNC: 8.9 MG/DL (ref 8.6–10.5)
CHLORIDE SERPL-SCNC: 105 MMOL/L (ref 98–107)
CHLORIDE SERPL-SCNC: 106 MMOL/L (ref 98–107)
CHLORIDE SERPL-SCNC: 109 MMOL/L (ref 98–107)
CHLORIDE SERPL-SCNC: 110 MMOL/L (ref 98–107)
CHLORIDE SERPL-SCNC: 110 MMOL/L (ref 98–107)
CHLORIDE SERPL-SCNC: 111 MMOL/L (ref 98–107)
CHLORIDE SERPL-SCNC: 111 MMOL/L (ref 98–107)
CHLORIDE SERPL-SCNC: 112 MMOL/L (ref 98–107)
CHLORIDE SERPL-SCNC: 120 MMOL/L (ref 98–107)
CHOLEST SERPL-MCNC: 114 MG/DL (ref 0–200)
CLARITY UR: ABNORMAL
CO2 SERPL-SCNC: 22 MMOL/L (ref 22–29)
CO2 SERPL-SCNC: 24 MMOL/L (ref 22–29)
CO2 SERPL-SCNC: 24.1 MMOL/L (ref 22–29)
CO2 SERPL-SCNC: 24.6 MMOL/L (ref 22–29)
CO2 SERPL-SCNC: 24.8 MMOL/L (ref 22–29)
CO2 SERPL-SCNC: 24.9 MMOL/L (ref 22–29)
CO2 SERPL-SCNC: 25.4 MMOL/L (ref 22–29)
CO2 SERPL-SCNC: 25.5 MMOL/L (ref 22–29)
CO2 SERPL-SCNC: 25.8 MMOL/L (ref 22–29)
CO2 SERPL-SCNC: 26.8 MMOL/L (ref 22–29)
CO2 SERPL-SCNC: 26.8 MMOL/L (ref 22–29)
CO2 SERPL-SCNC: 27.8 MMOL/L (ref 22–29)
COLOR UR: ABNORMAL
CREAT SERPL-MCNC: 0.63 MG/DL (ref 0.76–1.27)
CREAT SERPL-MCNC: 0.72 MG/DL (ref 0.76–1.27)
CREAT SERPL-MCNC: 0.73 MG/DL (ref 0.76–1.27)
CREAT SERPL-MCNC: 0.81 MG/DL (ref 0.76–1.27)
CREAT SERPL-MCNC: 0.83 MG/DL (ref 0.76–1.27)
CREAT SERPL-MCNC: 0.84 MG/DL (ref 0.76–1.27)
CREAT SERPL-MCNC: 0.85 MG/DL (ref 0.76–1.27)
CREAT SERPL-MCNC: 0.94 MG/DL (ref 0.76–1.27)
CREAT SERPL-MCNC: 1.13 MG/DL (ref 0.76–1.27)
CREAT SERPL-MCNC: 2.06 MG/DL (ref 0.76–1.27)
CREAT UR-MCNC: <4.2 MG/DL
CRP SERPL-MCNC: 1.52 MG/DL (ref 0–0.5)
CRP SERPL-MCNC: 11.42 MG/DL (ref 0–0.5)
CRP SERPL-MCNC: 2.92 MG/DL (ref 0–0.5)
CRP SERPL-MCNC: 3.75 MG/DL (ref 0–0.5)
CRP SERPL-MCNC: 7.85 MG/DL (ref 0–0.5)
D-LACTATE SERPL-SCNC: 1.7 MMOL/L (ref 0.5–2)
DEPRECATED RDW RBC AUTO: 43.3 FL (ref 37–54)
DEPRECATED RDW RBC AUTO: 43.6 FL (ref 37–54)
DEPRECATED RDW RBC AUTO: 44.5 FL (ref 37–54)
DEPRECATED RDW RBC AUTO: 46.3 FL (ref 37–54)
DEPRECATED RDW RBC AUTO: 46.5 FL (ref 37–54)
DEPRECATED RDW RBC AUTO: 46.9 FL (ref 37–54)
DEPRECATED RDW RBC AUTO: 47.5 FL (ref 37–54)
DEPRECATED RDW RBC AUTO: 48.4 FL (ref 37–54)
DEPRECATED RDW RBC AUTO: 48.5 FL (ref 37–54)
DEPRECATED RDW RBC AUTO: 48.6 FL (ref 37–54)
DEPRECATED RDW RBC AUTO: 49.6 FL (ref 37–54)
EOSINOPHIL # BLD AUTO: 0 10*3/MM3 (ref 0–0.4)
EOSINOPHIL # BLD AUTO: 0.02 10*3/MM3 (ref 0–0.4)
EOSINOPHIL # BLD AUTO: 0.22 10*3/MM3 (ref 0–0.4)
EOSINOPHIL # BLD MANUAL: 0.2 10*3/MM3 (ref 0–0.4)
EOSINOPHIL NFR BLD AUTO: 0 % (ref 0.3–6.2)
EOSINOPHIL NFR BLD AUTO: 0.2 % (ref 0.3–6.2)
EOSINOPHIL NFR BLD AUTO: 2.9 % (ref 0.3–6.2)
EOSINOPHIL NFR BLD MANUAL: 2 % (ref 0.3–6.2)
ERYTHROCYTE [DISTWIDTH] IN BLOOD BY AUTOMATED COUNT: 13.2 % (ref 12.3–15.4)
ERYTHROCYTE [DISTWIDTH] IN BLOOD BY AUTOMATED COUNT: 13.4 % (ref 12.3–15.4)
ERYTHROCYTE [DISTWIDTH] IN BLOOD BY AUTOMATED COUNT: 13.5 % (ref 12.3–15.4)
ERYTHROCYTE [DISTWIDTH] IN BLOOD BY AUTOMATED COUNT: 13.6 % (ref 12.3–15.4)
ERYTHROCYTE [DISTWIDTH] IN BLOOD BY AUTOMATED COUNT: 13.7 % (ref 12.3–15.4)
ERYTHROCYTE [DISTWIDTH] IN BLOOD BY AUTOMATED COUNT: 13.9 % (ref 12.3–15.4)
ERYTHROCYTE [DISTWIDTH] IN BLOOD BY AUTOMATED COUNT: 14.1 % (ref 12.3–15.4)
ERYTHROCYTE [DISTWIDTH] IN BLOOD BY AUTOMATED COUNT: 14.2 % (ref 12.3–15.4)
ERYTHROCYTE [DISTWIDTH] IN BLOOD BY AUTOMATED COUNT: 14.2 % (ref 12.3–15.4)
ERYTHROCYTE [DISTWIDTH] IN BLOOD BY AUTOMATED COUNT: 14.3 % (ref 12.3–15.4)
ERYTHROCYTE [DISTWIDTH] IN BLOOD BY AUTOMATED COUNT: 14.4 % (ref 12.3–15.4)
GFR SERPL CREATININE-BSD FRML MDRD: 104 ML/MIN/1.73
GFR SERPL CREATININE-BSD FRML MDRD: 106 ML/MIN/1.73
GFR SERPL CREATININE-BSD FRML MDRD: 123 ML/MIN/1.73
GFR SERPL CREATININE-BSD FRML MDRD: 31 ML/MIN/1.73
GFR SERPL CREATININE-BSD FRML MDRD: 63 ML/MIN/1.73
GFR SERPL CREATININE-BSD FRML MDRD: 78 ML/MIN/1.73
GFR SERPL CREATININE-BSD FRML MDRD: 87 ML/MIN/1.73
GFR SERPL CREATININE-BSD FRML MDRD: 89 ML/MIN/1.73
GFR SERPL CREATININE-BSD FRML MDRD: 90 ML/MIN/1.73
GFR SERPL CREATININE-BSD FRML MDRD: 92 ML/MIN/1.73
GLOBULIN UR ELPH-MCNC: 2.7 GM/DL
GLOBULIN UR ELPH-MCNC: 2.8 GM/DL
GLOBULIN UR ELPH-MCNC: 2.9 GM/DL
GLOBULIN UR ELPH-MCNC: 2.9 GM/DL
GLOBULIN UR ELPH-MCNC: 3.1 GM/DL
GLOBULIN UR ELPH-MCNC: 3.4 GM/DL
GLUCOSE SERPL-MCNC: 100 MG/DL (ref 65–99)
GLUCOSE SERPL-MCNC: 103 MG/DL (ref 65–99)
GLUCOSE SERPL-MCNC: 104 MG/DL (ref 65–99)
GLUCOSE SERPL-MCNC: 109 MG/DL (ref 65–99)
GLUCOSE SERPL-MCNC: 119 MG/DL (ref 65–99)
GLUCOSE SERPL-MCNC: 132 MG/DL (ref 65–99)
GLUCOSE SERPL-MCNC: 136 MG/DL (ref 65–99)
GLUCOSE SERPL-MCNC: 139 MG/DL (ref 65–99)
GLUCOSE SERPL-MCNC: 140 MG/DL (ref 65–99)
GLUCOSE SERPL-MCNC: 78 MG/DL (ref 65–99)
GLUCOSE SERPL-MCNC: 86 MG/DL (ref 65–99)
GLUCOSE SERPL-MCNC: 87 MG/DL (ref 65–99)
GLUCOSE UR STRIP-MCNC: NEGATIVE MG/DL
GRAM STN SPEC: ABNORMAL
GRAM STN SPEC: ABNORMAL
HBA1C MFR BLD: 5.62 % (ref 4.8–5.6)
HCT VFR BLD AUTO: 31.2 % (ref 37.5–51)
HCT VFR BLD AUTO: 32.7 % (ref 37.5–51)
HCT VFR BLD AUTO: 33.9 % (ref 37.5–51)
HCT VFR BLD AUTO: 34.1 % (ref 37.5–51)
HCT VFR BLD AUTO: 34.2 % (ref 37.5–51)
HCT VFR BLD AUTO: 34.2 % (ref 37.5–51)
HCT VFR BLD AUTO: 34.7 % (ref 37.5–51)
HCT VFR BLD AUTO: 36 % (ref 37.5–51)
HCT VFR BLD AUTO: 36.2 % (ref 37.5–51)
HCT VFR BLD AUTO: 36.2 % (ref 37.5–51)
HCT VFR BLD AUTO: 36.8 % (ref 37.5–51)
HDLC SERPL-MCNC: 29 MG/DL (ref 40–60)
HGB BLD-MCNC: 10 G/DL (ref 13–17.7)
HGB BLD-MCNC: 10.5 G/DL (ref 13–17.7)
HGB BLD-MCNC: 10.6 G/DL (ref 13–17.7)
HGB BLD-MCNC: 10.7 G/DL (ref 13–17.7)
HGB BLD-MCNC: 10.8 G/DL (ref 13–17.7)
HGB BLD-MCNC: 11.2 G/DL (ref 13–17.7)
HGB BLD-MCNC: 11.3 G/DL (ref 13–17.7)
HGB BLD-MCNC: 11.4 G/DL (ref 13–17.7)
HGB BLD-MCNC: 11.7 G/DL (ref 13–17.7)
HGB BLD-MCNC: 12 G/DL (ref 13–17.7)
HGB BLD-MCNC: 12.1 G/DL (ref 13–17.7)
HGB UR QL STRIP.AUTO: ABNORMAL
HYALINE CASTS UR QL AUTO: ABNORMAL /LPF
IMM GRANULOCYTES # BLD AUTO: 0.05 10*3/MM3 (ref 0–0.05)
IMM GRANULOCYTES # BLD AUTO: 0.05 10*3/MM3 (ref 0–0.05)
IMM GRANULOCYTES # BLD AUTO: 0.06 10*3/MM3 (ref 0–0.05)
IMM GRANULOCYTES # BLD AUTO: 0.06 10*3/MM3 (ref 0–0.05)
IMM GRANULOCYTES # BLD AUTO: 0.1 10*3/MM3 (ref 0–0.05)
IMM GRANULOCYTES # BLD AUTO: 0.11 10*3/MM3 (ref 0–0.05)
IMM GRANULOCYTES NFR BLD AUTO: 0.4 % (ref 0–0.5)
IMM GRANULOCYTES NFR BLD AUTO: 0.5 % (ref 0–0.5)
IMM GRANULOCYTES NFR BLD AUTO: 0.5 % (ref 0–0.5)
IMM GRANULOCYTES NFR BLD AUTO: 0.7 % (ref 0–0.5)
IMM GRANULOCYTES NFR BLD AUTO: 0.9 % (ref 0–0.5)
IMM GRANULOCYTES NFR BLD AUTO: 1.1 % (ref 0–0.5)
ISOLATED FROM: ABNORMAL
KETONES UR QL STRIP: ABNORMAL
LDLC SERPL CALC-MCNC: 64 MG/DL (ref 0–100)
LDLC/HDLC SERPL: 2.16 {RATIO}
LEUKOCYTE ESTERASE UR QL STRIP.AUTO: ABNORMAL
LYMPHOCYTES # BLD AUTO: 0.73 10*3/MM3 (ref 0.7–3.1)
LYMPHOCYTES # BLD AUTO: 0.8 10*3/MM3 (ref 0.7–3.1)
LYMPHOCYTES # BLD AUTO: 0.88 10*3/MM3 (ref 0.7–3.1)
LYMPHOCYTES # BLD AUTO: 1.28 10*3/MM3 (ref 0.7–3.1)
LYMPHOCYTES # BLD AUTO: 1.53 10*3/MM3 (ref 0.7–3.1)
LYMPHOCYTES # BLD AUTO: 1.64 10*3/MM3 (ref 0.7–3.1)
LYMPHOCYTES # BLD MANUAL: 0.15 10*3/MM3 (ref 0.7–3.1)
LYMPHOCYTES # BLD MANUAL: 0.74 10*3/MM3 (ref 0.7–3.1)
LYMPHOCYTES # BLD MANUAL: 1 10*3/MM3 (ref 0.7–3.1)
LYMPHOCYTES NFR BLD AUTO: 13.6 % (ref 19.6–45.3)
LYMPHOCYTES NFR BLD AUTO: 16.5 % (ref 19.6–45.3)
LYMPHOCYTES NFR BLD AUTO: 16.7 % (ref 19.6–45.3)
LYMPHOCYTES NFR BLD AUTO: 5.6 % (ref 19.6–45.3)
LYMPHOCYTES NFR BLD AUTO: 5.9 % (ref 19.6–45.3)
LYMPHOCYTES NFR BLD AUTO: 8.8 % (ref 19.6–45.3)
LYMPHOCYTES NFR BLD MANUAL: 1 % (ref 5–12)
LYMPHOCYTES NFR BLD MANUAL: 2.1 % (ref 5–12)
LYMPHOCYTES NFR BLD MANUAL: 3 % (ref 5–12)
MCH RBC QN AUTO: 29 PG (ref 26.6–33)
MCH RBC QN AUTO: 29.4 PG (ref 26.6–33)
MCH RBC QN AUTO: 29.4 PG (ref 26.6–33)
MCH RBC QN AUTO: 29.5 PG (ref 26.6–33)
MCH RBC QN AUTO: 29.9 PG (ref 26.6–33)
MCH RBC QN AUTO: 30 PG (ref 26.6–33)
MCH RBC QN AUTO: 30 PG (ref 26.6–33)
MCH RBC QN AUTO: 30.1 PG (ref 26.6–33)
MCH RBC QN AUTO: 30.1 PG (ref 26.6–33)
MCHC RBC AUTO-ENTMCNC: 30.8 G/DL (ref 31.5–35.7)
MCHC RBC AUTO-ENTMCNC: 31.1 G/DL (ref 31.5–35.7)
MCHC RBC AUTO-ENTMCNC: 31.3 G/DL (ref 31.5–35.7)
MCHC RBC AUTO-ENTMCNC: 31.5 G/DL (ref 31.5–35.7)
MCHC RBC AUTO-ENTMCNC: 32.1 G/DL (ref 31.5–35.7)
MCHC RBC AUTO-ENTMCNC: 32.5 G/DL (ref 31.5–35.7)
MCHC RBC AUTO-ENTMCNC: 32.7 G/DL (ref 31.5–35.7)
MCHC RBC AUTO-ENTMCNC: 32.7 G/DL (ref 31.5–35.7)
MCHC RBC AUTO-ENTMCNC: 32.9 G/DL (ref 31.5–35.7)
MCHC RBC AUTO-ENTMCNC: 33 G/DL (ref 31.5–35.7)
MCHC RBC AUTO-ENTMCNC: 33.1 G/DL (ref 31.5–35.7)
MCV RBC AUTO: 89.8 FL (ref 79–97)
MCV RBC AUTO: 90 FL (ref 79–97)
MCV RBC AUTO: 90.7 FL (ref 79–97)
MCV RBC AUTO: 91.2 FL (ref 79–97)
MCV RBC AUTO: 91.6 FL (ref 79–97)
MCV RBC AUTO: 92.3 FL (ref 79–97)
MCV RBC AUTO: 92.6 FL (ref 79–97)
MCV RBC AUTO: 93.1 FL (ref 79–97)
MCV RBC AUTO: 93.5 FL (ref 79–97)
MCV RBC AUTO: 94.6 FL (ref 79–97)
MCV RBC AUTO: 95.5 FL (ref 79–97)
MONOCYTES # BLD AUTO: 0.5 10*3/MM3 (ref 0.1–0.9)
MONOCYTES # BLD AUTO: 0.51 10*3/MM3 (ref 0.1–0.9)
MONOCYTES # BLD AUTO: 0.53 10*3/MM3 (ref 0.1–0.9)
MONOCYTES # BLD AUTO: 0.6 10*3/MM3 (ref 0.1–0.9)
MONOCYTES # BLD AUTO: 0.63 10*3/MM3 (ref 0.1–0.9)
MONOCYTES # BLD AUTO: 0.77 10*3/MM3 (ref 0.1–0.9)
MONOCYTES # BLD: 0.15 10*3/MM3 (ref 0.1–0.9)
MONOCYTES # BLD: 0.3 10*3/MM3 (ref 0.1–0.9)
MONOCYTES # BLD: 0.5 10*3/MM3 (ref 0.1–0.9)
MONOCYTES NFR BLD AUTO: 3.9 % (ref 5–12)
MONOCYTES NFR BLD AUTO: 4.6 % (ref 5–12)
MONOCYTES NFR BLD AUTO: 5 % (ref 5–12)
MONOCYTES NFR BLD AUTO: 6.3 % (ref 5–12)
MONOCYTES NFR BLD AUTO: 6.7 % (ref 5–12)
MONOCYTES NFR BLD AUTO: 6.8 % (ref 5–12)
MRSA DNA SPEC QL NAA+PROBE: NORMAL
NEUTROPHILS # BLD AUTO: 14.46 10*3/MM3 (ref 1.7–7)
NEUTROPHILS # BLD AUTO: 22.61 10*3/MM3 (ref 1.7–7)
NEUTROPHILS # BLD AUTO: 8.38 10*3/MM3 (ref 1.7–7)
NEUTROPHILS NFR BLD AUTO: 11.59 10*3/MM3 (ref 1.7–7)
NEUTROPHILS NFR BLD AUTO: 12.21 10*3/MM3 (ref 1.7–7)
NEUTROPHILS NFR BLD AUTO: 5.56 10*3/MM3 (ref 1.7–7)
NEUTROPHILS NFR BLD AUTO: 7.65 10*3/MM3 (ref 1.7–7)
NEUTROPHILS NFR BLD AUTO: 72.6 % (ref 42.7–76)
NEUTROPHILS NFR BLD AUTO: 76.9 % (ref 42.7–76)
NEUTROPHILS NFR BLD AUTO: 78.5 % (ref 42.7–76)
NEUTROPHILS NFR BLD AUTO: 8.42 10*3/MM3 (ref 1.7–7)
NEUTROPHILS NFR BLD AUTO: 8.87 10*3/MM3 (ref 1.7–7)
NEUTROPHILS NFR BLD AUTO: 84.3 % (ref 42.7–76)
NEUTROPHILS NFR BLD AUTO: 89.2 % (ref 42.7–76)
NEUTROPHILS NFR BLD AUTO: 89.7 % (ref 42.7–76)
NEUTROPHILS NFR BLD MANUAL: 84.8 % (ref 42.7–76)
NEUTROPHILS NFR BLD MANUAL: 94.8 % (ref 42.7–76)
NEUTROPHILS NFR BLD MANUAL: 98 % (ref 42.7–76)
NITRITE UR QL STRIP: NEGATIVE
NRBC BLD AUTO-RTO: 0 /100 WBC (ref 0–0.2)
NRBC BLD AUTO-RTO: 0.1 /100 WBC (ref 0–0.2)
NT-PROBNP SERPL-MCNC: 2080 PG/ML (ref 0–1800)
PH UR STRIP.AUTO: <=5 [PH] (ref 5–8)
PHOSPHATE SERPL-MCNC: 3.5 MG/DL (ref 2.5–4.5)
PLAT MORPH BLD: NORMAL
PLATELET # BLD AUTO: 169 10*3/MM3 (ref 140–450)
PLATELET # BLD AUTO: 181 10*3/MM3 (ref 140–450)
PLATELET # BLD AUTO: 184 10*3/MM3 (ref 140–450)
PLATELET # BLD AUTO: 192 10*3/MM3 (ref 140–450)
PLATELET # BLD AUTO: 195 10*3/MM3 (ref 140–450)
PLATELET # BLD AUTO: 200 10*3/MM3 (ref 140–450)
PLATELET # BLD AUTO: 210 10*3/MM3 (ref 140–450)
PLATELET # BLD AUTO: 215 10*3/MM3 (ref 140–450)
PLATELET # BLD AUTO: 229 10*3/MM3 (ref 140–450)
PLATELET # BLD AUTO: 269 10*3/MM3 (ref 140–450)
PLATELET # BLD AUTO: 418 10*3/MM3 (ref 140–450)
PMV BLD AUTO: 10.7 FL (ref 6–12)
PMV BLD AUTO: 11.2 FL (ref 6–12)
PMV BLD AUTO: 11.5 FL (ref 6–12)
PMV BLD AUTO: 11.5 FL (ref 6–12)
PMV BLD AUTO: 11.7 FL (ref 6–12)
PMV BLD AUTO: 11.8 FL (ref 6–12)
PMV BLD AUTO: 12.1 FL (ref 6–12)
PMV BLD AUTO: 12.2 FL (ref 6–12)
PMV BLD AUTO: 12.3 FL (ref 6–12)
PMV BLD AUTO: 12.4 FL (ref 6–12)
PMV BLD AUTO: 12.4 FL (ref 6–12)
POIKILOCYTOSIS BLD QL SMEAR: ABNORMAL
POLYCHROMASIA BLD QL SMEAR: ABNORMAL
POTASSIUM SERPL-SCNC: 3.7 MMOL/L (ref 3.5–5.2)
POTASSIUM SERPL-SCNC: 3.7 MMOL/L (ref 3.5–5.2)
POTASSIUM SERPL-SCNC: 3.8 MMOL/L (ref 3.5–5.2)
POTASSIUM SERPL-SCNC: 3.9 MMOL/L (ref 3.5–5.2)
POTASSIUM SERPL-SCNC: 4 MMOL/L (ref 3.5–5.2)
POTASSIUM SERPL-SCNC: 4.1 MMOL/L (ref 3.5–5.2)
POTASSIUM SERPL-SCNC: 4.1 MMOL/L (ref 3.5–5.2)
POTASSIUM SERPL-SCNC: 4.4 MMOL/L (ref 3.5–5.2)
POTASSIUM SERPL-SCNC: 4.5 MMOL/L (ref 3.5–5.2)
POTASSIUM SERPL-SCNC: 4.6 MMOL/L (ref 3.5–5.2)
POTASSIUM SERPL-SCNC: 5 MMOL/L (ref 3.5–5.2)
POTASSIUM SERPL-SCNC: 5.1 MMOL/L (ref 3.5–5.2)
PROCALCITONIN SERPL-MCNC: 13 NG/ML (ref 0–0.25)
PROT SERPL-MCNC: 5.3 G/DL (ref 6–8.5)
PROT SERPL-MCNC: 5.8 G/DL (ref 6–8.5)
PROT SERPL-MCNC: 6 G/DL (ref 6–8.5)
PROT SERPL-MCNC: 6 G/DL (ref 6–8.5)
PROT SERPL-MCNC: 6.4 G/DL (ref 6–8.5)
PROT SERPL-MCNC: 6.5 G/DL (ref 6–8.5)
PROT SERPL-MCNC: 6.6 G/DL (ref 6–8.5)
PROT UR QL STRIP: ABNORMAL
QT INTERVAL: 505 MS
RBC # BLD AUTO: 3.35 10*6/MM3 (ref 4.14–5.8)
RBC # BLD AUTO: 3.57 10*6/MM3 (ref 4.14–5.8)
RBC # BLD AUTO: 3.57 10*6/MM3 (ref 4.14–5.8)
RBC # BLD AUTO: 3.66 10*6/MM3 (ref 4.14–5.8)
RBC # BLD AUTO: 3.67 10*6/MM3 (ref 4.14–5.8)
RBC # BLD AUTO: 3.75 10*6/MM3 (ref 4.14–5.8)
RBC # BLD AUTO: 3.8 10*6/MM3 (ref 4.14–5.8)
RBC # BLD AUTO: 3.87 10*6/MM3 (ref 4.14–5.8)
RBC # BLD AUTO: 3.9 10*6/MM3 (ref 4.14–5.8)
RBC # BLD AUTO: 3.99 10*6/MM3 (ref 4.14–5.8)
RBC # BLD AUTO: 4.1 10*6/MM3 (ref 4.14–5.8)
RBC # UR STRIP: ABNORMAL /HPF
RBC MORPH BLD: NORMAL
REF LAB TEST METHOD: ABNORMAL
SARS-COV-2 ORF1AB RESP QL NAA+PROBE: NOT DETECTED
SODIUM SERPL-SCNC: 140 MMOL/L (ref 136–145)
SODIUM SERPL-SCNC: 142 MMOL/L (ref 136–145)
SODIUM SERPL-SCNC: 143 MMOL/L (ref 136–145)
SODIUM SERPL-SCNC: 143 MMOL/L (ref 136–145)
SODIUM SERPL-SCNC: 144 MMOL/L (ref 136–145)
SODIUM SERPL-SCNC: 145 MMOL/L (ref 136–145)
SODIUM SERPL-SCNC: 146 MMOL/L (ref 136–145)
SODIUM SERPL-SCNC: 147 MMOL/L (ref 136–145)
SODIUM SERPL-SCNC: 150 MMOL/L (ref 136–145)
SODIUM SERPL-SCNC: 156 MMOL/L (ref 136–145)
SODIUM UR-SCNC: 142 MMOL/L
SP GR UR STRIP: 1.03 (ref 1–1.03)
SQUAMOUS #/AREA URNS HPF: ABNORMAL /HPF
T4 FREE SERPL-MCNC: 1.02 NG/DL (ref 0.93–1.7)
TRIGL SERPL-MCNC: 112 MG/DL (ref 0–150)
TSH SERPL DL<=0.05 MIU/L-ACNC: 9.16 UIU/ML (ref 0.27–4.2)
UROBILINOGEN UR QL STRIP: ABNORMAL
VANCOMYCIN SERPL-MCNC: 9.8 MCG/ML (ref 5–40)
VARIANT LYMPHS NFR BLD MANUAL: 1 % (ref 19.6–45.3)
VARIANT LYMPHS NFR BLD MANUAL: 10.1 % (ref 19.6–45.3)
VARIANT LYMPHS NFR BLD MANUAL: 3.1 % (ref 19.6–45.3)
VLDLC SERPL-MCNC: 21 MG/DL (ref 5–40)
WBC # UR STRIP: ABNORMAL /HPF
WBC MORPH BLD: NORMAL
WBC NRBC COR # BLD: 10.41 10*3/MM3 (ref 3.4–10.8)
WBC NRBC COR # BLD: 11.29 10*3/MM3 (ref 3.4–10.8)
WBC NRBC COR # BLD: 12.99 10*3/MM3 (ref 3.4–10.8)
WBC NRBC COR # BLD: 13.61 10*3/MM3 (ref 3.4–10.8)
WBC NRBC COR # BLD: 14.76 10*3/MM3 (ref 3.4–10.8)
WBC NRBC COR # BLD: 16.51 10*3/MM3 (ref 3.4–10.8)
WBC NRBC COR # BLD: 23.85 10*3/MM3 (ref 3.4–10.8)
WBC NRBC COR # BLD: 7.65 10*3/MM3 (ref 3.4–10.8)
WBC NRBC COR # BLD: 9.88 10*3/MM3 (ref 3.4–10.8)
WBC NRBC COR # BLD: 9.95 10*3/MM3 (ref 3.4–10.8)
WBC NRBC COR # BLD: 9.99 10*3/MM3 (ref 3.4–10.8)

## 2022-01-01 PROCEDURE — 85007 BL SMEAR W/DIFF WBC COUNT: CPT | Performed by: HOSPITALIST

## 2022-01-01 PROCEDURE — 63710000001 DEXAMETHASONE PER 0.25 MG: Performed by: HOSPITALIST

## 2022-01-01 PROCEDURE — 80048 BASIC METABOLIC PNL TOTAL CA: CPT | Performed by: INTERNAL MEDICINE

## 2022-01-01 PROCEDURE — 82248 BILIRUBIN DIRECT: CPT | Performed by: HOSPITALIST

## 2022-01-01 PROCEDURE — 97162 PT EVAL MOD COMPLEX 30 MIN: CPT

## 2022-01-01 PROCEDURE — 93010 ELECTROCARDIOGRAM REPORT: CPT | Performed by: INTERNAL MEDICINE

## 2022-01-01 PROCEDURE — P9612 CATHETERIZE FOR URINE SPEC: HCPCS

## 2022-01-01 PROCEDURE — 80048 BASIC METABOLIC PNL TOTAL CA: CPT | Performed by: HOSPITALIST

## 2022-01-01 PROCEDURE — U0004 COV-19 TEST NON-CDC HGH THRU: HCPCS | Performed by: EMERGENCY MEDICINE

## 2022-01-01 PROCEDURE — 85025 COMPLETE CBC W/AUTO DIFF WBC: CPT | Performed by: HOSPITALIST

## 2022-01-01 PROCEDURE — 85007 BL SMEAR W/DIFF WBC COUNT: CPT | Performed by: EMERGENCY MEDICINE

## 2022-01-01 PROCEDURE — 94760 N-INVAS EAR/PLS OXIMETRY 1: CPT

## 2022-01-01 PROCEDURE — 25010000002 MORPHINE PER 10 MG: Performed by: HOSPITALIST

## 2022-01-01 PROCEDURE — 94799 UNLISTED PULMONARY SVC/PX: CPT

## 2022-01-01 PROCEDURE — 84439 ASSAY OF FREE THYROXINE: CPT | Performed by: HOSPITALIST

## 2022-01-01 PROCEDURE — 81001 URINALYSIS AUTO W/SCOPE: CPT | Performed by: EMERGENCY MEDICINE

## 2022-01-01 PROCEDURE — 86140 C-REACTIVE PROTEIN: CPT | Performed by: HOSPITALIST

## 2022-01-01 PROCEDURE — 97110 THERAPEUTIC EXERCISES: CPT

## 2022-01-01 PROCEDURE — 85025 COMPLETE CBC W/AUTO DIFF WBC: CPT | Performed by: EMERGENCY MEDICINE

## 2022-01-01 PROCEDURE — 36415 COLL VENOUS BLD VENIPUNCTURE: CPT | Performed by: HOSPITALIST

## 2022-01-01 PROCEDURE — 80076 HEPATIC FUNCTION PANEL: CPT | Performed by: HOSPITALIST

## 2022-01-01 PROCEDURE — 97535 SELF CARE MNGMENT TRAINING: CPT

## 2022-01-01 PROCEDURE — 97164 PT RE-EVAL EST PLAN CARE: CPT

## 2022-01-01 PROCEDURE — 80053 COMPREHEN METABOLIC PANEL: CPT | Performed by: HOSPITALIST

## 2022-01-01 PROCEDURE — 25010000002 VANCOMYCIN 10 G RECONSTITUTED SOLUTION: Performed by: INTERNAL MEDICINE

## 2022-01-01 PROCEDURE — 87040 BLOOD CULTURE FOR BACTERIA: CPT | Performed by: EMERGENCY MEDICINE

## 2022-01-01 PROCEDURE — 25010000002 CEFTRIAXONE PER 250 MG: Performed by: INTERNAL MEDICINE

## 2022-01-01 PROCEDURE — 25010000002 PIPERACILLIN SOD-TAZOBACTAM PER 1 G: Performed by: INTERNAL MEDICINE

## 2022-01-01 PROCEDURE — 87086 URINE CULTURE/COLONY COUNT: CPT | Performed by: EMERGENCY MEDICINE

## 2022-01-01 PROCEDURE — 97166 OT EVAL MOD COMPLEX 45 MIN: CPT

## 2022-01-01 PROCEDURE — 94761 N-INVAS EAR/PLS OXIMETRY MLT: CPT

## 2022-01-01 PROCEDURE — 25010000002 CEFTRIAXONE PER 250 MG: Performed by: HOSPITALIST

## 2022-01-01 PROCEDURE — 87147 CULTURE TYPE IMMUNOLOGIC: CPT | Performed by: EMERGENCY MEDICINE

## 2022-01-01 PROCEDURE — 85027 COMPLETE CBC AUTOMATED: CPT | Performed by: HOSPITALIST

## 2022-01-01 PROCEDURE — 83036 HEMOGLOBIN GLYCOSYLATED A1C: CPT | Performed by: HOSPITALIST

## 2022-01-01 PROCEDURE — 87077 CULTURE AEROBIC IDENTIFY: CPT | Performed by: EMERGENCY MEDICINE

## 2022-01-01 PROCEDURE — 97530 THERAPEUTIC ACTIVITIES: CPT

## 2022-01-01 PROCEDURE — 82570 ASSAY OF URINE CREATININE: CPT | Performed by: INTERNAL MEDICINE

## 2022-01-01 PROCEDURE — 87150 DNA/RNA AMPLIFIED PROBE: CPT | Performed by: EMERGENCY MEDICINE

## 2022-01-01 PROCEDURE — 71045 X-RAY EXAM CHEST 1 VIEW: CPT

## 2022-01-01 PROCEDURE — 84443 ASSAY THYROID STIM HORMONE: CPT | Performed by: HOSPITALIST

## 2022-01-01 PROCEDURE — 99285 EMERGENCY DEPT VISIT HI MDM: CPT

## 2022-01-01 PROCEDURE — 25010000002 DEXAMETHASONE PER 1 MG: Performed by: HOSPITALIST

## 2022-01-01 PROCEDURE — 87186 SC STD MICRODIL/AGAR DIL: CPT | Performed by: EMERGENCY MEDICINE

## 2022-01-01 PROCEDURE — G0378 HOSPITAL OBSERVATION PER HR: HCPCS

## 2022-01-01 PROCEDURE — 80061 LIPID PANEL: CPT | Performed by: HOSPITALIST

## 2022-01-01 PROCEDURE — 93005 ELECTROCARDIOGRAM TRACING: CPT | Performed by: HOSPITALIST

## 2022-01-01 PROCEDURE — 36415 COLL VENOUS BLD VENIPUNCTURE: CPT

## 2022-01-01 PROCEDURE — 87641 MR-STAPH DNA AMP PROBE: CPT | Performed by: INTERNAL MEDICINE

## 2022-01-01 PROCEDURE — 83880 ASSAY OF NATRIURETIC PEPTIDE: CPT | Performed by: HOSPITALIST

## 2022-01-01 PROCEDURE — 85027 COMPLETE CBC AUTOMATED: CPT | Performed by: INTERNAL MEDICINE

## 2022-01-01 PROCEDURE — 84300 ASSAY OF URINE SODIUM: CPT | Performed by: INTERNAL MEDICINE

## 2022-01-01 PROCEDURE — 84145 PROCALCITONIN (PCT): CPT | Performed by: INTERNAL MEDICINE

## 2022-01-01 PROCEDURE — 70450 CT HEAD/BRAIN W/O DYE: CPT

## 2022-01-01 PROCEDURE — 80202 ASSAY OF VANCOMYCIN: CPT | Performed by: INTERNAL MEDICINE

## 2022-01-01 PROCEDURE — 80053 COMPREHEN METABOLIC PANEL: CPT | Performed by: EMERGENCY MEDICINE

## 2022-01-01 PROCEDURE — 83605 ASSAY OF LACTIC ACID: CPT | Performed by: EMERGENCY MEDICINE

## 2022-01-01 PROCEDURE — 25010000002 CEFTRIAXONE PER 250 MG: Performed by: EMERGENCY MEDICINE

## 2022-01-01 PROCEDURE — 80069 RENAL FUNCTION PANEL: CPT | Performed by: INTERNAL MEDICINE

## 2022-01-01 RX ORDER — PROMETHAZINE HYDROCHLORIDE 6.25 MG/5ML
6.25 SYRUP ORAL EVERY 4 HOURS PRN
Status: CANCELLED | OUTPATIENT
Start: 2022-01-01

## 2022-01-01 RX ORDER — LEVOTHYROXINE SODIUM 0.12 MG/1
125 TABLET ORAL
Status: DISCONTINUED | OUTPATIENT
Start: 2022-01-01 | End: 2022-01-01

## 2022-01-01 RX ORDER — CEPHALEXIN 500 MG/1
500 CAPSULE ORAL EVERY 8 HOURS SCHEDULED
Status: DISCONTINUED | OUTPATIENT
Start: 2022-01-01 | End: 2022-01-01

## 2022-01-01 RX ORDER — HALOPERIDOL 1 MG/1
1 TABLET ORAL EVERY 4 HOURS PRN
Status: DISCONTINUED | OUTPATIENT
Start: 2022-01-01 | End: 2022-01-01 | Stop reason: HOSPADM

## 2022-01-01 RX ORDER — PROMETHAZINE HYDROCHLORIDE 12.5 MG/1
6.25 SUPPOSITORY RECTAL EVERY 4 HOURS PRN
Status: CANCELLED | OUTPATIENT
Start: 2022-01-01

## 2022-01-01 RX ORDER — MELATONIN
1000 DAILY
Status: DISCONTINUED | OUTPATIENT
Start: 2022-01-01 | End: 2022-01-01

## 2022-01-01 RX ORDER — MORPHINE SULFATE 4 MG/ML
4 INJECTION, SOLUTION INTRAMUSCULAR; INTRAVENOUS
Status: DISCONTINUED | OUTPATIENT
Start: 2022-01-01 | End: 2022-01-01 | Stop reason: HOSPADM

## 2022-01-01 RX ORDER — LORAZEPAM 2 MG/ML
0.5 INJECTION INTRAMUSCULAR
Status: DISCONTINUED | OUTPATIENT
Start: 2022-01-01 | End: 2022-01-01 | Stop reason: HOSPADM

## 2022-01-01 RX ORDER — ACETAMINOPHEN 325 MG/1
650 TABLET ORAL EVERY 4 HOURS PRN
Status: DISCONTINUED | OUTPATIENT
Start: 2022-01-01 | End: 2022-01-01

## 2022-01-01 RX ORDER — PROMETHAZINE HYDROCHLORIDE 6.25 MG/5ML
6.25 SYRUP ORAL EVERY 4 HOURS PRN
Status: DISCONTINUED | OUTPATIENT
Start: 2022-01-01 | End: 2022-01-01 | Stop reason: HOSPADM

## 2022-01-01 RX ORDER — DEXTROSE MONOHYDRATE 50 MG/ML
100 INJECTION, SOLUTION INTRAVENOUS CONTINUOUS
Status: DISCONTINUED | OUTPATIENT
Start: 2022-01-01 | End: 2022-01-01

## 2022-01-01 RX ORDER — LORAZEPAM 2 MG/ML
1 INJECTION INTRAMUSCULAR
Status: CANCELLED | OUTPATIENT
Start: 2022-01-01 | End: 2022-02-10

## 2022-01-01 RX ORDER — DEXAMETHASONE 6 MG/1
6 TABLET ORAL
Qty: 3 TABLET | Refills: 0 | Status: SHIPPED | OUTPATIENT
Start: 2022-01-01 | End: 2022-01-01

## 2022-01-01 RX ORDER — ACETAMINOPHEN 650 MG/1
650 SUPPOSITORY RECTAL EVERY 4 HOURS PRN
Status: CANCELLED | OUTPATIENT
Start: 2022-01-01

## 2022-01-01 RX ORDER — MELATONIN
1000 DAILY
Qty: 30 TABLET | Refills: 0 | Status: SHIPPED | OUTPATIENT
Start: 2022-01-01 | End: 2022-01-01

## 2022-01-01 RX ORDER — DEXTROSE MONOHYDRATE 50 MG/ML
125 INJECTION, SOLUTION INTRAVENOUS CONTINUOUS
Status: DISCONTINUED | OUTPATIENT
Start: 2022-01-01 | End: 2022-01-01

## 2022-01-01 RX ORDER — PROMETHAZINE HYDROCHLORIDE 25 MG/1
6.25 TABLET ORAL EVERY 4 HOURS PRN
Status: DISCONTINUED | OUTPATIENT
Start: 2022-01-01 | End: 2022-01-01 | Stop reason: HOSPADM

## 2022-01-01 RX ORDER — LORAZEPAM 2 MG/ML
2 CONCENTRATE ORAL
Status: CANCELLED | OUTPATIENT
Start: 2022-01-01 | End: 2022-02-10

## 2022-01-01 RX ORDER — LORAZEPAM 2 MG/ML
0.5 INJECTION INTRAMUSCULAR
Status: CANCELLED | OUTPATIENT
Start: 2022-01-01 | End: 2022-02-10

## 2022-01-01 RX ORDER — GLYCOPYRROLATE 0.2 MG/ML
0.4 INJECTION INTRAMUSCULAR; INTRAVENOUS
Status: DISCONTINUED | OUTPATIENT
Start: 2022-01-01 | End: 2022-01-01 | Stop reason: HOSPADM

## 2022-01-01 RX ORDER — HALOPERIDOL 5 MG/ML
1 INJECTION INTRAMUSCULAR EVERY 4 HOURS PRN
Status: DISCONTINUED | OUTPATIENT
Start: 2022-01-01 | End: 2022-01-01 | Stop reason: HOSPADM

## 2022-01-01 RX ORDER — PROMETHAZINE HYDROCHLORIDE 12.5 MG/1
6.25 SUPPOSITORY RECTAL EVERY 4 HOURS PRN
Status: DISCONTINUED | OUTPATIENT
Start: 2022-01-01 | End: 2022-01-01 | Stop reason: HOSPADM

## 2022-01-01 RX ORDER — MORPHINE SULFATE 4 MG/ML
4 INJECTION, SOLUTION INTRAMUSCULAR; INTRAVENOUS
Status: CANCELLED | OUTPATIENT
Start: 2022-01-01 | End: 2022-02-10

## 2022-01-01 RX ORDER — CEPHALEXIN 500 MG/1
500 CAPSULE ORAL EVERY 8 HOURS SCHEDULED
Qty: 5 CAPSULE | Refills: 0 | Status: SHIPPED | OUTPATIENT
Start: 2022-01-01 | End: 2022-01-01

## 2022-01-01 RX ORDER — MORPHINE SULFATE 2 MG/ML
2 INJECTION, SOLUTION INTRAMUSCULAR; INTRAVENOUS
Status: CANCELLED | OUTPATIENT
Start: 2022-01-01 | End: 2022-02-08

## 2022-01-01 RX ORDER — KETOROLAC TROMETHAMINE 15 MG/ML
15 INJECTION, SOLUTION INTRAMUSCULAR; INTRAVENOUS EVERY 6 HOURS PRN
Status: DISCONTINUED | OUTPATIENT
Start: 2022-01-01 | End: 2022-01-01 | Stop reason: HOSPADM

## 2022-01-01 RX ORDER — LORAZEPAM 2 MG/ML
0.5 CONCENTRATE ORAL
Status: DISCONTINUED | OUTPATIENT
Start: 2022-01-01 | End: 2022-01-01 | Stop reason: HOSPADM

## 2022-01-01 RX ORDER — GLYCOPYRROLATE 0.2 MG/ML
0.2 INJECTION INTRAMUSCULAR; INTRAVENOUS
Status: DISCONTINUED | OUTPATIENT
Start: 2022-01-01 | End: 2022-01-01 | Stop reason: HOSPADM

## 2022-01-01 RX ORDER — MORPHINE SULFATE 2 MG/ML
2 INJECTION, SOLUTION INTRAMUSCULAR; INTRAVENOUS
Status: DISCONTINUED | OUTPATIENT
Start: 2022-01-01 | End: 2022-01-01 | Stop reason: HOSPADM

## 2022-01-01 RX ORDER — LORAZEPAM 2 MG/ML
2 INJECTION INTRAMUSCULAR
Status: DISCONTINUED | OUTPATIENT
Start: 2022-01-01 | End: 2022-01-01 | Stop reason: HOSPADM

## 2022-01-01 RX ORDER — DIPHENHYDRAMINE HYDROCHLORIDE 50 MG/ML
25 INJECTION INTRAMUSCULAR; INTRAVENOUS EVERY 6 HOURS PRN
Status: DISCONTINUED | OUTPATIENT
Start: 2022-01-01 | End: 2022-01-01 | Stop reason: HOSPADM

## 2022-01-01 RX ORDER — ACETAMINOPHEN 650 MG/1
650 SUPPOSITORY RECTAL EVERY 4 HOURS PRN
Status: DISCONTINUED | OUTPATIENT
Start: 2022-01-01 | End: 2022-01-01 | Stop reason: HOSPADM

## 2022-01-01 RX ORDER — KETOROLAC TROMETHAMINE 15 MG/ML
15 INJECTION, SOLUTION INTRAMUSCULAR; INTRAVENOUS EVERY 6 HOURS PRN
Status: CANCELLED | OUTPATIENT
Start: 2022-01-01 | End: 2022-02-08

## 2022-01-01 RX ORDER — SODIUM CHLORIDE 0.9 % (FLUSH) 0.9 %
10 SYRINGE (ML) INJECTION AS NEEDED
Status: DISCONTINUED | OUTPATIENT
Start: 2022-01-01 | End: 2022-01-01

## 2022-01-01 RX ORDER — DIPHENHYDRAMINE HYDROCHLORIDE AND LIDOCAINE HYDROCHLORIDE AND ALUMINUM HYDROXIDE AND MAGNESIUM HYDRO
5 KIT EVERY 4 HOURS PRN
Status: DISCONTINUED | OUTPATIENT
Start: 2022-01-01 | End: 2022-01-01 | Stop reason: HOSPADM

## 2022-01-01 RX ORDER — MORPHINE SULFATE 20 MG/ML
5 SOLUTION ORAL
Status: DISCONTINUED | OUTPATIENT
Start: 2022-01-01 | End: 2022-01-01 | Stop reason: HOSPADM

## 2022-01-01 RX ORDER — FAMOTIDINE 20 MG/1
20 TABLET, FILM COATED ORAL EVERY 12 HOURS SCHEDULED
Qty: 60 TABLET | Refills: 0 | Status: SHIPPED | OUTPATIENT
Start: 2022-01-01 | End: 2022-01-01

## 2022-01-01 RX ORDER — LEVOTHYROXINE SODIUM 0.15 MG/1
150 TABLET ORAL
Status: DISCONTINUED | OUTPATIENT
Start: 2022-01-01 | End: 2022-01-01

## 2022-01-01 RX ORDER — MORPHINE SULFATE 2 MG/ML
4 INJECTION, SOLUTION INTRAMUSCULAR; INTRAVENOUS
Status: DISCONTINUED | OUTPATIENT
Start: 2022-01-01 | End: 2022-01-01 | Stop reason: HOSPADM

## 2022-01-01 RX ORDER — LORAZEPAM 2 MG/ML
2 INJECTION INTRAMUSCULAR
Status: CANCELLED | OUTPATIENT
Start: 2022-01-01 | End: 2022-02-10

## 2022-01-01 RX ORDER — LORAZEPAM 2 MG/ML
1 CONCENTRATE ORAL
Status: DISCONTINUED | OUTPATIENT
Start: 2022-01-01 | End: 2022-01-01 | Stop reason: HOSPADM

## 2022-01-01 RX ORDER — MORPHINE SULFATE 20 MG/ML
5 SOLUTION ORAL
Status: CANCELLED | OUTPATIENT
Start: 2022-01-01 | End: 2022-02-08

## 2022-01-01 RX ORDER — ESCITALOPRAM OXALATE 10 MG/1
10 TABLET ORAL NIGHTLY
Status: DISCONTINUED | OUTPATIENT
Start: 2022-01-01 | End: 2022-01-01

## 2022-01-01 RX ORDER — ESCITALOPRAM OXALATE 10 MG/1
10 TABLET ORAL NIGHTLY
Status: DISCONTINUED | OUTPATIENT
Start: 2022-01-01 | End: 2022-01-01 | Stop reason: HOSPADM

## 2022-01-01 RX ORDER — ACETAMINOPHEN 650 MG/1
650 SUPPOSITORY RECTAL EVERY 4 HOURS PRN
Status: DISCONTINUED | OUTPATIENT
Start: 2022-01-01 | End: 2022-01-01

## 2022-01-01 RX ORDER — MORPHINE SULFATE 20 MG/ML
10 SOLUTION ORAL
Status: DISCONTINUED | OUTPATIENT
Start: 2022-01-01 | End: 2022-01-01 | Stop reason: HOSPADM

## 2022-01-01 RX ORDER — DIPHENHYDRAMINE HYDROCHLORIDE AND LIDOCAINE HYDROCHLORIDE AND ALUMINUM HYDROXIDE AND MAGNESIUM HYDRO
5 KIT EVERY 4 HOURS PRN
Status: CANCELLED | OUTPATIENT
Start: 2022-01-01

## 2022-01-01 RX ORDER — HALOPERIDOL 2 MG/ML
1 SOLUTION ORAL EVERY 4 HOURS PRN
Status: DISCONTINUED | OUTPATIENT
Start: 2022-01-01 | End: 2022-01-01 | Stop reason: HOSPADM

## 2022-01-01 RX ORDER — CARBIDOPA AND LEVODOPA 50; 200 MG/1; MG/1
1 TABLET, EXTENDED RELEASE ORAL EVERY 12 HOURS SCHEDULED
Status: DISCONTINUED | OUTPATIENT
Start: 2022-01-01 | End: 2022-01-01

## 2022-01-01 RX ORDER — DONEPEZIL HYDROCHLORIDE 10 MG/1
10 TABLET, FILM COATED ORAL NIGHTLY
Status: DISCONTINUED | OUTPATIENT
Start: 2022-01-01 | End: 2022-01-01

## 2022-01-01 RX ORDER — DIPHENOXYLATE HYDROCHLORIDE AND ATROPINE SULFATE 2.5; .025 MG/1; MG/1
1 TABLET ORAL
Status: DISCONTINUED | OUTPATIENT
Start: 2022-01-01 | End: 2022-01-01 | Stop reason: HOSPADM

## 2022-01-01 RX ORDER — QUETIAPINE FUMARATE 25 MG/1
12.5 TABLET, FILM COATED ORAL NIGHTLY
Status: DISCONTINUED | OUTPATIENT
Start: 2022-01-01 | End: 2022-01-01

## 2022-01-01 RX ORDER — ACETAMINOPHEN 160 MG/5ML
650 SOLUTION ORAL EVERY 4 HOURS PRN
Status: DISCONTINUED | OUTPATIENT
Start: 2022-01-01 | End: 2022-01-01 | Stop reason: HOSPADM

## 2022-01-01 RX ORDER — GLYCOPYRROLATE 0.2 MG/ML
0.4 INJECTION INTRAMUSCULAR; INTRAVENOUS
Status: CANCELLED | OUTPATIENT
Start: 2022-01-01

## 2022-01-01 RX ORDER — GUAIFENESIN 200 MG/10ML
400 LIQUID ORAL EVERY 8 HOURS SCHEDULED
Status: DISCONTINUED | OUTPATIENT
Start: 2022-01-01 | End: 2022-01-01

## 2022-01-01 RX ORDER — ACETAMINOPHEN 325 MG/1
650 TABLET ORAL EVERY 4 HOURS PRN
Status: DISCONTINUED | OUTPATIENT
Start: 2022-01-01 | End: 2022-01-01 | Stop reason: HOSPADM

## 2022-01-01 RX ORDER — CARBIDOPA AND LEVODOPA 50; 200 MG/1; MG/1
2 TABLET, EXTENDED RELEASE ORAL EVERY 12 HOURS SCHEDULED
Status: DISCONTINUED | OUTPATIENT
Start: 2022-01-01 | End: 2022-01-01

## 2022-01-01 RX ORDER — ESCITALOPRAM OXALATE 10 MG/1
10 TABLET ORAL DAILY
Status: DISCONTINUED | OUTPATIENT
Start: 2022-01-01 | End: 2022-01-01

## 2022-01-01 RX ORDER — GLYCOPYRROLATE 0.2 MG/ML
0.2 INJECTION INTRAMUSCULAR; INTRAVENOUS
Status: CANCELLED | OUTPATIENT
Start: 2022-01-01

## 2022-01-01 RX ORDER — LEVOTHYROXINE SODIUM 0.15 MG/1
150 TABLET ORAL
Qty: 30 TABLET | Refills: 0 | Status: SHIPPED | OUTPATIENT
Start: 2022-01-01 | End: 2022-03-03

## 2022-01-01 RX ORDER — DIPHENHYDRAMINE HYDROCHLORIDE 50 MG/ML
25 INJECTION INTRAMUSCULAR; INTRAVENOUS EVERY 6 HOURS PRN
Status: CANCELLED | OUTPATIENT
Start: 2022-01-01

## 2022-01-01 RX ORDER — ACETAMINOPHEN 325 MG/1
650 TABLET ORAL EVERY 4 HOURS PRN
Status: CANCELLED | OUTPATIENT
Start: 2022-01-01

## 2022-01-01 RX ORDER — LORAZEPAM 2 MG/ML
2 CONCENTRATE ORAL
Status: DISCONTINUED | OUTPATIENT
Start: 2022-01-01 | End: 2022-01-01 | Stop reason: HOSPADM

## 2022-01-01 RX ORDER — HALOPERIDOL 2 MG/ML
1 SOLUTION ORAL EVERY 4 HOURS PRN
Status: CANCELLED | OUTPATIENT
Start: 2022-01-01

## 2022-01-01 RX ORDER — LORAZEPAM 2 MG/ML
1 INJECTION INTRAMUSCULAR
Status: DISCONTINUED | OUTPATIENT
Start: 2022-01-01 | End: 2022-01-01 | Stop reason: HOSPADM

## 2022-01-01 RX ORDER — DIPHENOXYLATE HYDROCHLORIDE AND ATROPINE SULFATE 2.5; .025 MG/1; MG/1
1 TABLET ORAL
Status: CANCELLED | OUTPATIENT
Start: 2022-01-01

## 2022-01-01 RX ORDER — DIPHENHYDRAMINE HCL 25 MG
25 CAPSULE ORAL EVERY 6 HOURS PRN
Status: DISCONTINUED | OUTPATIENT
Start: 2022-01-01 | End: 2022-01-01 | Stop reason: HOSPADM

## 2022-01-01 RX ORDER — HALOPERIDOL 5 MG/ML
1 INJECTION INTRAMUSCULAR EVERY 4 HOURS PRN
Status: CANCELLED | OUTPATIENT
Start: 2022-01-01

## 2022-01-01 RX ORDER — LORAZEPAM 2 MG/ML
0.5 CONCENTRATE ORAL
Status: CANCELLED | OUTPATIENT
Start: 2022-01-01 | End: 2022-02-10

## 2022-01-01 RX ORDER — SENNA PLUS 8.6 MG/1
1 TABLET ORAL NIGHTLY PRN
Status: DISCONTINUED | OUTPATIENT
Start: 2022-01-01 | End: 2022-01-01 | Stop reason: HOSPADM

## 2022-01-01 RX ORDER — PROMETHAZINE HYDROCHLORIDE 25 MG/1
6.25 TABLET ORAL EVERY 4 HOURS PRN
Status: CANCELLED | OUTPATIENT
Start: 2022-01-01

## 2022-01-01 RX ORDER — HALOPERIDOL 1 MG/1
1 TABLET ORAL EVERY 4 HOURS PRN
Status: CANCELLED | OUTPATIENT
Start: 2022-01-01

## 2022-01-01 RX ORDER — SODIUM CHLORIDE 450 MG/100ML
75 INJECTION, SOLUTION INTRAVENOUS CONTINUOUS
Status: DISCONTINUED | OUTPATIENT
Start: 2022-01-01 | End: 2022-01-01

## 2022-01-01 RX ORDER — FAMOTIDINE 20 MG/1
20 TABLET, FILM COATED ORAL EVERY 12 HOURS SCHEDULED
Status: DISCONTINUED | OUTPATIENT
Start: 2022-01-01 | End: 2022-01-01

## 2022-01-01 RX ORDER — CEPHALEXIN 500 MG/1
500 CAPSULE ORAL EVERY 8 HOURS SCHEDULED
Qty: 9 CAPSULE | Refills: 0 | Status: SHIPPED | OUTPATIENT
Start: 2022-01-01 | End: 2022-01-01 | Stop reason: HOSPADM

## 2022-01-01 RX ORDER — LORAZEPAM 2 MG/ML
1 CONCENTRATE ORAL
Status: CANCELLED | OUTPATIENT
Start: 2022-01-01 | End: 2022-02-10

## 2022-01-01 RX ORDER — DIPHENHYDRAMINE HCL 25 MG
25 CAPSULE ORAL EVERY 6 HOURS PRN
Status: CANCELLED | OUTPATIENT
Start: 2022-01-01

## 2022-01-01 RX ORDER — MORPHINE SULFATE 20 MG/ML
10 SOLUTION ORAL
Status: CANCELLED | OUTPATIENT
Start: 2022-01-01 | End: 2022-02-10

## 2022-01-01 RX ORDER — NITROGLYCERIN 0.4 MG/1
0.4 TABLET SUBLINGUAL
Status: DISCONTINUED | OUTPATIENT
Start: 2022-01-01 | End: 2022-01-01

## 2022-01-01 RX ORDER — ACETAMINOPHEN 160 MG/5ML
650 SOLUTION ORAL EVERY 4 HOURS PRN
Status: CANCELLED | OUTPATIENT
Start: 2022-01-01

## 2022-01-01 RX ADMIN — TAZOBACTAM SODIUM AND PIPERACILLIN SODIUM 3.38 G: 375; 3 INJECTION, SOLUTION INTRAVENOUS at 21:45

## 2022-01-01 RX ADMIN — CARBIDOPA AND LEVODOPA 2 TABLET: 50; 200 TABLET, EXTENDED RELEASE ORAL at 20:37

## 2022-01-01 RX ADMIN — CARBIDOPA AND LEVODOPA 2 TABLET: 50; 200 TABLET, EXTENDED RELEASE ORAL at 20:25

## 2022-01-01 RX ADMIN — LEVOTHYROXINE SODIUM 150 MCG: 0.15 TABLET ORAL at 05:36

## 2022-01-01 RX ADMIN — CARBIDOPA AND LEVODOPA 1 TABLET: 25; 250 TABLET ORAL at 15:10

## 2022-01-01 RX ADMIN — METOPROLOL TARTRATE 12.5 MG: 25 TABLET, FILM COATED ORAL at 09:52

## 2022-01-01 RX ADMIN — RIVAROXABAN 20 MG: 20 TABLET, FILM COATED ORAL at 17:42

## 2022-01-01 RX ADMIN — DONEPEZIL HYDROCHLORIDE 10 MG: 10 TABLET, FILM COATED ORAL at 20:31

## 2022-01-01 RX ADMIN — RIVAROXABAN 20 MG: 20 TABLET, FILM COATED ORAL at 17:20

## 2022-01-01 RX ADMIN — CARBIDOPA AND LEVODOPA 1 TABLET: 25; 250 TABLET ORAL at 09:21

## 2022-01-01 RX ADMIN — FAMOTIDINE 20 MG: 20 TABLET, FILM COATED ORAL at 21:49

## 2022-01-01 RX ADMIN — MORPHINE SULFATE 2 MG: 2 INJECTION, SOLUTION INTRAMUSCULAR; INTRAVENOUS at 17:42

## 2022-01-01 RX ADMIN — FAMOTIDINE 20 MG: 20 TABLET, FILM COATED ORAL at 09:27

## 2022-01-01 RX ADMIN — FAMOTIDINE 20 MG: 20 TABLET, FILM COATED ORAL at 20:16

## 2022-01-01 RX ADMIN — MORPHINE SULFATE 2 MG: 2 INJECTION, SOLUTION INTRAMUSCULAR; INTRAVENOUS at 02:10

## 2022-01-01 RX ADMIN — CARBIDOPA AND LEVODOPA 1 TABLET: 25; 250 TABLET ORAL at 23:44

## 2022-01-01 RX ADMIN — DONEPEZIL HYDROCHLORIDE 10 MG: 10 TABLET, FILM COATED ORAL at 21:49

## 2022-01-01 RX ADMIN — RIVAROXABAN 20 MG: 20 TABLET, FILM COATED ORAL at 17:04

## 2022-01-01 RX ADMIN — SODIUM CHLORIDE 1000 ML: 9 INJECTION, SOLUTION INTRAVENOUS at 15:28

## 2022-01-01 RX ADMIN — CARBIDOPA AND LEVODOPA 2 TABLET: 50; 200 TABLET, EXTENDED RELEASE ORAL at 21:29

## 2022-01-01 RX ADMIN — MORPHINE SULFATE 4 MG: 4 INJECTION, SOLUTION INTRAMUSCULAR; INTRAVENOUS at 09:50

## 2022-01-01 RX ADMIN — TAZOBACTAM SODIUM AND PIPERACILLIN SODIUM 3.38 G: 375; 3 INJECTION, SOLUTION INTRAVENOUS at 20:17

## 2022-01-01 RX ADMIN — LEVOTHYROXINE SODIUM 150 MCG: 0.15 TABLET ORAL at 06:54

## 2022-01-01 RX ADMIN — GLYCOPYRROLATE 0.2 MG: 0.2 INJECTION INTRAMUSCULAR; INTRAVENOUS at 09:50

## 2022-01-01 RX ADMIN — FAMOTIDINE 20 MG: 20 TABLET, FILM COATED ORAL at 21:46

## 2022-01-01 RX ADMIN — CEPHALEXIN 500 MG: 500 CAPSULE ORAL at 20:40

## 2022-01-01 RX ADMIN — Medication 1000 UNITS: at 09:21

## 2022-01-01 RX ADMIN — DEXTROSE MONOHYDRATE 75 ML/HR: 50 INJECTION, SOLUTION INTRAVENOUS at 02:45

## 2022-01-01 RX ADMIN — Medication 1000 UNITS: at 12:12

## 2022-01-01 RX ADMIN — DONEPEZIL HYDROCHLORIDE 5 MG: 5 TABLET, FILM COATED ORAL at 23:44

## 2022-01-01 RX ADMIN — LEVOTHYROXINE SODIUM 125 MCG: 0.12 TABLET ORAL at 05:55

## 2022-01-01 RX ADMIN — OXYCODONE HYDROCHLORIDE AND ACETAMINOPHEN 500 MG: 500 TABLET ORAL at 12:12

## 2022-01-01 RX ADMIN — FAMOTIDINE 20 MG: 20 TABLET, FILM COATED ORAL at 20:49

## 2022-01-01 RX ADMIN — LEVOTHYROXINE SODIUM 125 MCG: 0.12 TABLET ORAL at 05:59

## 2022-01-01 RX ADMIN — CARBIDOPA AND LEVODOPA 2 TABLET: 50; 200 TABLET, EXTENDED RELEASE ORAL at 20:31

## 2022-01-01 RX ADMIN — LEVOTHYROXINE SODIUM 150 MCG: 0.15 TABLET ORAL at 05:38

## 2022-01-01 RX ADMIN — RIVAROXABAN 20 MG: 20 TABLET, FILM COATED ORAL at 18:52

## 2022-01-01 RX ADMIN — LEVOTHYROXINE SODIUM 125 MCG: 0.12 TABLET ORAL at 07:17

## 2022-01-01 RX ADMIN — Medication 1000 UNITS: at 08:10

## 2022-01-01 RX ADMIN — Medication 1000 UNITS: at 09:42

## 2022-01-01 RX ADMIN — REMDESIVIR 100 MG: 100 INJECTION, POWDER, LYOPHILIZED, FOR SOLUTION INTRAVENOUS at 16:22

## 2022-01-01 RX ADMIN — RIVAROXABAN 20 MG: 20 TABLET, FILM COATED ORAL at 21:30

## 2022-01-01 RX ADMIN — DONEPEZIL HYDROCHLORIDE 5 MG: 5 TABLET, FILM COATED ORAL at 20:44

## 2022-01-01 RX ADMIN — LEVOTHYROXINE SODIUM 150 MCG: 0.15 TABLET ORAL at 09:54

## 2022-01-01 RX ADMIN — CARBIDOPA AND LEVODOPA 1 TABLET: 25; 250 TABLET ORAL at 16:22

## 2022-01-01 RX ADMIN — FAMOTIDINE 20 MG: 20 TABLET, FILM COATED ORAL at 09:32

## 2022-01-01 RX ADMIN — VANCOMYCIN HYDROCHLORIDE 1250 MG: 10 INJECTION, POWDER, LYOPHILIZED, FOR SOLUTION INTRAVENOUS at 20:44

## 2022-01-01 RX ADMIN — MORPHINE SULFATE 2 MG: 2 INJECTION, SOLUTION INTRAMUSCULAR; INTRAVENOUS at 06:09

## 2022-01-01 RX ADMIN — TAZOBACTAM SODIUM AND PIPERACILLIN SODIUM 3.38 G: 375; 3 INJECTION, SOLUTION INTRAVENOUS at 09:41

## 2022-01-01 RX ADMIN — BUDESONIDE AND FORMOTEROL FUMARATE DIHYDRATE 2 PUFF: 160; 4.5 AEROSOL RESPIRATORY (INHALATION) at 20:45

## 2022-01-01 RX ADMIN — ESCITALOPRAM 10 MG: 10 TABLET, FILM COATED ORAL at 20:45

## 2022-01-01 RX ADMIN — FAMOTIDINE 20 MG: 20 TABLET, FILM COATED ORAL at 20:36

## 2022-01-01 RX ADMIN — QUETIAPINE FUMARATE 12.5 MG: 25 TABLET ORAL at 23:01

## 2022-01-01 RX ADMIN — ESCITALOPRAM 10 MG: 10 TABLET, FILM COATED ORAL at 20:31

## 2022-01-01 RX ADMIN — DEXTROSE MONOHYDRATE 100 ML/HR: 50 INJECTION, SOLUTION INTRAVENOUS at 13:50

## 2022-01-01 RX ADMIN — RIVAROXABAN 20 MG: 20 TABLET, FILM COATED ORAL at 18:48

## 2022-01-01 RX ADMIN — Medication 1000 UNITS: at 09:26

## 2022-01-01 RX ADMIN — CARBIDOPA AND LEVODOPA 2 TABLET: 50; 200 TABLET, EXTENDED RELEASE ORAL at 09:32

## 2022-01-01 RX ADMIN — SODIUM CHLORIDE 75 ML/HR: 4.5 INJECTION, SOLUTION INTRAVENOUS at 23:08

## 2022-01-01 RX ADMIN — DEXTROSE MONOHYDRATE 100 ML/HR: 50 INJECTION, SOLUTION INTRAVENOUS at 09:35

## 2022-01-01 RX ADMIN — QUETIAPINE FUMARATE 12.5 MG: 25 TABLET ORAL at 21:50

## 2022-01-01 RX ADMIN — ESCITALOPRAM 10 MG: 10 TABLET, FILM COATED ORAL at 23:44

## 2022-01-01 RX ADMIN — CEPHALEXIN 500 MG: 500 CAPSULE ORAL at 14:29

## 2022-01-01 RX ADMIN — TAZOBACTAM SODIUM AND PIPERACILLIN SODIUM 3.38 G: 375; 3 INJECTION, SOLUTION INTRAVENOUS at 17:54

## 2022-01-01 RX ADMIN — RIVAROXABAN 20 MG: 20 TABLET, FILM COATED ORAL at 17:23

## 2022-01-01 RX ADMIN — METOPROLOL TARTRATE 12.5 MG: 25 TABLET, FILM COATED ORAL at 09:02

## 2022-01-01 RX ADMIN — DEXTROSE MONOHYDRATE 75 ML/HR: 50 INJECTION, SOLUTION INTRAVENOUS at 12:59

## 2022-01-01 RX ADMIN — GUAIFENESIN 600 MG: 600 TABLET, EXTENDED RELEASE ORAL at 20:44

## 2022-01-01 RX ADMIN — ESCITALOPRAM 10 MG: 10 TABLET, FILM COATED ORAL at 20:36

## 2022-01-01 RX ADMIN — CEFTRIAXONE 1 G: 1 INJECTION, POWDER, FOR SOLUTION INTRAMUSCULAR; INTRAVENOUS at 14:20

## 2022-01-01 RX ADMIN — FAMOTIDINE 20 MG: 20 TABLET, FILM COATED ORAL at 09:03

## 2022-01-01 RX ADMIN — QUETIAPINE FUMARATE 12.5 MG: 25 TABLET ORAL at 20:45

## 2022-01-01 RX ADMIN — LEVOTHYROXINE SODIUM 125 MCG: 0.12 TABLET ORAL at 14:43

## 2022-01-01 RX ADMIN — CEFTRIAXONE SODIUM 2 G: 2 INJECTION, POWDER, FOR SOLUTION INTRAMUSCULAR; INTRAVENOUS at 17:32

## 2022-01-01 RX ADMIN — CARBIDOPA AND LEVODOPA 1 TABLET: 25; 250 TABLET ORAL at 00:42

## 2022-01-01 RX ADMIN — DONEPEZIL HYDROCHLORIDE 10 MG: 10 TABLET, FILM COATED ORAL at 20:36

## 2022-01-01 RX ADMIN — METOPROLOL TARTRATE 25 MG: 25 TABLET, FILM COATED ORAL at 21:24

## 2022-01-01 RX ADMIN — CARBIDOPA AND LEVODOPA 1 TABLET: 25; 250 TABLET ORAL at 21:45

## 2022-01-01 RX ADMIN — DONEPEZIL HYDROCHLORIDE 10 MG: 10 TABLET, FILM COATED ORAL at 20:45

## 2022-01-01 RX ADMIN — LEVOTHYROXINE SODIUM 125 MCG: 0.12 TABLET ORAL at 06:26

## 2022-01-01 RX ADMIN — FAMOTIDINE 20 MG: 20 TABLET, FILM COATED ORAL at 21:30

## 2022-01-01 RX ADMIN — QUETIAPINE FUMARATE 25 MG: 25 TABLET ORAL at 20:44

## 2022-01-01 RX ADMIN — Medication 1000 UNITS: at 14:43

## 2022-01-01 RX ADMIN — GLYCOPYRROLATE 0.2 MG: 0.2 INJECTION INTRAMUSCULAR; INTRAVENOUS at 09:36

## 2022-01-01 RX ADMIN — RIVAROXABAN 20 MG: 20 TABLET, FILM COATED ORAL at 23:01

## 2022-01-01 RX ADMIN — GLYCOPYRROLATE 0.2 MG: 0.2 INJECTION INTRAMUSCULAR; INTRAVENOUS at 21:05

## 2022-01-01 RX ADMIN — RIVAROXABAN 20 MG: 20 TABLET, FILM COATED ORAL at 17:54

## 2022-01-01 RX ADMIN — ESCITALOPRAM 10 MG: 10 TABLET, FILM COATED ORAL at 20:24

## 2022-01-01 RX ADMIN — QUETIAPINE FUMARATE 25 MG: 25 TABLET ORAL at 23:44

## 2022-01-01 RX ADMIN — REMDESIVIR 100 MG: 100 INJECTION, POWDER, LYOPHILIZED, FOR SOLUTION INTRAVENOUS at 15:41

## 2022-01-01 RX ADMIN — Medication 1000 UNITS: at 08:33

## 2022-01-01 RX ADMIN — TAZOBACTAM SODIUM AND PIPERACILLIN SODIUM 3.38 G: 375; 3 INJECTION, SOLUTION INTRAVENOUS at 02:24

## 2022-01-01 RX ADMIN — LEVOTHYROXINE SODIUM 150 MCG: 0.15 TABLET ORAL at 06:27

## 2022-01-01 RX ADMIN — ESCITALOPRAM 10 MG: 10 TABLET, FILM COATED ORAL at 21:45

## 2022-01-01 RX ADMIN — BUDESONIDE AND FORMOTEROL FUMARATE DIHYDRATE 2 PUFF: 160; 4.5 AEROSOL RESPIRATORY (INHALATION) at 07:29

## 2022-01-01 RX ADMIN — ACETAMINOPHEN 650 MG: 325 TABLET, FILM COATED ORAL at 20:31

## 2022-01-01 RX ADMIN — SODIUM CHLORIDE 75 ML/HR: 4.5 INJECTION, SOLUTION INTRAVENOUS at 12:46

## 2022-01-01 RX ADMIN — TAZOBACTAM SODIUM AND PIPERACILLIN SODIUM 3.38 G: 375; 3 INJECTION, SOLUTION INTRAVENOUS at 03:55

## 2022-01-01 RX ADMIN — GUAIFENESIN 400 MG: 200 SOLUTION ORAL at 06:54

## 2022-01-01 RX ADMIN — Medication 1000 UNITS: at 09:52

## 2022-01-01 RX ADMIN — ESCITALOPRAM 10 MG: 10 TABLET, FILM COATED ORAL at 21:51

## 2022-01-01 RX ADMIN — CARBIDOPA AND LEVODOPA 2 TABLET: 50; 200 TABLET, EXTENDED RELEASE ORAL at 20:13

## 2022-01-01 RX ADMIN — CETIRIZINE HYDROCHLORIDE 5 MG: 10 TABLET ORAL at 09:02

## 2022-01-01 RX ADMIN — FAMOTIDINE 20 MG: 20 TABLET, FILM COATED ORAL at 09:21

## 2022-01-01 RX ADMIN — CARBIDOPA AND LEVODOPA 1 TABLET: 25; 250 TABLET ORAL at 20:44

## 2022-01-01 RX ADMIN — Medication 1000 UNITS: at 09:32

## 2022-01-01 RX ADMIN — FAMOTIDINE 20 MG: 20 TABLET, FILM COATED ORAL at 08:22

## 2022-01-01 RX ADMIN — FAMOTIDINE 20 MG: 20 TABLET, FILM COATED ORAL at 23:44

## 2022-01-01 RX ADMIN — RIVAROXABAN 20 MG: 20 TABLET, FILM COATED ORAL at 17:24

## 2022-01-01 RX ADMIN — FAMOTIDINE 20 MG: 20 TABLET, FILM COATED ORAL at 08:33

## 2022-01-01 RX ADMIN — CARBIDOPA AND LEVODOPA 2 TABLET: 50; 200 TABLET, EXTENDED RELEASE ORAL at 09:02

## 2022-01-01 RX ADMIN — CETIRIZINE HYDROCHLORIDE 5 MG: 10 TABLET ORAL at 12:12

## 2022-01-01 RX ADMIN — GUAIFENESIN 400 MG: 200 SOLUTION ORAL at 13:49

## 2022-01-01 RX ADMIN — ESCITALOPRAM 10 MG: 10 TABLET, FILM COATED ORAL at 20:17

## 2022-01-01 RX ADMIN — TAZOBACTAM SODIUM AND PIPERACILLIN SODIUM 3.38 G: 375; 3 INJECTION, SOLUTION INTRAVENOUS at 09:30

## 2022-01-01 RX ADMIN — CEPHALEXIN 500 MG: 500 CAPSULE ORAL at 05:36

## 2022-01-01 RX ADMIN — MORPHINE SULFATE 2 MG: 2 INJECTION, SOLUTION INTRAMUSCULAR; INTRAVENOUS at 22:16

## 2022-01-01 RX ADMIN — CARBIDOPA AND LEVODOPA 2 TABLET: 50; 200 TABLET, EXTENDED RELEASE ORAL at 21:51

## 2022-01-01 RX ADMIN — GUAIFENESIN 400 MG: 200 SOLUTION ORAL at 05:59

## 2022-01-01 RX ADMIN — CARBIDOPA AND LEVODOPA 1 TABLET: 50; 200 TABLET, EXTENDED RELEASE ORAL at 08:58

## 2022-01-01 RX ADMIN — CEPHALEXIN 500 MG: 500 CAPSULE ORAL at 21:47

## 2022-01-01 RX ADMIN — LEVOTHYROXINE SODIUM 150 MCG: 0.15 TABLET ORAL at 06:04

## 2022-01-01 RX ADMIN — OXYCODONE HYDROCHLORIDE AND ACETAMINOPHEN 500 MG: 500 TABLET ORAL at 09:43

## 2022-01-01 RX ADMIN — VANCOMYCIN HYDROCHLORIDE 1500 MG: 10 INJECTION, POWDER, LYOPHILIZED, FOR SOLUTION INTRAVENOUS at 22:51

## 2022-01-01 RX ADMIN — Medication 1000 UNITS: at 08:29

## 2022-01-01 RX ADMIN — GUAIFENESIN 400 MG: 200 SOLUTION ORAL at 16:23

## 2022-01-01 RX ADMIN — CARBIDOPA AND LEVODOPA 1 TABLET: 25; 250 TABLET ORAL at 09:03

## 2022-01-01 RX ADMIN — GLYCOPYRROLATE 0.4 MG: 0.2 INJECTION INTRAMUSCULAR; INTRAVENOUS at 16:40

## 2022-01-01 RX ADMIN — CARBIDOPA AND LEVODOPA 1 TABLET: 25; 250 TABLET ORAL at 15:46

## 2022-01-01 RX ADMIN — CEFTRIAXONE 1 G: 1 INJECTION, POWDER, FOR SOLUTION INTRAMUSCULAR; INTRAVENOUS at 14:34

## 2022-01-01 RX ADMIN — CARBIDOPA AND LEVODOPA 1 TABLET: 25; 250 TABLET ORAL at 17:17

## 2022-01-01 RX ADMIN — QUETIAPINE FUMARATE 25 MG: 25 TABLET ORAL at 20:16

## 2022-01-01 RX ADMIN — BUDESONIDE AND FORMOTEROL FUMARATE DIHYDRATE 2 PUFF: 160; 4.5 AEROSOL RESPIRATORY (INHALATION) at 09:30

## 2022-01-01 RX ADMIN — CARBIDOPA AND LEVODOPA 2 TABLET: 50; 200 TABLET, EXTENDED RELEASE ORAL at 08:29

## 2022-01-01 RX ADMIN — METOPROLOL TARTRATE 25 MG: 25 TABLET, FILM COATED ORAL at 08:59

## 2022-01-01 RX ADMIN — CARBIDOPA AND LEVODOPA 1 TABLET: 25; 250 TABLET ORAL at 12:12

## 2022-01-01 RX ADMIN — ESCITALOPRAM 10 MG: 10 TABLET, FILM COATED ORAL at 20:44

## 2022-01-01 RX ADMIN — CARBIDOPA AND LEVODOPA 1 TABLET: 50; 200 TABLET, EXTENDED RELEASE ORAL at 23:01

## 2022-01-01 RX ADMIN — RIVAROXABAN 20 MG: 20 TABLET, FILM COATED ORAL at 18:04

## 2022-01-01 RX ADMIN — CEFTRIAXONE 2 G: 2 INJECTION, POWDER, FOR SOLUTION INTRAMUSCULAR; INTRAVENOUS at 14:42

## 2022-01-01 RX ADMIN — QUETIAPINE FUMARATE 12.5 MG: 25 TABLET ORAL at 21:31

## 2022-01-01 RX ADMIN — METOPROLOL TARTRATE 12.5 MG: 25 TABLET, FILM COATED ORAL at 08:30

## 2022-01-01 RX ADMIN — FAMOTIDINE 20 MG: 20 TABLET, FILM COATED ORAL at 20:31

## 2022-01-01 RX ADMIN — ACETAMINOPHEN 650 MG: 325 TABLET, FILM COATED ORAL at 03:27

## 2022-01-01 RX ADMIN — BUDESONIDE AND FORMOTEROL FUMARATE DIHYDRATE 2 PUFF: 160; 4.5 AEROSOL RESPIRATORY (INHALATION) at 20:38

## 2022-01-01 RX ADMIN — CEPHALEXIN 500 MG: 500 CAPSULE ORAL at 22:06

## 2022-01-01 RX ADMIN — GLYCOPYRROLATE 0.2 MG: 0.2 INJECTION INTRAMUSCULAR; INTRAVENOUS at 06:08

## 2022-01-01 RX ADMIN — DONEPEZIL HYDROCHLORIDE 10 MG: 10 TABLET, FILM COATED ORAL at 20:13

## 2022-01-01 RX ADMIN — ACETAMINOPHEN 650 MG: 325 TABLET, FILM COATED ORAL at 14:29

## 2022-01-01 RX ADMIN — OXYCODONE HYDROCHLORIDE AND ACETAMINOPHEN 500 MG: 500 TABLET ORAL at 09:03

## 2022-01-01 RX ADMIN — BUDESONIDE AND FORMOTEROL FUMARATE DIHYDRATE 2 PUFF: 160; 4.5 AEROSOL RESPIRATORY (INHALATION) at 08:23

## 2022-01-01 RX ADMIN — CARBIDOPA AND LEVODOPA 1 TABLET: 25; 250 TABLET ORAL at 20:17

## 2022-01-01 RX ADMIN — FAMOTIDINE 20 MG: 20 TABLET, FILM COATED ORAL at 08:29

## 2022-01-01 RX ADMIN — DEXTROSE MONOHYDRATE 75 ML/HR: 50 INJECTION, SOLUTION INTRAVENOUS at 17:00

## 2022-01-01 RX ADMIN — ESCITALOPRAM 10 MG: 10 TABLET, FILM COATED ORAL at 12:12

## 2022-01-01 RX ADMIN — DONEPEZIL HYDROCHLORIDE 10 MG: 10 TABLET, FILM COATED ORAL at 20:24

## 2022-01-01 RX ADMIN — TAZOBACTAM SODIUM AND PIPERACILLIN SODIUM 3.38 G: 375; 3 INJECTION, SOLUTION INTRAVENOUS at 02:45

## 2022-01-01 RX ADMIN — ACETAMINOPHEN 650 MG: 325 TABLET, FILM COATED ORAL at 00:16

## 2022-01-01 RX ADMIN — DEXAMETHASONE 6 MG: 4 TABLET ORAL at 09:21

## 2022-01-01 RX ADMIN — CARBIDOPA AND LEVODOPA 2 TABLET: 50; 200 TABLET, EXTENDED RELEASE ORAL at 20:45

## 2022-01-01 RX ADMIN — DEXAMETHASONE 6 MG: 4 TABLET ORAL at 16:22

## 2022-01-01 RX ADMIN — FAMOTIDINE 20 MG: 20 TABLET, FILM COATED ORAL at 20:44

## 2022-01-01 RX ADMIN — CEFTRIAXONE 1 G: 1 INJECTION, POWDER, FOR SOLUTION INTRAMUSCULAR; INTRAVENOUS at 14:17

## 2022-01-01 RX ADMIN — CARBIDOPA AND LEVODOPA 2 TABLET: 50; 200 TABLET, EXTENDED RELEASE ORAL at 08:10

## 2022-01-01 RX ADMIN — METOPROLOL TARTRATE 12.5 MG: 25 TABLET, FILM COATED ORAL at 08:33

## 2022-01-01 RX ADMIN — CARBIDOPA AND LEVODOPA 2 TABLET: 50; 200 TABLET, EXTENDED RELEASE ORAL at 09:52

## 2022-01-01 RX ADMIN — VANCOMYCIN HYDROCHLORIDE 1250 MG: 10 INJECTION, POWDER, LYOPHILIZED, FOR SOLUTION INTRAVENOUS at 18:09

## 2022-01-01 RX ADMIN — FAMOTIDINE 20 MG: 20 TABLET, FILM COATED ORAL at 09:52

## 2022-01-01 RX ADMIN — DONEPEZIL HYDROCHLORIDE 10 MG: 10 TABLET, FILM COATED ORAL at 21:31

## 2022-01-01 RX ADMIN — FAMOTIDINE 20 MG: 20 TABLET, FILM COATED ORAL at 14:46

## 2022-01-01 RX ADMIN — Medication 1000 UNITS: at 08:22

## 2022-01-01 RX ADMIN — FAMOTIDINE 20 MG: 20 TABLET, FILM COATED ORAL at 12:21

## 2022-01-01 RX ADMIN — DEXAMETHASONE SODIUM PHOSPHATE 6 MG: 10 INJECTION INTRAMUSCULAR; INTRAVENOUS at 15:41

## 2022-01-01 RX ADMIN — RIVAROXABAN 20 MG: 20 TABLET, FILM COATED ORAL at 18:39

## 2022-01-01 RX ADMIN — Medication 1000 UNITS: at 09:03

## 2022-01-01 RX ADMIN — ACETAMINOPHEN 650 MG: 325 TABLET, FILM COATED ORAL at 20:39

## 2022-01-01 RX ADMIN — NIFEDIPINE 30 MG: 30 TABLET, FILM COATED, EXTENDED RELEASE ORAL at 12:16

## 2022-01-01 RX ADMIN — FAMOTIDINE 20 MG: 20 TABLET, FILM COATED ORAL at 09:42

## 2022-01-01 RX ADMIN — GUAIFENESIN 400 MG: 200 SOLUTION ORAL at 23:44

## 2022-01-01 RX ADMIN — BUDESONIDE AND FORMOTEROL FUMARATE DIHYDRATE 2 PUFF: 160; 4.5 AEROSOL RESPIRATORY (INHALATION) at 21:10

## 2022-01-01 RX ADMIN — ESCITALOPRAM 10 MG: 10 TABLET, FILM COATED ORAL at 21:30

## 2022-01-01 RX ADMIN — QUETIAPINE FUMARATE 25 MG: 25 TABLET ORAL at 21:45

## 2022-01-01 RX ADMIN — GUAIFENESIN 400 MG: 200 SOLUTION ORAL at 21:45

## 2022-01-01 RX ADMIN — FAMOTIDINE 20 MG: 20 TABLET, FILM COATED ORAL at 08:10

## 2022-01-01 RX ADMIN — BUDESONIDE AND FORMOTEROL FUMARATE DIHYDRATE 2 PUFF: 160; 4.5 AEROSOL RESPIRATORY (INHALATION) at 22:05

## 2022-01-01 RX ADMIN — DEXTROSE MONOHYDRATE 100 ML/HR: 50 INJECTION, SOLUTION INTRAVENOUS at 23:51

## 2022-01-01 RX ADMIN — DONEPEZIL HYDROCHLORIDE 5 MG: 5 TABLET, FILM COATED ORAL at 20:16

## 2022-01-01 RX ADMIN — GUAIFENESIN 600 MG: 600 TABLET, EXTENDED RELEASE ORAL at 20:16

## 2022-01-01 RX ADMIN — DONEPEZIL HYDROCHLORIDE 5 MG: 5 TABLET, FILM COATED ORAL at 21:45

## 2022-01-01 RX ADMIN — CETIRIZINE HYDROCHLORIDE 5 MG: 10 TABLET ORAL at 09:42

## 2022-01-01 RX ADMIN — CEPHALEXIN 500 MG: 500 CAPSULE ORAL at 05:38

## 2022-01-01 RX ADMIN — BUDESONIDE AND FORMOTEROL FUMARATE DIHYDRATE 2 PUFF: 160; 4.5 AEROSOL RESPIRATORY (INHALATION) at 08:50

## 2022-01-01 RX ADMIN — DEXAMETHASONE 6 MG: 4 TABLET ORAL at 09:26

## 2022-01-01 RX ADMIN — CARBIDOPA AND LEVODOPA 2 TABLET: 50; 200 TABLET, EXTENDED RELEASE ORAL at 08:22

## 2022-01-01 RX ADMIN — CEPHALEXIN 500 MG: 500 CAPSULE ORAL at 13:52

## 2022-01-01 RX ADMIN — CEFTRIAXONE 1 G: 1 INJECTION, POWDER, FOR SOLUTION INTRAMUSCULAR; INTRAVENOUS at 14:11

## 2022-01-01 RX ADMIN — CARBIDOPA AND LEVODOPA 1 TABLET: 25; 250 TABLET ORAL at 09:27

## 2022-01-01 RX ADMIN — BUDESONIDE AND FORMOTEROL FUMARATE DIHYDRATE 2 PUFF: 160; 4.5 AEROSOL RESPIRATORY (INHALATION) at 07:54

## 2022-01-01 RX ADMIN — FAMOTIDINE 20 MG: 20 TABLET, FILM COATED ORAL at 20:13

## 2022-01-01 RX ADMIN — METOPROLOL TARTRATE 12.5 MG: 25 TABLET, FILM COATED ORAL at 09:31

## 2022-01-01 RX ADMIN — TAZOBACTAM SODIUM AND PIPERACILLIN SODIUM 3.38 G: 375; 3 INJECTION, SOLUTION INTRAVENOUS at 12:59

## 2022-01-01 RX ADMIN — FAMOTIDINE 20 MG: 20 TABLET, FILM COATED ORAL at 09:02

## 2022-01-01 RX ADMIN — GUAIFENESIN 600 MG: 600 TABLET, EXTENDED RELEASE ORAL at 09:03

## 2022-01-01 RX ADMIN — CARBIDOPA AND LEVODOPA 1 TABLET: 25; 250 TABLET ORAL at 15:41

## 2022-01-01 RX ADMIN — DEXAMETHASONE SODIUM PHOSPHATE 6 MG: 10 INJECTION INTRAMUSCULAR; INTRAVENOUS at 15:33

## 2022-01-01 RX ADMIN — ESCITALOPRAM 10 MG: 10 TABLET, FILM COATED ORAL at 21:47

## 2022-01-01 RX ADMIN — FAMOTIDINE 20 MG: 20 TABLET, FILM COATED ORAL at 20:24

## 2022-01-01 RX ADMIN — DONEPEZIL HYDROCHLORIDE 10 MG: 10 TABLET, FILM COATED ORAL at 21:47

## 2022-01-01 RX ADMIN — FAMOTIDINE 20 MG: 20 TABLET, FILM COATED ORAL at 21:45

## 2022-01-01 RX ADMIN — REMDESIVIR 100 MG: 100 INJECTION, POWDER, LYOPHILIZED, FOR SOLUTION INTRAVENOUS at 15:33

## 2022-01-01 RX ADMIN — ACETAMINOPHEN 650 MG: 325 TABLET, FILM COATED ORAL at 05:36

## 2022-01-01 RX ADMIN — METOPROLOL TARTRATE 12.5 MG: 25 TABLET, FILM COATED ORAL at 20:31

## 2022-01-01 RX ADMIN — ESCITALOPRAM 10 MG: 10 TABLET, FILM COATED ORAL at 20:13

## 2022-01-01 RX ADMIN — CARBIDOPA AND LEVODOPA 1 TABLET: 25; 250 TABLET ORAL at 09:43

## 2022-01-01 RX ADMIN — RIVAROXABAN 20 MG: 20 TABLET, FILM COATED ORAL at 17:17

## 2022-01-01 RX ADMIN — CARBIDOPA AND LEVODOPA 2 TABLET: 50; 200 TABLET, EXTENDED RELEASE ORAL at 21:46

## 2022-01-01 RX ADMIN — CARBIDOPA AND LEVODOPA 2 TABLET: 50; 200 TABLET, EXTENDED RELEASE ORAL at 08:33

## 2022-01-01 RX ADMIN — LEVOTHYROXINE SODIUM 125 MCG: 0.12 TABLET ORAL at 06:54

## 2022-01-01 NOTE — PROGRESS NOTES
"Pharmacy Consult - Convert Sinemet CR to Immediate release formulation    Rommel Gonzalez has been consulted  per Dr. Kc's request.          Relevant clinical data and objective history reviewed:  76 y.o. male 180.3 cm (70.98\") 73.1 kg (161 lb 2.5 oz)         Past Medical History:   Diagnosis Date    AF (atrial fibrillation) (HCC)     Altered mental status     Anticoagulated     Aspiration pneumonia (HCC)     Atrial flutter (HCC)     Atypical chest pain     Chest pain     Colon polyps     Confusion     Dementia (HCC)     Depression     Disease of thyroid gland     Disorder of thyroid     Dysarthria     Dysphagia     Dyspnea and respiratory abnormalities     Elevated TSH     Fall     Fatigue     Gait instability     Gastroesophageal reflux disease     Hay fever     Hyperlipidemia     Hypertension     Hypothyroidism     Idiopathic Parkinson's disease (HCC)     Insomnia     Measles     Metabolic encephalopathy     Mumps     Non-traumatic rhabdomyolysis     CORINNE (obstructive sleep apnea)     Palpitations     Parkinson disease (HCC)     Peripheral neuropathy     Pneumonia     Poor sleep pattern     Progressive supranuclear palsy (HCC)     Sepsis (HCC)     Slurred speech     Tremor     Weakness of voice      is allergic to ppd [tuberculin purified protein derivative].    Lab Results   Component Value Date    WBC 15.92 (H) 12/31/2021    HGB 11.6 (L) 12/31/2021    HCT 36.3 (L) 12/31/2021    MCV 94.5 12/31/2021     12/31/2021     Lab Results   Component Value Date    GLUCOSE 105 (H) 12/31/2021    CALCIUM 8.3 (L) 12/31/2021     12/31/2021    K 4.7 12/31/2021    CO2 24.2 12/31/2021     12/31/2021    BUN 20 12/31/2021    CREATININE 0.84 12/31/2021    EGFRIFAFRI 96 02/05/2019    EGFRIFNONA 89 12/31/2021    BCR 23.8 12/31/2021    ANIONGAP 9.8 12/31/2021       Estimated Creatinine Clearance: 76.1 mL/min (by C-G formula based on SCr of 0.84 mg/dL).       Assessment/Plan     Per  Adriana, the CR formulation of " Sinemet is typically 10% higher dosing than the IR tablets.  Patient currently takes Sinemet CR 50/200   2 tablets by mouth every 12 hours.   (total of 800mg levodopa)    Will change the order to Sinemet 25/250  immediate release tablets po q8h (total of 750mg levodopa)      Palak Pace [facility]

## 2022-01-01 NOTE — PLAN OF CARE
Problem: Adult Inpatient Plan of Care  Goal: Plan of Care Review  Flowsheets (Taken 1/1/2022 0421)  Outcome Summary: Disorientedx4, unable to answer orientation questions. Elevated temperatures, Tylenol given x2, see flowsheets and MAR. Patient was normal sinus, switched to a fib. STAT EKG performed, junctional rhythm. Cardiology paged, awaiting call back. Patient diaphoretic, linens changed once. Q2 turns. Incontinent, brief and male wrap in place. Oral care performed, fluids encouraged. Will continue to monitor.   Goal Outcome Evaluation:  Plan of Care Reviewed With: patient           Outcome Summary: Disorientedx4, unable to answer orientation questions. Elevated temperatures, Tylenol given x2, see flowsheets and MAR. Patient was normal sinus, switched to a fib. STAT EKG performed, junctional rhythm. Cardiology paged, awaiting call back. Patient diaphoretic, linens changed once. Q2 turns. Incontinent, brief and male wrap in place. Oral care performed, fluids encouraged. Will continue to monitor.

## 2022-01-01 NOTE — PROGRESS NOTES
"Daily progress note    Chief complaint  Doing same  No new complaints  Sleepy and lethargic    History of present illness  76-year-old white male with history of atrial fibrillation Parkinson disease progressive supranuclear palsy severe dementia hypothyroidism and gastroesophageal reflux disease who is well-known to our service brought to the emergency room with fever chills started yesterday with nonproductive cough and nasal congestion.  Patient did receive COVID-19 vaccine work-up in ER revealed COVID-19 infection with hypoxia admit for management.  Patient is a poor historian unable to give detailed history and most of the history obtained from the chart nursing staff old record and I know the patient from previous admission.  Patient is DNR per his wishes.     REVIEW OF SYSTEMS  Unable to obtain      PHYSICAL EXAM  Blood pressure 103/66, pulse 60, temperature 97.8 °F (36.6 °C), temperature source Axillary, resp. rate 18, height 180.3 cm (70.98\"), weight 73.1 kg (161 lb 2.5 oz), SpO2 90 %.    Constitutional:       Appearance: Normal appearance.   HENT:      Head: Normocephalic and atraumatic.      Mouth/Throat:      Mouth: Mucous membranes are moist.   Eyes:      Extraocular Movements: Extraocular movements intact.      Pupils: Pupils are equal, round, and reactive to light.   Cardiovascular:      Rate and Rhythm: Regular rhythm. Bradycardia present.   Pulmonary:      Effort: Pulmonary effort is normal.      Breath sounds: Normal breath sounds.   Abdominal:      General: There is no distension.      Palpations: Abdomen is soft.      Tenderness: There is no abdominal tenderness.   Musculoskeletal:      Cervical back: Normal range of motion.   Skin:     General: Skin is warm and dry.   Neurological:      General: No focal deficit present.      Mental Status: He is alert and oriented to person, place, and time.   Psychiatric:         Mood and Affect: Mood normal.         Behavior: Behavior normal.         Thought " Content: Thought content normal.         Judgment: Judgment normal.      LABS  Lab Results (last 24 hours)     Procedure Component Value Units Date/Time    Manual Differential [[id number]]  (Abnormal) Collected: 01/01/22 0731    Specimen: Blood Updated: 01/01/22 1015     Neutrophil % 98.0 %      Lymphocyte % 1.0 %      Monocyte % 1.0 %      Neutrophils Absolute 14.46 10*3/mm3      Lymphocytes Absolute 0.15 10*3/mm3      Monocytes Absolute 0.15 10*3/mm3      RBC Morphology Normal     WBC Morphology Normal     Platelet Morphology Normal    CBC & Differential [[id number]]  (Abnormal) Collected: 01/01/22 0731    Specimen: Blood Updated: 01/01/22 1015    Narrative:      The following orders were created for panel order CBC & Differential.  Procedure                               Abnormality         Status                     ---------                               -----------         ------                     CBC Auto Differential[[id number]]        Abnormal            Final result                 Please view results for these tests on the individual orders.    CBC Auto Differential [[id number]]  (Abnormal) Collected: 01/01/22 0731    Specimen: Blood Updated: 01/01/22 1015     WBC 14.76 10*3/mm3      RBC 4.10 10*6/mm3      Hemoglobin 12.1 g/dL      Hematocrit 36.8 %      MCV 89.8 fL      MCH 29.5 pg      MCHC 32.9 g/dL      RDW 13.4 %      RDW-SD 43.6 fl      MPV 11.8 fL      Platelets 192 10*3/mm3     Comprehensive Metabolic Panel [[id number]]  (Abnormal) Collected: 01/01/22 0731    Specimen: Blood Updated: 01/01/22 0840     Glucose 139 mg/dL      BUN 32 mg/dL      Creatinine 0.94 mg/dL      Sodium 140 mmol/L      Potassium 4.4 mmol/L      Chloride 105 mmol/L      CO2 24.1 mmol/L      Calcium 8.5 mg/dL      Total Protein 6.5 g/dL      Albumin 3.60 g/dL      ALT (SGPT) 12 U/L      AST (SGOT) 81 U/L      Alkaline Phosphatase 48 U/L      Total Bilirubin 0.3 mg/dL      eGFR Non African Amer 78 mL/min/1.73      Globulin 2.9  gm/dL      A/G Ratio 1.2 g/dL      BUN/Creatinine Ratio 34.0     Anion Gap 10.9 mmol/L     Narrative:      GFR Normal >60  Chronic Kidney Disease <60  Kidney Failure <15      Bilirubin, Direct [[id number]]  (Normal) Collected: 01/01/22 0731    Specimen: Blood Updated: 01/01/22 0837     Bilirubin, Direct <0.2 mg/dL     C-reactive Protein [[id number]]  (Abnormal) Collected: 01/01/22 0731    Specimen: Blood Updated: 01/01/22 0837     C-Reactive Protein 11.42 mg/dL         Imaging Results (Last 24 Hours)     ** No results found for the last 24 hours. **          Current Facility-Administered Medications:   •  acetaminophen (TYLENOL) tablet 650 mg, 650 mg, Oral, Q4H PRN, Darrell Kc MD, 650 mg at 01/01/22 0327  •  ascorbic acid (VITAMIN C) tablet 500 mg, 500 mg, Oral, Daily, Darrell Kc MD, 500 mg at 01/01/22 1212  •  budesonide-formoterol (SYMBICORT) 160-4.5 MCG/ACT inhaler 2 puff, 2 puff, Inhalation, BID - RT, Darrell Kc MD, 2 puff at 01/01/22 0754  •  carbidopa-levodopa (SINEMET)  MG per tablet 1 tablet, 1 tablet, Oral, TID, Darrell Kc MD, 1 tablet at 01/01/22 1212  •  cetirizine (zyrTEC) tablet 5 mg, 5 mg, Oral, Daily, Darrell Kc MD, 5 mg at 01/01/22 1212  •  cholecalciferol (VITAMIN D3) tablet 1,000 Units, 1,000 Units, Oral, Daily, Darrell Kc MD, 1,000 Units at 01/01/22 1212  •  dexamethasone (DECADRON) injection 6 mg, 6 mg, Intravenous, Daily, Darrell Kc MD, 6 mg at 12/31/21 2213  •  donepezil (ARICEPT) tablet 5 mg, 5 mg, Oral, Nightly, Darrell Kc MD, 5 mg at 12/31/21 2212  •  escitalopram (LEXAPRO) tablet 10 mg, 10 mg, Oral, Daily, Darrell Kc MD, 10 mg at 01/01/22 1212  •  famotidine (PEPCID) tablet 20 mg, 20 mg, Oral, Q12H, Darrell Kc MD, 20 mg at 01/01/22 1221  •  guaiFENesin (MUCINEX) 12 hr tablet 600 mg, 600 mg, Oral, Q12H, Darrell Kc MD, 600 mg at 12/31/21 2212  •  levothyroxine (SYNTHROID, LEVOTHROID) tablet 125 mcg, 125 mcg, Oral, Q AM, Darrell Kc MD, 125 mcg at  01/01/22 0555  •  NIFEdipine XL (PROCARDIA XL) 24 hr tablet 30 mg, 30 mg, Oral, Daily, Kanika Kc MD, 30 mg at 01/01/22 1216  •  QUEtiapine (SEROquel) tablet 25 mg, 25 mg, Oral, Nightly, Kanika Kc MD, 25 mg at 12/31/21 2213  •  [COMPLETED] remdesivir 200 mg in sodium chloride 0.9 % 290 mL IVPB (powder vial), 200 mg, Intravenous, Q24H, 200 mg at 12/30/21 2022 **FOLLOWED BY** remdesivir 100 mg in sodium chloride 0.9 % 270 mL IVPB (powder vial), 100 mg, Intravenous, Q24H, Kanika Kc MD, 100 mg at 12/31/21 2209  •  rivaroxaban (XARELTO) tablet 20 mg, 20 mg, Oral, Daily With Dinner, Kanika Kc MD, 20 mg at 12/31/21 2212  •  terazosin (HYTRIN) capsule 1 mg, 1 mg, Oral, Nightly, Kanika Kc MD, 1 mg at 12/31/21 2212     ASSESSMENT  COVID-19 infection  Acute hypoxia secondary #1  Chronic atrial fibrillation on Xarelto  Severe Parkinson disease  Severe dementia  Progressive supranuclear palsy  Hypothyroidism  Chronic anemia  Gastroesophageal reflux disease    PLAN  CPM  Supplement oxygen  Albuterol and Symbicort  Decadron  Remdesivir  Infectious disease consult appreciated  Continue nursing home medications  Stress ulcer DVT prophylaxis  Supportive care  DNR  Discussed with POA and nursing staff  Follow closely and further recommendation according to hospital course    KANIKA KC MD

## 2022-01-01 NOTE — PLAN OF CARE
Goal Outcome Evaluation:              Outcome Summary: unable to verbally respond but responds with hand grasps and blinking to yes/no questions; no resp distress; aspiration precautions maintianed; occasional cough; O2 increased to 4LNC; asleep off and on;

## 2022-01-01 NOTE — PROGRESS NOTES
"  Infectious Diseases Progress Note    Jero Schaffer MD     [facility]  [place]: 2 days  Patient Identification:  Name: Rommel Gonzalez  Age: 76 y.o.  Sex: male  :  1945  MRN: [id number]         Primary Care Physician: Dennis Goncalves MD            Subjective: Appears ill but does not engage in any information exchange..  Interval History: See consultation note.    Objective:    Scheduled Meds:vitamin C, 500 mg, Oral, Daily  budesonide-formoterol, 2 puff, Inhalation, BID - RT  carbidopa-levodopa, 1 tablet, Oral, TID  cetirizine, 5 mg, Oral, Daily  cholecalciferol, 1,000 Units, Oral, Daily  dexamethasone, 6 mg, Intravenous, Daily  donepezil, 5 mg, Oral, Nightly  escitalopram, 10 mg, Oral, Nightly  famotidine, 20 mg, Oral, Q12H  guaiFENesin, 600 mg, Oral, Q12H  levothyroxine, 125 mcg, Oral, Q AM  QUEtiapine, 25 mg, Oral, Nightly  remdesivir, 100 mg, Intravenous, Q24H  rivaroxaban, 20 mg, Oral, Daily With Dinner      Continuous Infusions:     Vital signs in last 24 hours:  Temp:  [96.6 °F (35.9 °C)-101.4 °F (38.6 °C)] 97.8 °F (36.6 °C)  Heart Rate:  [59-84] 84  Resp:  [14-18] 18  BP: ()/(58-72) 103/66    Intake/Output:    Intake/Output Summary (Last 24 hours) at 2022 1839  Last data filed at 2022 1800  Gross per 24 hour   Intake 280 ml   Output --   Net 280 ml       Exam:  /66 (BP Location: Left arm, Patient Position: Lying)   Pulse 84   Temp 97.8 °F (36.6 °C) (Axillary)   Resp 18   Ht 180.3 cm (70.98\")   Wt 73.1 kg (161 lb 2.5 oz) Comment: not weighed by RD  SpO2 92%   BMI 22.49 kg/m²   Patient is examined using the personal protective equipment as per guidelines from infection control for this particular patient as enacted.  Hand washing was performed before and after patient interaction.  General Appearance:  Awake comfortable and not an ill-appearing or in distress as he was 24 hours ago.  Head:    Normocephalic, without obvious abnormality, atraumatic   Eyes:    PERRL, " conjunctivae/corneas clear, EOM's intact, both eyes   Ears:    Normal external ear canals, both ears   Nose:   Nares normal, septum midline, mucosa normal, no drainage    or sinus tenderness   Throat:   Lips, tongue, gums normal; oral mucosa pink and moist   Neck:   Supple, symmetrical, trachea midline, no adenopathy;     thyroid:  no enlargement/tenderness/nodules; no carotid    bruit or JVD   Back:     Symmetric, no curvature, ROM normal, no CVA tenderness   Lungs:     Clear to auscultation bilaterally, respirations unlabored   Chest Wall:    No tenderness or deformity    Heart:   Irregularly irregular   Abdomen:    Soft nontender   Extremities:  Atrophy due to disuse   Pulses:   Pulses palpable in all extremities; symmetric all extremities   Skin:   Skin color normal, Skin is warm and dry,  no rashes or palpable lesions   Neurologic:  Spasticity in upper and lower extremities noted mostly she is noted            Data Review:    I reviewed the patient's new clinical results.  Results from last 7 days   Lab Units 01/01/22  0731 12/31/21  0702 12/30/21  1123   WBC 10*3/mm3 14.76* 15.92* 4.56   HEMOGLOBIN g/dL 12.1* 11.6* 10.9*   PLATELETS 10*3/mm3 192 211 217     Results from last 7 days   Lab Units 01/01/22  0731 12/31/21  0702 12/30/21  1123   SODIUM mmol/L 140 137 134*   POTASSIUM mmol/L 4.4 4.7 4.1   CHLORIDE mmol/L 105 103 99   CO2 mmol/L 24.1 24.2 27.7   BUN mg/dL 32* 20 17   CREATININE mg/dL 0.94 0.84 0.87   CALCIUM mg/dL 8.5* 8.3* 8.6   GLUCOSE mg/dL 139* 105* 89     Microbiology Results (last 10 days)     Procedure Component Value - Date/Time    Respiratory Panel PCR w/COVID-19(SARS-CoV-2) TRENT/KAMRYN/ALLAN/PAD/COR/MAD/VALENTIN In-House, NP Swab in [facility], 3-4 HR TAT - Swab, Nasopharynx [[id number]]  (Abnormal) Collected: 12/30/21 1142    Lab Status: Final result Specimen: Swab from Nasopharynx Updated: 12/30/21 1249     ADENOVIRUS, PCR Not Detected     Coronavirus 229E Not Detected     Coronavirus HKU1 Not Detected      Coronavirus NL63 Not Detected     Coronavirus OC43 Not Detected     COVID19 Detected     Human Metapneumovirus Not Detected     Human Rhinovirus/Enterovirus Not Detected     Influenza A PCR Not Detected     Influenza B PCR Not Detected     Parainfluenza Virus 1 Not Detected     Parainfluenza Virus 2 Not Detected     Parainfluenza Virus 3 Not Detected     Parainfluenza Virus 4 Not Detected     RSV, PCR Not Detected     Bordetella pertussis pcr Not Detected     Bordetella parapertussis PCR Not Detected     Chlamydophila pneumoniae PCR Not Detected     Mycoplasma pneumo by PCR Not Detected    Narrative:      In the setting of a positive respiratory panel with a viral infection PLUS a negative procalcitonin without other underlying concern for bacterial infection, consider observing off antibiotics or discontinuation of antibiotics and continue supportive care. If the respiratory panel is positive for atypical bacterial infection (Bordetella pertussis, Chlamydophila pneumoniae, or Mycoplasma pneumoniae), consider antibiotic de-escalation to target atypical bacterial infection.            Assessment:    COVID  1-systemic COVID-19 infection with  2-evolving COVID-19 pneumonia with hypoxia  3-at risk of aspiration pneumonia  4-Parkinson's disease and immobilization syndrome  5-history of atrial fibrillation on chronic anticoagulation therapy  6-dysphagia  7-incomplete database-patient cannot participate in significant review systems.     Recommendations/Discussions:  · There is worsening oxygen requirement and hypoxia with oxygen saturation dropping to 88% on 4 L nasal cannula and a fever spike last night is concerning for evolving secondary bacterial infection or aspiration pneumonia and this is occurring on appropriate treatment for COVID-19 infection.  Secondary bacterial infection suggestive of pneumonia needs to be ruled out with procalcitonin level and chest x-ray to decide on antimicrobial treatment or to  proceed with interleukin-6 inhibitors.  · If procalcitonin level is normal then consider baricitinib if it is abnormal then repeat x-ray and start empirically on treatment for aspiration pneumonia.  Jero Schaffer MD  1/1/2022  18:39 EST    Much of this encounter note is an electronic transcription/translation of spoken language to printed text. The electronic translation of spoken language may permit erroneous, or at times, nonsensical words or phrases to be inadvertently transcribed; Although I have reviewed the note for such errors, some may still exist

## 2022-01-02 NOTE — PLAN OF CARE
Problem: Adult Inpatient Plan of Care  Goal: Plan of Care Review  Outcome: Ongoing, Progressing  Flowsheets (Taken 1/2/2022 0408)  Progress: no change  Plan of Care Reviewed With: patient  Outcome Summary:   BP cont to be low   Aflutter/Afib on monitor   4L NC O2   no evidence of distress, pain, or SOA   Q2H turn   nonverbal   no fever overnight   vanc & zosyn started d/t high procal   will cont to monitor   Goal Outcome Evaluation:  Plan of Care Reviewed With: patient        Progress: no change  Outcome Summary: BP cont to be low; Aflutter/Afib on monitor; 4L NC O2; no evidence of distress, pain, or SOA; Q2H turn; nonverbal; no fever overnight; vanc & zosyn started d/t high procal; will cont to monitor

## 2022-01-02 NOTE — PROGRESS NOTES
"Daily progress note    Chief complaint  Doing same  No new complaints  O2 sats 92% on 4 L    History of present illness  76-year-old white male with history of atrial fibrillation Parkinson disease progressive supranuclear palsy severe dementia hypothyroidism and gastroesophageal reflux disease who is well-known to our service brought to the emergency room with fever chills started yesterday with nonproductive cough and nasal congestion.  Patient did receive COVID-19 vaccine work-up in ER revealed COVID-19 infection with hypoxia admit for management.  Patient is a poor historian unable to give detailed history and most of the history obtained from the chart nursing staff old record and I know the patient from previous admission.  Patient is DNR per his wishes.     REVIEW OF SYSTEMS  Unable to obtain      PHYSICAL EXAM  Blood pressure 105/79, pulse 106, temperature 97.7 °F (36.5 °C), temperature source Axillary, resp. rate 16, height 180.3 cm (70.98\"), weight 73.1 kg (161 lb 2.5 oz), SpO2 92 %.    Constitutional:       Appearance: Normal appearance.   HENT:      Head: Normocephalic and atraumatic.      Mouth/Throat:      Mouth: Mucous membranes are moist.   Eyes:      Extraocular Movements: Extraocular movements intact.      Pupils: Pupils are equal, round, and reactive to light.   Cardiovascular:      Rate and Rhythm: Regular rhythm. Bradycardia present.   Pulmonary:      Effort: Pulmonary effort is normal.      Breath sounds: Normal breath sounds.   Abdominal:      General: There is no distension.      Palpations: Abdomen is soft.      Tenderness: There is no abdominal tenderness.   Musculoskeletal:      Cervical back: Normal range of motion.   Skin:     General: Skin is warm and dry.   Neurological:      General: No focal deficit present.      Mental Status: He is alert and oriented to person, place, and time.   Psychiatric:         Mood and Affect: Mood normal.         Behavior: Behavior normal.         Thought " Content: Thought content normal.         Judgment: Judgment normal.      LABS  Lab Results (last 24 hours)     Procedure Component Value Units Date/Time    MRSA Screen, PCR (Inpatient) - Swab, Nares [[id number]] Collected: 01/02/22 1214    Specimen: Swab from Nares Updated: 01/02/22 1241    Bilirubin, Direct [[id number]]  (Normal) Collected: 01/02/22 0605    Specimen: Blood Updated: 01/02/22 0748     Bilirubin, Direct <0.2 mg/dL      Comment: Specimen hemolyzed. Results may be affected.       Comprehensive Metabolic Panel [[id number]]  (Abnormal) Collected: 01/02/22 0605    Specimen: Blood Updated: 01/02/22 0748     Glucose 132 mg/dL      BUN 35 mg/dL      Creatinine 0.85 mg/dL      Sodium 150 mmol/L      Potassium 5.0 mmol/L      Comment: Slight hemolysis detected by analyzer. Results may be affected.        Chloride 112 mmol/L      CO2 26.8 mmol/L      Calcium 8.4 mg/dL      Total Protein 6.4 g/dL      Albumin 3.60 g/dL      ALT (SGPT) 17 U/L      AST (SGOT) 73 U/L      Comment: Slight hemolysis detected by analyzer. Results may be affected.        Alkaline Phosphatase 44 U/L      Total Bilirubin 0.3 mg/dL      eGFR Non African Amer 87 mL/min/1.73      Globulin 2.8 gm/dL      A/G Ratio 1.3 g/dL      BUN/Creatinine Ratio 41.2     Anion Gap 11.2 mmol/L     Narrative:      GFR Normal >60  Chronic Kidney Disease <60  Kidney Failure <15      C-reactive Protein [[id number]]  (Abnormal) Collected: 01/02/22 0605    Specimen: Blood Updated: 01/02/22 0739     C-Reactive Protein 7.85 mg/dL     CBC & Differential [[id number]]  (Abnormal) Collected: 01/02/22 0605    Specimen: Blood Updated: 01/02/22 0732    Narrative:      The following orders were created for panel order CBC & Differential.  Procedure                               Abnormality         Status                     ---------                               -----------         ------                     CBC Auto Differential[[id number]]        Abnormal            Final  "result                 Please view results for these tests on the individual orders.    CBC Auto Differential [[id number]]  (Abnormal) Collected: 01/02/22 0605    Specimen: Blood Updated: 01/02/22 0732     WBC 13.61 10*3/mm3      RBC 3.99 10*6/mm3      Hemoglobin 12.0 g/dL      Hematocrit 36.2 %      MCV 90.7 fL      MCH 30.1 pg      MCHC 33.1 g/dL      RDW 13.5 %      RDW-SD 44.5 fl      MPV 12.2 fL      Platelets 181 10*3/mm3      Neutrophil % 89.7 %      Lymphocyte % 5.9 %      Monocyte % 3.9 %      Eosinophil % 0.0 %      Basophil % 0.1 %      Immature Grans % 0.4 %      Neutrophils, Absolute 12.21 10*3/mm3      Lymphocytes, Absolute 0.80 10*3/mm3      Monocytes, Absolute 0.53 10*3/mm3      Eosinophils, Absolute 0.00 10*3/mm3      Basophils, Absolute 0.01 10*3/mm3      Immature Grans, Absolute 0.06 10*3/mm3      nRBC 0.0 /100 WBC     Procalcitonin [[id number]]  (Abnormal) Collected: 01/01/22 0731    Specimen: Blood Updated: 01/01/22 1925     Procalcitonin 13.00 ng/mL     Narrative:      As a Marker for Sepsis (Non-Neonates):     1. <0.5 ng/mL represents a low risk of severe sepsis and/or septic shock.  2. >2 ng/mL represents a high risk of severe sepsis and/or septic shock.    As a Marker for Lower Respiratory Tract Infections that require antibiotic therapy:  PCT on Admission     Antibiotic Therapy             6-12 Hrs later  >0.5                          Strongly Recommended            >0.25 - <0.5             Recommended  0.1 - 0.25                  Discouraged                       Remeasure/reassess PCT  <0.1                         Strongly Discouraged         Remeasure/reassess PCT      As 28 day mortality risk marker: \"Change in Procalcitonin Result\" (>80% or <=80%) if Day 0 (or Day 1) and Day 4 values are available. Refer to http://www.[employer]-pct-calculator.com/    Change in PCT <=80 %   A decrease of PCT levels below or equal to 80% defines a positive change in PCT test result representing a higher risk " for 28-day all-cause mortality of patients diagnosed with severe sepsis or septic shock.    Change in PCT >80 %   A decrease of PCT levels of more than 80% defines a negative change in PCT result representing a lower risk for 28-day all-cause mortality of patients diagnosed with severe sepsis or septic shock.                    Imaging Results (Last 24 Hours)     Procedure Component Value Units Date/Time    XR Chest 1 View [[id number]] Collected: 01/01/22 2030     Updated: 01/01/22 2035    Narrative:      CHEST SINGLE VIEW     HISTORY: Progressive hypoxia     COMPARISON: AP chest 12/30/2021     FINDINGS: There is apical lordotic positioning. The cardiomediastinal  silhouette is not changed and the heart size is borderline enlarged.  There are mild increased interstitial markings suggesting mild pulmonary  emphysema. Lungs appear clear of focal airspace disease and there is no  evidence for pulmonary edema or pleural effusion. Cardiac monitoring  leads are noted.       Impression:      Chronic changes in the chest without evidence for active  disease.     This report was finalized on 1/1/2022 8:32 PM by Dr. Jd Andrade M.D.             Current Facility-Administered Medications:   •  acetaminophen (TYLENOL) tablet 650 mg, 650 mg, Oral, Q4H PRN, Darrell Kc MD, 650 mg at 01/01/22 0327  •  ascorbic acid (VITAMIN C) tablet 500 mg, 500 mg, Oral, Daily, Darrell Kc MD, 500 mg at 01/02/22 0943  •  budesonide-formoterol (SYMBICORT) 160-4.5 MCG/ACT inhaler 2 puff, 2 puff, Inhalation, BID - RT, Darrell Kc MD, 2 puff at 01/02/22 0850  •  carbidopa-levodopa (SINEMET)  MG per tablet 1 tablet, 1 tablet, Oral, TID, Darrell Kc MD, 1 tablet at 01/02/22 0943  •  cetirizine (zyrTEC) tablet 5 mg, 5 mg, Oral, Daily, Darrell Kc MD, 5 mg at 01/02/22 0942  •  cholecalciferol (VITAMIN D3) tablet 1,000 Units, 1,000 Units, Oral, Daily, Darrell Kc MD, 1,000 Units at 01/02/22 0942  •  dexamethasone (DECADRON) injection  6 mg, 6 mg, Intravenous, Daily, Darrell Kc MD, 6 mg at 01/01/22 1533  •  donepezil (ARICEPT) tablet 5 mg, 5 mg, Oral, Nightly, Darrell Kc MD, 5 mg at 01/01/22 2016  •  escitalopram (LEXAPRO) tablet 10 mg, 10 mg, Oral, Nightly, Darrell Kc MD, 10 mg at 01/01/22 2017  •  famotidine (PEPCID) tablet 20 mg, 20 mg, Oral, Q12H, Darrell Kc MD, 20 mg at 01/02/22 0942  •  guaiFENesin (MUCINEX) 12 hr tablet 600 mg, 600 mg, Oral, Q12H, Darrell Kc MD, 600 mg at 01/01/22 2016  •  levothyroxine (SYNTHROID, LEVOTHROID) tablet 125 mcg, 125 mcg, Oral, Q AM, Darrell Kc MD, 125 mcg at 01/02/22 0626  •  Pharmacy to dose vancomycin, , Does not apply, Continuous PRN, Jero Schaffer MD  •  piperacillin-tazobactam (ZOSYN) 3.375 g in iso-osmotic dextrose 50 ml (premix), 3.375 g, Intravenous, Q8H, Jero Schaffer MD, 3.375 g at 01/02/22 0941  •  QUEtiapine (SEROquel) tablet 25 mg, 25 mg, Oral, Nightly, Darrell Kc MD, 25 mg at 01/01/22 2016  •  [COMPLETED] remdesivir 200 mg in sodium chloride 0.9 % 290 mL IVPB (powder vial), 200 mg, Intravenous, Q24H, 200 mg at 12/30/21 2022 **FOLLOWED BY** remdesivir 100 mg in sodium chloride 0.9 % 270 mL IVPB (powder vial), 100 mg, Intravenous, Q24H, Darrell Kc MD, 100 mg at 01/01/22 1533  •  rivaroxaban (XARELTO) tablet 20 mg, 20 mg, Oral, Daily With Dinner, Darrell cK MD, 20 mg at 01/01/22 1717  •  vancomycin 1250 mg/250 mL 0.9% NS IVPB ([facility]), 1,250 mg, Intravenous, Q24H, Jero Schaffer MD     ASSESSMENT  COVID-19 infection  Acute hypoxia   Hypernatremia  Chronic atrial fibrillation on Xarelto  Severe Parkinson disease  Severe dementia  Progressive supranuclear palsy  Hypothyroidism  Chronic anemia  Gastroesophageal reflux disease    PLAN  CPM  Supplement oxygen  Albuterol and Symbicort  Decadron  Remdesivir  IVF  IV antibiotics  Infectious disease consult appreciated  Continue nursing home medications  Stress ulcer DVT prophylaxis  Supportive care  DNR  PT/OT  Discussed with POA and  nursing staff  Follow closely and further recommendation according to hospital course    KANIKA ESCOBEDO MD

## 2022-01-02 NOTE — PROGRESS NOTES
"[facility]  Clinical Pharmacy Department     Vancomycin Pharmacokinetic Initial Consult Note    Rommel Gonzalez is a 76 y.o. male who is on day 1 of pharmacy to dose vancomycin.    Indication: PNA  Consulting Provider: Dr. Schaffer  Planned Duration of Therapy: 7 days  Loading Dose Ordered: 1500 mg on 1/1 at 2100  MRSA PCR performed: Yes; Result: Needs to be collected  Culture/Source:   Target: -600 mg/L.hr   Other Antimicrobials: Zosyn    Vitals/Labs  Ht: 180.3 cm (70.98\"); Wt: 73.1 kg (161 lb 2.5 oz)  Temp Readings from Last 1 Encounters:   01/01/22 95 °F (35 °C) (Oral)    Estimated Creatinine Clearance: 68 mL/min (by C-G formula based on SCr of 0.94 mg/dL).     Results from last 7 days   Lab Units 01/01/22  0731 12/31/21  0702 12/30/21  1123   CREATININE mg/dL 0.94 0.84 0.87   WBC 10*3/mm3 14.76* 15.92* 4.56     Assessment/Plan:    Vancomycin Dose:   1250 mg IV every  24  hours  Predictive AUC level for the dose ordered is 408 mg/L.hr, which is within the target of 400-600 mg/L.hr  Vanc Trough has been ordered for 1/3 at 2030     Pharmacy will follow patient's kidney function and will adjust doses and obtain levels as necessary. Thank you for involving pharmacy in this patient's care. Please contact pharmacy with any questions or concerns.                           Dominik Torres [facility]  Clinical Pharmacist  01/01/22     "

## 2022-01-02 NOTE — PLAN OF CARE
Goal Outcome Evaluation:              Outcome Summary: O2 at 4LNC; afib on monitor; resting comfortably; follows commands; no resp distress; occasional cough; tolerating diet with aspiration precautions maintained

## 2022-01-03 NOTE — PROGRESS NOTES
"  Infectious Diseases Progress Note    Jero Schaffer MD     [facility]  [place]: 4 days  Patient Identification:  Name: Rommel Gonzalez  Age: 76 y.o.  Sex: male  :  1945  MRN: [id number]         Primary Care Physician: Dennis Goncalves MD            Subjective: Patient more interactive and opens eyes but still does not engage and converse..  Interval History: See consultation note.    Objective:    Scheduled Meds:vitamin C, 500 mg, Oral, Daily  budesonide-formoterol, 2 puff, Inhalation, BID - RT  carbidopa-levodopa, 1 tablet, Oral, TID  cetirizine, 5 mg, Oral, Daily  cholecalciferol, 1,000 Units, Oral, Daily  dexamethasone, 6 mg, Intravenous, Daily  donepezil, 5 mg, Oral, Nightly  escitalopram, 10 mg, Oral, Nightly  famotidine, 20 mg, Oral, Q12H  guaiFENesin, 600 mg, Oral, Q12H  levothyroxine, 125 mcg, Oral, Q AM  piperacillin-tazobactam, 3.375 g, Intravenous, Q8H  QUEtiapine, 25 mg, Oral, Nightly  remdesivir, 100 mg, Intravenous, Q24H  rivaroxaban, 20 mg, Oral, Daily With Dinner  vancomycin, 1,250 mg, Intravenous, Q24H      Continuous Infusions:dextrose, 75 mL/hr, Last Rate: 75 mL/hr (22 1700)  Pharmacy to dose vancomycin,         Vital signs in last 24 hours:  Temp:  [97.5 °F (36.4 °C)-98.4 °F (36.9 °C)] 97.9 °F (36.6 °C)  Heart Rate:  [] 69  Resp:  [16] 16  BP: (102-124)/(72-81) 124/77    Intake/Output:    Intake/Output Summary (Last 24 hours) at 1/3/2022 0921  Last data filed at 2022 1217  Gross per 24 hour   Intake 120 ml   Output --   Net 120 ml       Exam:  /77 (BP Location: Left arm, Patient Position: Lying)   Pulse 69   Temp 97.9 °F (36.6 °C) (Oral)   Resp 16   Ht 180.3 cm (70.98\")   Wt 73.1 kg (161 lb 2.5 oz) Comment: not weighed by RD  SpO2 96%   BMI 22.49 kg/m²   Patient is examined using the personal protective equipment as per guidelines from infection control for this particular patient as enacted.  Hand washing was performed before and after patient " interaction.  General Appearance:  Labored more responsive.  Head:    Normocephalic, without obvious abnormality, atraumatic   Eyes:    PERRL, conjunctivae/corneas clear, EOM's intact, both eyes   Ears:    Normal external ear canals, both ears   Nose:   Nares normal, septum midline, mucosa normal, no drainage    or sinus tenderness   Throat:   Lips, tongue, gums normal; oral mucosa pink and moist   Neck:   Supple, symmetrical, trachea midline, no adenopathy;     thyroid:  no enlargement/tenderness/nodules; no carotid    bruit or JVD   Back:     Symmetric, no curvature, ROM normal, no CVA tenderness   Lungs:     Clear to auscultation bilaterally, respirations unlabored   Chest Wall:    No tenderness or deformity    Heart:   Irregularly irregular   Abdomen:    Soft nontender   Extremities:  Atrophy due to disuse   Pulses:   Pulses palpable in all extremities; symmetric all extremities   Skin:   Skin color normal, Skin is warm and dry,  no rashes or palpable lesions   Neurologic:  Spasticity in upper and lower extremities noted mostly she is noted            Data Review:    I reviewed the patient's new clinical results.  Results from last 7 days   Lab Units 01/03/22  0628 01/02/22  0605 01/01/22  0731 12/31/21  0702 12/30/21  1123   WBC 10*3/mm3 12.99* 13.61* 14.76* 15.92* 4.56   HEMOGLOBIN g/dL 11.2* 12.0* 12.1* 11.6* 10.9*   PLATELETS 10*3/mm3 195 181 192 211 217     Results from last 7 days   Lab Units 01/03/22  0628 01/02/22  0605 01/01/22  0731 12/31/21  0702 12/30/21  1123   SODIUM mmol/L 145 150* 140 137 134*   POTASSIUM mmol/L 4.6 5.0 4.4 4.7 4.1   CHLORIDE mmol/L 112* 112* 105 103 99   CO2 mmol/L 24.8 26.8 24.1 24.2 27.7   BUN mg/dL 32* 35* 32* 20 17   CREATININE mg/dL 0.81 0.85 0.94 0.84 0.87   CALCIUM mg/dL 8.1* 8.4* 8.5* 8.3* 8.6   GLUCOSE mg/dL 140* 132* 139* 105* 89     Microbiology Results (last 10 days)     Procedure Component Value - Date/Time    MRSA Screen, PCR (Inpatient) - Swab, Nares [[id number]]   (Normal) Collected: 01/02/22 1214    Lab Status: Final result Specimen: Swab from Nares Updated: 01/02/22 1407     MRSA PCR No MRSA Detected    Respiratory Panel PCR w/COVID-19(SARS-CoV-2) TRENT/KAMRYN/ALLAN/PAD/COR/MAD/VALENTIN In-House, NP Swab in UTM/VTM, 3-4 HR TAT - Swab, Nasopharynx [[id number]]  (Abnormal) Collected: 12/30/21 1142    Lab Status: Final result Specimen: Swab from Nasopharynx Updated: 12/30/21 1249     ADENOVIRUS, PCR Not Detected     Coronavirus 229E Not Detected     Coronavirus HKU1 Not Detected     Coronavirus NL63 Not Detected     Coronavirus OC43 Not Detected     COVID19 Detected     Human Metapneumovirus Not Detected     Human Rhinovirus/Enterovirus Not Detected     Influenza A PCR Not Detected     Influenza B PCR Not Detected     Parainfluenza Virus 1 Not Detected     Parainfluenza Virus 2 Not Detected     Parainfluenza Virus 3 Not Detected     Parainfluenza Virus 4 Not Detected     RSV, PCR Not Detected     Bordetella pertussis pcr Not Detected     Bordetella parapertussis PCR Not Detected     Chlamydophila pneumoniae PCR Not Detected     Mycoplasma pneumo by PCR Not Detected    Narrative:      In the setting of a positive respiratory panel with a viral infection PLUS a negative procalcitonin without other underlying concern for bacterial infection, consider observing off antibiotics or discontinuation of antibiotics and continue supportive care. If the respiratory panel is positive for atypical bacterial infection (Bordetella pertussis, Chlamydophila pneumoniae, or Mycoplasma pneumoniae), consider antibiotic de-escalation to target atypical bacterial infection.            Assessment:    COVID  1-systemic COVID-19 infection with  2-evolving COVID-19 pneumonia with hypoxia  3-aspiration pneumonia -with elevated procalcitonin and worsening hypoxia  4-Parkinson's disease and immobilization syndrome  5-history of atrial fibrillation on chronic anticoagulation therapy  6-dysphagia  7-incomplete  database-patient cannot participate in significant review systems.     Recommendations/Discussions:  · Continue aggressive care for COVID-19 pneumonia with superimposed bacterial pneumonia given elevated procalcitonin and worsening hypoxia that he has exhibited.  · Overall prognosis is guarded continue with aspiration precautions.  Jero Schaffer MD  1/3/2022  09:21 EST    Much of this encounter note is an electronic transcription/translation of spoken language to printed text. The electronic translation of spoken language may permit erroneous, or at times, nonsensical words or phrases to be inadvertently transcribed; Although I have reviewed the note for such errors, some may still exist

## 2022-01-03 NOTE — PLAN OF CARE
Goal Outcome Evaluation:              Outcome Summary: vss, weaned to RA, tolerating po, eating 100% of meals, turned q2hrs, will continue to follow

## 2022-01-03 NOTE — PROGRESS NOTES
"Daily progress note    Chief complaint  Doing same  No new complaints  O2 sats 92% on 2L    History of present illness  76-year-old white male with history of atrial fibrillation Parkinson disease progressive supranuclear palsy severe dementia hypothyroidism and gastroesophageal reflux disease who is well-known to our service brought to the emergency room with fever chills started yesterday with nonproductive cough and nasal congestion.  Patient did receive COVID-19 vaccine work-up in ER revealed COVID-19 infection with hypoxia admit for management.  Patient is a poor historian unable to give detailed history and most of the history obtained from the chart nursing staff old record and I know the patient from previous admission.  Patient is DNR per his wishes.     REVIEW OF SYSTEMS  Unable to obtain      PHYSICAL EXAM  Blood pressure 127/70, pulse 63, temperature 96.7 °F (35.9 °C), temperature source Oral, resp. rate 16, height 180.3 cm (70.98\"), weight 73.1 kg (161 lb 2.5 oz), SpO2 92 %.    Constitutional:       Appearance: Normal appearance.   HENT:      Head: Normocephalic and atraumatic.      Mouth/Throat:      Mouth: Mucous membranes are moist.   Eyes:      Extraocular Movements: Extraocular movements intact.      Pupils: Pupils are equal, round, and reactive to light.   Cardiovascular:      Rate and Rhythm: Regular rhythm. Bradycardia present.   Pulmonary:      Effort: Pulmonary effort is normal.      Breath sounds: Normal breath sounds.   Abdominal:      General: There is no distension.      Palpations: Abdomen is soft.      Tenderness: There is no abdominal tenderness.   Musculoskeletal:      Cervical back: Normal range of motion.   Skin:     General: Skin is warm and dry.   Neurological:      General: No focal deficit present.      Mental Status: He is alert and oriented to person, place, and time.   Psychiatric:         Mood and Affect: Mood normal.         Behavior: Behavior normal.         Thought " Content: Thought content normal.         Judgment: Judgment normal.      LABS  Lab Results (last 24 hours)     Procedure Component Value Units Date/Time    Bilirubin, Direct [[id number]]  (Normal) Collected: 01/03/22 0628    Specimen: Blood Updated: 01/03/22 0806     Bilirubin, Direct 0.2 mg/dL      Comment: Specimen hemolyzed. Results may be affected.       Comprehensive Metabolic Panel [[id number]]  (Abnormal) Collected: 01/03/22 0628    Specimen: Blood Updated: 01/03/22 0806     Glucose 140 mg/dL      BUN 32 mg/dL      Creatinine 0.81 mg/dL      Sodium 145 mmol/L      Potassium 4.6 mmol/L      Comment: Slight hemolysis detected by analyzer. Results may be affected.        Chloride 112 mmol/L      CO2 24.8 mmol/L      Calcium 8.1 mg/dL      Total Protein 6.0 g/dL      Albumin 2.90 g/dL      ALT (SGPT) 16 U/L      AST (SGOT) 52 U/L      Comment: Slight hemolysis detected by analyzer. Results may be affected.        Alkaline Phosphatase 43 U/L      Total Bilirubin 0.4 mg/dL      eGFR Non African Amer 92 mL/min/1.73      Globulin 3.1 gm/dL      A/G Ratio 0.9 g/dL      BUN/Creatinine Ratio 39.5     Anion Gap 8.2 mmol/L     Narrative:      GFR Normal >60  Chronic Kidney Disease <60  Kidney Failure <15      C-reactive Protein [[id number]]  (Abnormal) Collected: 01/03/22 0628    Specimen: Blood Updated: 01/03/22 0805     C-Reactive Protein 3.75 mg/dL     CBC & Differential [[id number]]  (Abnormal) Collected: 01/03/22 0628    Specimen: Blood Updated: 01/03/22 0748    Narrative:      The following orders were created for panel order CBC & Differential.  Procedure                               Abnormality         Status                     ---------                               -----------         ------                     CBC Auto Differential[[id number]]        Abnormal            Final result                 Please view results for these tests on the individual orders.    CBC Auto Differential [[id number]]  (Abnormal)  Collected: 01/03/22 0628    Specimen: Blood Updated: 01/03/22 0748     WBC 12.99 10*3/mm3      RBC 3.80 10*6/mm3      Hemoglobin 11.2 g/dL      Hematocrit 34.2 %      MCV 90.0 fL      MCH 29.5 pg      MCHC 32.7 g/dL      RDW 13.2 %      RDW-SD 43.3 fl      MPV 12.4 fL      Platelets 195 10*3/mm3      Neutrophil % 89.2 %      Lymphocyte % 5.6 %      Monocyte % 4.6 %      Eosinophil % 0.0 %      Basophil % 0.1 %      Immature Grans % 0.5 %      Neutrophils, Absolute 11.59 10*3/mm3      Lymphocytes, Absolute 0.73 10*3/mm3      Monocytes, Absolute 0.60 10*3/mm3      Eosinophils, Absolute 0.00 10*3/mm3      Basophils, Absolute 0.01 10*3/mm3      Immature Grans, Absolute 0.06 10*3/mm3      nRBC 0.1 /100 WBC         Imaging Results (Last 24 Hours)     ** No results found for the last 24 hours. **          Current Facility-Administered Medications:   •  acetaminophen (TYLENOL) tablet 650 mg, 650 mg, Oral, Q4H PRN, Darrell Kc MD, 650 mg at 01/01/22 0327  •  ascorbic acid (VITAMIN C) tablet 500 mg, 500 mg, Oral, Daily, Darrell Kc MD, 500 mg at 01/03/22 0903  •  budesonide-formoterol (SYMBICORT) 160-4.5 MCG/ACT inhaler 2 puff, 2 puff, Inhalation, BID - RT, Darrell Kc MD, 2 puff at 01/03/22 0823  •  carbidopa-levodopa (SINEMET)  MG per tablet 1 tablet, 1 tablet, Oral, TID, Darrell Kc MD, 1 tablet at 01/03/22 0903  •  cetirizine (zyrTEC) tablet 5 mg, 5 mg, Oral, Daily, Darrell Kc MD, 5 mg at 01/03/22 0902  •  cholecalciferol (VITAMIN D3) tablet 1,000 Units, 1,000 Units, Oral, Daily, Darrell Kc MD, 1,000 Units at 01/03/22 0903  •  dexamethasone (DECADRON) tablet 6 mg, 6 mg, Oral, Daily With Breakfast, Darrell Kc MD  •  dextrose (D5W) 5 % infusion, 75 mL/hr, Intravenous, Continuous, Darrell Kc MD, Last Rate: 75 mL/hr at 01/03/22 1259, 75 mL/hr at 01/03/22 1259  •  donepezil (ARICEPT) tablet 5 mg, 5 mg, Oral, Nightly, Darrell Kc MD, 5 mg at 01/02/22 2044  •  escitalopram (LEXAPRO) tablet 10 mg, 10  mg, Oral, Nightly, Kanika Kc MD, 10 mg at 01/02/22 2044  •  famotidine (PEPCID) tablet 20 mg, 20 mg, Oral, Q12H, Kanika Kc MD, 20 mg at 01/03/22 0903  •  guaifenesin (ROBITUSSIN) 100 MG/5ML liquid 400 mg, 400 mg, Oral, Q8H, Kanika Kc MD  •  levothyroxine (SYNTHROID, LEVOTHROID) tablet 125 mcg, 125 mcg, Oral, Q AM, Kanika Kc MD, 125 mcg at 01/02/22 0626  •  piperacillin-tazobactam (ZOSYN) 3.375 g in iso-osmotic dextrose 50 ml (premix), 3.375 g, Intravenous, Q8H, Jero Schaffer MD, 3.375 g at 01/03/22 1259  •  QUEtiapine (SEROquel) tablet 25 mg, 25 mg, Oral, Nightly, Kanika Kc MD, 25 mg at 01/02/22 2044  •  [COMPLETED] remdesivir 200 mg in sodium chloride 0.9 % 290 mL IVPB (powder vial), 200 mg, Intravenous, Q24H, 200 mg at 12/30/21 2022 **FOLLOWED BY** remdesivir 100 mg in sodium chloride 0.9 % 270 mL IVPB (powder vial), 100 mg, Intravenous, Q24H, Kanika Kc MD, 100 mg at 01/02/22 1541  •  rivaroxaban (XARELTO) tablet 20 mg, 20 mg, Oral, Daily With Dinner, Kanika Kc MD, 20 mg at 01/02/22 1754     ASSESSMENT  COVID-19 infection  Acute hypoxia   Hypernatremia  Chronic atrial fibrillation on Xarelto  Severe Parkinson disease  Severe dementia  Progressive supranuclear palsy  Hypothyroidism  Chronic anemia  Gastroesophageal reflux disease    PLAN  CPM  Supplement oxygen  Albuterol and Symbicort  Decadron  Remdesivir  Continue IVF  Continue IV antibiotics  Infectious disease consult appreciated  Continue nursing home medications  Stress ulcer DVT prophylaxis  Supportive care  DNR  PT/OT  Discussed with POA nursing staff and infectious disease  Follow closely and further recommendation according to hospital course    KANIKA KC MD

## 2022-01-03 NOTE — THERAPY DISCHARGE NOTE
Acute Care - Occupational Therapy Evaluation and Discharge  [facility]    Patient Name: Rommel Gonzalez  : 1945    MRN: [id number]                              Today's Date: 1/3/2022       Admit Date: 2021    Visit Dx:     ICD-10-CM ICD-9-CM   1. COVID  U07.1 079.89   2. Hypoxia  R09.02 799.02     Patient Active Problem List   Diagnosis   • AF (atrial fibrillation) (HCC)   • Depression   • Gait instability   • Hypertension   • Insomnia   • Idiopathic Parkinson's disease (HCC)   • Peripheral neuropathy   • Dysarthria   • Atrial flutter (HCC)   • Anticoagulated   • Health care maintenance   • Hypothyroidism (acquired)   • Weakness of voice   • Abnormal chest CT   • Facial droop   • Hypersomnia    • Hyperlipidemia   • High risk medication use   • CORINNE on CPAP   • Obstructive sleep apnea, adult   • Fall   • Progressive supranuclear palsy (HCC)   • Dysphagia   • Atypical parkinsonism (HCC)   • Constipation   • Hyperreflexia   • Torsion dystonia   • Transient cerebral ischemia   • Pneumonia of both lungs due to infectious organism   • Closed fracture of multiple ribs   • Closed compression fracture of L1 lumbar vertebra   • Closed fracture of transverse process of lumbar vertebra (HCC)   • Altered mental status   • Frequent falls   • Sepsis without acute organ dysfunction (HCC)   • Choking   • Abnormal esophagram   • COVID     Past Medical History:   Diagnosis Date   • AF (atrial fibrillation) (HCC)    • Altered mental status    • Anticoagulated    • Aspiration pneumonia (HCC)    • Atrial flutter (HCC)    • Atypical chest pain    • Chest pain    • Colon polyps    • Confusion    • Dementia (HCC)    • Depression    • Disease of thyroid gland    • Disorder of thyroid    • Dysarthria    • Dysphagia    • Dyspnea and respiratory abnormalities    • Elevated TSH    • Fall    • Fatigue    • Gait instability    • Gastroesophageal reflux disease    • Hay fever    • Hyperlipidemia    • Hypertension    • Hypothyroidism     • Idiopathic Parkinson's disease (HCC)    • Insomnia    • Measles    • Metabolic encephalopathy    • Mumps    • Non-traumatic rhabdomyolysis    • CORINNE (obstructive sleep apnea)    • Palpitations    • Parkinson disease (HCC)    • Peripheral neuropathy    • Pneumonia    • Poor sleep pattern    • Progressive supranuclear palsy (HCC)    • Sepsis (HCC)    • Slurred speech    • Tremor    • Weakness of voice      Past Surgical History:   Procedure Laterality Date   • ENDOSCOPY N/A 10/14/2020    Procedure: ESOPHAGOGASTRODUODENOSCOPY;  Surgeon: Avis Sargent MD;  Location: [facility] ENDOSCOPY;  Service: Gastroenterology;  Laterality: N/A;  Pre: abnormal esophagram  Post: normal   • ENDOSCOPY W/ PEG TUBE PLACEMENT N/A 3/16/2018    Procedure: ESOPHAGOGASTRODUODENOSCOPY WITH PERCUTANEOUS ENDOSCOPIC GASTROSTOMY TUBE INSERTION;  Surgeon: Lopez Vang Jr., MD;  Location: [facility] ENDOSCOPY;  Service: Gastroenterology      General Information     Row Name 01/03/22 9462          OT Time and Intention    Document Type evaluation  -     Mode of Treatment occupational therapy; individual therapy  -     Row Name 01/03/22 9393          General Information    Patient Profile Reviewed yes  -SHELLEY     Prior Level of Function --  dependent for ADLs; uses w/c and self propels himself a few feet  -SHELLEY     Existing Precautions/Restrictions fall; oxygen therapy device and L/min  -     Barriers to Rehab medically complex; previous functional deficit; cognitive status; contractures  -     Row Name 01/03/22 3963          Occupational Profile    Reason for Services/Referral (Occupational Profile) Pt is a 77 y/o M admit + fever from SNF, found to be COVID +. Pt is non verbal and is dependent for ADLs. Pt uses w/c and self propels himself a few feet. PMH includes HTN, PD, Afib, dementia and hand contractures.  -     Row Name 01/03/22 134          Living Environment    Lives With facility resident  -     Row Name 01/03/22 1006           Cognition    Orientation Status (Cognition) oriented to; person  -     Row Name 01/03/22 1342          Safety Issues, Functional Mobility    Safety Issues Affecting Function (Mobility) insight into deficits/self-awareness  -     Impairments Affecting Function (Mobility) balance; endurance/activity tolerance; range of motion (ROM); strength; pain; muscle tone abnormal; cognition  -     Cognitive Impairments, Mobility Safety/Performance insight into deficits/self-awareness  -           User Key  (r) = Recorded By, (t) = Taken By, (c) = Cosigned By    Initials Name Provider Type     Danna Juan, OT Occupational Therapist               Mobility/ADL's     Row Name 01/03/22 1346          Bed Mobility    Bed Mobility rolling left; rolling right  -     Rolling Left [place] (Bed Mobility) maximum assist (25% patient effort)  -     Rolling Right [place] (Bed Mobility) maximum assist (25% patient effort)  -     Assistive Device (Bed Mobility) bed rails; draw sheet  -     Row Name 01/03/22 1346          Transfers    Transfers sit-stand transfer  -     Sit-Stand [place] (Transfers) not tested  -     Row Name 01/03/22 1346          Functional Mobility    Functional Mobility- Ind. Level not appropriate to assess  -     Functional Mobility- Comment Pt is non ambulatory at baseline.  -     Row Name 01/03/22 1346          Activities of Daily Living    BADL Assessment/Intervention feeding  -     Row Name 01/03/22 1346          Self-Feeding Assessment/Training    [place] Level (Feeding) liquids to mouth; prepare tray/open items; scoop food and bring to mouth; dependent (less than 25% patient effort)  -     Assistive Devices (Feeding) hand over hand  -     Position (Self-Feeding) sitting up in bed  -     Comment (Feeding) pt requires max hand over hand assist using RUE, but quickly fatigues.  -           User Key  (r) = Recorded By, (t) = Taken By, (c) = Cosigned By     Initials Name Provider Type    Danna Olsen OT Occupational Therapist               Obj/Interventions     Row Name 01/03/22 1347          Sensory Assessment (Somatosensory)    Sensory Assessment (Somatosensory) unable/difficult to assess  -     Row Name 01/03/22 1347          Vision Assessment/Intervention    Visual Impairment/Limitations WFL  -[facility] Name 01/03/22 1347          Range of Motion Comprehensive    Comment, General Range of Motion BUE Shoulders/Elbows AROM WFL. B wrist drop with digit contractures noted (more concentrated ulnarly than radialy).  -     Row Name 01/03/22 1347          Balance    Balance Assessment sitting static balance; sitting dynamic balance  -SHELLEY     Static Sitting Balance moderate impairment; supported; long sitting  -SHELLEY     Dynamic Sitting Balance moderate impairment; severe impairment; supported; long sitting  -SHELLEY           User Key  (r) = Recorded By, (t) = Taken By, (c) = Cosigned By    Initials Name Provider Type    Danna Olsen OT Occupational Therapist               Goals/Plan    No documentation.                Clinical Impression     Row Name 01/03/22 1349          Pain Scale: Numbers Pre/Post-Treatment    Pretreatment Pain Rating 0/10 - no pain  -SHELLEY     Posttreatment Pain Rating 0/10 - no pain  -     Row Name 01/03/22 1341          Plan of Care Review    Plan of Care Reviewed With patient  -     Outcome Summary Pt is a 75 y/o M admit + fever from SNF, found to be COVID +. Pt is non verbal and is dependent for ADLs. Pt uses w/c and self propels himself a few feet. PMH includes HTN, PD, Afib, dementia and hand contractures. Pt requires max-dep A for bed mobility. Pt demonstrates good proximal UE ROM/strength, but limited with ADLs due to baseline distal hand contractures. Pt requires max-dep A for self feeding task. Pt appears at baseline level of indep with ADLs and will benefit from continued support from staff. OT will s/o at this time.   -     Row Name 01/03/22 1348          Therapy Assessment/Plan (OT)    Therapy Frequency (OT) evaluation only  -     Row Name 01/03/22 1348          Vital Signs    Recovery Time VSS  -     Row Name 01/03/22 1348          Positioning and Restraints    Pre-Treatment Position in bed  -SHELLEY     Post Treatment Position bed  -SHELLEY     In Bed notified nsg; supine; call light within reach; encouraged to call for assist; exit alarm on  -           User Key  (r) = Recorded By, (t) = Taken By, (c) = Cosigned By    Initials Name Provider Type    Danna Olsen OT Occupational Therapist               Outcome Measures     Row Name 01/03/22 1350          How much help from another is currently needed...    Putting on and taking off regular lower body clothing? 1  -SHELLEY     Bathing (including washing, rinsing, and drying) 1  -SHELLEY     Toileting (which includes using toilet bed pan or urinal) 1  -SHELLEY     Putting on and taking off regular upper body clothing 1  -SHELLEY     Taking care of personal grooming (such as brushing teeth) 1  -SHELLEY     Eating meals 1  -SHELLEY     AM-PAC 6 Clicks Score (OT) 6  -     Row Name 01/03/22 1350          Functional Assessment    Outcome Measure Options AM-PAC 6 Clicks Daily Activity (OT)  -SHELLEY           User Key  (r) = Recorded By, (t) = Taken By, (c) = Cosigned By    Initials Name Provider Type    Danna Olsen OT Occupational Therapist              Occupational Therapy Education                 Title: PT OT SLP Therapies (In Progress)     Topic: Occupational Therapy (Done)     Point: ADL training (Done)     Description:   Instruct learner(s) on proper safety adaptation and remediation techniques during self care or transfers.   Instruct in proper use of assistive devices.              Learning Progress Summary           Patient Acceptance, E,TB, VU by SHELLEY at 1/3/2022 1351                   Point: Home exercise program (Done)     Description:   Instruct learner(s) on appropriate technique  for monitoring, assisting and/or progressing therapeutic exercises/activities.              Learning Progress Summary           Patient Acceptance, E,TB, VU by SHELLEY at 1/3/2022 1351                   Point: Precautions (Done)     Description:   Instruct learner(s) on prescribed precautions during self-care and functional transfers.              Learning Progress Summary           Patient Acceptance, E,TB, VU by SHELLEY at 1/3/2022 1351                   Point: Body mechanics (Done)     Description:   Instruct learner(s) on proper positioning and spine alignment during self-care, functional mobility activities and/or exercises.              Learning Progress Summary           Patient Acceptance, E,TB, VU by SHELLEY at 1/3/2022 1351                               User Key     Initials Effective Dates Name Provider Type Discipline    SHELLEY 06/16/21 -  Danna Juan OT Occupational Therapist OT              OT Recommendation and Plan  Retired Outcome Summary/Treatment Plan (OT)  Anticipated Discharge Disposition (OT): extended care facility  Therapy Frequency (OT): evaluation only  Plan of Care Review  Plan of Care Reviewed With: patient  Outcome Summary: Pt is a 77 y/o M admit + fever from SNF, found to be COVID +. Pt is non verbal and is dependent for ADLs. Pt uses w/c and self propels himself a few feet. PMH includes HTN, PD, Afib, dementia and hand contractures. Pt requires max-dep A for bed mobility. Pt demonstrates good proximal UE ROM/strength, but limited with ADLs due to baseline distal hand contractures. Pt requires max-dep A for self feeding task. Pt appears at baseline level of indep with ADLs and will benefit from continued support from staff. OT will s/o at this time.  Plan of Care Reviewed With: patient  Outcome Summary: Pt is a 77 y/o M admit + fever from SNF, found to be COVID +. Pt is non verbal and is dependent for ADLs. Pt uses w/c and self propels himself a few feet. PMH includes HTN, PD, Afib, dementia and  hand contractures. Pt requires max-dep A for bed mobility. Pt demonstrates good proximal UE ROM/strength, but limited with ADLs due to baseline distal hand contractures. Pt requires max-dep A for self feeding task. Pt appears at baseline level of indep with ADLs and will benefit from continued support from staff. OT will s/o at this time.     Time Calculation:    Time Calculation- OT     Row Name 01/03/22 1351             Time Calculation- OT    OT Start Time 0857  -SHELLEY      OT Stop Time 0929  -SHELLEY      OT Time Calculation (min) 32 min  -SHELLEY      Total Timed Code Minutes- OT 24 minute(s)  -SHELLEY      OT Received On 01/03/22  -SHELLEY              Timed Charges    10343 - OT Self Care/Mgmt Minutes 24  -SHELLEY              Untimed Charges    OT Eval/Re-eval Minutes 8  -SHELLEY              Total Minutes    Timed Charges Total Minutes 24  -SHELLEY      Untimed Charges Total Minutes 8  -SHELLEY       Total Minutes 32  -SHELLEY            User Key  (r) = Recorded By, (t) = Taken By, (c) = Cosigned By    Initials Name Provider Type    Danna Olsen OT Occupational Therapist              Therapy Charges for Today     Code Description Service Date Service Provider Modifiers Qty    83895999291 HC OT SELF CARE/MGMT/TRAIN EA 15 MIN 1/3/2022 Danna Juan OT GO 2    [id number] HC OT EVAL MOD COMPLEXITY 2 1/3/2022 Danna Juan OT GO 1               Danna Juan OT  1/3/2022

## 2022-01-03 NOTE — PLAN OF CARE
Goal Outcome Evaluation:  Plan of Care Reviewed With: patient           Outcome Summary: Pt is a 77 y/o M admit + fever from SNF, found to be COVID +. Pt is non verbal and is dependent for ADLs. Pt uses w/c and self propels himself a few feet. PMH includes HTN, PD, Afib, dementia and hand contractures. Pt requires max-dep A for bed mobility. Pt demonstrates good proximal UE ROM/strength, but limited with ADLs due to baseline distal hand contractures. Pt requires max-dep A for self feeding task. Pt appears at baseline level of indep with ADLs and will benefit from continued support from staff. OT will s/o at this time.  Patient was not wearing a face mask during this therapy encounter. Therapist used appropriate personal protective equipment including mask, gloves, eye protection, face shield, and gown.  Mask used was N95/duckbill. Appropriate PPE was worn during the entire therapy session. Hand hygiene was completed before and after therapy session. Patient is in enhanced droplet precautions.   \

## 2022-01-04 NOTE — PROGRESS NOTES
"Daily progress note    Chief complaint  Doing same  No new complaints  O2 sats 93% on room air    History of present illness  76-year-old white male with history of atrial fibrillation Parkinson disease progressive supranuclear palsy severe dementia hypothyroidism and gastroesophageal reflux disease who is well-known to our service brought to the emergency room with fever chills started yesterday with nonproductive cough and nasal congestion.  Patient did receive COVID-19 vaccine work-up in ER revealed COVID-19 infection with hypoxia admit for management.  Patient is a poor historian unable to give detailed history and most of the history obtained from the chart nursing staff old record and I know the patient from previous admission.  Patient is DNR per his wishes.     REVIEW OF SYSTEMS  Unable to obtain      PHYSICAL EXAM  Blood pressure 119/59, pulse 56, temperature 97.5 °F (36.4 °C), temperature source Oral, resp. rate 18, height 180.3 cm (70.98\"), weight 73.1 kg (161 lb 2.5 oz), SpO2 93 %.    Constitutional:       Appearance: Normal appearance.   HENT:      Head: Normocephalic and atraumatic.      Mouth/Throat:      Mouth: Mucous membranes are moist.   Eyes:      Extraocular Movements: Extraocular movements intact.      Pupils: Pupils are equal, round, and reactive to light.   Cardiovascular:      Rate and Rhythm: Regular rhythm. Bradycardia present.   Pulmonary:      Effort: Pulmonary effort is normal.      Breath sounds: Normal breath sounds.   Abdominal:      General: There is no distension.      Palpations: Abdomen is soft.      Tenderness: There is no abdominal tenderness.   Musculoskeletal:      Cervical back: Normal range of motion.   Skin:     General: Skin is warm and dry.   Neurological:      General: No focal deficit present.      Mental Status: He is alert and oriented to person, place, and time.   Psychiatric:         Mood and Affect: Mood normal.         Behavior: Behavior normal.         Thought " Content: Thought content normal.         Judgment: Judgment normal.      LABS  Lab Results (last 24 hours)     Procedure Component Value Units Date/Time    Comprehensive Metabolic Panel [[id number]]  (Abnormal) Collected: 01/04/22 0853    Specimen: Blood Updated: 01/04/22 1017     Glucose 136 mg/dL      BUN 30 mg/dL      Creatinine 0.84 mg/dL      Sodium 147 mmol/L      Potassium 4.0 mmol/L      Chloride 111 mmol/L      CO2 25.4 mmol/L      Calcium 8.0 mg/dL      Total Protein 6.0 g/dL      Albumin 3.10 g/dL      ALT (SGPT) 32 U/L      AST (SGOT) 43 U/L      Alkaline Phosphatase 46 U/L      Total Bilirubin 0.5 mg/dL      eGFR Non African Amer 89 mL/min/1.73      Globulin 2.9 gm/dL      A/G Ratio 1.1 g/dL      BUN/Creatinine Ratio 35.7     Anion Gap 10.6 mmol/L     Narrative:      GFR Normal >60  Chronic Kidney Disease <60  Kidney Failure <15      Bilirubin, Direct [[id number]]  (Normal) Collected: 01/04/22 0853    Specimen: Blood Updated: 01/04/22 1017     Bilirubin, Direct 0.2 mg/dL     C-reactive Protein [[id number]]  (Abnormal) Collected: 01/04/22 0853    Specimen: Blood Updated: 01/04/22 1017     C-Reactive Protein 2.92 mg/dL     CBC & Differential [[id number]]  (Abnormal) Collected: 01/04/22 0853    Specimen: Blood Updated: 01/04/22 0940    Narrative:      The following orders were created for panel order CBC & Differential.  Procedure                               Abnormality         Status                     ---------                               -----------         ------                     CBC Auto Differential[[id number]]        Abnormal            Final result                 Please view results for these tests on the individual orders.    CBC Auto Differential [[id number]]  (Abnormal) Collected: 01/04/22 0853    Specimen: Blood Updated: 01/04/22 0940     WBC 9.99 10*3/mm3      RBC 3.67 10*6/mm3      Hemoglobin 10.8 g/dL      Hematocrit 34.7 %      MCV 94.6 fL      MCH 29.4 pg      MCHC 31.1 g/dL      RDW  13.9 %      RDW-SD 48.5 fl      MPV 12.4 fL      Platelets 169 10*3/mm3      Neutrophil % 84.3 %      Lymphocyte % 8.8 %      Monocyte % 6.3 %      Eosinophil % 0.0 %      Basophil % 0.1 %      Immature Grans % 0.5 %      Neutrophils, Absolute 8.42 10*3/mm3      Lymphocytes, Absolute 0.88 10*3/mm3      Monocytes, Absolute 0.63 10*3/mm3      Eosinophils, Absolute 0.00 10*3/mm3      Basophils, Absolute 0.01 10*3/mm3      Immature Grans, Absolute 0.05 10*3/mm3      nRBC 0.0 /100 WBC         Imaging Results (Last 24 Hours)     ** No results found for the last 24 hours. **          Current Facility-Administered Medications:   •  acetaminophen (TYLENOL) tablet 650 mg, 650 mg, Oral, Q4H PRN, Darrell Kc MD, 650 mg at 01/01/22 0327  •  budesonide-formoterol (SYMBICORT) 160-4.5 MCG/ACT inhaler 2 puff, 2 puff, Inhalation, BID - RT, Darrell Kc MD, 2 puff at 01/04/22 0729  •  carbidopa-levodopa (SINEMET)  MG per tablet 1 tablet, 1 tablet, Oral, TID, Darrell Kc MD, 1 tablet at 01/04/22 0927  •  cholecalciferol (VITAMIN D3) tablet 1,000 Units, 1,000 Units, Oral, Daily, Darrell Kc MD, 1,000 Units at 01/04/22 0926  •  dexamethasone (DECADRON) tablet 6 mg, 6 mg, Oral, Daily With Breakfast, Darrell Kc MD, 6 mg at 01/04/22 0926  •  dextrose (D5W) 5 % infusion, 75 mL/hr, Intravenous, Continuous, Darrell Kc MD, Last Rate: 75 mL/hr at 01/04/22 0245, 75 mL/hr at 01/04/22 0245  •  donepezil (ARICEPT) tablet 5 mg, 5 mg, Oral, Nightly, Darrell Kc MD, 5 mg at 01/03/22 2145  •  escitalopram (LEXAPRO) tablet 10 mg, 10 mg, Oral, Nightly, Darrell Kc MD, 10 mg at 01/03/22 2145  •  famotidine (PEPCID) tablet 20 mg, 20 mg, Oral, Q12H, Darrell Kc MD, 20 mg at 01/04/22 0927  •  guaifenesin (ROBITUSSIN) 100 MG/5ML liquid 400 mg, 400 mg, Oral, Q8H, Darrell Kc MD, 400 mg at 01/04/22 0559  •  levothyroxine (SYNTHROID, LEVOTHROID) tablet 125 mcg, 125 mcg, Oral, Q AM, Darrell Kc MD, 125 mcg at 01/04/22 0559  •   piperacillin-tazobactam (ZOSYN) 3.375 g in iso-osmotic dextrose 50 ml (premix), 3.375 g, Intravenous, Q8H, Jero Schaffer MD, 3.375 g at 01/04/22 0930  •  QUEtiapine (SEROquel) tablet 25 mg, 25 mg, Oral, Nightly, Kanika Kc MD, 25 mg at 01/03/22 2145  •  rivaroxaban (XARELTO) tablet 20 mg, 20 mg, Oral, Daily With Dinner, Kanika Kc MD, 20 mg at 01/03/22 1704     ASSESSMENT  COVID-19 infection  Acute hypoxia   Hypernatremia  Chronic atrial fibrillation on Xarelto  Severe Parkinson disease  Severe dementia  Progressive supranuclear palsy  Hypothyroidism  Chronic anemia  Gastroesophageal reflux disease    PLAN  CPM  Supplement oxygen  Albuterol and Symbicort  Change Decadron to p.o.  Remdesivir completed  Continue IVF and encourage p.o. intake  IV antibiotics per infectious disease  Continue nursing home medications  Stress ulcer DVT prophylaxis  Supportive care  DNR  PT/OT  Discussed with POA nursing staff and infectious disease  Follow closely and further recommendation according to hospital course    KANIKA KC MD

## 2022-01-04 NOTE — PLAN OF CARE
Goal Outcome Evaluation:  Plan of Care Reviewed With: patient        Progress: improving  Outcome Summary: Has slept long intervals. Pedro po and meds w/o diff. VSS. Remains on RA. No changes no distress

## 2022-01-04 NOTE — THERAPY TREATMENT NOTE
Patient Name: Rommel Gonzalez  : 1945    MRN: [id number]                              Today's Date: 2022       Admit Date: 2021    Visit Dx:     ICD-10-CM ICD-9-CM   1. COVID  U07.1 079.89   2. Hypoxia  R09.02 799.02     Patient Active Problem List   Diagnosis   • AF (atrial fibrillation) (HCC)   • Depression   • Gait instability   • Hypertension   • Insomnia   • Idiopathic Parkinson's disease (HCC)   • Peripheral neuropathy   • Dysarthria   • Atrial flutter (HCC)   • Anticoagulated   • Health care maintenance   • Hypothyroidism (acquired)   • Weakness of voice   • Abnormal chest CT   • Facial droop   • Hypersomnia    • Hyperlipidemia   • High risk medication use   • CORINNE on CPAP   • Obstructive sleep apnea, adult   • Fall   • Progressive supranuclear palsy (HCC)   • Dysphagia   • Atypical parkinsonism (HCC)   • Constipation   • Hyperreflexia   • Torsion dystonia   • Transient cerebral ischemia   • Pneumonia of both lungs due to infectious organism   • Closed fracture of multiple ribs   • Closed compression fracture of L1 lumbar vertebra   • Closed fracture of transverse process of lumbar vertebra (HCC)   • Altered mental status   • Frequent falls   • Sepsis without acute organ dysfunction (HCC)   • Choking   • Abnormal esophagram   • COVID     Past Medical History:   Diagnosis Date   • AF (atrial fibrillation) (HCC)    • Altered mental status    • Anticoagulated    • Aspiration pneumonia (HCC)    • Atrial flutter (HCC)    • Atypical chest pain    • Chest pain    • Colon polyps    • Confusion    • Dementia (HCC)    • Depression    • Disease of thyroid gland    • Disorder of thyroid    • Dysarthria    • Dysphagia    • Dyspnea and respiratory abnormalities    • Elevated TSH    • Fall    • Fatigue    • Gait instability    • Gastroesophageal reflux disease    • Hay fever    • Hyperlipidemia    • Hypertension    • Hypothyroidism    • Idiopathic Parkinson's disease (HCC)    • Insomnia    • Measles    •  Metabolic encephalopathy    • Mumps    • Non-traumatic rhabdomyolysis    • CORINNE (obstructive sleep apnea)    • Palpitations    • Parkinson disease (HCC)    • Peripheral neuropathy    • Pneumonia    • Poor sleep pattern    • Progressive supranuclear palsy (HCC)    • Sepsis (HCC)    • Slurred speech    • Tremor    • Weakness of voice      Past Surgical History:   Procedure Laterality Date   • ENDOSCOPY N/A 10/14/2020    Procedure: ESOPHAGOGASTRODUODENOSCOPY;  Surgeon: Avis Sargent MD;  Location: [facility] ENDOSCOPY;  Service: Gastroenterology;  Laterality: N/A;  Pre: abnormal esophagram  Post: normal   • ENDOSCOPY W/ PEG TUBE PLACEMENT N/A 3/16/2018    Procedure: ESOPHAGOGASTRODUODENOSCOPY WITH PERCUTANEOUS ENDOSCOPIC GASTROSTOMY TUBE INSERTION;  Surgeon: Lopez Vang Jr., MD;  Location: [facility] ENDOSCOPY;  Service: Gastroenterology      General Information     Row Name 01/04/22 1837          Physical Therapy Time and Intention    Document Type therapy note (daily note)  -     Mode of Treatment individual therapy; physical therapy  -     Row Name 01/04/22 1837          General Information    Patient Profile Reviewed yes  -     Existing Precautions/Restrictions fall; oxygen therapy device and L/min  -     Barriers to Rehab --  hx of Parkinsons  -     Row Name 01/04/22 1837          Cognition    Orientation Status (Cognition) unable/difficult to assess  falling asleep, but when awake followed commands  -[facility] Name 01/04/22 1837          Safety Issues, Functional Mobility    Impairments Affecting Function (Mobility) range of motion (ROM); strength  -           User Key  (r) = Recorded By, (t) = Taken By, (c) = Cosigned By    Initials Name Provider Type     Ana Rosa Fabian PTA Physical Therapy Assistant               Mobility    No documentation.                Obj/Interventions     Row Name 01/04/22 1839          Motor Skills    Therapeutic Exercise --  shldr flex bilat x10 w/assist; APs, QS, hip  abd/add, HS, all w/assist except HS x10 reps-falling asleep during session  -     Row Name 01/04/22 1839          Balance    Comment, Balance unable to sit EOB as pt falling asleep during session today  -           User Key  (r) = Recorded By, (t) = Taken By, (c) = Cosigned By    Initials Name Provider Type    Ana Rosa Whitlock PTA Physical Therapy Assistant               Goals/Plan    No documentation.                Clinical Impression     [facility] Name 01/04/22 1841          Pain Scale: Numbers Pre/Post-Treatment    Pretreatment Pain Rating 0/10 - no pain  -     Posttreatment Pain Rating 0/10 - no pain  -     Pre/Posttreatment Pain Comment nodded head or squeezed hand to answer, non-verbal, can make a few sounds only this session  -[facility] Name 01/04/22 1841          Plan of Care Review    Plan of Care Reviewed With patient  -     Outcome Summary Pt kaiser ther exer all 4 extremities, some assist given by pt on HS, falling asleep during session; plan EOB , possible standing if co-rx possible  -[facility] Name 01/04/22 1841          Therapy Assessment/Plan (PT)    Rehab Potential (PT) fair, will monitor progress closely  -[facility] Name 01/04/22 1841          Vital Signs    O2 Delivery Pre Treatment supplemental O2  -[facility] Name 01/04/22 1841          Positioning and Restraints    Pre-Treatment Position in bed  -     Post Treatment Position bed  -     In Bed fowlers; call light within reach; encouraged to call for assist; exit alarm on  -           User Key  (r) = Recorded By, (t) = Taken By, (c) = Cosigned By    Initials Name Provider Type    Ana Rosa Whitlock PTA Physical Therapy Assistant               Outcome Measures     Row Name 01/04/22 1843          How much help from another person do you currently need...    Turning from your back to your side while in flat bed without using bedrails? 1  -     Moving from lying on back to sitting on the side of a flat bed without bedrails? 1  -TARA      Moving to and from a bed to a chair (including a wheelchair)? 1  -JM     Standing up from a chair using your arms (e.g., wheelchair, bedside chair)? 1  -JM     Climbing 3-5 steps with a railing? 1  -JM     To walk in hospital room? 1  -JM     AM-PAC 6 Clicks Score (PT) 6  -JM           User Key  (r) = Recorded By, (t) = Taken By, (c) = Cosigned By    Initials Name Provider Type    Ana Rosa Whitlock PTA Physical Therapy Assistant                             Physical Therapy Education                 Title: PT OT SLP Therapies (In Progress)     Topic: Physical Therapy (In Progress)     Point: Mobility training (In Progress)     Learning Progress Summary           Patient Acceptance, E,TB,D, NR,NL by  at 12/31/2021 1457                   Point: Home exercise program (In Progress)     Learning Progress Summary           Patient Acceptance, E,TB,D, NR,NL by  at 12/31/2021 1457                   Point: Body mechanics (Not Started)     Learner Progress:  Not documented in this visit.          Point: Precautions (Not Started)     Learner Progress:  Not documented in this visit.                      User Key     Initials Effective Dates Name Provider Type Discipline     10/22/21 -  Grisel Valdez, PT Physical Therapist PT              PT Recommendation and Plan     Plan of Care Reviewed With: patient  Outcome Summary: Pt kaiser ther exer all 4 extremities, some assist given by pt on HS, falling asleep during session; plan EOB , possible standing if co-rx possible     Time Calculation:    PT Charges     Row Name 01/04/22 1843             Time Calculation    Start Time 1616  -      Stop Time 1643  -      Time Calculation (min) 27 min  -TARA      PT Received On 01/04/22  -TARA      PT - Next Appointment 01/06/22  -TARA            User Key  (r) = Recorded By, (t) = Taken By, (c) = Cosigned By    Initials Name Provider Type    Ana Rosa Whitlock PTA Physical Therapy Assistant              Therapy Charges for Today      Code Description Service Date Service Provider Modifiers Qty    14448608541 HC PT THER PROC EA 15 MIN 1/4/2022 Ana Rosa Fabian, CLARENCE GP 2          PT G-Codes  Outcome Measure Options: AM-PAC 6 Clicks Daily Activity (OT)  AM-PAC 6 Clicks Score (PT): 6  AM-PAC 6 Clicks Score (OT): 6    Ana Rosa Fabian PTA  1/4/2022

## 2022-01-04 NOTE — PROGRESS NOTES
"  Infectious Diseases Progress Note        [facility]  [place]: 5 days  Patient Identification:  Name: Rommel Gonzalez  Age: 76 y.o.  Sex: male  :  1945  MRN: [id number]         Primary Care Physician: Dennis Goncalves MD            Subjective: Patient more interactive and opens eyes but still does not engage and converse..  Interval History: See consultation note.    Objective:    Scheduled Meds:budesonide-formoterol, 2 puff, Inhalation, BID - RT  carbidopa-levodopa, 1 tablet, Oral, TID  cholecalciferol, 1,000 Units, Oral, Daily  dexamethasone, 6 mg, Oral, Daily With Breakfast  donepezil, 5 mg, Oral, Nightly  escitalopram, 10 mg, Oral, Nightly  famotidine, 20 mg, Oral, Q12H  guaifenesin, 400 mg, Oral, Q8H  levothyroxine, 125 mcg, Oral, Q AM  piperacillin-tazobactam, 3.375 g, Intravenous, Q8H  QUEtiapine, 25 mg, Oral, Nightly  rivaroxaban, 20 mg, Oral, Daily With Dinner      Continuous Infusions:dextrose, 100 mL/hr, Last Rate: 100 mL/hr (22 1350)        Vital signs in last 24 hours:  Temp:  [97.3 °F (36.3 °C)-97.7 °F (36.5 °C)] 97.7 °F (36.5 °C)  Heart Rate:  [56-66] 58  Resp:  [18-20] 18  BP: (110-120)/(58-62) 119/59    Intake/Output:    Intake/Output Summary (Last 24 hours) at 2022 1509  Last data filed at 2022 1300  Gross per 24 hour   Intake 4296 ml   Output --   Net 4296 ml       Exam:  /59 (BP Location: Left arm, Patient Position: Lying)   Pulse 58   Temp 97.7 °F (36.5 °C) (Oral)   Resp 18   Ht 180.3 cm (70.98\")   Wt 73.1 kg (161 lb 2.5 oz) Comment: not weighed by RD  SpO2 91%   BMI 22.49 kg/m²   Patient is examined using the personal protective equipment as per guidelines from infection control for this particular patient as enacted.  Hand washing was performed before and after patient interaction.  General Appearance:  Labored more responsive.  Head:    Normocephalic, without obvious abnormality, atraumatic   Eyes:    PERRL, conjunctivae/corneas clear, EOM's intact, " both eyes   Ears:    Normal external ear canals, both ears   Nose:   Nares normal, septum midline, mucosa normal, no drainage    or sinus tenderness   Throat:   Lips, tongue, gums normal; oral mucosa pink and moist   Neck:   Supple, symmetrical, trachea midline, no adenopathy;     thyroid:  no enlargement/tenderness/nodules; no carotid    bruit or JVD   Back:     Symmetric, no curvature, ROM normal, no CVA tenderness   Lungs:     Clear to auscultation bilaterally, respirations unlabored   Chest Wall:    No tenderness or deformity    Heart:   Irregularly irregular   Abdomen:    Soft nontender   Extremities:  Atrophy due to disuse   Pulses:   Pulses palpable in all extremities; symmetric all extremities   Skin:   Skin color normal, Skin is warm and dry,  no rashes or palpable lesions   Neurologic:  Spasticity in upper and lower extremities noted mostly she is noted            Data Review:    I reviewed the patient's new clinical results.  Results from last 7 days   Lab Units 01/04/22  0853 01/03/22  0628 01/02/22  0605 01/01/22  0731 12/31/21  0702 12/30/21  1123   WBC 10*3/mm3 9.99 12.99* 13.61* 14.76* 15.92* 4.56   HEMOGLOBIN g/dL 10.8* 11.2* 12.0* 12.1* 11.6* 10.9*   PLATELETS 10*3/mm3 169 195 181 192 211 217     Results from last 7 days   Lab Units 01/04/22  0853 01/03/22  0628 01/02/22  0605 01/01/22  0731 12/31/21  0702 12/30/21  1123   SODIUM mmol/L 147* 145 150* 140 137 134*   POTASSIUM mmol/L 4.0 4.6 5.0 4.4 4.7 4.1   CHLORIDE mmol/L 111* 112* 112* 105 103 99   CO2 mmol/L 25.4 24.8 26.8 24.1 24.2 27.7   BUN mg/dL 30* 32* 35* 32* 20 17   CREATININE mg/dL 0.84 0.81 0.85 0.94 0.84 0.87   CALCIUM mg/dL 8.0* 8.1* 8.4* 8.5* 8.3* 8.6   GLUCOSE mg/dL 136* 140* 132* 139* 105* 89     Microbiology Results (last 10 days)     Procedure Component Value - Date/Time    MRSA Screen, PCR (Inpatient) - Swab, Nares [[id number]]  (Normal) Collected: 01/02/22 1214    Lab Status: Final result Specimen: Swab from Nares Updated:  01/02/22 1407     MRSA PCR No MRSA Detected    Respiratory Panel PCR w/COVID-19(SARS-CoV-2) TRENT/KAMRYN/ALLAN/PAD/COR/MAD/VALENTIN In-House, NP Swab in UTM/VTM, 3-4 HR TAT - Swab, Nasopharynx [[id number]]  (Abnormal) Collected: 12/30/21 1142    Lab Status: Final result Specimen: Swab from Nasopharynx Updated: 12/30/21 1249     ADENOVIRUS, PCR Not Detected     Coronavirus 229E Not Detected     Coronavirus HKU1 Not Detected     Coronavirus NL63 Not Detected     Coronavirus OC43 Not Detected     COVID19 Detected     Human Metapneumovirus Not Detected     Human Rhinovirus/Enterovirus Not Detected     Influenza A PCR Not Detected     Influenza B PCR Not Detected     Parainfluenza Virus 1 Not Detected     Parainfluenza Virus 2 Not Detected     Parainfluenza Virus 3 Not Detected     Parainfluenza Virus 4 Not Detected     RSV, PCR Not Detected     Bordetella pertussis pcr Not Detected     Bordetella parapertussis PCR Not Detected     Chlamydophila pneumoniae PCR Not Detected     Mycoplasma pneumo by PCR Not Detected    Narrative:      In the setting of a positive respiratory panel with a viral infection PLUS a negative procalcitonin without other underlying concern for bacterial infection, consider observing off antibiotics or discontinuation of antibiotics and continue supportive care. If the respiratory panel is positive for atypical bacterial infection (Bordetella pertussis, Chlamydophila pneumoniae, or Mycoplasma pneumoniae), consider antibiotic de-escalation to target atypical bacterial infection.            Assessment:    COVID  1-systemic COVID-19 infection with  2-evolving COVID-19 pneumonia with hypoxia  3-aspiration pneumonia -with elevated procalcitonin and worsening hypoxia  4-Parkinson's disease and immobilization syndrome  5-history of atrial fibrillation on chronic anticoagulation therapy  6-dysphagia  7-incomplete database-patient cannot participate in significant review  systems.     Recommendations/Discussions:  · Continue aggressive care for COVID-19 pneumonia with superimposed bacterial pneumonia given elevated procalcitonin and worsening hypoxia that he has exhibited.  · Overall prognosis is guarded continue with aspiration precautions.  Marcus Bernard MD  1/4/2022  15:09 EST

## 2022-01-04 NOTE — PLAN OF CARE
Goal Outcome Evaluation:  Plan of Care Reviewed With: patient           Outcome Summary: Pt kaiser ther exer all 4 extremities, some assist given by pt on HS, falling asleep during session; plan EOB , possible standing if co-rx possible    Patient was wearing a face mask during this therapy encounter. Therapist used appropriate personal protective equipment including eye protection, mask, and gloves.  Mask used was N95/duckbill. Appropriate PPE was worn during the entire therapy session. Hand hygiene was completed before and after therapy session. Patient is in enhanced droplet precautions.

## 2022-01-04 NOTE — PLAN OF CARE
Goal Outcome Evaluation:              Outcome Summary: tolerating diet; good po intake; on RA; nonverbal, appears comfortable; follows commands

## 2022-01-05 NOTE — PLAN OF CARE
Goal Outcome Evaluation:              Outcome Summary: alert and calm.  follows simple commands and seems to understand staff; VSS; on RA; eating well with rest periods between bites; to be transferred back to [facility] facility at 1930; no distress

## 2022-01-05 NOTE — PROGRESS NOTES
"  Infectious Diseases Progress Note        [facility]  [place]: 6 days  Patient Identification:  Name: Rommel Gonzalez  Age: 76 y.o.  Sex: male  :  1945  MRN: [id number]         Primary Care Physician: Dennis Goncalves MD            Subjective: Patient more interactive and opens eyes but still does not engage and converse..  Interval History: See consultation note.    Objective:    Scheduled Meds:budesonide-formoterol, 2 puff, Inhalation, BID - RT  carbidopa-levodopa, 1 tablet, Oral, TID  cholecalciferol, 1,000 Units, Oral, Daily  dexamethasone, 6 mg, Oral, Daily With Breakfast  donepezil, 5 mg, Oral, Nightly  escitalopram, 10 mg, Oral, Nightly  famotidine, 20 mg, Oral, Q12H  guaifenesin, 400 mg, Oral, Q8H  levothyroxine, 125 mcg, Oral, Q AM  QUEtiapine, 25 mg, Oral, Nightly  rivaroxaban, 20 mg, Oral, Daily With Dinner      Continuous Infusions:dextrose, 100 mL/hr, Last Rate: 100 mL/hr (22 0935)        Vital signs in last 24 hours:  Temp:  [97.3 °F (36.3 °C)-98.1 °F (36.7 °C)] 98.1 °F (36.7 °C)  Heart Rate:  [51-63] 54  Resp:  [18] 18  BP: (118-133)/(66-71) 128/71    Intake/Output:    Intake/Output Summary (Last 24 hours) at 2022 1251  Last data filed at 2022 2351  Gross per 24 hour   Intake 2048 ml   Output --   Net 2048 ml       Exam:  /71 (BP Location: Left arm, Patient Position: Lying)   Pulse 54   Temp 98.1 °F (36.7 °C) (Axillary)   Resp 18   Ht 180.3 cm (70.98\")   Wt 73.1 kg (161 lb 2.5 oz) Comment: not weighed by RD  SpO2 98%   BMI 22.49 kg/m²   Patient is examined using the personal protective equipment as per guidelines from infection control for this particular patient as enacted.  Hand washing was performed before and after patient interaction.  General Appearance:  Labored more responsive.  Head:    Normocephalic, without obvious abnormality, atraumatic   Eyes:    PERRL, conjunctivae/corneas clear, EOM's intact, both eyes   Ears:    Normal external ear canals, " both ears   Nose:   Nares normal, septum midline, mucosa normal, no drainage    or sinus tenderness   Throat:   Lips, tongue, gums normal; oral mucosa pink and moist   Neck:   Supple, symmetrical, trachea midline, no adenopathy;     thyroid:  no enlargement/tenderness/nodules; no carotid    bruit or JVD   Back:     Symmetric, no curvature, ROM normal, no CVA tenderness   Lungs:     Clear to auscultation bilaterally, respirations unlabored   Chest Wall:    No tenderness or deformity    Heart:   Irregularly irregular   Abdomen:    Soft nontender   Extremities:  Atrophy due to disuse   Pulses:   Pulses palpable in all extremities; symmetric all extremities   Skin:   Skin color normal, Skin is warm and dry,  no rashes or palpable lesions   Neurologic:  Spasticity in upper and lower extremities noted mostly she is noted            Data Review:    I reviewed the patient's new clinical results.  Results from last 7 days   Lab Units 01/05/22  0858 01/04/22  0853 01/03/22  0628 01/02/22  0605 01/01/22  0731 12/31/21  0702 12/30/21  1123   WBC 10*3/mm3 11.29* 9.99 12.99* 13.61* 14.76* 15.92* 4.56   HEMOGLOBIN g/dL 10.6* 10.8* 11.2* 12.0* 12.1* 11.6* 10.9*   PLATELETS 10*3/mm3 184 169 195 181 192 211 217     Results from last 7 days   Lab Units 01/05/22  0858 01/04/22  0853 01/03/22  0628 01/02/22  0605 01/01/22  0731 12/31/21  0702 12/30/21  1123   SODIUM mmol/L 142 147* 145 150* 140 137 134*   POTASSIUM mmol/L 3.8 4.0 4.6 5.0 4.4 4.7 4.1   CHLORIDE mmol/L 109* 111* 112* 112* 105 103 99   CO2 mmol/L 24.6 25.4 24.8 26.8 24.1 24.2 27.7   BUN mg/dL 23 30* 32* 35* 32* 20 17   CREATININE mg/dL 0.73* 0.84 0.81 0.85 0.94 0.84 0.87   CALCIUM mg/dL 8.0* 8.0* 8.1* 8.4* 8.5* 8.3* 8.6   GLUCOSE mg/dL 119* 136* 140* 132* 139* 105* 89     Microbiology Results (last 10 days)     Procedure Component Value - Date/Time    MRSA Screen, PCR (Inpatient) - Swab, Nares [[id number]]  (Normal) Collected: 01/02/22 1214    Lab Status: Final result  Specimen: Swab from Nares Updated: 01/02/22 1407     MRSA PCR No MRSA Detected    Respiratory Panel PCR w/COVID-19(SARS-CoV-2) TRENT/KAMRYN/ALLAN/PAD/COR/MAD/VALENTIN In-House, NP Swab in UTM/VTM, 3-4 HR TAT - Swab, Nasopharynx [[id number]]  (Abnormal) Collected: 12/30/21 1142    Lab Status: Final result Specimen: Swab from Nasopharynx Updated: 12/30/21 1249     ADENOVIRUS, PCR Not Detected     Coronavirus 229E Not Detected     Coronavirus HKU1 Not Detected     Coronavirus NL63 Not Detected     Coronavirus OC43 Not Detected     COVID19 Detected     Human Metapneumovirus Not Detected     Human Rhinovirus/Enterovirus Not Detected     Influenza A PCR Not Detected     Influenza B PCR Not Detected     Parainfluenza Virus 1 Not Detected     Parainfluenza Virus 2 Not Detected     Parainfluenza Virus 3 Not Detected     Parainfluenza Virus 4 Not Detected     RSV, PCR Not Detected     Bordetella pertussis pcr Not Detected     Bordetella parapertussis PCR Not Detected     Chlamydophila pneumoniae PCR Not Detected     Mycoplasma pneumo by PCR Not Detected    Narrative:      In the setting of a positive respiratory panel with a viral infection PLUS a negative procalcitonin without other underlying concern for bacterial infection, consider observing off antibiotics or discontinuation of antibiotics and continue supportive care. If the respiratory panel is positive for atypical bacterial infection (Bordetella pertussis, Chlamydophila pneumoniae, or Mycoplasma pneumoniae), consider antibiotic de-escalation to target atypical bacterial infection.            Assessment:    COVID  1-systemic COVID-19 infection with  2-evolving COVID-19 pneumonia with hypoxia  3-aspiration pneumonia -with elevated procalcitonin and worsening hypoxia  4-Parkinson's disease and immobilization syndrome  5-history of atrial fibrillation on chronic anticoagulation therapy  6-dysphagia  7-incomplete database-patient cannot participate in significant review  systems.     Recommendations/Discussions:  · Continue aggressive care for COVID-19 pneumonia with superimposed bacterial pneumonia given elevated procalcitonin and worsening hypoxia that he has exhibited.  · Overall prognosis is guarded continue with aspiration precautions.  Marcus Bernard MD  1/5/2022  12:51 EST

## 2022-01-05 NOTE — PROGRESS NOTES
"Daily progress note    Chief complaint  Doing same  No new complaints  O2 sats 98% on room air    History of present illness  76-year-old white male with history of atrial fibrillation Parkinson disease progressive supranuclear palsy severe dementia hypothyroidism and gastroesophageal reflux disease who is well-known to our service brought to the emergency room with fever chills started yesterday with nonproductive cough and nasal congestion.  Patient did receive COVID-19 vaccine work-up in ER revealed COVID-19 infection with hypoxia admit for management.  Patient is a poor historian unable to give detailed history and most of the history obtained from the chart nursing staff old record and I know the patient from previous admission.  Patient is DNR per his wishes.     REVIEW OF SYSTEMS  Unable to obtain      PHYSICAL EXAM  Blood pressure 115/69, pulse 69, temperature 97.9 °F (36.6 °C), temperature source Axillary, resp. rate 18, height 180.3 cm (70.98\"), weight 73.1 kg (161 lb 2.5 oz), SpO2 98 %.    Constitutional:       Appearance: Normal appearance.   HENT:      Head: Normocephalic and atraumatic.      Mouth/Throat:      Mouth: Mucous membranes are moist.   Eyes:      Extraocular Movements: Extraocular movements intact.      Pupils: Pupils are equal, round, and reactive to light.   Cardiovascular:      Rate and Rhythm: Regular rhythm. Bradycardia present.   Pulmonary:      Effort: Pulmonary effort is normal.      Breath sounds: Normal breath sounds.   Abdominal:      General: There is no distension.      Palpations: Abdomen is soft.      Tenderness: There is no abdominal tenderness.   Musculoskeletal:      Cervical back: Normal range of motion.   Skin:     General: Skin is warm and dry.   Neurological:      General: No focal deficit present.      Mental Status: He is alert and oriented to person, place, and time.   Psychiatric:         Mood and Affect: Mood normal.         Behavior: Behavior normal.         " Thought Content: Thought content normal.         Judgment: Judgment normal.      LABS  Lab Results (last 24 hours)     Procedure Component Value Units Date/Time    Hepatic Function Panel [[id number]]  (Abnormal) Collected: 01/05/22 0858    Specimen: Blood Updated: 01/05/22 1223     Total Protein 5.8 g/dL      Albumin 3.10 g/dL      ALT (SGPT) 23 U/L      AST (SGOT) 27 U/L      Alkaline Phosphatase 46 U/L      Total Bilirubin 0.3 mg/dL      Bilirubin, Direct <0.2 mg/dL      Bilirubin, Indirect --     Comment: Unable to calculate       C-reactive Protein [[id number]]  (Abnormal) Collected: 01/05/22 0858    Specimen: Blood Updated: 01/05/22 1223     C-Reactive Protein 1.52 mg/dL     Basic Metabolic Panel [[id number]]  (Abnormal) Collected: 01/05/22 0858    Specimen: Blood Updated: 01/05/22 1154     Glucose 119 mg/dL      BUN 23 mg/dL      Creatinine 0.73 mg/dL      Sodium 142 mmol/L      Potassium 3.8 mmol/L      Chloride 109 mmol/L      CO2 24.6 mmol/L      Calcium 8.0 mg/dL      eGFR Non African Amer 104 mL/min/1.73      BUN/Creatinine Ratio 31.5     Anion Gap 8.4 mmol/L     Narrative:      GFR Normal >60  Chronic Kidney Disease <60  Kidney Failure <15      CBC & Differential [[id number]]  (Abnormal) Collected: 01/05/22 0858    Specimen: Blood Updated: 01/05/22 0942    Narrative:      The following orders were created for panel order CBC & Differential.  Procedure                               Abnormality         Status                     ---------                               -----------         ------                     CBC Auto Differential[[id number]]        Abnormal            Final result                 Please view results for these tests on the individual orders.    CBC Auto Differential [[id number]]  (Abnormal) Collected: 01/05/22 0858    Specimen: Blood Updated: 01/05/22 0942     WBC 11.29 10*3/mm3      RBC 3.66 10*6/mm3      Hemoglobin 10.6 g/dL      Hematocrit 33.9 %      MCV 92.6 fL      MCH 29.0 pg       MCHC 31.3 g/dL      RDW 13.6 %      RDW-SD 46.5 fl      MPV 12.3 fL      Platelets 184 10*3/mm3      Neutrophil % 78.5 %      Lymphocyte % 13.6 %      Monocyte % 6.8 %      Eosinophil % 0.0 %      Basophil % 0.2 %      Immature Grans % 0.9 %      Neutrophils, Absolute 8.87 10*3/mm3      Lymphocytes, Absolute 1.53 10*3/mm3      Monocytes, Absolute 0.77 10*3/mm3      Eosinophils, Absolute 0.00 10*3/mm3      Basophils, Absolute 0.02 10*3/mm3      Immature Grans, Absolute 0.10 10*3/mm3      nRBC 0.0 /100 WBC         Imaging Results (Last 24 Hours)     ** No results found for the last 24 hours. **          Current Facility-Administered Medications:   •  acetaminophen (TYLENOL) tablet 650 mg, 650 mg, Oral, Q4H PRN, Darrell Kc MD, 650 mg at 01/01/22 0327  •  budesonide-formoterol (SYMBICORT) 160-4.5 MCG/ACT inhaler 2 puff, 2 puff, Inhalation, BID - RT, Darrell Kc MD, 2 puff at 01/05/22 0930  •  carbidopa-levodopa (SINEMET)  MG per tablet 1 tablet, 1 tablet, Oral, TID, Darrell Kc MD, 1 tablet at 01/05/22 0921  •  cholecalciferol (VITAMIN D3) tablet 1,000 Units, 1,000 Units, Oral, Daily, Darrell Kc MD, 1,000 Units at 01/05/22 0921  •  dexamethasone (DECADRON) tablet 6 mg, 6 mg, Oral, Daily With Breakfast, Darrell Kc MD, 6 mg at 01/05/22 0921  •  dextrose (D5W) 5 % infusion, 100 mL/hr, Intravenous, Continuous, Darrell Kc MD, Last Rate: 100 mL/hr at 01/05/22 0935, 100 mL/hr at 01/05/22 0935  •  donepezil (ARICEPT) tablet 5 mg, 5 mg, Oral, Nightly, Darrell Kc MD, 5 mg at 01/04/22 9109  •  escitalopram (LEXAPRO) tablet 10 mg, 10 mg, Oral, Nightly, Darrell Kc MD, 10 mg at 01/04/22 2344  •  famotidine (PEPCID) tablet 20 mg, 20 mg, Oral, Q12H, Darrell Kc MD, 20 mg at 01/05/22 0921  •  guaifenesin (ROBITUSSIN) 100 MG/5ML liquid 400 mg, 400 mg, Oral, Q8H, Darrell Kc MD, 400 mg at 01/05/22 0654  •  levothyroxine (SYNTHROID, LEVOTHROID) tablet 125 mcg, 125 mcg, Oral, Q AM, Darrell Kc MD, 125 mcg  at 01/05/22 0654  •  QUEtiapine (SEROquel) tablet 25 mg, 25 mg, Oral, Nightly, Kainka Kc MD, 25 mg at 01/04/22 2344  •  rivaroxaban (XARELTO) tablet 20 mg, 20 mg, Oral, Daily With Dinner, Kanika Kc MD, 20 mg at 01/04/22 1720  •  senna (SENOKOT) tablet 1 tablet, 1 tablet, Oral, Nightly PRN, Kanika Kc MD     ASSESSMENT  COVID-19 infection  Acute hypoxia   Hypernatremia corrected  Chronic atrial fibrillation on Xarelto  Severe Parkinson disease  Severe dementia  Progressive supranuclear palsy  Hypothyroidism  Chronic anemia  Gastroesophageal reflux disease    PLAN  Discharge back to nursing home  Discharge summary dictated    KANIKA KC MD

## 2022-01-05 NOTE — CASE MANAGEMENT/SOCIAL WORK
Continued Stay Note  [facility]     Patient Name: Rommel Gonzalez  MRN: [id number]  Today's Date: 1/5/2022    Admit Date: 12/30/2021     Discharge Plan     Row Name 01/05/22 1440       Plan    Plan [facility]    Patient/Family in Agreement with Plan yes    Plan Comments DC orders noted. EMS scheduled for 730 pm. Attempted to reach the nurse at [facility] but was transferred to their . Left message with update that the patient would be returning via EMS at 730 pm. Spoke with POA/sister Raquel and updated her regarding DC plans for tonight. She will attempt to reach the facility also. Message to nurse to update her of DC time. Marietta Dawson RN CCP               Discharge Codes    No documentation.               Expected Discharge Date and Time     Expected Discharge Date Expected Discharge Time    Jan 5, 2022             Marietta Dawson RN

## 2022-01-05 NOTE — DISCHARGE SUMMARY
Discharge summary    Date of admission 12/30/2021  Date of discharge 1/5/2022    Final diagnosis  COVID-19 infection  Acute hypoxia   Hypernatremia corrected  Paroxysmal atrial fibrillation   Severe Parkinson disease  Severe dementia  Progressive supranuclear palsy  Hypothyroidism  Chronic anemia  Gastroesophageal reflux disease     Discharge medications    Current Facility-Administered Medications:   •  acetaminophen (TYLENOL) tablet 650 mg, 650 mg, Oral, Q4H PRN, Darrell Kc MD, 650 mg at 01/01/22 0327  •  carbidopa-levodopa (SINEMET)  MG per tablet 1 tablet, 1 tablet, Oral, TID, Darrell Kc MD, 1 tablet at 01/05/22 0921  •  cholecalciferol (VITAMIN D3) tablet 1,000 Units, 1,000 Units, Oral, Daily, Darrell Kc MD, 1,000 Units at 01/05/22 0921  •  dexamethasone (DECADRON) tablet 6 mg, 6 mg, Oral, Daily With Breakfast, Darrell Kc MD, 6 mg at 01/05/22 0921  •  donepezil (ARICEPT) tablet 5 mg, 5 mg, Oral, Nightly, Darrell Kc MD, 5 mg at 01/04/22 2344  •  escitalopram (LEXAPRO) tablet 10 mg, 10 mg, Oral, Nightly, Darrell Kc MD, 10 mg at 01/04/22 2344  •  famotidine (PEPCID) tablet 20 mg, 20 mg, Oral, Q12H, Darrell Kc MD, 20 mg at 01/05/22 0921  •  levothyroxine (SYNTHROID, LEVOTHROID) tablet 125 mcg, 125 mcg, Oral, Q AM, Darrell Kc MD, 125 mcg at 01/05/22 0654  •  QUEtiapine (SEROquel) tablet 25 mg, 25 mg, Oral, Nightly, Darrell Kc MD, 25 mg at 01/04/22 2344  •  rivaroxaban (XARELTO) tablet 20 mg, 20 mg, Oral, Daily With Dinner, Darrell Kc MD, 20 mg at 01/04/22 1720  •  senna (SENOKOT) tablet 1 tablet, 1 tablet, Oral, Nightly PRN, Darrell Kc MD     Consults obtained  Infectious disease  Cardiology  Nutrition  Spiritual care    Procedures  None    Hospital course  76-year-old white male with history of severe Parkinson disease and paroxysmal atrial fibrillation progressive supranuclear palsy and severe dementia who is a nursing home resident admitted through emergency room with fever  chills nonproductive cough and congestion and work-up in ER revealed COVID-19 infection with hypoxia admit for management. Patient admitted treated with supplemental oxygen nebulizer Decadron and remdesivir. Patient followed by infectious disease and returned to baseline and completed remdesivir and Decadron changed to by mouth. Patient also have severe hyponatremia resolved with IV hydration. Patient remained DNR throughout hospital course and cleared for discharge on the remaining dose of Decadron by mouth. Patient is off oxygen and his O2 sats 98% room air.    Discharge diet puréed with nectar thick    Activity per PT OT    Medications as above    Further care per accepting physician at nursing home    KANIKA ESCOBEDO MD

## 2022-01-06 NOTE — CASE MANAGEMENT/SOCIAL WORK
Case Management Discharge Note      Final Note: [facility] assisted living. DRK         Selected Continued Care - Discharged on 1/5/2022 Admission date: 12/30/2021 - Discharge disposition: Skilled Nursing Facility (DC - External)    Destination Coordination complete.    Service Provider Selected Services Address Phone Fax Patient Preferred    [facility]  Assisted Living [address] [facility] [address] [phone] --          Durable Medical Equipment    No services have been selected for the patient.              Dialysis/Infusion    No services have been selected for the patient.              Home Medical Care    No services have been selected for the patient.              Therapy    No services have been selected for the patient.              Community Resources    No services have been selected for the patient.              Community & DME    No services have been selected for the patient.                  Transportation Services  Ambulance: [facility]    Final Discharge Disposition Code: 04 - intermediate care facility

## 2022-01-26 PROBLEM — E43 SEVERE MALNUTRITION: Status: ACTIVE | Noted: 2022-01-01

## 2022-02-01 NOTE — NURSING NOTE
Per report this morning previous nurse on 1/31/22 (Aleksandra Bridges) was told by [facility] that patient was not back to baseline and would not be appropriate for memory care unit.  Per previous nurse sister is also considering hospice care.  Patient noted with temp of 101.1 last night and received tylenol.  Call to Dr Kc regarding findings related to disacharge and elevated temp.  Per Dr. Kc Case Management needs to call facility and determine level of care to return and patient to work with therapy.  [facility] scheduled for 9:00am canceled at this time.  Sister Raquel aware of discharge delay

## 2022-02-01 NOTE — PROGRESS NOTES
Adult Nutrition  Assessment/PES    Patient Name:  Rommel Gonzalez  YOB: 1945  MRN: [id number]  Admit Date:  1/25/2022    Assessment Date:  2/1/2022    Comments:  F/u calorie count. Calorie count envelope was missing from room. Pt has dementia and is a poor historian. Spoke with nurse who reported pt's sister fed him at breakfast and he had eaten maybe 2 bites and pt did not eat at lunch. Pt is total feed.    1/30: breakfast 50%, lunch 75%  1/31: lunch 75%, snack 25%  2/1: breakfast 0%, lunch 0%    Inadequate nutrition intake. Pt has Magic Cup ordered TID. Pt may discharge soon. Continue to follow.     Reason for Assessment     Row Name 02/01/22 1529          Reason for Assessment    Reason For Assessment follow-up protocol     Diagnosis other (see comments)  admit w/: altered mental status, severe malnutrition, hx: afib, depression, unsteady gait, high bp, difficulty falling or staying asleep, parkinson, problem with function peripheral nerve, speech imapirment, high chol/trig, sleep apnea, fall     Identified At Risk by Screening Criteria other (see comments); MST SCORE 2+  calorie count                    Labs/Tests/Procedures/Meds     Row Name 02/01/22 1531          Labs/Procedures/Meds    Lab Results Reviewed reviewed     Lab Results Comments chloride (high), glucose (high), creatinien (low), hemoglobin a1c (high) 5.62, albumin (low)            Diagnostic Tests/Procedures    Diagnostic Test/Procedure Reviewed reviewed     Diagnostic Test/Procedures Comments XR            Medications    Pertinent Medications Reviewed reviewed     Pertinent Medications Comments sinemet, keflex, vitamin D3, aricept, lexapro, pepcid, levothyroxine, xarelto                    Nutrition Prescription Ordered     Row Name 02/01/22 1533          Nutrition Prescription PO    Current PO Diet Pureed     Fluid Consistency Nectar/syrup thick     Supplement Magic Cup     Supplement Frequency 3 times a day                 Evaluation of Received Nutrient/Fluid Intake     Row Name 02/01/22 1533          PO Evaluation    % PO Intake 75% x lunch yesterday, 100% x snack yesterday                     Problem/Interventions:                    Electronically signed by:  Cheryl Diaz RD  02/01/22 15:35 EST

## 2022-02-01 NOTE — PLAN OF CARE
Problem: Adult Inpatient Plan of Care  Goal: Plan of Care Review  Outcome: Ongoing, Progressing  Flowsheets (Taken 2/1/2022 0582)  Progress: no change  Plan of Care Reviewed With: patient  Outcome Summary: Pt remains nonverbal. Possible pressure injury developed on bilateral perineum, q2 turns and barrier cream applied. Wocn reconsulted. Plan for possible d/c today. Temp slightly elevated at 101.1. Prn tylenol given x2. will continue to monitor.  Goal: Patient-Specific Goal (Individualized)  Outcome: Ongoing, Progressing  Goal: Absence of Hospital-Acquired Illness or Injury  Outcome: Ongoing, Progressing  Intervention: Identify and Manage Fall Risk  Recent Flowsheet Documentation  Taken 2/1/2022 0440 by Lilly Santamaria, RN  Safety Promotion/Fall Prevention:   activity supervised   assistive device/personal items within reach   clutter free environment maintained   fall prevention program maintained   nonskid shoes/slippers when out of bed   room organization consistent   safety round/check completed  Taken 2/1/2022 0223 by Lilly Santamaria RN  Safety Promotion/Fall Prevention:   room organization consistent   safety round/check completed  Taken 2/1/2022 0001 by Lilly Santamaria, RN  Safety Promotion/Fall Prevention:   room organization consistent   safety round/check completed  Taken 1/31/2022 2205 by Lilly Santamaria, RN  Safety Promotion/Fall Prevention:   room organization consistent   safety round/check completed  Taken 1/31/2022 2026 by Lilly Santamaria, RN  Safety Promotion/Fall Prevention:   activity supervised   assistive device/personal items within reach   clutter free environment maintained   fall prevention program maintained   nonskid shoes/slippers when out of bed   room organization consistent   safety round/check completed  Intervention: Prevent Skin Injury  Recent Flowsheet Documentation  Taken 2/1/2022 0440 by Lilly Santamaria, RN  Body Position:   position maintained   tilted, left  Taken  2/1/2022 0223 by Lilly Santamaria RN  Body Position:   turned   tilted, left   weight shift assistance provided  Taken 2/1/2022 0001 by Lilly Santamaria RN  Body Position:   turned   tilted, right   weight shift assistance provided  Taken 1/31/2022 2026 by Lilly Santamaria RN  Body Position:   turned   tilted, left   weight shift assistance provided  Skin Protection:   adhesive use limited   incontinence pads utilized   skin-to-skin areas padded   tubing/devices free from skin contact  Intervention: Prevent and Manage VTE (venous thromboembolism) Risk  Recent Flowsheet Documentation  Taken 1/31/2022 2026 by Lilly Santamaria RN  VTE Prevention/Management:   bilateral   dorsiflexion/plantar flexion performed   sequential compression devices on  Intervention: Prevent Infection  Recent Flowsheet Documentation  Taken 2/1/2022 0440 by Lilly Santamaria RN  Infection Prevention:   personal protective equipment utilized   rest/sleep promoted   single patient room provided  Taken 2/1/2022 0223 by Lilly Santamaria RN  Infection Prevention:   personal protective equipment utilized   rest/sleep promoted   single patient room provided  Taken 2/1/2022 0001 by Lilly Santamaria RN  Infection Prevention:   personal protective equipment utilized   rest/sleep promoted   single patient room provided  Taken 1/31/2022 2205 by Lilly Santamaria RN  Infection Prevention:   personal protective equipment utilized   rest/sleep promoted   single patient room provided  Taken 1/31/2022 2026 by Lilly Santamaria RN  Infection Prevention:   personal protective equipment utilized   rest/sleep promoted   single patient room provided  Goal: Optimal Comfort and Wellbeing  Outcome: Ongoing, Progressing  Intervention: Provide Person-Centered Care  Recent Flowsheet Documentation  Taken 1/31/2022 2026 by Lilly Santamaria RN  Trust Relationship/Rapport:   choices provided   care explained   emotional support provided   questions answered    thoughts/feelings acknowledged  Goal: Readiness for Transition of Care  Outcome: Ongoing, Progressing   Goal Outcome Evaluation:  Plan of Care Reviewed With: patient        Progress: no change  Outcome Summary: Pt remains nonverbal. Possible pressure injury developed on bilateral perineum, q2 turns and barrier cream applied. Wocn reconsulted. Plan for possible d/c today. Temp slightly elevated at 101.1. Prn tylenol given x2. will continue to monitor.

## 2022-02-01 NOTE — NURSING NOTE
"CWOCN- consult for coccyx, buttocks. I assessed the skin- there is redness in the gluteal cleft near the rectum. The skin is fragile, dry, loose but has not peeled. The skin blanches slowly. Patient is incontinent but not having loose bm, per family in room. I apply barrier ointment. Recommend to add a sacral silicone border dressing to decrease friction as this seems to be the main cause of the skin starting to breakdown- patient is positioned high in bed to eat and he slides down. Skin has a red, \"roughed up\" appearance to it and is loose, friable. There is an accumax on the bed. Patient is tolerating turns.    02/01/22 1115   Wound 02/01/22 0545 Bilateral perineum   Placement Date/Time: 02/01/22 0545   Present on Hospital Admission: No  Side: Bilateral  Location: perineum  Stage, Pressure Injury : deep tissue injury   Dressing Appearance open to air   Base blanchable; red   Periwound intact  (fragile)   Periwound Temperature warm   Periwound Skin Turgor soft   Edges irregular   Drainage Amount none   Care, Wound barrier applied   Periwound Care barrier ointment applied     "

## 2022-02-01 NOTE — PROGRESS NOTES
"  Infectious Diseases Progress Note    Jero Schaffer MD     [facility]  Los: 5 days  Patient Identification:  Name: Rommel Gonzalez  Age: 76 y.o.  Sex: male  :  1945  MRN: [id number]         Primary Care Physician: Carolina Reyna APRN            Subjective: Ill-appearing does not engage well supposed to be discharged earlier today but held because of the low-grade temperature and concern from his daughter that he is not back to his baseline.  Interval History: See consultation note.    Objective:    Scheduled Meds:carbidopa-levodopa CR, 2 tablet, Oral, Q12H  cephalexin, 500 mg, Oral, Q8H  cholecalciferol, 1,000 Units, Oral, Daily  donepezil, 10 mg, Oral, Nightly  escitalopram, 10 mg, Oral, Nightly  famotidine, 20 mg, Oral, Q12H  levothyroxine, 150 mcg, Oral, Q AM  metoprolol tartrate, 12.5 mg, Oral, Q12H  rivaroxaban, 20 mg, Oral, Daily With Dinner      Continuous Infusions:     Vital signs in last 24 hours:  Temp:  [98.3 °F (36.8 °C)-101.1 °F (38.4 °C)] 101.1 °F (38.4 °C)  Heart Rate:  [50-73] 71  Resp:  [18] 18  BP: (155-157)/(56-76) 156/68    Intake/Output:  No intake or output data in the 24 hours ending 22 2252    Exam:  /68 (BP Location: Left arm, Patient Position: Lying)   Pulse 71   Temp (!) 101.1 °F (38.4 °C) (Oral)   Resp 18   Ht 180.3 cm (70.98\")   Wt 67.1 kg (147 lb 14.9 oz) Comment: not weighed by RD  SpO2 93%   BMI 20.64 kg/m²   Patient is examined using the personal protective equipment as per guidelines from infection control for this particular patient as enacted.  Hand washing was performed before and after patient interaction.  General Appearance:  Awake interactive has better color.  Family member at the bedside agrees with this improved appearance.                          Head:    Normocephalic, without obvious abnormality, atraumatic                           Eyes:    PERRL, conjunctivae/corneas clear, EOM's intact, both eyes                "          Throat:   Lips, tongue, gums normal; oral mucosa pink and moist                           Neck:   Supple, symmetrical, trachea midline, no JVD                         Lungs:    Clear to auscultation bilaterally, respirations unlabored                 Chest Wall:    No tenderness or deformity                          Heart:  S1-S2 regular                  Abdomen:   Soft nontender                 Extremities:   Extremities normal, atraumatic, no cyanosis or edema                        Pulses:   Pulses palpable in all extremities                            Skin:   Skin is warm and dry,  no rashes or palpable lesions                  Neurologic: Awake limited mobility due to spasticity of lower extremities and has masked facies       Data Review:    I reviewed the patient's new clinical results.  Results from last 7 days   Lab Units 01/29/22  0358 01/28/22  0516 01/27/22  0354 01/26/22  0637 01/25/22  1417   WBC 10*3/mm3 10.41 7.65 9.88 16.51* 23.85*   HEMOGLOBIN g/dL 11.3* 10.5* 10.0* 10.7* 11.7*   PLATELETS 10*3/mm3 269 215 200 210 229     Results from last 7 days   Lab Units 01/30/22  0550 01/29/22  0358 01/28/22  0516 01/27/22  0354 01/26/22  0637 01/25/22  1417   SODIUM mmol/L 144 145 146* 145 143 143   POTASSIUM mmol/L 3.9 3.7 4.1 4.1 4.5 5.1   CHLORIDE mmol/L 110* 110* 112* 112* 111* 106   CO2 mmol/L 25.8 24.9 26.8 25.5 22.0 24.0   BUN mg/dL 13 15 20 30* 37* 39*   CREATININE mg/dL 0.73* 0.63* 0.73* 0.83 1.13 2.06*   CALCIUM mg/dL 8.2* 8.5* 8.4* 8.2* 8.3* 8.9   GLUCOSE mg/dL 104* 103* 86 78 87 109*       Microbiology Results (last 10 days)     Procedure Component Value - Date/Time    Urine Culture - Urine, Urine, Catheter In/Out [[id number]]  (Abnormal)  (Susceptibility) Collected: 01/25/22 1527    Lab Status: Final result Specimen: Urine, Catheter In/Out Updated: 01/27/22 1211     Urine Culture >100,000 CFU/mL Citrobacter koseri    Susceptibility      Citrobacter koseri     CLAUDIA     Cefazolin Susceptible      Cefepime Susceptible     Ceftazidime Susceptible     Ceftriaxone Susceptible     Gentamicin Susceptible     Levofloxacin Susceptible     Nitrofurantoin Susceptible     Piperacillin + Tazobactam Susceptible     Trimethoprim + Sulfamethoxazole Susceptible                         Blood Culture - Blood, Arm, Left [[id number]]  (Normal) Collected: 01/25/22 1501    Lab Status: Final result Specimen: Blood from Arm, Left Updated: 01/30/22 2345     Blood Culture No growth at 5 days    COVID PRE-OP / PRE-PROCEDURE SCREENING ORDER (NO ISOLATION) - Swab, Nasopharynx [[id number]]  (Normal) Collected: 01/25/22 1456    Lab Status: Final result Specimen: Swab from Nasopharynx Updated: 01/25/22 2118    Narrative:      The following orders were created for panel order COVID PRE-OP / PRE-PROCEDURE SCREENING ORDER (NO ISOLATION) - Swab, Nasopharynx.  Procedure                               Abnormality         Status                     ---------                               -----------         ------                     COVID-19,APTIMA PANTHER(...[[id number]]  Normal              Final result                 Please view results for these tests on the individual orders.    COVID-19,APTIMA PANTHER(LUZ MARIA),[facility], NP/OP SWAB IN UTM/VTM/SALINE TRANSPORT MEDIA,24 HR TAT - Swab, Nasopharynx [[id number]]  (Normal) Collected: 01/25/22 1456    Lab Status: Final result Specimen: Swab from Nasopharynx Updated: 01/25/22 2118     COVID19 Not Detected    Narrative:      Fact sheet for providers: https://www.fda.gov/media/832233/download     Fact sheet for patients: https://www.fda.gov/media/463815/download    Test performed by RT PCR.    Blood Culture - Blood, Arm, Right [[id number]]  (Abnormal) Collected: 01/25/22 1417    Lab Status: Final result Specimen: Blood from Arm, Right Updated: 01/27/22 0817     Blood Culture Staphylococcus, coagulase negative     Isolated from Aerobic and Anaerobic Bottles     Gram Stain Anaerobic Bottle Gram  positive cocci in clusters      Aerobic Bottle Gram positive cocci in clusters    Narrative:      Probable contaminant requires clinical correlation, susceptibility not performed unless requested by physician.      Blood Culture ID, PCR - Blood, Arm, Right [[id number]]  (Abnormal) Collected: 01/25/22 1417    Lab Status: Final result Specimen: Blood from Arm, Right Updated: 01/26/22 1353     BCID, PCR Staph spp, not aureus or lugdunesis. Identification by BCID2 PCR.     BOTTLE TYPE Anaerobic Bottle          Assessment:    Altered mental status    Severe malnutrition (CMS/HCC)  1-gram-positive bacteremia in the situation of recent hospitalization and abnormal urinalysis that could very well be staph epidermidis urinary tract infection with bacteremia versus contamination during the process of collection.  2-urinary tract infection improving on IV ceftriaxone  3-dementia  4-immobilization syndrome  5-toxic metabolic encephalopathy secondary to above  6-chronic anticoagulation  7-Parkinson's disease with supranuclear palsy  8-acute kidney injury improved.  9-recurrence of fever-rule out aspiration or development of complications of antibiotics as this is occurring on antibiotic treatment.     Recommendations/Discussions:  See my recommendations and discussion on 1/29/2021.  Continue with aspiration precautions  Continue present antibiotics and if his fever spike persists with chest x-ray in the morning.  Jero Schaffer MD  1/31/2022  22:52 EST    Much of this encounter note is an electronic transcription/translation of spoken language to printed text. The electronic translation of spoken language may permit erroneous, or at times, nonsensical words or phrases to be inadvertently transcribed; Although I have reviewed the note for such errors, some may still exist

## 2022-02-01 NOTE — PROGRESS NOTES
"   LOS: 6 days     Chief Complaint/ Reason for encounter: AMANDA  Chief Complaint   Patient presents with   • Altered Mental Status         Subjective    1/28: Not as alert today.  Discussed with RN, eating and drinking very little  Discussed with the sister at bedside about goals of care and possible palliative care    1/29 about the same, nonverbal noncommunicative, eating and drinking very little    1/30 alert but does not follow commands, opens eyes, seems to be eating and drinking well but requires complete assistance    1/31 about the same, eyes open but stares blankly at the television denies complaints  Seems to be eating well with assistance    2/1 feels well, no c/o  Nonverbal, requires assistance with meals    Medical history reviewed:  Altered Mental Status        Subjective    History taken from: Patient and chart    Vital Signs  Temp:  [98.3 °F (36.8 °C)-101.1 °F (38.4 °C)] 99.1 °F (37.3 °C)  Heart Rate:  [52-73] 66  Resp:  [18] 18  BP: (148-169)/(56-82) 169/82       Wt Readings from Last 1 Encounters:   01/26/22 1045 67.1 kg (147 lb 14.9 oz)   01/25/22 2328 67.1 kg (147 lb 14.9 oz)       Objective:  Vital signs: (most recent): Blood pressure 169/82, pulse 66, temperature 99.1 °F (37.3 °C), temperature source Oral, resp. rate 18, height 180.3 cm (70.98\"), weight 67.1 kg (147 lb 14.9 oz), SpO2 95 %.              Objective:  General Appearance:  Comfortable, chronically ill-appearing, in no acute distress and not in pain.  Awake, alert, oriented  HEENT: Mucous membranes moist, no injury, oropharynx clear  Lungs:  Normal effort and normal respiratory rate.  Breath sounds clear to auscultation.  No  respiratory distress.  No rales, decreased breath sounds or rhonchi.    Heart: Normal rate.  Regular rhythm.  S1 normal.  No murmur.   Abdomen: Abdomen is soft.  Bowel sounds are normal, no abdominal tenderness.  There is no rebound or guarding  Extremities: Normal range of motion.  No significant edema of " bilateral lower extremities, distal pulses intact  Neurological: No focal motor or sensory deficits, pupils reactive  Skin:  Warm and dry.  No rash or cyanosis.       Results Review:    Intake/Output:   No intake or output data in the 24 hours ending 02/01/22 1208      DATA:  Radiology and Labs:  The following labs independently reviewed by me. Additional labs ordered for tomorrow a.m.  Interval notes, chart personally reviewed by me.   Old records independently reviewed showing sign creatinine 0.8, no prior CKD or renal failure  Discussed with patient and his sister at bedside    Risk/ complexity of medical care/ medical decision making moderate complexity    Labs:   No results found for this or any previous visit (from the past 24 hour(s)).    Radiology:  Imaging Results (Last 24 Hours)     ** No results found for the last 24 hours. **             Medications have been reviewed:  Current Facility-Administered Medications   Medication Dose Route Frequency Provider Last Rate Last Admin   • acetaminophen (TYLENOL) tablet 650 mg  650 mg Oral Q4H PRN Darrell Kc MD   650 mg at 02/01/22 0536   • carbidopa-levodopa CR (SINEMET CR)  MG per CR tablet 2 tablet  2 tablet Oral Q12H Darrell Kc MD   2 tablet at 02/01/22 0902   • cephalexin (KEFLEX) capsule 500 mg  500 mg Oral Q8H Darrell Kc MD   500 mg at 02/01/22 0536   • cholecalciferol (VITAMIN D3) tablet 1,000 Units  1,000 Units Oral Daily Darrell Kc MD   1,000 Units at 02/01/22 0903   • donepezil (ARICEPT) tablet 10 mg  10 mg Oral Nightly Darrell Kc MD   10 mg at 01/31/22 2031   • escitalopram (LEXAPRO) tablet 10 mg  10 mg Oral Nightly Darrell Kc MD   10 mg at 01/31/22 2031   • famotidine (PEPCID) tablet 20 mg  20 mg Oral Q12H Darrell Kc MD   20 mg at 02/01/22 0902   • levothyroxine (SYNTHROID, LEVOTHROID) tablet 150 mcg  150 mcg Oral Q AM Darrell Kc MD   150 mcg at 02/01/22 0536   • metoprolol tartrate (LOPRESSOR) tablet 12.5 mg  12.5 mg Oral  Q12H Darrell Kc MD   12.5 mg at 02/01/22 0902   • rivaroxaban (XARELTO) tablet 20 mg  20 mg Oral Daily With Dinner Darrell Kc MD   20 mg at 01/31/22 1852   • sodium chloride 0.9 % flush 10 mL  10 mL Intravenous PRN Omid Hood MD           ASSESSMENT:  AMANDA, secondary to volume depletion.  Improved with fluids, nearing baseline  UTI on IV antibiotics  Progressive dementia  Parkinson's disease  Progressive, supranuclear palsy  Altered mental status, improved some           PLAN:   Labs pending today but renal function volume and electrolytes have been very stable  Continues to require  full assistance with meals and hydration  Monitor off IV fluids for now  Okay for discharge from renal standpoint  Labs ordered for AM if pt still admitted      Rafael rTuong MD   [facility]   Office phone number: [phone]  Answering service phone number: [phone]    02/01/22  12:08 EST    Dictation performed using Dragon dictation software

## 2022-02-01 NOTE — PROGRESS NOTES
"Daily progress note    Chief complaint   Events noted  Doing better  No new complaints  Family at bedside    History of present illness  76-year-old white male with history of atrial fibrillation Parkinson disease dementia progressive supranuclear palsy hypothyroidism chronic anemia and gastroesophageal disease who was recently treated for COVID-19 infection with hypoxia and hyponatremia and discharged back to nursing home in stable condition sent to the ER with altered mental status.  Patient found to have acute UTI with sepsis and acute kidney injury admit for management.  Patient unable to give detailed history most of the history obtained from the chart nursing staff] remember the patient believes admission.  Patient is DNR per his wishes.    REVIEW OF SYSTEMS  Unremarkable except  generalized weakness     PHYSICAL EXAM   Blood pressure 147/75, pulse (!) 46, temperature 98.6 °F (37 °C), temperature source Oral, resp. rate 16, height 180.3 cm (70.98\"), weight 67.1 kg (147 lb 14.9 oz), SpO2 96 %.    GENERAL: Awake, eyes held tightly closed, no acute distress  SKIN: Warm, dry  HENT: Normocephalic, atraumatic  EYES: no scleral icterus  CV: regular rhythm, regular rate  RESPIRATORY: normal effort, lungs clear  ABDOMEN: soft, nontender, nondistended bowel sounds positive  MUSCULOSKELETAL: no deformity  NEURO: alert, moves all extremities     LAB RESULTS  Lab Results (last 24 hours)     ** No results found for the last 24 hours. **        Imaging Results (Last 24 Hours)     ** No results found for the last 24 hours. **          Current Facility-Administered Medications:   •  acetaminophen (TYLENOL) tablet 650 mg, 650 mg, Oral, Q4H PRN, Darrell Kc MD, 650 mg at 02/01/22 0536  •  carbidopa-levodopa CR (SINEMET CR)  MG per CR tablet 2 tablet, 2 tablet, Oral, Q12H, Darrell Kc MD, 2 tablet at 02/01/22 0902  •  cephalexin (KEFLEX) capsule 500 mg, 500 mg, Oral, Q8H, Darrell Kc MD, 500 mg at 02/01/22 1352  •  " cholecalciferol (VITAMIN D3) tablet 1,000 Units, 1,000 Units, Oral, Daily, Kanika Kc MD, 1,000 Units at 02/01/22 0903  •  donepezil (ARICEPT) tablet 10 mg, 10 mg, Oral, Nightly, Kanika Kc MD, 10 mg at 01/31/22 2031  •  escitalopram (LEXAPRO) tablet 10 mg, 10 mg, Oral, Nightly, Kanika Kc MD, 10 mg at 01/31/22 2031  •  famotidine (PEPCID) tablet 20 mg, 20 mg, Oral, Q12H, Kanika Kc MD, 20 mg at 02/01/22 0902  •  levothyroxine (SYNTHROID, LEVOTHROID) tablet 150 mcg, 150 mcg, Oral, Q AM, Kanika Kc MD, 150 mcg at 02/01/22 0536  •  metoprolol tartrate (LOPRESSOR) tablet 12.5 mg, 12.5 mg, Oral, Q12H, Kanika Kc MD, 12.5 mg at 02/01/22 0902  •  rivaroxaban (XARELTO) tablet 20 mg, 20 mg, Oral, Daily With Dinner, Kanika Kc MD, 20 mg at 01/31/22 1852  •  [COMPLETED] Insert peripheral IV, , , Once **AND** sodium chloride 0.9 % flush 10 mL, 10 mL, Intravenous, PRN, Omid Hood MD     ASSESSMENT  Acute Citrobacter UTI with sepsis  CNS bacteremia contamination  Acute kidney injury resolved  Metabolic encephalopathy  Severe dementia  Severe Parkinson disease  Progressive supranuclear palsy  Recent COVID-19 infection with hypoxia resolved  Paroxysmal atrial fibrillation  Hypothyroidism  Chronic anemia  Gastroesophageal reflux disease    PLAN  CPM  Discontinue IVF  Antibiotics  PO  Discontinue beta-blocker  Nephrology consult appreciated  Infectious disease to follow patient  Adjust nursing home medications  Stress ulcer DVT prophylaxis  Supportive care  DNR  PT/OT  Discussed with nursing staff and family  Hold discharge and observe     KANIKA KC MD

## 2022-02-01 NOTE — PLAN OF CARE
Goal Outcome Evaluation:  Plan of Care Reviewed With: patient        Progress: no change  Outcome Summary: Pt remains nonverbal. Possible pressure injury developed on bilateral perineum, q2 turns and barrier cream applied. Wocn reconsulted. Plan for possible d/c today. Temp slightly elevated at 101.1. Prn tylenol given x2. will continue to monitor.

## 2022-02-01 NOTE — PROGRESS NOTES
"  Infectious Diseases Progress Note    Jero Schaffer MD     [facility]  [place]: 6 days  Patient Identification:  Name: Rommel Gonzalez  Age: 76 y.o.  Sex: male  :  1945  MRN: [id number]         Primary Care Physician: Carolina Reyna APRN            Subjective: Looks around and does not engage in any information exchange.  Interval History: See consultation note.    Objective:    Scheduled Meds:carbidopa-levodopa CR, 2 tablet, Oral, Q12H  cephalexin, 500 mg, Oral, Q8H  cholecalciferol, 1,000 Units, Oral, Daily  donepezil, 10 mg, Oral, Nightly  escitalopram, 10 mg, Oral, Nightly  famotidine, 20 mg, Oral, Q12H  levothyroxine, 150 mcg, Oral, Q AM  metoprolol tartrate, 12.5 mg, Oral, Q12H  rivaroxaban, 20 mg, Oral, Daily With Dinner      Continuous Infusions:     Vital signs in last 24 hours:  Temp:  [98.3 °F (36.8 °C)-101.1 °F (38.4 °C)] 99.1 °F (37.3 °C)  Heart Rate:  [52-73] 66  Resp:  [18] 18  BP: (148-169)/(56-82) 169/82    Intake/Output:  No intake or output data in the 24 hours ending 22 1218    Exam:  /82 (BP Location: Left arm, Patient Position: Lying)   Pulse 66   Temp 99.1 °F (37.3 °C) (Oral)   Resp 18   Ht 180.3 cm (70.98\")   Wt 67.1 kg (147 lb 14.9 oz) Comment: not weighed by RD  SpO2 95%   BMI 20.64 kg/m²   Patient is examined using the personal protective equipment as per guidelines from infection control for this particular patient as enacted.  Hand washing was performed before and after patient interaction.  General Appearance:  Awake interactive has better color.  Family member at the bedside agrees with this improved appearance.                          Head:    Normocephalic, without obvious abnormality, atraumatic                           Eyes:    PERRL, conjunctivae/corneas clear, EOM's intact, both eyes                         Throat:   Lips, tongue, gums normal; oral mucosa pink and moist                           Neck:   Supple, symmetrical, " trachea midline, no JVD                         Lungs:    Clear to auscultation bilaterally, respirations unlabored                 Chest Wall:    No tenderness or deformity                          Heart:  S1-S2 regular                  Abdomen:   Soft nontender                 Extremities:   Extremities normal, atraumatic, no cyanosis or edema                        Pulses:   Pulses palpable in all extremities                            Skin:   Skin is warm and dry,  no rashes or palpable lesions                  Neurologic: Awake limited mobility due to spasticity of lower extremities and has masked facies       Data Review:    I reviewed the patient's new clinical results.  Results from last 7 days   Lab Units 01/29/22  0358 01/28/22  0516 01/27/22  0354 01/26/22  0637 01/25/22  1417   WBC 10*3/mm3 10.41 7.65 9.88 16.51* 23.85*   HEMOGLOBIN g/dL 11.3* 10.5* 10.0* 10.7* 11.7*   PLATELETS 10*3/mm3 269 215 200 210 229     Results from last 7 days   Lab Units 01/30/22  0550 01/29/22  0358 01/28/22  0516 01/27/22  0354 01/26/22  0637 01/25/22  1417   SODIUM mmol/L 144 145 146* 145 143 143   POTASSIUM mmol/L 3.9 3.7 4.1 4.1 4.5 5.1   CHLORIDE mmol/L 110* 110* 112* 112* 111* 106   CO2 mmol/L 25.8 24.9 26.8 25.5 22.0 24.0   BUN mg/dL 13 15 20 30* 37* 39*   CREATININE mg/dL 0.73* 0.63* 0.73* 0.83 1.13 2.06*   CALCIUM mg/dL 8.2* 8.5* 8.4* 8.2* 8.3* 8.9   GLUCOSE mg/dL 104* 103* 86 78 87 109*       Microbiology Results (last 10 days)     Procedure Component Value - Date/Time    Urine Culture - Urine, Urine, Catheter In/Out [[id number]]  (Abnormal)  (Susceptibility) Collected: 01/25/22 1527    Lab Status: Final result Specimen: Urine, Catheter In/Out Updated: 01/27/22 1211     Urine Culture >100,000 CFU/mL Citrobacter koseri    Susceptibility      Citrobacter koseri     CLAUDIA     Cefazolin Susceptible     Cefepime Susceptible     Ceftazidime Susceptible     Ceftriaxone Susceptible     Gentamicin Susceptible     Levofloxacin  Susceptible     Nitrofurantoin Susceptible     Piperacillin + Tazobactam Susceptible     Trimethoprim + Sulfamethoxazole Susceptible                         Blood Culture - Blood, Arm, Left [[id number]]  (Normal) Collected: 01/25/22 1501    Lab Status: Final result Specimen: Blood from Arm, Left Updated: 01/30/22 2345     Blood Culture No growth at 5 days    COVID PRE-OP / PRE-PROCEDURE SCREENING ORDER (NO ISOLATION) - Swab, Nasopharynx [[id number]]  (Normal) Collected: 01/25/22 1456    Lab Status: Final result Specimen: Swab from Nasopharynx Updated: 01/25/22 2118    Narrative:      The following orders were created for panel order COVID PRE-OP / PRE-PROCEDURE SCREENING ORDER (NO ISOLATION) - Swab, Nasopharynx.  Procedure                               Abnormality         Status                     ---------                               -----------         ------                     COVID-19,APTIMA PANTHER(...[[id number]]  Normal              Final result                 Please view results for these tests on the individual orders.    COVID-19,APTIMA PANTHER(LUZ MARIA),[facility], NP/OP SWAB IN UTM/VTM/SALINE TRANSPORT MEDIA,24 HR TAT - Swab, Nasopharynx [[id number]]  (Normal) Collected: 01/25/22 1456    Lab Status: Final result Specimen: Swab from Nasopharynx Updated: 01/25/22 2118     COVID19 Not Detected    Narrative:      Fact sheet for providers: https://www.fda.gov/media/422107/download     Fact sheet for patients: https://www.fda.gov/media/337113/download    Test performed by RT PCR.    Blood Culture - Blood, Arm, Right [[id number]]  (Abnormal) Collected: 01/25/22 1417    Lab Status: Final result Specimen: Blood from Arm, Right Updated: 01/27/22 0817     Blood Culture Staphylococcus, coagulase negative     Isolated from Aerobic and Anaerobic Bottles     Gram Stain Anaerobic Bottle Gram positive cocci in clusters      Aerobic Bottle Gram positive cocci in clusters    Narrative:      Probable contaminant requires  clinical correlation, susceptibility not performed unless requested by physician.      Blood Culture ID, PCR - Blood, Arm, Right [[id number]]  (Abnormal) Collected: 01/25/22 1417    Lab Status: Final result Specimen: Blood from Arm, Right Updated: 01/26/22 1353     BCID, PCR Staph spp, not aureus or lugdunesis. Identification by BCID2 PCR.     BOTTLE TYPE Anaerobic Bottle          Assessment:    Altered mental status    Severe malnutrition (CMS/HCC)  1-gram-positive bacteremia in the situation of recent hospitalization and abnormal urinalysis that could very well be staph epidermidis urinary tract infection with bacteremia versus contamination during the process of collection.  2-urinary tract infection improving on IV ceftriaxone  3-dementia  4-immobilization syndrome  5-toxic metabolic encephalopathy secondary to above  6-chronic anticoagulation  7-Parkinson's disease with supranuclear palsy  8-acute kidney injury improved.  9-recurrence of fever-rule out aspiration or development of complications of antibiotics as this is occurring on antibiotic treatment.     Recommendations/Discussions:  See my recommendations and discussion on 1/29/2021.  Continue with aspiration precautions  Continue present antibiotics and if his fever spike persists with chest x-ray in the morning.  Jero Schaffer MD  2/1/2022  12:18 EST    Much of this encounter note is an electronic transcription/translation of spoken language to printed text. The electronic translation of spoken language may permit erroneous, or at times, nonsensical words or phrases to be inadvertently transcribed; Although I have reviewed the note for such errors, some may still exist

## 2022-02-01 NOTE — CONSULTS
Patient evaluated by [facility] for possible services upon hospital discharge. Initial referral was made by Dr. Ramila Reyna and Kendra Elwell prior to pt's hospital admission. Dr. Kc agreeable for [facility] to see pt prior to discharge.     Patient does qualify for services in LTC setting due to disease progression. Family is torn on hospice vs rehab however pt does not appear to be able to participate in therapy in current condition. Raquel, pt's sister, says they will be ok with pt going back to [facility] with hospice if the facility will take pt now that he requires total care. Raquel feels better knowing they can discontinue hospice services anytime if pt improves and goals change.     Family requesting assistance from hospital [facility] to communicate with [facility] to find out if pt can return to facility with hospice in his current condition. If pt cannot return, patient will need alternative placement.     Update given to Arnold DORMAN. [facility] will follow for discharge plan. Thank you for this referral and please call Customer Support with any questions.     Mary Dunn RN, BSN  Referral & Admissions Coordinator  [facility]   [phone]

## 2022-02-01 NOTE — PROGRESS NOTES
Continued Stay Note  [facility]     Patient Name: Rommel Gonzalez  MRN: [id number]  Today's Date: 2/1/2022    Admit Date: 1/25/2022     Discharge Plan     Row Name 02/01/22 0856       Plan    Plan From [facility],    Plan Comments Spoke withYecenia with [facility].  She states that the patient needs to be fever free for 24 hours prior return.  He needs to feed self and assist of 1 for transfers with mobility of self moving toward an exist with a WC or walker to be able to return. They can accept with hospice without these qualifications if he is comfort care. [facility] will notify Dr Kc.   Nursing has asked for PT to reeval and for OT to see the patient........................Ashley Headley RN               Discharge Codes    No documentation.               Expected Discharge Date and Time     Expected Discharge Date Expected Discharge Time    Jan 31, 2022             Ashley Headley RN

## 2022-02-01 NOTE — NURSING NOTE
Pt's sister Miya called concerned that her brother is not back to his baseline and does not think that [facility] will take him back unless he is under hospice care.  Spoke with Lise at [facility], she stated that the Pt is normally verbal, can feed himself, and is able to ambulate with a walker.  Information passed on to night shift RN to relay in the am.

## 2022-02-02 NOTE — PROGRESS NOTES
Continued Stay Note  [facility]     Patient Name: Rommel Gonzalez  MRN: [id number]  Today's Date: 2/2/2022    Admit Date: 1/25/2022     Discharge Plan     Row Name 02/02/22 1732       Plan    Plan Return to [facility] via [facility] at 0730 2/3    Plan Comments VM from Ramila with intake with [facility].  She states that they will see the patient once he returns to [facility]...................Ashley Headley RN               Discharge Codes    No documentation.               Expected Discharge Date and Time     Expected Discharge Date Expected Discharge Time    Feb 2, 2022             Ashley Headley RN

## 2022-02-02 NOTE — PLAN OF CARE
Goal Outcome Evaluation:  Plan of Care Reviewed With: patient        Progress: declining  Outcome Summary: Pt shows no progression overnight. Afebrile, however is sweating and feels hot to the touch. Q2 turns and barrier cream, incontinence care prn. Pt unable to stay awake to take morning meds, noted in MAR. 2L NC overnight, HR sinus elisa. RN does not believe pt is ready for discharge today. Will continue to monitor closely.

## 2022-02-02 NOTE — PROGRESS NOTES
Continued Stay Note  [facility]     Patient Name: Rommel Gonzalez  MRN: [id number]  Today's Date: 2/2/2022    Admit Date: 1/25/2022     Discharge Plan     Row Name 02/02/22 1130       Plan    Plan Return to [facility] with hospice    Plan Comments Spoke with Vasqueztomás, sister this am.  She states she now prefers that the patient return to [facility] with hospice care.  Dr Kc plans dc today.  EMS with [facility] arranged for next available at 730 am 2/3 pending weather.  Spopke with Yecenia with [facility] and she accepts and aware of the transport time.  .........................Ashley Headley RN               Discharge Codes    No documentation.               Expected Discharge Date and Time     Expected Discharge Date Expected Discharge Time    Feb 2, 2022             Ashley Headley RN

## 2022-02-02 NOTE — PROGRESS NOTES
Continued Stay Note  [facility]     Patient Name: Rommel Gonzalez  MRN: [id number]  Today's Date: 2/2/2022    Admit Date: 1/25/2022     Discharge Plan     Row Name 02/02/22 1130       Plan    Plan Return to [facility] with hospice    Plan Comments Spoke with Vasqueztomás, sister this am.  She states she now prefers that the patient return to [facility] with hospice care.  Dr Kc plans dc today.  EMS with [facility] arranged for next available at 730 am 2/3 pending weather.........................Ashley Headley RN               Discharge Codes    No documentation.               Expected Discharge Date and Time     Expected Discharge Date Expected Discharge Time    Feb 2, 2022             Ashley Headley RN

## 2022-02-02 NOTE — THERAPY RE-EVALUATION
Patient Name: Rommel Gonzalez  : 1945    MRN: [id number]                              Today's Date: 2022       Admit Date: 2022    Visit Dx:     ICD-10-CM ICD-9-CM   1. Altered mental status, unspecified altered mental status type  R41.82 780.97   2. Urinary tract infection with hematuria, site unspecified  N39.0 599.0    R31.9 599.70   3. Impaired mobility and activities of daily living  Z74.09 V49.89    Z78.9      Patient Active Problem List   Diagnosis   • AF (atrial fibrillation) (HCC)   • Depression   • Gait instability   • Hypertension   • Insomnia   • Idiopathic Parkinson's disease (HCC)   • Peripheral neuropathy   • Dysarthria   • Atrial flutter (HCC)   • Anticoagulated   • Health care maintenance   • Hypothyroidism (acquired)   • Weakness of voice   • Abnormal chest CT   • Facial droop   • Hypersomnia    • Hyperlipidemia   • High risk medication use   • CORINNE on CPAP   • Obstructive sleep apnea, adult   • Fall   • Progressive supranuclear palsy (HCC)   • Dysphagia   • Atypical parkinsonism (HCC)   • Constipation   • Hyperreflexia   • Torsion dystonia   • Transient cerebral ischemia   • Pneumonia of both lungs due to infectious organism   • Closed fracture of multiple ribs   • Closed compression fracture of L1 lumbar vertebra   • Closed fracture of transverse process of lumbar vertebra (HCC)   • Altered mental status   • Frequent falls   • Sepsis without acute organ dysfunction (HCC)   • Choking   • Abnormal esophagram   • COVID   • Severe malnutrition (CMS/HCC)     Past Medical History:   Diagnosis Date   • AF (atrial fibrillation) (HCC)    • Altered mental status    • Anticoagulated    • Aspiration pneumonia (HCC)    • Atrial flutter (HCC)    • Atypical chest pain    • Chest pain    • Colon polyps    • Confusion    • Dementia (HCC)    • Depression    • Disease of thyroid gland    • Disorder of thyroid    • Dysarthria    • Dysphagia    • Dyspnea and respiratory abnormalities    • Elevated TSH     • Fall    • Fatigue    • Gait instability    • Gastroesophageal reflux disease    • Hay fever    • Hyperlipidemia    • Hypertension    • Hypothyroidism    • Idiopathic Parkinson's disease (HCC)    • Insomnia    • Measles    • Metabolic encephalopathy    • Mumps    • Non-traumatic rhabdomyolysis    • CORINNE (obstructive sleep apnea)    • Palpitations    • Parkinson disease (HCC)    • Peripheral neuropathy    • Pneumonia    • Poor sleep pattern    • Progressive supranuclear palsy (HCC)    • Sepsis (HCC)    • Slurred speech    • Tremor    • Weakness of voice      Past Surgical History:   Procedure Laterality Date   • ENDOSCOPY N/A 10/14/2020    Procedure: ESOPHAGOGASTRODUODENOSCOPY;  Surgeon: Avis Sargent MD;  Location: [facility] ENDOSCOPY;  Service: Gastroenterology;  Laterality: N/A;  Pre: abnormal esophagram  Post: normal   • ENDOSCOPY W/ PEG TUBE PLACEMENT N/A 3/16/2018    Procedure: ESOPHAGOGASTRODUODENOSCOPY WITH PERCUTANEOUS ENDOSCOPIC GASTROSTOMY TUBE INSERTION;  Surgeon: Lopez Vang Jr., MD;  Location: [facility] ENDOSCOPY;  Service: Gastroenterology      General Information     [facility] Name 02/02/22 1226          Physical Therapy Time and Intention    Document Type re-evaluation  -     Mode of Treatment individual therapy; physical therapy  -     Row Name 02/02/22 1226          General Information    Patient Profile Reviewed yes  -     Existing Precautions/Restrictions fall; oxygen therapy device and L/min  -     Row Name 02/02/22 1226          Cognition    Orientation Status (Cognition) unable/difficult to assess  Pt nonverbal and unable to answer orientation questions  -     Row Name 02/02/22 1226          Safety Issues, Functional Mobility    Impairments Affecting Function (Mobility) balance; cognition; coordination; endurance/activity tolerance; grasp; strength; range of motion (ROM); postural/trunk control  -           User Key  (r) = Recorded By, (t) = Taken By, (c) = Cosigned By    Initials  Name Provider Type     Brittani Dowell Physical Therapist               Mobility     Row Name 02/02/22 1226          Bed Mobility    Bed Mobility supine-sit; sit-supine; scooting/bridging  -     Scooting/Bridging Stirling (Bed Mobility) dependent (less than 25% patient effort); 2 person assist  -     Supine-Sit Stirling (Bed Mobility) dependent (less than 25% patient effort); 1 person assist; verbal cues  -     Sit-Supine Stirling (Bed Mobility) dependent (less than 25% patient effort); 1 person assist; verbal cues  -     Assistive Device (Bed Mobility) draw sheet; bed rails; head of bed elevated  -     Row Name 02/02/22 1226          Transfers    Comment (Transfers) Pt unable to maintain independent sitting balance for longer than a few seconds - posterior lean noted as well as limited ability to follow commands. Unsafe to attempt transfers this date.  -     Row Name 02/02/22 1226          Sit-Stand Transfer    Sit-Stand Stirling (Transfers) unable to assess  -     Row Name 02/02/22 1226          Gait/Stairs (Locomotion)    Stirling Level (Gait) unable to assess  -           User Key  (r) = Recorded By, (t) = Taken By, (c) = Cosigned By    Initials Name Provider Type     Brittani Dowell Physical Therapist               Obj/Interventions     Row Name 02/02/22 1228          Motor Skills    Therapeutic Exercise --  5-10 reps B PROM AP/LAQ  -     Row Name 02/02/22 1228          Balance    Balance Assessment sitting static balance; sitting dynamic balance  -     Static Sitting Balance supported; sitting, edge of bed; mild impairment  -     Dynamic Sitting Balance moderate impairment; supported; sitting, edge of bed  -     Comment, Balance Pt able to maintain sitting balance for a couple minutes without assist, but intermittent posterior lean requiring cues/intermittent assist to correct. When attempting LE PROM, noted increased posterior lean compared to static sitting -  fatigued quickly eventually requiring mod A to maintain sitting balance.  -           User Key  (r) = Recorded By, (t) = Taken By, (c) = Cosigned By    Initials Name Provider Type    Brittani Baig Physical Therapist               Goals/Plan    No documentation.                Clinical Impression     Row Name 02/02/22 1229          Pain Scale: FACES Pre/Post-Treatment    Pain: FACES Scale, Pretreatment 0-->no hurt  -     Posttreatment Pain Rating 0-->no hurt  -     Row Name 02/02/22 1229          Plan of Care Review    Plan of Care Reviewed With patient  -     Progress no change  -     Outcome Summary Pt seen for PT re-eval today, requesting eval for appropriateness of return to memory care facility. Pt nonverbal at this time and unable to answer any orientation questions. Pt dependent x1 for transfers to EOB and back to supine. PT attempted to assist pt with reaching, but with tightly gripped fist and unable to hold onto bedrail. Pt intially with posterior lean in sitting, but able to correct with cueing and maintain sitting balance with intermittent cueing for ~5 minutes. Attempted PROM on BLE as pt unable to follow commands - increased posterior lean with dynamic sitting balance and pt fatigued quickly requiring mod A to maintain. Dr. Kc came in towards end of session and reports pt to be D/C back to memory care with hospice, so facility is able to accept. PT will sign-off at this time.  -     Row Name 02/02/22 1222          Positioning and Restraints    Pre-Treatment Position in bed  -     Post Treatment Position bed  -BH     In Bed supine; call light within reach; encouraged to call for assist; with family/caregiver  -           User Key  (r) = Recorded By, (t) = Taken By, (c) = Cosigned By    Initials Name Provider Type    Brittani Baig Physical Therapist               Outcome Measures     Row Name 02/02/22 1235          How much help from another person do you currently need...     Turning from your back to your side while in flat bed without using bedrails? 2  -BH     Moving from lying on back to sitting on the side of a flat bed without bedrails? 2  -BH     Moving to and from a bed to a chair (including a wheelchair)? 1  -BH     Standing up from a chair using your arms (e.g., wheelchair, bedside chair)? 1  -BH     Climbing 3-5 steps with a railing? 1  -BH     To walk in hospital room? 1  -     AM-PAC 6 Clicks Score (PT) 8  -     Row Name 02/02/22 1235 02/02/22 0933       Functional Assessment    Outcome Measure Options AM-PAC 6 Clicks Basic Mobility (PT)  - AM-PAC 6 Clicks Daily Activity (OT)  -SM (r) LR (t) SM (c)          User Key  (r) = Recorded By, (t) = Taken By, (c) = Cosigned By    Initials Name Provider Type     Devi Barbour, OT Occupational Therapist     Brittani Dowell Physical Therapist    Promise Hernandez OT Student OT Student                             Physical Therapy Education                 Title: PT OT SLP Therapies (In Progress)     Topic: Physical Therapy (In Progress)     Point: Mobility training (In Progress)     Learning Progress Summary           Patient Acceptance, E,TB,D, NR,NL by  at 2/2/2022 1236    Acceptance, E, NR by  at 1/27/2022 1552                   Point: Home exercise program (In Progress)     Learning Progress Summary           Patient Acceptance, E,TB,D, NR,NL by  at 2/2/2022 1236    Acceptance, E, NR by  at 1/27/2022 1552                   Point: Body mechanics (In Progress)     Learning Progress Summary           Patient Acceptance, E,TB,D, NR,NL by  at 2/2/2022 1236    Acceptance, E, NR by  at 1/27/2022 1552                   Point: Precautions (In Progress)     Learning Progress Summary           Patient Acceptance, E,TB,D, NR,NL by  at 2/2/2022 1236    Acceptance, E, NR by  at 1/27/2022 1552                               User Key     Initials Effective Dates Name Provider Type Discipline     06/16/21 -  Lc  Evelyne MG, PT Physical Therapist PT     05/10/21 -  Brittani Dowell Physical Therapist PT              PT Recommendation and Plan     Plan of Care Reviewed With: patient  Progress: no change  Outcome Summary: Pt seen for PT re-eval today, requesting eval for appropriateness of return to memory care facility. Pt nonverbal at this time and unable to answer any orientation questions. Pt dependent x1 for transfers to EOB and back to supine. PT attempted to assist pt with reaching, but with tightly gripped fist and unable to hold onto bedrail. Pt intially with posterior lean in sitting, but able to correct with cueing and maintain sitting balance with intermittent cueing for ~5 minutes. Attempted PROM on BLE as pt unable to follow commands - increased posterior lean with dynamic sitting balance and pt fatigued quickly requiring mod A to maintain. Dr. Kc came in towards end of session and reports pt to be D/C back to memory care with hospice, so facility is able to accept. PT will sign-off at this time.     Time Calculation:    PT Charges     Row Name 02/02/22 1237             Time Calculation    Start Time 1119  -      Stop Time 1134  -      Time Calculation (min) 15 min  -      PT Received On 02/02/22  -              Time Calculation- PT    Total Timed Code Minutes- PT 10 minute(s)  -              Timed Charges    03975 - PT Therapeutic Activity Minutes 10  -BH              Untimed Charges    PT Eval/Re-eval Minutes 5  -BH              Total Minutes    Timed Charges Total Minutes 10  -BH      Untimed Charges Total Minutes 5  -BH       Total Minutes 15  -BH            User Key  (r) = Recorded By, (t) = Taken By, (c) = Cosigned By    Initials Name Provider Type     Brittani Dowell Physical Therapist              Therapy Charges for Today     Code Description Service Date Service Provider Modifiers Qty    00670665806  PT THERAPEUTIC ACT EA 15 MIN 2/2/2022 Brittani Dowell GP 1    [id number]  PT AQUA RE  -EVAL  ESTABLISHED PLAN 2 2/2/2022 Brittani Dowell GP 1          PT G-Codes  Outcome Measure Options: AM-PAC 6 Clicks Basic Mobility (PT)  AM-PAC 6 Clicks Score (PT): 8  AM-PAC 6 Clicks Score (OT): 6    BRITTANI DOWELL  2/2/2022

## 2022-02-02 NOTE — PROGRESS NOTES
"  Infectious Diseases Progress Note    Jero Schaffer MD     [facility]  [place]: 7 days  Patient Identification:  Name: Rommel Gonzalez  Age: 76 y.o.  Sex: male  :  1945  MRN: [id number]         Primary Care Physician: Carolina Reyna APRN            Subjective: comfortable and appears to understand and attepmt o converse  Interval History: See consultation note.    Objective:    Scheduled Meds:carbidopa-levodopa CR, 2 tablet, Oral, Q12H  cephalexin, 500 mg, Oral, Q8H  cholecalciferol, 1,000 Units, Oral, Daily  donepezil, 10 mg, Oral, Nightly  escitalopram, 10 mg, Oral, Nightly  famotidine, 20 mg, Oral, Q12H  levothyroxine, 150 mcg, Oral, Q AM  rivaroxaban, 20 mg, Oral, Daily With Dinner      Continuous Infusions:     Vital signs in last 24 hours:  Temp:  [98.6 °F (37 °C)-99 °F (37.2 °C)] 98.6 °F (37 °C)  Heart Rate:  [46-64] 64  Resp:  [16-18] 18  BP: (147-166)/(72-82) 164/74    Intake/Output:  No intake or output data in the 24 hours ending 22 1013    Exam:  /74 (BP Location: Left arm, Patient Position: Lying)   Pulse 64   Temp 98.6 °F (37 °C) (Oral)   Resp 18   Ht 180.3 cm (70.98\")   Wt 67.1 kg (147 lb 14.9 oz) Comment: not weighed by RD  SpO2 98%   BMI 20.64 kg/m²   Patient is examined using the personal protective equipment as per guidelines from infection control for this particular patient as enacted.  Hand washing was performed before and after patient interaction.  General Appearance:  Looks around and sticks his tongue out as if trying to say something                          Head:    Normocephalic, without obvious abnormality, atraumatic                           Eyes:    PERRL, conjunctivae/corneas clear, EOM's intact, both eyes                         Throat:   Lips, tongue, gums normal; oral mucosa pink and moist                           Neck:   Supple, symmetrical, trachea midline, no JVD                         Lungs:    Clear to auscultation " bilaterally, respirations unlabored                 Chest Wall:    No tenderness or deformity                          Heart:  S1-S2 regular                  Abdomen:   Soft nontender                 Extremities:   Extremities normal, atraumatic, no cyanosis or edema                        Pulses:   Pulses palpable in all extremities                            Skin:   Skin is warm and dry,  no rashes or palpable lesions                  Neurologic: Awake limited mobility due to spasticity of lower extremities and has masked facies       Data Review:    I reviewed the patient's new clinical results.  Results from last 7 days   Lab Units 02/02/22  0545 01/29/22  0358 01/28/22  0516 01/27/22  0354   WBC 10*3/mm3 9.95 10.41 7.65 9.88   HEMOGLOBIN g/dL 11.4* 11.3* 10.5* 10.0*   PLATELETS 10*3/mm3 418 269 215 200     Results from last 7 days   Lab Units 01/30/22  0550 01/29/22  0358 01/28/22  0516 01/27/22  0354   SODIUM mmol/L 144 145 146* 145   POTASSIUM mmol/L 3.9 3.7 4.1 4.1   CHLORIDE mmol/L 110* 110* 112* 112*   CO2 mmol/L 25.8 24.9 26.8 25.5   BUN mg/dL 13 15 20 30*   CREATININE mg/dL 0.73* 0.63* 0.73* 0.83   CALCIUM mg/dL 8.2* 8.5* 8.4* 8.2*   GLUCOSE mg/dL 104* 103* 86 78       Microbiology Results (last 10 days)     Procedure Component Value - Date/Time    Urine Culture - Urine, Urine, Catheter In/Out [[id number]]  (Abnormal)  (Susceptibility) Collected: 01/25/22 1527    Lab Status: Final result Specimen: Urine, Catheter In/Out Updated: 01/27/22 1211     Urine Culture >100,000 CFU/mL Citrobacter koseri    Susceptibility      Citrobacter koseri     CLAUDIA     Cefazolin Susceptible     Cefepime Susceptible     Ceftazidime Susceptible     Ceftriaxone Susceptible     Gentamicin Susceptible     Levofloxacin Susceptible     Nitrofurantoin Susceptible     Piperacillin + Tazobactam Susceptible     Trimethoprim + Sulfamethoxazole Susceptible                         Blood Culture - Blood, Arm, Left [[id number]]  (Normal)  Collected: 01/25/22 1501    Lab Status: Final result Specimen: Blood from Arm, Left Updated: 01/30/22 2345     Blood Culture No growth at 5 days    COVID PRE-OP / PRE-PROCEDURE SCREENING ORDER (NO ISOLATION) - Swab, Nasopharynx [[id number]]  (Normal) Collected: 01/25/22 1456    Lab Status: Final result Specimen: Swab from Nasopharynx Updated: 01/25/22 2118    Narrative:      The following orders were created for panel order COVID PRE-OP / PRE-PROCEDURE SCREENING ORDER (NO ISOLATION) - Swab, Nasopharynx.  Procedure                               Abnormality         Status                     ---------                               -----------         ------                     COVID-19,APTIMA PANTHER(...[[id number]]  Normal              Final result                 Please view results for these tests on the individual orders.    COVID-19,APTIMA PANTHER(LUZ MARIA),[facility], NP/OP SWAB IN UTM/VTM/SALINE TRANSPORT MEDIA,24 HR TAT - Swab, Nasopharynx [[id number]]  (Normal) Collected: 01/25/22 1456    Lab Status: Final result Specimen: Swab from Nasopharynx Updated: 01/25/22 2118     COVID19 Not Detected    Narrative:      Fact sheet for providers: https://www.fda.gov/media/220032/download     Fact sheet for patients: https://www.fda.gov/media/510097/download    Test performed by RT PCR.    Blood Culture - Blood, Arm, Right [[id number]]  (Abnormal) Collected: 01/25/22 1417    Lab Status: Final result Specimen: Blood from Arm, Right Updated: 01/27/22 0817     Blood Culture Staphylococcus, coagulase negative     Isolated from Aerobic and Anaerobic Bottles     Gram Stain Anaerobic Bottle Gram positive cocci in clusters      Aerobic Bottle Gram positive cocci in clusters    Narrative:      Probable contaminant requires clinical correlation, susceptibility not performed unless requested by physician.      Blood Culture ID, PCR - Blood, Arm, Right [[id number]]  (Abnormal) Collected: 01/25/22 1417    Lab Status: Final result Specimen:  Blood from Arm, Right Updated: 01/26/22 1353     BCID, PCR Staph spp, not aureus or lugdunesis. Identification by BCID2 PCR.     BOTTLE TYPE Anaerobic Bottle          Assessment:    Altered mental status    Severe malnutrition (CMS/HCC)  1-gram-positive bacteremia in the situation of recent hospitalization and abnormal urinalysis that could very well be staph epidermidis urinary tract infection with bacteremia versus contamination during the process of collection.  2-urinary tract infection improving on IV ceftriaxone  3-dementia  4-immobilization syndrome  5-toxic metabolic encephalopathy secondary to above  6-chronic anticoagulation  7-Parkinson's disease with supranuclear palsy  8-acute kidney injury improved.  9-recurrence of fever-rule out aspiration or development of complications of antibiotics as this is occurring on antibiotic treatment.     Recommendations/Discussions:  See my recommendations and discussion on 1/29/2021.  Continue with aspiration precautions  Continue present antibiotics and if his fever spike persists with chest x-ray in the morning.  Jero Schaffer MD  2/2/2022  10:13 EST    Much of this encounter note is an electronic transcription/translation of spoken language to printed text. The electronic translation of spoken language may permit erroneous, or at times, nonsensical words or phrases to be inadvertently transcribed; Although I have reviewed the note for such errors, some may still exist

## 2022-02-02 NOTE — THERAPY EVALUATION
Patient Name: Rommel Gonzalez  : 1945    MRN: [id number]                              Today's Date: 2022       Admit Date: 2022    Visit Dx:     ICD-10-CM ICD-9-CM   1. Altered mental status, unspecified altered mental status type  R41.82 780.97   2. Urinary tract infection with hematuria, site unspecified  N39.0 599.0    R31.9 599.70   3. Impaired mobility and activities of daily living  Z74.09 V49.89    Z78.9      Patient Active Problem List   Diagnosis   • AF (atrial fibrillation) (HCC)   • Depression   • Gait instability   • Hypertension   • Insomnia   • Idiopathic Parkinson's disease (HCC)   • Peripheral neuropathy   • Dysarthria   • Atrial flutter (HCC)   • Anticoagulated   • Health care maintenance   • Hypothyroidism (acquired)   • Weakness of voice   • Abnormal chest CT   • Facial droop   • Hypersomnia    • Hyperlipidemia   • High risk medication use   • CORINNE on CPAP   • Obstructive sleep apnea, adult   • Fall   • Progressive supranuclear palsy (HCC)   • Dysphagia   • Atypical parkinsonism (HCC)   • Constipation   • Hyperreflexia   • Torsion dystonia   • Transient cerebral ischemia   • Pneumonia of both lungs due to infectious organism   • Closed fracture of multiple ribs   • Closed compression fracture of L1 lumbar vertebra   • Closed fracture of transverse process of lumbar vertebra (HCC)   • Altered mental status   • Frequent falls   • Sepsis without acute organ dysfunction (HCC)   • Choking   • Abnormal esophagram   • COVID   • Severe malnutrition (CMS/HCC)     Past Medical History:   Diagnosis Date   • AF (atrial fibrillation) (HCC)    • Altered mental status    • Anticoagulated    • Aspiration pneumonia (HCC)    • Atrial flutter (HCC)    • Atypical chest pain    • Chest pain    • Colon polyps    • Confusion    • Dementia (HCC)    • Depression    • Disease of thyroid gland    • Disorder of thyroid    • Dysarthria    • Dysphagia    • Dyspnea and respiratory abnormalities    • Elevated TSH     • Fall    • Fatigue    • Gait instability    • Gastroesophageal reflux disease    • Hay fever    • Hyperlipidemia    • Hypertension    • Hypothyroidism    • Idiopathic Parkinson's disease (HCC)    • Insomnia    • Measles    • Metabolic encephalopathy    • Mumps    • Non-traumatic rhabdomyolysis    • CORINNE (obstructive sleep apnea)    • Palpitations    • Parkinson disease (HCC)    • Peripheral neuropathy    • Pneumonia    • Poor sleep pattern    • Progressive supranuclear palsy (HCC)    • Sepsis (HCC)    • Slurred speech    • Tremor    • Weakness of voice      Past Surgical History:   Procedure Laterality Date   • ENDOSCOPY N/A 10/14/2020    Procedure: ESOPHAGOGASTRODUODENOSCOPY;  Surgeon: Avis Sargent MD;  Location: [facility] ENDOSCOPY;  Service: Gastroenterology;  Laterality: N/A;  Pre: abnormal esophagram  Post: normal   • ENDOSCOPY W/ PEG TUBE PLACEMENT N/A 3/16/2018    Procedure: ESOPHAGOGASTRODUODENOSCOPY WITH PERCUTANEOUS ENDOSCOPIC GASTROSTOMY TUBE INSERTION;  Surgeon: Lopez Vang Jr., MD;  Location: [facility] ENDOSCOPY;  Service: Gastroenterology      General Information     Row Name 02/02/22 0902          OT Time and Intention    Document Type evaluation  -SM (r) LR (t) SM (c)     Mode of Treatment occupational therapy; individual therapy  -SM (r) LR (t) SM (c)     Row Name 02/02/22 0902          General Information    Patient Profile Reviewed yes  -SM (r) LR (t) SM (c)     Prior Level of Function ADL's; max assist:; transfer; w/c or scooter  -SM (r) LR (t) SM (c)     Existing Precautions/Restrictions fall; oxygen therapy device and L/min  -SM (r) LR (t) SM (c)     Barriers to Rehab medically complex; cognitive status  -SM (r) LR (t) SM (c)     Row Name 02/02/22 0902          Occupational Profile    Environmental Supports and Barriers (Occupational Profile) Has w/c and rwx, pt required A from facility staff to transfer into w/c.  -SM (r) LR (t) SM (c)     Row Name 02/02/22 0902          Living  Environment    Lives With facility resident  -SM (r) LR (t) SM (c)     Row Name 02/02/22 0902          Cognition    Orientation Status (Cognition) unable/difficult to assess  -SM (r) LR (t) SM (c)     Row Name 02/02/22 0902          Safety Issues, Functional Mobility    Safety Issues Affecting Function (Mobility) ability to follow commands  -SM (r) LR (t) SM (c)     Impairments Affecting Function (Mobility) balance; cognition; coordination; endurance/activity tolerance; grasp; strength; range of motion (ROM); postural/trunk control  -SM (r) LR (t) SM (c)     Cognitive Impairments, Mobility Safety/Performance insight into deficits/self-awareness; safety precaution awareness; attention  -SM (r) LR (t) SM (c)     Comment, Safety Issues/Impairments (Mobility) Pt with decreased sitting balance EOB, pt followed few verbal commands during evaluation.  -SM (r) LR (t) SM (c)           User Key  (r) = Recorded By, (t) = Taken By, (c) = Cosigned By    Initials Name Provider Type    SM Devi Barbour, OT Occupational Therapist    LR Promise Delacruz, OT Student OT Student                 Mobility/ADL's     Row Name 02/02/22 0905          Bed Mobility    Bed Mobility supine-sit; sit-supine  -SM (r) LR (t) SM (c)     Supine-Sit [place] (Bed Mobility) dependent (less than 25% patient effort); 1 person assist; verbal cues  -SM (r) LR (t) SM (c)     Sit-Supine [place] (Bed Mobility) dependent (less than 25% patient effort); 1 person assist; verbal cues  -SM (r) LR (t) SM (c)     Bed Mobility, Safety Issues impaired trunk control for bed mobility  -SM (r) LR (t) SM (c)     Assistive Device (Bed Mobility) draw sheet; bed rails; head of bed elevated  -SM (r) LR (t) SM (c)     Comment (Bed Mobility) Pt did not follow verbal cues to initiate transfer from supine to sitting EOB, pt dependent in transfers.  -SM (r) LR (t) SM (c)     Row Name 02/02/22 0905          Transfers    Comment (Transfers) NT at this time, pt dependent  in bed mobility, does not consistently follow verbal commands.  -SM (r) LR (t) SM (c)     Row Name 02/02/22 0905          Functional Mobility    Functional Mobility- Comment NT at this time, pt dependent in bed mobility, does not consistently follow verbal commands.  -SM (r) LR (t) SM (c)     Row Name 02/02/22 0905          Activities of Daily Living    BADL Assessment/Intervention feeding; lower body dressing  -SM (r) LR (t) SM (c)     [facility] Name 02/02/22 0905          Self-Feeding Assessment/Training    [place] Level (Feeding) dependent (less than 25% patient effort); feeding skills  -SM (r) LR (t) SM (c)     Position (Self-Feeding) sitting up in bed; edge of bed sitting  -SM (r) LR (t) SM (c)     Comment (Feeding) Pt at one time had weighted handles to use, did not reach for spoon to self-feed with preferred food of chocolate ice cream.  -SM (r) LR (t) SM (c)     [facility] Name 02/02/22 0905          Lower Body Dressing Assessment/Training    [place] Level (Lower Body Dressing) lower body dressing skills; doff; don; dependent (less than 25% patient effort)  -SM (r) LR (t) SM (c)     Position (Lower Body Dressing) sitting up in bed  -SM (r) LR (t) SM (c)           User Key  (r) = Recorded By, (t) = Taken By, (c) = Cosigned By    Initials Name Provider Type    SM Devi Barbour, OT Occupational Therapist    LR Promise Delacruz OT Student OT Student               Obj/Interventions     Row Name 02/02/22 0910          Sensory Assessment (Somatosensory)    Sensory Assessment (Somatosensory) unable/difficult to assess  -SM (r) LR (t) SM (c)     [facility] Name 02/02/22 0910          Sensory Interventions    Comment, Sensory Intervention Pt non verbal during most of evaluation, limiting assessment of sensation.  -SM (r) LR (t) SM (c)     Row Name 02/02/22 0910          Vision Assessment/Intervention    Vision Assessment Comment Pt non verbal during most of evaluation, limiting visual assessment.  -SM (r) LR (t) SM (c)      Row Name 02/02/22 0910          Range of Motion Comprehensive    Comment, General Range of Motion Difficult to assess, pt with ~ 10 degrees B UE shoulder flexion. Pt's B fingers in flexion, B elbows extended.  -SM (r) LR (t) SM (c)     Row Name 02/02/22 0910          Strength Comprehensive (MMT)    General Manual Muscle Testing (MMT) Assessment upper extremity strength deficits identified  -SM (r) LR (t) SM (c)     Row Name 02/02/22 0910          Upper Extremity (Manual Muscle Testing)    Comment, MMT: Upper Extremity B UEs grossly 2/5, pt able to move B UE once sitting EOB upon request.  -SM (r) LR (t) SM (c)     Row Name 02/02/22 0910          Motor Skills    Motor Skills coordination; functional endurance; muscle tone; posture; motor control/coordination interventions  -SM (r) LR (t) SM (c)     Coordination bilateral; fine motor deficit; gross motor deficit; severe impairment; upper extremity  -SM (r) LR (t) SM (c)     Functional Endurance B hands in flexed position, unable to assess coordination. Pt presents with increased tone, pt with Parkinsons's diagnosis, resistant to movement.  -SM (r) LR (t) SM (c)     Muscle Tone bilateral; upper extremity(s); hypertonia  -SM (r) LR (t) SM (c)     Row Name 02/02/22 0910          Balance    Balance Assessment sitting static balance  -SM (r) LR (t) SM (c)     Static Sitting Balance severe impairment; supported; sitting, edge of bed  -SM (r) LR (t) SM (c)     Comment, Balance Pt with decreased sitting balance, pt required max A to maintain sitting balance at EOB. Pt's B UE placed beside to increase stability, pt was unable to use B UE to assist with sitting balance.  -SM (r) LR (t) SM (c)           User Key  (r) = Recorded By, (t) = Taken By, (c) = Cosigned By    Initials Name Provider Type    Devi Patel, OT Occupational Therapist    LR Promise Delacruz OT Student OT Student               Goals/Plan     Row Name 02/02/22 0930          Bed Mobility Goal 1 (OT)     Activity/Assistive Device (Bed Mobility Goal 1, OT) bed mobility activities, all  -SM (r) LR (t) SM (c)     [place] Level/Cues Needed (Bed Mobility Goal 1, OT) moderate assist (50-74% patient effort)  -SM (r) LR (t) SM (c)     Time Frame (Bed Mobility Goal 1, OT) 2 weeks; short term goal (STG)  -SM (r) LR (t) SM (c)     Progress/Outcomes (Bed Mobility Goal 1, OT) goal ongoing  -SM (r) LR (t) SM (c)     Row Name 02/02/22 0930          Self-Feeding Goal 1 (OT)    Activity/Device (Self-Feeding Goal 1, OT) self-feeding skills, all  -SM (r) LR (t) SM (c)     [place] Level/Cues Needed (Self-Feeding Goal 1, OT) moderate assist (50-74% patient effort); 1 person assist  -SM (r) LR (t) SM (c)     Time Frame (Self-Feeding Goal 1, OT) short term goal (STG); 2 weeks  -SM (r) LR (t) SM (c)     Progress/Outcomes (Self-Feeding Goal 1, OT) goal ongoing  -SM (r) LR (t) SM (c)     Row Name 02/02/22 0930          Problem Specific Goal 1 (OT)    Problem Specific Goal 1 (OT) Pt will maintain sitting balance EOB with min A for 5 min to complete self-feeding.  -SM (r) LR (t) SM (c)     Time Frame (Problem Specific Goal 1, OT) short term goal (STG); 2 weeks  -SM (r) LR (t) SM (c)     Progress/Outcome (Problem Specific Goal 1, OT) goal ongoing  -SM (r) LR (t) SM (c)     Row Name 02/02/22 0930          Therapy Assessment/Plan (OT)    Planned Therapy Interventions (OT) activity tolerance training; functional balance retraining; occupation/activity based interventions; passive ROM/stretching; transfer/mobility retraining; ROM/therapeutic exercise; patient/caregiver education/training; BADL retraining  -SM (r) LR (t) SM (c)           User Key  (r) = Recorded By, (t) = Taken By, (c) = Cosigned By    Initials Name Provider Type    Devi Patel, OT Occupational Therapist    LR Promise Delacruz, OT Student OT Student               Clinical Impression     Row Name 02/02/22 0927          Pain Assessment    Additional Documentation  Pain Scale: FACES Pre/Post-Treatment (Group)  -SM (r) LR (t) SM (c)     Row Name 02/02/22 0920          Pain Scale: FACES Pre/Post-Treatment    Pain: FACES Scale, Pretreatment 0-->no hurt  -SM (r) LR (t) SM (c)     Posttreatment Pain Rating 0-->no hurt  -SM (r) LR (t) SM (c)     Pre/Posttreatment Pain Comment Pt nonverbal most of session, no c/o pain or grimaced face during evaluation.  -SM (r) LR (t) SM (c)     Row Name 02/02/22 0920          Plan of Care Review    Plan of Care Reviewed With patient  -SM (r) LR (t) SM (c)     Outcome Summary Pt is 76 y. o. M admitted to [facility] with altered mental state from [facility]. Pt with medical history including but not limited to A Fib, Parkinson's Dementia. History obtained from note review and sister who was in room during evaluation. Pt was previously assist of 1 in facility for transfers to/from wheelchair. OT evaluation requested to determine pt's current function for d/c placement. Pt dependent for bed mobiilty to sit EOB, max A to maintain static sitting balance on EOB. Pt dependent in feeding, sister fed 2 spoons of chocolate ice cream during evaluation. Pt nonverbal throughout most of session, with limited grunting. Pt limited in following commands, was able to move B LE when prompted. Pt with decreased Oxygen to 83% sitting EOB x2, required verbal cues and gestures to use pursed lip breathing, oxygen returned to high 90's. Recommend skilled OT services to increase safety and independence in self-care and bed mobility. Recommend subacute rehab at d/c. Per CCP pt required to self-feed and transfer with A x1 to return to previous level at facility, pt is unable at this time to self-feed or transfer with A x1.  -SM (r) LR (t) SM (c)     Row Name 02/02/22 0920          Therapy Assessment/Plan (OT)    Rehab Potential (OT) fair, will monitor progress closely  -SM (r) LR (t) SM (c)     Criteria for Skilled Therapeutic Interventions Met (OT) yes; skilled treatment  is necessary  -SM (r) LR (t) SM (c)     Therapy Frequency (OT) 3 times/wk  -SM (r) LR (t) SM (c)     Row Name 02/02/22 0920          Therapy Plan Review/Discharge Plan (OT)    Anticipated Discharge Disposition (OT) sub acute care setting; skilled nursing facility  -SM (r) LR (t) SM (c)     Row Name 02/02/22 0920          Vital Signs    Pre SpO2 (%) 96  -SM (r) LR (t) SM (c)     O2 Delivery Pre Treatment supplemental O2  -SM (r) LR (t) SM (c)     Intra SpO2 (%) 83  -SM (r) LR (t) SM (c)     O2 Delivery Intra Treatment supplemental O2  -SM (r) LR (t) SM (c)     Post SpO2 (%) 96  -SM (r) LR (t) SM (c)     O2 Delivery Post Treatment supplemental O2  -SM (r) LR (t) SM (c)     Pre Patient Position Supine  -SM (r) LR (t) SM (c)     Intra Patient Position Sitting  -SM (r) LR (t) SM (c)     Post Patient Position Supine  -SM (r) LR (t) SM (c)     Row Name 02/02/22 0920          Positioning and Restraints    Pre-Treatment Position in bed  -SM (r) LR (t) SM (c)     Post Treatment Position bed  -SM (r) LR (t) SM (c)     In Bed notified nsg; fowlers; with family/caregiver; exit alarm on; encouraged to call for assist; call light within reach; legs elevated  -SM (r) LR (t) SM (c)           User Key  (r) = Recorded By, (t) = Taken By, (c) = Cosigned By    Initials Name Provider Type    Devi Patel, OT Occupational Therapist    LR Promise Delacruz, OT Student OT Student               Outcome Measures     Row Name 02/02/22 0933          How much help from another is currently needed...    Putting on and taking off regular lower body clothing? 1  -SM (r) LR (t) SM (c)     Bathing (including washing, rinsing, and drying) 1  -SM (r) LR (t) SM (c)     Toileting (which includes using toilet bed pan or urinal) 1  -SM (r) LR (t) SM (c)     Putting on and taking off regular upper body clothing 1  -SM (r) LR (t) SM (c)     Taking care of personal grooming (such as brushing teeth) 1  -SM (r) LR (t) SM (c)     Eating meals 1  -SM (r) LR  (t) SM (c)     AM-PAC 6 Clicks Score (OT) 6  -SM (r) LR (t)     Row Name 02/02/22 0933          Functional Assessment    Outcome Measure Options AM-PAC 6 Clicks Daily Activity (OT)  -SM (r) LR (t) SM (c)           User Key  (r) = Recorded By, (t) = Taken By, (c) = Cosigned By    Initials Name Provider Type    SM Devi Barbour, OT Occupational Therapist    LR Promise Delacruz, OT Student OT Student                Occupational Therapy Education                 Title: PT OT SLP Therapies (In Progress)     Topic: Occupational Therapy (In Progress)     Point: ADL training (Done)     Description:   Instruct learner(s) on proper safety adaptation and remediation techniques during self care or transfers.   Instruct in proper use of assistive devices.              Learning Progress Summary           Patient Acceptance, E, VU,NR by LR at 2/2/2022 0940    Comment: Educated family member on role of OT, POC, d/c planning. Ed pt on pursed lip breathing during evaluation.   Family Acceptance, E, VU,NR by LR at 2/2/2022 0940    Comment: Educated family member on role of OT, POC, d/c planning. Ed pt on pursed lip breathing during evaluation.                   Point: Home exercise program (Not Started)     Description:   Instruct learner(s) on appropriate technique for monitoring, assisting and/or progressing therapeutic exercises/activities.              Learner Progress:  Not documented in this visit.          Point: Precautions (Not Started)     Description:   Instruct learner(s) on prescribed precautions during self-care and functional transfers.              Learner Progress:  Not documented in this visit.          Point: Body mechanics (Not Started)     Description:   Instruct learner(s) on proper positioning and spine alignment during self-care, functional mobility activities and/or exercises.              Learner Progress:  Not documented in this visit.                      User Key     Initials Effective Dates Name  Provider Type Discipline    LR 01/18/22 -  Promise Delacruz, OT Student OT Student OT              OT Recommendation and Plan  Planned Therapy Interventions (OT): activity tolerance training, functional balance retraining, occupation/activity based interventions, passive ROM/stretching, transfer/mobility retraining, ROM/therapeutic exercise, patient/caregiver education/training, BADL retraining  Therapy Frequency (OT): 3 times/wk  Plan of Care Review  Plan of Care Reviewed With: patient  Outcome Summary: Pt is 76 y. o. M admitted to [facility] with [facility] mental state from [facility]. Pt with medical history including but not limited to A Fib, Parkinson's Dementia. History obtained from note review and sister who was in room during evaluation. Pt was previously assist of 1 in facility for transfers to/from wheelchair. OT evaluation requested to determine pt's current function for d/c placement. Pt dependent for bed mobiilty to sit EOB, max A to maintain static sitting balance on EOB. Pt dependent in feeding, sister fed 2 spoons of chocolate ice cream during evaluation. Pt nonverbal throughout most of session, with limited grunting. Pt limited in following commands, was able to move B LE when prompted. Pt with decreased Oxygen to 83% sitting EOB x2, required verbal cues and gestures to use pursed lip breathing, oxygen returned to high 90's. Recommend skilled OT services to increase safety and independence in self-care and bed mobility. Recommend subacute rehab at d/c. Per CCP pt required to self-feed and transfer with A x1 to return to previous level at facility, pt is unable at this time to self-feed or transfer with A x1.     Time Calculation:    Time Calculation- OT     Row Name 02/02/22 0934             Time Calculation- OT    OT Start Time 0830  -SM (r) LR (t) SM (c)      OT Stop Time 0857  -SM (r) LR (t) SM (c)      OT Time Calculation (min) 27 min  -SM (r) LR (t)      Total Timed Code Minutes- OT 19  minute(s)  -SM (r) LR (t) SM (c)      OT Received On 02/02/22  -SM (r) LR (t) SM (c)      OT - Next Appointment 02/04/22  -SM (r) LR (t) SM (c)      OT Goal Re-Cert Due Date 02/16/22  -SM (r) LR (t) SM (c)              Timed Charges    65854 - OT Therapeutic Activity Minutes 19  -SM (r) LR (t) SM (c)              Untimed Charges    OT Eval/Re-eval Minutes 8  -SM (r) LR (t) SM (c)              Total Minutes    Timed Charges Total Minutes 19  -SM (r) LR (t)      Untimed Charges Total Minutes 8  -SM (r) LR (t)       Total Minutes 27  -SM (r) LR (t)            User Key  (r) = Recorded By, (t) = Taken By, (c) = Cosigned By    Initials Name Provider Type     Devi Barbour, OT Occupational Therapist    LR Promise Delacruz, OT Student OT Student              Therapy Charges for Today     Code Description Service Date Service Provider Modifiers Qty    [id number]  OT THERAPEUTIC ACT EA 15 MIN 2/2/2022 Promise Delacruz, OT Student GO 1    09814580016 HC OT EVAL MOD COMPLEXITY 2 2/2/2022 Promise Delacruz OT Student GO 1               NATHAN LIND  2/2/2022

## 2022-02-02 NOTE — PLAN OF CARE
Goal Outcome Evaluation:  Plan of Care Reviewed With: patient        Progress: no change  Outcome Summary: Pt seen for PT re-eval today, requesting eval for appropriateness of return to memory care facility. Pt nonverbal at this time and unable to answer any orientation questions. Pt dependent x1 for transfers to EOB and back to supine. PT attempted to assist pt with reaching, but with tightly gripped fist and unable to hold onto bedrail. Pt intially with posterior lean in sitting, but able to correct with cueing and maintain sitting balance with intermittent cueing for ~5 minutes. Attempted PROM on BLE as pt unable to follow commands - increased posterior lean with dynamic sitting balance and pt fatigued quickly requiring mod A to maintain. Dr. Kc came in towards end of session and reports pt to be D/C back to memory care with hospice, so facility is able to accept. PT will sign-off at this time.    Patient was not wearing a face mask during this therapy encounter. Therapist used appropriate personal protective equipment including eye protection, mask, and gloves.  Mask used was standard procedure mask. Appropriate PPE was worn during the entire therapy session. Hand hygiene was completed before and after therapy session. Patient is not in enhanced droplet precautions.

## 2022-02-02 NOTE — PROGRESS NOTES
"Physicians Statement of Medical Necessity for  Ambulance Transportation    GENERAL INFORMATION     Name: Rommel Gonzalez  YOB: 1945  Medicare #: [id number]  Transport Date:               (Valid for round trips this date, or for scheduled repetitive trips for 60 days from the date signed below.)  Origin: [facility]  Destination: [facility]  Is the Patient's stay covered under Medicare Part A (PPS/DRG?)Yes  Closest appropriate facility? Yes  If this a hosp-hosp transfer? No  Is this a hospice patient? Yes, Is this transport related to patient's terminal illness? Yes    MEDICAL NECESSITY QUESTIONAIRE    Ambulance Transportation is medically necessary only if other means of transportation are contraindicated or would be potentially harmful to the patient.  To meet this requirement, the patient must be either \"bed confined\" or suffer from a condition such that transport by means other than an ambulance is contraindicated by the patient's condition.  The following questions must be answered by the healthcare professional signing below for this form to be valid:     1) Describe the MEDICAL CONDITION (physical and/or mental) of this patient AT THE TIME OF AMBULANCE TRANSPORT that requires the patient to be transported in an ambulance, and why transport by other means is contraindicated by the patient's condition: UTI, confusion  Past Medical History:   Diagnosis Date   • AF (atrial fibrillation) (HCC)    • Altered mental status    • Anticoagulated    • Aspiration pneumonia (HCC)    • Atrial flutter (HCC)    • Atypical chest pain    • Chest pain    • Colon polyps    • Confusion    • Dementia (HCC)    • Depression    • Disease of thyroid gland    • Disorder of thyroid    • Dysarthria    • Dysphagia    • Dyspnea and respiratory abnormalities    • Elevated TSH    • Fall    • Fatigue    • Gait instability    • Gastroesophageal reflux disease    • Hay fever    • Hyperlipidemia    • Hypertension    • " "Hypothyroidism    • Idiopathic Parkinson's disease (HCC)    • Insomnia    • Measles    • Metabolic encephalopathy    • Mumps    • Non-traumatic rhabdomyolysis    • CORINNE (obstructive sleep apnea)    • Palpitations    • Parkinson disease (HCC)    • Peripheral neuropathy    • Pneumonia    • Poor sleep pattern    • Progressive supranuclear palsy (HCC)    • Sepsis (HCC)    • Slurred speech    • Tremor    • Weakness of voice       Past Surgical History:   Procedure Laterality Date   • ENDOSCOPY N/A 10/14/2020    Procedure: ESOPHAGOGASTRODUODENOSCOPY;  Surgeon: Avis Sargent MD;  Location: [facility] ENDOSCOPY;  Service: Gastroenterology;  Laterality: N/A;  Pre: abnormal esophagram  Post: normal   • ENDOSCOPY W/ PEG TUBE PLACEMENT N/A 3/16/2018    Procedure: ESOPHAGOGASTRODUODENOSCOPY WITH PERCUTANEOUS ENDOSCOPIC GASTROSTOMY TUBE INSERTION;  Surgeon: Lopez Vang Jr., MD;  Location: [facility] ENDOSCOPY;  Service: Gastroenterology      2) Is this patient \"bed confined\" as defined below?Yes   To be \"bed confined\" the patient must satisfy all three of the following criteria:  (1) unable to get up from bed without assistance; AND (2) unable to ambulate;  AND (3) unable to sit in a chair or wheelchair.  3) Can this patient safely be transported by car or wheelchair van (I.e., may safely sit during transport, without an attendant or monitoring?)No   4. In addition to completing questions 1-3 above, please check any of the following conditions that apply*:          *Note: supporting documentation for any boxes checked must be maintained in the patient's medical records Patient is confused and Unable to tolerate seated position for time needed to transport      SIGNATURE OF PHYSICIAN OR OTHER AUTHORIZED HEALTHCARE PROFESSIONAL    I certify that the above information is true and correct based on my evaluation of this patient, and represent that the patient requires transport by ambulance and that other forms of transport are " contraindicated.  I understand that this information will be used by the Centers for Medicare and Medicaid Services (CMS) to support the determiniation of medical necessity for ambulance services, and I represent that I have personal knowledge of the patient's condition at the time of transport.    f this box is checked, I also certify that the patient is physically or mentally incapable of signing the ambulance service's claim form and that the institution with which I am affiliated has furnished care, services or assistance to the patient.  My signature below is made on behalf of the patient pursuant to 42 .36(b)(4). In accordance with 42 .37, the specific reason(s) that the patient is physically or mentally incapable of signing the claim for is as follows:     Signature of Physician or Healthcare Professional  Date/Time:        (For Scheduled repetitive transport, this form is not valid for transports performed more than 60 days after this date).                                                                                                                                            --------------------------------------------------------------------------------------------  Printed Name and Credentials of Physician or Authorized Healthcare Professional     *Form must be signed by patient's attending physician for scheduled, repetitive transports,.  For non-repetitive ambulance transports, if unable to obtain the signature of the attending physician, any of the following may sign (please select below):     Physician  Clinical Nurse Specialist  Registered Nurse     Physician Assistant  Discharge Planner  Licensed Practical Nurse     Nurse Practitioner

## 2022-02-02 NOTE — PROGRESS NOTES
Continued Stay Note  [facility]     Patient Name: Rommel Gonzalez  MRN: [id number]  Today's Date: 2/2/2022    Admit Date: 1/25/2022     Discharge Plan     Row Name 02/02/22 1236       Plan    Plan Return to [facility] via [facility] at 0730 2/3 pending weather    Plan Comments Spoke with the [facility] retail pharmacy.  They will forward the prescriptions for levothyroxine and lopressor that were sent there to the patient's preferred pharmacy [facility] [address].................Ashley Headley RN    Row Name 02/02/22 1130       Plan    Plan Return to [facility] with hospice    Plan Comments Spoke with sister García this am.  She states she now prefers that the patient return to [facility] with hospice care.  Dr Kc plans AL today.  EMS with [facility] arranged for next available at 730 am 2/3 pending weather.  Spopke with Yecenia with [facility] and she accepts and aware of the transport time.  .........................Ashley Headley RN               Discharge Codes    No documentation.               Expected Discharge Date and Time     Expected Discharge Date Expected Discharge Time    Feb 2, 2022             Ashley Headley RN

## 2022-02-02 NOTE — PLAN OF CARE
Goal Outcome Evaluation:  Plan of Care Reviewed With: patient        Progress: improving     Patient remains nonverbal but will respond to name and will move some with hands and feet. PT was able to get pt up to bedside and bend upper ext. Pts sister remains at bedside. [facility] [place] [facility].

## 2022-02-02 NOTE — PROGRESS NOTES
"Daily progress note    Chief complaint   Doing better  No new complaints  Family at bedside    History of present illness  76-year-old white male with history of atrial fibrillation Parkinson disease dementia progressive supranuclear palsy hypothyroidism chronic anemia and gastroesophageal disease who was recently treated for COVID-19 infection with hypoxia and hyponatremia and discharged back to nursing home in stable condition sent to the ER with altered mental status.  Patient found to have acute UTI with sepsis and acute kidney injury admit for management.  Patient unable to give detailed history most of the history obtained from the chart nursing staff] remember the patient believes admission.  Patient is DNR per his wishes.    REVIEW OF SYSTEMS  Unremarkable except  generalized weakness     PHYSICAL EXAM   Blood pressure 164/74, pulse 64, temperature 98.6 °F (37 °C), temperature source Oral, resp. rate 18, height 180.3 cm (70.98\"), weight 67.1 kg (147 lb 14.9 oz), SpO2 98 %.    GENERAL: Awake, eyes held tightly closed, no acute distress  SKIN: Warm, dry  HENT: Normocephalic, atraumatic  EYES: no scleral icterus  CV: regular rhythm, regular rate  RESPIRATORY: normal effort, lungs clear  ABDOMEN: soft, nontender, nondistended bowel sounds positive  MUSCULOSKELETAL: no deformity  NEURO: alert, moves all extremities     LAB RESULTS  Lab Results (last 24 hours)     Procedure Component Value Units Date/Time    Renal Function Panel [[id number]]  (Abnormal) Collected: 02/02/22 0545    Specimen: Blood Updated: 02/02/22 1022     Glucose 100 mg/dL      BUN 23 mg/dL      Creatinine 0.72 mg/dL      Sodium 156 mmol/L      Potassium 3.7 mmol/L      Chloride 120 mmol/L      CO2 27.8 mmol/L      Calcium 8.6 mg/dL      Albumin 2.80 g/dL      Phosphorus 3.5 mg/dL      Anion Gap 8.2 mmol/L      BUN/Creatinine Ratio 31.9     eGFR Non African Amer 106 mL/min/1.73     Narrative:      GFR Normal >60  Chronic Kidney Disease <60  Kidney " Failure <15      CBC & Differential [[id number]]  (Abnormal) Collected: 02/02/22 0545    Specimen: Blood Updated: 02/02/22 0618    Narrative:      The following orders were created for panel order CBC & Differential.  Procedure                               Abnormality         Status                     ---------                               -----------         ------                     CBC Auto Differential[[id number]]        Abnormal            Final result                 Please view results for these tests on the individual orders.    CBC Auto Differential [[id number]]  (Abnormal) Collected: 02/02/22 0545    Specimen: Blood Updated: 02/02/22 0618     WBC 9.95 10*3/mm3      RBC 3.87 10*6/mm3      Hemoglobin 11.4 g/dL      Hematocrit 36.2 %      MCV 93.5 fL      MCH 29.5 pg      MCHC 31.5 g/dL      RDW 14.4 %      RDW-SD 48.6 fl      MPV 10.7 fL      Platelets 418 10*3/mm3      Neutrophil % 76.9 %      Lymphocyte % 16.5 %      Monocyte % 5.0 %      Eosinophil % 0.2 %      Basophil % 0.3 %      Immature Grans % 1.1 %      Neutrophils, Absolute 7.65 10*3/mm3      Lymphocytes, Absolute 1.64 10*3/mm3      Monocytes, Absolute 0.50 10*3/mm3      Eosinophils, Absolute 0.02 10*3/mm3      Basophils, Absolute 0.03 10*3/mm3      Immature Grans, Absolute 0.11 10*3/mm3      nRBC 0.0 /100 WBC         Imaging Results (Last 24 Hours)     ** No results found for the last 24 hours. **          Current Facility-Administered Medications:   •  acetaminophen (TYLENOL) tablet 650 mg, 650 mg, Oral, Q4H PRN, Darrell Kc MD, 650 mg at 02/02/22 0016  •  carbidopa-levodopa CR (SINEMET CR)  MG per CR tablet 2 tablet, 2 tablet, Oral, Q12H, Darrell Kc MD, 2 tablet at 02/02/22 0822  •  cephalexin (KEFLEX) capsule 500 mg, 500 mg, Oral, Q8H, Darrell Kc MD, 500 mg at 02/01/22 2147  •  cholecalciferol (VITAMIN D3) tablet 1,000 Units, 1,000 Units, Oral, Daily, Darrell Kc MD, 1,000 Units at 02/02/22 0822  •  donepezil (ARICEPT) tablet  10 mg, 10 mg, Oral, Nightly, Kanika Kc MD, 10 mg at 02/01/22 2147  •  escitalopram (LEXAPRO) tablet 10 mg, 10 mg, Oral, Nightly, Kanika Kc MD, 10 mg at 02/01/22 2147  •  famotidine (PEPCID) tablet 20 mg, 20 mg, Oral, Q12H, Kanika Kc MD, 20 mg at 02/02/22 0822  •  levothyroxine (SYNTHROID, LEVOTHROID) tablet 150 mcg, 150 mcg, Oral, Q AM, Kanika Kc MD, 150 mcg at 02/01/22 0536  •  rivaroxaban (XARELTO) tablet 20 mg, 20 mg, Oral, Daily With Dinner, Kanika Kc MD, 20 mg at 01/31/22 1852  •  [COMPLETED] Insert peripheral IV, , , Once **AND** sodium chloride 0.9 % flush 10 mL, 10 mL, Intravenous, PRN, Omid Hood MD     ASSESSMENT  Acute Citrobacter UTI with sepsis  CNS bacteremia contamination  Acute kidney injury resolved  Metabolic encephalopathy  Severe dementia  Severe Parkinson disease  Progressive supranuclear palsy  Recent COVID-19 infection with hypoxia resolved  Paroxysmal atrial fibrillation  Hypothyroidism  Chronic anemia  Gastroesophageal reflux disease    PLAN  Discharge back to nursing home with hospice  Discharge summary dictated    KANIKA KC MD

## 2022-02-02 NOTE — PROGRESS NOTES
"   LOS: 7 days     Chief Complaint/ Reason for encounter: AMANDA  Chief Complaint   Patient presents with   • Altered Mental Status         Subjective    1/28: Not as alert today.  Discussed with RN, eating and drinking very little  Discussed with the sister at bedside about goals of care and possible palliative care    1/29 about the same, nonverbal noncommunicative, eating and drinking very little    1/30 alert but does not follow commands, opens eyes, seems to be eating and drinking well but requires complete assistance    1/31 about the same, eyes open but stares blankly at the television denies complaints  Seems to be eating well with assistance    2/1 feels well, no c/o  Nonverbal, requires assistance with meals    2/2 remains nonverbal, requires complete assistance with meals      Medical history reviewed:  Altered Mental Status        Subjective    History taken from: Patient and chart    Vital Signs  Temp:  [98.6 °F (37 °C)-101 °F (38.3 °C)] 101 °F (38.3 °C)  Heart Rate:  [53-75] 75  Resp:  [16-18] 16  BP: (150-166)/(68-82) 150/68       Wt Readings from Last 1 Encounters:   01/26/22 1045 67.1 kg (147 lb 14.9 oz)   01/25/22 2328 67.1 kg (147 lb 14.9 oz)       Objective:  Vital signs: (most recent): Blood pressure 150/68, pulse 75, temperature (!) 101 °F (38.3 °C), temperature source Oral, resp. rate 16, height 180.3 cm (70.98\"), weight 67.1 kg (147 lb 14.9 oz), SpO2 97 %.              Objective:  General Appearance:  Comfortable, chronically ill-appearing, in no acute distress and not in pain.  Awake, alert, oriented  HEENT: Mucous membranes moist, no injury, oropharynx clear  Lungs:  Normal effort and normal respiratory rate.  Breath sounds clear to auscultation.  No  respiratory distress.  No rales, decreased breath sounds or rhonchi.    Heart: Normal rate.  Regular rhythm.  S1 normal.  No murmur.   Abdomen: Abdomen is soft.  Bowel sounds are normal, no abdominal tenderness.  There is no rebound or " guarding  Extremities: Normal range of motion.  No significant edema of bilateral lower extremities, distal pulses intact  Neurological: No focal motor or sensory deficits, pupils reactive  Skin:  Warm and dry.  No rash or cyanosis.       Results Review:    Intake/Output:   No intake or output data in the 24 hours ending 02/02/22 1435      DATA:  Radiology and Labs:  The following labs independently reviewed by me. Additional labs ordered for tomorrow a.m.  Interval notes, chart personally reviewed by me.   Old records independently reviewed showing sign creatinine 0.8, no prior CKD or renal failure  Discussed with patient and his sister at bedside    Risk/ complexity of medical care/ medical decision making moderate complexity    Labs:   Recent Results (from the past 24 hour(s))   Renal Function Panel    Collection Time: 02/02/22  5:45 AM    Specimen: Blood   Result Value Ref Range    Glucose 100 (H) 65 - 99 mg/dL    BUN 23 8 - 23 mg/dL    Creatinine 0.72 (L) 0.76 - 1.27 mg/dL    Sodium 156 (H) 136 - 145 mmol/L    Potassium 3.7 3.5 - 5.2 mmol/L    Chloride 120 (H) 98 - 107 mmol/L    CO2 27.8 22.0 - 29.0 mmol/L    Calcium 8.6 8.6 - 10.5 mg/dL    Albumin 2.80 (L) 3.50 - 5.20 g/dL    Phosphorus 3.5 2.5 - 4.5 mg/dL    Anion Gap 8.2 5.0 - 15.0 mmol/L    BUN/Creatinine Ratio 31.9 (H) 7.0 - 25.0    eGFR Non African Amer 106 >60 mL/min/1.73   CBC Auto Differential    Collection Time: 02/02/22  5:45 AM    Specimen: Blood   Result Value Ref Range    WBC 9.95 3.40 - 10.80 10*3/mm3    RBC 3.87 (L) 4.14 - 5.80 10*6/mm3    Hemoglobin 11.4 (L) 13.0 - 17.7 g/dL    Hematocrit 36.2 (L) 37.5 - 51.0 %    MCV 93.5 79.0 - 97.0 fL    MCH 29.5 26.6 - 33.0 pg    MCHC 31.5 31.5 - 35.7 g/dL    RDW 14.4 12.3 - 15.4 %    RDW-SD 48.6 37.0 - 54.0 fl    MPV 10.7 6.0 - 12.0 fL    Platelets 418 140 - 450 10*3/mm3    Neutrophil % 76.9 (H) 42.7 - 76.0 %    Lymphocyte % 16.5 (L) 19.6 - 45.3 %    Monocyte % 5.0 5.0 - 12.0 %    Eosinophil % 0.2 (L) 0.3  - 6.2 %    Basophil % 0.3 0.0 - 1.5 %    Immature Grans % 1.1 (H) 0.0 - 0.5 %    Neutrophils, Absolute 7.65 (H) 1.70 - 7.00 10*3/mm3    Lymphocytes, Absolute 1.64 0.70 - 3.10 10*3/mm3    Monocytes, Absolute 0.50 0.10 - 0.90 10*3/mm3    Eosinophils, Absolute 0.02 0.00 - 0.40 10*3/mm3    Basophils, Absolute 0.03 0.00 - 0.20 10*3/mm3    Immature Grans, Absolute 0.11 (H) 0.00 - 0.05 10*3/mm3    nRBC 0.0 0.0 - 0.2 /100 WBC       Radiology:  Imaging Results (Last 24 Hours)     ** No results found for the last 24 hours. **             Medications have been reviewed:  Current Facility-Administered Medications   Medication Dose Route Frequency Provider Last Rate Last Admin   • acetaminophen (TYLENOL) tablet 650 mg  650 mg Oral Q4H PRN Darrell Kc MD   650 mg at 02/02/22 1429   • carbidopa-levodopa CR (SINEMET CR)  MG per CR tablet 2 tablet  2 tablet Oral Q12H Darrell Kc MD   2 tablet at 02/02/22 0822   • cephalexin (KEFLEX) capsule 500 mg  500 mg Oral Q8H Darrell Kc MD   500 mg at 02/02/22 1429   • cholecalciferol (VITAMIN D3) tablet 1,000 Units  1,000 Units Oral Daily Darrell Kc MD   1,000 Units at 02/02/22 0822   • donepezil (ARICEPT) tablet 10 mg  10 mg Oral Nightly Darrell Kc MD   10 mg at 02/01/22 2147   • escitalopram (LEXAPRO) tablet 10 mg  10 mg Oral Nightly Darrell Kc MD   10 mg at 02/01/22 2147   • famotidine (PEPCID) tablet 20 mg  20 mg Oral Q12H Darrell Kc MD   20 mg at 02/02/22 0822   • levothyroxine (SYNTHROID, LEVOTHROID) tablet 150 mcg  150 mcg Oral Q AM Darrell Kc MD   150 mcg at 02/01/22 0536   • rivaroxaban (XARELTO) tablet 20 mg  20 mg Oral Daily With Dinner Darrell Kc MD   20 mg at 01/31/22 1633   • sodium chloride 0.9 % flush 10 mL  10 mL Intravenous PRN Omid Hood MD           ASSESSMENT:  AMANDA, secondary to volume depletion.  Improved with fluids, nearing baseline  UTI on IV antibiotics  Progressive dementia  Parkinson's disease  Progressive, supranuclear  palsy  Altered mental status, improved some           PLAN:   Discharge is planned for this afternoon  Renal function is stable but his sodium is up to 156  Unfortunately, I do not think he is able to eat and drink well enough on his own and I suspect he will probably be back before too long with recurrent dehydration  If that occurs then palliative care should be pursued  Continue to encourage oral fluid intake and would recheck chemistries early next week      Rafael Truong MD   [facility]   Office phone number: [phone]  Answering service phone number: [phone]    02/02/22  14:35 EST    Dictation performed using Dragon dictation software

## 2022-02-02 NOTE — PLAN OF CARE
Goal Outcome Evaluation:  Plan of Care Reviewed With: (P) patient           Outcome Summary: (P) Pt is 76 y. o. M admitted to [facility] with altered mental state from [facility]. Pt with medical history including but not limited to A Fib, Parkinson's Dementia. History obtained from note review and sister who was in room during evaluation. Pt was previously assist of 1 in facility for transfers to/from wheelchair. OT evaluation requested to determine pt's current function for d/c placement. Pt dependent for bed mobiilty to sit EOB, max A to maintain static sitting balance on EOB. Pt dependent in feeding, sister fed 2 spoons of chocolate ice cream during evaluation. Pt nonverbal throughout most of session, with limited grunting. Pt limited in following commands, was able to move B LE when prompted. Pt with decreased Oxygen to 83% sitting EOB x2, required verbal cues and gestures to use pursed lip breathing, oxygen returned to high 90's. Recommend skilled OT services to increase safety and independence in self-care and bed mobility. Recommend subacute rehab at d/c. Per CCP pt required to self-feed and transfer with A x1 to return to previous level at facility, pt is unable at this time to self-feed or transfer with A x1.    OTS wore PPE including mask, gloves, eye protection, and complete hand hygiene prior to/after session. Pt did not wear a mask.

## 2022-02-02 NOTE — NURSING NOTE
Dr. Kc informed of temp of 101 and gave pt tylenol. Physician states no further treatment needed.

## 2022-02-03 NOTE — PLAN OF CARE
Plan for d/c today if pt remains afebrile. Plan for hospice to meet pt at [facility] after d/c. No acute changes overnight. Pt remains nonverbal, VSS. HR bradycardic in 50's at times. Q2 turns, incontinence care prn. Will continue to monitor closely.

## 2022-02-03 NOTE — PLAN OF CARE
Goal Outcome Evaluation:      Declining:  Pt transferred to [facility] at 1610 for comfort care. On arrival, pt unresponsive, shallow breathing with periods of apnea. Sister at bedside, attentive to pt and participating in pt's care. RN educated pt regarding comfort care, care plan and comfort meds. Sister onboard with comfort measures. RN will continue to monitor.

## 2022-02-03 NOTE — PROGRESS NOTES
"Daily progress note    Chief complaint   Events noted  Doing worse this morning  Low-grade fever off and on  No shortness of breath or cough  Family at bedside    History of present illness  76-year-old white male with history of atrial fibrillation Parkinson disease dementia progressive supranuclear palsy hypothyroidism chronic anemia and gastroesophageal disease who was recently treated for COVID-19 infection with hypoxia and hyponatremia and discharged back to nursing home in stable condition sent to the ER with altered mental status.  Patient found to have acute UTI with sepsis and acute kidney injury admit for management.  Patient unable to give detailed history most of the history obtained from the chart nursing staff] remember the patient believes admission.  Patient is DNR per his wishes.    REVIEW OF SYSTEMS  Unremarkable except  generalized weakness     PHYSICAL EXAM   Blood pressure 157/76, pulse 72, temperature 98.6 °F (37 °C), temperature source Oral, resp. rate 20, height 180.3 cm (70.98\"), weight 67.1 kg (147 lb 14.9 oz), SpO2 91 %.    GENERAL: Awake, eyes held tightly closed, no acute distress  SKIN: Warm, dry  HENT: Normocephalic, atraumatic  EYES: no scleral icterus  CV: regular rhythm, regular rate  RESPIRATORY: normal effort, lungs clear  ABDOMEN: soft, nontender, nondistended bowel sounds positive  MUSCULOSKELETAL: no deformity  NEURO: alert, moves all extremities     LAB RESULTS  Lab Results (last 24 hours)     ** No results found for the last 24 hours. **        Imaging Results (Last 24 Hours)     ** No results found for the last 24 hours. **          Current Facility-Administered Medications:   •  acetaminophen (TYLENOL) suppository 650 mg, 650 mg, Rectal, Q4H PRN, Darrell Kc MD  •  acetaminophen (TYLENOL) tablet 650 mg, 650 mg, Oral, Q4H PRN, Darrell Kc MD, 650 mg at 02/02/22 2039  •  carbidopa-levodopa CR (SINEMET CR)  MG per CR tablet 2 tablet, 2 tablet, Oral, Q12H, De" MD Kanika, 2 tablet at 02/02/22 2037  •  cephalexin (KEFLEX) capsule 500 mg, 500 mg, Oral, Q8H, Kanika Kc MD, 500 mg at 02/03/22 0538  •  cholecalciferol (VITAMIN D3) tablet 1,000 Units, 1,000 Units, Oral, Daily, Kanika Kc MD, 1,000 Units at 02/02/22 0822  •  donepezil (ARICEPT) tablet 10 mg, 10 mg, Oral, Nightly, Kanika Kc MD, 10 mg at 02/02/22 2036  •  escitalopram (LEXAPRO) tablet 10 mg, 10 mg, Oral, Nightly, Kanika Kc MD, 10 mg at 02/02/22 2036  •  famotidine (PEPCID) tablet 20 mg, 20 mg, Oral, Q12H, Kanika Kc MD, 20 mg at 02/02/22 2036  •  levothyroxine (SYNTHROID, LEVOTHROID) tablet 150 mcg, 150 mcg, Oral, Q AM, Kanika Kc MD, 150 mcg at 02/03/22 0538  •  rivaroxaban (XARELTO) tablet 20 mg, 20 mg, Oral, Daily With Dinner, Kanika Kc MD, 20 mg at 02/02/22 1724  •  [COMPLETED] Insert peripheral IV, , , Once **AND** sodium chloride 0.9 % flush 10 mL, 10 mL, Intravenous, PRN, Omid Hood MD     ASSESSMENT  Acute Citrobacter UTI with sepsis  CNS bacteremia contamination  Acute kidney injury resolved  Metabolic encephalopathy  Severe dementia  Severe Parkinson disease  Progressive supranuclear palsy  Recent COVID-19 infection with hypoxia resolved  Paroxysmal atrial fibrillation  Hypothyroidism  Chronic anemia  Gastroesophageal reflux disease    PLAN  Long discussion with patient POA about his prognosis and she changed her mind and decided to keep patient comfortable in our palliative care unit and allow natural death.  Patient sister also agreed to stop all scheduled medication and use routine palliative care orders for comfort care.  I agree with her decision and will comply and transfer patient to palliative care unit for comfort care.    KANIKA KC MD

## 2022-02-03 NOTE — PROGRESS NOTES
Continued Stay Note  [facility]     Patient Name: Rommel Gonzalez  MRN: [id number]  Today's Date: 2/3/2022    Admit Date: 1/25/2022     Discharge Plan     Row Name 02/03/22 1129       Plan    Plan [facility] with EMS to  at 3pm    Plan Comments VM for JIM and for Yecenia with [facility] to discuss denial for return and notifiy of EMS today at 3..................Ashley Headley RN               Discharge Codes    No documentation.               Expected Discharge Date and Time     Expected Discharge Date Expected Discharge Time    Feb 2, 2022             Ashley Headley RN

## 2022-02-03 NOTE — NURSING NOTE
Pt scheduled for discharge 2/3 w/ EMS at 0730. RN spoke with Lise from [facility] regarding pt being febrile today w/ temp of 101. Lise stated despite pt returning with hospice pt still needs to be afebrile for 24 hours prior to return to facility. RN will notify physician and EMS. Augusto.

## 2022-02-03 NOTE — NURSING NOTE
RN notified MD De. RN spoke w/ Lisa w/ EMS, new discharge time of 1500 for 2/3. [facility].

## 2022-02-03 NOTE — NURSING NOTE
Raquel, sister, has decided to transfer patient over to palliative care unit instead of going back to [facility]. Called [facility], and spoke with Ramila in scheduling. She is going to call Raquel to schedule a time for one of their RNs to meet with her tomorrow around 1:30 to re-evaluate.

## 2022-02-03 NOTE — PROGRESS NOTES
"   LOS: 8 days     Chief Complaint/ Reason for encounter: AMANDA  Chief Complaint   Patient presents with   • Altered Mental Status         Subjective    1/28: Not as alert today.  Discussed with RN, eating and drinking very little  Discussed with the sister at bedside about goals of care and possible palliative care    1/29 about the same, nonverbal noncommunicative, eating and drinking very little    1/30 alert but does not follow commands, opens eyes, seems to be eating and drinking well but requires complete assistance    1/31 about the same, eyes open but stares blankly at the television denies complaints  Seems to be eating well with assistance    2/1 feels well, no c/o  Nonverbal, requires assistance with meals    2/2 remains nonverbal, requires complete assistance with meals    2/3 no change, nonverbal, minimal oral intake despite assistance      Medical history reviewed:  Altered Mental Status        Subjective    History taken from: Patient and chart    Vital Signs  Temp:  [98.5 °F (36.9 °C)-101 °F (38.3 °C)] 100.3 °F (37.9 °C)  Heart Rate:  [68-75] 72  Resp:  [16-20] 20  BP: (145-158)/(68-76) 157/76       Wt Readings from Last 1 Encounters:   01/26/22 1045 67.1 kg (147 lb 14.9 oz)   01/25/22 2328 67.1 kg (147 lb 14.9 oz)       Objective:  Vital signs: (most recent): Blood pressure 157/76, pulse 72, temperature 100.3 °F (37.9 °C), temperature source Oral, resp. rate 20, height 180.3 cm (70.98\"), weight 67.1 kg (147 lb 14.9 oz), SpO2 91 %.              Objective:  General Appearance:  Comfortable, chronically ill-appearing, in no acute distress and not in pain.  Awake, alert, oriented  HEENT: Mucous membranes moist, no injury, oropharynx clear  Lungs:  Normal effort and normal respiratory rate.  Breath sounds clear to auscultation.  No  respiratory distress.  No rales, decreased breath sounds or rhonchi.    Heart: Normal rate.  Regular rhythm.  S1 normal.  No murmur.   Abdomen: Abdomen is soft.  Bowel sounds " are normal, no abdominal tenderness.  There is no rebound or guarding  Extremities: Normal range of motion.  No significant edema of bilateral lower extremities, distal pulses intact  Neurological: No focal motor or sensory deficits, pupils reactive  Skin:  Warm and dry.  No rash or cyanosis.       Results Review:    Intake/Output:     Intake/Output Summary (Last 24 hours) at 2/3/2022 0929  Last data filed at 2/2/2022 1715  Gross per 24 hour   Intake 60 ml   Output --   Net 60 ml         DATA:  Radiology and Labs:  The following labs independently reviewed by me. Additional labs ordered for tomorrow a.m.  Interval notes, chart personally reviewed by me.   Old records independently reviewed showing sign creatinine 0.8, no prior CKD or renal failure  Discussed with patient and his sister at bedside    Risk/ complexity of medical care/ medical decision making moderate complexity    Labs:   No results found for this or any previous visit (from the past 24 hour(s)).    Radiology:  Imaging Results (Last 24 Hours)     ** No results found for the last 24 hours. **             Medications have been reviewed:  Current Facility-Administered Medications   Medication Dose Route Frequency Provider Last Rate Last Admin   • acetaminophen (TYLENOL) tablet 650 mg  650 mg Oral Q4H PRN Darrell Kc MD   650 mg at 02/02/22 2039   • carbidopa-levodopa CR (SINEMET CR)  MG per CR tablet 2 tablet  2 tablet Oral Q12H Darrell Kc MD   2 tablet at 02/02/22 2037   • cephalexin (KEFLEX) capsule 500 mg  500 mg Oral Q8H Darrell Kc MD   500 mg at 02/03/22 0538   • cholecalciferol (VITAMIN D3) tablet 1,000 Units  1,000 Units Oral Daily Darrell Kc MD   1,000 Units at 02/02/22 0822   • donepezil (ARICEPT) tablet 10 mg  10 mg Oral Nightly Darrell Kc MD   10 mg at 02/02/22 2036   • escitalopram (LEXAPRO) tablet 10 mg  10 mg Oral Nightly Darrell Kc MD   10 mg at 02/02/22 2036   • famotidine (PEPCID) tablet 20 mg  20 mg Oral Q12H  Darrell Kc MD   20 mg at 02/02/22 2036   • levothyroxine (SYNTHROID, LEVOTHROID) tablet 150 mcg  150 mcg Oral Q AM Darrell Kc MD   150 mcg at 02/03/22 0538   • rivaroxaban (XARELTO) tablet 20 mg  20 mg Oral Daily With Dinner Darrell Kc MD   20 mg at 02/02/22 1724   • sodium chloride 0.9 % flush 10 mL  10 mL Intravenous PRN Omid Hood MD           ASSESSMENT:  AMANDA, secondary to volume depletion.  Improved with fluids, nearing baseline  UTI on IV antibiotics  Progressive dementia  Parkinson's disease  Progressive, supranuclear palsy  Altered mental status, improved some           PLAN:   Discharge is planned for this afternoon  Renal function is stable but his sodium was up to 156 yesterday  Unfortunately, I do not think he is able to eat and drink well enough on his own and I suspect he will probably be back before too long with recurrent dehydration  Discussed with his sister at bedside, explained extremely poor prognosis given his lack of oral intake  Hospice/palliative care appropriate  If he does not stay another night would resume D5W versus palliative care evaluation      Rafael Truong MD   [facility]   Office phone number: [phone]  Answering service phone number: [phone]    02/03/22  09:29 EST    Dictation performed using Dragon dictation software

## 2022-02-03 NOTE — PROGRESS NOTES
"  Infectious Diseases Progress Note    Jero Schaffer MD     [facility]  Los: 8 days  Patient Identification:  Name: Rommel Gonzalez  Age: 76 y.o.  Sex: male  :  1945  MRN: [id number]         Primary Care Physician: Carolina Reyna APRN            Subjective: Comfortable and being visited by family members at the bedside.  Did not wake up to interact with me.  Interval History: See consultation note.    Objective:    Scheduled Meds:carbidopa-levodopa CR, 2 tablet, Oral, Q12H  cephalexin, 500 mg, Oral, Q8H  cholecalciferol, 1,000 Units, Oral, Daily  donepezil, 10 mg, Oral, Nightly  escitalopram, 10 mg, Oral, Nightly  famotidine, 20 mg, Oral, Q12H  levothyroxine, 150 mcg, Oral, Q AM  rivaroxaban, 20 mg, Oral, Daily With Dinner      Continuous Infusions:     Vital signs in last 24 hours:  Temp:  [98.5 °F (36.9 °C)-101 °F (38.3 °C)] 100.3 °F (37.9 °C)  Heart Rate:  [68-75] 72  Resp:  [16-20] 20  BP: (145-158)/(68-76) 157/76    Intake/Output:    Intake/Output Summary (Last 24 hours) at 2/3/2022 0846  Last data filed at 2022 1715  Gross per 24 hour   Intake 60 ml   Output --   Net 60 ml       Exam:  /76 (BP Location: Left arm, Patient Position: Lying)   Pulse 72   Temp 100.3 °F (37.9 °C) (Oral)   Resp 20   Ht 180.3 cm (70.98\")   Wt 67.1 kg (147 lb 14.9 oz) Comment: not weighed by RD  SpO2 91%   BMI 20.64 kg/m²   Patient is examined using the personal protective equipment as per guidelines from infection control for this particular patient as enacted.  Hand washing was performed before and after patient interaction.  General Appearance:  Ill-appearing male who does not respond and looks worse than few days ago.                          Head:    Normocephalic, without obvious abnormality, atraumatic                           Eyes:    PERRL, conjunctivae/corneas clear, EOM's intact, both eyes                         Throat:   Lips, tongue, gums normal; oral mucosa pink and " moist                           Neck:   Supple, symmetrical, trachea midline, no JVD                         Lungs:    Clear to auscultation bilaterally, respirations unlabored                 Chest Wall:    No tenderness or deformity                          Heart:  S1-S2 regular                  Abdomen:   Soft nontender                 Extremities:   Extremities normal, atraumatic, no cyanosis or edema                        Pulses:   Pulses palpable in all extremities                            Skin:   Skin is warm and dry,  no rashes or palpable lesions                  Neurologic: Awake limited mobility due to spasticity of lower extremities and has masked facies       Data Review:    I reviewed the patient's new clinical results.  Results from last 7 days   Lab Units 02/02/22  0545 01/29/22  0358 01/28/22  0516   WBC 10*3/mm3 9.95 10.41 7.65   HEMOGLOBIN g/dL 11.4* 11.3* 10.5*   PLATELETS 10*3/mm3 418 269 215     Results from last 7 days   Lab Units 02/02/22  0545 01/30/22  0550 01/29/22  0358 01/28/22  0516   SODIUM mmol/L 156* 144 145 146*   POTASSIUM mmol/L 3.7 3.9 3.7 4.1   CHLORIDE mmol/L 120* 110* 110* 112*   CO2 mmol/L 27.8 25.8 24.9 26.8   BUN mg/dL 23 13 15 20   CREATININE mg/dL 0.72* 0.73* 0.63* 0.73*   CALCIUM mg/dL 8.6 8.2* 8.5* 8.4*   GLUCOSE mg/dL 100* 104* 103* 86       Microbiology Results (last 10 days)     Procedure Component Value - Date/Time    Urine Culture - Urine, Urine, Catheter In/Out [[id number]]  (Abnormal)  (Susceptibility) Collected: 01/25/22 1527    Lab Status: Final result Specimen: Urine, Catheter In/Out Updated: 01/27/22 1211     Urine Culture >100,000 CFU/mL Citrobacter koseri    Susceptibility      Citrobacter koseri     CLAUDIA     Cefazolin Susceptible     Cefepime Susceptible     Ceftazidime Susceptible     Ceftriaxone Susceptible     Gentamicin Susceptible     Levofloxacin Susceptible     Nitrofurantoin Susceptible     Piperacillin + Tazobactam Susceptible     Trimethoprim +  Sulfamethoxazole Susceptible                         Blood Culture - Blood, Arm, Left [[id number]]  (Normal) Collected: 01/25/22 1501    Lab Status: Final result Specimen: Blood from Arm, Left Updated: 01/30/22 2345     Blood Culture No growth at 5 days    COVID PRE-OP / PRE-PROCEDURE SCREENING ORDER (NO ISOLATION) - Swab, Nasopharynx [[id number]]  (Normal) Collected: 01/25/22 1456    Lab Status: Final result Specimen: Swab from Nasopharynx Updated: 01/25/22 2118    Narrative:      The following orders were created for panel order COVID PRE-OP / PRE-PROCEDURE SCREENING ORDER (NO ISOLATION) - Swab, Nasopharynx.  Procedure                               Abnormality         Status                     ---------                               -----------         ------                     COVID-19,APTIMA PANTHER(...[[id number]]  Normal              Final result                 Please view results for these tests on the individual orders.    COVID-19,APTIMA PANTHER(LUZ MARIA),[facility], NP/OP SWAB IN UTM/VTM/SALINE TRANSPORT MEDIA,24 HR TAT - Swab, Nasopharynx [[id number]]  (Normal) Collected: 01/25/22 1456    Lab Status: Final result Specimen: Swab from Nasopharynx Updated: 01/25/22 2118     COVID19 Not Detected    Narrative:      Fact sheet for providers: https://www.fda.gov/media/624666/download     Fact sheet for patients: https://www.fda.gov/media/735178/download    Test performed by RT PCR.    Blood Culture - Blood, Arm, Right [[id number]]  (Abnormal) Collected: 01/25/22 1417    Lab Status: Final result Specimen: Blood from Arm, Right Updated: 01/27/22 0817     Blood Culture Staphylococcus, coagulase negative     Isolated from Aerobic and Anaerobic Bottles     Gram Stain Anaerobic Bottle Gram positive cocci in clusters      Aerobic Bottle Gram positive cocci in clusters    Narrative:      Probable contaminant requires clinical correlation, susceptibility not performed unless requested by physician.      Blood Culture ID, PCR  - Blood, Arm, Right [[id number]]  (Abnormal) Collected: 01/25/22 1417    Lab Status: Final result Specimen: Blood from Arm, Right Updated: 01/26/22 1353     BCID, PCR Staph spp, not aureus or lugdunesis. Identification by BCID2 PCR.     BOTTLE TYPE Anaerobic Bottle          Assessment:    Altered mental status    Severe malnutrition (CMS/HCC)  1-gram-positive bacteremia in the situation of recent hospitalization and abnormal urinalysis that could very well be staph epidermidis urinary tract infection with bacteremia versus contamination during the process of collection.  2-urinary tract infection improving on IV ceftriaxone  3-dementia  4-immobilization syndrome  5-toxic metabolic encephalopathy secondary to above  6-chronic anticoagulation  7-Parkinson's disease with supranuclear palsy  8-acute kidney injury improved.  9-recurrence of fever-rule out aspiration or development of complications of antibiotics as this is occurring on antibiotic treatment.     Recommendations/Discussions:  This change in status and decline despite aggressive care and initial improvement after current treatment suggests that he is exhibiting end-stage/phase in his life.  Given the comorbidities and lack of improvement despite multiple hospitalizations and aggressive treatment for infection that are recurrent I think it is reasonable to consider changing the care structure to comfort evaluation..  Jero Schaffer MD  2/3/2022  08:46 EST    Much of this encounter note is an electronic transcription/translation of spoken language to printed text. The electronic translation of spoken language may permit erroneous, or at times, nonsensical words or phrases to be inadvertently transcribed; Although I have reviewed the note for such errors, some may still exist

## 2022-02-04 NOTE — CONSULTS
[facility] admit 2/4/2022  [facility] ID [id number]    Primary diagnosis: supranuclear palsy G23.1    Patient is needing symptom management.    Met with patient sister and provided explanation of services. Written material provided. Consents signed for [facility].    Thank you for the referral.    Lashay Jalloh RN  [facility]  [phone]

## 2022-02-04 NOTE — PROGRESS NOTES
"   LOS: 9 days     Chief Complaint/ Reason for encounter: AMANDA  Chief Complaint   Patient presents with   • Altered Mental Status         Subjective    1/28: Not as alert today.  Discussed with RN, eating and drinking very little  Discussed with the sister at bedside about goals of care and possible palliative care    1/29 about the same, nonverbal noncommunicative, eating and drinking very little    1/30 alert but does not follow commands, opens eyes, seems to be eating and drinking well but requires complete assistance    1/31 about the same, eyes open but stares blankly at the television denies complaints  Seems to be eating well with assistance    2/1 feels well, no c/o  Nonverbal, requires assistance with meals    2/2 remains nonverbal, requires complete assistance with meals    2/3 no change, nonverbal, minimal oral intake despite assistance    2/4 remains nonverbal.  His sister decided to transition to hospice care yesterday and he was transitioned over to the [address] floor.  Not eating or drinking, nonverbal, does not even open his eyes  No pain or dyspnea      Medical history reviewed:  Altered Mental Status        Subjective    History taken from: Patient and chart    Vital Signs  Temp:  [98.4 °F (36.9 °C)-101.7 °F (38.7 °C)] 101.7 °F (38.7 °C)  Heart Rate:  [73-78] 78  Resp:  [18-28] 28  BP: (128-152)/(67-81) 128/67       Wt Readings from Last 1 Encounters:   01/26/22 1045 67.1 kg (147 lb 14.9 oz)   01/25/22 2328 67.1 kg (147 lb 14.9 oz)       Objective:  Vital signs: (most recent): Blood pressure 128/67, pulse 78, temperature (!) 101.7 °F (38.7 °C), temperature source Axillary, resp. rate 28, height 180.3 cm (70.98\"), weight 67.1 kg (147 lb 14.9 oz), SpO2 95 %.              Objective:  General Appearance:  Comfortable, chronically ill-appearing, in no acute distress and not in pain.  Resting, nonverbal  HEENT: Mucous membranes moist, no injury, oropharynx clear  Lungs: Respirations slightly labored, " no wheezes or rhonchi  Heart: Normal rate.  Regular rhythm.  S1 normal.  No murmur.   Abdomen: Abdomen is soft.  Bowel sounds are normal, no abdominal tenderness.  There is no rebound or guarding  Extremities: Normal range of motion.  No significant edema of bilateral lower extremities, distal pulses intact  Neurological: No focal motor or sensory deficits  Skin:  Warm and dry.  No rash or cyanosis.       Results Review:    Intake/Output:   No intake or output data in the 24 hours ending 02/04/22 1404      DATA:  Radiology and Labs:  The following labs independently reviewed by me. Additional labs ordered for tomorrow a.m.  Interval notes, chart personally reviewed by me.   Discussed with patient and his sister at bedside, explained that we would not be checking any more labs.  She agrees to palliative/comfort care    Risk/ complexity of medical care/ medical decision making moderate complexity    Labs:   No results found for this or any previous visit (from the past 24 hour(s)).       Medications have been reviewed:  Current Facility-Administered Medications   Medication Dose Route Frequency Provider Last Rate Last Admin   • acetaminophen (TYLENOL) tablet 650 mg  650 mg Oral Q4H PRN Darrell Kc MD        Or   • acetaminophen (TYLENOL) 160 MG/5ML solution 650 mg  650 mg Oral Q4H PRN Darrell Kc MD        Or   • acetaminophen (TYLENOL) suppository 650 mg  650 mg Rectal Q4H PRN Darrell Kc MD       • diphenhydrAMINE (BENADRYL) capsule 25 mg  25 mg Oral Q6H PRN Darrell Kc MD        Or   • diphenhydrAMINE (BENADRYL) injection 25 mg  25 mg Intravenous Q6H PRN Darrell Kc MD       • diphenoxylate-atropine (LOMOTIL) 2.5-0.025 MG per tablet 1 tablet  1 tablet Oral Q2H PRN Darrell Kc MD       • First Mouthwash (Magic Mouthwash) 5 mL  5 mL Swish & Spit Q4H PRN Darrell Kc MD       • Glycerin-Hypromellose- (ARTIFICIAL TEARS) 0.2-0.2-1 % ophthalmic solution solution 1 drop  1 drop Both Eyes Q30 Min PRN  Darrell Kc MD       • glycopyrrolate (ROBINUL) injection 0.2 mg  0.2 mg Intravenous Q2H PRN Darrell Kc MD   0.2 mg at 02/04/22 0936    Or   • glycopyrrolate (ROBINUL) injection 0.2 mg  0.2 mg Subcutaneous Q2H PRN Darrell Kc MD        Or   • glycopyrrolate (ROBINUL) injection 0.4 mg  0.4 mg Intravenous Q2H PRN Darrell Kc MD        Or   • glycopyrrolate (ROBINUL) injection 0.4 mg  0.4 mg Subcutaneous Q2H PRN Darrell Kc MD       • haloperidol (HALDOL) tablet 1 mg  1 mg Oral Q4H PRN Darrell Kc MD        Or   • haloperidol (HALDOL) 2 MG/ML solution 1 mg  1 mg Oral Q4H PRN Darrell Kc MD        Or   • haloperidol lactate (HALDOL) injection 1 mg  1 mg Subcutaneous Q4H PRN Darrell Kc MD       • HYDROmorphone (DILAUDID) injection 0.5 mg  0.5 mg Intravenous Q1H PRN Darrell Kc MD        Or   • morphine injection 2 mg  2 mg Intravenous Q1H PRN Darrell Kc MD        Or   • morphine concentrated solution 5 mg  5 mg Sublingual Q1H PRN Darrell Kc MD        Or   • ketorolac (TORADOL) injection 15 mg  15 mg Intravenous Q6H PRN Darrell Kc MD       • HYDROmorphone (DILAUDID) injection 1 mg  1 mg Intravenous Q1H PRN Darrell Kc MD        Or   • morphine injection 4 mg  4 mg Intravenous Q1H PRN Darrell Kc MD        Or   • morphine concentrated solution 10 mg  10 mg Sublingual Q1H PRN Darrell Kc MD       • LORazepam (ATIVAN) injection 0.5 mg  0.5 mg Intravenous Q1H PRN Darrell Kc MD        Or   • LORazepam (ATIVAN) injection 0.5 mg  0.5 mg Subcutaneous Q1H PRN Darrell Kc MD        Or   • LORazepam (ATIVAN) 2 MG/ML concentrated solution 0.5 mg  0.5 mg Sublingual Q1H PRN Darrell Kc MD       • LORazepam (ATIVAN) injection 1 mg  1 mg Intravenous Q1H PRN Darrell Kc MD        Or   • LORazepam (ATIVAN) injection 1 mg  1 mg Subcutaneous Q1H PRN Darrell Kc MD        Or   • LORazepam (ATIVAN) 2 MG/ML concentrated solution 1 mg  1 mg Sublingual Q1H PRN Darrell Kc MD       • LORazepam  (ATIVAN) injection 2 mg  2 mg Intravenous Q1H PRN Darrell Kc MD        Or   • LORazepam (ATIVAN) injection 2 mg  2 mg Subcutaneous Q1H PRN Darrell Kc MD        Or   • LORazepam (ATIVAN) 2 MG/ML concentrated solution 2 mg  2 mg Sublingual Q1H PRN Darrell Kc MD       • promethazine (PHENERGAN) tablet 6.25 mg  6.25 mg Oral Q4H PRN Darrell Kc MD        Or   • promethazine (PHENERGAN) 6.25 MG/5ML syrup 6.25 mg  6.25 mg Oral Q4H PRN Darrell Kc MD        Or   • promethazine (PHENERGAN) suppository 6.25 mg  6.25 mg Rectal Q4H PRN Darrell Kc MD           ASSESSMENT:  MAANDA, secondary to volume depletion.  Improved with fluids, nearing baseline  UTI on IV antibiotics  Progressive dementia  Parkinson's disease  Progressive, supranuclear palsy  Altered mental status, improved some           PLAN:   Agree with transition to palliative care  He continues to spike fevers: may be silently aspirating  Also with inadequate oral intake as evidenced by rising sodium levels  Does not appear able to maintain oral hydration on his own  Agree with comfort care  Discussed with the sister at bedside  All scheduled medications and labs have been discontinued    Will sign off, please call if any questions or concerns      Rafael Truong MD   [facility]   Office phone number: [phone]  Answering service phone number: [phone]    02/04/22  14:04 EST    Dictation performed using Dragon dictation software

## 2022-02-04 NOTE — PROGRESS NOTES
Events noted.  Chart reviewed  Agree with changing care structure to comfort and palliation.  Had conversation with patient's family member at the bedside on 2/3/2022.  Nothing further to add.  We will sign off.

## 2022-02-04 NOTE — PROGRESS NOTES
Discharge Planning Assessment  [facility]     Patient Name: Rommel Gonzalez  MRN: [id number]  Today's Date: 2/4/2022    Admit Date: 1/25/2022     Discharge Needs Assessment    No documentation.                Discharge Plan     Row Name 02/04/22 1722       Plan    Plan Comments The patient transferred to [facility] from [address] [place] [address] on 2/3/22. The patient is palliative. [facility] did evaluate today and admitted to a [facility] scattered bed today. CCP will continue to follow for any needs that may arise. MARY LOU Rangel RN, CCP.    Final Discharge Disposition Code 51 - hospice medical facility    Final Note Admitted to a [facility] scattered bed on 2/4/22. MARY LOU Rangel RN,CCP              Continued Care and Services - Discharged on 2/4/2022 Admission date: 1/25/2022 - Discharge disposition: Hospice/Medical Facility ([facility])    Destination Coordination complete.    Service Provider Request Status Selected Services Address Phone Fax Patient Preferred    [facility]   [place] Hospice [address] [id number] [phone] --    [facility]  Accepted N/A [id number] [facility] [address] [phone] --            Selected Continued Care - Prior Encounters Includes selections from prior encounters from 10/27/2021 to 2/4/2022    Discharged on 1/5/2022 Admission date: 12/30/2021 - Discharge disposition: Intermediate Care     Destination     Service Provider Selected Services Address Phone Fax Patient [facility]  Assisted Living [address] [facility] [address] [phone] --                    Expected Discharge Date and Time     Expected Discharge Date Expected Discharge Time    Feb 4, 2022          Demographic Summary    No documentation.                Functional Status    No documentation.                Psychosocial    No documentation.                Abuse/Neglect    No  documentation.                Legal    No documentation.                Substance Abuse    No documentation.                Patient Forms    No documentation.                   Nan Rangel RN

## 2022-02-04 NOTE — PLAN OF CARE
Goal Outcome Evaluation:  Plan of Care Reviewed With: family         Declining,  Pt unresponsive, shallow breathing. PRN robinul given x2 for congestion management. Pt flipped to HSB today. Sister. Raquel, at bedside, attentive to pt and participating in pt's care. Sister hesitant regarding pain management meds. RN educated sister re: comfort meds and signs to look for indicating pt may be in pain. PRN 2 mg morphine given once for pain control, with good effect. Pt incontinent, brief on. RN will continue to monitor pt.

## 2022-02-04 NOTE — PLAN OF CARE
Goal Outcome Evaluation:  Plan of Care Reviewed With: patient           Outcome Summary: Pt. resting comfortably, unresponsive to stimuli, occasional brief cough, no signs of shortness of air. Pt's family very hesitant for patient to have morphine. Patient tolerating turns, incontinent but voiding regularly, brief changed several times throughout night.

## 2022-02-04 NOTE — CONSULTS
Met with sister at bedside. We prayed together for comfort and peace of patient and family.  Patient attended Spirit[facility].  Family is aware that chaplains are available for support.

## 2022-02-04 NOTE — PROGRESS NOTES
"Daily progress note    Chief complaint   Comfortable  No new issues  Family at bedside    History of present illness  76-year-old white male with history of atrial fibrillation Parkinson disease dementia progressive supranuclear palsy hypothyroidism chronic anemia and gastroesophageal disease who was recently treated for COVID-19 infection with hypoxia and hyponatremia and discharged back to nursing home in stable condition sent to the ER with altered mental status.  Patient found to have acute UTI with sepsis and acute kidney injury admit for management.  Patient unable to give detailed history most of the history obtained from the chart nursing staff] remember the patient believes admission.  Patient is DNR per his wishes.    REVIEW OF SYSTEMS  Unremarkable except  generalized weakness     PHYSICAL EXAM   Blood pressure 144/76, pulse 93, temperature (!) 102 °F (38.9 °C), temperature source Axillary, resp. rate 20, height 180.3 cm (70.98\"), weight 67.1 kg (147 lb 14.9 oz), SpO2 92 %.    Unchanged     LAB RESULTS  Lab Results (last 24 hours)     ** No results found for the last 24 hours. **        Imaging Results (Last 24 Hours)     ** No results found for the last 24 hours. **          Current Facility-Administered Medications:   •  acetaminophen (TYLENOL) tablet 650 mg, 650 mg, Oral, Q4H PRN **OR** acetaminophen (TYLENOL) 160 MG/5ML solution 650 mg, 650 mg, Oral, Q4H PRN **OR** acetaminophen (TYLENOL) suppository 650 mg, 650 mg, Rectal, Q4H PRN, Darrell Kc MD  •  diphenhydrAMINE (BENADRYL) capsule 25 mg, 25 mg, Oral, Q6H PRN **OR** diphenhydrAMINE (BENADRYL) injection 25 mg, 25 mg, Intravenous, Q6H PRN, Darrell Kc MD  •  diphenoxylate-atropine (LOMOTIL) 2.5-0.025 MG per tablet 1 tablet, 1 tablet, Oral, Q2H PRN, Darrell Kc MD  •  First Mouthwash (Magic Mouthwash) 5 mL, 5 mL, Swish & Spit, Q4H PRN, Darrell Kc MD  •  Glycerin-Hypromellose- (ARTIFICIAL TEARS) 0.2-0.2-1 % ophthalmic solution solution " 1 drop, 1 drop, Both Eyes, Q30 Min PRN, Darrell Kc MD  •  glycopyrrolate (ROBINUL) injection 0.2 mg, 0.2 mg, Intravenous, Q2H PRN, 0.2 mg at 02/04/22 0936 **OR** glycopyrrolate (ROBINUL) injection 0.2 mg, 0.2 mg, Subcutaneous, Q2H PRN **OR** glycopyrrolate (ROBINUL) injection 0.4 mg, 0.4 mg, Intravenous, Q2H PRN **OR** glycopyrrolate (ROBINUL) injection 0.4 mg, 0.4 mg, Subcutaneous, Q2H PRN, Darrell Kc MD  •  haloperidol (HALDOL) tablet 1 mg, 1 mg, Oral, Q4H PRN **OR** haloperidol (HALDOL) 2 MG/ML solution 1 mg, 1 mg, Oral, Q4H PRN **OR** haloperidol lactate (HALDOL) injection 1 mg, 1 mg, Subcutaneous, Q4H PRN, Darrell Kc MD  •  HYDROmorphone (DILAUDID) injection 0.5 mg, 0.5 mg, Intravenous, Q1H PRN **OR** morphine injection 2 mg, 2 mg, Intravenous, Q1H PRN **OR** morphine concentrated solution 5 mg, 5 mg, Sublingual, Q1H PRN **OR** ketorolac (TORADOL) injection 15 mg, 15 mg, Intravenous, Q6H PRN, Darrell Kc MD  •  HYDROmorphone (DILAUDID) injection 1 mg, 1 mg, Intravenous, Q1H PRN **OR** morphine injection 4 mg, 4 mg, Intravenous, Q1H PRN **OR** morphine concentrated solution 10 mg, 10 mg, Sublingual, Q1H PRN, Darrell Kc MD  •  LORazepam (ATIVAN) injection 0.5 mg, 0.5 mg, Intravenous, Q1H PRN **OR** LORazepam (ATIVAN) injection 0.5 mg, 0.5 mg, Subcutaneous, Q1H PRN **OR** LORazepam (ATIVAN) 2 MG/ML concentrated solution 0.5 mg, 0.5 mg, Sublingual, Q1H PRN, Darrell Kc MD  •  LORazepam (ATIVAN) injection 1 mg, 1 mg, Intravenous, Q1H PRN **OR** LORazepam (ATIVAN) injection 1 mg, 1 mg, Subcutaneous, Q1H PRN **OR** LORazepam (ATIVAN) 2 MG/ML concentrated solution 1 mg, 1 mg, Sublingual, Q1H PRN, Darrell Kc MD  •  LORazepam (ATIVAN) injection 2 mg, 2 mg, Intravenous, Q1H PRN **OR** LORazepam (ATIVAN) injection 2 mg, 2 mg, Subcutaneous, Q1H PRN **OR** LORazepam (ATIVAN) 2 MG/ML concentrated solution 2 mg, 2 mg, Sublingual, Q1H PRN, Darrell Kc MD  •  promethazine (PHENERGAN) tablet 6.25 mg, 6.25 mg,  Oral, Q4H PRN **OR** promethazine (PHENERGAN) 6.25 MG/5ML syrup 6.25 mg, 6.25 mg, Oral, Q4H PRN **OR** promethazine (PHENERGAN) suppository 6.25 mg, 6.25 mg, Rectal, Q4H PRN, Kanika Kc MD     ASSESSMENT  Acute Citrobacter UTI with sepsis  CNS bacteremia contamination  Acute kidney injury resolved  Metabolic encephalopathy  Severe dementia  Severe Parkinson disease  Progressive supranuclear palsy  Recent COVID-19 infection with hypoxia resolved  Paroxysmal atrial fibrillation  Hypothyroidism  Chronic anemia  Gastroesophageal reflux disease    PLAN  Transfer to hospice scattered bed  Comfort care only  Allow natural death  Routine palliative care orders  Discussed with family  Discussed with nursing staff  Combined discharge summary and history and physical dictated    KANIKA KC MD

## 2022-02-04 NOTE — PROGRESS NOTES
Case Management Discharge Note      Final Note: Admitted to a [facility] scattered bed on 2/4/22. MARY LOU Rangel RN,CCP    Provided Post Acute Provider List?: Refused  Provided Post Acute Provider Quality & Resource List?: N/A    Selected Continued Care - Discharged on 2/4/2022 Admission date: 1/25/2022 - Discharge disposition: Hospice/Medical Facility ([facility])    Destination Coordination complete.    Service Provider Selected Services Address Phone Fax Patient Preferred    [facility]  Inpatient Hospice [address], [facility] [address] [phone] --          Durable Medical Equipment    No services have been selected for the patient.              Dialysis/Infusion    No services have been selected for the patient.              Home Medical Care    No services have been selected for the patient.              Therapy    No services have been selected for the patient.              Community Resources    No services have been selected for the patient.              Community & DME    No services have been selected for the patient.                Selected Continued Care - Prior Encounters Includes selections from prior encounters from 10/27/2021 to 2/4/2022    Discharged on 1/5/2022 Admission date: 12/30/2021 - Discharge disposition: Intermediate Care     Destination     Service Provider Selected Services Address Phone Fax Patient Preferred    [facility]  Assisted Living [address] [facility], [facility] [address] [phone] --                         Final Discharge Disposition Code: 51 - hospice medical facility

## 2022-02-04 NOTE — DISCHARGE SUMMARY
Discharge summary    Date of admission 1/25/2022  Date of discharge 2/4/2022  Date of admission to hospice scattered bed    Final diagnosis  Acute Citrobacter UTI with sepsis  CNS bacteremia contamination  Acute kidney injury resolved  Metabolic encephalopathy  Severe dementia  Severe Parkinson disease  Progressive supranuclear palsy  Recent COVID-19 infection with hypoxia resolved  Paroxysmal atrial fibrillation  Hypothyroidism  Chronic anemia  Gastroesophageal reflux disease     Discharge medications    Current Facility-Administered Medications:   •  acetaminophen (TYLENOL) tablet 650 mg, 650 mg, Oral, Q4H PRN **OR** acetaminophen (TYLENOL) 160 MG/5ML solution 650 mg, 650 mg, Oral, Q4H PRN **OR** acetaminophen (TYLENOL) suppository 650 mg, 650 mg, Rectal, Q4H PRN, Darrell Kc MD  •  diphenhydrAMINE (BENADRYL) capsule 25 mg, 25 mg, Oral, Q6H PRN **OR** diphenhydrAMINE (BENADRYL) injection 25 mg, 25 mg, Intravenous, Q6H PRN, Darrell Kc MD  •  diphenoxylate-atropine (LOMOTIL) 2.5-0.025 MG per tablet 1 tablet, 1 tablet, Oral, Q2H PRN, Darrell Kc MD  •  First Mouthwash (Magic Mouthwash) 5 mL, 5 mL, Swish & Spit, Q4H PRN, Darrell Kc MD  •  Glycerin-Hypromellose- (ARTIFICIAL TEARS) 0.2-0.2-1 % ophthalmic solution solution 1 drop, 1 drop, Both Eyes, Q30 Min PRN, Darrell Kc MD  •  glycopyrrolate (ROBINUL) injection 0.2 mg, 0.2 mg, Intravenous, Q2H PRN, 0.2 mg at 02/04/22 0936 **OR** glycopyrrolate (ROBINUL) injection 0.2 mg, 0.2 mg, Subcutaneous, Q2H PRN **OR** glycopyrrolate (ROBINUL) injection 0.4 mg, 0.4 mg, Intravenous, Q2H PRN **OR** glycopyrrolate (ROBINUL) injection 0.4 mg, 0.4 mg, Subcutaneous, Q2H PRN, Darrell Kc MD  •  haloperidol (HALDOL) tablet 1 mg, 1 mg, Oral, Q4H PRN **OR** haloperidol (HALDOL) 2 MG/ML solution 1 mg, 1 mg, Oral, Q4H PRN **OR** haloperidol lactate (HALDOL) injection 1 mg, 1 mg, Subcutaneous, Q4H PRN, Darrell Kc MD  •  HYDROmorphone (DILAUDID) injection 0.5 mg, 0.5  mg, Intravenous, Q1H PRN **OR** morphine injection 2 mg, 2 mg, Intravenous, Q1H PRN **OR** morphine concentrated solution 5 mg, 5 mg, Sublingual, Q1H PRN **OR** ketorolac (TORADOL) injection 15 mg, 15 mg, Intravenous, Q6H PRN, Darrell Kc MD  •  HYDROmorphone (DILAUDID) injection 1 mg, 1 mg, Intravenous, Q1H PRN **OR** morphine injection 4 mg, 4 mg, Intravenous, Q1H PRN **OR** morphine concentrated solution 10 mg, 10 mg, Sublingual, Q1H PRN, Darrell Kc MD  •  LORazepam (ATIVAN) injection 0.5 mg, 0.5 mg, Intravenous, Q1H PRN **OR** LORazepam (ATIVAN) injection 0.5 mg, 0.5 mg, Subcutaneous, Q1H PRN **OR** LORazepam (ATIVAN) 2 MG/ML concentrated solution 0.5 mg, 0.5 mg, Sublingual, Q1H PRN, Darrell Kc MD  •  LORazepam (ATIVAN) injection 1 mg, 1 mg, Intravenous, Q1H PRN **OR** LORazepam (ATIVAN) injection 1 mg, 1 mg, Subcutaneous, Q1H PRN **OR** LORazepam (ATIVAN) 2 MG/ML concentrated solution 1 mg, 1 mg, Sublingual, Q1H PRN, Darrell Kc MD  •  LORazepam (ATIVAN) injection 2 mg, 2 mg, Intravenous, Q1H PRN **OR** LORazepam (ATIVAN) injection 2 mg, 2 mg, Subcutaneous, Q1H PRN **OR** LORazepam (ATIVAN) 2 MG/ML concentrated solution 2 mg, 2 mg, Sublingual, Q1H PRN, Darrell Kc MD  •  promethazine (PHENERGAN) tablet 6.25 mg, 6.25 mg, Oral, Q4H PRN **OR** promethazine (PHENERGAN) 6.25 MG/5ML syrup 6.25 mg, 6.25 mg, Oral, Q4H PRN **OR** promethazine (PHENERGAN) suppository 6.25 mg, 6.25 mg, Rectal, Q4H PRN, Darrell Kc MD     Consults obtained  Nephrology  Infectious disease  Nutrition  Palliative care  Hospice  Spiritual care    Procedures  None    Hospital course  76-year-old white male with history of dementia Parkinson disease progressive supranuclear palsy who is well-known to our service admitted to emergency room with altered mental status.  Patient recently treated for COVID-19 infection with hypoxia and hyponatremia.  Patient work-up in ER on this admission revealed acute UTI with sepsis and also found  "to be in acute kidney injury.  Patient admitted treated with IV fluid and IV antibiotics and his cultures came back positive for Citrobacter and his blood cultures were also positive for CNS which was contamination.  Patient shows improvement and returned to normal good days and bad days.  Patient and family wants to take him back to the nursing home with hospice but he has declined and family decided to keep him in palliative care unit for comfort care and allow natural death.  Patient remained comfortable and he does qualify for hospice care at bed and will be transferred to hospice care at bed for comfort care only.  Patient was DNR and his CODE STATUS changed to allow natural death.    Physical examination  Blood pressure 144/76, pulse 93, temperature (!) 102 °F (38.9 °C), temperature source Axillary, resp. rate 20, height 180.3 cm (70.98\"), weight 67.1 kg (147 lb 14.9 oz), SpO2 92 %.    Minimally responsive  Lungs decreased breath sounds  Heart S1-S2  Abdomen soft nontender hypoactive bowel sounds  Extremities trace edema    LABS  Lab Results (last 24 hours)     ** No results found for the last 24 hours. **        Imaging Results (Last 24 Hours)     ** No results found for the last 24 hours. **        PLAN  Hospice scattered bed  Comfort care only  Allow natural death  Routine palliative care orders  Discussed with family  Discussed with nursing staff    KANIKA ESCOBEDO MD  "

## 2022-02-05 NOTE — PROGRESS NOTES
Patient  this morning   Will release patient body with family   Death summary dictated    KANIKA ESCOBEDO MD

## 2022-02-05 NOTE — PLAN OF CARE
Goal Outcome Evaluation:  Plan of Care Reviewed With: patient        Progress: declining  Outcome Summary: Appears comfortable at rest. Tachypneic with mild secretions this morning. Morphine 2mg given prior to turns, added robinul this morning. Family at bedside through the night. Will continue palliative care

## 2022-02-05 NOTE — DISCHARGE SUMMARY
Discharge summary    Date of admission 2022  Date of death 2022    Discussion  76-year-old white male with history of dementia Parkinson's disease progressive supranuclear palsy admitted to emergency room with altered mental status.  Patient recently treated for COVID-19 infection with hypoxia and found to have UTI with sepsis on this admission.  Patient also have acute kidney injury.  Patient treated with IV fluid IV antibiotics and followed by infectious disease and nephrology and his infection and acute kidney injury resolved.  Patient family wants him to go back to nursing home with hospice but he continued to have low-grade fever with good days and bad days and poor quality of life.  Patient was DNR and family decided to withdraw care and transfer patient to palliative care unit for comfort care only and allow natural death.  Patient remained in palliative care unit and then qualified for hospice scattered bed for comfort care and  this morning.  For details see my discharge summary from acute care    Cause of death cardiopulmonary failure    KANIKA ESCOBEDO MD

## 2022-02-07 NOTE — PROGRESS NOTES
Discharge Planning Assessment  [facility]     Patient Name: Rommel Gonzalez  MRN: [id number]  Today's Date: 2022    Admit Date: 2022     Discharge Needs Assessment    No documentation.                Discharge Plan    No documentation.               Continued Care and Services - Discharged on 2022 Admission date: 2022 - Discharge disposition:    Coordination has not been started for this encounter.     Selected Continued Care - Prior Encounters Includes selections from prior encounters from 2021 to 2022    Discharged on 2022 Admission date: 2022 - Discharge disposition: Hospice/Medical Facility ([facility])    Destination     Service Provider Selected Services Address Phone Fax Patient Preferred    [facility] [address] [facility] [address] [phone] --                Discharged on 2022 Admission date: 2021 - Discharge disposition: Intermediate Care     Destination     Service Provider Selected Services Address Phone Fax Patient Preferred    [facility]  Assisted Living [address] [facility] [address] [phone] --                    Expected Discharge Date and Time     Expected Discharge Date Expected Discharge Time    2022  4:24 PM         Demographic Summary    No documentation.                Functional Status    No documentation.                Psychosocial    No documentation.                Abuse/Neglect    No documentation.                Legal    No documentation.                Substance Abuse    No documentation.                Patient Forms    No documentation.                   Nan Rangel, DANDY

## 2022-02-07 NOTE — PROGRESS NOTES
Case Management Discharge Note      Final Note: The patient  on 22 @ 12:06. B. Juan Carlos RN, CCP.         Selected Continued Care - Discharged on 2022 Admission date: 2022 - Discharge disposition:     Destination Coordination complete.    Service Provider Selected Services Address Phone Fax Patient Preferred    [facility]  Inpatient Hospice [address], [facility] [id number] [phone] --          Durable Medical Equipment    No services have been selected for the patient.              Dialysis/Infusion    No services have been selected for the patient.              Home Medical Care    No services have been selected for the patient.              Therapy    No services have been selected for the patient.              Community Resources    No services have been selected for the patient.              Community & DME    No services have been selected for the patient.                Selected Continued Care - Prior Encounters Includes selections from prior encounters from 2021 to 2022    Discharged on 2022 Admission date: 2022 - Discharge disposition: Hospice/Medical Facility ([facility])    Destination     Service Provider Selected Services Address Phone Fax Patient Preferred    [facility] [address], [facility] [address] [phone] --                Discharged on 2022 Admission date: 2021 - Discharge disposition: Intermediate Care     Destination     Service Provider Selected Services Address Phone Fax Patient Preferred    [facility]  Assisted Living [address] [facility] [address] [phone] --                         Final Discharge Disposition Code: 41 -  in medical facility

## 2024-08-16 NOTE — PLAN OF CARE
Patient called back and was advised  Landry Mart MD   to Ben Rivas       8/16/24  1:10 PM  Urine shows no evidence of infection.  Please schedule f/u if still having symptoms    Last read by Ben Rivas at  1:27 PM on 8/16/2024.   Problem: Fall Risk (Adult)  Goal: Absence of Fall  Outcome: Ongoing (interventions implemented as appropriate)      Problem: Nutrition, Imbalanced: Inadequate Oral Intake (Adult)  Goal: Improved Oral Intake  Outcome: Ongoing (interventions implemented as appropriate)    Goal: Prevent Further Weight Loss  Outcome: Ongoing (interventions implemented as appropriate)      Problem: Patient Care Overview  Goal: Plan of Care Review  Outcome: Ongoing (interventions implemented as appropriate)   03/14/18 0443   Coping/Psychosocial   Plan of Care Reviewed With patient   OTHER   Outcome Summary meds per order, no skin breakdown, npo cont, no fallls, see labs and vs   Plan of Care Review   Progress improving     Goal: Individualization and Mutuality  Outcome: Ongoing (interventions implemented as appropriate)    Goal: Discharge Needs Assessment  Outcome: Ongoing (interventions implemented as appropriate)    Goal: Interprofessional Rounds/Family Conf  Outcome: Ongoing (interventions implemented as appropriate)      Problem: Skin Injury Risk (Adult)  Goal: Skin Health and Integrity  Outcome: Ongoing (interventions implemented as appropriate)

## 2024-11-05 NOTE — PROGRESS NOTES
Continued Stay Note  [facility]     Patient Name: Rommel Gonzalez  MRN: [id number]  Today's Date: 2/3/2022    Admit Date: 1/25/2022     Discharge Plan     Row Name 02/03/22 1211       Plan    Plan transfer to palliative care    Plan Comments Spoke with nursing who notified CCP that the plan is now for transfer to palliative care.  Spoke with Sherine and cancelled ambulance scheduled for 3 pm today.  Awaiting a return call from [facility] and will notify........................Ashley Headley RN    Row Name 02/03/22 1129       Plan    Plan [facility] with EMS to  at 3pm    Plan Comments VM for JIM and for Yecenia with [facility] to discuss denial for return and notifiy of EMS today at 3..................Ashley Headley RN               Discharge Codes    No documentation.               Expected Discharge Date and Time     Expected Discharge Date Expected Discharge Time    Feb 2, 2022             Ashley Headley RN     show

## (undated) DEVICE — SENSR O2 OXIMAX FNGR A/ 18IN NONSTR

## (undated) DEVICE — TUBING, SUCTION, 1/4" X 10', STRAIGHT: Brand: MEDLINE

## (undated) DEVICE — LN SMPL CO2 SHTRM SD STREAM W/M LUER

## (undated) DEVICE — ADAPT CLN BIOGUARD AIR/H2O DISP

## (undated) DEVICE — BITEBLOCK OMNI BLOC

## (undated) DEVICE — KT ORCA ORCAPOD DISP STRL

## (undated) DEVICE — Device: Brand: DEFENDO AIR/WATER/SUCTION AND BIOPSY VALVE

## (undated) DEVICE — CANN O2 ETCO2 FITS ALL CONN CO2 SMPL A/ 7IN DISP LF

## (undated) DEVICE — CANN NASL CO2 TRULINK W/O2 A/

## (undated) DEVICE — TBG 02 CRUSH RESIST LF CLR 7FT